# Patient Record
Sex: MALE | Race: BLACK OR AFRICAN AMERICAN | NOT HISPANIC OR LATINO
[De-identification: names, ages, dates, MRNs, and addresses within clinical notes are randomized per-mention and may not be internally consistent; named-entity substitution may affect disease eponyms.]

---

## 2017-06-12 ENCOUNTER — APPOINTMENT (OUTPATIENT)
Dept: HEMATOLOGY ONCOLOGY | Facility: CLINIC | Age: 57
End: 2017-06-12

## 2017-06-12 VITALS
RESPIRATION RATE: 14 BRPM | WEIGHT: 315 LBS | HEIGHT: 70 IN | BODY MASS INDEX: 45.1 KG/M2 | DIASTOLIC BLOOD PRESSURE: 94 MMHG | SYSTOLIC BLOOD PRESSURE: 150 MMHG | TEMPERATURE: 96.3 F | HEART RATE: 80 BPM

## 2017-06-12 DIAGNOSIS — L97.519 NON-PRESSURE CHRONIC ULCER OF OTHER PART OF RIGHT FOOT WITH UNSPECIFIED SEVERITY: ICD-10-CM

## 2017-06-12 DIAGNOSIS — Z82.0 FAMILY HISTORY OF EPILEPSY AND OTHER DISEASES OF THE NERVOUS SYSTEM: ICD-10-CM

## 2017-06-12 DIAGNOSIS — Z80.6 FAMILY HISTORY OF LEUKEMIA: ICD-10-CM

## 2017-06-12 DIAGNOSIS — Z86.69 PERSONAL HISTORY OF OTHER DISEASES OF THE NERVOUS SYSTEM AND SENSE ORGANS: ICD-10-CM

## 2017-06-12 DIAGNOSIS — Z98.890 OTHER SPECIFIED POSTPROCEDURAL STATES: ICD-10-CM

## 2017-06-12 DIAGNOSIS — Z86.39 PERSONAL HISTORY OF OTHER ENDOCRINE, NUTRITIONAL AND METABOLIC DISEASE: ICD-10-CM

## 2017-06-12 DIAGNOSIS — Z87.39 PERSONAL HISTORY OF OTHER DISEASES OF THE MUSCULOSKELETAL SYSTEM AND CONNECTIVE TISSUE: ICD-10-CM

## 2017-06-12 DIAGNOSIS — Z86.79 PERSONAL HISTORY OF OTHER DISEASES OF THE CIRCULATORY SYSTEM: ICD-10-CM

## 2017-06-12 DIAGNOSIS — Z83.3 FAMILY HISTORY OF DIABETES MELLITUS: ICD-10-CM

## 2017-06-12 RX ORDER — LOSARTAN POTASSIUM 25 MG/1
25 TABLET, FILM COATED ORAL
Refills: 0 | Status: ACTIVE | COMMUNITY

## 2017-06-12 RX ORDER — LOSARTAN POTASSIUM 100 MG/1
100 TABLET, FILM COATED ORAL
Refills: 0 | Status: ACTIVE | COMMUNITY

## 2017-06-12 RX ORDER — ASPIRIN 81 MG
81 TABLET, DELAYED RELEASE (ENTERIC COATED) ORAL
Refills: 0 | Status: ACTIVE | COMMUNITY

## 2017-06-13 LAB
BASOPHILS # BLD: 0.08 TH/MM3
BASOPHILS NFR BLD: 0.8 %
EOSINOPHIL # BLD: 0.45 TH/MM3
EOSINOPHIL NFR BLD: 4.3 %
ERYTHROCYTE [DISTWIDTH] IN BLOOD BY AUTOMATED COUNT: 16.8 %
GRANULOCYTES # BLD: 6.67 TH/MM3
GRANULOCYTES NFR BLD: 64.2 %
HCT VFR BLD AUTO: 38.2 %
HGB BLD-MCNC: 12.1 G/DL
IMM GRANULOCYTES # BLD: 0.36 TH/MM3
IMM GRANULOCYTES NFR BLD: 3.5 %
LYMPHOCYTES # BLD: 1.99 TH/MM3
LYMPHOCYTES NFR BLD: 19.2 %
MCH RBC QN AUTO: 28.4 PG
MCHC RBC AUTO-ENTMCNC: 31.7 G/DL
MCV RBC AUTO: 89.7 FL
MONOCYTES # BLD: 0.83 TH/MM3
MONOCYTES NFR BLD: 8 %
PERCENT SATURATION (NORTH): 17.2 %
PLATELET # BLD: 191 TH/MM3
PMV BLD AUTO: 9.8 FL
RBC # BLD AUTO: 4.26 MIL/MM3
RETICS/RBC NFR: 1.19 %
TIBC SERPL-MCNC: 349 UG/DL
WBC # BLD: 10.38 TH/MM3

## 2017-06-16 LAB
ALBUMIN MFR SERPL ELPH: 41.2 %
ALBUMIN SERPL ELPH-MCNC: 3.7 G/DL
ALBUMIN/GLOB SERPL ELPH: 0.7 ZZ
ALPHA1 GLOB MFR SERPL ELPH: 4 %
ALPHA1 GLOB SERPL ELPH-MCNC: 0.4 G/DL
ALPHA2 GLOB MFR SERPL ELPH: 7.7 %
ALPHA2 GLOB SERPL ELPH-MCNC: 0.7 G/DL
B-GLOBULIN SERPL ELPH-MCNC: 1.2 G/DL
BETA1 GLOB MFR SERPL ELPH: 12.8 %
FERRITIN SERPL-MCNC: 97 NG/ML
GAMMA GLOB MFR SERPL ELPH: 34.3 %
GAMMA GLOB SERPL ELPH-MCNC: 3.1 G/DL
INTERPRETATION SERPL IFE-IMP: NORMAL
KAPPA LC FREE SER-MCNC: 121.9 MG/L
KAPPA LC FREE/LAMBDA FREE SER: 2.18
LAMBDA LC FREE SERPL-MCNC: 55.9 MG/L
PROT PATTERN SERPL ELPH-IMP: 9 G/DL
PROT PATTERN SERPL ELPH-IMP: NORMAL
PROT SERPL-MCNC: 9 G/DL
VIT B12 SERPL-MCNC: 413 PG/ML

## 2017-07-17 ENCOUNTER — APPOINTMENT (OUTPATIENT)
Dept: HEMATOLOGY ONCOLOGY | Facility: CLINIC | Age: 57
End: 2017-07-17

## 2017-07-17 ENCOUNTER — OUTPATIENT (OUTPATIENT)
Dept: OUTPATIENT SERVICES | Facility: HOSPITAL | Age: 57
LOS: 1 days | Discharge: HOME | End: 2017-07-17

## 2017-07-17 VITALS
RESPIRATION RATE: 14 BRPM | HEART RATE: 94 BPM | DIASTOLIC BLOOD PRESSURE: 84 MMHG | SYSTOLIC BLOOD PRESSURE: 157 MMHG | HEIGHT: 78 IN | TEMPERATURE: 96.8 F

## 2017-07-17 DIAGNOSIS — D47.2 MONOCLONAL GAMMOPATHY: ICD-10-CM

## 2017-07-17 DIAGNOSIS — M86.179 OTHER ACUTE OSTEOMYELITIS, UNSPECIFIED ANKLE AND FOOT: ICD-10-CM

## 2017-07-17 DIAGNOSIS — N18.9 CHRONIC KIDNEY DISEASE, UNSPECIFIED: ICD-10-CM

## 2017-07-17 DIAGNOSIS — R53.83 OTHER FATIGUE: ICD-10-CM

## 2017-07-17 DIAGNOSIS — N17.9 ACUTE KIDNEY FAILURE, UNSPECIFIED: ICD-10-CM

## 2017-07-18 DIAGNOSIS — D63.1 ANEMIA IN CHRONIC KIDNEY DISEASE: ICD-10-CM

## 2017-08-07 ENCOUNTER — INPATIENT (INPATIENT)
Facility: HOSPITAL | Age: 57
LOS: 3 days | Discharge: HOME HEALTH | End: 2017-08-11
Attending: INTERNAL MEDICINE | Admitting: FAMILY MEDICINE

## 2017-08-07 DIAGNOSIS — M86.179 OTHER ACUTE OSTEOMYELITIS, UNSPECIFIED ANKLE AND FOOT: ICD-10-CM

## 2017-08-07 DIAGNOSIS — N17.9 ACUTE KIDNEY FAILURE, UNSPECIFIED: ICD-10-CM

## 2017-08-07 DIAGNOSIS — R53.83 OTHER FATIGUE: ICD-10-CM

## 2017-08-14 DIAGNOSIS — I12.9 HYPERTENSIVE CHRONIC KIDNEY DISEASE WITH STAGE 1 THROUGH STAGE 4 CHRONIC KIDNEY DISEASE, OR UNSPECIFIED CHRONIC KIDNEY DISEASE: ICD-10-CM

## 2017-08-14 DIAGNOSIS — N17.9 ACUTE KIDNEY FAILURE, UNSPECIFIED: ICD-10-CM

## 2017-08-14 DIAGNOSIS — E11.42 TYPE 2 DIABETES MELLITUS WITH DIABETIC POLYNEUROPATHY: ICD-10-CM

## 2017-08-14 DIAGNOSIS — E44.1 MILD PROTEIN-CALORIE MALNUTRITION: ICD-10-CM

## 2017-08-14 DIAGNOSIS — E11.319 TYPE 2 DIABETES MELLITUS WITH UNSPECIFIED DIABETIC RETINOPATHY WITHOUT MACULAR EDEMA: ICD-10-CM

## 2017-08-14 DIAGNOSIS — M86.471 CHRONIC OSTEOMYELITIS WITH DRAINING SINUS, RIGHT ANKLE AND FOOT: ICD-10-CM

## 2017-08-14 DIAGNOSIS — E11.22 TYPE 2 DIABETES MELLITUS WITH DIABETIC CHRONIC KIDNEY DISEASE: ICD-10-CM

## 2017-08-14 DIAGNOSIS — E11.21 TYPE 2 DIABETES MELLITUS WITH DIABETIC NEPHROPATHY: ICD-10-CM

## 2017-08-14 DIAGNOSIS — E11.69 TYPE 2 DIABETES MELLITUS WITH OTHER SPECIFIED COMPLICATION: ICD-10-CM

## 2017-08-14 DIAGNOSIS — N40.0 BENIGN PROSTATIC HYPERPLASIA WITHOUT LOWER URINARY TRACT SYMPTOMS: ICD-10-CM

## 2017-08-14 DIAGNOSIS — A49.02 METHICILLIN RESISTANT STAPHYLOCOCCUS AUREUS INFECTION, UNSPECIFIED SITE: ICD-10-CM

## 2017-08-14 DIAGNOSIS — I70.234 ATHEROSCLEROSIS OF NATIVE ARTERIES OF RIGHT LEG WITH ULCERATION OF HEEL AND MIDFOOT: ICD-10-CM

## 2017-08-14 DIAGNOSIS — E66.01 MORBID (SEVERE) OBESITY DUE TO EXCESS CALORIES: ICD-10-CM

## 2017-08-14 DIAGNOSIS — E11.51 TYPE 2 DIABETES MELLITUS WITH DIABETIC PERIPHERAL ANGIOPATHY WITHOUT GANGRENE: ICD-10-CM

## 2017-08-14 DIAGNOSIS — L97.414 NON-PRESSURE CHRONIC ULCER OF RIGHT HEEL AND MIDFOOT WITH NECROSIS OF BONE: ICD-10-CM

## 2017-08-14 DIAGNOSIS — D50.0 IRON DEFICIENCY ANEMIA SECONDARY TO BLOOD LOSS (CHRONIC): ICD-10-CM

## 2017-08-14 DIAGNOSIS — Z79.4 LONG TERM (CURRENT) USE OF INSULIN: ICD-10-CM

## 2017-08-14 DIAGNOSIS — N18.3 CHRONIC KIDNEY DISEASE, STAGE 3 (MODERATE): ICD-10-CM

## 2017-10-25 ENCOUNTER — OUTPATIENT (OUTPATIENT)
Dept: OUTPATIENT SERVICES | Facility: HOSPITAL | Age: 57
LOS: 1 days | Discharge: HOME | End: 2017-10-25

## 2017-10-25 ENCOUNTER — APPOINTMENT (OUTPATIENT)
Dept: HEMATOLOGY ONCOLOGY | Facility: CLINIC | Age: 57
End: 2017-10-25

## 2017-10-25 VITALS
HEART RATE: 97 BPM | TEMPERATURE: 97.2 F | SYSTOLIC BLOOD PRESSURE: 136 MMHG | RESPIRATION RATE: 14 BRPM | DIASTOLIC BLOOD PRESSURE: 77 MMHG | HEIGHT: 77 IN

## 2017-10-25 DIAGNOSIS — D89.0 POLYCLONAL HYPERGAMMAGLOBULINEMIA: ICD-10-CM

## 2017-10-25 DIAGNOSIS — D63.8 ANEMIA IN OTHER CHRONIC DISEASES CLASSIFIED ELSEWHERE: ICD-10-CM

## 2017-10-26 DIAGNOSIS — D89.0 POLYCLONAL HYPERGAMMAGLOBULINEMIA: ICD-10-CM

## 2017-10-26 DIAGNOSIS — D63.1 ANEMIA IN CHRONIC KIDNEY DISEASE: ICD-10-CM

## 2017-10-26 LAB
ALBUMIN MFR SERPL ELPH: 45.8 %
ALBUMIN SERPL ELPH-MCNC: 3.9 G/DL
ALBUMIN SERPL-MCNC: 3.6 G/DL
ALBUMIN/GLOB SERPL ELPH: 0.8 ZZ
ALBUMIN/GLOB SERPL: 0.78
ALP SERPL-CCNC: 71 IU/L
ALPHA1 GLOB MFR SERPL ELPH: 4.2 %
ALPHA1 GLOB SERPL ELPH-MCNC: 0.4 G/DL
ALPHA2 GLOB MFR SERPL ELPH: 7.9 %
ALPHA2 GLOB SERPL ELPH-MCNC: 0.7 G/DL
ALT SERPL-CCNC: 17 IU/L
ANION GAP SERPL CALC-SCNC: 11 MEQ/L
AST SERPL-CCNC: 24 IU/L
B-GLOBULIN SERPL ELPH-MCNC: 1.2 G/DL
BASOPHILS # BLD: 0.01 TH/MM3
BASOPHILS NFR BLD: 0.1 %
BETA1 GLOB MFR SERPL ELPH: 13.4 %
BILIRUB SERPL-MCNC: 1 MG/DL
BUN SERPL-MCNC: 28 MG/DL
BUN/CREAT SERPL: 14.2 %
CALCIUM SERPL-MCNC: 9.2 MG/DL
CHLORIDE SERPL-SCNC: 100 MEQ/L
CO2 SERPL-SCNC: 28 MEQ/L
CREAT SERPL-MCNC: 1.97 MG/DL
EOSINOPHIL # BLD: 0.28 TH/MM3
EOSINOPHIL NFR BLD: 3 %
ERYTHROCYTE [DISTWIDTH] IN BLOOD BY AUTOMATED COUNT: 15.9 %
GAMMA GLOB MFR SERPL ELPH: 28.7 %
GAMMA GLOB SERPL ELPH-MCNC: 2.5 G/DL
GFR SERPL CREATININE-BSD FRML MDRD: 43
GLUCOSE SERPL-MCNC: 77 MG/DL
GRANULOCYTES # BLD: 6.62 TH/MM3
GRANULOCYTES NFR BLD: 70.4 %
HCT VFR BLD AUTO: 39.7 %
HGB BLD-MCNC: 12.1 G/DL
IMM GRANULOCYTES # BLD: 0.01 TH/MM3
IMM GRANULOCYTES NFR BLD: 0.1 %
LYMPHOCYTES # BLD: 1.62 TH/MM3
LYMPHOCYTES NFR BLD: 17.2 %
MCH RBC QN AUTO: 26.7 PG
MCHC RBC AUTO-ENTMCNC: 30.5 G/DL
MCV RBC AUTO: 87.6 FL
MONOCYTES # BLD: 0.87 TH/MM3
MONOCYTES NFR BLD: 9.2 %
PLATELET # BLD: 191 TH/MM3
PMV BLD AUTO: 9.7 FL
POTASSIUM SERPL-SCNC: 3.6 MMOL/L
PROT PATTERN SERPL ELPH-IMP: 8.6 G/DL
PROT PATTERN SERPL ELPH-IMP: NORMAL
PROT SERPL-MCNC: 8.2 G/DL
PROT SERPL-MCNC: 8.6 G/DL
RBC # BLD AUTO: 4.53 MIL/MM3
SODIUM SERPL-SCNC: 139 MEQ/L
WBC # BLD: 9.41 TH/MM3

## 2017-10-27 LAB
KAPPA LC FREE SER-MCNC: 114.9 MG/L
KAPPA LC FREE/LAMBDA FREE SER: 2.29
LAMBDA LC FREE SERPL-MCNC: 50.1 MG/L

## 2019-10-02 ENCOUNTER — OUTPATIENT (OUTPATIENT)
Dept: OUTPATIENT SERVICES | Facility: HOSPITAL | Age: 59
LOS: 1 days | Discharge: HOME | End: 2019-10-02
Payer: COMMERCIAL

## 2019-10-02 VITALS
DIASTOLIC BLOOD PRESSURE: 64 MMHG | TEMPERATURE: 98 F | OXYGEN SATURATION: 96 % | WEIGHT: 315 LBS | RESPIRATION RATE: 18 BRPM | HEART RATE: 82 BPM | SYSTOLIC BLOOD PRESSURE: 127 MMHG | HEIGHT: 70 IN

## 2019-10-02 DIAGNOSIS — E13.610: ICD-10-CM

## 2019-10-02 DIAGNOSIS — Z01.818 ENCOUNTER FOR OTHER PREPROCEDURAL EXAMINATION: ICD-10-CM

## 2019-10-02 DIAGNOSIS — Z98.890 OTHER SPECIFIED POSTPROCEDURAL STATES: Chronic | ICD-10-CM

## 2019-10-02 DIAGNOSIS — Z89.422 ACQUIRED ABSENCE OF OTHER LEFT TOE(S): Chronic | ICD-10-CM

## 2019-10-02 DIAGNOSIS — Z94.5 SKIN TRANSPLANT STATUS: Chronic | ICD-10-CM

## 2019-10-02 LAB
ALBUMIN SERPL ELPH-MCNC: 4.5 G/DL — SIGNIFICANT CHANGE UP (ref 3.5–5.2)
ALP SERPL-CCNC: 88 U/L — SIGNIFICANT CHANGE UP (ref 30–115)
ALT FLD-CCNC: 16 U/L — SIGNIFICANT CHANGE UP (ref 0–41)
ANION GAP SERPL CALC-SCNC: 14 MMOL/L — SIGNIFICANT CHANGE UP (ref 7–14)
APPEARANCE UR: CLEAR — SIGNIFICANT CHANGE UP
APTT BLD: 31.9 SEC — SIGNIFICANT CHANGE UP (ref 27–39.2)
AST SERPL-CCNC: 26 U/L — SIGNIFICANT CHANGE UP (ref 0–41)
BACTERIA # UR AUTO: NEGATIVE — SIGNIFICANT CHANGE UP
BASOPHILS # BLD AUTO: 0.02 K/UL — SIGNIFICANT CHANGE UP (ref 0–0.2)
BASOPHILS NFR BLD AUTO: 0.2 % — SIGNIFICANT CHANGE UP (ref 0–1)
BILIRUB SERPL-MCNC: 0.6 MG/DL — SIGNIFICANT CHANGE UP (ref 0.2–1.2)
BILIRUB UR-MCNC: NEGATIVE — SIGNIFICANT CHANGE UP
BUN SERPL-MCNC: 30 MG/DL — HIGH (ref 10–20)
CALCIUM SERPL-MCNC: 9.8 MG/DL — SIGNIFICANT CHANGE UP (ref 8.5–10.1)
CHLORIDE SERPL-SCNC: 96 MMOL/L — LOW (ref 98–110)
CO2 SERPL-SCNC: 31 MMOL/L — SIGNIFICANT CHANGE UP (ref 17–32)
COLOR SPEC: SIGNIFICANT CHANGE UP
CREAT SERPL-MCNC: 2 MG/DL — HIGH (ref 0.7–1.5)
DIFF PNL FLD: ABNORMAL
EOSINOPHIL # BLD AUTO: 0.22 K/UL — SIGNIFICANT CHANGE UP (ref 0–0.7)
EOSINOPHIL NFR BLD AUTO: 2.6 % — SIGNIFICANT CHANGE UP (ref 0–8)
EPI CELLS # UR: 0 /HPF — SIGNIFICANT CHANGE UP (ref 0–5)
ESTIMATED AVERAGE GLUCOSE: 200 MG/DL — HIGH (ref 68–114)
GLUCOSE SERPL-MCNC: 128 MG/DL — HIGH (ref 70–99)
GLUCOSE UR QL: ABNORMAL
HBA1C BLD-MCNC: 8.6 % — HIGH (ref 4–5.6)
HCT VFR BLD CALC: 43.1 % — SIGNIFICANT CHANGE UP (ref 42–52)
HGB BLD-MCNC: 13.8 G/DL — LOW (ref 14–18)
HYALINE CASTS # UR AUTO: 0 /LPF — SIGNIFICANT CHANGE UP (ref 0–7)
IMM GRANULOCYTES NFR BLD AUTO: 0.4 % — HIGH (ref 0.1–0.3)
INR BLD: 0.97 RATIO — SIGNIFICANT CHANGE UP (ref 0.65–1.3)
KETONES UR-MCNC: NEGATIVE — SIGNIFICANT CHANGE UP
LEUKOCYTE ESTERASE UR-ACNC: NEGATIVE — SIGNIFICANT CHANGE UP
LYMPHOCYTES # BLD AUTO: 2.01 K/UL — SIGNIFICANT CHANGE UP (ref 1.2–3.4)
LYMPHOCYTES # BLD AUTO: 23.8 % — SIGNIFICANT CHANGE UP (ref 20.5–51.1)
MCHC RBC-ENTMCNC: 28.9 PG — SIGNIFICANT CHANGE UP (ref 27–31)
MCHC RBC-ENTMCNC: 32 G/DL — SIGNIFICANT CHANGE UP (ref 32–37)
MCV RBC AUTO: 90.2 FL — SIGNIFICANT CHANGE UP (ref 80–94)
MONOCYTES # BLD AUTO: 0.77 K/UL — HIGH (ref 0.1–0.6)
MONOCYTES NFR BLD AUTO: 9.1 % — SIGNIFICANT CHANGE UP (ref 1.7–9.3)
NEUTROPHILS # BLD AUTO: 5.41 K/UL — SIGNIFICANT CHANGE UP (ref 1.4–6.5)
NEUTROPHILS NFR BLD AUTO: 63.9 % — SIGNIFICANT CHANGE UP (ref 42.2–75.2)
NITRITE UR-MCNC: NEGATIVE — SIGNIFICANT CHANGE UP
NRBC # BLD: 0 /100 WBCS — SIGNIFICANT CHANGE UP (ref 0–0)
PH UR: 6.5 — SIGNIFICANT CHANGE UP (ref 5–8)
PLATELET # BLD AUTO: 201 K/UL — SIGNIFICANT CHANGE UP (ref 130–400)
POTASSIUM SERPL-MCNC: 4.3 MMOL/L — SIGNIFICANT CHANGE UP (ref 3.5–5)
POTASSIUM SERPL-SCNC: 4.3 MMOL/L — SIGNIFICANT CHANGE UP (ref 3.5–5)
PROT SERPL-MCNC: 9 G/DL — HIGH (ref 6–8)
PROT UR-MCNC: NEGATIVE — SIGNIFICANT CHANGE UP
PROTHROM AB SERPL-ACNC: 11.2 SEC — SIGNIFICANT CHANGE UP (ref 9.95–12.87)
RBC # BLD: 4.78 M/UL — SIGNIFICANT CHANGE UP (ref 4.7–6.1)
RBC # FLD: 14.8 % — HIGH (ref 11.5–14.5)
RBC CASTS # UR COMP ASSIST: 0 /HPF — SIGNIFICANT CHANGE UP (ref 0–4)
SODIUM SERPL-SCNC: 141 MMOL/L — SIGNIFICANT CHANGE UP (ref 135–146)
SP GR SPEC: 1.02 — SIGNIFICANT CHANGE UP (ref 1.01–1.02)
UROBILINOGEN FLD QL: SIGNIFICANT CHANGE UP
WBC # BLD: 8.46 K/UL — SIGNIFICANT CHANGE UP (ref 4.8–10.8)
WBC # FLD AUTO: 8.46 K/UL — SIGNIFICANT CHANGE UP (ref 4.8–10.8)
WBC UR QL: 0 /HPF — SIGNIFICANT CHANGE UP (ref 0–5)

## 2019-10-02 PROCEDURE — 93010 ELECTROCARDIOGRAM REPORT: CPT

## 2019-10-02 RX ORDER — EZETIMIBE 10 MG/1
1 TABLET ORAL
Qty: 0 | Refills: 0 | DISCHARGE

## 2019-10-02 RX ORDER — GLIMEPIRIDE 1 MG
1 TABLET ORAL
Qty: 0 | Refills: 0 | DISCHARGE

## 2019-10-02 RX ORDER — AMLODIPINE BESYLATE 2.5 MG/1
1 TABLET ORAL
Qty: 0 | Refills: 0 | DISCHARGE

## 2019-10-02 RX ORDER — CANAGLIFLOZIN 100 MG/1
1 TABLET, FILM COATED ORAL
Qty: 0 | Refills: 0 | DISCHARGE

## 2019-10-02 RX ORDER — ASPIRIN/CALCIUM CARB/MAGNESIUM 324 MG
1 TABLET ORAL
Qty: 0 | Refills: 0 | DISCHARGE

## 2019-10-02 RX ORDER — CILOSTAZOL 100 MG/1
0 TABLET ORAL
Qty: 0 | Refills: 0 | DISCHARGE

## 2019-10-02 NOTE — H&P PST ADULT - NSICDXPASTSURGICALHX_GEN_ALL_CORE_FT
PAST SURGICAL HISTORY:  H/O skin graft     History of percutaneous angioplasty     Left toe amputee 4 TOES

## 2019-10-02 NOTE — H&P PST ADULT - REASON FOR ADMISSION
59 Y/O M SCHEDULED FOR PAST FOR CHARCOT RECONSTRUCTION INCLUDING MULTIPLE RIGHT FOOT OSTEOTOMIES, APPLICATION OF INTERNAL HARDWARE AND EXTERNAL FIXATOR FRAME AND ACHILLES TENDON LENGTHENING ON 10/4/19

## 2019-10-02 NOTE — H&P PST ADULT - HISTORY OF PRESENT ILLNESS
CURRENTLY  DENIES ANY CP, SOB,PALPITATIONS,COUGH OR DYSURIA  EXERCISE TOLERANCE 1 FOS WITHOUT SOB      AS PER PATIENT  this is his/her complete medical history including medications - PRESCRIPTIONS  OVER THE COUNTER MEDS

## 2019-10-02 NOTE — H&P PST ADULT - NSICDXPASTMEDICALHX_GEN_ALL_CORE_FT
PAST MEDICAL HISTORY:  DM (diabetes mellitus)     High cholesterol     HTN (hypertension)     MATA on CPAP

## 2019-10-04 ENCOUNTER — INPATIENT (INPATIENT)
Facility: HOSPITAL | Age: 59
LOS: 4 days | Discharge: HOME HEALTH | End: 2019-10-09
Attending: HOSPITALIST | Admitting: HOSPITALIST
Payer: MEDICARE

## 2019-10-04 VITALS
HEIGHT: 70 IN | WEIGHT: 315 LBS | RESPIRATION RATE: 16 BRPM | TEMPERATURE: 99 F | DIASTOLIC BLOOD PRESSURE: 68 MMHG | HEART RATE: 92 BPM | SYSTOLIC BLOOD PRESSURE: 127 MMHG

## 2019-10-04 DIAGNOSIS — E11.22 TYPE 2 DIABETES MELLITUS WITH DIABETIC CHRONIC KIDNEY DISEASE: ICD-10-CM

## 2019-10-04 DIAGNOSIS — Z98.890 OTHER SPECIFIED POSTPROCEDURAL STATES: Chronic | ICD-10-CM

## 2019-10-04 DIAGNOSIS — Z79.4 LONG TERM (CURRENT) USE OF INSULIN: ICD-10-CM

## 2019-10-04 DIAGNOSIS — R50.82 POSTPROCEDURAL FEVER: ICD-10-CM

## 2019-10-04 DIAGNOSIS — M14.671 CHARCOT'S JOINT, RIGHT ANKLE AND FOOT: ICD-10-CM

## 2019-10-04 DIAGNOSIS — E11.69 TYPE 2 DIABETES MELLITUS WITH OTHER SPECIFIED COMPLICATION: ICD-10-CM

## 2019-10-04 DIAGNOSIS — E78.00 PURE HYPERCHOLESTEROLEMIA, UNSPECIFIED: ICD-10-CM

## 2019-10-04 DIAGNOSIS — G47.00 INSOMNIA, UNSPECIFIED: ICD-10-CM

## 2019-10-04 DIAGNOSIS — Z53.20 PROCEDURE AND TREATMENT NOT CARRIED OUT BECAUSE OF PATIENT'S DECISION FOR UNSPECIFIED REASONS: ICD-10-CM

## 2019-10-04 DIAGNOSIS — Z94.5 SKIN TRANSPLANT STATUS: Chronic | ICD-10-CM

## 2019-10-04 DIAGNOSIS — Z79.82 LONG TERM (CURRENT) USE OF ASPIRIN: ICD-10-CM

## 2019-10-04 DIAGNOSIS — G47.33 OBSTRUCTIVE SLEEP APNEA (ADULT) (PEDIATRIC): ICD-10-CM

## 2019-10-04 DIAGNOSIS — E66.01 MORBID (SEVERE) OBESITY DUE TO EXCESS CALORIES: ICD-10-CM

## 2019-10-04 DIAGNOSIS — N18.3 CHRONIC KIDNEY DISEASE, STAGE 3 (MODERATE): ICD-10-CM

## 2019-10-04 DIAGNOSIS — Z89.422 ACQUIRED ABSENCE OF OTHER LEFT TOE(S): ICD-10-CM

## 2019-10-04 DIAGNOSIS — Z89.422 ACQUIRED ABSENCE OF OTHER LEFT TOE(S): Chronic | ICD-10-CM

## 2019-10-04 DIAGNOSIS — M86.671 OTHER CHRONIC OSTEOMYELITIS, RIGHT ANKLE AND FOOT: ICD-10-CM

## 2019-10-04 DIAGNOSIS — I12.9 HYPERTENSIVE CHRONIC KIDNEY DISEASE WITH STAGE 1 THROUGH STAGE 4 CHRONIC KIDNEY DISEASE, OR UNSPECIFIED CHRONIC KIDNEY DISEASE: ICD-10-CM

## 2019-10-04 DIAGNOSIS — B95.62 METHICILLIN RESISTANT STAPHYLOCOCCUS AUREUS INFECTION AS THE CAUSE OF DISEASES CLASSIFIED ELSEWHERE: ICD-10-CM

## 2019-10-04 DIAGNOSIS — Z99.89 DEPENDENCE ON OTHER ENABLING MACHINES AND DEVICES: ICD-10-CM

## 2019-10-04 LAB
BLD GP AB SCN SERPL QL: SIGNIFICANT CHANGE UP
GLUCOSE BLDC GLUCOMTR-MCNC: 115 MG/DL — HIGH (ref 70–99)
GLUCOSE BLDC GLUCOMTR-MCNC: 133 MG/DL — HIGH (ref 70–99)
GLUCOSE BLDC GLUCOMTR-MCNC: 193 MG/DL — HIGH (ref 70–99)

## 2019-10-04 RX ORDER — DEXTROSE 50 % IN WATER 50 %
25 SYRINGE (ML) INTRAVENOUS ONCE
Refills: 0 | Status: DISCONTINUED | OUTPATIENT
Start: 2019-10-04 | End: 2019-10-05

## 2019-10-04 RX ORDER — INSULIN LISPRO 100/ML
VIAL (ML) SUBCUTANEOUS
Refills: 0 | Status: DISCONTINUED | OUTPATIENT
Start: 2019-10-04 | End: 2019-10-09

## 2019-10-04 RX ORDER — OXYCODONE AND ACETAMINOPHEN 5; 325 MG/1; MG/1
1 TABLET ORAL ONCE
Refills: 0 | Status: DISCONTINUED | OUTPATIENT
Start: 2019-10-04 | End: 2019-10-04

## 2019-10-04 RX ORDER — MORPHINE SULFATE 50 MG/1
4 CAPSULE, EXTENDED RELEASE ORAL EVERY 6 HOURS
Refills: 0 | Status: DISCONTINUED | OUTPATIENT
Start: 2019-10-04 | End: 2019-10-05

## 2019-10-04 RX ORDER — ACETAMINOPHEN 500 MG
650 TABLET ORAL ONCE
Refills: 0 | Status: COMPLETED | OUTPATIENT
Start: 2019-10-04 | End: 2019-10-05

## 2019-10-04 RX ORDER — HYDROMORPHONE HYDROCHLORIDE 2 MG/ML
1 INJECTION INTRAMUSCULAR; INTRAVENOUS; SUBCUTANEOUS
Refills: 0 | Status: DISCONTINUED | OUTPATIENT
Start: 2019-10-04 | End: 2019-10-04

## 2019-10-04 RX ORDER — AMLODIPINE BESYLATE 2.5 MG/1
5 TABLET ORAL DAILY
Refills: 0 | Status: DISCONTINUED | OUTPATIENT
Start: 2019-10-04 | End: 2019-10-04

## 2019-10-04 RX ORDER — HYDROCHLOROTHIAZIDE 25 MG
25 TABLET ORAL AT BEDTIME
Refills: 0 | Status: DISCONTINUED | OUTPATIENT
Start: 2019-10-04 | End: 2019-10-05

## 2019-10-04 RX ORDER — SODIUM CHLORIDE 9 MG/ML
1000 INJECTION INTRAMUSCULAR; INTRAVENOUS; SUBCUTANEOUS
Refills: 0 | Status: DISCONTINUED | OUTPATIENT
Start: 2019-10-04 | End: 2019-10-04

## 2019-10-04 RX ORDER — DEXTROSE 50 % IN WATER 50 %
12.5 SYRINGE (ML) INTRAVENOUS ONCE
Refills: 0 | Status: DISCONTINUED | OUTPATIENT
Start: 2019-10-04 | End: 2019-10-05

## 2019-10-04 RX ORDER — VANCOMYCIN HCL 1 G
1000 VIAL (EA) INTRAVENOUS EVERY 12 HOURS
Refills: 0 | Status: DISCONTINUED | OUTPATIENT
Start: 2019-10-04 | End: 2019-10-05

## 2019-10-04 RX ORDER — MORPHINE SULFATE 50 MG/1
2 CAPSULE, EXTENDED RELEASE ORAL EVERY 6 HOURS
Refills: 0 | Status: DISCONTINUED | OUTPATIENT
Start: 2019-10-04 | End: 2019-10-05

## 2019-10-04 RX ORDER — ASPIRIN/CALCIUM CARB/MAGNESIUM 324 MG
81 TABLET ORAL DAILY
Refills: 0 | Status: DISCONTINUED | OUTPATIENT
Start: 2019-10-04 | End: 2019-10-04

## 2019-10-04 RX ORDER — ACETAMINOPHEN 500 MG
650 TABLET ORAL ONCE
Refills: 0 | Status: DISCONTINUED | OUTPATIENT
Start: 2019-10-04 | End: 2019-10-04

## 2019-10-04 RX ORDER — AMLODIPINE BESYLATE 2.5 MG/1
5 TABLET ORAL AT BEDTIME
Refills: 0 | Status: DISCONTINUED | OUTPATIENT
Start: 2019-10-04 | End: 2019-10-05

## 2019-10-04 RX ORDER — ENOXAPARIN SODIUM 100 MG/ML
40 INJECTION SUBCUTANEOUS DAILY
Refills: 0 | Status: DISCONTINUED | OUTPATIENT
Start: 2019-10-04 | End: 2019-10-09

## 2019-10-04 RX ORDER — INSULIN GLARGINE 100 [IU]/ML
40 INJECTION, SOLUTION SUBCUTANEOUS AT BEDTIME
Refills: 0 | Status: DISCONTINUED | OUTPATIENT
Start: 2019-10-04 | End: 2019-10-09

## 2019-10-04 RX ORDER — GLUCAGON INJECTION, SOLUTION 0.5 MG/.1ML
1 INJECTION, SOLUTION SUBCUTANEOUS ONCE
Refills: 0 | Status: DISCONTINUED | OUTPATIENT
Start: 2019-10-04 | End: 2019-10-05

## 2019-10-04 RX ORDER — DEXTROSE 50 % IN WATER 50 %
15 SYRINGE (ML) INTRAVENOUS ONCE
Refills: 0 | Status: DISCONTINUED | OUTPATIENT
Start: 2019-10-04 | End: 2019-10-05

## 2019-10-04 RX ORDER — HYDROCHLOROTHIAZIDE 25 MG
25 TABLET ORAL DAILY
Refills: 0 | Status: DISCONTINUED | OUTPATIENT
Start: 2019-10-04 | End: 2019-10-04

## 2019-10-04 RX ORDER — ONDANSETRON 8 MG/1
4 TABLET, FILM COATED ORAL EVERY 4 HOURS
Refills: 0 | Status: DISCONTINUED | OUTPATIENT
Start: 2019-10-04 | End: 2019-10-04

## 2019-10-04 RX ORDER — INFLUENZA VIRUS VACCINE 15; 15; 15; 15 UG/.5ML; UG/.5ML; UG/.5ML; UG/.5ML
0.5 SUSPENSION INTRAMUSCULAR ONCE
Refills: 0 | Status: DISCONTINUED | OUTPATIENT
Start: 2019-10-04 | End: 2019-10-09

## 2019-10-04 RX ORDER — INSULIN LISPRO 100/ML
12 VIAL (ML) SUBCUTANEOUS
Refills: 0 | Status: DISCONTINUED | OUTPATIENT
Start: 2019-10-04 | End: 2019-10-09

## 2019-10-04 RX ORDER — SODIUM CHLORIDE 9 MG/ML
1000 INJECTION, SOLUTION INTRAVENOUS
Refills: 0 | Status: DISCONTINUED | OUTPATIENT
Start: 2019-10-04 | End: 2019-10-05

## 2019-10-04 RX ADMIN — INSULIN GLARGINE 40 UNIT(S): 100 INJECTION, SOLUTION SUBCUTANEOUS at 23:31

## 2019-10-04 RX ADMIN — SODIUM CHLORIDE 150 MILLILITER(S): 9 INJECTION INTRAMUSCULAR; INTRAVENOUS; SUBCUTANEOUS at 18:08

## 2019-10-04 NOTE — H&P ADULT - NSHPLABSRESULTS_GEN_ALL_CORE
Complete Blood Count + Automated Diff (10.02.19 @ 17:45)    WBC Count: 8.46 K/uL    RBC Count: 4.78 M/uL    Hemoglobin: 13.8 g/dL    Hematocrit: 43.1 %    Mean Cell Volume: 90.2 fL    Mean Cell Hemoglobin: 28.9 pg    Mean Cell Hemoglobin Conc: 32.0 g/dL    Red Cell Distrib Width: 14.8 %    Platelet Count - Automated: 201 K/uL    Auto Neutrophil #: 5.41 K/uL    Auto Lymphocyte #: 2.01 K/uL    Auto Monocyte #: 0.77 K/uL    Auto Eosinophil #: 0.22 K/uL    Auto Basophil #: 0.02 K/uL    Auto Neutrophil %: 63.9: Differential percentages must be correlated with absolute numbers for  clinical significance. %    Auto Lymphocyte %: 23.8 %    Auto Monocyte %: 9.1 %    Auto Eosinophil %: 2.6 %    Auto Basophil %: 0.2 %    Auto Immature Granulocyte %: 0.4 %    Nucleated RBC: 0 /100 WBCs    Comprehensive Metabolic Panel (10.02.19 @ 17:45)    Sodium, Serum: 141 mmol/L    Potassium, Serum: 4.3 mmol/L    Chloride, Serum: 96 mmol/L    Carbon Dioxide, Serum: 31 mmol/L    Anion Gap, Serum: 14 mmol/L    Blood Urea Nitrogen, Serum: 30 mg/dL    Creatinine, Serum: 2.0 mg/dL    Glucose, Serum: 128 mg/dL    Calcium, Total Serum: 9.8 mg/dL    Protein Total, Serum: 9.0 g/dL    Albumin, Serum: 4.5 g/dL    Bilirubin Total, Serum: 0.6 mg/dL    Alkaline Phosphatase, Serum: 88 U/L    Aspartate Aminotransferase (AST/SGOT): 26 U/L    Alanine Aminotransferase (ALT/SGPT): 16 U/L    eGFR if Non : 35: Interpretative comment  The units for eGFR are mL/min/1.73M2 (normalized body surface area). The  eGFR is calculated from a serum creatinine using the CKD-EPI equation.  Other variables required for calculation are race, age and sex. Among  patients with chronic kidney disease (CKD), the eGFR is useful in  determining the stage of disease according to KDOQI CKD classification.  All eGFR results are reported numerically with the following  interpretation.          GFR                    With                 Without     (ml/min/1.73 m2)    Kidney Damage       Kidney Damage        >= 90                    Stage 1                     Normal        60-89                    Stage 2                     Decreased GFR        30-59     Stage 3                     Stage 3        15-29                    Stage 4                     Stage 4        < 15                      Stage 5                     Stage 5  Each stage of CKD assumes that the associated GFR level has been in  effect for at least 3 months. Determination of stages one and two (with  eGFR > 59 ml/min/m2) requires estimation of kidney damage for at least 3  months as defined by structural or functional abnormalities.  Limitations: All estimates of GFR will be less accurate for patients at  extremes of muscle mass (including but not limited to frail elderly,  critically ill, or cancer patients), those with unusual diets, and those  with conditions associated with reduced secretion or extrarenal  elimination of creatinine. The eGFR equation is not recommended for use  in patients with unstable creatinine levels. mL/min/1.73M2    eGFR if African American: 41 mL/min/1.73M2      < from: 12 Lead ECG (10.02.19 @ 15:11) >    Diagnosis Line Sinus rhythm with 1st degree A-V block with Premature supraventricular  complexes  Left axis deviation  Abnormal ECG    < end of copied text >

## 2019-10-04 NOTE — H&P ADULT - ASSESSMENT
Patient is a 57 y/o M with PMH of DM, hypertension, dyslipidemia, sleep apnea was admitted for Charcot foot reconstruction with external fixation today on 10/4/19. Admitted to the medicine service for post operative antibiotics.    ASSESSMENT AND PLAN    #Charcot's foot  -Reconstruction with external fixation done on 10/4  -Patient is Non weight bearing strict  -Podiatry following  -Diet resumed. No active pain complaints  -No dressing change needed as per podiatry  -f/u am hemoglobin  -Vancomycin IV started today. As per podiatry, patient needs 3 days of antibiotics  -Check Vanc trough after 3rd dose tomorrow    #Diabetes Mellitus  -Started on insulin glargine and lispro  -Monitor FS and adjust insulin accordingly    #Hypertension  -c/w HCTZ and amlodipine    #Dyslipidemia  c/w zetia    #Diet: DASH/TLC/Carb consistent  #DVT ppx: lovenox  #GI ppx: Not indicated  #Dispo: still acute    PLEASE CONFIRM MEDICATIONS WITH PHARMACY IN AM. [facility]

## 2019-10-04 NOTE — H&P ADULT - ATTENDING COMMENTS
I saw and evaluated patient  by bedside,  c/o post op right ankle pain , tolerating diet well.    All labs, radiology studies, VS was reviewed  I have reviewed the resident's note and agree with documented findings and  plan of care.  -Right ankle charcoat joint- s/p surgical repair- further post op course as per Podiatry sx. rec.  daily DVT proph  -pain management  - post op abx tx. - IV linezolid tx.    - Severe obesity - BMI above 40   -outpatient weight loss tx.    - DM 2 - on insulin tx. continue bsfs monitoring    - CKD stage 3  - continue renal function monitoring    #Progress Note Handoff: Pending Consults____Podiatry f/up _____,Tests___none_____,Test Results___BMP in am_  Family discussion: pt. by bedside Disposition: to be determined

## 2019-10-04 NOTE — CHART NOTE - NSCHARTNOTEFT_GEN_A_CORE
PACU ANESTHESIA ADMISSION NOTE      Procedure: Application, external fixation device, large  External reposition of right ankle joint using external fixation device  Removal, external fixation device, ankle  Removal, external fixation device, foot    Post op diagnosis:  Diabetic Charcot's foot      ____  Intubated  TV:______       Rate: ______      FiO2: ______    _x___  Patent Airway    ____  Full return of protective reflexes    __x__  Full recovery from anesthesia / back to baseline status    Vitals:  T(F):98.7  HR: 99  BP: 172/87  RR: 21  SpO2: 98%    Mental Status:  _x___ Awake   __x___ Alert   _____ Drowsy   _____ Sedated    Nausea/Vomiting:  x____ NO  ______Yes,     See Post - Op Orders   x___ See Post - Op/PCA Orders    Post - Operative Fluids:   ____ Oral   __x__ See Post - Op Orders    Plan: Discharge:   ____Home       __x___Floor     _____Critical Care    _____  Other:_________________    Comments: Patient tolerated procedure  Transfer to PACU   No anesthesia complications

## 2019-10-04 NOTE — BRIEF OPERATIVE NOTE - NSICDXBRIEFPROCEDURE_GEN_ALL_CORE_FT
PROCEDURES:  Application, external fixation device, large 04-Oct-2019 18:02:23  Antonio Kelley  External reposition of right ankle joint using external fixation device 04-Oct-2019 17:58:24  Antonio Kelley

## 2019-10-04 NOTE — PRE-ANESTHESIA EVALUATION ADULT - NSPROPOSEDPROCEDFT_GEN_ALL_CORE
Charcot Reconstruction including Right Foot Osteotomies, Application of internal hardware and external fixator, tendon lengthening

## 2019-10-04 NOTE — PROGRESS NOTE ADULT - SUBJECTIVE AND OBJECTIVE BOX
PODIATRY PROGRESS NOTE   [id number] FERNANDEZ GUZMAN is a pleasant well-nourished, well developed 58y Male in no acute distress, alert awake, and oriented to person, place and time.   Patient is a 58y old  Male who presents with a chief complaint of right foot pain  HPI:  pt has long history of charcot foot deformity right foot.  s/p charchot recon with external fixation 10/4  pt will need to be admitted to under medicine.       Vital Signs Last 24 Hrs  T(C): 37 (04 Oct 2019 13:32), Max: 37 (04 Oct 2019 13:00)  T(F): 98.6 (04 Oct 2019 13:00), Max: 98.6 (04 Oct 2019 13:00)  HR: 92 (04 Oct 2019 13:32) (92 - 92)  BP: 127/68 (04 Oct 2019 13:32) (127/68 - 127/68)  BP(mean): --  RR: 16 (04 Oct 2019 13:32) (16 - 16)  SpO2: --                        A:  charcot foot type  s/p charcot recon RLE with external fixation 10/4    P:  pt evaluated and treated  strict NWB RLE  order 1g vanco q12h  no dressing change needed   podiatry to follow in house  f/u with attending  10-04-19 @ 18:15

## 2019-10-04 NOTE — H&P ADULT - HISTORY OF PRESENT ILLNESS
Patient is a 59 y/o M with PMH of DM, hypertension, dyslipidemia, sleep apnea was admitted for Charcot foot reconstruction with external fixation today. Patient has been admitted for IV antibiotics multiple times in the past for osteomyelitis of right foot. He has to receive IV vancomycin for 3 days post surgery as prophylaxis because of history of MRSA infection in foot. No other active complaints post surgery. Patient is lying comfortably in bed.

## 2019-10-05 LAB
ANION GAP SERPL CALC-SCNC: 10 MMOL/L — SIGNIFICANT CHANGE UP (ref 7–14)
ANION GAP SERPL CALC-SCNC: 12 MMOL/L — SIGNIFICANT CHANGE UP (ref 7–14)
BASOPHILS # BLD AUTO: 0.02 K/UL — SIGNIFICANT CHANGE UP (ref 0–0.2)
BASOPHILS NFR BLD AUTO: 0.2 % — SIGNIFICANT CHANGE UP (ref 0–1)
BUN SERPL-MCNC: 27 MG/DL — HIGH (ref 10–20)
BUN SERPL-MCNC: 27 MG/DL — HIGH (ref 10–20)
CALCIUM SERPL-MCNC: 8.6 MG/DL — SIGNIFICANT CHANGE UP (ref 8.5–10.1)
CALCIUM SERPL-MCNC: 9.1 MG/DL — SIGNIFICANT CHANGE UP (ref 8.5–10.1)
CHLORIDE SERPL-SCNC: 102 MMOL/L — SIGNIFICANT CHANGE UP (ref 98–110)
CHLORIDE SERPL-SCNC: 99 MMOL/L — SIGNIFICANT CHANGE UP (ref 98–110)
CO2 SERPL-SCNC: 29 MMOL/L — SIGNIFICANT CHANGE UP (ref 17–32)
CO2 SERPL-SCNC: 31 MMOL/L — SIGNIFICANT CHANGE UP (ref 17–32)
CREAT SERPL-MCNC: 1.8 MG/DL — HIGH (ref 0.7–1.5)
CREAT SERPL-MCNC: 1.8 MG/DL — HIGH (ref 0.7–1.5)
EOSINOPHIL # BLD AUTO: 0.15 K/UL — SIGNIFICANT CHANGE UP (ref 0–0.7)
EOSINOPHIL NFR BLD AUTO: 1.5 % — SIGNIFICANT CHANGE UP (ref 0–8)
GLUCOSE BLDC GLUCOMTR-MCNC: 117 MG/DL — HIGH (ref 70–99)
GLUCOSE BLDC GLUCOMTR-MCNC: 130 MG/DL — HIGH (ref 70–99)
GLUCOSE BLDC GLUCOMTR-MCNC: 140 MG/DL — HIGH (ref 70–99)
GLUCOSE BLDC GLUCOMTR-MCNC: 148 MG/DL — HIGH (ref 70–99)
GLUCOSE BLDC GLUCOMTR-MCNC: 180 MG/DL — HIGH (ref 70–99)
GLUCOSE SERPL-MCNC: 177 MG/DL — HIGH (ref 70–99)
GLUCOSE SERPL-MCNC: 180 MG/DL — HIGH (ref 70–99)
HCT VFR BLD CALC: 39.1 % — LOW (ref 42–52)
HCT VFR BLD CALC: 40.1 % — LOW (ref 42–52)
HCV AB S/CO SERPL IA: 0.16 S/CO — SIGNIFICANT CHANGE UP (ref 0–0.99)
HCV AB SERPL-IMP: SIGNIFICANT CHANGE UP
HGB BLD-MCNC: 12 G/DL — LOW (ref 14–18)
HGB BLD-MCNC: 12.5 G/DL — LOW (ref 14–18)
IMM GRANULOCYTES NFR BLD AUTO: 0.4 % — HIGH (ref 0.1–0.3)
LYMPHOCYTES # BLD AUTO: 1.69 K/UL — SIGNIFICANT CHANGE UP (ref 1.2–3.4)
LYMPHOCYTES # BLD AUTO: 17.4 % — LOW (ref 20.5–51.1)
MAGNESIUM SERPL-MCNC: 2.2 MG/DL — SIGNIFICANT CHANGE UP (ref 1.8–2.4)
MCHC RBC-ENTMCNC: 28.2 PG — SIGNIFICANT CHANGE UP (ref 27–31)
MCHC RBC-ENTMCNC: 28.6 PG — SIGNIFICANT CHANGE UP (ref 27–31)
MCHC RBC-ENTMCNC: 30.7 G/DL — LOW (ref 32–37)
MCHC RBC-ENTMCNC: 31.2 G/DL — LOW (ref 32–37)
MCV RBC AUTO: 91.8 FL — SIGNIFICANT CHANGE UP (ref 80–94)
MCV RBC AUTO: 91.8 FL — SIGNIFICANT CHANGE UP (ref 80–94)
MONOCYTES # BLD AUTO: 1.03 K/UL — HIGH (ref 0.1–0.6)
MONOCYTES NFR BLD AUTO: 10.6 % — HIGH (ref 1.7–9.3)
NEUTROPHILS # BLD AUTO: 6.78 K/UL — HIGH (ref 1.4–6.5)
NEUTROPHILS NFR BLD AUTO: 69.9 % — SIGNIFICANT CHANGE UP (ref 42.2–75.2)
NRBC # BLD: 0 /100 WBCS — SIGNIFICANT CHANGE UP (ref 0–0)
NRBC # BLD: 0 /100 WBCS — SIGNIFICANT CHANGE UP (ref 0–0)
PLATELET # BLD AUTO: 165 K/UL — SIGNIFICANT CHANGE UP (ref 130–400)
PLATELET # BLD AUTO: 174 K/UL — SIGNIFICANT CHANGE UP (ref 130–400)
POTASSIUM SERPL-MCNC: 3.8 MMOL/L — SIGNIFICANT CHANGE UP (ref 3.5–5)
POTASSIUM SERPL-MCNC: 4.1 MMOL/L — SIGNIFICANT CHANGE UP (ref 3.5–5)
POTASSIUM SERPL-SCNC: 3.8 MMOL/L — SIGNIFICANT CHANGE UP (ref 3.5–5)
POTASSIUM SERPL-SCNC: 4.1 MMOL/L — SIGNIFICANT CHANGE UP (ref 3.5–5)
RBC # BLD: 4.26 M/UL — LOW (ref 4.7–6.1)
RBC # BLD: 4.37 M/UL — LOW (ref 4.7–6.1)
RBC # FLD: 14.7 % — HIGH (ref 11.5–14.5)
RBC # FLD: 15 % — HIGH (ref 11.5–14.5)
SODIUM SERPL-SCNC: 141 MMOL/L — SIGNIFICANT CHANGE UP (ref 135–146)
SODIUM SERPL-SCNC: 142 MMOL/L — SIGNIFICANT CHANGE UP (ref 135–146)
WBC # BLD: 10.58 K/UL — SIGNIFICANT CHANGE UP (ref 4.8–10.8)
WBC # BLD: 9.71 K/UL — SIGNIFICANT CHANGE UP (ref 4.8–10.8)
WBC # FLD AUTO: 10.58 K/UL — SIGNIFICANT CHANGE UP (ref 4.8–10.8)
WBC # FLD AUTO: 9.71 K/UL — SIGNIFICANT CHANGE UP (ref 4.8–10.8)

## 2019-10-05 PROCEDURE — 99223 1ST HOSP IP/OBS HIGH 75: CPT | Mod: AI

## 2019-10-05 RX ORDER — AMLODIPINE BESYLATE 2.5 MG/1
5 TABLET ORAL DAILY
Refills: 0 | Status: DISCONTINUED | OUTPATIENT
Start: 2019-10-05 | End: 2019-10-09

## 2019-10-05 RX ORDER — AMPICILLIN SODIUM AND SULBACTAM SODIUM 250; 125 MG/ML; MG/ML
1.5 INJECTION, POWDER, FOR SUSPENSION INTRAMUSCULAR; INTRAVENOUS EVERY 8 HOURS
Refills: 0 | Status: DISCONTINUED | OUTPATIENT
Start: 2019-10-05 | End: 2019-10-05

## 2019-10-05 RX ORDER — LINEZOLID 600 MG/300ML
600 INJECTION, SOLUTION INTRAVENOUS EVERY 12 HOURS
Refills: 0 | Status: COMPLETED | OUTPATIENT
Start: 2019-10-05 | End: 2019-10-07

## 2019-10-05 RX ORDER — ACETAMINOPHEN 500 MG
650 TABLET ORAL EVERY 6 HOURS
Refills: 0 | Status: DISCONTINUED | OUTPATIENT
Start: 2019-10-05 | End: 2019-10-06

## 2019-10-05 RX ORDER — HYDROCHLOROTHIAZIDE 25 MG
25 TABLET ORAL DAILY
Refills: 0 | Status: DISCONTINUED | OUTPATIENT
Start: 2019-10-05 | End: 2019-10-09

## 2019-10-05 RX ORDER — FAMOTIDINE 10 MG/ML
20 INJECTION INTRAVENOUS ONCE
Refills: 0 | Status: COMPLETED | OUTPATIENT
Start: 2019-10-05 | End: 2019-10-05

## 2019-10-05 RX ADMIN — Medication 12 UNIT(S): at 11:36

## 2019-10-05 RX ADMIN — Medication 0: at 11:35

## 2019-10-05 RX ADMIN — Medication 2: at 09:09

## 2019-10-05 RX ADMIN — Medication 25 MILLIGRAM(S): at 05:11

## 2019-10-05 RX ADMIN — LINEZOLID 100 MILLIGRAM(S): 600 INJECTION, SOLUTION INTRAVENOUS at 18:54

## 2019-10-05 RX ADMIN — Medication 250 MILLIGRAM(S): at 05:11

## 2019-10-05 RX ADMIN — Medication 650 MILLIGRAM(S): at 11:27

## 2019-10-05 RX ADMIN — Medication 12 UNIT(S): at 18:54

## 2019-10-05 RX ADMIN — Medication 650 MILLIGRAM(S): at 12:15

## 2019-10-05 RX ADMIN — ENOXAPARIN SODIUM 40 MILLIGRAM(S): 100 INJECTION SUBCUTANEOUS at 11:28

## 2019-10-05 RX ADMIN — FAMOTIDINE 20 MILLIGRAM(S): 10 INJECTION INTRAVENOUS at 02:43

## 2019-10-05 RX ADMIN — INSULIN GLARGINE 40 UNIT(S): 100 INJECTION, SOLUTION SUBCUTANEOUS at 22:02

## 2019-10-05 RX ADMIN — AMLODIPINE BESYLATE 5 MILLIGRAM(S): 2.5 TABLET ORAL at 05:11

## 2019-10-05 RX ADMIN — Medication 12 UNIT(S): at 09:09

## 2019-10-05 RX ADMIN — Medication 650 MILLIGRAM(S): at 20:22

## 2019-10-05 NOTE — PROGRESS NOTE ADULT - ASSESSMENT
Patient is a 59 y/o M with PMH of DM, hypertension, dyslipidemia, sleep apnea was admitted for Charcot foot reconstruction with external fixation today on 10/4/19. Admitted to the medicine service for post operative antibiotics.      #Charcot's foot  -Reconstruction with external fixation done on 10/4  -Patient is Non weight bearing strict  -Podiatry following  -Diet resumed. No active pain complaints  -No dressing change needed as per podiatry  -f/u am hemoglobin  -Vancomycin IV started today. As per podiatry, patient needs 3 days of antibiotics  -Check Vanc trough tomorrow afternoon    #Diabetes Mellitus  -Started on insulin glargine and lispro  -Monitor FS and adjust insulin accordingly    #Hypertension  -c/w HCTZ and amlodipine    #Dyslipidemia  c/w zetia    #Diet: DASH/TLC/Carb consistent  #DVT ppx: lovenox  #GI ppx: Not indicated  #Dispo: still acute

## 2019-10-06 LAB
ANION GAP SERPL CALC-SCNC: 15 MMOL/L — HIGH (ref 7–14)
BASOPHILS # BLD AUTO: 0.02 K/UL — SIGNIFICANT CHANGE UP (ref 0–0.2)
BASOPHILS NFR BLD AUTO: 0.1 % — SIGNIFICANT CHANGE UP (ref 0–1)
BUN SERPL-MCNC: 24 MG/DL — HIGH (ref 10–20)
CALCIUM SERPL-MCNC: 8.8 MG/DL — SIGNIFICANT CHANGE UP (ref 8.5–10.1)
CHLORIDE SERPL-SCNC: 99 MMOL/L — SIGNIFICANT CHANGE UP (ref 98–110)
CO2 SERPL-SCNC: 27 MMOL/L — SIGNIFICANT CHANGE UP (ref 17–32)
CREAT SERPL-MCNC: 1.8 MG/DL — HIGH (ref 0.7–1.5)
EOSINOPHIL # BLD AUTO: 0.16 K/UL — SIGNIFICANT CHANGE UP (ref 0–0.7)
EOSINOPHIL NFR BLD AUTO: 1.1 % — SIGNIFICANT CHANGE UP (ref 0–8)
GLUCOSE BLDC GLUCOMTR-MCNC: 100 MG/DL — HIGH (ref 70–99)
GLUCOSE BLDC GLUCOMTR-MCNC: 108 MG/DL — HIGH (ref 70–99)
GLUCOSE BLDC GLUCOMTR-MCNC: 141 MG/DL — HIGH (ref 70–99)
GLUCOSE BLDC GLUCOMTR-MCNC: 183 MG/DL — HIGH (ref 70–99)
GLUCOSE SERPL-MCNC: 110 MG/DL — HIGH (ref 70–99)
HCT VFR BLD CALC: 36.5 % — LOW (ref 42–52)
HGB BLD-MCNC: 11.3 G/DL — LOW (ref 14–18)
IMM GRANULOCYTES NFR BLD AUTO: 0.7 % — HIGH (ref 0.1–0.3)
LYMPHOCYTES # BLD AUTO: 1.25 K/UL — SIGNIFICANT CHANGE UP (ref 1.2–3.4)
LYMPHOCYTES # BLD AUTO: 9 % — LOW (ref 20.5–51.1)
MCHC RBC-ENTMCNC: 28.4 PG — SIGNIFICANT CHANGE UP (ref 27–31)
MCHC RBC-ENTMCNC: 31 G/DL — LOW (ref 32–37)
MCV RBC AUTO: 91.7 FL — SIGNIFICANT CHANGE UP (ref 80–94)
MONOCYTES # BLD AUTO: 1.69 K/UL — HIGH (ref 0.1–0.6)
MONOCYTES NFR BLD AUTO: 12.1 % — HIGH (ref 1.7–9.3)
NEUTROPHILS # BLD AUTO: 10.71 K/UL — HIGH (ref 1.4–6.5)
NEUTROPHILS NFR BLD AUTO: 77 % — HIGH (ref 42.2–75.2)
NRBC # BLD: 0 /100 WBCS — SIGNIFICANT CHANGE UP (ref 0–0)
PLATELET # BLD AUTO: 155 K/UL — SIGNIFICANT CHANGE UP (ref 130–400)
POTASSIUM SERPL-MCNC: 3.4 MMOL/L — LOW (ref 3.5–5)
POTASSIUM SERPL-SCNC: 3.4 MMOL/L — LOW (ref 3.5–5)
RBC # BLD: 3.98 M/UL — LOW (ref 4.7–6.1)
RBC # FLD: 15 % — HIGH (ref 11.5–14.5)
SODIUM SERPL-SCNC: 141 MMOL/L — SIGNIFICANT CHANGE UP (ref 135–146)
WBC # BLD: 13.93 K/UL — HIGH (ref 4.8–10.8)
WBC # FLD AUTO: 13.93 K/UL — HIGH (ref 4.8–10.8)

## 2019-10-06 PROCEDURE — 99233 SBSQ HOSP IP/OBS HIGH 50: CPT

## 2019-10-06 RX ORDER — ACETAMINOPHEN 500 MG
650 TABLET ORAL EVERY 6 HOURS
Refills: 0 | Status: DISCONTINUED | OUTPATIENT
Start: 2019-10-06 | End: 2019-10-09

## 2019-10-06 RX ORDER — ACETAMINOPHEN 500 MG
650 TABLET ORAL EVERY 6 HOURS
Refills: 0 | Status: DISCONTINUED | OUTPATIENT
Start: 2019-10-06 | End: 2019-10-06

## 2019-10-06 RX ADMIN — LINEZOLID 100 MILLIGRAM(S): 600 INJECTION, SOLUTION INTRAVENOUS at 17:47

## 2019-10-06 RX ADMIN — Medication 12 UNIT(S): at 17:46

## 2019-10-06 RX ADMIN — Medication 2: at 11:22

## 2019-10-06 RX ADMIN — Medication 12 UNIT(S): at 11:22

## 2019-10-06 RX ADMIN — LINEZOLID 100 MILLIGRAM(S): 600 INJECTION, SOLUTION INTRAVENOUS at 05:30

## 2019-10-06 RX ADMIN — Medication 650 MILLIGRAM(S): at 21:49

## 2019-10-06 RX ADMIN — AMLODIPINE BESYLATE 5 MILLIGRAM(S): 2.5 TABLET ORAL at 05:30

## 2019-10-06 RX ADMIN — Medication 650 MILLIGRAM(S): at 02:52

## 2019-10-06 RX ADMIN — INSULIN GLARGINE 40 UNIT(S): 100 INJECTION, SOLUTION SUBCUTANEOUS at 21:49

## 2019-10-06 RX ADMIN — Medication 12 UNIT(S): at 10:21

## 2019-10-06 RX ADMIN — ENOXAPARIN SODIUM 40 MILLIGRAM(S): 100 INJECTION SUBCUTANEOUS at 11:22

## 2019-10-06 RX ADMIN — Medication 25 MILLIGRAM(S): at 05:30

## 2019-10-06 NOTE — PROGRESS NOTE ADULT - SUBJECTIVE AND OBJECTIVE BOX
PROGRESS NOTE   Pt seen @ bedside during PM rounds. S/p RLE Charcot recon. Dressing was soiled in coagulated blood and serosanguinous fluid and partially intact.   Currently patient denies F/N/V and shortness of breath     Vital Signs Last 24 Hrs  T(C): 37.4 (05 Oct 2019 17:16), Max: 37.7 (05 Oct 2019 14:02)  T(F): 99.3 (05 Oct 2019 17:16), Max: 99.9 (05 Oct 2019 14:02)  HR: 95 (05 Oct 2019 17:16) (86 - 99)  BP: 125/59 (05 Oct 2019 17:16) (106/59 - 177/81)  BP(mean): --  RR: 18 (05 Oct 2019 17:16) (18 - 22)  SpO2: 100% (05 Oct 2019 01:56) (96% - 100%)                          12.0   10.58 )-----------( 165      ( 05 Oct 2019 06:54 )             39.1               10-05    141  |  102  |  27<H>  ----------------------------<  180<H>  4.1   |  29  |  1.8<H>    Ca    8.6      05 Oct 2019 06:54  Mg     2.2     10-05        PHYSICAL EXAM  LE Focused:  s/p Charcot Recon Right Foot  Ex-Fix Apparatus Intact   Mild Edema and Erythema, w/ mild pinpoint bleeding @ the Suture sites and K-Wire Entry/Exit Points   Capillary Refill Time Less Than 3 Seconds  Foot was Warm to Warm to the touch     Assessment  S/p Charcot recon  Pin Sites Stable; Sutures are Intact, wound edges are well coapted w/o signs of wound dehiscences       Plan:  Patient evaluated and seen. All questions and concerns were addressed and answered   Wounds were Flushed w/ Normal Saline; Reapplied Xeroform / DSD / Kerlix / ACE   Weight Bearing Status: Continue STRICT NWB RLE   Monitoring closely  for a few days for signs of infection  Cont IV Abx: Vancomycin   Podiatry Following w/ Daily Dressing Changes  Planned Discharge This Week;   Wheelchair on D/C  Attending Updated and Aware

## 2019-10-06 NOTE — PROGRESS NOTE ADULT - SUBJECTIVE AND OBJECTIVE BOX
FERNANDEZ GUZMAN  [address] [facility] [address] ([facility] [address] [facility])            Patient was evaluated and examined  by bedside, reports right foot post op pain controlled on current regimen tx.                REVIEW OF SYSTEMS:  please see pertinent positives mentioned above, all other 12 ROS negative      T(C): , Max: 38.3 (10-06-19 @ 01:00)  HR: 90 (10-06-19 @ 13:05)  BP: 140/77 (10-06-19 @ 13:05)  RR: 18 (10-06-19 @ 13:05)  SpO2: --  CAPILLARY BLOOD GLUCOSE      POCT Blood Glucose.: 183 mg/dL (06 Oct 2019 11:03)  POCT Blood Glucose.: 100 mg/dL (06 Oct 2019 07:27)  POCT Blood Glucose.: 117 mg/dL (05 Oct 2019 21:44)  POCT Blood Glucose.: 140 mg/dL (05 Oct 2019 16:36)      PHYSICAL EXAM:  GENERAL: NAD, AAOX3, patient is laying comfortably in bed, obese  HEENT: AT, NC, PERRLA, SUPPLE, NO JVD, NO CB  LUNG: CTA B/L  CVS: normal S1, S2, RRR, NO M/G/R  ABDOMEN: soft, bowel sounds present, normoactive in all 4 quadrants, non-tender, non-distended  EXT: right ankle post op changes with metal rods in joint, no E/C/C, positive PP b/l extremities  NEURO: no acute focal neurological deficits, gait not tested  SKIN: no rash, no ecchymosis        LAB  CBC  Date: 10-06-19 @ 07:41  Mean cell Oxtqcfzhjo71.4  Mean cell Hemoglobin Conc31.0  Mean cell Volum 91.7  Platelet count-Automate 155  RBC Count 3.98  Red Cell Distrib Width15.0  WBC Count13.93  % Albumin, Urine--  Hematocrit 36.5  Hemoglobin 11.3  CBC  Date: 10-05-19 @ 06:54  Mean cell Jheskiacjy54.2  Mean cell Hemoglobin Conc30.7  Mean cell Volum 91.8  Platelet count-Automate 165  RBC Count 4.26  Red Cell Distrib Width15.0  WBC Count10.58  % Albumin, Urine--  Hematocrit 39.1  Hemoglobin 12.0  CBC  Date: 10-05-19 @ 01:05  Mean cell Hcyiwkbocy68.6  Mean cell Hemoglobin Conc31.2  Mean cell Volum 91.8  Platelet count-Automate 174  RBC Count 4.37  Red Cell Distrib Width14.7  WBC Count9.71  % Albumin, Urine--  Hematocrit 40.1  Hemoglobin 12.5  CBC  Date: 10-02-19 @ 17:45  Mean cell Daprayywxl58.9  Mean cell Hemoglobin Conc32.0  Mean cell Volum 90.2  Platelet count-Automate 201  RBC Count 4.78  Red Cell Distrib Width14.8  WBC Count8.46  % Albumin, Urine--  Hematocrit 43.1  Hemoglobin 13.8    [facility]  10-06-19 @ 07:41  Blood Gas Arterial-Calcium,Ionized--  Blood Urea Nitrogen, Serum 24 mg/dL<H> [10 - 20]  Carbon Dioxide, Serum27 mmol/L [17 - 32]  Chloride, Serum99 mmol/L [98 - 110]  Creatinie, Serum1.8 mg/dL<H> [0.7 - 1.5]  Glucose, Golvv533 mg/dL<H> [70 - 99]  Potassium, Serum3.4 mmol/L<L> [3.5 - 5.0]  Sodium, Serum 141 mmol/L [135 - 146]  [facility]  10-05-19 @ 06:54  Blood Gas Arterial-Calcium,Ionized--  Blood Urea Nitrogen, Serum 27 mg/dL<H> [10 - 20]  Carbon Dioxide, Serum29 mmol/L [17 - 32]  Chloride, Wdioj058 mmol/L [98 - 110]  Creatinie, Serum1.8 mg/dL<H> [0.7 - 1.5]  Glucose, Pepee539 mg/dL<H> [70 - 99]  Potassium, Serum4.1 mmol/L [3.5 - 5.0]  Sodium, Serum 141 mmol/L [135 - 146]  [facility]  10-05-19 @ 01:05  Blood Gas Arterial-Calcium,Ionized--  Blood Urea Nitrogen, Serum 27 mg/dL<H> [10 - 20]  Carbon Dioxide, Serum31 mmol/L [17 - 32]  Chloride, Serum99 mmol/L [98 - 110]  Creatinie, Serum1.8 mg/dL<H> [0.7 - 1.5]  Glucose, Tfpvq174 mg/dL<H> [70 - 99]  Potassium, Serum3.8 mmol/L [3.5 - 5.0]  Sodium, Serum 142 mmol/L [135 - 146]  [facility]  10-02-19 @ 17:45  Blood Gas Arterial-Calcium,Ionized--  Blood Urea Nitrogen, Serum 30 mg/dL<H> [10 - 20]  Carbon Dioxide, Serum31 mmol/L [17 - 32]  Chloride, Serum96 mmol/L<L> [98 - 110]  Creatinie, Serum2.0 mg/dL<H> [0.7 - 1.5]  Glucose, Ryruk044 mg/dL<H> [70 - 99]  Potassium, Serum4.3 mmol/L [3.5 - 5.0]  Sodium, Serum 141 mmol/L [135 - 146]          Medications:  acetaminophen   Tablet .. 650 milliGRAM(s) Oral every 6 hours PRN  amLODIPine   Tablet 5 milliGRAM(s) Oral daily  enoxaparin Injectable 40 milliGRAM(s) SubCutaneous daily  hydrochlorothiazide 25 milliGRAM(s) Oral daily  influenza   Vaccine 0.5 milliLiter(s) IntraMuscular once  insulin glargine Injectable (LANTUS) 40 Unit(s) SubCutaneous at bedtime  insulin lispro (HumaLOG) corrective regimen sliding scale   SubCutaneous three times a day before meals  insulin lispro Injectable (HumaLOG) 12 Unit(s) SubCutaneous three times a day before meals  linezolid  IVPB 600 milliGRAM(s) IV Intermittent every 12 hours        Assessment and Plan:  -Right ankle charcoat joint- s/p surgical repair- further post op course as per Podiatry sx. rec.  daily DVT proph  -pain management  - post op abx tx. - IV linezolid tx.    - Severe obesity - BMI above 40   -outpatient weight loss tx.    - DM 2 - on insulin tx. continue bsfs monitoring    - CKD stage 3  - continue renal function monitoring    #Progress Note Handoff: Pending Consults____Podiatry f/up _____,Tests___none_____,Test Results___BMP in am_  Family discussion: pt. by bedside Disposition: home with home care , patient will need wheelchair arrangements prior to d/c.

## 2019-10-07 ENCOUNTER — TRANSCRIPTION ENCOUNTER (OUTPATIENT)
Age: 59
End: 2019-10-07

## 2019-10-07 DIAGNOSIS — Z02.9 ENCOUNTER FOR ADMINISTRATIVE EXAMINATIONS, UNSPECIFIED: ICD-10-CM

## 2019-10-07 LAB
ANION GAP SERPL CALC-SCNC: 14 MMOL/L — SIGNIFICANT CHANGE UP (ref 7–14)
APPEARANCE UR: CLEAR — SIGNIFICANT CHANGE UP
BACTERIA # UR AUTO: NEGATIVE — SIGNIFICANT CHANGE UP
BILIRUB UR-MCNC: NEGATIVE — SIGNIFICANT CHANGE UP
BUN SERPL-MCNC: 20 MG/DL — SIGNIFICANT CHANGE UP (ref 10–20)
CALCIUM SERPL-MCNC: 9.2 MG/DL — SIGNIFICANT CHANGE UP (ref 8.5–10.1)
CHLORIDE SERPL-SCNC: 94 MMOL/L — LOW (ref 98–110)
CO2 SERPL-SCNC: 29 MMOL/L — SIGNIFICANT CHANGE UP (ref 17–32)
COLOR SPEC: SIGNIFICANT CHANGE UP
CREAT SERPL-MCNC: 1.7 MG/DL — HIGH (ref 0.7–1.5)
DIFF PNL FLD: ABNORMAL
EPI CELLS # UR: 0 /HPF — SIGNIFICANT CHANGE UP (ref 0–5)
GLUCOSE BLDC GLUCOMTR-MCNC: 111 MG/DL — HIGH (ref 70–99)
GLUCOSE BLDC GLUCOMTR-MCNC: 151 MG/DL — HIGH (ref 70–99)
GLUCOSE BLDC GLUCOMTR-MCNC: 161 MG/DL — HIGH (ref 70–99)
GLUCOSE BLDC GLUCOMTR-MCNC: 161 MG/DL — HIGH (ref 70–99)
GLUCOSE SERPL-MCNC: 112 MG/DL — HIGH (ref 70–99)
GLUCOSE UR QL: ABNORMAL
HCT VFR BLD CALC: 36.6 % — LOW (ref 42–52)
HGB BLD-MCNC: 11.5 G/DL — LOW (ref 14–18)
HYALINE CASTS # UR AUTO: 1 /LPF — SIGNIFICANT CHANGE UP (ref 0–7)
KETONES UR-MCNC: SIGNIFICANT CHANGE UP
LEUKOCYTE ESTERASE UR-ACNC: NEGATIVE — SIGNIFICANT CHANGE UP
MAGNESIUM SERPL-MCNC: 2 MG/DL — SIGNIFICANT CHANGE UP (ref 1.8–2.4)
MCHC RBC-ENTMCNC: 28.9 PG — SIGNIFICANT CHANGE UP (ref 27–31)
MCHC RBC-ENTMCNC: 31.4 G/DL — LOW (ref 32–37)
MCV RBC AUTO: 92 FL — SIGNIFICANT CHANGE UP (ref 80–94)
NITRITE UR-MCNC: NEGATIVE — SIGNIFICANT CHANGE UP
NRBC # BLD: 0 /100 WBCS — SIGNIFICANT CHANGE UP (ref 0–0)
PH UR: 6 — SIGNIFICANT CHANGE UP (ref 5–8)
PLATELET # BLD AUTO: 167 K/UL — SIGNIFICANT CHANGE UP (ref 130–400)
POTASSIUM SERPL-MCNC: 3.4 MMOL/L — LOW (ref 3.5–5)
POTASSIUM SERPL-SCNC: 3.4 MMOL/L — LOW (ref 3.5–5)
PROT UR-MCNC: SIGNIFICANT CHANGE UP
RBC # BLD: 3.98 M/UL — LOW (ref 4.7–6.1)
RBC # FLD: 14.9 % — HIGH (ref 11.5–14.5)
RBC CASTS # UR COMP ASSIST: 18 /HPF — HIGH (ref 0–4)
SODIUM SERPL-SCNC: 137 MMOL/L — SIGNIFICANT CHANGE UP (ref 135–146)
SP GR SPEC: 1.02 — SIGNIFICANT CHANGE UP (ref 1.01–1.02)
UROBILINOGEN FLD QL: SIGNIFICANT CHANGE UP
WBC # BLD: 16.24 K/UL — HIGH (ref 4.8–10.8)
WBC # FLD AUTO: 16.24 K/UL — HIGH (ref 4.8–10.8)
WBC UR QL: 0 /HPF — SIGNIFICANT CHANGE UP (ref 0–5)

## 2019-10-07 PROCEDURE — 71045 X-RAY EXAM CHEST 1 VIEW: CPT | Mod: 26

## 2019-10-07 PROCEDURE — 99233 SBSQ HOSP IP/OBS HIGH 50: CPT

## 2019-10-07 RX ORDER — POTASSIUM CHLORIDE 20 MEQ
40 PACKET (EA) ORAL ONCE
Refills: 0 | Status: COMPLETED | OUTPATIENT
Start: 2019-10-07 | End: 2019-10-07

## 2019-10-07 RX ADMIN — Medication 12 UNIT(S): at 07:44

## 2019-10-07 RX ADMIN — Medication 40 MILLIEQUIVALENT(S): at 10:38

## 2019-10-07 RX ADMIN — Medication 12 UNIT(S): at 17:45

## 2019-10-07 RX ADMIN — LINEZOLID 100 MILLIGRAM(S): 600 INJECTION, SOLUTION INTRAVENOUS at 05:31

## 2019-10-07 RX ADMIN — Medication 2: at 11:15

## 2019-10-07 RX ADMIN — Medication 25 MILLIGRAM(S): at 05:33

## 2019-10-07 RX ADMIN — AMLODIPINE BESYLATE 5 MILLIGRAM(S): 2.5 TABLET ORAL at 05:33

## 2019-10-07 RX ADMIN — Medication 650 MILLIGRAM(S): at 17:45

## 2019-10-07 RX ADMIN — INSULIN GLARGINE 40 UNIT(S): 100 INJECTION, SOLUTION SUBCUTANEOUS at 21:26

## 2019-10-07 RX ADMIN — Medication 2: at 17:43

## 2019-10-07 RX ADMIN — ENOXAPARIN SODIUM 40 MILLIGRAM(S): 100 INJECTION SUBCUTANEOUS at 11:16

## 2019-10-07 RX ADMIN — Medication 650 MILLIGRAM(S): at 16:07

## 2019-10-07 RX ADMIN — Medication 12 UNIT(S): at 11:16

## 2019-10-07 NOTE — DISCHARGE NOTE NURSING/CASE MANAGEMENT/SOCIAL WORK - PATIENT PORTAL LINK FT
You can access the FollowMyHealth Patient Portal offered by [facility] by registering at the following website: [email or web address]. By joining [email or web address] FollowMyHealth portal, you will also be able to view your health information using other applications (apps) compatible with our system.

## 2019-10-07 NOTE — CONSULT NOTE ADULT - ASSESSMENT
IMPRESSION: Rehab of reconstruction of right charcoat joint, s/p ext fixator    PRECAUTIONS: [  ] Cardiac  [  ] Respiratory  [  ] Seizures [  ] Contact Isolation  [  ] Droplet Isolation  [  ] Other    Weight Bearing Status: NWB LLE    RECOMMENDATION:    Out of Bed to Chair     DVT/Decubiti Prophylaxis    REHAB PLAN:     [  x ] Bedside P/T 3-5 times a week   [   ]   Bedside O/T  2-3 times a week             [   ] No Rehab Therapy Indicated                   [   ]  Speech Therapy   Conditioning/ROM                                    ADL  Bed Mobility                                               Conditioning/ROM  Transfers                                                     Bed Mobility  Sitting /Standing Balance                         Transfers                                        Gait Training                                               Sitting/Standing Balance  Stair Training [   ]Applicable                    Home equipment Eval                                                                        Splinting  [   ] Only      GOALS:   ADL   [   ]   Independent                    Transfers  [ x  ] Independent                          Ambulation  [ x  ] Independent     [ x   ] With device                            [   ]  CG                                                         [   ]  CG                                                                  [   ] CG                            [    ] Min A                                                   [   ] Min A                                                              [   ] Min  A          DISCHARGE PLAN:   [   ]  Good candidate for Intensive Rehabilitation/Hospital based                                             Will tolerate 3hrs Intensive Rehab Daily                                       [  x  ]  Short Term Rehab in Skilled Nursing Facility                      vs                 [ x   ]  Home with Outpatient or  services / 24hr care                                         [    ]  Possible Candidate for Intensive Hospital based Rehab IMPRESSION: Rehab of reconstruction of right charcoat joint, s/p ext fixator    PRECAUTIONS: [  ] Cardiac  [  ] Respiratory  [  ] Seizures [  ] Contact Isolation  [  ] Droplet Isolation  [  ] Other    Weight Bearing Status: NWB RLE    RECOMMENDATION:    Out of Bed to Chair     DVT/Decubiti Prophylaxis    REHAB PLAN:     [  x ] Bedside P/T 3-5 times a week   [   ]   Bedside O/T  2-3 times a week             [   ] No Rehab Therapy Indicated                   [   ]  Speech Therapy   Conditioning/ROM                                    ADL  Bed Mobility                                               Conditioning/ROM  Transfers                                                     Bed Mobility  Sitting /Standing Balance                         Transfers                                        Gait Training                                               Sitting/Standing Balance  Stair Training [   ]Applicable                    Home equipment Eval                                                                        Splinting  [   ] Only      GOALS:   ADL   [   ]   Independent                    Transfers  [ x  ] Independent                          Ambulation  [ x  ] Independent     [ x   ] With device                            [   ]  CG                                                         [   ]  CG                                                                  [   ] CG                            [    ] Min A                                                   [   ] Min A                                                              [   ] Min  A          DISCHARGE PLAN:   [   ]  Good candidate for Intensive Rehabilitation/Hospital based                                             Will tolerate 3hrs Intensive Rehab Daily                                       [  x  ]  Short Term Rehab in Skilled Nursing Facility                      vs                 [ x   ]  Home with Outpatient or  services / 24hr care                                         [    ]  Possible Candidate for Intensive Hospital based Rehab

## 2019-10-07 NOTE — CONSULT NOTE ADULT - SUBJECTIVE AND OBJECTIVE BOX
HPI:  Patient is a 57 y/o M with PMH of DM, hypertension, dyslipidemia, sleep apnea was admitted for Charcot foot reconstruction with external fixation today. Patient has been admitted for IV antibiotics multiple times in the past for osteomyelitis of right foot. He has to receive IV vancomycin for 3 days post surgery as prophylaxis because of history of MRSA infection in foot. No other active complaints post surgery. Patient is lying comfortably in bed. (04 Oct 2019 20:39)      PAST MEDICAL & SURGICAL HISTORY:  High cholesterol  HTN (hypertension)  DM (diabetes mellitus)  MATA on CPAP  Left toe amputee: 4 TOES  H/O skin graft  History of percutaneous angioplasty      Hospital Course:    TODAY'S SUBJECTIVE & REVIEW OF SYMPTOMS:     Constitutional WNL   Cardio WNL   Resp WNL   GI WNL  Heme WNL  Endo WNL  Skin WNL  MSK Weakness  Neuro WNL  Cognitive WNL  Psych WNL      MEDICATIONS  (STANDING):  amLODIPine   Tablet 5 milliGRAM(s) Oral daily  enoxaparin Injectable 40 milliGRAM(s) SubCutaneous daily  hydrochlorothiazide 25 milliGRAM(s) Oral daily  influenza   Vaccine 0.5 milliLiter(s) IntraMuscular once  insulin glargine Injectable (LANTUS) 40 Unit(s) SubCutaneous at bedtime  insulin lispro (HumaLOG) corrective regimen sliding scale   SubCutaneous three times a day before meals  insulin lispro Injectable (HumaLOG) 12 Unit(s) SubCutaneous three times a day before meals    MEDICATIONS  (PRN):  acetaminophen   Tablet .. 650 milliGRAM(s) Oral every 6 hours PRN Temp greater or equal to 38C (100.4F), Moderate Pain (4 - 6)      FAMILY HISTORY:  FH: leukemia  Family history of diabetes mellitus (DM)      Allergies    No Known Allergies    Intolerances    statins (Other)      SOCIAL HISTORY:    [  ] Etoh  [  ] Smoking  [  ] Substance abuse     Home Environment:  [  ] Home Alone  [  ] Lives with Family  [x  ] Home Health Aid 24hr    Dwelling:  [  ] Apartment  [x  ] Private House  [  ] Adult Home  [  ] Skilled Nursing Facility      [  ] Short Term  [  ] Long Term  [  ] Stairs       Elevator [  ]    FUNCTIONAL STATUS PTA: (Check all that apply)  Ambulation: [ x  ]Independent    [  ] Dependent     [  ] Non-Ambulatory  Assistive Device: [  ] SA Cane  [  ]  Q Cane  [ x ] Walker  [  ]  Wheelchair  ADL : [x  ] Independent  [  ]  Dependent       Vital Signs Last 24 Hrs  T(C): 36.8 (07 Oct 2019 12:11), Max: 38.4 (06 Oct 2019 20:18)  T(F): 98.2 (07 Oct 2019 12:11), Max: 101.1 (06 Oct 2019 20:18)  HR: 92 (07 Oct 2019 12:11) (85 - 92)  BP: 132/65 (07 Oct 2019 12:11) (118/69 - 133/65)  BP(mean): --  RR: 18 (07 Oct 2019 12:11) (18 - 18)  SpO2: --      PHYSICAL EXAM: Alert & Oriented X3  GENERAL: NAD  HEAD:  Atraumatic, Normocephalic  CHEST/LUNG: Clear   HEART: S1S2+  ABDOMEN: Soft, Nontender  EXTREMITIES:  no calf tenderness, + right ankle ext fixator    NERVOUS SYSTEM:  Cranial Nerves 2-12 intact [  ] Abnormal  [  ]  ROM: WFL all extremities [  ]  Abnormal [x  ]limited rle  Motor Strength: WFL all extremities  [  ]  Abnormal [x  ]limited rle  Sensation: intact to light touch [  ] Abnormal [  ]  Reflexes: Symmetric [  ]  Abnormal [  ]    FUNCTIONAL STATUS:  Bed Mobility: Independent [  ]  Supervision [  ]  Needs Assistance [ x ]  N/A [  ]  Transfers: Independent [  ]  Supervision [  ]  Needs Assistance [ x ]  N/A [  ]   Ambulation: Independent [  ]  Supervision [  ]  Needs Assistance [  ]  N/A [  ]  ADL: Independent [  ] Requires Assistance [  ] N/A [  ]      LABS:                        11.5   16.24 )-----------( 167      ( 07 Oct 2019 08:00 )             36.6     10-07    137  |  94<L>  |  20  ----------------------------<  112<H>  3.4<L>   |  29  |  1.7<H>    Ca    9.2      07 Oct 2019 08:00  Mg     2.0     10-07        Urinalysis Basic - ( 07 Oct 2019 12:28 )    Color: Light Yellow / Appearance: Clear / S.021 / pH: x  Gluc: x / Ketone: Trace  / Bili: Negative / Urobili: <2 mg/dL   Blood: x / Protein: Trace / Nitrite: Negative   Leuk Esterase: Negative / RBC: 18 /HPF / WBC 0 /HPF   Sq Epi: x / Non Sq Epi: 0 /HPF / Bacteria: Negative        RADIOLOGY & ADDITIONAL STUDIES:    Assesment:

## 2019-10-07 NOTE — PROGRESS NOTE ADULT - ATTENDING COMMENTS
#Fever, post op; clinically asymptomatic  low suspicion for infectious etiology; pt requesting to be d/c home, will f/u outpt pmd  monitor off abx  #Charcot foot s/p repair  pt refusing PT, would like to be d/c home has 24 hour hha  to d/w podiatry  pain controlled    #Progress Note Handoff:  Pending (specify):  Consults_________, Tests________, Test Results_______, Other___f/u pod______  Family discussion:d/w pt at bedside re: primary dx, treatment plan  Disposition: Home___/SNF___/Other________/Unknown at this time___x_____

## 2019-10-07 NOTE — PROGRESS NOTE ADULT - SUBJECTIVE AND OBJECTIVE BOX
SUBJECTIVE:    Patient is a 58y old Male who presents with a chief complaint of Antibiotics for Charcot's foot (06 Oct 2019 13:52)    Currently admitted to medicine with the primary diagnosis of    Today is hospital day 3d. This morning he is resting comfortably in bed and reports no new issues or overnight events.     INTERVAL EVENTS: Patient comfortable. Had low grade fever overnight    PAST MEDICAL & SURGICAL HISTORY  High cholesterol  HTN (hypertension)  DM (diabetes mellitus)  MATA on CPAP  Left toe amputee: 4 TOES  H/O skin graft  History of percutaneous angioplasty      ALLERGIES:  No Known Allergies    MEDICATIONS:  STANDING MEDICATIONS  amLODIPine   Tablet 5 milliGRAM(s) Oral daily  enoxaparin Injectable 40 milliGRAM(s) SubCutaneous daily  hydrochlorothiazide 25 milliGRAM(s) Oral daily  influenza   Vaccine 0.5 milliLiter(s) IntraMuscular once  insulin glargine Injectable (LANTUS) 40 Unit(s) SubCutaneous at bedtime  insulin lispro (HumaLOG) corrective regimen sliding scale   SubCutaneous three times a day before meals  insulin lispro Injectable (HumaLOG) 12 Unit(s) SubCutaneous three times a day before meals    PRN MEDICATIONS  acetaminophen   Tablet .. 650 milliGRAM(s) Oral every 6 hours PRN    VITALS:   T(F): 99.3  HR: 85  BP: 118/69  RR: 18  SpO2: --    LABS:                        11.3   13.93 )-----------( 155      ( 06 Oct 2019 07:41 )             36.5     10-06    141  |  99  |  24<H>  ----------------------------<  110<H>  3.4<L>   |  27  |  1.8<H>    Ca    8.8      06 Oct 2019 07:41                    RADIOLOGY:    PHYSICAL EXAM:  GEN: No acute distress  PULM/CHEST: Clear to auscultation bilaterally, no rales, rhonchi or wheezes   CVS: Regular rate and rhythm, S1-S2, no murmurs  ABD: Soft, non-tender, non-distended, +BS  EXT: No edema  NEURO: AAOx3    Lovelace Catheter: SUBJECTIVE:    Patient is a 58y old Male who presents with a chief complaint of Antibiotics for Charcot's foot (06 Oct 2019 13:52)    Currently admitted to medicine with the primary diagnosis of    Today is hospital day 3d. This morning he is resting comfortably in bed and reports no new issues or overnight events.     INTERVAL EVENTS: Patient comfortable. Had low grade fever overnight  no cp, sob, abd pain  +fever overnight, no cough, diarrhea, abd pain    PAST MEDICAL & SURGICAL HISTORY  High cholesterol  HTN (hypertension)  DM (diabetes mellitus)  MATA on CPAP  Left toe amputee: 4 TOES  H/O skin graft  History of percutaneous angioplasty      ALLERGIES:  No Known Allergies    MEDICATIONS:  STANDING MEDICATIONS  amLODIPine   Tablet 5 milliGRAM(s) Oral daily  enoxaparin Injectable 40 milliGRAM(s) SubCutaneous daily  hydrochlorothiazide 25 milliGRAM(s) Oral daily  influenza   Vaccine 0.5 milliLiter(s) IntraMuscular once  insulin glargine Injectable (LANTUS) 40 Unit(s) SubCutaneous at bedtime  insulin lispro (HumaLOG) corrective regimen sliding scale   SubCutaneous three times a day before meals  insulin lispro Injectable (HumaLOG) 12 Unit(s) SubCutaneous three times a day before meals    PRN MEDICATIONS  acetaminophen   Tablet .. 650 milliGRAM(s) Oral every 6 hours PRN    VITALS:   T(F): 99.3  HR: 85  BP: 118/69  RR: 18  SpO2: --    LABS:                        11.3   13.93 )-----------( 155      ( 06 Oct 2019 07:41 )             36.5     10-06    141  |  99  |  24<H>  ----------------------------<  110<H>  3.4<L>   |  27  |  1.8<H>    Ca    8.8      06 Oct 2019 07:41                    RADIOLOGY:    PHYSICAL EXAM:  GEN: No acute distress  PULM/CHEST: Clear to auscultation bilaterally, no rales, rhonchi or wheezes   CVS: Regular rate and rhythm, S1-S2, no murmurs  ABD: Soft, non-tender, non-distended, +BS  EXT: No edema  NEURO: AAOx3    Lovelace Catheter:

## 2019-10-07 NOTE — PROGRESS NOTE ADULT - ASSESSMENT
Patient is a 59 y/o M with PMH of DM, hypertension, dyslipidemia, sleep apnea was admitted for Charcot foot reconstruction with external fixation today on 10/4/19. Admitted to the medicine service for post operative antibiotics.      # Charcot's foot  - Reconstruction with external fixation done on 10/4  - Patient is Non weight bearing strict  - Podiatry following  - Diet resumed. No active pain complaints  - No dressing change needed as per podiatry  - f/u am hemoglobin  - Vancomycin IV started Saturday. As per podiatry, patient needs 3 days of antibiotics  - Will stop vancomycin today if recommended by podiatry    # Diabetes Mellitus  - Started on insulin glargine and lispro  - Monitor FS and adjust insulin accordingly, well controlled    # Hypertension  - c/w HCTZ and amlodipine    # Dyslipidemia  - c/w zetia    #Diet: DASH/TLC/Carb consistent  #DVT ppx: lovenox  #GI ppx: Not indicated  #Dispo: still acute

## 2019-10-07 NOTE — PROGRESS NOTE ADULT - SUBJECTIVE AND OBJECTIVE BOX
PROGRESS NOTE   Pt seen @ bedside during AM rounds. S/p RLE Charcot recon. Dressing +Clean, +Dry, + Intact.         Vital Signs Last 24 Hrs  T(C): 37.4 (07 Oct 2019 05:00), Max: 38.4 (06 Oct 2019 20:18)  T(F): 99.3 (07 Oct 2019 05:00), Max: 101.1 (06 Oct 2019 20:18)  HR: 85 (07 Oct 2019 05:00) (85 - 92)  BP: 118/69 (07 Oct 2019 05:00) (118/69 - 140/77)  BP(mean): --  RR: 18 (07 Oct 2019 05:00) (18 - 18)  SpO2: --                          11.5   16.24 )-----------( 167      ( 07 Oct 2019 08:00 )             36.6               10-07    137  |  94<L>  |  20  ----------------------------<  112<H>  3.4<L>   |  29  |  1.7<H>    Ca    9.2      07 Oct 2019 08:00  Mg     2.0     10-07        Assessment  S/p Charcot recon  Pin sites and surgical sites are clean without drainage. no dehiscence. Ex fix stable and intact.   Plan:  Pt evaluated  Continue strict NWB RLE   Dressing change by podiatry  Plan D/C to home once pt is stable from the medicine team and fevers are resolved.  Wheelchair on D/C  Request VNS for dressing changes  wound care orders on D/C: Wash with peroxide and apply Tami to pin sites/Gauze/Cling/ACE.   F/u with Dr. Hudson as o/p in 1 week after D/C  attending updated PROGRESS NOTE   Pt seen @ bedside during AM rounds. S/p RLE Charcot recon. Dressing +Clean, +Dry, + Intact.         Vital Signs Last 24 Hrs  T(C): 37.4 (07 Oct 2019 05:00), Max: 38.4 (06 Oct 2019 20:18)  T(F): 99.3 (07 Oct 2019 05:00), Max: 101.1 (06 Oct 2019 20:18)  HR: 85 (07 Oct 2019 05:00) (85 - 92)  BP: 118/69 (07 Oct 2019 05:00) (118/69 - 140/77)  BP(mean): --  RR: 18 (07 Oct 2019 05:00) (18 - 18)  SpO2: --                          11.5   16.24 )-----------( 167      ( 07 Oct 2019 08:00 )             36.6               10-07    137  |  94<L>  |  20  ----------------------------<  112<H>  3.4<L>   |  29  |  1.7<H>    Ca    9.2      07 Oct 2019 08:00  Mg     2.0     10-07        Assessment  S/p Charcot recon  Pin sites and surgical sites are clean without drainage. no dehiscence. Ex fix stable and intact.   Plan:  Pt evaluated  Continue strict NWB RLE   Dressing change by podiatry  Plan D/C to home once pt is stable from the medicine team and fevers are resolved.  Recommend Bactrim DS q12 for 21 days  Wheelchair on D/C  Request VNS for dressing changes  wound care orders on D/C: Wash with peroxide and apply Tami to pin sites/Gauze/Cling/ACE.   F/u with Dr. Hudson as o/p in 1 week after D/C  attending updated

## 2019-10-08 LAB
ALBUMIN SERPL ELPH-MCNC: 3.4 G/DL — LOW (ref 3.5–5.2)
ALP SERPL-CCNC: 81 U/L — SIGNIFICANT CHANGE UP (ref 30–115)
ALT FLD-CCNC: 17 U/L — SIGNIFICANT CHANGE UP (ref 0–41)
ANION GAP SERPL CALC-SCNC: 14 MMOL/L — SIGNIFICANT CHANGE UP (ref 7–14)
AST SERPL-CCNC: 29 U/L — SIGNIFICANT CHANGE UP (ref 0–41)
BASOPHILS # BLD AUTO: 0.02 K/UL — SIGNIFICANT CHANGE UP (ref 0–0.2)
BASOPHILS NFR BLD AUTO: 0.1 % — SIGNIFICANT CHANGE UP (ref 0–1)
BILIRUB SERPL-MCNC: 0.9 MG/DL — SIGNIFICANT CHANGE UP (ref 0.2–1.2)
BUN SERPL-MCNC: 23 MG/DL — HIGH (ref 10–20)
CALCIUM SERPL-MCNC: 8.7 MG/DL — SIGNIFICANT CHANGE UP (ref 8.5–10.1)
CHLORIDE SERPL-SCNC: 94 MMOL/L — LOW (ref 98–110)
CO2 SERPL-SCNC: 30 MMOL/L — SIGNIFICANT CHANGE UP (ref 17–32)
CREAT SERPL-MCNC: 1.8 MG/DL — HIGH (ref 0.7–1.5)
EOSINOPHIL # BLD AUTO: 0.26 K/UL — SIGNIFICANT CHANGE UP (ref 0–0.7)
EOSINOPHIL NFR BLD AUTO: 1.7 % — SIGNIFICANT CHANGE UP (ref 0–8)
GLUCOSE BLDC GLUCOMTR-MCNC: 100 MG/DL — HIGH (ref 70–99)
GLUCOSE BLDC GLUCOMTR-MCNC: 110 MG/DL — HIGH (ref 70–99)
GLUCOSE BLDC GLUCOMTR-MCNC: 143 MG/DL — HIGH (ref 70–99)
GLUCOSE BLDC GLUCOMTR-MCNC: 157 MG/DL — HIGH (ref 70–99)
GLUCOSE BLDC GLUCOMTR-MCNC: 173 MG/DL — HIGH (ref 70–99)
GLUCOSE SERPL-MCNC: 161 MG/DL — HIGH (ref 70–99)
HCT VFR BLD CALC: 37.4 % — LOW (ref 42–52)
HGB BLD-MCNC: 11.7 G/DL — LOW (ref 14–18)
IMM GRANULOCYTES NFR BLD AUTO: 0.7 % — HIGH (ref 0.1–0.3)
LYMPHOCYTES # BLD AUTO: 1.45 K/UL — SIGNIFICANT CHANGE UP (ref 1.2–3.4)
LYMPHOCYTES # BLD AUTO: 9.7 % — LOW (ref 20.5–51.1)
MAGNESIUM SERPL-MCNC: 2.2 MG/DL — SIGNIFICANT CHANGE UP (ref 1.8–2.4)
MCHC RBC-ENTMCNC: 28.5 PG — SIGNIFICANT CHANGE UP (ref 27–31)
MCHC RBC-ENTMCNC: 31.3 G/DL — LOW (ref 32–37)
MCV RBC AUTO: 91.2 FL — SIGNIFICANT CHANGE UP (ref 80–94)
MONOCYTES # BLD AUTO: 1.59 K/UL — HIGH (ref 0.1–0.6)
MONOCYTES NFR BLD AUTO: 10.6 % — HIGH (ref 1.7–9.3)
NEUTROPHILS # BLD AUTO: 11.57 K/UL — HIGH (ref 1.4–6.5)
NEUTROPHILS NFR BLD AUTO: 77.2 % — HIGH (ref 42.2–75.2)
NRBC # BLD: 0 /100 WBCS — SIGNIFICANT CHANGE UP (ref 0–0)
PLATELET # BLD AUTO: 194 K/UL — SIGNIFICANT CHANGE UP (ref 130–400)
POTASSIUM SERPL-MCNC: 3.7 MMOL/L — SIGNIFICANT CHANGE UP (ref 3.5–5)
POTASSIUM SERPL-SCNC: 3.7 MMOL/L — SIGNIFICANT CHANGE UP (ref 3.5–5)
PROT SERPL-MCNC: 7.7 G/DL — SIGNIFICANT CHANGE UP (ref 6–8)
RBC # BLD: 4.1 M/UL — LOW (ref 4.7–6.1)
RBC # FLD: 14.9 % — HIGH (ref 11.5–14.5)
SODIUM SERPL-SCNC: 138 MMOL/L — SIGNIFICANT CHANGE UP (ref 135–146)
WBC # BLD: 14.99 K/UL — HIGH (ref 4.8–10.8)
WBC # FLD AUTO: 14.99 K/UL — HIGH (ref 4.8–10.8)

## 2019-10-08 PROCEDURE — 99233 SBSQ HOSP IP/OBS HIGH 50: CPT

## 2019-10-08 RX ORDER — LANOLIN ALCOHOL/MO/W.PET/CERES
5 CREAM (GRAM) TOPICAL AT BEDTIME
Refills: 0 | Status: DISCONTINUED | OUTPATIENT
Start: 2019-10-08 | End: 2019-10-09

## 2019-10-08 RX ADMIN — Medication 650 MILLIGRAM(S): at 22:12

## 2019-10-08 RX ADMIN — Medication 25 MILLIGRAM(S): at 05:37

## 2019-10-08 RX ADMIN — ENOXAPARIN SODIUM 40 MILLIGRAM(S): 100 INJECTION SUBCUTANEOUS at 11:38

## 2019-10-08 RX ADMIN — Medication 12 UNIT(S): at 07:38

## 2019-10-08 RX ADMIN — Medication 1 TABLET(S): at 17:50

## 2019-10-08 RX ADMIN — Medication 12 UNIT(S): at 11:38

## 2019-10-08 RX ADMIN — INSULIN GLARGINE 40 UNIT(S): 100 INJECTION, SOLUTION SUBCUTANEOUS at 21:32

## 2019-10-08 RX ADMIN — AMLODIPINE BESYLATE 5 MILLIGRAM(S): 2.5 TABLET ORAL at 05:37

## 2019-10-08 RX ADMIN — Medication 2: at 16:43

## 2019-10-08 RX ADMIN — Medication 5 MILLIGRAM(S): at 23:26

## 2019-10-08 RX ADMIN — Medication 12 UNIT(S): at 16:42

## 2019-10-08 NOTE — PROGRESS NOTE ADULT - SUBJECTIVE AND OBJECTIVE BOX
SUBJECTIVE:    Patient is a 58y old Male who presents with a chief complaint of Antibiotics for Charcot's foot (07 Oct 2019 14:25)    Currently admitted to medicine with the primary diagnosis of    Today is hospital day 4d. This morning he is resting comfortably in bed and reports no new issues or overnight events.     INTERVAL EVENTS: Patient spiked a fever yesterday.    PAST MEDICAL & SURGICAL HISTORY  High cholesterol  HTN (hypertension)  DM (diabetes mellitus)  MATA on CPAP  Left toe amputee: 4 TOES  H/O skin graft  History of percutaneous angioplasty      ALLERGIES:  No Known Allergies    MEDICATIONS:  STANDING MEDICATIONS  amLODIPine   Tablet 5 milliGRAM(s) Oral daily  enoxaparin Injectable 40 milliGRAM(s) SubCutaneous daily  hydrochlorothiazide 25 milliGRAM(s) Oral daily  influenza   Vaccine 0.5 milliLiter(s) IntraMuscular once  insulin glargine Injectable (LANTUS) 40 Unit(s) SubCutaneous at bedtime  insulin lispro (HumaLOG) corrective regimen sliding scale   SubCutaneous three times a day before meals  insulin lispro Injectable (HumaLOG) 12 Unit(s) SubCutaneous three times a day before meals    PRN MEDICATIONS  acetaminophen   Tablet .. 650 milliGRAM(s) Oral every 6 hours PRN    VITALS:   T(F): 96.7  HR: 90  BP: 133/66  RR: 18  SpO2: --    LABS:                        11.7   14.99 )-----------( 194      ( 08 Oct 2019 07:45 )             37.4     10-08    138  |  94<L>  |  23<H>  ----------------------------<  161<H>  3.7   |  30  |  1.8<H>    Ca    8.7      08 Oct 2019 07:45  Mg     2.2     10-    TPro  7.7  /  Alb  3.4<L>  /  TBili  0.9  /  DBili  x   /  AST  29  /  ALT  17  /  AlkPhos  81  10-      Urinalysis Basic - ( 07 Oct 2019 12:28 )    Color: Light Yellow / Appearance: Clear / S.021 / pH: x  Gluc: x / Ketone: Trace  / Bili: Negative / Urobili: <2 mg/dL   Blood: x / Protein: Trace / Nitrite: Negative   Leuk Esterase: Negative / RBC: 18 /HPF / WBC 0 /HPF   Sq Epi: x / Non Sq Epi: 0 /HPF / Bacteria: Negative                RADIOLOGY:    PHYSICAL EXAM:  GEN: No acute distress  PULM/CHEST: Clear to auscultation bilaterally, no rales, rhonchi or wheezes   CVS: Regular rate and rhythm, S1-S2, no murmurs  ABD: Soft, non-tender, non-distended, +BS  EXT: Right foot fixated with rods after surgery  NEURO: AAOx3    Lovelace Catheter: SUBJECTIVE:    Patient is a 58y old Male who presents with a chief complaint of Antibiotics for Charcot's foot (07 Oct 2019 14:25)    Currently admitted to medicine with the primary diagnosis of    Today is hospital day 4d. This morning he is resting comfortably in bed and reports no new issues or overnight events.     INTERVAL EVENTS: Patient spiked a fever yesterday.  no cp, sob, abd pain  fever yesterday, no cough, sob, diarrhea    PAST MEDICAL & SURGICAL HISTORY  High cholesterol  HTN (hypertension)  DM (diabetes mellitus)  MATA on CPAP  Left toe amputee: 4 TOES  H/O skin graft  History of percutaneous angioplasty      ALLERGIES:  No Known Allergies    MEDICATIONS:  STANDING MEDICATIONS  amLODIPine   Tablet 5 milliGRAM(s) Oral daily  enoxaparin Injectable 40 milliGRAM(s) SubCutaneous daily  hydrochlorothiazide 25 milliGRAM(s) Oral daily  influenza   Vaccine 0.5 milliLiter(s) IntraMuscular once  insulin glargine Injectable (LANTUS) 40 Unit(s) SubCutaneous at bedtime  insulin lispro (HumaLOG) corrective regimen sliding scale   SubCutaneous three times a day before meals  insulin lispro Injectable (HumaLOG) 12 Unit(s) SubCutaneous three times a day before meals    PRN MEDICATIONS  acetaminophen   Tablet .. 650 milliGRAM(s) Oral every 6 hours PRN    VITALS:   T(F): 96.7  HR: 90  BP: 133/66  RR: 18  SpO2: --    LABS:                        11.7   14.99 )-----------( 194      ( 08 Oct 2019 07:45 )             37.4     10-    138  |  94<L>  |  23<H>  ----------------------------<  161<H>  3.7   |  30  |  1.8<H>    Ca    8.7      08 Oct 2019 07:45  Mg     2.2     10-    TPro  7.7  /  Alb  3.4<L>  /  TBili  0.9  /  DBili  x   /  AST  29  /  ALT  17  /  AlkPhos  81  10      Urinalysis Basic - ( 07 Oct 2019 12:28 )    Color: Light Yellow / Appearance: Clear / S.021 / pH: x  Gluc: x / Ketone: Trace  / Bili: Negative / Urobili: <2 mg/dL   Blood: x / Protein: Trace / Nitrite: Negative   Leuk Esterase: Negative / RBC: 18 /HPF / WBC 0 /HPF   Sq Epi: x / Non Sq Epi: 0 /HPF / Bacteria: Negative                RADIOLOGY:    PHYSICAL EXAM:  GEN: No acute distress  PULM/CHEST: Clear to auscultation bilaterally, no rales, rhonchi or wheezes   CVS: Regular rate and rhythm, S1-S2, no murmurs  ABD: Soft, non-tender, non-distended, +BS  EXT: Right foot fixated with rods after surgery  NEURO: AAOx3    Lovelace Catheter:

## 2019-10-08 NOTE — PROGRESS NOTE ADULT - SUBJECTIVE AND OBJECTIVE BOX
PROGRESS NOTE   Pt seen @ bedside during PM rounds. S/p RLE Charcot recon. Dressing +Clean, +Dry, + Intact.     Vital Signs Last 24 Hrs  T(C): 36.6 (08 Oct 2019 12:45), Max: 38.8 (07 Oct 2019 17:46)  T(F): 97.8 (08 Oct 2019 12:45), Max: 101.8 (07 Oct 2019 17:46)  HR: 81 (08 Oct 2019 12:45) (81 - 90)  BP: 143/68 (08 Oct 2019 12:45) (133/66 - 143/68)  BP(mean): --  RR: 18 (08 Oct 2019 12:45) (18 - 18)  SpO2: --                          11.7   14.99 )-----------( 194      ( 08 Oct 2019 07:45 )             37.4               10-08    138  |  94<L>  |  23<H>  ----------------------------<  161<H>  3.7   |  30  |  1.8<H>    Ca    8.7      08 Oct 2019 07:45  Mg     2.2     10-08    TPro  7.7  /  Alb  3.4<L>  /  TBili  0.9  /  DBili  x   /  AST  29  /  ALT  17  /  AlkPhos  81  10-08      Assessment  S/p Charcot recon  Pin sites and surgical sites are clean without drainage. no dehiscence. Ex fix stable and intact.   Ulcer on the lateral side of the forefoot, stable   Plan:  Pt evaluated  Continue strict NWB RLE   Dressing change by podiatry  Plan D/C to home once pt is stable from the medicine team, fevers are resolved, ans wbc is within normal range.   Recommend Bactrim DS q12 for 21 days  Wheelchair on D/C  Request VNS for dressing changes  wound care orders on D/C: Wash with peroxide and apply Tami to pin sites/Gauze/Cling/ACE.   F/u with Dr. Hudson as o/p in 1 week after D/C  attending updated

## 2019-10-08 NOTE — PROGRESS NOTE ADULT - ATTENDING COMMENTS
#Fever, post op; clinically asymptomatic  febrile again yesterday evening  check bcx, ua neg; cxr neg  monitor off abx  plan for d/c in am if afebrile 24 hours   #Charcot foot s/p repair  pt refusing PT, would like to be d/c home has 24 hour hha  to d/w podiatry  pain controlled    #Progress Note Handoff:  Pending (specify):  Consults_________, Tests________, Test Results_______, Other___fever______  Family discussion:d/w pt at bedside re: primary dx, treatment plan  Disposition: Home___/SNF___/Other________/Unknown at this time___x_____ .

## 2019-10-08 NOTE — PROGRESS NOTE ADULT - ASSESSMENT
Patient is a 57 y/o M with PMH of DM, hypertension, dyslipidemia, sleep apnea was admitted for Charcot foot reconstruction with external fixation today on 10/4/19. Admitted to the medicine service for post operative antibiotics.    Patient spiked fever yesterday and blood cultures were sent. Patient will be monitored for any fevers and will follow blood cultures    # Charcot's foot  - Reconstruction with external fixation done on 10/4  - Patient is Non weight bearing strict  - Podiatry following  - Diet resumed. No active pain complaints  - No dressing change needed as per podiatry  - Completed vancomycin 3 day course on 10/09  - Fever spikes on 10/19 so cultures were sent. Will follow    # Diabetes Mellitus  - Started on insulin glargine and lispro  - Monitor FS and adjust insulin accordingly, well controlled    # Hypertension  - c/w HCTZ and amlodipine    # Dyslipidemia  - c/w zetia    #Diet: DASH/TLC/Carb consistent  #DVT ppx: lovenox  #GI ppx: Not indicated  #Dispo: still acute

## 2019-10-09 ENCOUNTER — TRANSCRIPTION ENCOUNTER (OUTPATIENT)
Age: 59
End: 2019-10-09

## 2019-10-09 VITALS
SYSTOLIC BLOOD PRESSURE: 158 MMHG | DIASTOLIC BLOOD PRESSURE: 74 MMHG | HEART RATE: 91 BPM | RESPIRATION RATE: 18 BRPM | TEMPERATURE: 96 F

## 2019-10-09 LAB
ALBUMIN SERPL ELPH-MCNC: 3.5 G/DL — SIGNIFICANT CHANGE UP (ref 3.5–5.2)
ALP SERPL-CCNC: 84 U/L — SIGNIFICANT CHANGE UP (ref 30–115)
ALT FLD-CCNC: 19 U/L — SIGNIFICANT CHANGE UP (ref 0–41)
ANION GAP SERPL CALC-SCNC: 15 MMOL/L — HIGH (ref 7–14)
AST SERPL-CCNC: 28 U/L — SIGNIFICANT CHANGE UP (ref 0–41)
BASOPHILS # BLD AUTO: 0.02 K/UL — SIGNIFICANT CHANGE UP (ref 0–0.2)
BASOPHILS NFR BLD AUTO: 0.1 % — SIGNIFICANT CHANGE UP (ref 0–1)
BILIRUB SERPL-MCNC: 0.6 MG/DL — SIGNIFICANT CHANGE UP (ref 0.2–1.2)
BUN SERPL-MCNC: 31 MG/DL — HIGH (ref 10–20)
CALCIUM SERPL-MCNC: 8.5 MG/DL — SIGNIFICANT CHANGE UP (ref 8.5–10.1)
CHLORIDE SERPL-SCNC: 95 MMOL/L — LOW (ref 98–110)
CO2 SERPL-SCNC: 28 MMOL/L — SIGNIFICANT CHANGE UP (ref 17–32)
CREAT SERPL-MCNC: 1.8 MG/DL — HIGH (ref 0.7–1.5)
EOSINOPHIL # BLD AUTO: 0.34 K/UL — SIGNIFICANT CHANGE UP (ref 0–0.7)
EOSINOPHIL NFR BLD AUTO: 2.5 % — SIGNIFICANT CHANGE UP (ref 0–8)
GLUCOSE BLDC GLUCOMTR-MCNC: 130 MG/DL — HIGH (ref 70–99)
GLUCOSE BLDC GLUCOMTR-MCNC: 173 MG/DL — HIGH (ref 70–99)
GLUCOSE BLDC GLUCOMTR-MCNC: 196 MG/DL — HIGH (ref 70–99)
GLUCOSE BLDC GLUCOMTR-MCNC: 240 MG/DL — HIGH (ref 70–99)
GLUCOSE SERPL-MCNC: 176 MG/DL — HIGH (ref 70–99)
HCT VFR BLD CALC: 38.4 % — LOW (ref 42–52)
HGB BLD-MCNC: 11.9 G/DL — LOW (ref 14–18)
IMM GRANULOCYTES NFR BLD AUTO: 0.6 % — HIGH (ref 0.1–0.3)
LYMPHOCYTES # BLD AUTO: 1.43 K/UL — SIGNIFICANT CHANGE UP (ref 1.2–3.4)
LYMPHOCYTES # BLD AUTO: 10.6 % — LOW (ref 20.5–51.1)
MAGNESIUM SERPL-MCNC: 2.4 MG/DL — SIGNIFICANT CHANGE UP (ref 1.8–2.4)
MCHC RBC-ENTMCNC: 28.1 PG — SIGNIFICANT CHANGE UP (ref 27–31)
MCHC RBC-ENTMCNC: 31 G/DL — LOW (ref 32–37)
MCV RBC AUTO: 90.6 FL — SIGNIFICANT CHANGE UP (ref 80–94)
MONOCYTES # BLD AUTO: 1.2 K/UL — HIGH (ref 0.1–0.6)
MONOCYTES NFR BLD AUTO: 8.9 % — SIGNIFICANT CHANGE UP (ref 1.7–9.3)
NEUTROPHILS # BLD AUTO: 10.37 K/UL — HIGH (ref 1.4–6.5)
NEUTROPHILS NFR BLD AUTO: 77.3 % — HIGH (ref 42.2–75.2)
NRBC # BLD: 0 /100 WBCS — SIGNIFICANT CHANGE UP (ref 0–0)
PLATELET # BLD AUTO: 214 K/UL — SIGNIFICANT CHANGE UP (ref 130–400)
POTASSIUM SERPL-MCNC: 3.7 MMOL/L — SIGNIFICANT CHANGE UP (ref 3.5–5)
POTASSIUM SERPL-SCNC: 3.7 MMOL/L — SIGNIFICANT CHANGE UP (ref 3.5–5)
PROT SERPL-MCNC: 7.8 G/DL — SIGNIFICANT CHANGE UP (ref 6–8)
RBC # BLD: 4.24 M/UL — LOW (ref 4.7–6.1)
RBC # FLD: 14.8 % — HIGH (ref 11.5–14.5)
SODIUM SERPL-SCNC: 138 MMOL/L — SIGNIFICANT CHANGE UP (ref 135–146)
WBC # BLD: 13.44 K/UL — HIGH (ref 4.8–10.8)
WBC # FLD AUTO: 13.44 K/UL — HIGH (ref 4.8–10.8)

## 2019-10-09 PROCEDURE — 99233 SBSQ HOSP IP/OBS HIGH 50: CPT

## 2019-10-09 RX ORDER — ACETAMINOPHEN 500 MG
2 TABLET ORAL
Qty: 0 | Refills: 0 | DISCHARGE
Start: 2019-10-09

## 2019-10-09 RX ADMIN — AMLODIPINE BESYLATE 5 MILLIGRAM(S): 2.5 TABLET ORAL at 05:13

## 2019-10-09 RX ADMIN — Medication 1 TABLET(S): at 05:13

## 2019-10-09 RX ADMIN — Medication 12 UNIT(S): at 12:27

## 2019-10-09 RX ADMIN — Medication 1 TABLET(S): at 18:51

## 2019-10-09 RX ADMIN — Medication 12 UNIT(S): at 08:47

## 2019-10-09 RX ADMIN — Medication 0: at 12:27

## 2019-10-09 RX ADMIN — ENOXAPARIN SODIUM 40 MILLIGRAM(S): 100 INJECTION SUBCUTANEOUS at 12:29

## 2019-10-09 RX ADMIN — Medication 25 MILLIGRAM(S): at 05:13

## 2019-10-09 RX ADMIN — Medication 2: at 08:48

## 2019-10-09 NOTE — DISCHARGE NOTE PROVIDER - NSDCCPCAREPLAN_GEN_ALL_CORE_FT
PRINCIPAL DISCHARGE DIAGNOSIS  Diagnosis: Diabetic Charcot foot  Assessment and Plan of Treatment: You came to the hospital for the repair of charcot foot. You were treated by the podiatry team and given 3 day course of antibiotics. You had low grade fever spikes post op and blood cultures were sent which came back negative. You were started on Bactrim DS q12 for 21 days and his WBC count and fevers improved. You will be discharged today and continue on your antibiotic for 19 more days and follow up with podiatry in 1 week

## 2019-10-09 NOTE — PROGRESS NOTE ADULT - SUBJECTIVE AND OBJECTIVE BOX
Podiatry Progress Note    Subjective:   Pt seen @ bedside during AM rounds. S/p RLE Charcot recon. Dressing +Clean, +Dry, + Intact.     PAST MEDICAL & SURGICAL HISTORY:  High cholesterol  HTN (hypertension)  DM (diabetes mellitus)  MATA on CPAP  Left toe amputee: 4 TOES  H/O skin graft  History of percutaneous angioplasty    Vital Signs Last 24 Hrs  T(C): 37.3 (09 Oct 2019 04:00), Max: 37.9 (08 Oct 2019 21:00)  T(F): 99.2 (09 Oct 2019 04:00), Max: 100.3 (08 Oct 2019 21:00)  HR: 85 (09 Oct 2019 04:00) (53 - 85)  BP: 129/64 (09 Oct 2019 04:00) (129/64 - 143/68)  BP(mean): --  RR: 18 (09 Oct 2019 04:00) (18 - 18)  SpO2: --                        11.9   13.44 )-----------( 214      ( 09 Oct 2019 07:22 )             38.4                 10-09    138  |  95<L>  |  31<H>  ----------------------------<  176<H>  3.7   |  28  |  1.8<H>    Ca    8.5      09 Oct 2019 07:22  Mg     2.4     10-09    TPro  7.8  /  Alb  3.5  /  TBili  0.6  /  DBili  x   /  AST  28  /  ALT  19  /  AlkPhos  84  10-09    Assessment:  S/p Charcot recon  Pin sites and surgical sites are clean without drainage. no dehiscence. Ex fix stable and intact.   Ulcer on the lateral side of the forefoot, stable     Plan:  Pt seen and evaluated. All questions and concerns were addressed and answered.   Continue strict NWB RLE   Dressing change by podiatry  Plan D/C to home once pt is stable from the medicine team, fevers are resolved, ans wbc is within normal range.   Recommend Bactrim DS q12 for 21 days  Request VNS for dressing changes  Will need a Heavy Duty Wheelchair on discharge   wound care orders on D/C: Wash with peroxide and apply Tami to pin sites/Gauze/Cling/ACE.   F/u with Dr. Hudson as o/p in 1 week after D/C  Attending Updated and Aware    Wheel Chair Request:   Mr Callahan Will Require a Heavy Duty Manual Wheelchair on Discharge. Pt. has a External Fixation Device on his Right Foot that requires Strict Non-Weight Bearing.  Due to this fact, it impairs Mr Callahan from ambulating at all. He will be limited from being transported to and from his private doctor as well as getting around the house. Patients mobility cannot be resolved using a walker or cane given his weight and ability to balance on one foot. The patient has sufficient upper extremity function to safely propel the manual wheelchair in the home or during a typical day. Pt has agreed to use a wheel chair and will not be an issue

## 2019-10-09 NOTE — DISCHARGE NOTE PROVIDER - HOSPITAL COURSE
HPI:    Patient is a 59 y/o M with PMH of DM, hypertension, dyslipidemia, sleep apnea was admitted for Charcot foot reconstruction with external fixation today. Patient has been admitted for IV antibiotics multiple times in the past for osteomyelitis of right foot. He has to receive IV vancomycin for 3 days post surgery as prophylaxis because of history of MRSA infection in foot. No other active complaints post surgery. Patient is lying comfortably in bed. (04 Oct 2019 20:39)        Patient had low grade fever spikes post op and blood cultures were sent which came back negative. Patient was started on Bactrim DS q12 for 21 days and his WBC count and fevers improved. Patient will be discharged today.

## 2019-10-09 NOTE — PROGRESS NOTE ADULT - SUBJECTIVE AND OBJECTIVE BOX
FERNANDEZ GUZMAN  58y  [facility] M3-4[facility] 015 A      Patient is a 58y old  Male who presents with a chief complaint of Antibiotics for Charcot's foot (09 Oct 2019 11:06)      INTERVAL HPI/OVERNIGHT EVENTS:    no acute events overnight     REVIEW OF SYSTEMS:  CONSTITUTIONAL: No fever, weight loss, or fatigue  EYES: No eye pain, visual disturbances, or discharge  ENMT:  No difficulty hearing, tinnitus, vertigo; No sinus or throat pain  NECK: No pain or stiffness  BREASTS: No pain, masses, or nipple discharge  RESPIRATORY: No cough, wheezing, chills or hemoptysis; No shortness of breath  CARDIOVASCULAR: No chest pain, palpitations, dizziness, or leg swelling  GASTROINTESTINAL: No abdominal or epigastric pain. No nausea, vomiting, or hematemesis; No diarrhea or constipation. No melena or hematochezia.  GENITOURINARY: No dysuria, frequency, hematuria, or incontinence  NEUROLOGICAL: No headaches, memory loss, loss of strength, numbness, or tremors  SKIN: No itching, burning, rashes, or lesions   LYMPH NODES: No enlarged glands  ENDOCRINE: No heat or cold intolerance; No hair loss  MUSCULOSKELETAL: No joint pain or swelling; No muscle, back, or extremity pain  PSYCHIATRIC: No depression, anxiety, mood swings, or difficulty sleeping  HEME/LYMPH: No easy bruising, or bleeding gums  ALLERY AND IMMUNOLOGIC: No hives or eczema  FAMILY HISTORY:  FH: leukemia  Family history of diabetes mellitus (DM)    T(C): 35.7 (10-09-19 @ 12:15), Max: 37.9 (10-08-19 @ 21:00)  HR: 91 (10-09-19 @ 12:15) (53 - 91)  BP: 158/74 (10-09-19 @ 12:15) (129/64 - 158/74)  RR: 18 (10-09-19 @ 12:15) (18 - 18)  SpO2: --  Wt(kg): --Vital Signs Last 24 Hrs  T(C): 35.7 (09 Oct 2019 12:15), Max: 37.9 (08 Oct 2019 21:00)  T(F): 96.3 (09 Oct 2019 12:15), Max: 100.3 (08 Oct 2019 21:00)  HR: 91 (09 Oct 2019 12:15) (53 - 91)  BP: 158/74 (09 Oct 2019 12:15) (129/64 - 158/74)  BP(mean): --  RR: 18 (09 Oct 2019 12:15) (18 - 18)  SpO2: --    PHYSICAL EXAM:  GENERAL: NAD, well-groomed, well-developed  HEAD:  Atraumatic, Normocephalic  EYES: EOMI, PERRLA, conjunctiva and sclera clear  ENMT: No tonsillar erythema, exudates, or enlargement; Moist mucous membranes, Good dentition, No lesions  NECK: Supple, No JVD, Normal thyroid  NERVOUS SYSTEM:  Alert & Oriented X3, Good concentration; Motor Strength 5/5 B/L upper and lower extremities; DTRs 2+ intact and symmetric  PULM: Clear to auscultation bilaterally  CARDIAC: Regular rate and rhythm; No murmurs, rubs, or gallops  GI: Soft, Nontender, Nondistended; Bowel sounds present morbid obesity  EXTREMITIES:  2+ Peripheral Pulses, No clubbing, cyanosis, or edema right foot fixated  LYMPH: No lymphadenopathy noted  SKIN: No rashes or lesions    Consultant(s) Notes Reviewed:  [x ] YES  [ ] NO  Care Discussed with Consultants/Other Providers [ x] YES  [ ] NO    LABS:                            11.9   13.44 )-----------( 214      ( 09 Oct 2019 07:22 )             38.4   10-09    138  |  95<L>  |  31<H>  ----------------------------<  176<H>  3.7   |  28  |  1.8<H>    Ca    8.5      09 Oct 2019 07:22  Mg     2.4     10-09    TPro  7.8  /  Alb  3.5  /  TBili  0.6  /  DBili  x   /  AST  28  /  ALT  19  /  AlkPhos  84  10-09            Culture - Blood (collected 07 Oct 2019 21:15)  Source: .Blood Blood  Preliminary Report (09 Oct 2019 03:01):    No growth to date.      acetaminophen   Tablet .. 650 milliGRAM(s) Oral every 6 hours PRN  amLODIPine   Tablet 5 milliGRAM(s) Oral daily  enoxaparin Injectable 40 milliGRAM(s) SubCutaneous daily  hydrochlorothiazide 25 milliGRAM(s) Oral daily  influenza   Vaccine 0.5 milliLiter(s) IntraMuscular once  insulin glargine Injectable (LANTUS) 40 Unit(s) SubCutaneous at bedtime  insulin lispro (HumaLOG) corrective regimen sliding scale   SubCutaneous three times a day before meals  insulin lispro Injectable (HumaLOG) 12 Unit(s) SubCutaneous three times a day before meals  melatonin 5 milliGRAM(s) Oral at bedtime  trimethoprim  160 mG/sulfamethoxazole 800 mG 1 Tablet(s) Oral every 12 hours      HEALTH ISSUES - PROBLEM Dx:          Case Discussed with House Staff   45 minutes spent on total encounter; more than 50% of the visit was spent counseling and/or coordinating care by the attending physician.   Spectra [phone]

## 2019-10-09 NOTE — DISCHARGE NOTE PROVIDER - NSDCFUADDINST_GEN_ALL_CORE_FT
Take your antibiotic for 19 more days and see podiatry for follow up in 1 weeks time. Please do not bear any weight on your operated foot.

## 2019-10-09 NOTE — PROGRESS NOTE ADULT - ASSESSMENT
Patient is a 58y old  Male who presents with a chief complaint of Antibiotics for Charcot's foot (09 Oct 2019 11:06)  October 4th had fixation for charcot foot ,     #Chacoat foot   sp fixation   bactrim x 21 days     #morbid obesity BMI 53 patient needs to see dieitian outpatient for further evaluation and recommend bariatric surgery evaluation     #CKD 3    #MATA on CPAP , suspicion for OHV     #HTN controlled    #DM CAPILLARY BLOOD GLUCOSE      POCT Blood Glucose.: 130 mg/dL (09 Oct 2019 11:32)  POCT Blood Glucose.: 196 mg/dL (09 Oct 2019 07:23)  POCT Blood Glucose.: 173 mg/dL (08 Oct 2019 21:28)  POCT Blood Glucose.: 157 mg/dL (08 Oct 2019 16:26)  controlled    #Insomnia on melatonin     Progress Note Handoff    Pending: home care arrangements , likely dc today  Family discussion: patient is of sound mind and verbalizes udnerstanding to plan of care    Disposition: patient wants to go home

## 2019-10-09 NOTE — DISCHARGE NOTE PROVIDER - CARE PROVIDERS DIRECT ADDRESSES
,DirectAddress_[email or web address] [email or web address]

## 2019-10-09 NOTE — PROGRESS NOTE ADULT - SUBJECTIVE AND OBJECTIVE BOX
SUBJECTIVE:    Patient is a 58y old Male who presents with a chief complaint of Antibiotics for Charcot's foot (08 Oct 2019 15:21)    Currently admitted to medicine with the primary diagnosis of    Today is hospital day 5d. This morning he is resting comfortably in bed and reports no new issues or overnight events.     INTERVAL EVENTS: Likely discharge today    PAST MEDICAL & SURGICAL HISTORY  High cholesterol  HTN (hypertension)  DM (diabetes mellitus)  MATA on CPAP  Left toe amputee: 4 TOES  H/O skin graft  History of percutaneous angioplasty      ALLERGIES:  No Known Allergies    MEDICATIONS:  STANDING MEDICATIONS  amLODIPine   Tablet 5 milliGRAM(s) Oral daily  enoxaparin Injectable 40 milliGRAM(s) SubCutaneous daily  hydrochlorothiazide 25 milliGRAM(s) Oral daily  influenza   Vaccine 0.5 milliLiter(s) IntraMuscular once  insulin glargine Injectable (LANTUS) 40 Unit(s) SubCutaneous at bedtime  insulin lispro (HumaLOG) corrective regimen sliding scale   SubCutaneous three times a day before meals  insulin lispro Injectable (HumaLOG) 12 Unit(s) SubCutaneous three times a day before meals  melatonin 5 milliGRAM(s) Oral at bedtime  trimethoprim  160 mG/sulfamethoxazole 800 mG 1 Tablet(s) Oral every 12 hours    PRN MEDICATIONS  acetaminophen   Tablet .. 650 milliGRAM(s) Oral every 6 hours PRN    VITALS:   T(F): 99.2  HR: 85  BP: 129/64  RR: 18  SpO2: --    LABS:                        11.7   14.99 )-----------( 194      ( 08 Oct 2019 07:45 )             37.4     10-08    138  |  94<L>  |  23<H>  ----------------------------<  161<H>  3.7   |  30  |  1.8<H>    Ca    8.7      08 Oct 2019 07:45  Mg     2.2     10-    TPro  7.7  /  Alb  3.4<L>  /  TBili  0.9  /  DBili  x   /  AST  29  /  ALT  17  /  AlkPhos  81  10-08      Urinalysis Basic - ( 07 Oct 2019 12:28 )    Color: Light Yellow / Appearance: Clear / S.021 / pH: x  Gluc: x / Ketone: Trace  / Bili: Negative / Urobili: <2 mg/dL   Blood: x / Protein: Trace / Nitrite: Negative   Leuk Esterase: Negative / RBC: 18 /HPF / WBC 0 /HPF   Sq Epi: x / Non Sq Epi: 0 /HPF / Bacteria: Negative            Culture - Blood (collected 07 Oct 2019 21:15)  Source: .Blood Blood  Preliminary Report (09 Oct 2019 03:01):    No growth to date.          RADIOLOGY:    PHYSICAL EXAM:  GEN: No acute distress  PULM/CHEST: Clear to auscultation bilaterally, no rales, rhonchi or wheezes   CVS: Regular rate and rhythm, S1-S2, no murmurs  ABD: Soft, non-tender, non-distended, +BS  EXT: Right foot fixated with rods after surgery  NEURO: AAOx3

## 2019-10-09 NOTE — DISCHARGE NOTE PROVIDER - PROVIDER TOKENS
PROVIDER:[TOKEN:[[id number]],FOLLOWUP:[1 week]],PROVIDER:[TOKEN:[[id number]],FOLLOWUP:[1 week]]

## 2019-10-09 NOTE — PROGRESS NOTE ADULT - REASON FOR ADMISSION
Antibiotics for Charcot's foot
charcot foot recon RLE
Antibiotics for Charcot's foot

## 2019-10-09 NOTE — PROGRESS NOTE ADULT - ASSESSMENT
Patient is a 59 y/o M with PMH of DM, hypertension, dyslipidemia, sleep apnea was admitted for Charcot foot reconstruction with external fixation today on 10/4/19. Admitted to the medicine service for post operative antibiotics.    Patient had low grade fever recorded overnight but blood cultures negative. Started on Bactrim DS and will be discharged today    # Charcot's foot  - Reconstruction with external fixation done on 10/4  - Patient is Non weight bearing strict  - Podiatry following  - Diet resumed. No active pain complaints  - No dressing change needed as per podiatry  - Completed vancomycin 3 day course on 10/09  - Fever spikes on 10/19 so cultures were sent. Came back negative  - Bactrim DS twice daily for 21 days starting 10/08 as per podiatry    # Diabetes Mellitus  - Started on insulin glargine and lispro  - Monitor FS and adjust insulin accordingly, well controlled    # Hypertension  - c/w HCTZ and amlodipine    # Dyslipidemia  - c/w zetia    #Diet: DASH/TLC/Carb consistent  #DVT ppx: lovenox  #GI ppx: Not indicated  #Dispo: still acute Patient is a 57 y/o M with PMH of DM, hypertension, dyslipidemia, sleep apnea was admitted for Charcot foot reconstruction with external fixation today on 10/4/19. Admitted to the medicine service for post operative antibiotics.    Patient had low grade fever recorded overnight but blood cultures negative. Started on Bactrim DS and will be discharged today    # Charcot's foot  - Reconstruction with external fixation done on 10/4  - Patient is Non weight bearing strict  - Podiatry following  - Diet resumed. No active pain complaints  - No dressing change needed as per podiatry  - Completed vancomycin 3 day course on 10/09  - Fever spikes on 10/19 so cultures were sent. Came back negative  - Bactrim DS twice daily for 21 days starting 10/08 as per podiatry    # Diabetes Mellitus  - Started on insulin glargine and lispro  - Monitor FS and adjust insulin accordingly, well controlled    # Hypertension  - c/w HCTZ and amlodipine    # Dyslipidemia  - c/w zetia    #Diet: DASH/TLC/Carb consistent  #DVT ppx: lovenox  #GI ppx: Not indicated  #Dispo: Discharge today

## 2019-10-09 NOTE — DISCHARGE NOTE PROVIDER - CARE PROVIDER_API CALL
Drake Hudson (DPM)  Surgery  [address] [place] [address]  Phone: [phone]  Fax: [phone]  Follow Up Time: 1 week    Ingrid Benedict (DO)  Family Medicine  [address] [place] [id number]  Phone: [phone]  Fax: [phone]  Follow Up Time: 1 week

## 2019-10-13 LAB
CULTURE RESULTS: SIGNIFICANT CHANGE UP
SPECIMEN SOURCE: SIGNIFICANT CHANGE UP

## 2019-11-20 ENCOUNTER — INPATIENT (INPATIENT)
Facility: HOSPITAL | Age: 59
LOS: 5 days | Discharge: HOME HEALTH | End: 2019-11-26
Attending: INTERNAL MEDICINE | Admitting: INTERNAL MEDICINE
Payer: MEDICARE

## 2019-11-20 VITALS
HEIGHT: 70 IN | RESPIRATION RATE: 18 BRPM | WEIGHT: 315 LBS | DIASTOLIC BLOOD PRESSURE: 58 MMHG | TEMPERATURE: 96 F | SYSTOLIC BLOOD PRESSURE: 101 MMHG | OXYGEN SATURATION: 95 % | HEART RATE: 95 BPM

## 2019-11-20 DIAGNOSIS — M14.671 CHARCOT'S JOINT, RIGHT ANKLE AND FOOT: ICD-10-CM

## 2019-11-20 DIAGNOSIS — E11.610 TYPE 2 DIABETES MELLITUS WITH DIABETIC NEUROPATHIC ARTHROPATHY: Chronic | ICD-10-CM

## 2019-11-20 DIAGNOSIS — Z98.890 OTHER SPECIFIED POSTPROCEDURAL STATES: Chronic | ICD-10-CM

## 2019-11-20 DIAGNOSIS — E11.9 TYPE 2 DIABETES MELLITUS WITHOUT COMPLICATIONS: ICD-10-CM

## 2019-11-20 DIAGNOSIS — Z94.5 SKIN TRANSPLANT STATUS: Chronic | ICD-10-CM

## 2019-11-20 DIAGNOSIS — I10 ESSENTIAL (PRIMARY) HYPERTENSION: ICD-10-CM

## 2019-11-20 DIAGNOSIS — Z89.422 ACQUIRED ABSENCE OF OTHER LEFT TOE(S): Chronic | ICD-10-CM

## 2019-11-20 DIAGNOSIS — E78.00 PURE HYPERCHOLESTEROLEMIA, UNSPECIFIED: ICD-10-CM

## 2019-11-20 PROBLEM — G47.33 OBSTRUCTIVE SLEEP APNEA (ADULT) (PEDIATRIC): Chronic | Status: ACTIVE | Noted: 2019-10-02

## 2019-11-20 LAB
ANION GAP SERPL CALC-SCNC: 13 MMOL/L — SIGNIFICANT CHANGE UP (ref 7–14)
APPEARANCE UR: ABNORMAL
APTT BLD: 31.7 SEC — SIGNIFICANT CHANGE UP (ref 27–39.2)
BACTERIA # UR AUTO: ABNORMAL
BASOPHILS # BLD AUTO: 0.02 K/UL — SIGNIFICANT CHANGE UP (ref 0–0.2)
BASOPHILS NFR BLD AUTO: 0.2 % — SIGNIFICANT CHANGE UP (ref 0–1)
BILIRUB UR-MCNC: NEGATIVE — SIGNIFICANT CHANGE UP
BLD GP AB SCN SERPL QL: SIGNIFICANT CHANGE UP
BUN SERPL-MCNC: 28 MG/DL — HIGH (ref 10–20)
CALCIUM SERPL-MCNC: 9.2 MG/DL — SIGNIFICANT CHANGE UP (ref 8.5–10.1)
CHLORIDE SERPL-SCNC: 95 MMOL/L — LOW (ref 98–110)
CO2 SERPL-SCNC: 27 MMOL/L — SIGNIFICANT CHANGE UP (ref 17–32)
COLOR SPEC: YELLOW — SIGNIFICANT CHANGE UP
CREAT SERPL-MCNC: 2.2 MG/DL — HIGH (ref 0.7–1.5)
DIFF PNL FLD: ABNORMAL
EOSINOPHIL # BLD AUTO: 0.31 K/UL — SIGNIFICANT CHANGE UP (ref 0–0.7)
EOSINOPHIL NFR BLD AUTO: 3.5 % — SIGNIFICANT CHANGE UP (ref 0–8)
EPI CELLS # UR: ABNORMAL /HPF
GLUCOSE BLDC GLUCOMTR-MCNC: 108 MG/DL — HIGH (ref 70–99)
GLUCOSE BLDC GLUCOMTR-MCNC: 113 MG/DL — HIGH (ref 70–99)
GLUCOSE BLDC GLUCOMTR-MCNC: 92 MG/DL — SIGNIFICANT CHANGE UP (ref 70–99)
GLUCOSE SERPL-MCNC: 155 MG/DL — HIGH (ref 70–99)
GLUCOSE UR QL: >=1000 MG/DL
HCT VFR BLD CALC: 34.5 % — LOW (ref 42–52)
HGB BLD-MCNC: 10.7 G/DL — LOW (ref 14–18)
IMM GRANULOCYTES NFR BLD AUTO: 0.3 % — SIGNIFICANT CHANGE UP (ref 0.1–0.3)
INR BLD: 1.08 RATIO — SIGNIFICANT CHANGE UP (ref 0.65–1.3)
KETONES UR-MCNC: NEGATIVE — SIGNIFICANT CHANGE UP
LEUKOCYTE ESTERASE UR-ACNC: NEGATIVE — SIGNIFICANT CHANGE UP
LYMPHOCYTES # BLD AUTO: 1.35 K/UL — SIGNIFICANT CHANGE UP (ref 1.2–3.4)
LYMPHOCYTES # BLD AUTO: 15.2 % — LOW (ref 20.5–51.1)
MCHC RBC-ENTMCNC: 27.8 PG — SIGNIFICANT CHANGE UP (ref 27–31)
MCHC RBC-ENTMCNC: 31 G/DL — LOW (ref 32–37)
MCV RBC AUTO: 89.6 FL — SIGNIFICANT CHANGE UP (ref 80–94)
MONOCYTES # BLD AUTO: 0.78 K/UL — HIGH (ref 0.1–0.6)
MONOCYTES NFR BLD AUTO: 8.8 % — SIGNIFICANT CHANGE UP (ref 1.7–9.3)
NEUTROPHILS # BLD AUTO: 6.38 K/UL — SIGNIFICANT CHANGE UP (ref 1.4–6.5)
NEUTROPHILS NFR BLD AUTO: 72 % — SIGNIFICANT CHANGE UP (ref 42.2–75.2)
NITRITE UR-MCNC: NEGATIVE — SIGNIFICANT CHANGE UP
NRBC # BLD: 0 /100 WBCS — SIGNIFICANT CHANGE UP (ref 0–0)
PH UR: 6 — SIGNIFICANT CHANGE UP (ref 5–8)
PLATELET # BLD AUTO: 252 K/UL — SIGNIFICANT CHANGE UP (ref 130–400)
POTASSIUM SERPL-MCNC: 4 MMOL/L — SIGNIFICANT CHANGE UP (ref 3.5–5)
POTASSIUM SERPL-SCNC: 4 MMOL/L — SIGNIFICANT CHANGE UP (ref 3.5–5)
PROT UR-MCNC: NEGATIVE MG/DL — SIGNIFICANT CHANGE UP
PROTHROM AB SERPL-ACNC: 12.4 SEC — SIGNIFICANT CHANGE UP (ref 9.95–12.87)
RBC # BLD: 3.85 M/UL — LOW (ref 4.7–6.1)
RBC # FLD: 15.8 % — HIGH (ref 11.5–14.5)
RBC CASTS # UR COMP ASSIST: ABNORMAL /HPF
SODIUM SERPL-SCNC: 135 MMOL/L — SIGNIFICANT CHANGE UP (ref 135–146)
SP GR SPEC: 1.02 — SIGNIFICANT CHANGE UP (ref 1.01–1.03)
UROBILINOGEN FLD QL: 0.2 MG/DL — SIGNIFICANT CHANGE UP (ref 0.2–0.2)
WBC # BLD: 8.87 K/UL — SIGNIFICANT CHANGE UP (ref 4.8–10.8)
WBC # FLD AUTO: 8.87 K/UL — SIGNIFICANT CHANGE UP (ref 4.8–10.8)
WBC UR QL: SIGNIFICANT CHANGE UP /HPF

## 2019-11-20 PROCEDURE — 99285 EMERGENCY DEPT VISIT HI MDM: CPT

## 2019-11-20 PROCEDURE — 71045 X-RAY EXAM CHEST 1 VIEW: CPT | Mod: 26

## 2019-11-20 PROCEDURE — 99223 1ST HOSP IP/OBS HIGH 75: CPT | Mod: AI

## 2019-11-20 PROCEDURE — 71046 X-RAY EXAM CHEST 2 VIEWS: CPT | Mod: 26

## 2019-11-20 RX ORDER — PANTOPRAZOLE SODIUM 20 MG/1
40 TABLET, DELAYED RELEASE ORAL
Refills: 0 | Status: DISCONTINUED | OUTPATIENT
Start: 2019-11-20 | End: 2019-11-21

## 2019-11-20 RX ORDER — AMLODIPINE BESYLATE 2.5 MG/1
5 TABLET ORAL DAILY
Refills: 0 | Status: DISCONTINUED | OUTPATIENT
Start: 2019-11-20 | End: 2019-11-20

## 2019-11-20 RX ORDER — CEFAZOLIN SODIUM 1 G
VIAL (EA) INJECTION
Refills: 0 | Status: DISCONTINUED | OUTPATIENT
Start: 2019-11-20 | End: 2019-11-21

## 2019-11-20 RX ORDER — EZETIMIBE 10 MG/1
1 TABLET ORAL
Qty: 0 | Refills: 0 | DISCHARGE

## 2019-11-20 RX ORDER — INSULIN GLARGINE 100 [IU]/ML
18 INJECTION, SOLUTION SUBCUTANEOUS AT BEDTIME
Refills: 0 | Status: DISCONTINUED | OUTPATIENT
Start: 2019-11-20 | End: 2019-11-21

## 2019-11-20 RX ORDER — CHLORHEXIDINE GLUCONATE 213 G/1000ML
1 SOLUTION TOPICAL
Refills: 0 | Status: DISCONTINUED | OUTPATIENT
Start: 2019-11-20 | End: 2019-11-21

## 2019-11-20 RX ORDER — HYDROCHLOROTHIAZIDE 25 MG
25 TABLET ORAL DAILY
Refills: 0 | Status: DISCONTINUED | OUTPATIENT
Start: 2019-11-20 | End: 2019-11-20

## 2019-11-20 RX ORDER — LOSARTAN POTASSIUM 100 MG/1
50 TABLET, FILM COATED ORAL DAILY
Refills: 0 | Status: DISCONTINUED | OUTPATIENT
Start: 2019-11-20 | End: 2019-11-20

## 2019-11-20 RX ORDER — DEXTROSE 50 % IN WATER 50 %
12.5 SYRINGE (ML) INTRAVENOUS ONCE
Refills: 0 | Status: DISCONTINUED | OUTPATIENT
Start: 2019-11-20 | End: 2019-11-21

## 2019-11-20 RX ORDER — LOSARTAN POTASSIUM 100 MG/1
1 TABLET, FILM COATED ORAL
Qty: 0 | Refills: 0 | DISCHARGE

## 2019-11-20 RX ORDER — SODIUM CHLORIDE 9 MG/ML
1000 INJECTION, SOLUTION INTRAVENOUS
Refills: 0 | Status: DISCONTINUED | OUTPATIENT
Start: 2019-11-20 | End: 2019-11-21

## 2019-11-20 RX ORDER — GLIMEPIRIDE 1 MG
2 TABLET ORAL
Qty: 0 | Refills: 0 | DISCHARGE

## 2019-11-20 RX ORDER — GLIMEPIRIDE 1 MG
1 TABLET ORAL
Qty: 0 | Refills: 0 | DISCHARGE

## 2019-11-20 RX ORDER — CANAGLIFLOZIN 100 MG/1
1 TABLET, FILM COATED ORAL
Qty: 0 | Refills: 0 | DISCHARGE

## 2019-11-20 RX ORDER — HEPARIN SODIUM 5000 [USP'U]/ML
5000 INJECTION INTRAVENOUS; SUBCUTANEOUS THREE TIMES A DAY
Refills: 0 | Status: DISCONTINUED | OUTPATIENT
Start: 2019-11-20 | End: 2019-11-20

## 2019-11-20 RX ORDER — CILOSTAZOL 100 MG/1
0 TABLET ORAL
Qty: 0 | Refills: 0 | DISCHARGE

## 2019-11-20 RX ORDER — GLUCAGON INJECTION, SOLUTION 0.5 MG/.1ML
1 INJECTION, SOLUTION SUBCUTANEOUS ONCE
Refills: 0 | Status: DISCONTINUED | OUTPATIENT
Start: 2019-11-20 | End: 2019-11-21

## 2019-11-20 RX ORDER — CILOSTAZOL 100 MG/1
50 TABLET ORAL
Refills: 0 | Status: DISCONTINUED | OUTPATIENT
Start: 2019-11-20 | End: 2019-11-21

## 2019-11-20 RX ORDER — INSULIN LISPRO 100/ML
VIAL (ML) SUBCUTANEOUS
Refills: 0 | Status: DISCONTINUED | OUTPATIENT
Start: 2019-11-20 | End: 2019-11-21

## 2019-11-20 RX ORDER — LOSARTAN POTASSIUM 100 MG/1
50 TABLET, FILM COATED ORAL AT BEDTIME
Refills: 0 | Status: DISCONTINUED | OUTPATIENT
Start: 2019-11-20 | End: 2019-11-20

## 2019-11-20 RX ORDER — AMLODIPINE BESYLATE 2.5 MG/1
1 TABLET ORAL
Qty: 0 | Refills: 0 | DISCHARGE

## 2019-11-20 RX ORDER — CILOSTAZOL 100 MG/1
100 TABLET ORAL
Refills: 0 | Status: DISCONTINUED | OUTPATIENT
Start: 2019-11-20 | End: 2019-11-20

## 2019-11-20 RX ORDER — ASPIRIN/CALCIUM CARB/MAGNESIUM 324 MG
1 TABLET ORAL
Qty: 0 | Refills: 0 | DISCHARGE

## 2019-11-20 RX ORDER — DEXTROSE 50 % IN WATER 50 %
25 SYRINGE (ML) INTRAVENOUS ONCE
Refills: 0 | Status: DISCONTINUED | OUTPATIENT
Start: 2019-11-20 | End: 2019-11-21

## 2019-11-20 RX ORDER — HYDROCHLOROTHIAZIDE 25 MG
25 TABLET ORAL AT BEDTIME
Refills: 0 | Status: DISCONTINUED | OUTPATIENT
Start: 2019-11-20 | End: 2019-11-20

## 2019-11-20 RX ORDER — ENOXAPARIN SODIUM 100 MG/ML
0 INJECTION SUBCUTANEOUS
Qty: 0 | Refills: 0 | DISCHARGE

## 2019-11-20 RX ORDER — CEFAZOLIN SODIUM 1 G
500 VIAL (EA) INJECTION ONCE
Refills: 0 | Status: COMPLETED | OUTPATIENT
Start: 2019-11-20 | End: 2019-11-20

## 2019-11-20 RX ORDER — SODIUM CHLORIDE 9 MG/ML
1000 INJECTION INTRAMUSCULAR; INTRAVENOUS; SUBCUTANEOUS
Refills: 0 | Status: DISCONTINUED | OUTPATIENT
Start: 2019-11-20 | End: 2019-11-21

## 2019-11-20 RX ORDER — AMLODIPINE BESYLATE 2.5 MG/1
5 TABLET ORAL AT BEDTIME
Refills: 0 | Status: DISCONTINUED | OUTPATIENT
Start: 2019-11-20 | End: 2019-11-21

## 2019-11-20 RX ORDER — CEFAZOLIN SODIUM 1 G
500 VIAL (EA) INJECTION EVERY 8 HOURS
Refills: 0 | Status: DISCONTINUED | OUTPATIENT
Start: 2019-11-20 | End: 2019-11-21

## 2019-11-20 RX ORDER — DEXTROSE 50 % IN WATER 50 %
15 SYRINGE (ML) INTRAVENOUS ONCE
Refills: 0 | Status: DISCONTINUED | OUTPATIENT
Start: 2019-11-20 | End: 2019-11-21

## 2019-11-20 RX ADMIN — CILOSTAZOL 50 MILLIGRAM(S): 100 TABLET ORAL at 17:13

## 2019-11-20 RX ADMIN — AMLODIPINE BESYLATE 5 MILLIGRAM(S): 2.5 TABLET ORAL at 21:07

## 2019-11-20 RX ADMIN — SODIUM CHLORIDE 75 MILLILITER(S): 9 INJECTION INTRAMUSCULAR; INTRAVENOUS; SUBCUTANEOUS at 16:15

## 2019-11-20 RX ADMIN — CHLORHEXIDINE GLUCONATE 1 APPLICATION(S): 213 SOLUTION TOPICAL at 16:49

## 2019-11-20 RX ADMIN — Medication 100 MILLIGRAM(S): at 21:08

## 2019-11-20 RX ADMIN — Medication 100 MILLIGRAM(S): at 17:13

## 2019-11-20 NOTE — H&P ADULT - ATTENDING COMMENTS
CANDY on CKD 3:  Baseline Cr 1.8.  Creatinine elevation ?due to Bactrim.  Started on gentle hydration. Hold Bactrim.  Monitor BMP. Hold Losartan and HCTZ.  Amlodipine for BP control. Nephrology consult and further work up if persists. CANDY on CKD 3:  Baseline Cr 1.8.  Creatinine elevation ?due to Bactrim.  Started on gentle hydration. Hold Bactrim.  Monitor BMP. Hold Losartan and HCTZ.  Amlodipine for BP control. Nephrology consult and further work up if persists.      Preoperative Risk Assessment:  Patient has no active Cardiopulmonary problems. METs prior to External Fixation 10/4 was >4.  Given Morbid Obesity, CKD3 and DM II on Insulin he is moderate Risk for planned procedure.

## 2019-11-20 NOTE — H&P ADULT - NSHPPHYSICALEXAM_GEN_ALL_CORE
PHYSICAL EXAM:      Constitutional: NAD, A&O x3    Eyes: PERRLA, no conjuctivitis    Neck: no lymphadenopathy    Respiratory: +air entry, no rales, no rhonchi, no wheezes    Cardiovascular: +S1 and S2, regular rate and rhythm    Gastrointestinal: +BS, soft, non-tender, not distended    Extremities:  no edema, no calf tenderness    Skin: no rashes, normal turgor PHYSICAL EXAM:      Constitutional: NAD, A&O x3    Eyes: PERRLA, no conjunctivitis    Neck: no lymphadenopathy    Respiratory: +air entry, no rales, no rhonchi, no wheezes    Cardiovascular: +S1 and S2, regular rate and rhythm    Gastrointestinal: +BS, soft, non-tender, not distended    Extremities:  no edema, no calf tenderness    Skin: no rashes, normal turgor

## 2019-11-20 NOTE — H&P ADULT - NSHPLABSRESULTS_GEN_ALL_CORE
10.7   8.87  )-----------( 252      ( 20 Nov 2019 12:50 )             34.5       11-20    135  |  95<L>  |  28<H>  ----------------------------<  155<H>  4.0   |  27  |  2.2<H>    Ca    9.2      20 Nov 2019 13:16                        PT/INR - ( 20 Nov 2019 12:50 )   PT: 12.40 sec;   INR: 1.08 ratio         PTT - ( 20 Nov 2019 12:50 )  PTT:31.7 sec    < from: Xray Chest 1 View- PORTABLE-Urgent (11.20.19 @ 12:31) >    Impression:      Left basilar focal atelectasis          Spoke with klerman on 11/20/2019 1:23 PM with readback.                  ZOFIA IMKE M.D., ATTENDING RADIOLOGIST  This document has been electronically signed. Nov 20 2019  1:23PM        < end of copied text > 10.7   8.87  )-----------( 252      ( 20 Nov 2019 12:50 )             34.5       11-20    135  |  95<L>  |  28<H>  ----------------------------<  155<H>  4.0   |  27  |  2.2<H>    Ca    9.2      20 Nov 2019 13:16              PT/INR - ( 20 Nov 2019 12:50 )   PT: 12.40 sec;   INR: 1.08 ratio         PTT - ( 20 Nov 2019 12:50 )  PTT:31.7 sec    X-ray Chest 1 View- PORTABLE-Urgent (11.20.19 @ 12:31)     Impression:      Left basilar focal atelectasis.

## 2019-11-20 NOTE — H&P ADULT - NSICDXPASTMEDICALHX_GEN_ALL_CORE_FT
PAST MEDICAL HISTORY:  Charcot ankle, right     DM (diabetes mellitus)     High cholesterol     HTN (hypertension)     MATA on CPAP

## 2019-11-20 NOTE — H&P ADULT - HISTORY OF PRESENT ILLNESS
Pt is a 57yo M w/ PMH DMT2, HTN and HLD presenting for removal of external fixation for Charcot's foot (right). Patient states that he was struggling with Charcot's foot for a few years, which was leading to a non-healing ulcer on the lateral side of his right foot. Patient followed by Dr. Hudson as an outpatient. About 6 weeks ago in early Oct 2019 patient had an operation to correct his Charcot's foot where an external fixation was placed. Patient has not been walking since the surgery, but was mobile prior. Pt was last seen by his podiatrist on monday, who advised him to come to the ED today for removal of external fixation in OR tomorrow. Denies HA, dizziness, NVD, fever, sob, chest pain, abdominal pain, change in urination.

## 2019-11-20 NOTE — ED PROVIDER NOTE - NS ED ROS FT
Constitutional: no fevers/chills, no sick contacts  Eyes: No visual changes, eye pain or discharge. No photophobia  ENMT: No hearing changes, pain, discharge or infections. No sore throat or drooling.  Neck:  No neck pain or stiffness. No limited ROM  Cardiac: No SOB or edema. No chest pain with exertion.  Respiratory: No cough or respiratory distress. No hemoptysis. No history of asthma or RAD  GI: No nausea, vomiting, diarrhea or abdominal pain, +"pulled groin muscle"  : No dysuria, frequency or burning. No discharge  MS: No myalgia, muscle weakness, joint pain or back pain, +foot pain w/ hardware  Neuro: No headache or weakness. No LOC  Skin: No skin rash  Endo: no diabetes or thyroid dysfunction  Heme: no abnormal bleeding or clotting  Except as documented in the HPI, all other systems are negative

## 2019-11-20 NOTE — H&P ADULT - NSICDXPASTSURGICALHX_GEN_ALL_CORE_FT
PAST SURGICAL HISTORY:  Diabetic Charcot foot Fixation with external device    H/O skin graft     History of percutaneous angioplasty     Left toe amputee 4 TOES

## 2019-11-20 NOTE — ED PROVIDER NOTE - CLINICAL SUMMARY MEDICAL DECISION MAKING FREE TEXT BOX
58yM DM presents for f/u procedure for removal of external hardware fixation for Charcot foot. CXR, EKG reviewed. Labs sent.  Will adm to medicine in advance of OR w/ podiatry tomorrow.

## 2019-11-20 NOTE — ED PROVIDER NOTE - PHYSICAL EXAMINATION
CONSTITUTIONAL: well developed; well nourished; well appearing in no acute distress  HEAD: normocephalic; atraumatic  EYES: no conjunctival injection, no scleral icterus  ENT: no nasal discharge; airway clear.  NECK: supple; non tender. + full passive ROM in all directions  CARD: S1, S2 normal; no murmurs, gallops, or rubs. Regular rate and rhythm  RESP: no wheezes, rales or rhonchi. Good air movement bilaterally without significant accessory muscle use  ABD: soft; non-distended; non-tender. No rebound, no guarding, no pulsatile abdominal mass  EXT: moving all extremities spontaneously, +external hardware fixation for R foot (wrapped in bandages)  SKIN: warm and dry, no lesions noted  NEURO: alert, oriented, CN II-XII grossly intact, motor and sensory grossly intact, speech nonslurred, gait deferred, no focal deficits. GCS 15  PSYCH: calm, cooperative, appropriate, good eye contact, logical thought process, no apparent danger to self or others

## 2019-11-20 NOTE — CHART NOTE - NSCHARTNOTEFT_GEN_A_CORE
Sx scheduled, tomorrow 11/21 for Removal of Hardware  Please optimize all pre-surgical labs prior to surgery  OR stratification and medical clearance requested  NPO @ MN

## 2019-11-20 NOTE — H&P ADULT - ASSESSMENT
Pt is a 59yo M w/ PMH DMT2, HTN and HLD presenting for removal of external fixation for Charcot's foot (right). Patient followed by Dr. Hudson as an outpatient. Pt was last seen by his podiatrist on monday, who advised him to come to the ED today for removal of external fixation in OR tomorrow.      PLAN: Case d/w Dr. Stearns  - Admit to inpatient level of care - medicine  - Podiatry c/s  - DASH diet w/ consistent carbs  - NPO after midnight  - Hold invokana and glimperide. Monitor fingersticks. SSI + lantus   - Hold ASA and ezetimibe   - Continue rest of home medications  - GI: protonix  - VTE: SQ heparin Pt is a 57yo M w/ PMH DMT2, HTN and HLD presenting for removal of external fixation for Charcot's foot (right). Patient followed by Dr. Hudson as an outpatient. Pt was last seen by his podiatrist on monday, who advised him to come to the ED today for removal of external fixation in OR tomorrow.      PLAN: Case d/w Dr. Stearns  - Admit to inpatient level of care - medicine  - Podiatry c/s  - DASH diet w/ consistent carbs  - NPO after midnight  - Hold invokana and glimperide. Monitor fingersticks. SSI + lantus   - Hold ASA and ezetimibe   - Continue rest of home medications  - GI: protonix  - VTE: SQ heparin.

## 2019-11-20 NOTE — ED ADULT NURSE NOTE - NSIMPLEMENTINTERV_GEN_ALL_ED
Implemented All Fall with Harm Risk Interventions:  [place] to call system. Call bell, personal items and telephone within reach. Instruct patient to call for assistance. Room bathroom lighting operational. Non-slip footwear when patient is off stretcher. Physically safe environment: no spills, clutter or unnecessary equipment. Stretcher in lowest position, wheels locked, appropriate side rails in place. Provide visual cue, wrist band, yellow gown, etc. Monitor gait and stability. Monitor for mental status changes and reorient to person, place, and time. Review medications for side effects contributing to fall risk. Reinforce activity limits and safety measures with patient and family. Provide visual clues: red socks.

## 2019-11-20 NOTE — CHART NOTE - NSCHARTNOTEFT_GEN_A_CORE
podiatry    sx scheduled tomorrow removal of external fixation RLE  NPO MN  pre op labs  req med clearance and OR stratification  optimize pt prior to sx

## 2019-11-21 ENCOUNTER — RESULT REVIEW (OUTPATIENT)
Age: 59
End: 2019-11-21

## 2019-11-21 LAB
ALBUMIN SERPL ELPH-MCNC: 3.3 G/DL — LOW (ref 3.5–5.2)
ALP SERPL-CCNC: 81 U/L — SIGNIFICANT CHANGE UP (ref 30–115)
ALT FLD-CCNC: 12 U/L — SIGNIFICANT CHANGE UP (ref 0–41)
ANION GAP SERPL CALC-SCNC: 9 MMOL/L — SIGNIFICANT CHANGE UP (ref 7–14)
AST SERPL-CCNC: 21 U/L — SIGNIFICANT CHANGE UP (ref 0–41)
BASOPHILS # BLD AUTO: 0.02 K/UL — SIGNIFICANT CHANGE UP (ref 0–0.2)
BASOPHILS NFR BLD AUTO: 0.3 % — SIGNIFICANT CHANGE UP (ref 0–1)
BILIRUB SERPL-MCNC: 0.4 MG/DL — SIGNIFICANT CHANGE UP (ref 0.2–1.2)
BUN SERPL-MCNC: 25 MG/DL — HIGH (ref 10–20)
CALCIUM SERPL-MCNC: 8.3 MG/DL — LOW (ref 8.5–10.1)
CHLORIDE SERPL-SCNC: 98 MMOL/L — SIGNIFICANT CHANGE UP (ref 98–110)
CO2 SERPL-SCNC: 29 MMOL/L — SIGNIFICANT CHANGE UP (ref 17–32)
CREAT SERPL-MCNC: 2 MG/DL — HIGH (ref 0.7–1.5)
EOSINOPHIL # BLD AUTO: 0.38 K/UL — SIGNIFICANT CHANGE UP (ref 0–0.7)
EOSINOPHIL NFR BLD AUTO: 4.8 % — SIGNIFICANT CHANGE UP (ref 0–8)
ERYTHROCYTE [SEDIMENTATION RATE] IN BLOOD: 86 MM/HR — HIGH (ref 0–10)
GLUCOSE BLDC GLUCOMTR-MCNC: 102 MG/DL — HIGH (ref 70–99)
GLUCOSE BLDC GLUCOMTR-MCNC: 168 MG/DL — HIGH (ref 70–99)
GLUCOSE BLDC GLUCOMTR-MCNC: 68 MG/DL — LOW (ref 70–99)
GLUCOSE BLDC GLUCOMTR-MCNC: 82 MG/DL — SIGNIFICANT CHANGE UP (ref 70–99)
GLUCOSE SERPL-MCNC: 95 MG/DL — SIGNIFICANT CHANGE UP (ref 70–99)
HCT VFR BLD CALC: 34.8 % — LOW (ref 42–52)
HGB BLD-MCNC: 10.4 G/DL — LOW (ref 14–18)
IMM GRANULOCYTES NFR BLD AUTO: 0.3 % — SIGNIFICANT CHANGE UP (ref 0.1–0.3)
LYMPHOCYTES # BLD AUTO: 1.23 K/UL — SIGNIFICANT CHANGE UP (ref 1.2–3.4)
LYMPHOCYTES # BLD AUTO: 15.5 % — LOW (ref 20.5–51.1)
MCHC RBC-ENTMCNC: 27.1 PG — SIGNIFICANT CHANGE UP (ref 27–31)
MCHC RBC-ENTMCNC: 29.9 G/DL — LOW (ref 32–37)
MCV RBC AUTO: 90.6 FL — SIGNIFICANT CHANGE UP (ref 80–94)
MONOCYTES # BLD AUTO: 0.79 K/UL — HIGH (ref 0.1–0.6)
MONOCYTES NFR BLD AUTO: 9.9 % — HIGH (ref 1.7–9.3)
NEUTROPHILS # BLD AUTO: 5.51 K/UL — SIGNIFICANT CHANGE UP (ref 1.4–6.5)
NEUTROPHILS NFR BLD AUTO: 69.2 % — SIGNIFICANT CHANGE UP (ref 42.2–75.2)
NRBC # BLD: 0 /100 WBCS — SIGNIFICANT CHANGE UP (ref 0–0)
PHOSPHATE SERPL-MCNC: 3.5 MG/DL — SIGNIFICANT CHANGE UP (ref 2.1–4.9)
PLATELET # BLD AUTO: 238 K/UL — SIGNIFICANT CHANGE UP (ref 130–400)
POTASSIUM SERPL-MCNC: 3.9 MMOL/L — SIGNIFICANT CHANGE UP (ref 3.5–5)
POTASSIUM SERPL-SCNC: 3.9 MMOL/L — SIGNIFICANT CHANGE UP (ref 3.5–5)
PROT SERPL-MCNC: 7.7 G/DL — SIGNIFICANT CHANGE UP (ref 6–8)
RBC # BLD: 3.84 M/UL — LOW (ref 4.7–6.1)
RBC # FLD: 15.6 % — HIGH (ref 11.5–14.5)
SODIUM SERPL-SCNC: 136 MMOL/L — SIGNIFICANT CHANGE UP (ref 135–146)
WBC # BLD: 7.95 K/UL — SIGNIFICANT CHANGE UP (ref 4.8–10.8)
WBC # FLD AUTO: 7.95 K/UL — SIGNIFICANT CHANGE UP (ref 4.8–10.8)

## 2019-11-21 PROCEDURE — 88304 TISSUE EXAM BY PATHOLOGIST: CPT | Mod: 26

## 2019-11-21 PROCEDURE — 73630 X-RAY EXAM OF FOOT: CPT | Mod: 26,RT

## 2019-11-21 PROCEDURE — 88311 DECALCIFY TISSUE: CPT | Mod: 26

## 2019-11-21 PROCEDURE — 99233 SBSQ HOSP IP/OBS HIGH 50: CPT

## 2019-11-21 RX ORDER — AMLODIPINE BESYLATE 2.5 MG/1
5 TABLET ORAL AT BEDTIME
Refills: 0 | Status: DISCONTINUED | OUTPATIENT
Start: 2019-11-21 | End: 2019-11-22

## 2019-11-21 RX ORDER — ACETAMINOPHEN 500 MG
650 TABLET ORAL ONCE
Refills: 0 | Status: DISCONTINUED | OUTPATIENT
Start: 2019-11-21 | End: 2019-11-21

## 2019-11-21 RX ORDER — INSULIN LISPRO 100/ML
VIAL (ML) SUBCUTANEOUS
Refills: 0 | Status: DISCONTINUED | OUTPATIENT
Start: 2019-11-21 | End: 2019-11-26

## 2019-11-21 RX ORDER — CEFAZOLIN SODIUM 1 G
500 VIAL (EA) INJECTION EVERY 8 HOURS
Refills: 0 | Status: DISCONTINUED | OUTPATIENT
Start: 2019-11-21 | End: 2019-11-23

## 2019-11-21 RX ORDER — DEXTROSE 50 % IN WATER 50 %
25 SYRINGE (ML) INTRAVENOUS ONCE
Refills: 0 | Status: DISCONTINUED | OUTPATIENT
Start: 2019-11-21 | End: 2019-11-26

## 2019-11-21 RX ORDER — SODIUM CHLORIDE 9 MG/ML
1000 INJECTION, SOLUTION INTRAVENOUS
Refills: 0 | Status: DISCONTINUED | OUTPATIENT
Start: 2019-11-21 | End: 2019-11-21

## 2019-11-21 RX ORDER — PANTOPRAZOLE SODIUM 20 MG/1
40 TABLET, DELAYED RELEASE ORAL
Refills: 0 | Status: DISCONTINUED | OUTPATIENT
Start: 2019-11-21 | End: 2019-11-26

## 2019-11-21 RX ORDER — ACETAMINOPHEN 500 MG
650 TABLET ORAL ONCE
Refills: 0 | Status: COMPLETED | OUTPATIENT
Start: 2019-11-21 | End: 2019-11-21

## 2019-11-21 RX ORDER — HYDROMORPHONE HYDROCHLORIDE 2 MG/ML
0.5 INJECTION INTRAMUSCULAR; INTRAVENOUS; SUBCUTANEOUS
Refills: 0 | Status: DISCONTINUED | OUTPATIENT
Start: 2019-11-21 | End: 2019-11-21

## 2019-11-21 RX ORDER — CHLORHEXIDINE GLUCONATE 213 G/1000ML
1 SOLUTION TOPICAL
Refills: 0 | Status: DISCONTINUED | OUTPATIENT
Start: 2019-11-21 | End: 2019-11-26

## 2019-11-21 RX ORDER — ACETAMINOPHEN 500 MG
650 TABLET ORAL EVERY 6 HOURS
Refills: 0 | Status: DISCONTINUED | OUTPATIENT
Start: 2019-11-21 | End: 2019-11-26

## 2019-11-21 RX ORDER — CILOSTAZOL 100 MG/1
50 TABLET ORAL
Refills: 0 | Status: DISCONTINUED | OUTPATIENT
Start: 2019-11-21 | End: 2019-11-26

## 2019-11-21 RX ORDER — OXYCODONE AND ACETAMINOPHEN 5; 325 MG/1; MG/1
1 TABLET ORAL ONCE
Refills: 0 | Status: DISCONTINUED | OUTPATIENT
Start: 2019-11-21 | End: 2019-11-21

## 2019-11-21 RX ORDER — GLUCAGON INJECTION, SOLUTION 0.5 MG/.1ML
1 INJECTION, SOLUTION SUBCUTANEOUS ONCE
Refills: 0 | Status: DISCONTINUED | OUTPATIENT
Start: 2019-11-21 | End: 2019-11-26

## 2019-11-21 RX ORDER — DEXTROSE 50 % IN WATER 50 %
12.5 SYRINGE (ML) INTRAVENOUS ONCE
Refills: 0 | Status: DISCONTINUED | OUTPATIENT
Start: 2019-11-21 | End: 2019-11-26

## 2019-11-21 RX ORDER — DEXTROSE 50 % IN WATER 50 %
15 SYRINGE (ML) INTRAVENOUS ONCE
Refills: 0 | Status: DISCONTINUED | OUTPATIENT
Start: 2019-11-21 | End: 2019-11-26

## 2019-11-21 RX ORDER — INSULIN GLARGINE 100 [IU]/ML
18 INJECTION, SOLUTION SUBCUTANEOUS AT BEDTIME
Refills: 0 | Status: DISCONTINUED | OUTPATIENT
Start: 2019-11-21 | End: 2019-11-26

## 2019-11-21 RX ORDER — SODIUM CHLORIDE 9 MG/ML
1000 INJECTION, SOLUTION INTRAVENOUS
Refills: 0 | Status: DISCONTINUED | OUTPATIENT
Start: 2019-11-21 | End: 2019-11-22

## 2019-11-21 RX ORDER — SODIUM CHLORIDE 9 MG/ML
1000 INJECTION, SOLUTION INTRAVENOUS
Refills: 0 | Status: DISCONTINUED | OUTPATIENT
Start: 2019-11-21 | End: 2019-11-26

## 2019-11-21 RX ADMIN — Medication 100 MILLIGRAM(S): at 21:38

## 2019-11-21 RX ADMIN — SODIUM CHLORIDE 50 MILLILITER(S): 9 INJECTION, SOLUTION INTRAVENOUS at 15:19

## 2019-11-21 RX ADMIN — Medication 100 MILLIGRAM(S): at 15:32

## 2019-11-21 RX ADMIN — PANTOPRAZOLE SODIUM 40 MILLIGRAM(S): 20 TABLET, DELAYED RELEASE ORAL at 06:15

## 2019-11-21 RX ADMIN — AMLODIPINE BESYLATE 5 MILLIGRAM(S): 2.5 TABLET ORAL at 21:38

## 2019-11-21 RX ADMIN — CILOSTAZOL 50 MILLIGRAM(S): 100 TABLET ORAL at 17:40

## 2019-11-21 RX ADMIN — SODIUM CHLORIDE 100 MILLILITER(S): 9 INJECTION, SOLUTION INTRAVENOUS at 14:18

## 2019-11-21 RX ADMIN — Medication 650 MILLIGRAM(S): at 01:47

## 2019-11-21 RX ADMIN — Medication 650 MILLIGRAM(S): at 04:58

## 2019-11-21 RX ADMIN — Medication 100 MILLIGRAM(S): at 05:29

## 2019-11-21 RX ADMIN — SODIUM CHLORIDE 50 MILLILITER(S): 9 INJECTION, SOLUTION INTRAVENOUS at 09:53

## 2019-11-21 RX ADMIN — CILOSTAZOL 50 MILLIGRAM(S): 100 TABLET ORAL at 05:29

## 2019-11-21 RX ADMIN — CHLORHEXIDINE GLUCONATE 1 APPLICATION(S): 213 SOLUTION TOPICAL at 15:31

## 2019-11-21 NOTE — PROGRESS NOTE ADULT - SUBJECTIVE AND OBJECTIVE BOX
THOMAS FERNANDEZ  58y  Male    Patient is a 58y old  Male who presents for Removal of external fixation for Charcot's foot (2019 09:45)      INTERVAL HPI/OVERNIGHT EVENTS:  No [id number] events.  Patient scheduled for OR today.      REVIEW OF SYSTEMS:  At least 10 systems were reviewed in ROS.   All systems reviewed are within normal limits.      VITALS:  T(F): 97.4 (19 @ 07:24), Max: 97.4 (19 @ 05:35)  HR: 88 (19 @ 07:24) (88 - 99)  BP: 157/80 (19 @ 07:24) (115/58 - 157/80)  RR: 18 (19 @ 05:35) (18 - 18)  SpO2: --      CAPILLARY BLOOD GLUCOSE  POCT Blood Glucose.: 82 mg/dL (2019 07:35)  POCT Blood Glucose.: 108 mg/dL (2019 21:11)  POCT Blood Glucose.: 92 mg/dL (2019 16:49)  POCT Blood Glucose.: 113 mg/dL (2019 15:10)    PHYSICAL EXAM:  GENERAL: NAD, well-developed  HEAD:  Atraumatic, Normocephalic  EYES: conjunctiva and sclera clear  ENMT: Moist mucous membranes  NECK: Supple, Normal thyroid  NERVOUS SYSTEM:  Alert & Oriented X 3, Good concentration; Motor Strength 5/5 B/L upper and lower extremities.  CHEST/LUNG: Clear to percussion bilaterally; No rales, rhonchi, wheezing, or rubs  HEART: Regular rate and rhythm; No murmurs, rubs, or gallops  ABDOMEN: Soft, Nontender, Nondistended; Bowel sounds present  EXTREMITIES:  2+ Peripheral Pulses, No clubbing, cyanosis, or edema  LYMPH: No lymphadenopathy noted  SKIN: No rashes or lesions    Consultant(s) Notes Reviewed:  [x ] YES  [ ] NO  Care Discussed with Consultants/Other Providers [ x] YES  [ ] NO    LABS:                        10.4   7.95  )-----------( 238      ( 2019 06:51 )             34.8         136  |  98  |  25<H>  ----------------------------<  95  3.9   |  29  |  2.0<H>    Ca    8.3<L>      2019 06:51  Phos  3.5         TPro  7.7  /  Alb  3.3<L>  /  TBili  0.4  /  DBili  x   /  AST  21  /  ALT  12  /  AlkPhos  81  -      Urinalysis Basic - ( 2019 21:00 )    Color: Yellow / Appearance: Slightly Cloudy / S.020 / pH: x  Gluc: x / Ketone: Negative  / Bili: Negative / Urobili: 0.2 mg/dL   Blood: x / Protein: Negative mg/dL / Nitrite: Negative   Leuk Esterase: Negative / RBC: 3-5 /HPF / WBC 1-2 /HPF   Sq Epi: x / Non Sq Epi: Few /HPF / Bacteria: Few      MICROBIOLOGY: None      RADIOLOGY & ADDITIONAL TESTS:  X-ray Chest 1 View- PORTABLE-Urgent (19 @ 12:31)   Left basilar focal atelectasis        Imaging Personally Reviewed:  [x] YES  [ ] NO      MEDICATIONS  (STANDING):  lactated ringers. 1000 milliLiter(s) (100 mL/Hr) IV Continuous <Continuous>    MEDICATIONS  (PRN):  acetaminophen   Tablet .. 650 milliGRAM(s) Oral once PRN Mild Pain (1 - 3)  HYDROmorphone  Injectable 0.5 milliGRAM(s) IV Push every 10 minutes PRN Severe Pain (7 - 10)  oxycodone    5 mG/acetaminophen 325 mG 1 Tablet(s) Oral once PRN Moderate Pain (4 - 6)      Home Medications:  amLODIPine 5 mg oral tablet: 1 tab(s) orally once a day (2019 14:31)  aspirin 81 mg oral tablet, chewable: 1 tab(s) orally once a day (2019 14:31)  ezetimibe 10 mg oral tablet: 1 tab(s) orally once a day (2019 14:31)  glimepiride 4 mg oral tablet: 2 tab(s) orally once a day (2019 14:31)  hydroCHLOROthiazide 25 mg oral tablet: 1 tab(s) orally once a day (at bedtime) (2019 14:31)  Invokana 300 mg oral tablet: 1 tab(s) orally once a day (2019 14:31)  Lantus Solostar Pen 100 units/mL subcutaneous solution: subcutaneous once a day (at bedtime)-  18 UNITS (2019 14:31)  losartan 50 mg oral tablet: 1 tab(s) orally once a day (2019 14:31)  Pletal 100 mg oral tablet: 1 tab(s) orally once a day (2019 14:31)        HEALTH ISSUES - PROBLEM Dx:  High cholesterol  HTN (hypertension)  DM (diabetes mellitus)  Charcot ankle, right

## 2019-11-21 NOTE — CHART NOTE - NSCHARTNOTEFT_GEN_A_CORE
podiatry        strict non weight bearing RLE  ESR/CRP  consult nephro  consult ID   recommend picc line  f/u with attending

## 2019-11-21 NOTE — CONSULT NOTE ADULT - SUBJECTIVE AND OBJECTIVE BOX
PODIATRY CONSULT   FERNANDEZ GUZMAN is a pleasant well-nourished, well developed 58y Male in no acute distress, alert awake, and oriented to person, place and time.   Patient is a 58y old  Male who presents with a chief complaint of Removal of external fixation for Charcot's foot (20 Nov 2019 14:19)    HPI:  Pt is a 57yo M w/ PMH DMT2, HTN and HLD presenting for removal of external fixation for Charcot's foot (right). Patient states that he was struggling with Charcot's foot for a few years, which was leading to a non-healing ulcer on the lateral side of his right foot. Patient followed by Dr. Hudson as an outpatient. About 6 weeks ago in early Oct 2019 patient had an operation to correct his Charcot's foot where an external fixation was placed. Patient has not been walking since the surgery, but was mobile prior. Pt was last seen by his podiatrist on monday, who advised him to come to the ED today for removal of external fixation in OR tomorrow. Denies HA, dizziness, NVD, fever, sob, chest pain, abdominal pain, change in urination. (20 Nov 2019 14:19)    pt had charcot recon RLE back in 10/4/19, pt sent by Dr. Hudson for removal of hardware today. pt states that nurse has been changing dressings daily and was seen by Dr. Hudson this week and advised him to come to the ED for surgery today. pt denies any n/v/f/c/sob at this time.     CHARCOT ANKLE, RIGHT;DM  Charcot ankle, right  High cholesterol  HTN (hypertension)  DM (diabetes mellitus)  MATA on CPAP      PMH: CHARCOT ANKLE, RIGHT;DM  Charcot ankle, right  High cholesterol  HTN (hypertension)  DM (diabetes mellitus)  MATA on CPAP    PSH: Diabetic Charcot foot  Left toe amputee  H/O skin graft  History of percutaneous angioplasty    Medication ceFAZolin   IVPB      ceFAZolin   IVPB 500 milliGRAM(s) IV Intermittent every 8 hours    Allergy: No Known Allergies  statins (Other)        Labs:                        10.4   7.95  )-----------( 238      ( 21 Nov 2019 06:51 )             34.8     PT/INR - ( 20 Nov 2019 12:50 )   PT: 12.40 sec;   INR: 1.08 ratio         PTT - ( 20 Nov 2019 12:50 )  PTT:31.7 sec  11-21    136  |  98  |  25<H>  ----------------------------<  95  3.9   |  29  |  2.0<H>    Ca    8.3<L>      21 Nov 2019 06:51  Phos  3.5     11-21    TPro  7.7  /  Alb  3.3<L>  /  TBili  0.4  /  DBili  x   /  AST  21  /  ALT  12  /  AlkPhos  81  11-21            O:   Derm: external fixator intact RLE, posterior distal calcaneus foot shifted laterally. mild dry blood noted. edema RLE  Vascular: unable to palpate 2/2 ex fix.  Capillary re-fill time less then 3 seconds digits 1-5 right foot.  warm to touch  Neuro: Protective sensation diminished to the level of the digits right foot.      Assessment and Plan:   charcot foot deformity R foot  s/p charcot recon with application of ex fix RLE 10/4      Chart reviewed and Patient evaluated  Discussed diagnosis and treatment with patient  sx scheduled today at 11:30 am, removal of ex fix RLE  NPO since MN  optimize pt prior to sx  med cl: moderate risk  f/u with attending

## 2019-11-21 NOTE — PROGRESS NOTE ADULT - ASSESSMENT
Patient is a 57yo M w/ PMH DMT2, HTN and HLD presenting for removal of external fixation for Charcot's foot (right). Patient followed by Dr. Hudson as an outpatient. Pt was last seen by his podiatrist on monday, who advised him to come to the ED today for removal of external fixation in OR tomorrow.      Assessment & Plan:    1. Charcot foot, right:  Podiatry following Scheduled for OR today.  Continue Cefazolin for now.  Bactrim on hold. (3 more days)      2. Morbid obesity:  BMI   Dietary and Lifestyle modification.      3. Type 2 DM:  Monitor FS with Insulin coverage.  Hemoglobin A1C, Whole Blood: 8.6 % (10.02.19 @ 17:45)  Hold Invokana and Glimepiride.      4.  HTN (hypertension):  Hold HCTZ, and losartan. Continue Amlodipine  Monitor BP.       5. CANDY on CKD 3:  Baseline Cr 1.8.  Creatinine elevation ?due to Bactrim.  Started on gentle hydration. Hold Bactrim.  Monitor BMP. Hold Losartan and HCTZ.  Amlodipine for BP control. Nephrology consult and further work up if persists.        Prophylaxis: Heparin  Code status: Full code    Progress Note Handoff:  Pending consults: None  Pending Tests: None  Family/Patient discussion: Plan of care discussed with Patient.  Disposition: Pending PT eval.      Attending: Dr. Madina Stearns. Spectra [id number].

## 2019-11-21 NOTE — BRIEF OPERATIVE NOTE - NSICDXBRIEFPROCEDURE_GEN_ALL_CORE_FT
PROCEDURES:  Pulsed lavage 21-Nov-2019 14:15:09  Antonio Kelley  Irrigation and excisional debridement of dermis and epidermis 21-Nov-2019 14:15:01  Antonio Kelley  Removal of external fixator from bone of lower leg and foot 21-Nov-2019 14:14:30  Antonio Kelley  Removal of external fixation of tibia 21-Nov-2019 14:14:00  Antonio Kelley PROCEDURES:  Biopsy, needle or trocar, deep bone 21-Nov-2019 16:55:31  Antonio Kelley  Pulsed lavage 21-Nov-2019 14:15:09  Antonio Kelley  Irrigation and excisional debridement of dermis and epidermis 21-Nov-2019 14:15:01  Antonio Kelley  Removal of external fixator from bone of lower leg and foot 21-Nov-2019 14:14:30  Antonio Kelley  Removal of external fixation of tibia 21-Nov-2019 14:14:00  Antonio Kelley

## 2019-11-21 NOTE — BRIEF OPERATIVE NOTE - NSICDXBRIEFPOSTOP_GEN_ALL_CORE_FT
POST-OP DIAGNOSIS:  Diabetic infection of right foot 21-Nov-2019 14:13:21  Antonio Kelley  Diabetic Charcot foot 21-Nov-2019 14:12:29  Antonio Kelley

## 2019-11-22 LAB
CRP SERPL-MCNC: 8.54 MG/DL — HIGH (ref 0–0.4)
GLUCOSE BLDC GLUCOMTR-MCNC: 152 MG/DL — HIGH (ref 70–99)
GLUCOSE BLDC GLUCOMTR-MCNC: 165 MG/DL — HIGH (ref 70–99)
GLUCOSE BLDC GLUCOMTR-MCNC: 174 MG/DL — HIGH (ref 70–99)
GLUCOSE BLDC GLUCOMTR-MCNC: 217 MG/DL — HIGH (ref 70–99)

## 2019-11-22 PROCEDURE — 99233 SBSQ HOSP IP/OBS HIGH 50: CPT

## 2019-11-22 RX ORDER — AMLODIPINE BESYLATE 2.5 MG/1
10 TABLET ORAL AT BEDTIME
Refills: 0 | Status: DISCONTINUED | OUTPATIENT
Start: 2019-11-22 | End: 2019-11-26

## 2019-11-22 RX ADMIN — Medication 1: at 08:21

## 2019-11-22 RX ADMIN — Medication 1: at 11:32

## 2019-11-22 RX ADMIN — PANTOPRAZOLE SODIUM 40 MILLIGRAM(S): 20 TABLET, DELAYED RELEASE ORAL at 06:18

## 2019-11-22 RX ADMIN — Medication 100 MILLIGRAM(S): at 14:10

## 2019-11-22 RX ADMIN — Medication 100 MILLIGRAM(S): at 21:27

## 2019-11-22 RX ADMIN — CILOSTAZOL 50 MILLIGRAM(S): 100 TABLET ORAL at 18:13

## 2019-11-22 RX ADMIN — Medication 2: at 18:13

## 2019-11-22 RX ADMIN — Medication 100 MILLIGRAM(S): at 05:55

## 2019-11-22 RX ADMIN — INSULIN GLARGINE 18 UNIT(S): 100 INJECTION, SOLUTION SUBCUTANEOUS at 22:25

## 2019-11-22 RX ADMIN — AMLODIPINE BESYLATE 10 MILLIGRAM(S): 2.5 TABLET ORAL at 21:17

## 2019-11-22 RX ADMIN — CILOSTAZOL 50 MILLIGRAM(S): 100 TABLET ORAL at 05:55

## 2019-11-22 NOTE — PROGRESS NOTE ADULT - SUBJECTIVE AND OBJECTIVE BOX
FERNANDEZ GUZMAN  58y  Male    Patient is a 58y old  Male who presents for Removal of external fixation for Charcot's foot (2019 09:45)      INTERVAL HPI/OVERNIGHT EVENTS:  No interval events.  s/p Removal of external fixation of tibia 2019.  Patient has no complaints. Denies any pain or fever.      REVIEW OF SYSTEMS:  At least 10 systems were reviewed in ROS.   All systems reviewed are within normal limits.      VITALS:  T(C): 36.7 (2019 13:54), Max: 36.9 (2019 05:09)  T(F): 98 (2019 13:54), Max: 98.5 (2019 05:09)  HR: 87 (2019 13:54) (80 - 89)  BP: 141/66 (2019 13:54) (134/63 - 144/64)  BP(mean): --  RR: 16 (2019 13:54) (16 - 16)  SpO2: --      CAPILLARY BLOOD GLUCOSE  POCT Blood Glucose.: 174 mg/dL (2019 11:24)  POCT Blood Glucose.: 152 mg/dL (2019 07:50)  POCT Blood Glucose.: 168 mg/dL (2019 21:40)  POCT Blood Glucose.: 102 mg/dL (2019 17:17)  POCT Blood Glucose.: 68 mg/dL (2019 16:30)      PHYSICAL EXAM:  GENERAL: NAD, well-developed  HEAD:  Atraumatic, Normocephalic  EYES: conjunctiva and sclera clear  ENMT: Moist mucous membranes  NECK: Supple, Normal thyroid  NERVOUS SYSTEM:  Alert & Oriented X 3, Good concentration; Motor Strength 5/5 B/L upper and lower extremities.  CHEST/LUNG: Clear to auscultation bilaterally; No rales, rhonchi, wheezing, or rubs  HEART: Regular rate and rhythm; No murmurs, rubs, or gallops  ABDOMEN: Soft, Nontender, Nondistended; Bowel sounds present  EXTREMITIES:  2+ Peripheral Pulses, No clubbing, cyanosis, or edema  LYMPH: No lymphadenopathy noted  SKIN: RT External fixator removed. Rt leg dressing/cast.    Consultant(s) Notes Reviewed:  [x ] YES  [ ] NO  Care Discussed with Consultants/Other Providers [ x] YES  [ ] NO    LABS:                        10.4   7.95  )-----------( 238      ( 2019 06:51 )             34.8         136  |  98  |  25<H>  ----------------------------<  95  3.9   |  29  |  2.0<H>    Ca    8.3<L>      2019 06:51  Phos  3.5         TPro  7.7  /  Alb  3.3<L>  /  TBili  0.4  /  DBili  x   /  AST  21  /  ALT  12  /  AlkPhos  81                 Urinalysis Basic - ( 2019 21:00 )    Color: Yellow / Appearance: Slightly Cloudy / S.020 / pH: x  Gluc: x / Ketone: Negative  / Bili: Negative / Urobili: 0.2 mg/dL   Blood: x / Protein: Negative mg/dL / Nitrite: Negative   Leuk Esterase: Negative / RBC: 3-5 /HPF / WBC 1-2 /HPF   Sq Epi: x / Non Sq Epi: Few /HPF / Bacteria: Few      MICROBIOLOGY: pending.      RADIOLOGY & ADDITIONAL TESTS:  X-ray Chest 1 View- PORTABLE-Urgent (19 @ 12:31)   Left basilar focal atelectasis        Imaging Personally Reviewed:  [x] YES  [ ] NO      MEDICATIONS  (STANDING):  amLODIPine   Tablet 5 milliGRAM(s) Oral at bedtime  ceFAZolin   IVPB 500 milliGRAM(s) IV Intermittent every 8 hours  chlorhexidine 4% Liquid 1 Application(s) Topical <User Schedule>  cilostazol 50 milliGRAM(s) Oral two times a day  dextrose 5%. 1000 milliLiter(s) (50 mL/Hr) IV Continuous <Continuous>  dextrose 50% Injectable 12.5 Gram(s) IV Push once  dextrose 50% Injectable 25 Gram(s) IV Push once  dextrose 50% Injectable 25 Gram(s) IV Push once  insulin glargine Injectable (LANTUS) 18 Unit(s) SubCutaneous at bedtime  insulin lispro (HumaLOG) corrective regimen sliding scale   SubCutaneous three times a day before meals  pantoprazole    Tablet 40 milliGRAM(s) Oral before breakfast      MEDICATIONS  (PRN):  acetaminophen   Tablet .. 650 milliGRAM(s) Oral every 6 hours PRN Moderate Pain (4 - 6)  dextrose 40% Gel 15 Gram(s) Oral once PRN Blood Glucose LESS THAN 70 milliGRAM(s)/deciliter  glucagon  Injectable 1 milliGRAM(s) IntraMuscular once PRN Glucose LESS THAN 70 milligrams/deciliter      Home Medications:  amLODIPine 5 mg oral tablet: 1 tab(s) orally once a day (2019 14:31)  aspirin 81 mg oral tablet, chewable: 1 tab(s) orally once a day (2019 14:31)  ezetimibe 10 mg oral tablet: 1 tab(s) orally once a day (2019 14:31)  glimepiride 4 mg oral tablet: 2 tab(s) orally once a day (2019 14:31)  hydroCHLOROthiazide 25 mg oral tablet: 1 tab(s) orally once a day (at bedtime) (2019 14:31)  Invokana 300 mg oral tablet: 1 tab(s) orally once a day (2019 14:31)  Lantus Solostar Pen 100 units/mL subcutaneous solution: subcutaneous once a day (at bedtime)-  18 UNITS (2019 14:31)  losartan 50 mg oral tablet: 1 tab(s) orally once a day (2019 14:31)  Pletal 100 mg oral tablet: 1 tab(s) orally once a day (2019 14:31)        HEALTH ISSUES - PROBLEM Dx:  Suspected OM  High cholesterol  HTN (hypertension)  DM (diabetes mellitus)  Charcot ankle, right

## 2019-11-22 NOTE — PROGRESS NOTE ADULT - ASSESSMENT
Patient is a 57yo M w/ PMH DMT2, HTN and HLD presenting for removal of external fixation for Charcot's foot (right). Patient followed by Dr. Hudson as an outpatient. Pt was last seen by his podiatrist on monday, who advised him to come to the ED today for removal of external fixation in OR tomorrow.      Assessment & Plan:    1. Charcot foot, right:  2. Suspected RT DFU/OM:  s/p Bone biopsy & Removal of external fixation of tibia 21-Nov-2019.  Continue Cefazolin for now.  ID consulted. May need a PICC line.      3. Morbid obesity:  BMI   Dietary and Lifestyle modification.      4. Type 2 DM:  Monitor FS with Insulin coverage.  Hemoglobin A1C, Whole Blood: 8.6 % (10.02.19 @ 17:45)  Hold Invokana and Glimepiride.      5.  HTN (hypertension):  Hold HCTZ, and losartan. Continue Amlodipine  Monitor BP.       6. CANDY on CKD 3:  Baseline Cr 1.8.  Creatinine elevation ?due to Bactrim.  Started on gentle hydration. Hold Bactrim.  Monitor BMP. Hold Losartan and HCTZ.  Amlodipine for BP control. Nephrology consult and further work up if persists.        Prophylaxis: Heparin  Code status: Full code    Progress Note Handoff:  Pending consults: None  Pending Tests: None  Family/Patient discussion: Plan of care discussed with Patient.  Disposition: Home with home care.      Attending: Dr. Madina Stearns. Spectra [phone].

## 2019-11-22 NOTE — CONSULT NOTE ADULT - SUBJECTIVE AND OBJECTIVE BOX
Patient is a 58y old  Male who presents with a chief complaint of Removal of external fixation for Charcot's foot (22 Nov 2019 14:54)      REVIEW OF SYSTEMS: Total of twelve systems have been reviewed with patient and found to be negative unless mentioned in HPI      PAST MEDICAL & SURGICAL HISTORY:  Charcot ankle, right  High cholesterol  HTN (hypertension)  DM (diabetes mellitus)  MATA on CPAP  Diabetic Charcot foot: Fixation with external device  Left toe amputee: 4 TOES  H/O skin graft  History of percutaneous angioplasty            SOCIAL HISTORY  Alcohol: Does not drink  Tobacco: Does not smoke  Illicit substance use: None      FAMILY HISTORY: Non contributory to the present illness        ALLERGIES:  No Known Allergies  statins (Other)      Vital Signs Last 24 Hrs  T(C): 35.6 (22 Nov 2019 21:25), Max: 36.9 (22 Nov 2019 05:09)  T(F): 96.1 (22 Nov 2019 21:25), Max: 98.5 (22 Nov 2019 05:09)  HR: 95 (22 Nov 2019 22:28) (87 - 95)  BP: 172/79 (22 Nov 2019 22:28) (140/80 - 197/90)  BP(mean): --  RR: 16 (22 Nov 2019 22:28) (16 - 16)  SpO2: --        PHYSICAL EXAM:  GENERAL: Not in distress   CHEST/LUNG:  Aire ntry bilaterally  HEART: s1 and s2 present  ABDOMEN:  Nontender and  Nondistended  EXTREMITIES: No pedal  edema  CNS: Awake and Alert      LABS:                        10.4   7.95  )-----------( 238      ( 21 Nov 2019 06:51 )             34.8     11-21    136  |  98  |  25<H>  ----------------------------<  95  3.9   |  29  |  2.0<H>    Ca    8.3<L>      21 Nov 2019 06:51  Phos  3.5     11-21    TPro  7.7  /  Alb  3.3<L>  /  TBili  0.4  /  DBili  x   /  AST  21  /  ALT  12  /  AlkPhos  81  11-21        CAPILLARY BLOOD GLUCOSE      POCT Blood Glucose.: 165 mg/dL (22 Nov 2019 22:02)  POCT Blood Glucose.: 217 mg/dL (22 Nov 2019 18:10)  POCT Blood Glucose.: 174 mg/dL (22 Nov 2019 11:24)  POCT Blood Glucose.: 152 mg/dL (22 Nov 2019 07:50)        MEDICATIONS  (STANDING):  amLODIPine   Tablet 10 milliGRAM(s) Oral at bedtime  ceFAZolin   IVPB 500 milliGRAM(s) IV Intermittent every 8 hours  chlorhexidine 4% Liquid 1 Application(s) Topical <User Schedule>  cilostazol 50 milliGRAM(s) Oral two times a day  dextrose 5%. 1000 milliLiter(s) (50 mL/Hr) IV Continuous <Continuous>  dextrose 50% Injectable 12.5 Gram(s) IV Push once  dextrose 50% Injectable 25 Gram(s) IV Push once  dextrose 50% Injectable 25 Gram(s) IV Push once  insulin glargine Injectable (LANTUS) 18 Unit(s) SubCutaneous at bedtime  insulin lispro (HumaLOG) corrective regimen sliding scale   SubCutaneous three times a day before meals  pantoprazole    Tablet 40 milliGRAM(s) Oral before breakfast    MEDICATIONS  (PRN):  acetaminophen   Tablet .. 650 milliGRAM(s) Oral every 6 hours PRN Moderate Pain (4 - 6)  dextrose 40% Gel 15 Gram(s) Oral once PRN Blood Glucose LESS THAN 70 milliGRAM(s)/deciliter  glucagon  Injectable 1 milliGRAM(s) IntraMuscular once PRN Glucose LESS THAN 70 milligrams/deciliter        RADIOLOGY & ADDITIONAL TESTS:    < from: Xray Foot AP + Lateral + Oblique, Right (11.21.19 @ 15:45) >  INTERPRETATION:  Clinical History / Reason for exam: Postop removal of   external fixation device  3. Images  Comparison 10 4/19 and 4/20/2017  The external fixation devices been removed.  The images are available for surgical review.  Impression  External fixation device removed        < end of copied text > Patient is a 58y old  Male with PMH DMT2, HTN and HLD presenting for removal of external fixation for Charcot's foot (right). Patient states that he was struggling with Charcot's foot for a few years, which was leading to a non-healing ulcer on the lateral side of his right foot. Patient followed by Dr. Hudson as an outpatient. About 6 weeks ago in early Oct 2019 patient had an operation to correct his Charcot's foot where an external fixation was placed. Patient has not been walking since the surgery, but was mobile prior. Pt was last seen by his podiatrist on Monday, who advised him to come to the ED for removal of external fixation in OR. He is s/p removal of External fixation, intra-op cultures pending.        REVIEW OF SYSTEMS: Total of twelve systems have been reviewed with patient and found to be negative unless mentioned in HPI      PAST MEDICAL & SURGICAL HISTORY:  Charcot ankle, right  High cholesterol  HTN (hypertension)  DM (diabetes mellitus)  MATA on CPAP  Diabetic Charcot foot: Fixation with external device  Left toe amputee: 4 TOES  H/O skin graft  History of percutaneous angioplasty            SOCIAL HISTORY  Alcohol: Does not drink  Tobacco: Does not smoke  Illicit substance use: None      FAMILY HISTORY: Non contributory to the present illness        ALLERGIES:  No Known Allergies  statins (Other)      Vital Signs Last 24 Hrs  T(C): 35.6 (22 Nov 2019 21:25), Max: 36.9 (22 Nov 2019 05:09)  T(F): 96.1 (22 Nov 2019 21:25), Max: 98.5 (22 Nov 2019 05:09)  HR: 95 (22 Nov 2019 22:28) (87 - 95)  BP: 172/79 (22 Nov 2019 22:28) (140/80 - 197/90)  BP(mean): --  RR: 16 (22 Nov 2019 22:28) (16 - 16)  SpO2: --        PHYSICAL EXAM:  GENERAL: Not in distress   CHEST/LUNG:  Air  entry bilaterally  HEART: s1 and s2 present  ABDOMEN:  Nontender and  Nondistended  EXTREMITIES: Right foot bandage in placed  CNS: Awake and Alert        LABS:                        10.4   7.95  )-----------( 238      ( 21 Nov 2019 06:51 )             34.8         11-21    136  |  98  |  25<H>  ----------------------------<  95  3.9   |  29  |  2.0<H>    Ca    8.3<L>      21 Nov 2019 06:51  Phos  3.5     11-21    TPro  7.7  /  Alb  3.3<L>  /  TBili  0.4  /  DBili  x   /  AST  21  /  ALT  12  /  AlkPhos  81  11-21        CAPILLARY BLOOD GLUCOSE  POCT Blood Glucose.: 165 mg/dL (22 Nov 2019 22:02)  POCT Blood Glucose.: 217 mg/dL (22 Nov 2019 18:10)  POCT Blood Glucose.: 174 mg/dL (22 Nov 2019 11:24)  POCT Blood Glucose.: 152 mg/dL (22 Nov 2019 07:50)        MEDICATIONS  (STANDING):  amLODIPine   Tablet 10 milliGRAM(s) Oral at bedtime  ceFAZolin   IVPB 500 milliGRAM(s) IV Intermittent every 8 hours  chlorhexidine 4% Liquid 1 Application(s) Topical <User Schedule>  cilostazol 50 milliGRAM(s) Oral two times a day  dextrose 5%. 1000 milliLiter(s) (50 mL/Hr) IV Continuous <Continuous>  dextrose 50% Injectable 12.5 Gram(s) IV Push once  dextrose 50% Injectable 25 Gram(s) IV Push once  dextrose 50% Injectable 25 Gram(s) IV Push once  insulin glargine Injectable (LANTUS) 18 Unit(s) SubCutaneous at bedtime  insulin lispro (HumaLOG) corrective regimen sliding scale   SubCutaneous three times a day before meals  pantoprazole    Tablet 40 milliGRAM(s) Oral before breakfast    MEDICATIONS  (PRN):  acetaminophen   Tablet .. 650 milliGRAM(s) Oral every 6 hours PRN Moderate Pain (4 - 6)  dextrose 40% Gel 15 Gram(s) Oral once PRN Blood Glucose LESS THAN 70 milliGRAM(s)/deciliter  glucagon  Injectable 1 milliGRAM(s) IntraMuscular once PRN Glucose LESS THAN 70 milligrams/deciliter        RADIOLOGY & ADDITIONAL TESTS:    < from: Xray Foot AP + Lateral + Oblique, Right (11.21.19 @ 15:45) >  INTERPRETATION:  Clinical History / Reason for exam: Postop removal of   external fixation device  3. Images  Comparison 10 4/19 and 4/20/2017  The external fixation devices been removed.  The images are available for surgical review.  Impression  External fixation device removed        < end of copied text >

## 2019-11-23 LAB
ALBUMIN SERPL ELPH-MCNC: 3.9 G/DL — SIGNIFICANT CHANGE UP (ref 3.5–5.2)
ALP SERPL-CCNC: 88 U/L — SIGNIFICANT CHANGE UP (ref 30–115)
ALT FLD-CCNC: 10 U/L — SIGNIFICANT CHANGE UP (ref 0–41)
ANION GAP SERPL CALC-SCNC: 10 MMOL/L — SIGNIFICANT CHANGE UP (ref 7–14)
AST SERPL-CCNC: 24 U/L — SIGNIFICANT CHANGE UP (ref 0–41)
BASOPHILS # BLD AUTO: 0.03 K/UL — SIGNIFICANT CHANGE UP (ref 0–0.2)
BASOPHILS NFR BLD AUTO: 0.4 % — SIGNIFICANT CHANGE UP (ref 0–1)
BILIRUB SERPL-MCNC: 0.4 MG/DL — SIGNIFICANT CHANGE UP (ref 0.2–1.2)
BUN SERPL-MCNC: 14 MG/DL — SIGNIFICANT CHANGE UP (ref 10–20)
CALCIUM SERPL-MCNC: 8.8 MG/DL — SIGNIFICANT CHANGE UP (ref 8.5–10.1)
CHLORIDE SERPL-SCNC: 100 MMOL/L — SIGNIFICANT CHANGE UP (ref 98–110)
CO2 SERPL-SCNC: 28 MMOL/L — SIGNIFICANT CHANGE UP (ref 17–32)
CREAT SERPL-MCNC: 1.4 MG/DL — SIGNIFICANT CHANGE UP (ref 0.7–1.5)
EOSINOPHIL # BLD AUTO: 0.42 K/UL — SIGNIFICANT CHANGE UP (ref 0–0.7)
EOSINOPHIL NFR BLD AUTO: 5.6 % — SIGNIFICANT CHANGE UP (ref 0–8)
GLUCOSE BLDC GLUCOMTR-MCNC: 106 MG/DL — HIGH (ref 70–99)
GLUCOSE BLDC GLUCOMTR-MCNC: 172 MG/DL — HIGH (ref 70–99)
GLUCOSE BLDC GLUCOMTR-MCNC: 185 MG/DL — HIGH (ref 70–99)
GLUCOSE BLDC GLUCOMTR-MCNC: 232 MG/DL — HIGH (ref 70–99)
GLUCOSE SERPL-MCNC: 112 MG/DL — HIGH (ref 70–99)
HCT VFR BLD CALC: 37.6 % — LOW (ref 42–52)
HGB BLD-MCNC: 11.3 G/DL — LOW (ref 14–18)
IMM GRANULOCYTES NFR BLD AUTO: 0.3 % — SIGNIFICANT CHANGE UP (ref 0.1–0.3)
LYMPHOCYTES # BLD AUTO: 1.66 K/UL — SIGNIFICANT CHANGE UP (ref 1.2–3.4)
LYMPHOCYTES # BLD AUTO: 22.1 % — SIGNIFICANT CHANGE UP (ref 20.5–51.1)
MCHC RBC-ENTMCNC: 27.4 PG — SIGNIFICANT CHANGE UP (ref 27–31)
MCHC RBC-ENTMCNC: 30.1 G/DL — LOW (ref 32–37)
MCV RBC AUTO: 91 FL — SIGNIFICANT CHANGE UP (ref 80–94)
MONOCYTES # BLD AUTO: 0.72 K/UL — HIGH (ref 0.1–0.6)
MONOCYTES NFR BLD AUTO: 9.6 % — HIGH (ref 1.7–9.3)
NEUTROPHILS # BLD AUTO: 4.65 K/UL — SIGNIFICANT CHANGE UP (ref 1.4–6.5)
NEUTROPHILS NFR BLD AUTO: 62 % — SIGNIFICANT CHANGE UP (ref 42.2–75.2)
NRBC # BLD: 0 /100 WBCS — SIGNIFICANT CHANGE UP (ref 0–0)
PLATELET # BLD AUTO: 278 K/UL — SIGNIFICANT CHANGE UP (ref 130–400)
POTASSIUM SERPL-MCNC: 4.5 MMOL/L — SIGNIFICANT CHANGE UP (ref 3.5–5)
POTASSIUM SERPL-SCNC: 4.5 MMOL/L — SIGNIFICANT CHANGE UP (ref 3.5–5)
PROT SERPL-MCNC: 8.9 G/DL — HIGH (ref 6–8)
RBC # BLD: 4.13 M/UL — LOW (ref 4.7–6.1)
RBC # FLD: 15.3 % — HIGH (ref 11.5–14.5)
SODIUM SERPL-SCNC: 138 MMOL/L — SIGNIFICANT CHANGE UP (ref 135–146)
WBC # BLD: 7.5 K/UL — SIGNIFICANT CHANGE UP (ref 4.8–10.8)
WBC # FLD AUTO: 7.5 K/UL — SIGNIFICANT CHANGE UP (ref 4.8–10.8)

## 2019-11-23 PROCEDURE — 99233 SBSQ HOSP IP/OBS HIGH 50: CPT

## 2019-11-23 RX ORDER — LOSARTAN POTASSIUM 100 MG/1
50 TABLET, FILM COATED ORAL DAILY
Refills: 0 | Status: DISCONTINUED | OUTPATIENT
Start: 2019-11-23 | End: 2019-11-26

## 2019-11-23 RX ORDER — CEFAZOLIN SODIUM 1 G
2000 VIAL (EA) INJECTION EVERY 8 HOURS
Refills: 0 | Status: DISCONTINUED | OUTPATIENT
Start: 2019-11-23 | End: 2019-11-24

## 2019-11-23 RX ORDER — ENOXAPARIN SODIUM 100 MG/ML
40 INJECTION SUBCUTANEOUS DAILY
Refills: 0 | Status: DISCONTINUED | OUTPATIENT
Start: 2019-11-23 | End: 2019-11-26

## 2019-11-23 RX ADMIN — ENOXAPARIN SODIUM 40 MILLIGRAM(S): 100 INJECTION SUBCUTANEOUS at 12:06

## 2019-11-23 RX ADMIN — CILOSTAZOL 50 MILLIGRAM(S): 100 TABLET ORAL at 17:02

## 2019-11-23 RX ADMIN — Medication 100 MILLIGRAM(S): at 14:00

## 2019-11-23 RX ADMIN — LOSARTAN POTASSIUM 50 MILLIGRAM(S): 100 TABLET, FILM COATED ORAL at 21:21

## 2019-11-23 RX ADMIN — CILOSTAZOL 50 MILLIGRAM(S): 100 TABLET ORAL at 05:07

## 2019-11-23 RX ADMIN — Medication 1: at 17:02

## 2019-11-23 RX ADMIN — Medication 100 MILLIGRAM(S): at 05:07

## 2019-11-23 RX ADMIN — PANTOPRAZOLE SODIUM 40 MILLIGRAM(S): 20 TABLET, DELAYED RELEASE ORAL at 06:04

## 2019-11-23 RX ADMIN — Medication 100 MILLIGRAM(S): at 21:20

## 2019-11-23 RX ADMIN — INSULIN GLARGINE 18 UNIT(S): 100 INJECTION, SOLUTION SUBCUTANEOUS at 21:22

## 2019-11-23 RX ADMIN — Medication 1: at 12:06

## 2019-11-23 RX ADMIN — AMLODIPINE BESYLATE 10 MILLIGRAM(S): 2.5 TABLET ORAL at 21:23

## 2019-11-23 NOTE — PROGRESS NOTE ADULT - SUBJECTIVE AND OBJECTIVE BOX
Podiatry Progress Note    Subjective:   FERNANDEZ GUZMAN is a pleasant well-nourished, well developed 58y Male in no acute distress, alert awake, and oriented to person, place and time. Patient is a 58y old  Male who presents with a chief complaint of Removal of external fixation for Charcot's foot (22 Nov 2019 23:08)    HPI:  Pt is a 59yo M w/ PMH DMT2, HTN and HLD presenting for removal of external fixation for Charcot's foot (right). Patient states that he was struggling with Charcot's foot for a few years, which was leading to a non-healing ulcer on the lateral side of his right foot. Patient followed by Dr. Hudson as an outpatient. About 6 weeks ago in early Oct 2019 patient had an operation to correct his Charcot's foot where an external fixation was placed. Patient has not been walking since the surgery, but was mobile prior. Pt was last seen by his podiatrist on monday, who advised him to come to the ED today for removal of external fixation in OR tomorrow. Denies HA, dizziness, NVD, fever, sob, chest pain, abdominal pain, change in urination. (20 Nov 2019 14:19)    PAST MEDICAL & SURGICAL HISTORY:  Charcot ankle, right  High cholesterol  HTN (hypertension)  DM (diabetes mellitus)  MATA on CPAP  Diabetic Charcot foot: Fixation with external device  Left toe amputee: 4 TOES  H/O skin graft  History of percutaneous angioplasty     Review of Systems:   Constitutional: No weakness, fevers or chills  Eyes / ENT: No visual changes; No vertigo or throat pain   Neck: No pain or stiffness  Respiratory: No cough, wheezing, hemoptysis; No shortness of breath  Cardiovascular: No chest pain or palpitations  Gastrointestinal: No abdominal or epigastric pain. No nausea, vomiting, or hematemesis; No diarrhea or constipation. No melena or hematochezia.  Genitourinary: No dysuria, frequency or hematuria  Neurological: No numbness or weakness  Skin: Multiple Ulcerations present on the dorsal medial and plantar aspect of the RLE secondary to pin hole sites from the External Fixator     Objective:  Vital Signs Last 24 Hrs  T(C): 36.7 (23 Nov 2019 05:00), Max: 36.7 (22 Nov 2019 13:54)  T(F): 98 (23 Nov 2019 05:00), Max: 98 (22 Nov 2019 13:54)  HR: 89 (23 Nov 2019 05:00) (87 - 95)  BP: 144/70 (23 Nov 2019 05:00) (141/66 - 197/90)  BP(mean): --  RR: 16 (23 Nov 2019 05:00) (16 - 16)  SpO2: --                        11.3   7.50  )-----------( 278      ( 23 Nov 2019 08:54 )             37.6                 Physical: Focused Right Lower Extremity   Derm:  Multiple Ulcerations present on the RLE from Hardware secondary to External Fixator   Mild Erythema and Edema RLE  Mild Periwound Maceration present on the Dorsal Ulcerations  Fibrogranular Wound Case at the Plantar Medial and Dorsal Lateral Wound Site  Mild Drainage w/o Odor    Vascular: Not Able to Assess secondary to Edema  Neuro: Protective Sensation Diminished    Assessment:   Charcot Foot Deformity Right Foot   s/p Charcot Recon w/ Application of External Fixator 10/4    Plan:   Chart reviewed and Patient evaluated. All questions and concerns were addressed and answered   Discussed diagnosis and treatment with patient  Surgical Sites were Flushed w/ Normal Saline; Applied Tami / DSD / Kerlix / Webril / AO Splint / ACE Wrap  Podiatry Following w/ Dressing Changes; Will Continue Monitoring Surgical Sites  Cont IV ABx: As Per ID; Planned for PICC Line Insertion  Discussed w/ Dr. Hudson Podiatry Progress Note    Subjective:   FERNANDEZ GUZMAN is a pleasant well-nourished, well developed 58y Male in no acute distress, alert awake, and oriented to person, place and time. Patient is a 58y old  Male who presents with a chief complaint of Removal of external fixation for Charcot's foot (22 Nov 2019 23:08)    HPI:  Pt is a 59yo M w/ PMH DMT2, HTN and HLD presenting for removal of external fixation for Charcot's foot (right). Patient states that he was struggling with Charcot's foot for a few years, which was leading to a non-healing ulcer on the lateral side of his right foot. Patient followed by Dr. Hudson as an outpatient. About 6 weeks ago in early Oct 2019 patient had an operation to correct his Charcot's foot where an external fixation was placed. Patient has not been walking since the surgery, but was mobile prior. Pt was last seen by his podiatrist on monday, who advised him to come to the ED today for removal of external fixation in OR tomorrow. Denies HA, dizziness, NVD, fever, sob, chest pain, abdominal pain, change in urination. (20 Nov 2019 14:19)    PAST MEDICAL & SURGICAL HISTORY:  Charcot ankle, right  High cholesterol  HTN (hypertension)  DM (diabetes mellitus)  MATA on CPAP  Diabetic Charcot foot: Fixation with external device  Left toe amputee: 4 TOES  H/O skin graft  History of percutaneous angioplasty     Review of Systems:   Constitutional: No weakness, fevers or chills  Eyes / ENT: No visual changes; No vertigo or throat pain   Neck: No pain or stiffness  Respiratory: No cough, wheezing, hemoptysis; No shortness of breath  Cardiovascular: No chest pain or palpitations  Gastrointestinal: No abdominal or epigastric pain. No nausea, vomiting, or hematemesis; No diarrhea or constipation. No melena or hematochezia.  Genitourinary: No dysuria, frequency or hematuria  Neurological: No numbness or weakness  Skin: Multiple Ulcerations present on the dorsal medial and plantar aspect of the RLE secondary to pin hole sites from the External Fixator     Objective:  Vital Signs Last 24 Hrs  T(C): 36.7 (23 Nov 2019 05:00), Max: 36.7 (22 Nov 2019 13:54)  T(F): 98 (23 Nov 2019 05:00), Max: 98 (22 Nov 2019 13:54)  HR: 89 (23 Nov 2019 05:00) (87 - 95)  BP: 144/70 (23 Nov 2019 05:00) (141/66 - 197/90)  BP(mean): --  RR: 16 (23 Nov 2019 05:00) (16 - 16)  SpO2: --                        11.3   7.50  )-----------( 278      ( 23 Nov 2019 08:54 )             37.6                 Physical: Focused Right Lower Extremity   Derm:  Multiple Ulcerations present on the RLE from Hardware secondary to External Fixator   Mild Erythema and Edema RLE  Mild Periwound Maceration present on the Dorsal Ulcerations  Fibrogranular Wound Case at the Plantar Medial and Dorsal Lateral Wound Site  Mild Drainage w/o Odor    Vascular: Not Able to Assess secondary to Edema  Neuro: Protective Sensation Diminished    Assessment:   Charcot Foot Deformity Right Foot   s/p Charcot Recon w/ Application of External Fixator 10/4    Plan:   Chart reviewed and Patient evaluated. All questions and concerns were addressed and answered   Discussed diagnosis and treatment with patient  Surgical Sites were Flushed w/ Normal Saline;   Applied Xeroform / DSD / Kerlix / Webril / AO Splint / ACE Wrap; Will Apply Tami On Next Dressing Change  Podiatry Following w/ Dressing Changes; Will Continue Monitoring Surgical Sites  Cont IV ABx: As Per ID; Planned for PICC Line Insertion  Discussed w/ Dr. Hudson

## 2019-11-23 NOTE — PROGRESS NOTE ADULT - ASSESSMENT
Patient is a 57yo M w/ PMH DMT2, HTN and HLD presenting for removal of external fixation for Charcot's foot (right). Patient followed by Dr. Hudson as an outpatient. Pt was last seen by his podiatrist on monday, who advised him to come to the ED today for removal of external fixation in OR tomorrow.      Assessment & Plan:    1. Charcot foot, right:  2. Suspected RT DFU/OM:  s/p Bone biopsy & Removal of external fixation of tibia 21-Nov-2019.  Continue Cefazolin for now.  ID consult still pending. May need a PICC line.  Follow up cultures.  Dressing Changes per Podiatry.        3. Morbid obesity:  BMI 54.5  Dietary and Lifestyle modification.      4. Type 2 DM:  Monitor FS with Insulin coverage.  Hemoglobin A1C, Whole Blood: 8.6 % (10.02.19 @ 17:45)  Hold Invokana and Glimepiride.      5.  HTN (hypertension):  Hold HCTZ. Continue Amlodipine & Losartan.  Monitor BP.       6. CANDY on CKD 3: Resolved.  Baseline Cr 1.8. Now 1.4.  Creatinine elevation ?due to Bactrim.  Off Bactrim & IV Fluids.  Monitor BMP.         Prophylaxis: Heparin  Code status: Full code    Progress Note Handoff:  Pending consults: ID  Pending Tests: None  Family/Patient discussion: Plan of care discussed with Patient.  Disposition: Home with home care vs STR.      Attending: Dr. Madina Stearns. Spectra [phone].

## 2019-11-23 NOTE — PROGRESS NOTE ADULT - SUBJECTIVE AND OBJECTIVE BOX
FERNANDEZ GUZMAN  58y  Male    Patient is a 58y old  Male who presents for Removal of external fixation for Charcot's foot (2019 09:45)      INTERVAL HPI/OVERNIGHT EVENTS:  No interval events.  s/p Removal of external fixation of tibia 2019.  Patient has no complaints. Denies any pain or fever.      REVIEW OF SYSTEMS:  At least 10 systems were reviewed in ROS.   All systems reviewed are within normal limits.      VITALS:  T(C): 36.7 (2019 05:00), Max: 36.7 (2019 13:54)  T(F): 98 (2019 05:00), Max: 98 (2019 13:54)  HR: 89 (2019 05:00) (87 - 95)  BP: 144/70 (2019 05:00) (141/66 - 197/90)  BP(mean): --  RR: 16 (2019 05:00) (16 - 16)  SpO2: --      CAPILLARY BLOOD GLUCOSE  POCT Blood Glucose.: 174 mg/dL (2019 11:24)  POCT Blood Glucose.: 152 mg/dL (2019 07:50)  POCT Blood Glucose.: 168 mg/dL (2019 21:40)  POCT Blood Glucose.: 102 mg/dL (2019 17:17)  POCT Blood Glucose.: 68 mg/dL (2019 16:30)      PHYSICAL EXAM:  GENERAL: NAD, well-developed  HEAD:  Atraumatic, Normocephalic  EYES: conjunctiva and sclera clear  ENMT: Moist mucous membranes  NECK: Supple, Normal thyroid  NERVOUS SYSTEM:  Alert & Oriented X 3, Good concentration; Motor Strength 5/5 B/L upper and lower extremities.  CHEST/LUNG: Clear to auscultation bilaterally; No rales, rhonchi, wheezing, or rubs  HEART: Regular rate and rhythm; No murmurs, rubs, or gallops  ABDOMEN: Soft, Nontender, Nondistended; Bowel sounds present  EXTREMITIES:  2+ Peripheral Pulses, No clubbing, cyanosis, or edema  LYMPH: No lymphadenopathy noted  SKIN: Rt leg dressing/cast. c/d/i    Consultant(s) Notes Reviewed:  [x ] YES  [ ] NO  Care Discussed with Consultants/Other Providers [ x] YES  [ ] NO    LABS:                                   11.3   7.50  )-----------( 278      ( 2019 08:54 )             37.6     11-    138  |  100  |  14  ----------------------------<  112<H>  4.5   |  28  |  1.4    Ca    8.8      2019 08:54    TPro  8.9<H>  /  Alb  3.9  /  TBili  0.4  /  DBili  x   /  AST  24  /  ALT  10  /  AlkPhos  88  11-               Urinalysis Basic - ( 2019 21:00 )    Color: Yellow / Appearance: Slightly Cloudy / S.020 / pH: x  Gluc: x / Ketone: Negative  / Bili: Negative / Urobili: 0.2 mg/dL   Blood: x / Protein: Negative mg/dL / Nitrite: Negative   Leuk Esterase: Negative / RBC: 3-5 /HPF / WBC 1-2 /HPF   Sq Epi: x / Non Sq Epi: Few /HPF / Bacteria: Few      MICROBIOLOGY: pending.      RADIOLOGY & ADDITIONAL TESTS:  X-ray Chest 1 View- PORTABLE-Urgent (19 @ 12:31)   Left basilar focal atelectasis        Imaging Personally Reviewed:  [x] YES  [ ] NO      MEDICATIONS  (STANDING):  amLODIPine   Tablet 10 milliGRAM(s) Oral at bedtime  ceFAZolin   IVPB 500 milliGRAM(s) IV Intermittent every 8 hours  chlorhexidine 4% Liquid 1 Application(s) Topical <User Schedule>  cilostazol 50 milliGRAM(s) Oral two times a day  dextrose 5%. 1000 milliLiter(s) (50 mL/Hr) IV Continuous <Continuous>  dextrose 50% Injectable 12.5 Gram(s) IV Push once  dextrose 50% Injectable 25 Gram(s) IV Push once  dextrose 50% Injectable 25 Gram(s) IV Push once  insulin glargine Injectable (LANTUS) 18 Unit(s) SubCutaneous at bedtime  insulin lispro (HumaLOG) corrective regimen sliding scale   SubCutaneous three times a day before meals  pantoprazole    Tablet 40 milliGRAM(s) Oral before breakfast      MEDICATIONS  (PRN):  acetaminophen   Tablet .. 650 milliGRAM(s) Oral every 6 hours PRN Moderate Pain (4 - 6)  dextrose 40% Gel 15 Gram(s) Oral once PRN Blood Glucose LESS THAN 70 milliGRAM(s)/deciliter  glucagon  Injectable 1 milliGRAM(s) IntraMuscular once PRN Glucose LESS THAN 70 milligrams/deciliter        Home Medications:  amLODIPine 5 mg oral tablet: 1 tab(s) orally once a day (2019 14:31)  aspirin 81 mg oral tablet, chewable: 1 tab(s) orally once a day (2019 14:31)  ezetimibe 10 mg oral tablet: 1 tab(s) orally once a day (2019 14:31)  glimepiride 4 mg oral tablet: 2 tab(s) orally once a day (2019 14:31)  hydroCHLOROthiazide 25 mg oral tablet: 1 tab(s) orally once a day (at bedtime) (2019 14:31)  Invokana 300 mg oral tablet: 1 tab(s) orally once a day (2019 14:31)  Lantus Solostar Pen 100 units/mL subcutaneous solution: subcutaneous once a day (at bedtime)-  18 UNITS (2019 14:31)  losartan 50 mg oral tablet: 1 tab(s) orally once a day (2019 14:31)  Pletal 100 mg oral tablet: 1 tab(s) orally once a day (2019 14:31)        HEALTH ISSUES - PROBLEM Dx:  Suspected OM  High cholesterol  HTN (hypertension)  DM (diabetes mellitus)  Charcot ankle, right

## 2019-11-24 LAB
GLUCOSE BLDC GLUCOMTR-MCNC: 136 MG/DL — HIGH (ref 70–99)
GLUCOSE BLDC GLUCOMTR-MCNC: 147 MG/DL — HIGH (ref 70–99)
GLUCOSE BLDC GLUCOMTR-MCNC: 169 MG/DL — HIGH (ref 70–99)
GLUCOSE BLDC GLUCOMTR-MCNC: 214 MG/DL — HIGH (ref 70–99)

## 2019-11-24 PROCEDURE — 99233 SBSQ HOSP IP/OBS HIGH 50: CPT

## 2019-11-24 RX ORDER — LINEZOLID 600 MG/300ML
600 INJECTION, SOLUTION INTRAVENOUS ONCE
Refills: 0 | Status: COMPLETED | OUTPATIENT
Start: 2019-11-24 | End: 2019-11-24

## 2019-11-24 RX ORDER — LINEZOLID 600 MG/300ML
600 INJECTION, SOLUTION INTRAVENOUS EVERY 12 HOURS
Refills: 0 | Status: DISCONTINUED | OUTPATIENT
Start: 2019-11-25 | End: 2019-11-26

## 2019-11-24 RX ORDER — LINEZOLID 600 MG/300ML
INJECTION, SOLUTION INTRAVENOUS
Refills: 0 | Status: DISCONTINUED | OUTPATIENT
Start: 2019-11-24 | End: 2019-11-26

## 2019-11-24 RX ADMIN — CILOSTAZOL 50 MILLIGRAM(S): 100 TABLET ORAL at 16:32

## 2019-11-24 RX ADMIN — CILOSTAZOL 50 MILLIGRAM(S): 100 TABLET ORAL at 05:44

## 2019-11-24 RX ADMIN — Medication 100 MILLIGRAM(S): at 05:43

## 2019-11-24 RX ADMIN — PANTOPRAZOLE SODIUM 40 MILLIGRAM(S): 20 TABLET, DELAYED RELEASE ORAL at 05:44

## 2019-11-24 RX ADMIN — Medication 1: at 11:41

## 2019-11-24 RX ADMIN — LINEZOLID 300 MILLIGRAM(S): 600 INJECTION, SOLUTION INTRAVENOUS at 16:31

## 2019-11-24 RX ADMIN — ENOXAPARIN SODIUM 40 MILLIGRAM(S): 100 INJECTION SUBCUTANEOUS at 11:42

## 2019-11-24 RX ADMIN — LOSARTAN POTASSIUM 50 MILLIGRAM(S): 100 TABLET, FILM COATED ORAL at 05:43

## 2019-11-24 RX ADMIN — INSULIN GLARGINE 18 UNIT(S): 100 INJECTION, SOLUTION SUBCUTANEOUS at 22:19

## 2019-11-24 RX ADMIN — AMLODIPINE BESYLATE 10 MILLIGRAM(S): 2.5 TABLET ORAL at 22:18

## 2019-11-24 NOTE — PROGRESS NOTE ADULT - SUBJECTIVE AND OBJECTIVE BOX
FERNANDEZ GUZMAN  58y  Male    Patient is a 58y old  Male who presents for Removal of external fixation for Charcot's foot (2019 09:45)      INTERVAL HPI/OVERNIGHT EVENTS:  No interval events.  s/p Removal of external fixation of tibia 2019.  Patient has no complaints. Denies any pain or fever.      REVIEW OF SYSTEMS:  At least 10 systems were reviewed in ROS.   All systems reviewed are within normal limits.      VITALS:  T(C): 36.8 (2019 05:44), Max: 36.8 (2019 05:44)  T(F): 98.2 (2019 05:44), Max: 98.2 (2019 05:44)  HR: 83 (2019 05:44) (83 - 92)  BP: 126/59 (2019 05:44) (126/59 - 173/79)  BP(mean): --  RR: 16 (2019 05:44) (16 - 16)  SpO2: --      CAPILLARY BLOOD GLUCOSE  POCT Blood Glucose.: 169 mg/dL (2019 11:31)  POCT Blood Glucose.: 136 mg/dL (2019 07:49)  POCT Blood Glucose.: 232 mg/dL (2019 21:09)  POCT Blood Glucose.: 185 mg/dL (2019 16:27)        PHYSICAL EXAM:  GENERAL: NAD, well-developed  HEAD:  Atraumatic, Normocephalic  EYES: conjunctiva and sclera clear  ENMT: Moist mucous membranes  NECK: Supple, Normal thyroid  NERVOUS SYSTEM:  Alert & Oriented X 3, Good concentration; Motor Strength 5/5 B/L upper and lower extremities.  CHEST/LUNG: Clear to auscultation bilaterally; No rales, rhonchi, wheezing, or rubs  HEART: Regular rate and rhythm; No murmurs, rubs, or gallops  ABDOMEN: Soft, Nontender, Nondistended; Bowel sounds present  EXTREMITIES:  2+ Peripheral Pulses, No clubbing, cyanosis, or edema  LYMPH: No lymphadenopathy noted  SKIN: Rt leg dressing/cast. c/d/i    Consultant(s) Notes Reviewed:  [x ] YES  [ ] NO  Care Discussed with Consultants/Other Providers [ x] YES  [ ] NO    LABS:                                 11.3   7.50  )-----------( 278      ( 2019 08:54 )             37.6     11-    138  |  100  |  14  ----------------------------<  112<H>  4.5   |  28  |  1.4    Ca    8.8      2019 08:54    TPro  8.9<H>  /  Alb  3.9  /  TBili  0.4  /  DBili  x   /  AST  24  /  ALT  10  /  AlkPhos  88                 Urinalysis Basic - ( 2019 21:00 )    Color: Yellow / Appearance: Slightly Cloudy / S.020 / pH: x  Gluc: x / Ketone: Negative  / Bili: Negative / Urobili: 0.2 mg/dL   Blood: x / Protein: Negative mg/dL / Nitrite: Negative   Leuk Esterase: Negative / RBC: 3-5 /HPF / WBC 1-2 /HPF   Sq Epi: x / Non Sq Epi: Few /HPF / Bacteria: Few      MICROBIOLOGY:   Culture - Other (19 @ 13:38)    -  Ampicillin/Sulbactam: R 16/8    -  Cefazolin: R <=4    -  Clindamycin: S <=0.25    -  Daptomycin: S 1    -  Erythromycin: R >4    -  Gentamicin: S <=1 Should not be used as monotherapy    -  Linezolid: S 2    -  Oxacillin: R >2    -  Penicillin: R >8    -  RIF- Rifampin: S <=1 Should not be used as monotherapy    -  Tetra/Doxy: S <=1    -  Trimethoprim/Sulfamethoxazole: S <=0.5/9.5    -  Vancomycin: S 1    Specimen Source: Wound bone    Organism Identification: Methicillin resistant Staphylococcus aureus    Organism: Methicillin resistant Staphylococcus aureus    Method Type: DAVIDA        RADIOLOGY & ADDITIONAL TESTS:  X-ray Chest 1 View- PORTABLE-Urgent (19 @ 12:31)   Left basilar focal atelectasis      Imaging Personally Reviewed:  [x] YES  [ ] NO      MEDICATIONS  (STANDING):  amLODIPine   Tablet 10 milliGRAM(s) Oral at bedtime  ceFAZolin   IVPB 2000 milliGRAM(s) IV Intermittent every 8 hours  chlorhexidine 4% Liquid 1 Application(s) Topical <User Schedule>  cilostazol 50 milliGRAM(s) Oral two times a day  dextrose 5%. 1000 milliLiter(s) (50 mL/Hr) IV Continuous <Continuous>  dextrose 50% Injectable 12.5 Gram(s) IV Push once  dextrose 50% Injectable 25 Gram(s) IV Push once  dextrose 50% Injectable 25 Gram(s) IV Push once  enoxaparin Injectable 40 milliGRAM(s) SubCutaneous daily  insulin glargine Injectable (LANTUS) 18 Unit(s) SubCutaneous at bedtime  insulin lispro (HumaLOG) corrective regimen sliding scale   SubCutaneous three times a day before meals  losartan 50 milliGRAM(s) Oral daily  pantoprazole    Tablet 40 milliGRAM(s) Oral before breakfast      MEDICATIONS  (PRN):  acetaminophen   Tablet .. 650 milliGRAM(s) Oral every 6 hours PRN Moderate Pain (4 - 6)  dextrose 40% Gel 15 Gram(s) Oral once PRN Blood Glucose LESS THAN 70 milliGRAM(s)/deciliter  glucagon  Injectable 1 milliGRAM(s) IntraMuscular once PRN Glucose LESS THAN 70 milligrams/deciliter          Home Medications:  amLODIPine 5 mg oral tablet: 1 tab(s) orally once a day (2019 14:31)  aspirin 81 mg oral tablet, chewable: 1 tab(s) orally once a day (2019 14:31)  ezetimibe 10 mg oral tablet: 1 tab(s) orally once a day (2019 14:31)  glimepiride 4 mg oral tablet: 2 tab(s) orally once a day (2019 14:31)  hydroCHLOROthiazide 25 mg oral tablet: 1 tab(s) orally once a day (at bedtime) (2019 14:31)  Invokana 300 mg oral tablet: 1 tab(s) orally once a day (2019 14:31)  Lantus Solostar Pen 100 units/mL subcutaneous solution: subcutaneous once a day (at bedtime)-  18 UNITS (2019 14:31)  losartan 50 mg oral tablet: 1 tab(s) orally once a day (2019 14:31)  Pletal 100 mg oral tablet: 1 tab(s) orally once a day (2019 14:31)        HEALTH ISSUES - PROBLEM Dx:  Suspected OM  High cholesterol  HTN (hypertension)  DM (diabetes mellitus)  Charcot ankle, right

## 2019-11-24 NOTE — PROGRESS NOTE ADULT - ASSESSMENT
Patient is a 59yo M w/ PMH DMT2, HTN and HLD presenting for removal of external fixation for Charcot's foot (right). Patient followed by Dr. Hudson as an outpatient. Pt was last seen by his podiatrist on monday, who advised him to come to the ED today for removal of external fixation in OR tomorrow.      Assessment & Plan:    1. Charcot foot, right:  2. Suspected RT DFU/OM:  s/p Bone biopsy & Removal of external fixation of tibia 21-Nov-2019.  Cultures: MRSA.   ID consult: IV Antibiotics for 6 weeks. Started on Zyvox. PICC ordered.   Dressing Changes per Podiatry.        3. Morbid obesity:  BMI 54.5  Dietary and Lifestyle modification.      4. Type 2 DM:  Monitor FS with Insulin coverage.  Hemoglobin A1C, Whole Blood: 8.6 % (10.02.19 @ 17:45)  Hold Invokana and Glimepiride.      5.  HTN (hypertension):  Hold HCTZ. Continue Amlodipine & Losartan.  Monitor BP.       6. CANDY on CKD 3: Resolved.  Baseline Cr 1.8. Now 1.4.  Creatinine elevation ? due to Bactrim.  Off Bactrim & IV Fluids.  Monitor BMP.         Prophylaxis: Heparin  Code status: Full code    Progress Note Handoff:  Pending consults: None  Pending Tests: None  Family/Patient discussion: Plan of care discussed with Patient.  Disposition: Home with home care vs STR.      Attending: Dr. Madina Stearns. Spectra [phone].

## 2019-11-24 NOTE — PROGRESS NOTE ADULT - SUBJECTIVE AND OBJECTIVE BOX
Podiatry Progress Note    Subjective:   FERNANDEZ GUZMAN is a pleasant well-nourished, well developed 58y Male in no acute distress, alert awake, and oriented to person, place and time.   Patient is a 58y old  Male who presents with a chief complaint of Removal of external fixation for Charcot's foot (23 Nov 2019 10:41)  Patient was seen bedside today during AM rounds. Patient was pleasant. Patient currently has no pedal complaints and denies F/N/V and shortness of breath.   Dressing was clean, intact and dry.     Objective:  Vital Signs Last 24 Hrs  T(C): 36.8 (24 Nov 2019 05:44), Max: 36.8 (24 Nov 2019 05:44)  T(F): 98.2 (24 Nov 2019 05:44), Max: 98.2 (24 Nov 2019 05:44)  HR: 83 (24 Nov 2019 05:44) (83 - 92)  BP: 126/59 (24 Nov 2019 05:44) (126/59 - 173/79)  BP(mean): --  RR: 16 (24 Nov 2019 05:44) (16 - 16)  SpO2: --                        11.3   7.50  )-----------( 278      ( 23 Nov 2019 08:54 )             37.6                 11-23    138  |  100  |  14  ----------------------------<  112<H>  4.5   |  28  |  1.4    Ca    8.8      23 Nov 2019 08:54    TPro  8.9<H>  /  Alb  3.9  /  TBili  0.4  /  DBili  x   /  AST  24  /  ALT  10  /  AlkPhos  88  11-23    Physical: Focused Right Lower Extremity   Derm:  Multiple Ulcerations present on the RLE from Hardware secondary to External Fixator   Mild Erythema and Edema RLE  Mild Periwound Maceration present on the Dorsal Ulcerations  Fibrogranular Wound Case at the Plantar Medial and Dorsal Lateral Wound Site  Mild Drainage w/o Odor    Vascular: Not Able to Assess secondary to Edema  Neuro: Protective Sensation Diminished    Assessment:   Charcot Foot Deformity Right Foot   s/p Charcot Recon w/ Application of External Fixator 10/4  Edema RLE - Improving since 11/23    Plan:   Chart reviewed and Patient evaluated. All questions and concerns were addressed and answered   Discussed diagnosis and treatment with patient  Surgical Sites were Flushed w/ Normal Saline;   Applied Tami / Xeroform / DSD / Kerlix / Webril / AO Splint / ACE Wrap; Next Dressing Change 11/26   Podiatry Following w/ Dressing Changes; Will Continue Monitoring Surgical Sites  Cont IV ABx: As Per ID; Planned for PICC Line Insertion  Discussed w/ Dr. Hudson

## 2019-11-25 ENCOUNTER — TRANSCRIPTION ENCOUNTER (OUTPATIENT)
Age: 59
End: 2019-11-25

## 2019-11-25 LAB
GLUCOSE BLDC GLUCOMTR-MCNC: 160 MG/DL — HIGH (ref 70–99)
GLUCOSE BLDC GLUCOMTR-MCNC: 165 MG/DL — HIGH (ref 70–99)
GLUCOSE BLDC GLUCOMTR-MCNC: 199 MG/DL — HIGH (ref 70–99)
GLUCOSE BLDC GLUCOMTR-MCNC: 276 MG/DL — HIGH (ref 70–99)
SURGICAL PATHOLOGY STUDY: SIGNIFICANT CHANGE UP

## 2019-11-25 PROCEDURE — 36573 INSJ PICC RS&I 5 YR+: CPT

## 2019-11-25 PROCEDURE — 99233 SBSQ HOSP IP/OBS HIGH 50: CPT

## 2019-11-25 RX ORDER — ACETAMINOPHEN 500 MG
2 TABLET ORAL
Qty: 0 | Refills: 0 | DISCHARGE
Start: 2019-11-25

## 2019-11-25 RX ORDER — LINEZOLID 600 MG/300ML
600 INJECTION, SOLUTION INTRAVENOUS
Qty: 0 | Refills: 0 | DISCHARGE
Start: 2019-11-25

## 2019-11-25 RX ADMIN — PANTOPRAZOLE SODIUM 40 MILLIGRAM(S): 20 TABLET, DELAYED RELEASE ORAL at 06:13

## 2019-11-25 RX ADMIN — INSULIN GLARGINE 18 UNIT(S): 100 INJECTION, SOLUTION SUBCUTANEOUS at 21:26

## 2019-11-25 RX ADMIN — LINEZOLID 300 MILLIGRAM(S): 600 INJECTION, SOLUTION INTRAVENOUS at 17:02

## 2019-11-25 RX ADMIN — Medication 1: at 07:57

## 2019-11-25 RX ADMIN — CILOSTAZOL 50 MILLIGRAM(S): 100 TABLET ORAL at 06:13

## 2019-11-25 RX ADMIN — ENOXAPARIN SODIUM 40 MILLIGRAM(S): 100 INJECTION SUBCUTANEOUS at 11:16

## 2019-11-25 RX ADMIN — AMLODIPINE BESYLATE 10 MILLIGRAM(S): 2.5 TABLET ORAL at 21:26

## 2019-11-25 RX ADMIN — Medication 1: at 16:28

## 2019-11-25 RX ADMIN — LINEZOLID 300 MILLIGRAM(S): 600 INJECTION, SOLUTION INTRAVENOUS at 06:14

## 2019-11-25 RX ADMIN — LOSARTAN POTASSIUM 50 MILLIGRAM(S): 100 TABLET, FILM COATED ORAL at 06:13

## 2019-11-25 RX ADMIN — Medication 1: at 13:54

## 2019-11-25 RX ADMIN — CILOSTAZOL 50 MILLIGRAM(S): 100 TABLET ORAL at 17:02

## 2019-11-25 NOTE — DISCHARGE NOTE PROVIDER - NSDCHHNEEDSERVICE_GEN_ALL_CORE
Medication teaching and assessment/Observation and assessment/Rehabilitation services Rehabilitation services/Medication teaching and assessment/Observation and assessment/Ostomy care and management

## 2019-11-25 NOTE — DISCHARGE NOTE PROVIDER - HOSPITAL COURSE
Patient is a 59yo M w/ PMH DMT2, HTN and HLD presenting for removal of external fixation for Charcot's foot (right). Patient followed by Dr. Hudson as an outpatient. Patient was last seen by his podiatrist who advised him to come to the ED today for removal of external fixation in OR.            Assessment & Plan:        1. Charcot foot, right:    2. RT DFU/OM:    s/p Bone biopsy & Removal of external fixation of tibia 21-Nov-2019.    Cultures: positive for MRSA.     ID consulted: IV Zyvox for 6 weeks. STOP 1/6/20. PICC placed 11/25/19.     If linezolid not approved or $$ can try Vanc 1.5 q8h IV but given body weight achieving good trough will be difficult.    Weekly CBC, BMP, Vanc trough, ESR/CRP.    ID follow-up with Vasquez Mejia or urs Dr. Mohsena Amin & [address] [facility], [phone] (call to make an appointment, walk-ins Tuesdays 10:30 AM). Fax [phone].    Dressing Changes per Podiatry.                3. Morbid obesity:    BMI 54.5    Dietary and Lifestyle modification recommended.            4. Type 2 DM:    Monitored FS with Insulin coverage.    Hemoglobin A1C, Whole Blood: 8.6 % (10.02.19 @ 17:45)    Resumed Invokana and Glimepiride on Discharge.            5.  HTN (hypertension):    Continued HCTZ, Amlodipine & Losartan.            6. CANDY on CKD 3: Resolved.    Baseline Cr 1.8. Now 1.4.    Creatinine elevation ? due to Bactrim.    Out Patient follow up with PMD & Nephrologist. Patient is a 59yo M w/ PMH DMT2, HTN and HLD presenting for removal of external fixation for Charcot's foot (right). Patient followed by Dr. Hudson as an outpatient. Patient was last seen by his podiatrist who advised him to come to the ED today for removal of external fixation in OR.            Assessment & Plan:        1. Charcot foot, right:    2. RT DFU/OM:    s/p Bone biopsy & Removal of external fixation of tibia 21-Nov-2019.    Cultures: positive for MRSA.     ID consulted: IV Zyvox for 6 weeks. STOP 1/6/20. PICC placed 11/25/19.     ID follow-up with Vasquez Mejia or urs Dr. Mohsena Amin & [address] [facility], [phone] (call to make an appointment, walk-ins Tuesdays 10:30 AM). Fax [phone].    Wound care: Applied Tami / Xeroform / DSD / Kerlix / Webril / AO Splint / ACE Wrap.             3. Morbid obesity:    BMI 54.5    Dietary and Lifestyle modification recommended.            4. Type 2 DM:    Monitored FS with Insulin coverage.    Hemoglobin A1C, Whole Blood: 8.6 % (10.02.19 @ 17:45)    Resumed Invokana and Glimepiride on Discharge.            5.  HTN (hypertension):    Continued HCTZ, Amlodipine & Losartan.            6. CANDY on CKD 3: Resolved.    Baseline Cr 1.8. Now 1.4.    Creatinine elevation ? due to Bactrim.    Out Patient follow up with PMD & Nephrologist.

## 2019-11-25 NOTE — PHYSICAL THERAPY INITIAL EVALUATION ADULT - GENERAL OBSERVATIONS, REHAB EVAL
14:57 - 15:20. Chart reviewed. Patient available to be seen for physical therapy, confirmed with nurse. Patient encountered already seated at edge of bed, +ace wrap RLE. Denies pain at rest, agreeable for PT.

## 2019-11-25 NOTE — CONSULT NOTE ADULT - REASON FOR ADMISSION
Removal of external fixation for Charcot's foot

## 2019-11-25 NOTE — DISCHARGE NOTE PROVIDER - CARE PROVIDER_API CALL
Candido Mejia)  Infectious Disease; Internal Medicine  [address] [place] [address]  Phone: [phone]  Fax: [phone]  Follow Up Time:     Drake Hudson (DPM)  Surgery  [address] [place] [address]  Phone: [phone]  Fax: [phone]  Follow Up Time:     Ingrid Benedict (DO)  Family Medicine  [address] [place] [address]  Phone: [phone]  Fax: [phone]  Follow Up Time: 1 week Candido Mejia)  Infectious Disease; Internal Medicine  [address] [place] [address]  Phone: [phone]  Fax: [phone]  Follow Up Time: 1 month    Drake Hudson (DPM)  Surgery  [address] [place] [address]  Phone: [phone]  Fax: [phone]  Follow Up Time: 1 week    Ingrid Benedict (DO)  Family Medicine  [address] [place] [address]  Phone: [phone]  Fax: [phone]  Follow Up Time: 1 week    Amin, Mohsena F (MD)  Infectious Disease; Internal Medicine  [address] [place] [address]  Phone: [phone]  Fax: [phone]  Follow Up Time: 1 month

## 2019-11-25 NOTE — DISCHARGE NOTE PROVIDER - NSDCFUADDINST_GEN_ALL_CORE_FT
NWB to the Right Lower extremity.   Wound care: Applied Tami / Xeroform / DSD / Kerlix / Webril / AO Splint / ACE Wrap. every 2-3 days.

## 2019-11-25 NOTE — DISCHARGE NOTE PROVIDER - NSDCMRMEDTOKEN_GEN_ALL_CORE_FT
amLODIPine 5 mg oral tablet: 1 tab(s) orally once a day  aspirin 81 mg oral tablet, chewable: 1 tab(s) orally once a day  ezetimibe 10 mg oral tablet: 1 tab(s) orally once a day  glimepiride 4 mg oral tablet: 2 tab(s) orally once a day  hydroCHLOROthiazide 25 mg oral tablet: 1 tab(s) orally once a day (at bedtime)  Invokana 300 mg oral tablet: 1 tab(s) orally once a day  Lanrenan Winters Pen 100 units/mL subcutaneous solution: subcutaneous once a day (at bedtime)-  18 UNITS  losartan 50 mg oral tablet: 1 tab(s) orally once a day  Pletal 100 mg oral tablet: 1 tab(s) orally once a day acetaminophen 325 mg oral tablet: 2 tab(s) orally every 6 hours, As needed, Moderate Pain (4 - 6)  amLODIPine 5 mg oral tablet: 1 tab(s) orally once a day  aspirin 81 mg oral tablet, chewable: 1 tab(s) orally once a day  ezetimibe 10 mg oral tablet: 1 tab(s) orally once a day  glimepiride 4 mg oral tablet: 2 tab(s) orally once a day  hydroCHLOROthiazide 25 mg oral tablet: 1 tab(s) orally once a day (at bedtime)  Invokana 300 mg oral tablet: 1 tab(s) orally once a day  Lantus Solostar Pen 100 units/mL subcutaneous solution: subcutaneous once a day (at bedtime)-  18 UNITS  linezolid 2 mg/mL-D5% intravenous solution: 600 milligram(s) intravenous every 12 hours STOP 1/6/20.  losartan 50 mg oral tablet: 1 tab(s) orally once a day  Pletal 100 mg oral tablet: 1 tab(s) orally once a day

## 2019-11-25 NOTE — DISCHARGE NOTE PROVIDER - NSDCCPCAREPLAN_GEN_ALL_CORE_FT
PRINCIPAL DISCHARGE DIAGNOSIS  Diagnosis: Charcot ankle, right  Assessment and Plan of Treatment: Follow up with Dr. Hudson out patient.  s/p Bone biopsy & Removal of external fixation of tibia 21-Nov-2019.  Cultures: positive for MRSA.      SECONDARY DISCHARGE DIAGNOSES  Diagnosis: Diabetic foot ulcer with osteomyelitis  Assessment and Plan of Treatment: Cultures: positive for MRSA.   ID consulted: IV Zyvox for 6 weeks. STOP 1/6/20. PICC placed 11/25/19.   ID follow-up with ingrid Mejia or Thurs Dr. Mohsena Amin & [facility] [facility], [phone] (call to make an appointment, walk-ins Tuesdays 10:30 AM).  Wound care: Applied Tami / Xeroform / DSD / Kerlix / Webril / AO Splint / ACE Wrap.    Diagnosis: CKD (chronic kidney disease) stage 3, GFR 30-59 ml/min  Assessment and Plan of Treatment: Follow up with PMD out patient. Ensure adequate hydration & avoid medications that affect your kidney.    Diagnosis: DM (diabetes mellitus)  Assessment and Plan of Treatment: Monitor blood sugars at home. Continue Home medications.

## 2019-11-25 NOTE — DISCHARGE NOTE PROVIDER - REASON FOR ADMISSION
Removal of external fixation for Charcot's foot Removal of external fixation for Charcot's foot  Osteomyelitis Rt foot.

## 2019-11-25 NOTE — PROGRESS NOTE ADULT - SUBJECTIVE AND OBJECTIVE BOX
FERNANDEZ GUZMAN  58y, Male  Allergy: No Known Allergies  statins (Other)      CHIEF COMPLAINT: Removal of external fixation for Charcot's foot (24 Nov 2019 12:42)      INTERVAL EVENTS/HPI  - No acute events overnight  - T(F): , Max: 98 (11-24-19 @ 14:22)  - Denies any worsening symptoms  - Tolerating medication  - WBC Count: 7.50 (11-23-19 @ 08:54)     -      ROS  General: Denies rigors, nightsweats  HEENT: Denies headache, rhinorrhea, sore throat, eye pain  CV: Denies CP, palpitations  PULM: Denies wheezing, hemoptysis  GI: Denies hematemesis, hematochezia, melena  : Denies discharge, hematuria  MSK: Denies arthralgias, myalgias  SKIN: Denies rash, lesions  NEURO: Denies paresthesias, weakness  PSYCH: Denies depression, anxiety    VITALS:  T(F): 97, Max: 98 (11-24-19 @ 14:22)  HR: 89  BP: 132/82  RR: 16Vital Signs Last 24 Hrs  T(C): 36.1 (25 Nov 2019 05:08), Max: 36.7 (24 Nov 2019 14:22)  T(F): 97 (25 Nov 2019 05:08), Max: 98 (24 Nov 2019 14:22)  HR: 89 (25 Nov 2019 05:08) (76 - 89)  BP: 132/82 (25 Nov 2019 05:08) (125/66 - 157/74)  BP(mean): --  RR: 16 (25 Nov 2019 05:08) (16 - 16)  SpO2: --    PHYSICAL EXAM:  Gen: NAD, resting in bed  HEENT: Normocephalic, atraumatic  Neck: supple, no lymphadenopathy  CV: Regular rate & regular rhythm  Lungs: decreased BS at bases, no fremitus  Abdomen: Soft, BS present  Ext: Warm, well perfused  Neuro: non focal, awake  Skin: no rash, no erythema  Lines: no phlebitis    FH: Non-contributory  Social Hx: Non-contributory    TESTS & MEASUREMENTS:                  Culture - Blood (collected 11-23-19 @ 15:42)  Source: .Blood None  Preliminary Report (11-25-19 @ 02:01):    No growth to date.    Culture - Other (collected 11-21-19 @ 13:38)  Source: Wound bone  Final Report (11-24-19 @ 13:22):    Rare Methicillin resistant Staphylococcus aureus    Few Corynebacterium species "Susceptibilities not performed"    Few Dermabacter hominis "Susceptibilities not performed"  Organism: Methicillin resistant Staphylococcus aureus (11-24-19 @ 13:22)  Organism: Methicillin resistant Staphylococcus aureus (11-24-19 @ 13:22)      -  Ampicillin/Sulbactam: R 16/8      -  Cefazolin: R <=4      -  Clindamycin: S <=0.25      -  Daptomycin: S 1      -  Erythromycin: R >4      -  Gentamicin: S <=1 Should not be used as monotherapy      -  Linezolid: S 2      -  Oxacillin: R >2      -  Penicillin: R >8      -  RIF- Rifampin: S <=1 Should not be used as monotherapy      -  Tetra/Doxy: S <=1      -  Trimethoprim/Sulfamethoxazole: S <=0.5/9.5      -  Vancomycin: S 1      Method Type: DAVIDA    Culture - Other (collected 11-21-19 @ 13:37)  Source: Wound deep wound  Final Report (11-24-19 @ 11:30):    Few Methicillin resistant Staphylococcus aureus    Normal skin mary isolated  Organism: Methicillin resistant Staphylococcus aureus (11-24-19 @ 11:30)  Organism: Methicillin resistant Staphylococcus aureus (11-24-19 @ 11:30)      -  Ampicillin/Sulbactam: R 16/8      -  Cefazolin: R <=4      -  Clindamycin: S <=0.25      -  Daptomycin: S 0.5      -  Erythromycin: R >4      -  Gentamicin: S <=1 Should not be used as monotherapy      -  Linezolid: S 2      -  Oxacillin: R >2      -  Penicillin: R >8      -  RIF- Rifampin: S <=1 Should not be used as monotherapy      -  Tetra/Doxy: S <=1      -  Trimethoprim/Sulfamethoxazole: S <=0.5/9.5      -  Vancomycin: S 1      Method Type: DAVIDA            INFECTIOUS DISEASES TESTING      RADIOLOGY & ADDITIONAL TESTS:  I have personally reviewed the last Chest xray  CXR      CT      CARDIOLOGY TESTING  12 Lead ECG:   Ventricular Rate 91 BPM    Atrial Rate 91 BPM    P-R Interval 244 ms    QRS Duration 118 ms    Q-T Interval 396 ms    QTC Calculation(Bezet) 487 ms    P Axis 57 degrees    R Axis -75 degrees    T Axis 69 degrees    Diagnosis Line Sinus rhythm with 1st degree A-V block  Left anterior fascicular block  Cannot rule out Inferior infarct (masked by fascicular block?) , age  undetermined    Confirmed by KYLE HARPER MD ([id number]) on 11/21/2019 11:04:13 AM (11-20-19 @ 19:42)  12 Lead ECG:   Ventricular Rate 88 BPM    Atrial Rate 88 BPM    P-R Interval 206 ms    QRS Duration 112 ms    Q-T Interval 402 ms    QTC Calculation(Bezet) 486 ms    P Axis 34 degrees    R Axis -67 degrees    T Axis 60 degrees    Diagnosis Line Normal sinus rhythm  Low voltage QRS  Left anterior fascicular block  Cannot rule out Inferior infarct (masked by fascicular block?) , age  undetermined  Possible Anterolateral infarct , age undetermined  Abnormal ECG    Confirmed by KYLE HARPER MD ([id number]) on 11/21/2019 11:04:02 AM (11-20-19 @ 13:03)      MEDICATIONS  amLODIPine   Tablet 10  chlorhexidine 4% Liquid 1  cilostazol 50  dextrose 5%. 1000  dextrose 50% Injectable 12.5  dextrose 50% Injectable 25  dextrose 50% Injectable 25  enoxaparin Injectable 40  insulin glargine Injectable (LANTUS) 18  insulin lispro (HumaLOG) corrective regimen sliding scale   linezolid  IVPB   linezolid  IVPB 600  losartan 50  pantoprazole    Tablet 40      ANTIBIOTICS:  linezolid  IVPB      linezolid  IVPB 600 milliGRAM(s) IV Intermittent every 12 hours      All available historical records have been reviewed

## 2019-11-25 NOTE — CONSULT NOTE ADULT - ASSESSMENT
IMPRESSION: Rehab of 57 y/o  m rehab  for  gd      PRECAUTIONS: [  ] Cardiac  [  ] Respiratory  [  ] Seizures [  ] Contact Isolation  [  ] Droplet Isolation  [ FALL ] Other    Weight Bearing Status: NWB RT LE    RECOMMENDATION:    Out of Bed to Chair     DVT/Decubiti Prophylaxis    REHAB PLAN:     [  xx ] Bedside P/T 3-5 times a week   [   ]   Bedside O/T  2-3 times a week             [   ] No Rehab Therapy Indicated                   [   ]  Speech Therapy   Conditioning/ROM                                    ADL  Bed Mobility                                               Conditioning/ROM  Transfers                                                     Bed Mobility  Sitting /Standing Balance                         Transfers                                        Gait Training                                               Sitting/Standing Balance  Stair Training [   ]Applicable                    Home equipment Eval                                                                        Splinting  [   ] Only      GOALS:   ADL   [   ]   Independent                    Transfers  [   ] Independent                          Ambulation  [   ] Independent     [    ] With device                            [   ]  CG                                                         [   ]  CG                                                                  [   ] CG                            [    ] Min A                                                   [   ] Min A                                                              [   ] Min  A          DISCHARGE PLAN:   [   ]  Good candidate for Intensive Rehabilitation/Hospital based-4A [facility]                                             Will tolerate 3hrs Intensive Rehab Daily                                       [    ]  Short Term Rehab in Skilled Nursing Facility                                       [   xx ]  Home with Outpatient or VN services                                         [    ]  Possible Candidate for Intensive Hospital based Rehab
Patient is a 58y old  Male with PMH DMT2, HTN and HLD presenting for removal of external fixation for Charcot's foot (right). Patient states that he was struggling with Charcot's foot for a few years, which was leading to a non-healing ulcer on the lateral side of his right foot. Patient followed by Dr. Hudson as an outpatient. About 6 weeks ago in early Oct 2019 patient had an operation to correct his Charcot's foot where an external fixation was placed. Patient has not been walking since the surgery, but was mobile prior. Pt was last seen by his podiatrist on Monday, who advised him to come to the ED for removal of external fixation in OR. He is s/p removal of External fixation, intra-op cultures pending.    # Right DFU with OM- s/p removal of External fixation    would recommend:    1. Follow up intra-op cultures  2. Adjust Cefazolin doses and ADD Linezolid since it would be difficult to obtain therapeutic  Vancomycin  level   3. Wound care as per Podiatry   4. Need at least 6 weeks of IV ABx    will follow the patient with you and make further recommendation based on the clinical course and Lab results  Thank you for the opportunity to participate in Mr. NORMAN's care

## 2019-11-25 NOTE — PROCEDURE NOTE - NSCOMPLICATION_GEN_A_CORE
----- Message from Elba Soliz sent at 1/13/2017 11:21 AM CST -----  Contact: Patient  ##Patient is at the pharmacy right now##    Patient called to request a refill on:    1. Xanax    [id number] [employer] - [facility] [address] [address]  Phone: [phone] Fax: [phone]    She can be contacted at [phone].    Thanks,  Elba   no complications

## 2019-11-25 NOTE — PROGRESS NOTE ADULT - SUBJECTIVE AND OBJECTIVE BOX
FERNANDEZ GUZMAN  58y  Male    Patient is a 58y old  Male who presents for Removal of external fixation for Charcot's foot (2019 09:45)      INTERVAL HPI/OVERNIGHT EVENTS:  No interval events.  s/p Removal of external fixation of tibia 2019.  Patient has no complaints. Denies any pain or fever.  PICC line to be placed today.      REVIEW OF SYSTEMS:  At least 10 systems were reviewed in ROS.   All systems reviewed are within normal limits.      VITALS:  T(C): 36.1 (2019 05:08), Max: 36.7 (2019 14:22)  T(F): 97 (2019 05:08), Max: 98 (2019 14:22)  HR: 89 (2019 05:08) (76 - 89)  BP: 132/82 (2019 05:08) (125/66 - 157/74)  BP(mean): --  RR: 16 (2019 05:08) (16 - 16)  SpO2: --      CAPILLARY BLOOD GLUCOSE  POCT Blood Glucose.: 160 mg/dL (2019 07:49)  POCT Blood Glucose.: 214 mg/dL (2019 21:19)  POCT Blood Glucose.: 147 mg/dL (2019 16:16)      PHYSICAL EXAM:  GENERAL: NAD, well-developed  HEAD:  Atraumatic, Normocephalic  EYES: conjunctiva and sclera clear  ENMT: Moist mucous membranes  NECK: Supple, Normal thyroid  NERVOUS SYSTEM:  Alert & Oriented X 3, Good concentration; Motor Strength 5/5 B/L upper and lower extremities.  CHEST/LUNG: Clear to auscultation bilaterally; No rales, rhonchi, wheezing, or rubs  HEART: Regular rate and rhythm; No murmurs, rubs, or gallops  ABDOMEN: Soft, Nontender, Nondistended; Bowel sounds present  EXTREMITIES:  2+ Peripheral Pulses, No clubbing, cyanosis, or edema  LYMPH: No lymphadenopathy noted  SKIN: Rt leg dressing/cast. c/d/i    Consultant(s) Notes Reviewed:  [x ] YES  [ ] NO  Care Discussed with Consultants/Other Providers [ x] YES  [ ] NO    LABS:                                 11.3   7.50  )-----------( 278      ( 2019 08:54 )             37.6       11-    138  |  100  |  14  ----------------------------<  112<H>  4.5   |  28  |  1.4    Ca    8.8      2019 08:54    TPro  8.9<H>  /  Alb  3.9  /  TBili  0.4  /  DBili  x   /  AST  24  /  ALT  10  /  AlkPhos  88                 Urinalysis Basic - ( 2019 21:00 )    Color: Yellow / Appearance: Slightly Cloudy / S.020 / pH: x  Gluc: x / Ketone: Negative  / Bili: Negative / Urobili: 0.2 mg/dL   Blood: x / Protein: Negative mg/dL / Nitrite: Negative   Leuk Esterase: Negative / RBC: 3-5 /HPF / WBC 1-2 /HPF   Sq Epi: x / Non Sq Epi: Few /HPF / Bacteria: Few      MICROBIOLOGY:   Culture - Other (19 @ 13:38)    -  Ampicillin/Sulbactam: R 16/8    -  Cefazolin: R <=4    -  Clindamycin: S <=0.25    -  Daptomycin: S 1    -  Erythromycin: R >4    -  Gentamicin: S <=1 Should not be used as monotherapy    -  Linezolid: S 2    -  Oxacillin: R >2    -  Penicillin: R >8    -  RIF- Rifampin: S <=1 Should not be used as monotherapy    -  Tetra/Doxy: S <=1    -  Trimethoprim/Sulfamethoxazole: S <=0.5/9.5    -  Vancomycin: S 1    Specimen Source: Wound bone    Organism Identification: Methicillin resistant Staphylococcus aureus    Organism: Methicillin resistant Staphylococcus aureus    Method Type: DAVIDA      Culture - Blood (19 @ 15:42)    Specimen Source: .Blood None    Culture Results:   No growth to date.      RADIOLOGY & ADDITIONAL TESTS:  X-ray Chest 1 View- PORTABLE-Urgent (19 @ 12:31)   Left basilar focal atelectasis      Imaging Personally Reviewed:  [x] YES  [ ] NO      MEDICATIONS  (STANDING):  amLODIPine   Tablet 10 milliGRAM(s) Oral at bedtime  chlorhexidine 4% Liquid 1 Application(s) Topical <User Schedule>  cilostazol 50 milliGRAM(s) Oral two times a day  dextrose 5%. 1000 milliLiter(s) (50 mL/Hr) IV Continuous <Continuous>  dextrose 50% Injectable 12.5 Gram(s) IV Push once  dextrose 50% Injectable 25 Gram(s) IV Push once  dextrose 50% Injectable 25 Gram(s) IV Push once  enoxaparin Injectable 40 milliGRAM(s) SubCutaneous daily  insulin glargine Injectable (LANTUS) 18 Unit(s) SubCutaneous at bedtime  insulin lispro (HumaLOG) corrective regimen sliding scale   SubCutaneous three times a day before meals  linezolid  IVPB      linezolid  IVPB 600 milliGRAM(s) IV Intermittent every 12 hours  losartan 50 milliGRAM(s) Oral daily  pantoprazole    Tablet 40 milliGRAM(s) Oral before breakfast      MEDICATIONS  (PRN):  acetaminophen   Tablet .. 650 milliGRAM(s) Oral every 6 hours PRN Moderate Pain (4 - 6)  dextrose 40% Gel 15 Gram(s) Oral once PRN Blood Glucose LESS THAN 70 milliGRAM(s)/deciliter  glucagon  Injectable 1 milliGRAM(s) IntraMuscular once PRN Glucose LESS THAN 70 milligrams/deciliter      Home Medications:  amLODIPine 5 mg oral tablet: 1 tab(s) orally once a day (2019 14:31)  aspirin 81 mg oral tablet, chewable: 1 tab(s) orally once a day (2019 14:31)  ezetimibe 10 mg oral tablet: 1 tab(s) orally once a day (2019 14:31)  glimepiride 4 mg oral tablet: 2 tab(s) orally once a day (2019 14:31)  hydroCHLOROthiazide 25 mg oral tablet: 1 tab(s) orally once a day (at bedtime) (2019 14:31)  Invokana 300 mg oral tablet: 1 tab(s) orally once a day (2019 14:31)  Lantus Solostar Pen 100 units/mL subcutaneous solution: subcutaneous once a day (at bedtime)-  18 UNITS (2019 14:31)  losartan 50 mg oral tablet: 1 tab(s) orally once a day (2019 14:31)  Pletal 100 mg oral tablet: 1 tab(s) orally once a day (2019 14:31)        HEALTH ISSUES - PROBLEM Dx:  Suspected OM  High cholesterol  HTN (hypertension)  DM (diabetes mellitus)  Charcot ankle, right

## 2019-11-25 NOTE — DISCHARGE NOTE PROVIDER - CARE PROVIDERS DIRECT ADDRESSES
,DirectAddress_Unknown,DirectAddress_Unknown,[email or web address] [place] [email or web address] ,DirectAddress_Unknown,DirectAddress_Unknown,[email or web address] [place] [email or web address],DirectAddress_Unknown

## 2019-11-25 NOTE — PHYSICAL THERAPY INITIAL EVALUATION ADULT - PASSIVE RANGE OF MOTION EXAMINATION, REHAB EVAL
BLE AAROM / PROM WFL grossly throughout, assessed in sitting/no Passive ROM deficits were identified

## 2019-11-25 NOTE — PHYSICAL THERAPY INITIAL EVALUATION ADULT - IMPAIRMENTS FOUND, PT EVAL
ergonomics and body mechanics/muscle strength/posture/gait, locomotion, and balance/aerobic capacity/endurance

## 2019-11-25 NOTE — DISCHARGE NOTE PROVIDER - PROVIDER TOKENS
PROVIDER:[TOKEN:[[id number]]],PROVIDER:[TOKEN:[[id number]]],PROVIDER:[TOKEN:[[id number]],FOLLOWUP:[1 week]] PROVIDER:[TOKEN:[[id number]],FOLLOWUP:[1 month]],PROVIDER:[TOKEN:[[id number]],FOLLOWUP:[1 week]],PROVIDER:[TOKEN:[[id number]],FOLLOWUP:[1 week]],PROVIDER:[TOKEN:[[id number]],FOLLOWUP:[1 month]]

## 2019-11-25 NOTE — PROGRESS NOTE ADULT - ASSESSMENT
Patient is a 57yo M w/ PMH DMT2, HTN and HLD presenting for removal of external fixation for Charcot's foot (right). Patient followed by Dr. Hudson as an outpatient. Patient was last seen by his podiatrist who advised him to come to the ED today for removal of external fixation in OR.      Assessment & Plan:    1. Charcot foot, right:  2. Suspected RT DFU/OM:  s/p Bone biopsy & Removal of external fixation of tibia 21-Nov-2019.  Cultures: MRSA.   ID consult: IV Zyvox for 6 weeks. STOP 1/6/20. PICC to be placed today.  If linezolid not approved or $$ can try Vanc 1.5 q8h IV but given body weight achieving good trough will be difficult.  Weekly CBC, BMP, Vanc trough, ESR/CRP.  ID follow-up with Vasquez Mejia or urs Dr. Mohsena Amin & [address] [facility], [phone] (call to make an appointment, walk-ins Tuesdays 10:30 AM). Fax [phone].  Dressing Changes per Podiatry.        3. Morbid obesity:  BMI 54.5  Dietary and Lifestyle modification.      4. Type 2 DM:  Monitor FS with Insulin coverage.  Hemoglobin A1C, Whole Blood: 8.6 % (10.02.19 @ 17:45)  Hold Invokana and Glimepiride.      5.  HTN (hypertension):  Continue HCTZ, Amlodipine & Losartan.  Monitor BP.       6. CANDY on CKD 3: Resolved.  Baseline Cr 1.8. Now 1.4.  Creatinine elevation ? due to Bactrim.  Off Bactrim & IV Fluids.  Monitor BMP.         Prophylaxis: Heparin  Code status: Full code    Progress Note Handoff:  Pending consults: None  Pending Tests: None  Family/Patient discussion: Plan of care discussed with Patient.  Disposition: Home with home care vs STR.      Attending: Dr. Madina Stearns. [phone].

## 2019-11-25 NOTE — PROGRESS NOTE ADULT - ASSESSMENT
ASSESSMENT  58y old  Male with PMH DMT2, HTN and HLD presenting for removal of external fixation for Charcot's foot (right). Patient states that he was struggling with Charcot's foot for a few years, which was leading to a non-healing ulcer on the lateral side of his right foot. Patient followed by Dr. Hudson as an outpatient. About 6 weeks ago in early Oct 2019 patient had an operation to correct his Charcot's foot where an external fixation was placed. Patient has not been walking since the surgery, but was mobile prior. Pt was last seen by his podiatrist on Monday, who advised him to come to the ED for removal of external fixation in OR. He is s/p removal of External fixation 11/21    IMPRESSION  # Right DFU with OM- s/p removal of External fixation    WCX with MRSA     Sedimentation Rate, Erythrocyte: 86 mm/Hr (11.21.19 @ 18:23)    C-Reactive Protein, Serum: 8.54 mg/dL (11.21.19 @ 18:23)  #Super Morbid obesity    would recommend:  - f/u pathology   - linezolid 600mg q12h IV via PICC x 6 weeks end 1/2/20  - monitor CBC  - if linezolid not approved or $$ can trial high dose vanc- will likely have difficulty achieving good troughs given body wt    can start vanc 1.5 q8h IV   - Weekly CBC, BMP, Vanc trough, ESR/CRP  - ID follow-up with Vasquez Mejia or Thurs Dr. Mohsena Amin      [address] [facility]       [phone] (call to make an appointment, walk-ins Tuesdays 10:30 AM)      Fax [phone]

## 2019-11-25 NOTE — CONSULT NOTE ADULT - SUBJECTIVE AND OBJECTIVE BOX
HPI:  58 y o M w/ PMH DMT2, HTN and HLD presenting for removal of external fixation for Charcot's foot (right). Patient states that he was struggling with Charcot's foot for a few years, which was leading to a non-healing ulcer on the lateral side of his right foot. Patient followed by Dr. Hudson as an outpatient. About 6 weeks ago in early Oct 2019 patient had an operation to correct his Charcot's foot where an external fixation was placed. Patient has not been walking since the surgery, but was mobile prior. Pt was last seen by his podiatrist on monday, who advised him to come to the ED today for removal of external fixation in OR tomorrow. Denies HA, dizziness, NVD, fever, sob, chest pain, abdominal pain, change in urination.     PTN  REFERRED TO ACUTE  REHAB  FOR  EVAL AND  TX   PAST MEDICAL & SURGICAL HISTORY:  Charcot ankle, right  High cholesterol  HTN (hypertension)  DM (diabetes mellitus)  MATA on CPAP  Diabetic Charcot foot: Fixation with external device  Left toe amputee: 4 TOES  H/O skin graft  History of percutaneous angioplasty      Hospital Course:    TODAY'S SUBJECTIVE & REVIEW OF SYMPTOMS:     Constitutional WNL   Cardio WNL   Resp WNL   GI WNL  Heme WNL  Endo WNL  Skin WNL  MSK WNL  Neuro WNL  Cognitive WNL  Psych WNL      MEDICATIONS  (STANDING):  amLODIPine   Tablet 10 milliGRAM(s) Oral at bedtime  chlorhexidine 4% Liquid 1 Application(s) Topical <User Schedule>  cilostazol 50 milliGRAM(s) Oral two times a day  dextrose 5%. 1000 milliLiter(s) (50 mL/Hr) IV Continuous <Continuous>  dextrose 50% Injectable 12.5 Gram(s) IV Push once  dextrose 50% Injectable 25 Gram(s) IV Push once  dextrose 50% Injectable 25 Gram(s) IV Push once  enoxaparin Injectable 40 milliGRAM(s) SubCutaneous daily  insulin glargine Injectable (LANTUS) 18 Unit(s) SubCutaneous at bedtime  insulin lispro (HumaLOG) corrective regimen sliding scale   SubCutaneous three times a day before meals  linezolid  IVPB      linezolid  IVPB 600 milliGRAM(s) IV Intermittent every 12 hours  losartan 50 milliGRAM(s) Oral daily  pantoprazole    Tablet 40 milliGRAM(s) Oral before breakfast    MEDICATIONS  (PRN):  acetaminophen   Tablet .. 650 milliGRAM(s) Oral every 6 hours PRN Moderate Pain (4 - 6)  dextrose 40% Gel 15 Gram(s) Oral once PRN Blood Glucose LESS THAN 70 milliGRAM(s)/deciliter  glucagon  Injectable 1 milliGRAM(s) IntraMuscular once PRN Glucose LESS THAN 70 milligrams/deciliter      FAMILY HISTORY:  FH: leukemia  Family history of diabetes mellitus (DM)      Allergies    No Known Allergies    Intolerances    statins (Other)      SOCIAL HISTORY:    [  ] Etoh  [  ] Smoking  [  ] Substance abuse     Home Environment:  [ x ] Home Alone  [  ] Lives with Family  [  ] Home Health Aid    Dwelling:  [  ] Apartment  [  x] Private House  [  ] Adult Home  [  ] Skilled Nursing Facility      [  ] Short Term  [  ] Long Term  [  x] Stairs   out side       Elevator [  ]    FUNCTIONAL STATUS PTA: (Check all that apply)  Ambulation: [x  ]Independent    [  ] Dependent     [  ] Non-Ambulatory  Assistive Device: [  ] SA Cane  [  ]  Q Cane  [ x ] Walker  [x  ]  Wheelchair  ADL : [x  ] Independent  [  ]  Dependent       Vital Signs Last 24 Hrs  T(C): 36.1 (25 Nov 2019 05:08), Max: 36.6 (24 Nov 2019 22:08)  T(F): 97 (25 Nov 2019 05:08), Max: 97.8 (24 Nov 2019 22:08)  HR: 76 (25 Nov 2019 14:20) (76 - 89)  BP: 130/76 (25 Nov 2019 14:20) (125/66 - 132/82)  BP(mean): --  RR: 16 (25 Nov 2019 14:20) (16 - 16)  SpO2: --      PHYSICAL EXAM: Alert & Oriented X3  GENERAL: NAD, well-groomed, well-developed  HEAD:  Atraumatic, Normocephalic  EYES: EOMI, PERRLA, conjunctiva and sclera clear  NECK: Supple, No JVD, Normal thyroid  CHEST/LUNG: Clear to percussion bilaterally; No rales, rhonchi, wheezing, or rubs  HEART: Regular rate and rhythm; No murmurs, rubs, or gallops  ABDOMEN: Soft, Nontender, Nondistended; Bowel sounds present  EXTREMITIES:  2+ Peripheral Pulses, No clubbing, cyanosis, or edema    NERVOUS SYSTEM:  Cranial Nerves 2-12 intact [ x ] Abnormal  [  ]  ROM: WFL all extremities [ x ]  Abnormal [  ]  Motor Strength: WFL all extremities  [ x ]  Abnormal [  ]  Sensation: intact to light touch [  x] Abnormal [  ]  Reflexes: Symmetric [ x ]  Abnormal [  ]    FUNCTIONAL STATUS:  Bed Mobility: Independent [  ]  Supervision [ x]  Needs Assistance [  ]  N/A [  ]  Transfers: Independent [  ]  Supervision [ x ]  Needs Assistance [  ]  N/A [  ]   Ambulation: Independent [  ]  Supervision [x  ]  Needs Assistance [  ]  N/A [  ]  ADL: Independent [ x] Requires Assistance [  ] N/A [  ]  SEE PT/OT IE NOTES    LABS:                RADIOLOGY & ADDITIONAL STUDIES:    Assesment:

## 2019-11-26 ENCOUNTER — TRANSCRIPTION ENCOUNTER (OUTPATIENT)
Age: 59
End: 2019-11-26

## 2019-11-26 VITALS
SYSTOLIC BLOOD PRESSURE: 173 MMHG | TEMPERATURE: 97 F | HEART RATE: 87 BPM | RESPIRATION RATE: 16 BRPM | DIASTOLIC BLOOD PRESSURE: 82 MMHG

## 2019-11-26 DIAGNOSIS — E11.621 TYPE 2 DIABETES MELLITUS WITH FOOT ULCER: ICD-10-CM

## 2019-11-26 LAB
ALBUMIN SERPL ELPH-MCNC: 3.5 G/DL — SIGNIFICANT CHANGE UP (ref 3.5–5.2)
ALP SERPL-CCNC: 80 U/L — SIGNIFICANT CHANGE UP (ref 30–115)
ALT FLD-CCNC: 10 U/L — SIGNIFICANT CHANGE UP (ref 0–41)
ANION GAP SERPL CALC-SCNC: 13 MMOL/L — SIGNIFICANT CHANGE UP (ref 7–14)
AST SERPL-CCNC: 21 U/L — SIGNIFICANT CHANGE UP (ref 0–41)
BILIRUB SERPL-MCNC: 0.4 MG/DL — SIGNIFICANT CHANGE UP (ref 0.2–1.2)
BUN SERPL-MCNC: 17 MG/DL — SIGNIFICANT CHANGE UP (ref 10–20)
CALCIUM SERPL-MCNC: 8.6 MG/DL — SIGNIFICANT CHANGE UP (ref 8.5–10.1)
CHLORIDE SERPL-SCNC: 99 MMOL/L — SIGNIFICANT CHANGE UP (ref 98–110)
CO2 SERPL-SCNC: 25 MMOL/L — SIGNIFICANT CHANGE UP (ref 17–32)
CREAT SERPL-MCNC: 1.3 MG/DL — SIGNIFICANT CHANGE UP (ref 0.7–1.5)
GLUCOSE BLDC GLUCOMTR-MCNC: 182 MG/DL — HIGH (ref 70–99)
GLUCOSE BLDC GLUCOMTR-MCNC: 221 MG/DL — HIGH (ref 70–99)
GLUCOSE SERPL-MCNC: 253 MG/DL — HIGH (ref 70–99)
POTASSIUM SERPL-MCNC: 4.4 MMOL/L — SIGNIFICANT CHANGE UP (ref 3.5–5)
POTASSIUM SERPL-SCNC: 4.4 MMOL/L — SIGNIFICANT CHANGE UP (ref 3.5–5)
PROT SERPL-MCNC: 8.2 G/DL — HIGH (ref 6–8)
SODIUM SERPL-SCNC: 137 MMOL/L — SIGNIFICANT CHANGE UP (ref 135–146)

## 2019-11-26 PROCEDURE — 99239 HOSP IP/OBS DSCHRG MGMT >30: CPT

## 2019-11-26 RX ORDER — LINEZOLID 600 MG/300ML
1 INJECTION, SOLUTION INTRAVENOUS
Qty: 84 | Refills: 0
Start: 2019-11-26 | End: 2020-01-06

## 2019-11-26 RX ADMIN — PANTOPRAZOLE SODIUM 40 MILLIGRAM(S): 20 TABLET, DELAYED RELEASE ORAL at 05:17

## 2019-11-26 RX ADMIN — LOSARTAN POTASSIUM 50 MILLIGRAM(S): 100 TABLET, FILM COATED ORAL at 05:17

## 2019-11-26 RX ADMIN — LINEZOLID 300 MILLIGRAM(S): 600 INJECTION, SOLUTION INTRAVENOUS at 05:17

## 2019-11-26 RX ADMIN — CHLORHEXIDINE GLUCONATE 1 APPLICATION(S): 213 SOLUTION TOPICAL at 05:17

## 2019-11-26 RX ADMIN — Medication 2: at 11:40

## 2019-11-26 RX ADMIN — Medication 1: at 07:44

## 2019-11-26 RX ADMIN — CILOSTAZOL 50 MILLIGRAM(S): 100 TABLET ORAL at 05:17

## 2019-11-26 RX ADMIN — ENOXAPARIN SODIUM 40 MILLIGRAM(S): 100 INJECTION SUBCUTANEOUS at 11:39

## 2019-11-26 NOTE — CHART NOTE - NSCHARTNOTEFT_GEN_A_CORE
I was called by [facility] for 2 positive right foot wound cultures. Both on 11/21. Both growing MRSA. Pt is currently on Linezolid and is being followed by ID.

## 2019-11-26 NOTE — PROGRESS NOTE ADULT - SUBJECTIVE AND OBJECTIVE BOX
FERNANDEZ GUZMAN  58y  Male    Patient is a 58y old  Male who presents for Removal of external fixation for Charcot's foot (21 Nov 2019 09:45)      INTERVAL HPI/OVERNIGHT EVENTS:  No interval events.  s/p Removal of external fixation of tibia 21-Nov-2019.  Patient has no complaints. Denies any pain or fever.  S/p PICC line 11/25/19        REVIEW OF SYSTEMS:  At least 10 systems were reviewed in ROS.   All systems reviewed are within normal limits.      VITALS:  Vital Signs Last 24 Hrs  T(C): 36 (26 Nov 2019 05:07), Max: 36 (26 Nov 2019 05:07)  T(F): 96.8 (26 Nov 2019 05:07), Max: 96.8 (26 Nov 2019 05:07)  HR: 87 (26 Nov 2019 05:07) (76 - 89)  BP: 173/82 (26 Nov 2019 05:07) (130/76 - 173/82)  BP(mean): --  RR: 16 (26 Nov 2019 05:07) (16 - 16)  SpO2: --          PHYSICAL EXAM:  GENERAL: NAD, well-developed  HEAD:  Atraumatic, Normocephalic  EYES: conjunctiva and sclera clear  ENMT: Moist mucous membranes  NECK: Supple, Normal thyroid  NERVOUS SYSTEM:  Alert & Oriented X 3, Good concentration; Motor Strength 5/5 B/L upper and lower extremities.  CHEST/LUNG: Clear to auscultation bilaterally; No rales, rhonchi, wheezing, or rubs  HEART: Regular rate and rhythm; No murmurs, rubs, or gallops  ABDOMEN: Soft, Nontender, Nondistended; Bowel sounds present  EXTREMITIES:  2+ Peripheral Pulses, No clubbing, cyanosis, or edema  LYMPH: No lymphadenopathy noted  SKIN: Rt leg dressing/cast. c/d/i    Consultant(s) Notes Reviewed:  [x ] YES  [ ] NO  Care Discussed with Consultants/Other Providers [ x] YES  [ ] NO    LABS:                       None today      Culture - Blood (11.23.19 @ 15:42)    Specimen Source: .Blood None    Culture Results:   No growth to date.      RADIOLOGY & ADDITIONAL TESTS:  None today      MEDICATIONS  (STANDING):  amLODIPine   Tablet 10 milliGRAM(s) Oral at bedtime  chlorhexidine 4% Liquid 1 Application(s) Topical <User Schedule>  cilostazol 50 milliGRAM(s) Oral two times a day  dextrose 5%. 1000 milliLiter(s) (50 mL/Hr) IV Continuous <Continuous>  dextrose 50% Injectable 12.5 Gram(s) IV Push once  dextrose 50% Injectable 25 Gram(s) IV Push once  dextrose 50% Injectable 25 Gram(s) IV Push once  enoxaparin Injectable 40 milliGRAM(s) SubCutaneous daily  insulin glargine Injectable (LANTUS) 18 Unit(s) SubCutaneous at bedtime  insulin lispro (HumaLOG) corrective regimen sliding scale   SubCutaneous three times a day before meals  linezolid  IVPB      linezolid  IVPB 600 milliGRAM(s) IV Intermittent every 12 hours  losartan 50 milliGRAM(s) Oral daily  pantoprazole    Tablet 40 milliGRAM(s) Oral before breakfast      MEDICATIONS  (PRN):  acetaminophen   Tablet .. 650 milliGRAM(s) Oral every 6 hours PRN Moderate Pain (4 - 6)  dextrose 40% Gel 15 Gram(s) Oral once PRN Blood Glucose LESS THAN 70 milliGRAM(s)/deciliter  glucagon  Injectable 1 milliGRAM(s) IntraMuscular once PRN Glucose LESS THAN 70 milligrams/deciliter      Home Medications:  amLODIPine 5 mg oral tablet: 1 tab(s) orally once a day (20 Nov 2019 14:31)  aspirin 81 mg oral tablet, chewable: 1 tab(s) orally once a day (20 Nov 2019 14:31)  ezetimibe 10 mg oral tablet: 1 tab(s) orally once a day (20 Nov 2019 14:31)  glimepiride 4 mg oral tablet: 2 tab(s) orally once a day (20 Nov 2019 14:31)  hydroCHLOROthiazide 25 mg oral tablet: 1 tab(s) orally once a day (at bedtime) (20 Nov 2019 14:31)  Invokana 300 mg oral tablet: 1 tab(s) orally once a day (20 Nov 2019 14:31)  Lantus Solostar Pen 100 units/mL subcutaneous solution: subcutaneous once a day (at bedtime)-  18 UNITS (20 Nov 2019 14:31)  losartan 50 mg oral tablet: 1 tab(s) orally once a day (20 Nov 2019 14:31)  Pletal 100 mg oral tablet: 1 tab(s) orally once a day (20 Nov 2019 14:31)        HEALTH ISSUES - PROBLEM Dx:  Suspected OM  High cholesterol  HTN (hypertension)  DM (diabetes mellitus)  Charcot ankle, right

## 2019-11-26 NOTE — DISCHARGE NOTE NURSING/CASE MANAGEMENT/SOCIAL WORK - NSDCFUADDAPPT_GEN_ALL_CORE_FT
Follow-up with Vasquez Mejia or Thurs Dr. Mohsena Amin & [facility] [place] , [phone] (call to make an appointment, walk-ins Tuesdays 10:30 AM).

## 2019-11-26 NOTE — PROGRESS NOTE ADULT - PROBLEM SELECTOR PLAN 1
clinically stable   MRSA osteo   6 weeks Po Zyvox 600 mg Q12 - NO need for PICC or IV abx if Po Zyvox is affordable   follow up with Id Dr Allen out pt ID   Recall if needed

## 2019-11-26 NOTE — CHART NOTE - NSCHARTNOTEFT_GEN_A_CORE
Left arm PICC line removed at request of Dr Parr  -pressure applied after removal  -no bleeding   -sterile gauze applied to cover puncture site

## 2019-11-26 NOTE — PROGRESS NOTE ADULT - ASSESSMENT
Patient is a 59yo M w/ PMH DMT2, HTN and HLD presenting for removal of external fixation for Charcot's foot (right). Patient followed by Dr. Hudson as an outpatient. Patient was last seen by his podiatrist who advised him to come to the ED today for removal of external fixation in OR.      Assessment & Plan:    1. Charcot foot, right:  2. Suspected RT DFU/OM:  s/p Bone biopsy & Removal of external fixation of tibia 21-Nov-2019.  Cultures: MRSA.   ID consult: IV Zyvox for 6 weeks. STOP 1/6/20. PICC to be placed.  If linezolid not approved or $$ can try Vanc 1.5 q8h IV but given body weight achieving good trough will be difficult.  Weekly CBC, BMP, Vanc trough, ESR/CRP.  ID follow-up with Vasquez Mejia or Thurs Dr. Mohsena Amin & [address] [place] [place], [phone] (call to make an appointment, walk-ins Tuesdays 10:30 AM). Fax [phone].  Dressing Changes per Podiatry.  Per patient he needs a PICC line extension and needs RN to observe him inject himself with antibiotic  Discharge today if home antibiotics set up        3. Morbid obesity:  BMI 54.5  Dietary and Lifestyle modification.      4. Type 2 DM:  Monitor FS with Insulin coverage.  Hemoglobin A1C, Whole Blood: 8.6 % (10.02.19 @ 17:45)  Hold Invokana and Glimepiride.      5.  HTN (hypertension):  Continue HCTZ, Amlodipine & Losartan.  Monitor BP.       6. CANDY on CKD 3: Resolved.  Baseline Cr 1.8. Now 1.4.  Creatinine elevation ? due to Bactrim.  Off Bactrim & IV Fluids.  Monitor BMP.         Prophylaxis: Heparin  Code status: Full code    Progress Note Handoff:    Disposition: Home with home care/VNS Patient is a 59yo M w/ PMH DMT2, HTN and HLD presenting for removal of external fixation for Charcot's foot (right). Patient followed by Dr. Hudson as an outpatient. Patient was last seen by his podiatrist who advised him to come to the ED today for removal of external fixation in OR.      Assessment & Plan:    1. Charcot foot, right:  2. Suspected RT DFU/OM:  s/p Bone biopsy & Removal of external fixation of tibia 21-Nov-2019.  Cultures: MRSA.   ID consult follow up, now recommends PO ZYVOX, PICC line to be removed prior to discharge, CM confirmed zyvox co pay and it's affordable. Home care set up   Weekly CBC, BMP, Vanc trough, ESR/CRP.  ID follow-up with Vasquez Mjeia or urs Dr. Mohsena Amin & [address], [phone] (call to make an appointment, walk-ins Tuesdays 10:30 AM). Fax [phone].  Dressing Changes per Podiatry.  Discharge today with po zyvox        3. Morbid obesity:  BMI 54.5  Dietary and Lifestyle modification.      4. Type 2 DM:  Monitor FS with Insulin coverage.  Hemoglobin A1C, Whole Blood: 8.6 % (10.02.19 @ 17:45)  Hold Invokana and Glimepiride.      5.  HTN (hypertension):  Continue HCTZ, Amlodipine & Losartan.  Monitor BP.       6. CANDY on CKD 3: Resolved.  Baseline Cr 1.8. Now 1.4.  Creatinine elevation ? due to Bactrim.  Off Bactrim & IV Fluids.  Monitor BMP.         Prophylaxis: Heparin  Code status: Full code    Progress Note Handoff:    Disposition: Home with home care/VNS

## 2019-11-26 NOTE — PROGRESS NOTE ADULT - SUBJECTIVE AND OBJECTIVE BOX
FERNANDEZ GUZMAN  58y, Male  Allergy: No Known Allergies  statins (Other)      CHIEF COMPLAINT: Removal of external fixation for Charcot's foot (26 Nov 2019 07:48)      INTERVAL EVENTS/HPI  - No acute events overnight  - T(F): , Max: 96.8 (11-26-19 @ 05:07)  - Denies any worsening symptoms  - Tolerating medication    ROS  10 system review - neg     SOCIAL HISTORY - not relevant     Substance Use (  ) never used  (  ) IVDU (  ) Other:  Tobacco Usage:  (   ) never smoked   (   ) former smoker   (   ) current smoker   Alcohol Usage: (   ) social  (   ) daily use (   ) denies  Sexual History:       FH noncontributory     VITALS:  T(F): 96.8, Max: 96.8 (11-26-19 @ 05:07)  HR: 87  BP: 173/82  RR: 16Vital Signs Last 24 Hrs  T(C): 36 (26 Nov 2019 05:07), Max: 36 (26 Nov 2019 05:07)  T(F): 96.8 (26 Nov 2019 05:07), Max: 96.8 (26 Nov 2019 05:07)  HR: 87 (26 Nov 2019 05:07) (76 - 89)  BP: 173/82 (26 Nov 2019 05:07) (130/76 - 173/82)  BP(mean): --  RR: 16 (26 Nov 2019 05:07) (16 - 16)  SpO2: --    PHYSICAL EXAM:  Gen: NAD, resting in bed  HEENT: Normocephalic, atraumatic  Neck: supple, no lymphadenopathy  CV: s1 s 2 +   Lungs: clear   Abdomen: Soft, BS present  Ext: Warm, well perfused. dressed R foot   Neuro: non focal, awake  Skin: no rash, no erythema      TESTS & MEASUREMENTS:                  Culture - Blood (collected 11-23-19 @ 15:42)  Source: .Blood None  Preliminary Report (11-25-19 @ 02:01):    No growth to date.    Culture - Other (collected 11-21-19 @ 13:38)  Source: Wound bone  Final Report (11-24-19 @ 13:22):    Rare Methicillin resistant Staphylococcus aureus    Few Corynebacterium species "Susceptibilities not performed"    Few Dermabacter hominis "Susceptibilities not performed"  Organism: Methicillin resistant Staphylococcus aureus (11-24-19 @ 13:22)  Organism: Methicillin resistant Staphylococcus aureus (11-24-19 @ 13:22)      -  Ampicillin/Sulbactam: R 16/8      -  Cefazolin: R <=4      -  Clindamycin: S <=0.25      -  Daptomycin: S 1      -  Erythromycin: R >4      -  Gentamicin: S <=1 Should not be used as monotherapy      -  Linezolid: S 2      -  Oxacillin: R >2      -  Penicillin: R >8      -  RIF- Rifampin: S <=1 Should not be used as monotherapy      -  Tetra/Doxy: S <=1      -  Trimethoprim/Sulfamethoxazole: S <=0.5/9.5      -  Vancomycin: S 1      Method Type: DAVIDA    Culture - Other (collected 11-21-19 @ 13:37)  Source: Wound deep wound  Final Report (11-24-19 @ 11:30):    Few Methicillin resistant Staphylococcus aureus    Normal skin mary isolated  Organism: Methicillin resistant Staphylococcus aureus (11-24-19 @ 11:30)  Organism: Methicillin resistant Staphylococcus aureus (11-24-19 @ 11:30)      -  Ampicillin/Sulbactam: R 16/8      -  Cefazolin: R <=4      -  Clindamycin: S <=0.25      -  Daptomycin: S 0.5      -  Erythromycin: R >4      -  Gentamicin: S <=1 Should not be used as monotherapy      -  Linezolid: S 2      -  Oxacillin: R >2      -  Penicillin: R >8      -  RIF- Rifampin: S <=1 Should not be used as monotherapy      -  Tetra/Doxy: S <=1      -  Trimethoprim/Sulfamethoxazole: S <=0.5/9.5      -  Vancomycin: S 1      Method Type: DAVIDA            INFECTIOUS DISEASES TESTING  Hepatitis C Virus Interpretation: Nonreact (10-05-19 @ 06:54)      RADIOLOGY & ADDITIONAL TESTS:      CARDIOLOGY TESTING  12 Lead ECG:   Ventricular Rate 91 BPM    Atrial Rate 91 BPM    P-R Interval 244 ms    QRS Duration 118 ms    Q-T Interval 396 ms    QTC Calculation(Bezet) 487 ms    P Axis 57 degrees    R Axis -75 degrees    T Axis 69 degrees    Diagnosis Line Sinus rhythm with 1st degree A-V block  Left anterior fascicular block  Cannot rule out Inferior infarct (masked by fascicular block?) , age  undetermined    Confirmed by LEROY CASTILLO, KYLE ([id number]) on 11/21/2019 11:04:13 AM (11-20-19 @ 19:42)  12 Lead ECG:   Ventricular Rate 88 BPM    Atrial Rate 88 BPM    P-R Interval 206 ms    QRS Duration 112 ms    Q-T Interval 402 ms    QTC Calculation(Bezet) 486 ms    P Axis 34 degrees    R Axis -67 degrees    T Axis 60 degrees    Diagnosis Line Normal sinus rhythm  Low voltage QRS  Left anterior fascicular block  Cannot rule out Inferior infarct (masked by fascicular block?) , age  undetermined  Possible Anterolateral infarct , age undetermined  Abnormal ECG    Confirmed by KYLE HARPER MD ([id number]) on 11/21/2019 11:04:02 AM (11-20-19 @ 13:03)      MEDICATIONS  amLODIPine   Tablet 10  chlorhexidine 4% Liquid 1  cilostazol 50  dextrose 5%. 1000  dextrose 50% Injectable 12.5  dextrose 50% Injectable 25  dextrose 50% Injectable 25  enoxaparin Injectable 40  insulin glargine Injectable (LANTUS) 18  insulin lispro (HumaLOG) corrective regimen sliding scale   linezolid  IVPB   linezolid  IVPB 600  losartan 50  pantoprazole    Tablet 40      ANTIBIOTICS:  linezolid  IVPB      linezolid  IVPB 600 milliGRAM(s) IV Intermittent every 12 hours

## 2019-11-26 NOTE — DISCHARGE NOTE NURSING/CASE MANAGEMENT/SOCIAL WORK - PATIENT PORTAL LINK FT
You can access the FollowMyHealth Patient Portal offered by [facility] by registering at the following website: [email or web address]. By joining [email or web address]s FollowMyHealth portal, you will also be able to view your health information using other applications (apps) compatible with our system.

## 2019-11-26 NOTE — PROGRESS NOTE ADULT - REASON FOR ADMISSION
Removal of external fixation for Charcot's foot

## 2019-11-29 LAB
CULTURE RESULTS: SIGNIFICANT CHANGE UP
SPECIMEN SOURCE: SIGNIFICANT CHANGE UP

## 2019-12-04 DIAGNOSIS — Z89.422 ACQUIRED ABSENCE OF OTHER LEFT TOE(S): ICD-10-CM

## 2019-12-04 DIAGNOSIS — Z47.89 ENCOUNTER FOR OTHER ORTHOPEDIC AFTERCARE: ICD-10-CM

## 2019-12-04 DIAGNOSIS — I12.9 HYPERTENSIVE CHRONIC KIDNEY DISEASE WITH STAGE 1 THROUGH STAGE 4 CHRONIC KIDNEY DISEASE, OR UNSPECIFIED CHRONIC KIDNEY DISEASE: ICD-10-CM

## 2019-12-04 DIAGNOSIS — G47.33 OBSTRUCTIVE SLEEP APNEA (ADULT) (PEDIATRIC): ICD-10-CM

## 2019-12-04 DIAGNOSIS — E11.69 TYPE 2 DIABETES MELLITUS WITH OTHER SPECIFIED COMPLICATION: ICD-10-CM

## 2019-12-04 DIAGNOSIS — E78.00 PURE HYPERCHOLESTEROLEMIA, UNSPECIFIED: ICD-10-CM

## 2019-12-04 DIAGNOSIS — Z79.82 LONG TERM (CURRENT) USE OF ASPIRIN: ICD-10-CM

## 2019-12-04 DIAGNOSIS — E11.610 TYPE 2 DIABETES MELLITUS WITH DIABETIC NEUROPATHIC ARTHROPATHY: ICD-10-CM

## 2019-12-04 DIAGNOSIS — Z79.4 LONG TERM (CURRENT) USE OF INSULIN: ICD-10-CM

## 2019-12-04 DIAGNOSIS — L97.316 NON-PRESSURE CHRONIC ULCER OF RIGHT ANKLE WITH BONE INVOLVEMENT WITHOUT EVIDENCE OF NECROSIS: ICD-10-CM

## 2019-12-04 DIAGNOSIS — B95.62 METHICILLIN RESISTANT STAPHYLOCOCCUS AUREUS INFECTION AS THE CAUSE OF DISEASES CLASSIFIED ELSEWHERE: ICD-10-CM

## 2019-12-04 DIAGNOSIS — E11.22 TYPE 2 DIABETES MELLITUS WITH DIABETIC CHRONIC KIDNEY DISEASE: ICD-10-CM

## 2019-12-04 DIAGNOSIS — E66.01 MORBID (SEVERE) OBESITY DUE TO EXCESS CALORIES: ICD-10-CM

## 2019-12-04 DIAGNOSIS — R26.9 UNSPECIFIED ABNORMALITIES OF GAIT AND MOBILITY: ICD-10-CM

## 2019-12-04 DIAGNOSIS — M14.671 CHARCOT'S JOINT, RIGHT ANKLE AND FOOT: ICD-10-CM

## 2019-12-04 DIAGNOSIS — N18.3 CHRONIC KIDNEY DISEASE, STAGE 3 (MODERATE): ICD-10-CM

## 2019-12-04 DIAGNOSIS — E11.622 TYPE 2 DIABETES MELLITUS WITH OTHER SKIN ULCER: ICD-10-CM

## 2019-12-04 DIAGNOSIS — N17.9 ACUTE KIDNEY FAILURE, UNSPECIFIED: ICD-10-CM

## 2019-12-04 DIAGNOSIS — M86.9 OSTEOMYELITIS, UNSPECIFIED: ICD-10-CM

## 2020-01-01 NOTE — DISCHARGE NOTE PROVIDER - NSDCFUADDAPPT_GEN_ALL_CORE_FT
Follow-up with Vasquez Mejia or Thurs Dr. Mohsena Amin & [facility] [place] , [phone] (call to make an appointment, walk-ins Tuesdays 10:30 AM). 0 Hour(s) 1 Minute(s)

## 2020-03-11 ENCOUNTER — RX RENEWAL (OUTPATIENT)
Age: 60
End: 2020-03-11

## 2020-04-15 ENCOUNTER — RX RENEWAL (OUTPATIENT)
Age: 60
End: 2020-04-15

## 2020-04-16 ENCOUNTER — RX RENEWAL (OUTPATIENT)
Age: 60
End: 2020-04-16

## 2020-04-20 ENCOUNTER — RX RENEWAL (OUTPATIENT)
Age: 60
End: 2020-04-20

## 2020-06-18 PROBLEM — M14.671 CHARCOT'S JOINT, RIGHT ANKLE AND FOOT: Chronic | Status: ACTIVE | Noted: 2019-11-20

## 2020-07-06 ENCOUNTER — APPOINTMENT (OUTPATIENT)
Dept: PLASTIC SURGERY | Facility: CLINIC | Age: 60
End: 2020-07-06
Payer: MEDICARE

## 2020-07-06 VITALS — WEIGHT: 315 LBS | BODY MASS INDEX: 45.1 KG/M2 | HEIGHT: 70 IN

## 2020-07-06 DIAGNOSIS — S91.301A UNSPECIFIED OPEN WOUND, RIGHT FOOT, INITIAL ENCOUNTER: ICD-10-CM

## 2020-07-06 PROCEDURE — 99203 OFFICE O/P NEW LOW 30 MIN: CPT

## 2020-07-06 NOTE — PHYSICAL EXAM
[de-identified] : morbidly obese male, NAD [de-identified] : NC/AT [de-identified] : PERRL [de-identified] : unlabored breathing, good inspiratory effort  [de-identified] : supple [de-identified] : SERGOR [de-identified] : soft, nontender  [de-identified] : Right foot- total contact cast in place which is now removed to reveal Charcot's bony architecture, open wound on lateral aspect of mid plantar surface measuring 6x3 cm with granulating base, soft tissue swelling,  tunneling present, venous stasis changes

## 2020-07-06 NOTE — ASSESSMENT
[FreeTextEntry1] : 60 yo M with multiple medical problems with a nonhealing right plantar foot wound.\par \par - right foot X-Ray\par - vascular evaluation\par - f/u after study

## 2020-07-06 NOTE — HISTORY OF PRESENT ILLNESS
[FreeTextEntry1] : 60 yo M with PMH of HTN, DM, MATA on CPAP, CKD, PVD, morbid obesity and DFU known to PRS from management of old b/l DFU and OM s/p STSG to foot. Patient presents today for evaluation of right foot wound, nonhealing for the past few years s/p multiple debridements by Podiatry. Denies any fever, chills or purulent drainage. Patient is in a TCC for few years is is currently on alternating Santyl and Tami. \par \par Occupation- retired clinical \par Nonsmoker

## 2020-08-20 ENCOUNTER — APPOINTMENT (OUTPATIENT)
Dept: VASCULAR SURGERY | Facility: CLINIC | Age: 60
End: 2020-08-20

## 2021-05-28 ENCOUNTER — INPATIENT (INPATIENT)
Facility: HOSPITAL | Age: 61
LOS: 28 days | Discharge: NURSING, REHAB OR LONG-TERM CARE | End: 2021-06-26
Attending: INTERNAL MEDICINE | Admitting: INTERNAL MEDICINE
Payer: MEDICARE

## 2021-05-28 VITALS
HEIGHT: 70 IN | SYSTOLIC BLOOD PRESSURE: 131 MMHG | TEMPERATURE: 99 F | OXYGEN SATURATION: 100 % | RESPIRATION RATE: 17 BRPM | HEART RATE: 118 BPM | WEIGHT: 300.05 LBS | DIASTOLIC BLOOD PRESSURE: 86 MMHG

## 2021-05-28 DIAGNOSIS — Z98.890 OTHER SPECIFIED POSTPROCEDURAL STATES: Chronic | ICD-10-CM

## 2021-05-28 DIAGNOSIS — Z89.422 ACQUIRED ABSENCE OF OTHER LEFT TOE(S): Chronic | ICD-10-CM

## 2021-05-28 DIAGNOSIS — E11.610 TYPE 2 DIABETES MELLITUS WITH DIABETIC NEUROPATHIC ARTHROPATHY: Chronic | ICD-10-CM

## 2021-05-28 DIAGNOSIS — Z94.5 SKIN TRANSPLANT STATUS: Chronic | ICD-10-CM

## 2021-05-28 LAB
ALBUMIN SERPL ELPH-MCNC: 2.9 G/DL — LOW (ref 3.5–5.2)
ALP SERPL-CCNC: 121 U/L — HIGH (ref 30–115)
ALT FLD-CCNC: 22 U/L — SIGNIFICANT CHANGE UP (ref 0–41)
ANION GAP SERPL CALC-SCNC: 12 MMOL/L — SIGNIFICANT CHANGE UP (ref 7–14)
APTT BLD: 33.7 SEC — SIGNIFICANT CHANGE UP (ref 27–39.2)
AST SERPL-CCNC: 20 U/L — SIGNIFICANT CHANGE UP (ref 0–41)
BASOPHILS # BLD AUTO: 0.04 K/UL — SIGNIFICANT CHANGE UP (ref 0–0.2)
BASOPHILS NFR BLD AUTO: 0.3 % — SIGNIFICANT CHANGE UP (ref 0–1)
BILIRUB SERPL-MCNC: 0.2 MG/DL — SIGNIFICANT CHANGE UP (ref 0.2–1.2)
BLD GP AB SCN SERPL QL: SIGNIFICANT CHANGE UP
BUN SERPL-MCNC: 52 MG/DL — HIGH (ref 10–20)
CALCIUM SERPL-MCNC: 8.4 MG/DL — LOW (ref 8.5–10.1)
CHLORIDE SERPL-SCNC: 94 MMOL/L — LOW (ref 98–110)
CO2 SERPL-SCNC: 27 MMOL/L — SIGNIFICANT CHANGE UP (ref 17–32)
CREAT SERPL-MCNC: 3 MG/DL — HIGH (ref 0.7–1.5)
EOSINOPHIL # BLD AUTO: 0.33 K/UL — SIGNIFICANT CHANGE UP (ref 0–0.7)
EOSINOPHIL NFR BLD AUTO: 2.6 % — SIGNIFICANT CHANGE UP (ref 0–8)
ERYTHROCYTE [SEDIMENTATION RATE] IN BLOOD: 126 MM/HR — HIGH (ref 0–10)
GLUCOSE SERPL-MCNC: 397 MG/DL — HIGH (ref 70–99)
HCT VFR BLD CALC: 31.3 % — LOW (ref 42–52)
HGB BLD-MCNC: 9.5 G/DL — LOW (ref 14–18)
IMM GRANULOCYTES NFR BLD AUTO: 0.7 % — HIGH (ref 0.1–0.3)
INR BLD: 1.3 RATIO — SIGNIFICANT CHANGE UP (ref 0.65–1.3)
LACTATE SERPL-SCNC: 1.1 MMOL/L — SIGNIFICANT CHANGE UP (ref 0.7–2)
LACTATE SERPL-SCNC: 2.3 MMOL/L — HIGH (ref 0.7–2)
LYMPHOCYTES # BLD AUTO: 1.22 K/UL — SIGNIFICANT CHANGE UP (ref 1.2–3.4)
LYMPHOCYTES # BLD AUTO: 9.7 % — LOW (ref 20.5–51.1)
MCHC RBC-ENTMCNC: 25.7 PG — LOW (ref 27–31)
MCHC RBC-ENTMCNC: 30.4 G/DL — LOW (ref 32–37)
MCV RBC AUTO: 84.8 FL — SIGNIFICANT CHANGE UP (ref 80–94)
MONOCYTES # BLD AUTO: 1.18 K/UL — HIGH (ref 0.1–0.6)
MONOCYTES NFR BLD AUTO: 9.4 % — HIGH (ref 1.7–9.3)
NEUTROPHILS # BLD AUTO: 9.68 K/UL — HIGH (ref 1.4–6.5)
NEUTROPHILS NFR BLD AUTO: 77.3 % — HIGH (ref 42.2–75.2)
NRBC # BLD: 0 /100 WBCS — SIGNIFICANT CHANGE UP (ref 0–0)
PLATELET # BLD AUTO: 341 K/UL — SIGNIFICANT CHANGE UP (ref 130–400)
POTASSIUM SERPL-MCNC: 4.6 MMOL/L — SIGNIFICANT CHANGE UP (ref 3.5–5)
POTASSIUM SERPL-SCNC: 4.6 MMOL/L — SIGNIFICANT CHANGE UP (ref 3.5–5)
PROT SERPL-MCNC: 9 G/DL — HIGH (ref 6–8)
PROTHROM AB SERPL-ACNC: 15 SEC — HIGH (ref 9.95–12.87)
RBC # BLD: 3.69 M/UL — LOW (ref 4.7–6.1)
RBC # FLD: 17.3 % — HIGH (ref 11.5–14.5)
SARS-COV-2 RNA SPEC QL NAA+PROBE: SIGNIFICANT CHANGE UP
SODIUM SERPL-SCNC: 133 MMOL/L — LOW (ref 135–146)
WBC # BLD: 12.54 K/UL — HIGH (ref 4.8–10.8)
WBC # FLD AUTO: 12.54 K/UL — HIGH (ref 4.8–10.8)

## 2021-05-28 PROCEDURE — 93926 LOWER EXTREMITY STUDY: CPT | Mod: 26,RT

## 2021-05-28 PROCEDURE — 93308 TTE F-UP OR LMTD: CPT | Mod: 26,GC

## 2021-05-28 PROCEDURE — 93971 EXTREMITY STUDY: CPT | Mod: 26,RT

## 2021-05-28 PROCEDURE — 99291 CRITICAL CARE FIRST HOUR: CPT | Mod: 25,GC

## 2021-05-28 PROCEDURE — 73630 X-RAY EXAM OF FOOT: CPT | Mod: 26,RT

## 2021-05-28 PROCEDURE — 73610 X-RAY EXAM OF ANKLE: CPT | Mod: 26,RT

## 2021-05-28 PROCEDURE — 71045 X-RAY EXAM CHEST 1 VIEW: CPT | Mod: 26

## 2021-05-28 PROCEDURE — 93010 ELECTROCARDIOGRAM REPORT: CPT

## 2021-05-28 RX ORDER — VANCOMYCIN HCL 1 G
2000 VIAL (EA) INTRAVENOUS ONCE
Refills: 0 | Status: COMPLETED | OUTPATIENT
Start: 2021-05-28 | End: 2021-05-28

## 2021-05-28 RX ORDER — ACETAMINOPHEN 500 MG
975 TABLET ORAL ONCE
Refills: 0 | Status: COMPLETED | OUTPATIENT
Start: 2021-05-28 | End: 2021-05-28

## 2021-05-28 RX ORDER — CEFEPIME 1 G/1
2000 INJECTION, POWDER, FOR SOLUTION INTRAMUSCULAR; INTRAVENOUS ONCE
Refills: 0 | Status: COMPLETED | OUTPATIENT
Start: 2021-05-28 | End: 2021-05-28

## 2021-05-28 RX ORDER — METRONIDAZOLE 500 MG
500 TABLET ORAL ONCE
Refills: 0 | Status: COMPLETED | OUTPATIENT
Start: 2021-05-28 | End: 2021-05-28

## 2021-05-28 RX ORDER — SODIUM CHLORIDE 9 MG/ML
2000 INJECTION, SOLUTION INTRAVENOUS ONCE
Refills: 0 | Status: COMPLETED | OUTPATIENT
Start: 2021-05-28 | End: 2021-05-28

## 2021-05-28 RX ADMIN — Medication 975 MILLIGRAM(S): at 20:49

## 2021-05-28 RX ADMIN — CEFEPIME 100 MILLIGRAM(S): 1 INJECTION, POWDER, FOR SOLUTION INTRAMUSCULAR; INTRAVENOUS at 20:49

## 2021-05-28 RX ADMIN — Medication 100 MILLIGRAM(S): at 19:06

## 2021-05-28 RX ADMIN — Medication 250 MILLIGRAM(S): at 22:09

## 2021-05-28 RX ADMIN — SODIUM CHLORIDE 2000 MILLILITER(S): 9 INJECTION, SOLUTION INTRAVENOUS at 19:08

## 2021-05-28 NOTE — ED ADULT NURSE NOTE - PSH
Diabetic Charcot foot  Fixation with external device  H/O skin graft    History of percutaneous angioplasty    Left toe amputee  4 TOES

## 2021-05-28 NOTE — ED PROVIDER NOTE - OBJECTIVE STATEMENT
61 yo M with PMHx of charcot ankle, DM, HLD, HTN MATA who presents with worsening R foot infection x3 wk associated with generalized weakness. Currently on 3rd week of bactrim with waxing/waning improvement but was seen by visiting nurse who advised pt to come to ED. No fever, chills, cp, sob, abd pain, nausea, vomiting, dysuria, calf pain. Has baseline neuropathy in both feet so does not feel any pain. 59 yo M with PMHx of charcot ankle, DM, HLD, HTN MATA who presents with worsening R foot infection x3 wk associated with generalized weakness. Currently on 3rd week of bactrim with waxing/waning improvement but was seen by visiting nurse who advised pt to come to ED. No fever, chills, cp, sob, abd pain, nausea, vomiting, dysuria, calf pain. Has baseline neuropathy in both feet so does not feel any pain.    Podiatry: Dr. Hudson 59 yo M with PMHx of charcot ankle, DM, HLD, HTN MATA who presents with worsening R foot infection x3 wk associated with generalized weakness. Infection has been waxing/waning despite being on 3rd week of bactrim so sent in by podiatry for IV abx and admission. No fever, chills, cp, sob, abd pain, nausea, vomiting, dysuria, calf pain. Has baseline neuropathy in both feet so does not feel any pain.    Podiatry: Dr. Hudson

## 2021-05-28 NOTE — ED ADULT NURSE NOTE - PMH
Charcot ankle, right    DM (diabetes mellitus)    High cholesterol    HTN (hypertension)    MATA on CPAP

## 2021-05-28 NOTE — ED PROVIDER NOTE - ATTENDING CONTRIBUTION TO CARE
Patient presents to ED for Rt foot wound x 3 wks, not healing with abx, getting worse. Patient with h/o sleep apnea, denies sob, denies cp/abd pain, denies n/v. Patient is c/o Rt foot wound only.   Vitals reviewed.   Lungs: CTA  abd: +BS, NT, ND, soft,   RLE exam: Rt toot has open wound with discharge, no crepitus, +cap refill.   CNS: awake, alert, o x 3, speaking in full sentences, appears comfortable and not in distress.   A/P: Rt foot wound,   labs, IVF, IV abx,   imaging,   surgery, vascular and podiatry consult,   reevaluation.

## 2021-05-28 NOTE — ED PROVIDER NOTE - PHYSICAL EXAMINATION
CONSTITUTIONAL: well developed, nontoxic appearing, in no acute distress, speaking in full sentences  SKIN: warm, dry, large area of ulceration to sole of R foot with tunneling and erythema and purulence, edema to RLE extending to mid calf, no calf tenderness, no crepitus, cap refill < 2-3 seconds  HEENT: normocephalic, atraumatic, no conjunctival erythema, moist mucous membranes, patent airway  NECK: supple  CV:  tachycardic rate  RESP: normal work of breathing  ABD: soft, nondistended, no rebound, no guarding  MSK: normal ROM, no cyanosis  NEURO: alert, oriented, grossly unremarkable  PSYCH: cooperative, appropriate

## 2021-05-28 NOTE — ED ADULT NURSE NOTE - NSIMPLEMENTINTERV_GEN_ALL_ED
Implemented All Fall Risk Interventions:  [place] to call system. Call bell, personal items and telephone within reach. Instruct patient to call for assistance. Room bathroom lighting operational. Non-slip footwear when patient is off stretcher. Physically safe environment: no spills, clutter or unnecessary equipment. Stretcher in lowest position, wheels locked, appropriate side rails in place. Provide visual cue, wrist band, yellow gown, etc. Monitor gait and stability. Monitor for mental status changes and reorient to person, place, and time. Review medications for side effects contributing to fall risk. Reinforce activity limits and safety measures with patient and family.

## 2021-05-28 NOTE — ED PROVIDER NOTE - NSFOLLOWUPINSTRUCTIONS_ED_ALL_ED_FT
Thoracic Aortic Aneurysm    WHAT YOU NEED TO KNOW:    What is a thoracic aortic aneurysm? A thoracic aortic aneurysm (TAA) is a bulge in your aorta that occurs when the aorta's walls are weakened. The aorta is a large blood vessel that extends from your heart down the center of your chest and into your abdomen.    What causes a TAA?   •Atherosclerosis: This is hardening of the arteries. Hardening occurs when fatty deposits, called plaque, build up along the walls of your arteries.  •Abnormal development: A part of your heart and aorta may not have developed normally.   •Inflammation: You may have inflammation in the walls of your aorta from an inflammatory disease or certain infections, such as syphilis.  •Loss of elasticity: As you age, blood vessels in the body may lose some of their elasticity. It may be worsened by long-term increased blood pressure. Certain disorders also can make the walls of your arteries lose elasticity.  •Trauma: Injury, particularly on the chest area, may lead to a partial or complete cut in the aorta.    What increases my risk of a TAA?   •Cigarette smoking  •High blood pressure  •A close family member has had a TAA  •Being male and older than 65 years of age  •Diabetes   •Being overweight    What are the signs and symptoms of a TAA? You may have no signs or symptoms. The following are common when signs and symptoms do occur:   •Chest, neck, back, or abdominal pain  •Cough or blood in sputum  •Hoarseness  •Trouble breathing, which may be affected by position  •Trouble swallowing  •Wheezing    How is a TAA diagnosed? You may need more than one of the following tests. You may be given contrast dye before some tests to help the pictures show up better. Tell the healthcare provider if you have ever had an allergic reaction to contrast dye.  •X-rays: These show your lungs, heart, aorta, and other parts around them. An x-ray of the aorta with dye is called an aortogram or aortography.     •CT scan: This test is also called a CAT scan. An x-ray machine uses a computer to take pictures of your chest and blood vessels. The pictures may show a TAA.     •MRI: This scan uses powerful magnets and a computer to take pictures of your chest and blood vessels. An MRI may show a TAA. Do not enter the MRI room with anything metal. Metal can cause serious injury. Tell the healthcare provider if you have any metal in or on your body.     •Transesophageal echocardiogram: ?A transesophageal echocardiogram (DUY) is a type of ultrasound that shows pictures of the size and shape of your heart. It also looks at how your heart moves when it is beating. These pictures are seen on a TV-like screen. You may need a DUY if your heart does not show up very well in a regular echocardiogram. You may also need a DUY to check for certain problems such as blood clots or infection inside the heart.         •You will be given medicine to relax you during a DUY. Healthcare providers put a tube in your mouth that is moved down into your esophagus (food pipe). The tube has a small ultrasound sensor on the end. Since your esophagus is right next to your heart, your healthcare provider can see your heart clearly.    How is a TAA treated?   •Medicines: You may be given blood pressure or cholesterol medicine to help stop your TAA from growing.    •Repair: Healthcare providers may cut out the aneurysm and replace the cut area with an artificial tube. If the aortic valve (flap) also has a problem, it may also be replaced.    •Stenting: A stent (tube) may be put in the portion of the aorta with aneurysm. The stent may strengthen your aorta and reduce pressure on the wall of your aorta.     What are the risks of a TAA? Surgery may damage to your spinal cord, or cause you to bleed or get an infection. Too much blood loss can cause your organs to fail, such as your kidneys. You may need to have surgery again. If untreated, TAA may cause more serious problems. The aneurysm may get larger, burst, and be life-threatening.     How can I manage my symptoms?   •Check your blood pressure as directed: Keep a record of your blood pressure and bring it with you to follow-up visits. Ask your healthcare provider what your blood pressure should be and how to check it. High blood pressure can make your aneurysm worse.    •Exercise: Ask your healthcare provider to help you create an exercise plan. This may help lower blood pressure.     •Eat a variety of healthy foods: Healthy foods include fruits, vegetables, whole-grain breads, low-fat dairy products, beans, lean meats, and fish. Ask if you need to be on a special diet.    •Do not smoke: If you smoke, it is never too late to quit. Smoking increases your risk for TAA and heart disease. Ask your healthcare provider for information if you need help quitting.    When should I contact my healthcare provider?   •You have a fever.   •You have new symptoms since your last appointment.  •Your symptoms prevent you from doing your daily activities.  •You have questions or concerns about your condition or care.    When should I seek immediate care or call 911?   •You have nausea and are vomiting.  •You have sudden chest, neck, back, or abdominal pain.   •Your skin becomes pale and sweaty, or you feel very weak.  •You are dizzy, or you faint.     CARE AGREEMENT:    You have the right to help plan your care. Learn about your health condition and how it may be treated. Discuss treatment options with your healthcare providers to decide what care you want to receive. You always have the right to refuse treatment.     © Copyright [employer] 2021

## 2021-05-28 NOTE — ED PROVIDER NOTE - CLINICAL SUMMARY MEDICAL DECISION MAKING FREE TEXT BOX
patient presented to ED for foot wound, patient remained hemodynamically stable, results of the labs, imaging findings reviewed and discussed with patient, Podiatry/surgery and vascular consults obtained and discussed with admitting physician and MAR, patient is admitted to Medicine for further evaluation and care.

## 2021-05-28 NOTE — ED PROVIDER NOTE - NS ED ROS FT
GEN:  no fever, no chills, + generalized weakness  NEURO:  no headache, no dizziness  ENT: no sore throat, no runny nose  CV:  no chest pain, no palpitations  RESP:  no sob, no cough  GI:  no nausea, no vomiting, no abdominal pain, no diarrhea  :  no dysuria, no urinary frequency, no hematuria  MSK:  no joint pain, + edema  SKIN:  + infection  HEME: no hematochezia, no melena

## 2021-05-28 NOTE — ED PROVIDER NOTE - CARE PLAN
Principal Discharge DX:	Diabetic infection of right foot  Secondary Diagnosis:	CANDY (acute kidney injury)   Principal Discharge DX:	Diabetic infection of right foot  Secondary Diagnosis:	CANDY (acute kidney injury)  Secondary Diagnosis:	Necrotizing fasciitis

## 2021-05-28 NOTE — ED PROCEDURE NOTE - ATTENDING CONTRIBUTION TO CARE
I personally supervised the study. I reviewed the images and interpretation by the resident/ACP and have edited where appropriate  Patient permission obtained, bedside US done, no Rt heart strain noted. Patient tolerated procedure well and no complications noted.

## 2021-05-29 LAB
-  COAGULASE NEGATIVE STAPHYLOCOCCUS: SIGNIFICANT CHANGE UP
ALBUMIN SERPL ELPH-MCNC: 2.7 G/DL — LOW (ref 3.5–5.2)
ALP SERPL-CCNC: 96 U/L — SIGNIFICANT CHANGE UP (ref 30–115)
ALT FLD-CCNC: 17 U/L — SIGNIFICANT CHANGE UP (ref 0–41)
ANION GAP SERPL CALC-SCNC: 13 MMOL/L — SIGNIFICANT CHANGE UP (ref 7–14)
APPEARANCE UR: CLEAR — SIGNIFICANT CHANGE UP
AST SERPL-CCNC: 16 U/L — SIGNIFICANT CHANGE UP (ref 0–41)
BACTERIA # UR AUTO: NEGATIVE — SIGNIFICANT CHANGE UP
BASOPHILS # BLD AUTO: 0.02 K/UL — SIGNIFICANT CHANGE UP (ref 0–0.2)
BASOPHILS NFR BLD AUTO: 0.2 % — SIGNIFICANT CHANGE UP (ref 0–1)
BILIRUB SERPL-MCNC: <0.2 MG/DL — SIGNIFICANT CHANGE UP (ref 0.2–1.2)
BILIRUB UR-MCNC: NEGATIVE — SIGNIFICANT CHANGE UP
BUN SERPL-MCNC: 48 MG/DL — HIGH (ref 10–20)
CALCIUM SERPL-MCNC: 8 MG/DL — LOW (ref 8.5–10.1)
CHLORIDE SERPL-SCNC: 84 MMOL/L — LOW (ref 98–110)
CO2 SERPL-SCNC: 26 MMOL/L — SIGNIFICANT CHANGE UP (ref 17–32)
COLOR SPEC: SIGNIFICANT CHANGE UP
CREAT SERPL-MCNC: 3 MG/DL — HIGH (ref 0.7–1.5)
CRP SERPL-MCNC: 390 MG/L — HIGH
DIFF PNL FLD: ABNORMAL
E FAECALIS DNA BLD POS QL NAA+NON-PROBE: SIGNIFICANT CHANGE UP
EOSINOPHIL # BLD AUTO: 0.18 K/UL — SIGNIFICANT CHANGE UP (ref 0–0.7)
EOSINOPHIL NFR BLD AUTO: 1.6 % — SIGNIFICANT CHANGE UP (ref 0–8)
EPI CELLS # UR: 0 /HPF — SIGNIFICANT CHANGE UP (ref 0–5)
GLUCOSE BLDC GLUCOMTR-MCNC: 190 MG/DL — HIGH (ref 70–99)
GLUCOSE BLDC GLUCOMTR-MCNC: 294 MG/DL — HIGH (ref 70–99)
GLUCOSE BLDC GLUCOMTR-MCNC: 297 MG/DL — HIGH (ref 70–99)
GLUCOSE BLDC GLUCOMTR-MCNC: 374 MG/DL — HIGH (ref 70–99)
GLUCOSE SERPL-MCNC: 660 MG/DL — CRITICAL HIGH (ref 70–99)
GLUCOSE UR QL: ABNORMAL
GRAM STN FLD: SIGNIFICANT CHANGE UP
HCT VFR BLD CALC: 26.9 % — LOW (ref 42–52)
HGB BLD-MCNC: 7.7 G/DL — LOW (ref 14–18)
HYALINE CASTS # UR AUTO: 3 /LPF — SIGNIFICANT CHANGE UP (ref 0–7)
IMM GRANULOCYTES NFR BLD AUTO: 0.6 % — HIGH (ref 0.1–0.3)
KETONES UR-MCNC: NEGATIVE — SIGNIFICANT CHANGE UP
LACTATE SERPL-SCNC: 1.4 MMOL/L — SIGNIFICANT CHANGE UP (ref 0.7–2)
LEUKOCYTE ESTERASE UR-ACNC: NEGATIVE — SIGNIFICANT CHANGE UP
LYMPHOCYTES # BLD AUTO: 1.06 K/UL — LOW (ref 1.2–3.4)
LYMPHOCYTES # BLD AUTO: 9.6 % — LOW (ref 20.5–51.1)
MAGNESIUM SERPL-MCNC: 2.9 MG/DL — HIGH (ref 1.8–2.4)
MCHC RBC-ENTMCNC: 25.5 PG — LOW (ref 27–31)
MCHC RBC-ENTMCNC: 28.6 G/DL — LOW (ref 32–37)
MCV RBC AUTO: 89.1 FL — SIGNIFICANT CHANGE UP (ref 80–94)
METHOD TYPE: SIGNIFICANT CHANGE UP
MONOCYTES # BLD AUTO: 1.1 K/UL — HIGH (ref 0.1–0.6)
MONOCYTES NFR BLD AUTO: 10 % — HIGH (ref 1.7–9.3)
NEUTROPHILS # BLD AUTO: 8.56 K/UL — HIGH (ref 1.4–6.5)
NEUTROPHILS NFR BLD AUTO: 78 % — HIGH (ref 42.2–75.2)
NIGHT BLUE STAIN TISS: SIGNIFICANT CHANGE UP
NIGHT BLUE STAIN TISS: SIGNIFICANT CHANGE UP
NITRITE UR-MCNC: NEGATIVE — SIGNIFICANT CHANGE UP
NRBC # BLD: 0 /100 WBCS — SIGNIFICANT CHANGE UP (ref 0–0)
OSMOLALITY SERPL: 312 MOS/KG — HIGH (ref 280–301)
PH UR: 6 — SIGNIFICANT CHANGE UP (ref 5–8)
PHOSPHATE SERPL-MCNC: 5.2 MG/DL — HIGH (ref 2.1–4.9)
PLATELET # BLD AUTO: 301 K/UL — SIGNIFICANT CHANGE UP (ref 130–400)
POTASSIUM SERPL-MCNC: 4.1 MMOL/L — SIGNIFICANT CHANGE UP (ref 3.5–5)
POTASSIUM SERPL-SCNC: 4.1 MMOL/L — SIGNIFICANT CHANGE UP (ref 3.5–5)
PROT SERPL-MCNC: 8.1 G/DL — HIGH (ref 6–8)
PROT UR-MCNC: SIGNIFICANT CHANGE UP
RBC # BLD: 3.02 M/UL — LOW (ref 4.7–6.1)
RBC # FLD: 17.7 % — HIGH (ref 11.5–14.5)
RBC CASTS # UR COMP ASSIST: 3 /HPF — SIGNIFICANT CHANGE UP (ref 0–4)
SODIUM SERPL-SCNC: 123 MMOL/L — LOW (ref 135–146)
SP GR SPEC: 1.03 — SIGNIFICANT CHANGE UP (ref 1.01–1.03)
SPECIMEN SOURCE: SIGNIFICANT CHANGE UP
UROBILINOGEN FLD QL: SIGNIFICANT CHANGE UP
WBC # BLD: 10.99 K/UL — HIGH (ref 4.8–10.8)
WBC # FLD AUTO: 10.99 K/UL — HIGH (ref 4.8–10.8)
WBC UR QL: 1 /HPF — SIGNIFICANT CHANGE UP (ref 0–5)

## 2021-05-29 PROCEDURE — 99223 1ST HOSP IP/OBS HIGH 75: CPT

## 2021-05-29 PROCEDURE — 99221 1ST HOSP IP/OBS SF/LOW 40: CPT

## 2021-05-29 PROCEDURE — 73630 X-RAY EXAM OF FOOT: CPT | Mod: 26,RT

## 2021-05-29 PROCEDURE — 75635 CT ANGIO ABDOMINAL ARTERIES: CPT | Mod: 26,MA

## 2021-05-29 PROCEDURE — 71275 CT ANGIOGRAPHY CHEST: CPT | Mod: 26,MA

## 2021-05-29 RX ORDER — HEPARIN SODIUM 5000 [USP'U]/ML
5000 INJECTION INTRAVENOUS; SUBCUTANEOUS EVERY 8 HOURS
Refills: 0 | Status: DISCONTINUED | OUTPATIENT
Start: 2021-05-29 | End: 2021-06-01

## 2021-05-29 RX ORDER — LINEZOLID 600 MG/300ML
600 INJECTION, SOLUTION INTRAVENOUS ONCE
Refills: 0 | Status: COMPLETED | OUTPATIENT
Start: 2021-05-29 | End: 2021-05-29

## 2021-05-29 RX ORDER — DEXTROSE 50 % IN WATER 50 %
15 SYRINGE (ML) INTRAVENOUS ONCE
Refills: 0 | Status: DISCONTINUED | OUTPATIENT
Start: 2021-05-29 | End: 2021-05-29

## 2021-05-29 RX ORDER — DEXTROSE 50 % IN WATER 50 %
25 SYRINGE (ML) INTRAVENOUS ONCE
Refills: 0 | Status: DISCONTINUED | OUTPATIENT
Start: 2021-05-29 | End: 2021-06-11

## 2021-05-29 RX ORDER — ASPIRIN/CALCIUM CARB/MAGNESIUM 324 MG
81 TABLET ORAL DAILY
Refills: 0 | Status: DISCONTINUED | OUTPATIENT
Start: 2021-05-29 | End: 2021-05-29

## 2021-05-29 RX ORDER — INSULIN LISPRO 100/ML
VIAL (ML) SUBCUTANEOUS
Refills: 0 | Status: DISCONTINUED | OUTPATIENT
Start: 2021-05-29 | End: 2021-05-29

## 2021-05-29 RX ORDER — LINEZOLID 600 MG/300ML
600 INJECTION, SOLUTION INTRAVENOUS EVERY 12 HOURS
Refills: 0 | Status: DISCONTINUED | OUTPATIENT
Start: 2021-05-29 | End: 2021-05-29

## 2021-05-29 RX ORDER — INSULIN LISPRO 100/ML
11 VIAL (ML) SUBCUTANEOUS
Refills: 0 | Status: DISCONTINUED | OUTPATIENT
Start: 2021-05-29 | End: 2021-06-01

## 2021-05-29 RX ORDER — AMPICILLIN SODIUM AND SULBACTAM SODIUM 250; 125 MG/ML; MG/ML
INJECTION, POWDER, FOR SUSPENSION INTRAMUSCULAR; INTRAVENOUS
Refills: 0 | Status: DISCONTINUED | OUTPATIENT
Start: 2021-05-29 | End: 2021-05-29

## 2021-05-29 RX ORDER — AMPICILLIN SODIUM AND SULBACTAM SODIUM 250; 125 MG/ML; MG/ML
3 INJECTION, POWDER, FOR SUSPENSION INTRAMUSCULAR; INTRAVENOUS ONCE
Refills: 0 | Status: COMPLETED | OUTPATIENT
Start: 2021-05-29 | End: 2021-05-29

## 2021-05-29 RX ORDER — SODIUM CHLORIDE 9 MG/ML
1000 INJECTION, SOLUTION INTRAVENOUS
Refills: 0 | Status: DISCONTINUED | OUTPATIENT
Start: 2021-05-29 | End: 2021-05-29

## 2021-05-29 RX ORDER — ACETAMINOPHEN 500 MG
1000 TABLET ORAL ONCE
Refills: 0 | Status: COMPLETED | OUTPATIENT
Start: 2021-05-29 | End: 2021-05-29

## 2021-05-29 RX ORDER — CHLORHEXIDINE GLUCONATE 213 G/1000ML
1 SOLUTION TOPICAL
Refills: 0 | Status: DISCONTINUED | OUTPATIENT
Start: 2021-05-29 | End: 2021-05-29

## 2021-05-29 RX ORDER — INSULIN LISPRO 100/ML
6 VIAL (ML) SUBCUTANEOUS ONCE
Refills: 0 | Status: COMPLETED | OUTPATIENT
Start: 2021-05-29 | End: 2021-05-29

## 2021-05-29 RX ORDER — INSULIN GLARGINE 100 [IU]/ML
33 INJECTION, SOLUTION SUBCUTANEOUS AT BEDTIME
Refills: 0 | Status: DISCONTINUED | OUTPATIENT
Start: 2021-05-29 | End: 2021-05-29

## 2021-05-29 RX ORDER — DEXTROSE 50 % IN WATER 50 %
25 SYRINGE (ML) INTRAVENOUS ONCE
Refills: 0 | Status: DISCONTINUED | OUTPATIENT
Start: 2021-05-29 | End: 2021-05-29

## 2021-05-29 RX ORDER — DEXTROSE 50 % IN WATER 50 %
12.5 SYRINGE (ML) INTRAVENOUS ONCE
Refills: 0 | Status: DISCONTINUED | OUTPATIENT
Start: 2021-05-29 | End: 2021-05-29

## 2021-05-29 RX ORDER — LINEZOLID 600 MG/300ML
INJECTION, SOLUTION INTRAVENOUS
Refills: 0 | Status: DISCONTINUED | OUTPATIENT
Start: 2021-05-29 | End: 2021-05-29

## 2021-05-29 RX ORDER — AMLODIPINE BESYLATE 2.5 MG/1
5 TABLET ORAL DAILY
Refills: 0 | Status: DISCONTINUED | OUTPATIENT
Start: 2021-05-29 | End: 2021-05-29

## 2021-05-29 RX ORDER — ONDANSETRON 8 MG/1
4 TABLET, FILM COATED ORAL ONCE
Refills: 0 | Status: DISCONTINUED | OUTPATIENT
Start: 2021-05-29 | End: 2021-05-29

## 2021-05-29 RX ORDER — INSULIN LISPRO 100/ML
11 VIAL (ML) SUBCUTANEOUS
Refills: 0 | Status: DISCONTINUED | OUTPATIENT
Start: 2021-05-29 | End: 2021-05-29

## 2021-05-29 RX ORDER — ASPIRIN/CALCIUM CARB/MAGNESIUM 324 MG
81 TABLET ORAL DAILY
Refills: 0 | Status: DISCONTINUED | OUTPATIENT
Start: 2021-05-29 | End: 2021-06-11

## 2021-05-29 RX ORDER — PANTOPRAZOLE SODIUM 20 MG/1
40 TABLET, DELAYED RELEASE ORAL
Refills: 0 | Status: DISCONTINUED | OUTPATIENT
Start: 2021-05-29 | End: 2021-06-11

## 2021-05-29 RX ORDER — HEPARIN SODIUM 5000 [USP'U]/ML
5000 INJECTION INTRAVENOUS; SUBCUTANEOUS EVERY 8 HOURS
Refills: 0 | Status: DISCONTINUED | OUTPATIENT
Start: 2021-05-29 | End: 2021-05-29

## 2021-05-29 RX ORDER — AMPICILLIN SODIUM AND SULBACTAM SODIUM 250; 125 MG/ML; MG/ML
3 INJECTION, POWDER, FOR SUSPENSION INTRAMUSCULAR; INTRAVENOUS EVERY 6 HOURS
Refills: 0 | Status: DISCONTINUED | OUTPATIENT
Start: 2021-05-29 | End: 2021-05-29

## 2021-05-29 RX ORDER — METRONIDAZOLE 500 MG
500 TABLET ORAL EVERY 8 HOURS
Refills: 0 | Status: DISCONTINUED | OUTPATIENT
Start: 2021-05-29 | End: 2021-06-09

## 2021-05-29 RX ORDER — CILOSTAZOL 100 MG/1
50 TABLET ORAL
Refills: 0 | Status: DISCONTINUED | OUTPATIENT
Start: 2021-05-29 | End: 2021-05-29

## 2021-05-29 RX ORDER — LINEZOLID 600 MG/300ML
600 INJECTION, SOLUTION INTRAVENOUS EVERY 12 HOURS
Refills: 0 | Status: DISCONTINUED | OUTPATIENT
Start: 2021-05-29 | End: 2021-06-07

## 2021-05-29 RX ORDER — ATORVASTATIN CALCIUM 80 MG/1
40 TABLET, FILM COATED ORAL AT BEDTIME
Refills: 0 | Status: DISCONTINUED | OUTPATIENT
Start: 2021-05-29 | End: 2021-05-29

## 2021-05-29 RX ORDER — ACETAMINOPHEN 500 MG
650 TABLET ORAL EVERY 6 HOURS
Refills: 0 | Status: DISCONTINUED | OUTPATIENT
Start: 2021-05-29 | End: 2021-05-29

## 2021-05-29 RX ORDER — CHLORHEXIDINE GLUCONATE 213 G/1000ML
1 SOLUTION TOPICAL
Refills: 0 | Status: DISCONTINUED | OUTPATIENT
Start: 2021-05-29 | End: 2021-06-11

## 2021-05-29 RX ORDER — CILOSTAZOL 100 MG/1
50 TABLET ORAL
Refills: 0 | Status: DISCONTINUED | OUTPATIENT
Start: 2021-05-29 | End: 2021-06-01

## 2021-05-29 RX ORDER — INSULIN LISPRO 100/ML
VIAL (ML) SUBCUTANEOUS
Refills: 0 | Status: DISCONTINUED | OUTPATIENT
Start: 2021-05-29 | End: 2021-06-09

## 2021-05-29 RX ORDER — ONDANSETRON 8 MG/1
4 TABLET, FILM COATED ORAL EVERY 8 HOURS
Refills: 0 | Status: DISCONTINUED | OUTPATIENT
Start: 2021-05-29 | End: 2021-06-02

## 2021-05-29 RX ORDER — GLUCAGON INJECTION, SOLUTION 0.5 MG/.1ML
1 INJECTION, SOLUTION SUBCUTANEOUS ONCE
Refills: 0 | Status: DISCONTINUED | OUTPATIENT
Start: 2021-05-29 | End: 2021-06-11

## 2021-05-29 RX ORDER — CEFEPIME 1 G/1
2000 INJECTION, POWDER, FOR SOLUTION INTRAMUSCULAR; INTRAVENOUS EVERY 24 HOURS
Refills: 0 | Status: DISCONTINUED | OUTPATIENT
Start: 2021-05-29 | End: 2021-06-01

## 2021-05-29 RX ORDER — AMLODIPINE BESYLATE 2.5 MG/1
5 TABLET ORAL DAILY
Refills: 0 | Status: DISCONTINUED | OUTPATIENT
Start: 2021-05-29 | End: 2021-05-31

## 2021-05-29 RX ORDER — DEXTROSE 50 % IN WATER 50 %
15 SYRINGE (ML) INTRAVENOUS ONCE
Refills: 0 | Status: DISCONTINUED | OUTPATIENT
Start: 2021-05-29 | End: 2021-06-11

## 2021-05-29 RX ORDER — INSULIN GLARGINE 100 [IU]/ML
33 INJECTION, SOLUTION SUBCUTANEOUS AT BEDTIME
Refills: 0 | Status: DISCONTINUED | OUTPATIENT
Start: 2021-05-29 | End: 2021-05-31

## 2021-05-29 RX ORDER — METOCLOPRAMIDE HCL 10 MG
5 TABLET ORAL ONCE
Refills: 0 | Status: COMPLETED | OUTPATIENT
Start: 2021-05-29 | End: 2021-05-29

## 2021-05-29 RX ORDER — GLUCAGON INJECTION, SOLUTION 0.5 MG/.1ML
1 INJECTION, SOLUTION SUBCUTANEOUS ONCE
Refills: 0 | Status: DISCONTINUED | OUTPATIENT
Start: 2021-05-29 | End: 2021-05-29

## 2021-05-29 RX ORDER — DEXTROSE 50 % IN WATER 50 %
12.5 SYRINGE (ML) INTRAVENOUS ONCE
Refills: 0 | Status: DISCONTINUED | OUTPATIENT
Start: 2021-05-29 | End: 2021-06-11

## 2021-05-29 RX ADMIN — ONDANSETRON 4 MILLIGRAM(S): 8 TABLET, FILM COATED ORAL at 18:55

## 2021-05-29 RX ADMIN — Medication 11 UNIT(S): at 16:34

## 2021-05-29 RX ADMIN — Medication 400 MILLIGRAM(S): at 07:00

## 2021-05-29 RX ADMIN — Medication 30 MILLILITER(S): at 21:05

## 2021-05-29 RX ADMIN — AMLODIPINE BESYLATE 5 MILLIGRAM(S): 2.5 TABLET ORAL at 11:51

## 2021-05-29 RX ADMIN — Medication 6 UNIT(S): at 19:59

## 2021-05-29 RX ADMIN — AMPICILLIN SODIUM AND SULBACTAM SODIUM 200 GRAM(S): 250; 125 INJECTION, POWDER, FOR SUSPENSION INTRAMUSCULAR; INTRAVENOUS at 07:00

## 2021-05-29 RX ADMIN — Medication 11 UNIT(S): at 11:49

## 2021-05-29 RX ADMIN — CEFEPIME 100 MILLIGRAM(S): 1 INJECTION, POWDER, FOR SOLUTION INTRAMUSCULAR; INTRAVENOUS at 16:36

## 2021-05-29 RX ADMIN — Medication 1000 MILLIGRAM(S): at 08:12

## 2021-05-29 RX ADMIN — HEPARIN SODIUM 5000 UNIT(S): 5000 INJECTION INTRAVENOUS; SUBCUTANEOUS at 21:04

## 2021-05-29 RX ADMIN — SODIUM CHLORIDE 100 MILLILITER(S): 9 INJECTION, SOLUTION INTRAVENOUS at 03:00

## 2021-05-29 RX ADMIN — CILOSTAZOL 50 MILLIGRAM(S): 100 TABLET ORAL at 16:36

## 2021-05-29 RX ADMIN — Medication 6: at 16:33

## 2021-05-29 RX ADMIN — LINEZOLID 300 MILLIGRAM(S): 600 INJECTION, SOLUTION INTRAVENOUS at 03:39

## 2021-05-29 RX ADMIN — LINEZOLID 300 MILLIGRAM(S): 600 INJECTION, SOLUTION INTRAVENOUS at 07:00

## 2021-05-29 RX ADMIN — HEPARIN SODIUM 5000 UNIT(S): 5000 INJECTION INTRAVENOUS; SUBCUTANEOUS at 07:00

## 2021-05-29 RX ADMIN — Medication 10: at 07:28

## 2021-05-29 RX ADMIN — AMPICILLIN SODIUM AND SULBACTAM SODIUM 200 GRAM(S): 250; 125 INJECTION, POWDER, FOR SUSPENSION INTRAMUSCULAR; INTRAVENOUS at 04:00

## 2021-05-29 RX ADMIN — LINEZOLID 300 MILLIGRAM(S): 600 INJECTION, SOLUTION INTRAVENOUS at 16:35

## 2021-05-29 RX ADMIN — CILOSTAZOL 50 MILLIGRAM(S): 100 TABLET ORAL at 11:50

## 2021-05-29 RX ADMIN — Medication 100 MILLIGRAM(S): at 21:04

## 2021-05-29 RX ADMIN — HEPARIN SODIUM 5000 UNIT(S): 5000 INJECTION INTRAVENOUS; SUBCUTANEOUS at 15:33

## 2021-05-29 RX ADMIN — Medication 6: at 11:48

## 2021-05-29 RX ADMIN — Medication 81 MILLIGRAM(S): at 11:51

## 2021-05-29 RX ADMIN — Medication 100 MILLIGRAM(S): at 15:44

## 2021-05-29 RX ADMIN — Medication 5 MILLIGRAM(S): at 22:02

## 2021-05-29 RX ADMIN — INSULIN GLARGINE 33 UNIT(S): 100 INJECTION, SOLUTION SUBCUTANEOUS at 21:14

## 2021-05-29 NOTE — H&P ADULT - ATTENDING COMMENTS
HPI:  Patient is a 59 y/o M with PMH of DM, hypertension, dyslipidemia, sleep apnea, Charcot foot reconstruction with external fixation, super morbid obesity present for worsening right foot infection.  Pt reports he has had this foot ulcer at various stage of healing for 13 years. Patient has been admitted for IV antibiotics multiple times in the past for osteomyelitis of right foot. He has history of MRSA infection in foot. pt has baseline neuropathy in both feet so does not feel any pain.  Patient states his Infection has been gradually getting worse despite treatment with bactrim, he saw his podiatrists and he was sent in by podiatry for IV abx . No fever, chills, cp, sob, abd pain, nausea, vomiting, dysuria, calf pain.     In ED  T(C): 37.3 (05-29-21 @ 01:31), Max: 38 (05-28-21 @ 20:27)  HR: 100 (05-29-21 @ 01:23) (99 - 118)  BP: 119/59 (05-29-21 @ 01:23) (119/59 - 139/69)  RR: 18 (05-29-21 @ 01:23) (17 - 18)  SpO2: 96% (05-29-21 @ 01:23) (86% - 100%)  there is a Right foot ulcer on the lateral aspect of the plantar surface measuring 8.5-3cm. Ulcer is surrounded by macerated tissue. Serous drainage from wound. Kurlex and sponge dressing in place, clean, dry and intact. Patient was I&D'd bedside by podiatry in the ed. purulent drainage was evacuated and sent to the lab for culture. Patient is scheduled for OR with podiatry in the AM.   (29 May 2021 02:29)    REVIEW OF SYSTEMS:    CONSTITUTIONAL: No weakness, fevers or chills  EYES/ENT: No visual changes;  No vertigo or throat pain   NECK: No pain or stiffness  RESPIRATORY: No cough, wheezing, hemoptysis; No shortness of breath  CARDIOVASCULAR: No chest pain or palpitations  GASTROINTESTINAL: No abdominal or epigastric pain. No nausea, vomiting, or hematemesis; No diarrhea or constipation. No melena or hematochezia.  GENITOURINARY: No dysuria, frequency or hematuria  NEUROLOGICAL: No numbness or weakness  SKIN: No itching, rashes    Physical Exam:  General: WN/WD NAD  Neurology: A&Ox3, nonfocal, follows commands  Eyes: PERRLA/ EOMI  ENT/Neck: Neck supple, trachea midline, No JVD  Respiratory: CTA B/L, No wheezing, rales, rhonchi  CV: Normal rate regular rhythm, S1S2, no murmurs, rubs or gallops  Abdominal: Soft, NT, ND +BS,   Extremities: No edema, + peripheral pulses  Skin: No Rashes, Hematoma, Ecchymosis  Incisions:   Tubes: HPI:  Patient is a 61 y/o M with PMH of DM, hypertension, dyslipidemia, sleep apnea, Charcot foot reconstruction with external fixation, super morbid obesity present for worsening right foot infection.  Pt reports he has had this foot ulcer at various stage of healing for 13 years. Patient has been admitted for IV antibiotics multiple times in the past for osteomyelitis of right foot. He has history of MRSA infection in foot. pt has baseline neuropathy in both feet so does not feel any pain.  Patient states his Infection has been gradually getting worse despite treatment with bactrim, he saw his podiatrists and he was sent in by podiatry for IV abx . No fever, chills, cp, sob, abd pain, nausea, vomiting, dysuria, calf pain.     In ED  T(C): 37.3 (05-29-21 @ 01:31), Max: 38 (05-28-21 @ 20:27)  HR: 100 (05-29-21 @ 01:23) (99 - 118)  BP: 119/59 (05-29-21 @ 01:23) (119/59 - 139/69)  RR: 18 (05-29-21 @ 01:23) (17 - 18)  SpO2: 96% (05-29-21 @ 01:23) (86% - 100%)  there is a Right foot ulcer on the lateral aspect of the plantar surface measuring 8.5-3cm. Ulcer is surrounded by macerated tissue. Serous drainage from wound. Kurlex and sponge dressing in place, clean, dry and intact. Patient was I&D'd bedside by podiatry in the ed. purulent drainage was evacuated and sent to the lab for culture. Patient is scheduled for OR with podiatry in the AM.   (29 May 2021 02:29)    REVIEW OF SYSTEMS: see cc/HPI   CONSTITUTIONAL: No weakness, fevers or chills  EYES/ENT: No visual changes;  No vertigo or throat pain   NECK: No pain or stiffness  RESPIRATORY: No cough, wheezing, hemoptysis; No shortness of breath, (+) MATA  CARDIOVASCULAR: No chest pain or palpitations  GASTROINTESTINAL: No abdominal or epigastric pain. No nausea, vomiting, or hematemesis; No diarrhea or constipation. No melena or hematochezia.  GENITOURINARY: No dysuria, frequency or hematuria  NEUROLOGICAL: No numbness or weakness  SKIN: No itching, rashes, ope wound on R foot-lateral area    Physical Exam:  General: WN/WD NAD, Morbid obesity  Neurology: A&Ox3, nonfocal, follows commands  Eyes: PERRLA/ EOMI  ENT/Neck: Neck supple, trachea midline, No JVD  Respiratory: CTA B/L, No wheezing, rales, rhonchi  CV: Normal rate regular rhythm, S1S2, no murmurs, rubs or gallops  Abdominal: Soft, NT, ND +BS,   Extremities: No edema, (+) R foot open wound/ infected   Skin: No Rashes, Hematoma, Ecchymosis, chronic venous stasis   Incisions: n/a  Tubes: n/a    A/p  DFU   H/o MRSA   -IV abx   -will need OR debridement   -patient is moderate risk for bandar-op complication while undergoing debridement     CANDY on CKD III  HTN   -IV fluids and repeat Scr  -hold Bactrim    DM type II / Dyslipidemia   -F/S monitoring and Lispro Sliding scale PRN while NPO   -statin     Hyponatremia   -hydration and serial BMP    MATA / Morbid obesity   -CPAP and monitoring of resp status post op  -ABG post-op     Anemia - chronic disease vs. iron def   -iron studies     DVT prophylaxis

## 2021-05-29 NOTE — CONSULT NOTE ADULT - SUBJECTIVE AND OBJECTIVE BOX
GENERAL SURGERY CONSULT NOTE    Patient: FERNANDEZ GUZMAN , 60y (56)Male   MRN: [id number]  Location: [facility] 003 A  Visit: 21 Inpatient  Date: 21 @ 02:31    HPI: 61 yo M with PMHx of charcot ankle, DM, HLD, HTN MATA who presents with worsening R foot infection x3 wk associated with generalized weakness. Infection has been waxing/waning despite being on 3rd week of bactrim so sent in by podiatry for IV abx and admission. No fever, chills, cp, sob, abd pain, nausea, vomiting, dysuria, calf pain. Has baseline neuropathy in both feet so does not feel any pain. Pt reports he has had this foot ulcer at various stage of healing for 13 years. He reports walking with a boot. He reports non compliance with CPAP.     PAST MEDICAL & SURGICAL HISTORY:  MATA on CPAP    DM (diabetes mellitus)    HTN (hypertension)    High cholesterol    Charcot ankle, right    History of percutaneous angioplasty    H/O skin graft    Left toe amputee  4 TOES    Diabetic Charcot foot  Fixation with external device    Home Medications:  acetaminophen 325 mg oral tablet: 2 tab(s) orally every 6 hours, As needed, Moderate Pain (4 - 6) (2019 17:16)  amLODIPine 5 mg oral tablet: 1 tab(s) orally once a day (2019 14:31)  aspirin 81 mg oral tablet, chewable: 1 tab(s) orally once a day (2019 14:31)  ezetimibe 10 mg oral tablet: 1 tab(s) orally once a day (2019 14:31)  glimepiride 4 mg oral tablet: 2 tab(s) orally once a day (2019 14:31)  hydroCHLOROthiazide 25 mg oral tablet: 1 tab(s) orally once a day (at bedtime) (2019 14:31)  Invokana 300 mg oral tablet: 1 tab(s) orally once a day (2019 14:31)  Lantus Solostar Pen 100 units/mL subcutaneous solution: subcutaneous once a day (at bedtime)-  18 UNITS (2019 14:31)  linezolid 2 mg/mL-D5% intravenous solution: 600 milligram(s) intravenous every 12 hours STOP 20. (2019 17:16)  losartan 50 mg oral tablet: 1 tab(s) orally once a day (2019 14:31)  Pletal 100 mg oral tablet: 1 tab(s) orally once a day (2019 14:31)    VITALS:  T(F): 99.1 (21 @ 01:31), Max: 100.4 (21 @ 20:27)  HR: 100 (21 @ 01:23) (99 - 118)  BP: 119/59 (21 @ 01:23) (119/59 - 139/69)  RR: 18 (21 @ 01:23) (17 - 18)  SpO2: 96% (21 @ 01:23) (86% - 100%)    PHYSICAL EXAM:  General: NAD, AAOx3, calm and cooperative  HEENT: NCAT, REGINA, EOMI, Trachea ML, Neck supple  Cardiac: RRR   Respiratory: Normal respiratory effort, on nasal canula to 5  Neuro: Sensation grossly intact and equal throughout, no focal deficits  Vascular: Dopplerable Pulses on dorsal aspect of Right foot. LRE and LLE perfused with 3-4 second cap refill  Skin: Warm/dry, edematous, no jaundice, serous drainage from wound.  Incision/wound: Right foot ulcer on the lateral aspect of the plantar surface measuring 8.5-3cm. Ulcer is surrounded by macerated tissue. Serous drainage from wound. Kurlex and sponge dressing in place, clean, dry and intact    MEDICATIONS  (STANDING):  lactated ringers. 1000 milliLiter(s) (100 mL/Hr) IV Continuous <Continuous>  linezolid  IVPB      linezolid  IVPB 600 milliGRAM(s) IV Intermittent once  linezolid  IVPB 600 milliGRAM(s) IV Intermittent every 12 hours    MEDICATIONS  (PRN):      LAB/STUDIES:                        9.5    12.54 )-----------( 341      ( 28 May 2021 18:28 )             31.3         133<L>  |  94<L>  |  52<H>  ----------------------------<  397<H>  4.6   |  27  |  3.0<H>    Ca    8.4<L>      28 May 2021 18:28    TPro  9.0<H>  /  Alb  2.9<L>  /  TBili  0.2  /  DBili  x   /  AST  20  /  ALT  22  /  AlkPhos  121<H>  05-28    PT/INR - ( 28 May 2021 18:28 )   PT: 15.00 sec;   INR: 1.30 ratio         PTT - ( 28 May 2021 18:28 )  PTT:33.7 sec  LIVER FUNCTIONS - ( 28 May 2021 18:28 )  Alb: 2.9 g/dL / Pro: 9.0 g/dL / ALK PHOS: 121 U/L / ALT: 22 U/L / AST: 20 U/L / GGT: x           Urinalysis Basic - ( 29 May 2021 01:20 )    Color: Light Yellow / Appearance: Clear / S.028 / pH: x  Gluc: x / Ketone: Negative  / Bili: Negative / Urobili: <2 mg/dL   Blood: x / Protein: Trace / Nitrite: Negative   Leuk Esterase: Negative / RBC: 3 /HPF / WBC 1 /HPF   Sq Epi: x / Non Sq Epi: 0 /HPF / Bacteria: Negative      IMAGING:  Not read     VASCULAR SURGERY CONSULT NOTE    Patient: FERNANDEZ GUZMAN , 60y (56)Male   MRN: [id number]  Location: [facility] 003 A  Visit: 21 Inpatient  Date: 21 @ 02:31    HPI: 59 yo M with PMHx of charcot ankle, DM, HLD, HTN MATA who presents with worsening R foot infection x3 wk associated with generalized weakness. Infection has been waxing/waning despite being on 3rd week of bactrim so sent in by podiatry for IV abx and admission. No fever, chills, cp, sob, abd pain, nausea, vomiting, dysuria, calf pain. Has baseline neuropathy in both feet so does not feel any pain. Pt reports he has had this foot ulcer at various stage of healing for 13 years. He reports walking with a boot. He reports non compliance with CPAP.     PAST MEDICAL & SURGICAL HISTORY:  MATA on CPAP    DM (diabetes mellitus)    HTN (hypertension)    High cholesterol    Charcot ankle, right    History of percutaneous angioplasty    H/O skin graft    Left toe amputee  4 TOES    Diabetic Charcot foot  Fixation with external device    Home Medications:  acetaminophen 325 mg oral tablet: 2 tab(s) orally every 6 hours, As needed, Moderate Pain (4 - 6) (2019 17:16)  amLODIPine 5 mg oral tablet: 1 tab(s) orally once a day (2019 14:31)  aspirin 81 mg oral tablet, chewable: 1 tab(s) orally once a day (2019 14:31)  ezetimibe 10 mg oral tablet: 1 tab(s) orally once a day (2019 14:31)  glimepiride 4 mg oral tablet: 2 tab(s) orally once a day (2019 14:31)  hydroCHLOROthiazide 25 mg oral tablet: 1 tab(s) orally once a day (at bedtime) (2019 14:31)  Invokana 300 mg oral tablet: 1 tab(s) orally once a day (2019 14:31)  Lantus Solostar Pen 100 units/mL subcutaneous solution: subcutaneous once a day (at bedtime)-  18 UNITS (2019 14:31)  linezolid 2 mg/mL-D5% intravenous solution: 600 milligram(s) intravenous every 12 hours STOP 20. (2019 17:16)  losartan 50 mg oral tablet: 1 tab(s) orally once a day (2019 14:31)  Pletal 100 mg oral tablet: 1 tab(s) orally once a day (2019 14:31)    VITALS:  T(F): 99.1 (21 @ 01:31), Max: 100.4 (21 @ 20:27)  HR: 100 (21 @ 01:23) (99 - 118)  BP: 119/59 (21 @ 01:23) (119/59 - 139/69)  RR: 18 (21 @ 01:23) (17 - 18)  SpO2: 96% (21 @ 01:23) (86% - 100%)    PHYSICAL EXAM:  General: NAD, AAOx3, calm and cooperative  HEENT: NCAT, REGINA, EOMI, Trachea ML, Neck supple  Cardiac: RRR   Respiratory: Normal respiratory effort, on nasal canula to 5  Neuro: Sensation grossly intact and equal throughout, no focal deficits  Vascular: Dopplerable Pulses on dorsal aspect of Right foot. LRE and LLE perfused with 3-4 second cap refill  Skin: Warm/dry, edematous, no jaundice, serous drainage from wound.  Incision/wound: Right foot ulcer on the lateral aspect of the plantar surface measuring 8.5-3cm. Ulcer is surrounded by macerated tissue. Serous drainage from wound. Kurlex and sponge dressing in place, clean, dry and intact    MEDICATIONS  (STANDING):  lactated ringers. 1000 milliLiter(s) (100 mL/Hr) IV Continuous <Continuous>  linezolid  IVPB      linezolid  IVPB 600 milliGRAM(s) IV Intermittent once  linezolid  IVPB 600 milliGRAM(s) IV Intermittent every 12 hours    MEDICATIONS  (PRN):      LAB/STUDIES:                        9.5    12.54 )-----------( 341      ( 28 May 2021 18:28 )             31.3         133<L>  |  94<L>  |  52<H>  ----------------------------<  397<H>  4.6   |  27  |  3.0<H>    Ca    8.4<L>      28 May 2021 18:28    TPro  9.0<H>  /  Alb  2.9<L>  /  TBili  0.2  /  DBili  x   /  AST  20  /  ALT  22  /  AlkPhos  121<H>  05-28    PT/INR - ( 28 May 2021 18:28 )   PT: 15.00 sec;   INR: 1.30 ratio         PTT - ( 28 May 2021 18:28 )  PTT:33.7 sec  LIVER FUNCTIONS - ( 28 May 2021 18:28 )  Alb: 2.9 g/dL / Pro: 9.0 g/dL / ALK PHOS: 121 U/L / ALT: 22 U/L / AST: 20 U/L / GGT: x           Urinalysis Basic - ( 29 May 2021 01:20 )    Color: Light Yellow / Appearance: Clear / S.028 / pH: x  Gluc: x / Ketone: Negative  / Bili: Negative / Urobili: <2 mg/dL   Blood: x / Protein: Trace / Nitrite: Negative   Leuk Esterase: Negative / RBC: 3 /HPF / WBC 1 /HPF   Sq Epi: x / Non Sq Epi: 0 /HPF / Bacteria: Negative      IMAGING:  Not read

## 2021-05-29 NOTE — CHART NOTE - NSCHARTNOTEFT_GEN_A_CORE
- Sx as Add-On #3 Exc Dbx st/b, w/wound washout Right Foot  - Please keep pt NPO  - Request Medical Clearance; OR stratification  - Pre-op Labs optimization;

## 2021-05-29 NOTE — CONSULT NOTE ADULT - ASSESSMENT
ASSESSMENT:   59 yo M with PMHx of charcot ankle, DM, HLD, HTN MATA who presents with worsening R foot infection x3 wk associated with generalized weakness. Infection has been waxing/waning despite being on 3rd week of bactrim so sent in by podiatry for IV abx and admission. No fever, chills, cp, sob, abd pain, nausea, vomiting, dysuria, calf pain. Has baseline neuropathy in both feet so does not feel any pain. Pt reports he has had this foot ulcer at various stage of healing for 13 years. He reports walking with a boot. He reports non compliance with CPAP. Right foot ulcer on the lateral aspect of the plantar surface measuring 8.5-3cm. Ulcer is surrounded by macerated tissue. Serous drainage from wound. Kurlex and sponge dressing in place, clean, dry and intact.     PLAN:  - Recommend admitting to Medicine  - Podiatry to manage  - Recommend ID consult    Above plan discussed with  ***      ASSESSMENT:   61 yo M with PMHx of charcot ankle, DM, HLD, HTN MATA who presents with worsening R foot infection x3 wk associated with generalized weakness. Infection has been waxing/waning despite being on 3rd week of bactrim so sent in by podiatry for IV abx and admission. No fever, chills, cp, sob, abd pain, nausea, vomiting, dysuria, calf pain. Has baseline neuropathy in both feet so does not feel any pain. Pt reports he has had this foot ulcer at various stage of healing for 13 years. He reports walking with a boot. He reports non compliance with CPAP. Right foot ulcer on the lateral aspect of the plantar surface measuring 8.5-3cm. Ulcer is surrounded by macerated tissue. Serous drainage from wound. Kurlex and sponge dressing in place, clean, dry and intact.     PLAN:  - Recommend admitting to Medicine  - Podiatry to manage  - Recommend ID consult

## 2021-05-29 NOTE — H&P ADULT - NSHPLABSRESULTS_GEN_ALL_CORE
Labs:  CAPILLARY BLOOD GLUCOSE                              9.5    12.54 )-----------( 341      ( 28 May 2021 18:28 )             31.3       Auto Neutrophil %: 77.3 % (21 @ 18:28)  Auto Immature Granulocyte %: 0.7 % (21 @ 18:28)        133<L>  |  94<L>  |  52<H>  ----------------------------<  397<H>  4.6   |  27  |  3.0<H>      Calcium, Total Serum: 8.4 mg/dL (21 @ 18:28)      LFTs:             9.0  | 0.2  | 20       ------------------[121     ( 28 May 2021 18:28 )  2.9  | x    | 22          Lipase:x      Amylase:x         Lactate, Blood: 1.1 mmol/L (21 @ 22:12)  Lactate, Blood: 2.3 mmol/L (21 @ 18:28)      Coags:     15.00  ----< 1.30    ( 28 May 2021 18:28 )     33.7                Urinalysis Basic - ( 29 May 2021 01:20 )    Color: Light Yellow / Appearance: Clear / S.028 / pH: x  Gluc: x / Ketone: Negative  / Bili: Negative / Urobili: <2 mg/dL   Blood: x / Protein: Trace / Nitrite: Negative   Leuk Esterase: Negative / RBC: 3 /HPF / WBC 1 /HPF   Sq Epi: x / Non Sq Epi: 0 /HPF / Bacteria: Negative

## 2021-05-29 NOTE — H&P ADULT - ASSESSMENT
Patient is a 59 y/o M with PMH of DM, hypertension, dyslipidemia, sleep apnea, Charcot foot reconstruction with external fixation, super morbid obesity present for worsening right foot infection.     #DFU with Hx of MRSA infection  -septic on admission, WBC 12,  + lactate 2.3. s/p cefepime flagyl and vanc in the ED  -Podiatry on board, s/p I&D bedside, purulent fluid drained, tissue sent.  -Scheduled for OR 5/29 with podiatry, follow up deep tissue culture results, adjust abx accordingly  -LE arterial duplex reviewed, no LE arterial disease. Follow up official read  -CT abdomen and pelvis with LE runoff reviewed, there is distal runoff. however there are subq air concerning for necrotizing fasciitis, Could be post surgical changes. Follow up official read. Vascular surgery on board.  -Abx: Unasyn and Zyvox 600mg q12hr in the setting of CANDY + Hx of MRSA  -ID eval    #CANDY on CKD III  -creatinine 3.0 on admission, his baseline seems to be 1.8 (lowest 1.4)  -likely secondary to pre-renal/poor po intake + bactrim usage  -dwain fluid hydration with LR 100cc/hr  -urine lytes    #hyponatremia, mild  -dwain hydration with LR 100cc/hr  -check urine lytes, serum and urine osm    #eusebio  -c/w CPAP at home    #anemia  -hb 9.5 on admission, baseline 11  -keep t&s active  -iron studies    #Diabetes Mellitus  -insulin per protocol while in patient  -hba1c in the am  -Monitor FS and adjust insulin accordingly    #Hypertension  -c/w amlodipine  -hold htcz and losartan in the setting of CANDY    #Dyslipidemia  -c/w home meds    #morbid obesity  -consulted on diet and exercise    #Diet: npo for now  #DVT ppx: heparin subq q8hrs  #GI ppx: Not indicated  #Dispo: acute   Patient is a 59 y/o M with PMH of DM, hypertension, dyslipidemia, sleep apnea, Charcot foot reconstruction with external fixation, super morbid obesity present for worsening right foot infection.     #DFU with Hx of MRSA infection  -septic on admission, WBC 12,  + lactate 2.3. s/p cefepime flagyl and vanc in the ED  -Podiatry on board, s/p I&D bedside, purulent fluid drained, tissue sent.  -Scheduled for OR 5/29 with podiatry, follow up deep tissue culture results, adjust abx accordingly  -LE arterial duplex reviewed, moderate common illiac artery disease. Follow up official read  -CT abdomen and pelvis with LE runoff reviewed, there is bilateral moderate common iliac artery disease with distal reconstitution. RLE subq air low suspicion for nec fasc. Follow up official read. Vascular surgery on board.  -Abx: Unasyn 3g q6hrs and Zyvox 600mg q12hr in the setting of CANDY + Hx of MRSA  -ID eval    #CANDY on CKD III  -creatinine 3.0 on admission, his baseline seems to be 1.8 (lowest 1.4)  -likely secondary to pre-renal/poor po intake + bactrim usage  -dwain fluid hydration with LR 100cc/hr  -urine lytes    #hyponatremia, mild  -dwain hydration with LR 100cc/hr  -f/u urine lytes, serum and urine osm    #eusebio  -c/w CPAP at home    #anemia  -hb 9.5 on admission, baseline 11  -keep t&s active  -iron studies    #Diabetes Mellitus  -insulin per protocol while in patient  -hba1c in the am  -Monitor FS and adjust insulin accordingly    #Hypertension  -c/w amlodipine  -hold htcz and losartan in the setting of CANDY    #Dyslipidemia  -c/w home meds    #morbid obesity  -consulted on diet and exercise    #Diet: npo for now  #DVT ppx: heparin subq q8hrs  #GI ppx: Not indicated  #Dispo: acute   Patient is a 61 y/o M with PMH of DM, hypertension, dyslipidemia, sleep apnea, Charcot foot reconstruction with external fixation, super morbid obesity present for worsening right foot infection.     #DFU with Hx of MRSA infection  -septic on admission, WBC 12,  + lactate 2.3. s/p cefepime flagyl and vanc in the ED  -Podiatry on board, s/p I&D bedside, purulent fluid drained, culture sent.  -Scheduled for OR 5/29 with podiatry, follow up deep tissue culture results, adjust abx accordingly  -LE arterial duplex reviewed, there is moderate common illiac artery disease. Follow up official read  -CT abdomen and pelvis with LE runoff reviewed, there is bilateral moderate common iliac artery disease with distal reconstitution. there is RLE subq air, likely secondary to charcot's foot, low suspicion for nec fasc. Follow up official read. Vascular surgery on board.  -Abx: Unasyn 3g q6hrs and Zyvox 600mg q12hr in the setting of CANDY + Hx of MRSA  -ID eval    #CANDY on CKD III  -creatinine 3.0 on admission, his baseline seems to be 1.8 (lowest 1.4)  -likely pre-renal +/- bactrim usage  -dwain fluid hydration with LR 100cc/hr  -urine lytes    #hyponatremia, mild  -dwain hydration with LR 100cc/hr  -f/u urine lytes, serum and urine osm    #eusebio  -non compliant with cpap  -c/w CPAP with education    #anemia  -hb 9.5 on admission, baseline 11  -keep t&s active  -iron studies    #Diabetes Mellitus  -insulin per protocol while in patient  -hba1c in the am  -Monitor FS and adjust insulin accordingly    #Hypertension  -c/w amlodipine  -hold htcz and losartan in the setting of CANDY    #Dyslipidemia  -c/w home meds    #morbid obesity  -consulted on diet and exercise    #Diet: npo for now  #DVT ppx: heparin subq q8hrs  #GI ppx: Not indicated  #Dispo: acute

## 2021-05-29 NOTE — CONSULT NOTE ADULT - ASSESSMENT
59 y/o M with PMH of DM, hypertension, dyslipidemia, sleep apnea, Charcot foot reconstruction with external fixation, super morbid obesity present for worsening right foot infection.  Pt reports he has had this foot ulcer at various stage of healing for 13 years. Patient has been admitted for IV antibiotics multiple times in the past for osteomyelitis of right foot. He has history of MRSA infection in foot. pt has baseline neuropathy in both feet so does not feel any pain.  Patient states his Infection has been gradually getting worse despite treatment with bactrim, he saw his podiatrists and he was sent in by podiatry for IV abx .    IMPRESSION;  R foot with abscess plantar aspect  -5/28 s/p debridement    RECOMMENDATIONS;  -f/u OR cultures  -linezolid 600 mg iv q12h  -Cefepime 2 gm iv q24h  -flagyl 500 mg iv q8h

## 2021-05-29 NOTE — CHART NOTE - NSCHARTNOTEFT_GEN_A_CORE
PACU ANESTHESIA ADMISSION NOTE      Procedure: Incision and drainage, pressure ulcer, skin, with debridement Pulsed lavage for 15 minutes    Post op diagnosis:  Gas gangrene of lower extremity    __x__  Patent Airway    _x___  Full return of protective reflexes    __x__  Full recovery from anesthesia / back to baseline status    Vitals:  T(C): 98.9 F  HR: 90  BP: 129/60  RR: 20  SpO2: 98%    Mental Status:  _x___ Awake   __x___ Alert   _____ Drowsy   _____ Sedated    Nausea/Vomiting:  _x___ NO  ______Yes,   See Post - Op Orders          Pain Scale (0-10):  _____    Treatment: ____ None    _x___ See Post - Op/PCA Orders    Post - Operative Fluids:   ____ Oral   __x__ See Post - Op Orders    Plan: Discharge:   ____Home       __x___Floor     _____Critical Care    _____  Other:_________________    Comments: uneventful anesthesia course no complications. Vitals stable. Pt transferred to PACU

## 2021-05-29 NOTE — CHART NOTE - NSCHARTNOTEFT_GEN_A_CORE
patient seen post op  underwent I&D and fasciotomy  Continue IV antibiotics linezolid and Unasyn as per ID  Follow up BMP  Follow up cultures  Dash TLC Consistent Carbs diet

## 2021-05-29 NOTE — CHART NOTE - NSCHARTNOTEFT_GEN_A_CORE
Notified by [employer] that patient's blood culture from 5/28 growing Gram-positive Cocci in pairs. Patient is s/p debridement on 5/28 of an abscess on the plantar aspect of his right foot.    #Gram-positive bacteremia  - in the setting of an abscess on the plantar aspect of the right foot  - S/p debridement on 5/28  - C/w IV antibiotics, as per ID  - F/u sensitivities  - F/u ID Notified by [employer] that patient's blood culture from 5/28 growing Gram-positive Cocci in pairs. Patient is s/p debridement on 5/28 of an abscess on the plantar aspect of his right foot.    #Gram-positive bacteremia  - in the setting of an abscess on the plantar aspect of the right foot  - S/p debridement on 5/28  - C/w IV antibiotics, as per ID  - F/u culture PCR and sensitivities  - F/u ID Notified by [employer] that patient's blood culture from 5/28 growing Gram-positive Cocci in pairs. Patient is s/p debridement on 5/28 of an abscess on the plantar aspect of his right foot. CBC dropped from 9.5 to 7.7 over last 24 hours. Hyponatremic to 123. Glucose to 660.    #?Diastolic CHF exacerbation  - MATA noncompliant  - Check ECHO, trops, BNP  - Strict I/O    #Anemia  - F/u CBC  - Active type and screen    #Hyponatremia  - Hyperglycemia likely contributing  - Renal consult  - Urine and serum osm  - Kidney/bladder U/S    #Gram-positive bacteremia  - in the setting of an abscess on the plantar aspect of the right foot  - S/p debridement on 5/28  - C/w IV antibiotics, as per ID  - F/u culture PCR and sensitivities  - F/u ID

## 2021-05-29 NOTE — H&P ADULT - NSHPPHYSICALEXAM_GEN_ALL_CORE
GENERAL: NAD, morbidly obese  HEAD:  Atraumatic, Normocephalic  EYES: EOMI, PERRLA, conjunctiva and sclera clear  ENT: Moist mucous membranes  NECK: Supple, No JVD  CHEST/LUNG: Clear to auscultation bilaterally; No rales, rhonchi, wheezing, or rubs. Unlabored respirations  HEART: Regular rate and rhythm; No murmurs, rubs, or gallops  ABDOMEN: Bowel sounds present; Soft, Nontender, Nondistended. No hepatomegally  EXTREMITIES:  Palpable DP and PT pulses. RLE dressing in place.   NERVOUS SYSTEM:  Alert & Oriented X3, speech clear. No deficits   MSK: FROM all 4 extremities, full and equal strength  SKIN: No rashes or lesions GENERAL: NAD, morbidly obese  HEAD:  Atraumatic, Normocephalic  EYES: EOMI, PERRLA, conjunctiva and sclera clear  ENT: Moist mucous membranes  NECK: Supple, No JVD  CHEST/LUNG: Clear to auscultation bilaterally; No rales, rhonchi, wheezing, or rubs. Unlabored respirations  HEART: Regular rate and rhythm; No murmurs, rubs, or gallops  ABDOMEN: Bowel sounds present; Soft, Nontender, Nondistended.   EXTREMITIES:  Palpable DP and PT pulses. Right foot ulcer on the lateral aspect of the plantar surface measuring 8.5-3cm. Ulcer is surrounded by macerated tissue. RLE dressing in place.   NERVOUS SYSTEM:  Alert & Oriented X3, speech clear. No deficits   MSK: FROM all 4 extremities, full and equal strength  SKIN: No rashes or lesions

## 2021-05-29 NOTE — CONSULT NOTE ADULT - SUBJECTIVE AND OBJECTIVE BOX
GUZMANFERNANDEZ  60y, Male  Allergy: No Known Allergies  statins (Other)      All historical available data reviewed.    HPI:  Patient is a 59 y/o M with PMH of DM, hypertension, dyslipidemia, sleep apnea, Charcot foot reconstruction with external fixation, super morbid obesity present for worsening right foot infection.  Pt reports he has had this foot ulcer at various stage of healing for 13 years. Patient has been admitted for IV antibiotics multiple times in the past for osteomyelitis of right foot. He has history of MRSA infection in foot. pt has baseline neuropathy in both feet so does not feel any pain.  Patient states his Infection has been gradually getting worse despite treatment with bactrim, he saw his podiatrists and he was sent in by podiatry for IV abx . No fever, chills, cp, sob, abd pain, nausea, vomiting, dysuria, calf pain.     In ED  T(C): 37.3 (21 @ 01:31), Max: 38 (21 @ 20:27)  HR: 100 (21 @ 01:23) (99 - 118)  BP: 119/59 (21 @ 01:23) (119/59 - 139/69)  RR: 18 (21 @ 01:23) (17 - 18)  SpO2: 96% (21 @ 01:23) (86% - 100%)  there is a Right foot ulcer on the lateral aspect of the plantar surface measuring 8.5-3cm. Ulcer is surrounded by macerated tissue. Serous drainage from wound. Kurlex and sponge dressing in place, clean, dry and intact. Patient was I&D'd bedside by podiatry in the ed. purulent drainage was evacuated and sent to the lab for culture. Patient is scheduled for OR with podiatry in the AM.   (29 May 2021 02:29)        FAMILY HISTORY:  Family history of diabetes mellitus (DM)    FH: leukemia      PAST MEDICAL & SURGICAL HISTORY:  MATA on CPAP    DM (diabetes mellitus)    HTN (hypertension)    High cholesterol    Charcot ankle, right    History of percutaneous angioplasty    H/O skin graft    Left toe amputee  4 TOES    Diabetic Charcot foot  Fixation with external device          VITALS:  T(F): 98.5, Max: 100.4 (21 @ 20:27)  HR: 89  BP: 124/62  RR: 17Vital Signs Last 24 Hrs  T(C): 36.9 (29 May 2021 05:20), Max: 38 (28 May 2021 20:27)  T(F): 98.5 (29 May 2021 03:44), Max: 100.4 (28 May 2021 20:27)  HR: 89 (29 May 2021 05:20) (89 - 118)  BP: 124/62 (29 May 2021 05:20) (119/59 - 139/69)  BP(mean): --  RR: 17 (29 May 2021 05:20) (17 - 20)  SpO2: 95% (29 May 2021 05:20) (86% - 100%)    TESTS & MEASUREMENTS:                        9.5    12.54 )-----------( 341      ( 28 May 2021 18:28 )             31.3     05-28    133<L>  |  94<L>  |  52<H>  ----------------------------<  397<H>  4.6   |  27  |  3.0<H>    Ca    8.4<L>      28 May 2021 18:28    TPro  9.0<H>  /  Alb  2.9<L>  /  TBili  0.2  /  DBili  x   /  AST  20  /  ALT  22  /  AlkPhos  121<H>  05-28    LIVER FUNCTIONS - ( 28 May 2021 18:28 )  Alb: 2.9 g/dL / Pro: 9.0 g/dL / ALK PHOS: 121 U/L / ALT: 22 U/L / AST: 20 U/L / GGT: x             Urinalysis Basic - ( 29 May 2021 01:20 )    Color: Light Yellow / Appearance: Clear / S.028 / pH: x  Gluc: x / Ketone: Negative  / Bili: Negative / Urobili: <2 mg/dL   Blood: x / Protein: Trace / Nitrite: Negative   Leuk Esterase: Negative / RBC: 3 /HPF / WBC 1 /HPF   Sq Epi: x / Non Sq Epi: 0 /HPF / Bacteria: Negative          RADIOLOGY & ADDITIONAL TESTS:  Personal review of radiological diagnostics performed  Echo and EKG results noted when applicable.     MEDICATIONS:  acetaminophen   Tablet .. 650 milliGRAM(s) Oral every 6 hours PRN  amLODIPine   Tablet 5 milliGRAM(s) Oral daily  ampicillin/sulbactam  IVPB 3 Gram(s) IV Intermittent every 6 hours  ampicillin/sulbactam  IVPB      aspirin  chewable 81 milliGRAM(s) Oral daily  atorvastatin 40 milliGRAM(s) Oral at bedtime  chlorhexidine 4% Liquid 1 Application(s) Topical <User Schedule>  cilostazol 50 milliGRAM(s) Oral two times a day  dextrose 40% Gel 15 Gram(s) Oral once  dextrose 5%. 1000 milliLiter(s) IV Continuous <Continuous>  dextrose 5%. 1000 milliLiter(s) IV Continuous <Continuous>  dextrose 50% Injectable 25 Gram(s) IV Push once  dextrose 50% Injectable 12.5 Gram(s) IV Push once  dextrose 50% Injectable 25 Gram(s) IV Push once  glucagon  Injectable 1 milliGRAM(s) IntraMuscular once  heparin   Injectable 5000 Unit(s) SubCutaneous every 8 hours  insulin glargine Injectable (LANTUS) 33 Unit(s) SubCutaneous at bedtime  insulin lispro (ADMELOG) corrective regimen sliding scale   SubCutaneous three times a day before meals  insulin lispro Injectable (ADMELOG) 11 Unit(s) SubCutaneous three times a day before meals  lactated ringers. 1000 milliLiter(s) IV Continuous <Continuous>  linezolid  IVPB      linezolid  IVPB 600 milliGRAM(s) IV Intermittent every 12 hours      ANTIBIOTICS:  ampicillin/sulbactam  IVPB 3 Gram(s) IV Intermittent every 6 hours  ampicillin/sulbactam  IVPB      linezolid  IVPB      linezolid  IVPB 600 milliGRAM(s) IV Intermittent every 12 hours       THOMAS FERNANDEZ  60y, Male  Allergy: No Known Allergies  statins (Other)      All historical available data reviewed.    HPI:  Patient is a 59 y/o M with PMH of DM, hypertension, dyslipidemia, sleep apnea, Charcot foot reconstruction with external fixation, super morbid obesity present for worsening right foot infection.  Pt reports he has had this foot ulcer at various stage of healing for 13 years. Patient has been admitted for IV antibiotics multiple times in the past for osteomyelitis of right foot. He has history of MRSA infection in foot. pt has baseline neuropathy in both feet so does not feel any pain.  Patient states his Infection has been gradually getting worse despite treatment with bactrim, he saw his podiatrists and he was sent in by podiatry for IV abx . No fever, chills, cp, sob, abd pain, nausea, vomiting, dysuria, calf pain.     In ED  T(C): 37.3 (21 @ 01:31), Max: 38 (21 @ 20:27)  HR: 100 (21 @ 01:23) (99 - 118)  BP: 119/59 (21 @ 01:23) (119/59 - 139/69)  RR: 18 (21 @ 01:23) (17 - 18)  SpO2: 96% (21 @ 01:23) (86% - 100%)  there is a Right foot ulcer on the lateral aspect of the plantar surface measuring 8.5-3cm. Ulcer is surrounded by macerated tissue. Serous drainage from wound. Kurlex and sponge dressing in place, clean, dry and intact. Patient was I&D'd bedside by podiatry in the ed. purulent drainage was evacuated and sent to the lab for culture. Patient is scheduled for OR with podiatry in the AM.   (29 May 2021 02:29)    s/p Dbridement  : pus noted        FAMILY HISTORY:  Family history of diabetes mellitus (DM)    FH: leukemia      PAST MEDICAL & SURGICAL HISTORY:  MATA on CPAP    DM (diabetes mellitus)    HTN (hypertension)    High cholesterol    Charcot ankle, right    History of percutaneous angioplasty    H/O skin graft    Left toe amputee  4 TOES    Diabetic Charcot foot  Fixation with external device          VITALS:  T(F): 98.5, Max: 100.4 (05-28-21 @ 20:27)  HR: 89  BP: 124/62  RR: 17Vital Signs Last 24 Hrs  T(C): 36.9 (29 May 2021 05:20), Max: 38 (28 May 2021 20:27)  T(F): 98.5 (29 May 2021 03:44), Max: 100.4 (28 May 2021 20:27)  HR: 89 (29 May 2021 05:20) (89 - 118)  BP: 124/62 (29 May 2021 05:20) (119/59 - 139/69)  BP(mean): --  RR: 17 (29 May 2021 05:20) (17 - 20)  SpO2: 95% (29 May 2021 05:20) (86% - 100%)    TESTS & MEASUREMENTS:                        9.5    12.54 )-----------( 341      ( 28 May 2021 18:28 )             31.3     05-28    133<L>  |  94<L>  |  52<H>  ----------------------------<  397<H>  4.6   |  27  |  3.0<H>    Ca    8.4<L>      28 May 2021 18:28    TPro  9.0<H>  /  Alb  2.9<L>  /  TBili  0.2  /  DBili  x   /  AST  20  /  ALT  22  /  AlkPhos  121<H>  -28    LIVER FUNCTIONS - ( 28 May 2021 18:28 )  Alb: 2.9 g/dL / Pro: 9.0 g/dL / ALK PHOS: 121 U/L / ALT: 22 U/L / AST: 20 U/L / GGT: x             Urinalysis Basic - ( 29 May 2021 01:20 )    Color: Light Yellow / Appearance: Clear / S.028 / pH: x  Gluc: x / Ketone: Negative  / Bili: Negative / Urobili: <2 mg/dL   Blood: x / Protein: Trace / Nitrite: Negative   Leuk Esterase: Negative / RBC: 3 /HPF / WBC 1 /HPF   Sq Epi: x / Non Sq Epi: 0 /HPF / Bacteria: Negative          RADIOLOGY & ADDITIONAL TESTS:  Personal review of radiological diagnostics performed  Echo and EKG results noted when applicable.     MEDICATIONS:  acetaminophen   Tablet .. 650 milliGRAM(s) Oral every 6 hours PRN  amLODIPine   Tablet 5 milliGRAM(s) Oral daily  ampicillin/sulbactam  IVPB 3 Gram(s) IV Intermittent every 6 hours  ampicillin/sulbactam  IVPB      aspirin  chewable 81 milliGRAM(s) Oral daily  atorvastatin 40 milliGRAM(s) Oral at bedtime  chlorhexidine 4% Liquid 1 Application(s) Topical <User Schedule>  cilostazol 50 milliGRAM(s) Oral two times a day  dextrose 40% Gel 15 Gram(s) Oral once  dextrose 5%. 1000 milliLiter(s) IV Continuous <Continuous>  dextrose 5%. 1000 milliLiter(s) IV Continuous <Continuous>  dextrose 50% Injectable 25 Gram(s) IV Push once  dextrose 50% Injectable 12.5 Gram(s) IV Push once  dextrose 50% Injectable 25 Gram(s) IV Push once  glucagon  Injectable 1 milliGRAM(s) IntraMuscular once  heparin   Injectable 5000 Unit(s) SubCutaneous every 8 hours  insulin glargine Injectable (LANTUS) 33 Unit(s) SubCutaneous at bedtime  insulin lispro (ADMELOG) corrective regimen sliding scale   SubCutaneous three times a day before meals  insulin lispro Injectable (ADMELOG) 11 Unit(s) SubCutaneous three times a day before meals  lactated ringers. 1000 milliLiter(s) IV Continuous <Continuous>  linezolid  IVPB      linezolid  IVPB 600 milliGRAM(s) IV Intermittent every 12 hours      ANTIBIOTICS:  ampicillin/sulbactam  IVPB 3 Gram(s) IV Intermittent every 6 hours  ampicillin/sulbactam  IVPB      linezolid  IVPB      linezolid  IVPB 600 milliGRAM(s) IV Intermittent every 12 hours

## 2021-05-29 NOTE — H&P ADULT - HISTORY OF PRESENT ILLNESS
Patient is a 59 y/o M with PMH of DM, hypertension, dyslipidemia, sleep apnea, Charcot foot reconstruction with external fixation, super morbid obesity present for worsening right foot infection.  Has baseline neuropathy in both feet so does not feel any pain. Pt reports he has had this foot ulcer at various stage of healing for 13 years. Patient has been admitted for IV antibiotics multiple times in the past for osteomyelitis of right foot. He has history of MRSA infection in foot. He reports walking with a boot. He reports non compliance with CPAP. Right foot ulcer on the lateral aspect of the plantar surface measuring 8.5-3cm. Ulcer is surrounded by macerated tissue. Serous drainage from wound. Kurlex and sponge dressing in place, clean, dry and intact.  Patient states his Infection has been gradually getting worse despite treatment with bactrim, he saw his podiatrists and he was sent in by podiatry for IV abx . No fever, chills, cp, sob, abd pain, nausea, vomiting, dysuria, calf pain.     In ED  T(C): 37.3 (05-29-21 @ 01:31), Max: 38 (05-28-21 @ 20:27)  HR: 100 (05-29-21 @ 01:23) (99 - 118)  BP: 119/59 (05-29-21 @ 01:23) (119/59 - 139/69)  RR: 18 (05-29-21 @ 01:23) (17 - 18)  SpO2: 96% (05-29-21 @ 01:23) (86% - 100%)  Patient was I&D bedside by podiatry in the ed. purulent drainage was evacuated and sent the the lab for culture. Patient is scheduled for OR with podiatry in the AM.   Patient is a 59 y/o M with PMH of DM, hypertension, dyslipidemia, sleep apnea, Charcot foot reconstruction with external fixation, super morbid obesity present for worsening right foot infection.  Has baseline neuropathy in both feet so does not feel any pain. Pt reports he has had this foot ulcer at various stage of healing for 13 years. Patient has been admitted for IV antibiotics multiple times in the past for osteomyelitis of right foot. He has history of MRSA infection in foot. He reports walking with a boot. pt is non-compliant with CPAP. Right foot ulcer on the lateral aspect of the plantar surface measuring 8.5-3cm. Ulcer is surrounded by macerated tissue. Serous drainage from wound. Kurlex and sponge dressing in place, clean, dry and intact.  Patient states his Infection has been gradually getting worse despite treatment with bactrim, he saw his podiatrists and he was sent in by podiatry for IV abx . No fever, chills, cp, sob, abd pain, nausea, vomiting, dysuria, calf pain.     In ED  T(C): 37.3 (05-29-21 @ 01:31), Max: 38 (05-28-21 @ 20:27)  HR: 100 (05-29-21 @ 01:23) (99 - 118)  BP: 119/59 (05-29-21 @ 01:23) (119/59 - 139/69)  RR: 18 (05-29-21 @ 01:23) (17 - 18)  SpO2: 96% (05-29-21 @ 01:23) (86% - 100%)  Patient was I&D bedside by podiatry in the ed. purulent drainage was evacuated and sent the the lab for culture. Patient is scheduled for OR with podiatry in the AM.   Patient is a 61 y/o M with PMH of DM, hypertension, dyslipidemia, sleep apnea, Charcot foot reconstruction with external fixation, super morbid obesity present for worsening right foot infection.  Pt reports he has had this foot ulcer at various stage of healing for 13 years. Patient has been admitted for IV antibiotics multiple times in the past for osteomyelitis of right foot. He has history of MRSA infection in foot. pt has baseline neuropathy in both feet so does not feel any pain.  Patient states his Infection has been gradually getting worse despite treatment with bactrim, he saw his podiatrists and he was sent in by podiatry for IV abx . No fever, chills, cp, sob, abd pain, nausea, vomiting, dysuria, calf pain.     In ED  T(C): 37.3 (05-29-21 @ 01:31), Max: 38 (05-28-21 @ 20:27)  HR: 100 (05-29-21 @ 01:23) (99 - 118)  BP: 119/59 (05-29-21 @ 01:23) (119/59 - 139/69)  RR: 18 (05-29-21 @ 01:23) (17 - 18)  SpO2: 96% (05-29-21 @ 01:23) (86% - 100%)  there is a Right foot ulcer on the lateral aspect of the plantar surface measuring 8.5-3cm. Ulcer is surrounded by macerated tissue. Serous drainage from wound. Kurlex and sponge dressing in place, clean, dry and intact. Patient was I&D'd bedside by podiatry in the ed. purulent drainage was evacuated and sent to the lab for culture. Patient is scheduled for OR with podiatry in the AM.

## 2021-05-30 LAB
-  AMPICILLIN/SULBACTAM: SIGNIFICANT CHANGE UP
-  CEFAZOLIN: SIGNIFICANT CHANGE UP
-  CLINDAMYCIN: SIGNIFICANT CHANGE UP
-  DAPTOMYCIN: SIGNIFICANT CHANGE UP
-  ERYTHROMYCIN: SIGNIFICANT CHANGE UP
-  GENTAMICIN: SIGNIFICANT CHANGE UP
-  LINEZOLID: SIGNIFICANT CHANGE UP
-  OXACILLIN: SIGNIFICANT CHANGE UP
-  PENICILLIN: SIGNIFICANT CHANGE UP
-  RIFAMPIN: SIGNIFICANT CHANGE UP
-  TETRACYCLINE: SIGNIFICANT CHANGE UP
-  TRIMETHOPRIM/SULFAMETHOXAZOLE: SIGNIFICANT CHANGE UP
-  VANCOMYCIN: SIGNIFICANT CHANGE UP
ALBUMIN SERPL ELPH-MCNC: 2.8 G/DL — LOW (ref 3.5–5.2)
ALBUMIN SERPL ELPH-MCNC: 2.9 G/DL — LOW (ref 3.5–5.2)
ALP SERPL-CCNC: 104 U/L — SIGNIFICANT CHANGE UP (ref 30–115)
ALP SERPL-CCNC: 105 U/L — SIGNIFICANT CHANGE UP (ref 30–115)
ALT FLD-CCNC: 17 U/L — SIGNIFICANT CHANGE UP (ref 0–41)
ALT FLD-CCNC: 20 U/L — SIGNIFICANT CHANGE UP (ref 0–41)
ANION GAP SERPL CALC-SCNC: 10 MMOL/L — SIGNIFICANT CHANGE UP (ref 7–14)
ANION GAP SERPL CALC-SCNC: 9 MMOL/L — SIGNIFICANT CHANGE UP (ref 7–14)
AST SERPL-CCNC: 16 U/L — SIGNIFICANT CHANGE UP (ref 0–41)
AST SERPL-CCNC: 26 U/L — SIGNIFICANT CHANGE UP (ref 0–41)
BASOPHILS # BLD AUTO: 0.05 K/UL — SIGNIFICANT CHANGE UP (ref 0–0.2)
BASOPHILS NFR BLD AUTO: 0.4 % — SIGNIFICANT CHANGE UP (ref 0–1)
BILIRUB SERPL-MCNC: 0.2 MG/DL — SIGNIFICANT CHANGE UP (ref 0.2–1.2)
BILIRUB SERPL-MCNC: 0.2 MG/DL — SIGNIFICANT CHANGE UP (ref 0.2–1.2)
BUN SERPL-MCNC: 59 MG/DL — HIGH (ref 10–20)
BUN SERPL-MCNC: 64 MG/DL — CRITICAL HIGH (ref 10–20)
CALCIUM SERPL-MCNC: 8.3 MG/DL — LOW (ref 8.5–10.1)
CALCIUM SERPL-MCNC: 9 MG/DL — SIGNIFICANT CHANGE UP (ref 8.5–10.1)
CALCIUM UR-MCNC: <1 MG/DL — SIGNIFICANT CHANGE UP
CHLORIDE SERPL-SCNC: 92 MMOL/L — LOW (ref 98–110)
CHLORIDE SERPL-SCNC: 94 MMOL/L — LOW (ref 98–110)
CHLORIDE UR-SCNC: <20 — SIGNIFICANT CHANGE UP
CK MB CFR SERPL CALC: 2 NG/ML — SIGNIFICANT CHANGE UP (ref 0.6–6.3)
CO2 SERPL-SCNC: 32 MMOL/L — SIGNIFICANT CHANGE UP (ref 17–32)
CO2 SERPL-SCNC: 38 MMOL/L — HIGH (ref 17–32)
CREAT ?TM UR-MCNC: 82 MG/DL — SIGNIFICANT CHANGE UP
CREAT SERPL-MCNC: 4.3 MG/DL — CRITICAL HIGH (ref 0.7–1.5)
CREAT SERPL-MCNC: 5.9 MG/DL — CRITICAL HIGH (ref 0.7–1.5)
CULTURE RESULTS: SIGNIFICANT CHANGE UP
CULTURE RESULTS: SIGNIFICANT CHANGE UP
EOSINOPHIL # BLD AUTO: 0.16 K/UL — SIGNIFICANT CHANGE UP (ref 0–0.7)
EOSINOPHIL NFR BLD AUTO: 1.3 % — SIGNIFICANT CHANGE UP (ref 0–8)
GLUCOSE BLDC GLUCOMTR-MCNC: 107 MG/DL — HIGH (ref 70–99)
GLUCOSE BLDC GLUCOMTR-MCNC: 194 MG/DL — HIGH (ref 70–99)
GLUCOSE BLDC GLUCOMTR-MCNC: 219 MG/DL — HIGH (ref 70–99)
GLUCOSE BLDC GLUCOMTR-MCNC: 72 MG/DL — SIGNIFICANT CHANGE UP (ref 70–99)
GLUCOSE BLDC GLUCOMTR-MCNC: 87 MG/DL — SIGNIFICANT CHANGE UP (ref 70–99)
GLUCOSE BLDC GLUCOMTR-MCNC: 88 MG/DL — SIGNIFICANT CHANGE UP (ref 70–99)
GLUCOSE SERPL-MCNC: 192 MG/DL — HIGH (ref 70–99)
GLUCOSE SERPL-MCNC: 79 MG/DL — SIGNIFICANT CHANGE UP (ref 70–99)
GRAM STN FLD: SIGNIFICANT CHANGE UP
HCT VFR BLD CALC: 26.8 % — LOW (ref 42–52)
HCT VFR BLD CALC: 28.1 % — LOW (ref 42–52)
HGB BLD-MCNC: 7.7 G/DL — LOW (ref 14–18)
HGB BLD-MCNC: 8.3 G/DL — LOW (ref 14–18)
IMM GRANULOCYTES NFR BLD AUTO: 0.9 % — HIGH (ref 0.1–0.3)
LYMPHOCYTES # BLD AUTO: 1.52 K/UL — SIGNIFICANT CHANGE UP (ref 1.2–3.4)
LYMPHOCYTES # BLD AUTO: 12.5 % — LOW (ref 20.5–51.1)
MAGNESIUM SERPL-MCNC: 3.3 MG/DL — CRITICAL HIGH (ref 1.8–2.4)
MAGNESIUM SERPL-MCNC: 3.4 MG/DL — CRITICAL HIGH (ref 1.8–2.4)
MCHC RBC-ENTMCNC: 25.5 PG — LOW (ref 27–31)
MCHC RBC-ENTMCNC: 25.6 PG — LOW (ref 27–31)
MCHC RBC-ENTMCNC: 28.7 G/DL — LOW (ref 32–37)
MCHC RBC-ENTMCNC: 29.5 G/DL — LOW (ref 32–37)
MCV RBC AUTO: 86.2 FL — SIGNIFICANT CHANGE UP (ref 80–94)
MCV RBC AUTO: 89 FL — SIGNIFICANT CHANGE UP (ref 80–94)
METHOD TYPE: SIGNIFICANT CHANGE UP
MONOCYTES # BLD AUTO: 1.36 K/UL — HIGH (ref 0.1–0.6)
MONOCYTES NFR BLD AUTO: 11.2 % — HIGH (ref 1.7–9.3)
MRSA PCR RESULT.: POSITIVE
NEUTROPHILS # BLD AUTO: 8.99 K/UL — HIGH (ref 1.4–6.5)
NEUTROPHILS NFR BLD AUTO: 73.7 % — SIGNIFICANT CHANGE UP (ref 42.2–75.2)
NRBC # BLD: 0 /100 WBCS — SIGNIFICANT CHANGE UP (ref 0–0)
NRBC # BLD: 0 /100 WBCS — SIGNIFICANT CHANGE UP (ref 0–0)
NT-PROBNP SERPL-SCNC: 748 PG/ML — HIGH (ref 0–300)
NT-PROBNP SERPL-SCNC: 894 PG/ML — HIGH (ref 0–300)
ORGANISM # SPEC MICROSCOPIC CNT: SIGNIFICANT CHANGE UP
ORGANISM # SPEC MICROSCOPIC CNT: SIGNIFICANT CHANGE UP
PHOSPHATE 24H UR-MCNC: 35 MG/DL — SIGNIFICANT CHANGE UP
PLATELET # BLD AUTO: 312 K/UL — SIGNIFICANT CHANGE UP (ref 130–400)
PLATELET # BLD AUTO: 313 K/UL — SIGNIFICANT CHANGE UP (ref 130–400)
POTASSIUM SERPL-MCNC: 4.7 MMOL/L — SIGNIFICANT CHANGE UP (ref 3.5–5)
POTASSIUM SERPL-MCNC: 5.5 MMOL/L — HIGH (ref 3.5–5)
POTASSIUM SERPL-SCNC: 4.7 MMOL/L — SIGNIFICANT CHANGE UP (ref 3.5–5)
POTASSIUM SERPL-SCNC: 5.5 MMOL/L — HIGH (ref 3.5–5)
POTASSIUM UR-SCNC: 23 MMOL/L — SIGNIFICANT CHANGE UP
PROT ?TM UR-MCNC: 19 MG/DLG/24H — SIGNIFICANT CHANGE UP
PROT SERPL-MCNC: 8.5 G/DL — HIGH (ref 6–8)
PROT SERPL-MCNC: 8.9 G/DL — HIGH (ref 6–8)
PROT/CREAT UR-RTO: 0.2 RATIO — SIGNIFICANT CHANGE UP (ref 0–0.2)
RBC # BLD: 3.01 M/UL — LOW (ref 4.7–6.1)
RBC # BLD: 3.26 M/UL — LOW (ref 4.7–6.1)
RBC # FLD: 17.3 % — HIGH (ref 11.5–14.5)
RBC # FLD: 17.6 % — HIGH (ref 11.5–14.5)
SODIUM SERPL-SCNC: 136 MMOL/L — SIGNIFICANT CHANGE UP (ref 135–146)
SODIUM SERPL-SCNC: 139 MMOL/L — SIGNIFICANT CHANGE UP (ref 135–146)
SODIUM UR-SCNC: 21 MMOL/L — SIGNIFICANT CHANGE UP
SPECIMEN SOURCE: SIGNIFICANT CHANGE UP
TRANSFERRIN SERPL-MCNC: 119 MG/DL — LOW (ref 200–360)
TROPONIN T SERPL-MCNC: 0.07 NG/ML — CRITICAL HIGH
TROPONIN T SERPL-MCNC: 0.11 NG/ML — CRITICAL HIGH
TROPONIN T SERPL-MCNC: 0.12 NG/ML — CRITICAL HIGH
TROPONIN T SERPL-MCNC: 0.12 NG/ML — CRITICAL HIGH
WBC # BLD: 12.16 K/UL — HIGH (ref 4.8–10.8)
WBC # BLD: 12.19 K/UL — HIGH (ref 4.8–10.8)
WBC # FLD AUTO: 12.16 K/UL — HIGH (ref 4.8–10.8)
WBC # FLD AUTO: 12.19 K/UL — HIGH (ref 4.8–10.8)

## 2021-05-30 PROCEDURE — 70496 CT ANGIOGRAPHY HEAD: CPT | Mod: 26

## 2021-05-30 PROCEDURE — 76770 US EXAM ABDO BACK WALL COMP: CPT | Mod: 26

## 2021-05-30 PROCEDURE — 74018 RADEX ABDOMEN 1 VIEW: CPT | Mod: 26

## 2021-05-30 PROCEDURE — 93010 ELECTROCARDIOGRAM REPORT: CPT

## 2021-05-30 PROCEDURE — 70498 CT ANGIOGRAPHY NECK: CPT | Mod: 26

## 2021-05-30 PROCEDURE — 70450 CT HEAD/BRAIN W/O DYE: CPT | Mod: 26,59

## 2021-05-30 PROCEDURE — 0042T: CPT

## 2021-05-30 PROCEDURE — 99291 CRITICAL CARE FIRST HOUR: CPT | Mod: 25

## 2021-05-30 PROCEDURE — 99233 SBSQ HOSP IP/OBS HIGH 50: CPT

## 2021-05-30 RX ORDER — POLYETHYLENE GLYCOL 3350 17 G/17G
17 POWDER, FOR SOLUTION ORAL DAILY
Refills: 0 | Status: DISCONTINUED | OUTPATIENT
Start: 2021-05-30 | End: 2021-06-11

## 2021-05-30 RX ORDER — SIMETHICONE 80 MG/1
80 TABLET, CHEWABLE ORAL
Refills: 0 | Status: DISCONTINUED | OUTPATIENT
Start: 2021-05-30 | End: 2021-05-30

## 2021-05-30 RX ORDER — SODIUM CHLORIDE 9 MG/ML
1000 INJECTION, SOLUTION INTRAVENOUS
Refills: 0 | Status: DISCONTINUED | OUTPATIENT
Start: 2021-05-30 | End: 2021-05-30

## 2021-05-30 RX ORDER — SENNA PLUS 8.6 MG/1
2 TABLET ORAL AT BEDTIME
Refills: 0 | Status: DISCONTINUED | OUTPATIENT
Start: 2021-05-30 | End: 2021-06-11

## 2021-05-30 RX ORDER — SODIUM CHLORIDE 9 MG/ML
1000 INJECTION INTRAMUSCULAR; INTRAVENOUS; SUBCUTANEOUS
Refills: 0 | Status: DISCONTINUED | OUTPATIENT
Start: 2021-05-30 | End: 2021-05-31

## 2021-05-30 RX ADMIN — SODIUM CHLORIDE 100 MILLILITER(S): 9 INJECTION INTRAMUSCULAR; INTRAVENOUS; SUBCUTANEOUS at 11:13

## 2021-05-30 RX ADMIN — Medication 4: at 08:22

## 2021-05-30 RX ADMIN — AMLODIPINE BESYLATE 5 MILLIGRAM(S): 2.5 TABLET ORAL at 05:02

## 2021-05-30 RX ADMIN — POLYETHYLENE GLYCOL 3350 17 GRAM(S): 17 POWDER, FOR SOLUTION ORAL at 11:09

## 2021-05-30 RX ADMIN — CILOSTAZOL 50 MILLIGRAM(S): 100 TABLET ORAL at 17:19

## 2021-05-30 RX ADMIN — HEPARIN SODIUM 5000 UNIT(S): 5000 INJECTION INTRAVENOUS; SUBCUTANEOUS at 05:02

## 2021-05-30 RX ADMIN — CHLORHEXIDINE GLUCONATE 1 APPLICATION(S): 213 SOLUTION TOPICAL at 05:02

## 2021-05-30 RX ADMIN — SODIUM CHLORIDE 100 MILLILITER(S): 9 INJECTION INTRAMUSCULAR; INTRAVENOUS; SUBCUTANEOUS at 21:42

## 2021-05-30 RX ADMIN — SIMETHICONE 80 MILLIGRAM(S): 80 TABLET, CHEWABLE ORAL at 17:18

## 2021-05-30 RX ADMIN — HEPARIN SODIUM 5000 UNIT(S): 5000 INJECTION INTRAVENOUS; SUBCUTANEOUS at 17:16

## 2021-05-30 RX ADMIN — CEFEPIME 100 MILLIGRAM(S): 1 INJECTION, POWDER, FOR SOLUTION INTRAMUSCULAR; INTRAVENOUS at 17:16

## 2021-05-30 RX ADMIN — LINEZOLID 300 MILLIGRAM(S): 600 INJECTION, SOLUTION INTRAVENOUS at 17:16

## 2021-05-30 RX ADMIN — Medication 11 UNIT(S): at 11:20

## 2021-05-30 RX ADMIN — Medication 2: at 11:19

## 2021-05-30 RX ADMIN — ONDANSETRON 4 MILLIGRAM(S): 8 TABLET, FILM COATED ORAL at 04:20

## 2021-05-30 RX ADMIN — Medication 11 UNIT(S): at 08:21

## 2021-05-30 RX ADMIN — Medication 100 MILLIGRAM(S): at 21:40

## 2021-05-30 RX ADMIN — Medication 100 MILLIGRAM(S): at 05:02

## 2021-05-30 RX ADMIN — HEPARIN SODIUM 5000 UNIT(S): 5000 INJECTION INTRAVENOUS; SUBCUTANEOUS at 21:41

## 2021-05-30 RX ADMIN — Medication 100 MILLIGRAM(S): at 17:16

## 2021-05-30 RX ADMIN — LINEZOLID 300 MILLIGRAM(S): 600 INJECTION, SOLUTION INTRAVENOUS at 05:02

## 2021-05-30 RX ADMIN — CILOSTAZOL 50 MILLIGRAM(S): 100 TABLET ORAL at 05:02

## 2021-05-30 RX ADMIN — Medication 81 MILLIGRAM(S): at 11:09

## 2021-05-30 RX ADMIN — PANTOPRAZOLE SODIUM 40 MILLIGRAM(S): 20 TABLET, DELAYED RELEASE ORAL at 05:02

## 2021-05-30 NOTE — PROVIDER CONTACT NOTE (OTHER) - ACTION/TREATMENT ORDERED:
MD Kate made aware. Will continue to monitor.
MD Kate made aware. Will order something else for pt for nausea. Will continue to monitor.
will order ekg
MD Kate made aware will continue to monitor.
MD Kate made aware. Will come draw pts labs. Will continue to monitor.
MD Mei made aware. Will continue to monitor.
MD Kate made aware will continue to monitor.
will come and assess pt and "review the chart"
will order ekg and come with U/S to get blood

## 2021-05-30 NOTE — CONSULT NOTE ADULT - SUBJECTIVE AND OBJECTIVE BOX
NEPHROLOGY CONSULTATION NOTE    THIS CONSULT IS INCOMPLETE / FULL CONSULT TO FOLLOW    Patient is a 60y Male whom presented to the hospital with     PAST MEDICAL & SURGICAL HISTORY:  MATA on CPAP    DM (diabetes mellitus)    HTN (hypertension)    High cholesterol    Charcot ankle, right    History of percutaneous angioplasty    H/O skin graft    Left toe amputee  4 TOES    Diabetic Charcot foot  Fixation with external device      Allergies:  No Known Allergies  statins (Other)    Home Medications Reviewed  Hospital Medications:   MEDICATIONS  (STANDING):  amLODIPine   Tablet 5 milliGRAM(s) Oral daily  aspirin  chewable 81 milliGRAM(s) Oral daily  cefepime   IVPB 2000 milliGRAM(s) IV Intermittent every 24 hours  chlorhexidine 4% Liquid 1 Application(s) Topical <User Schedule>  cilostazol 50 milliGRAM(s) Oral two times a day  dextrose 40% Gel 15 Gram(s) Oral once  dextrose 50% Injectable 25 Gram(s) IV Push once  dextrose 50% Injectable 12.5 Gram(s) IV Push once  dextrose 50% Injectable 25 Gram(s) IV Push once  glucagon  Injectable 1 milliGRAM(s) IntraMuscular once  heparin   Injectable 5000 Unit(s) SubCutaneous every 8 hours  insulin glargine Injectable (LANTUS) 33 Unit(s) SubCutaneous at bedtime  insulin lispro (ADMELOG) corrective regimen sliding scale   SubCutaneous three times a day before meals  insulin lispro Injectable (ADMELOG) 11 Unit(s) SubCutaneous three times a day before meals  linezolid  IVPB 600 milliGRAM(s) IV Intermittent every 12 hours  metroNIDAZOLE  IVPB 500 milliGRAM(s) IV Intermittent every 8 hours  pantoprazole    Tablet 40 milliGRAM(s) Oral before breakfast  polyethylene glycol 3350 17 Gram(s) Oral daily  senna 2 Tablet(s) Oral at bedtime      SOCIAL HISTORY:  Denies ETOH,Smoking,   FAMILY HISTORY:  Family history of diabetes mellitus (DM)    FH: leukemia          REVIEW OF SYSTEMS:  CONSTITUTIONAL: No weakness, fevers or chills  EYES/ENT: No visual changes;  No vertigo or throat pain   NECK: No pain or stiffness  RESPIRATORY: No cough, wheezing, hemoptysis; No shortness of breath  CARDIOVASCULAR: No chest pain or palpitations.  GASTROINTESTINAL: No abdominal or epigastric pain. No nausea, vomiting, or hematemesis; No diarrhea or constipation. No melena or hematochezia.  GENITOURINARY: No dysuria, frequency, foamy urine, urinary urgency, incontinence or hematuria  NEUROLOGICAL: No numbness or weakness  SKIN: No itching, burning, rashes, or lesions   VASCULAR: No bilateral lower extremity edema.   All other review of systems is negative unless indicated above.    VITALS:  T(F): 99.5 (21 @ 08:00), Max: 99.6 (21 @ 04:00)  HR: 107 (21 @ 08:00)  BP: 156/80 (21 @ 08:00)  RR: 18 (21 @ 08:00)  SpO2: 98% (21 @ 08:00)        I&O's Detail        PHYSICAL EXAM:  Constitutional: NAD  HEENT: anicteric sclera, oropharynx clear, MMM  Neck: No JVD  Respiratory: CTAB, no wheezes, rales or rhonchi  Cardiovascular: S1, S2, RRR  Gastrointestinal: BS+, soft, NT/ND  Extremities: No cyanosis or clubbing. No peripheral edema  Neurological: A/O x 3, no focal deficits  Psychiatric: Normal mood, normal affect  : No CVA tenderness. No terrazas.   Skin: No rashes  Vascular Access:    LABS:      139  |  92<L>  |  59<H>  ----------------------------<  192<H>  4.7   |  38<H>  |  4.3<HH>    Ca    9.0      30 May 2021 05:35  Phos  5.2     05  Mg     3.4         TPro  8.9<H>  /  Alb  2.9<L>  /  TBili  0.2  /  DBili      /  AST  16  /  ALT  17  /  AlkPhos  105      Creatinine Trend: 4.3 <--, 3.0 <--, 3.0 <--                        8.3    12.16 )-----------( 313      ( 30 May 2021 05:35 )             28.1     Urine Studies:  Urinalysis Basic - ( 29 May 2021 01:20 )    Color: Light Yellow / Appearance: Clear / S.028 / pH:   Gluc:  / Ketone: Negative  / Bili: Negative / Urobili: <2 mg/dL   Blood:  / Protein: Trace / Nitrite: Negative   Leuk Esterase: Negative / RBC: 3 /HPF / WBC 1 /HPF   Sq Epi:  / Non Sq Epi: 0 /HPF / Bacteria: Negative                RADIOLOGY & ADDITIONAL STUDIES:                 NEPHROLOGY CONSULTATION NOTE    Patient is a 59 y/o M with PMH of DM, hypertension, dyslipidemia, sleep apnea, Charcot foot reconstruction with external fixation, morbid obesity presenting to the hospital on  with worsening foot ulcer.  Renal called for CANDY  Patient is sp multiple admissions in the past few weeks with multiple antibiotics course but with worsening conditions of his right foot.  no chest pain no diarrhea no constipation no rash   Confused said he could not sleep overnight/ denied any urinary retention no dysuria no hematuria   sp debridement on  cx c/w Staph aureus    one dose of vanco    PAST MEDICAL & SURGICAL HISTORY:  MATA on CPAP  DM (diabetes mellitus)  HTN (hypertension)  High cholesterol  Charcot ankle, right  History of percutaneous angioplasty  H/O skin graft  Left toe amputee 4 TOES  Diabetic Charcot foot  Fixation with external device      Allergies:  No Known Allergies  statins (Other)    Home Medications Reviewed  Hospital Medications:   MEDICATIONS  (STANDING):  amLODIPine   Tablet 5 milliGRAM(s) Oral daily  aspirin  chewable 81 milliGRAM(s) Oral daily  cefepime   IVPB 2000 milliGRAM(s) IV Intermittent every 24 hours  cilostazol 50 milliGRAM(s) Oral two times a day  dextrose 40% Gel 15 Gram(s) Oral once  glucagon  Injectable 1 milliGRAM(s) IntraMuscular once  heparin   Injectable 5000 Unit(s) SubCutaneous every 8 hours  insulin glargine Injectable (LANTUS) 33 Unit(s) SubCutaneous at bedtime  insulin lispro (ADMELOG) corrective regimen sliding scale   SubCutaneous three times a day before meals  insulin lispro Injectable (ADMELOG) 11 Unit(s) SubCutaneous three times a day before meals  linezolid  IVPB 600 milliGRAM(s) IV Intermittent every 12 hours  metroNIDAZOLE  IVPB 500 milliGRAM(s) IV Intermittent every 8 hours  pantoprazole    Tablet 40 milliGRAM(s) Oral before breakfast  polyethylene glycol 3350 17 Gram(s) Oral daily  senna 2 Tablet(s) Oral at bedtime      SOCIAL HISTORY:  Denies ETOH,Smoking,   FAMILY HISTORY:  Family history of diabetes mellitus (DM)    FH: leukemia          REVIEW OF SYSTEMS:  All other review of systems is negative unless indicated above.    VITALS:  T(F): 99.5 (21 @ 08:00), Max: 99.6 (21 @ 04:00)  HR: 107 (21 @ 08:00)  BP: 156/80 (21 @ 08:00)  RR: 18 (21 @ 08:00)  SpO2: 98% (21 @ 08:00)        I&O's Detail        PHYSICAL EXAM:  Constitutional: obese  Neck: No JVD  Respiratory: CTAB, no wheezes, rales or rhonchi  Cardiovascular: S1, S2, RRR  Gastrointestinal: BS+, soft, NT/ND  Extremities: No cyanosis or clubbing. feet in dressing  Neurological: lethargic  Psychiatric: Normal mood, normal affect  : No terrazas.   Skin: No rashes  Vascular Access:    LABS:      139  |  92<L>  |  59<H>  ----------------------------<  192<H>  4.7   |  38<H>  |  4.3<HH>    Ca    9.0      30 May 2021 05:35  Phos  5.2       Mg     3.4         TPro  8.9<H>  /  Alb  2.9<L>  /  TBili  0.2  /  DBili      /  AST  16  /  ALT  17  /  AlkPhos  105      Creatinine Trend: 4.3 <--, 3.0 <--, 3.0 <--                        8.3    12.16 )-----------( 313      ( 30 May 2021 05:35 )             28.1     Urine Studies:  Urinalysis Basic - ( 29 May 2021 01:20 )    Color: Light Yellow / Appearance: Clear / S.028 / pH:   Gluc:  / Ketone: Negative  / Bili: Negative / Urobili: <2 mg/dL   Blood:  / Protein: Trace / Nitrite: Negative   Leuk Esterase: Negative / RBC: 3 /HPF / WBC 1 /HPF   Sq Epi:  / Non Sq Epi: 0 /HPF / Bacteria: Negative                RADIOLOGY & ADDITIONAL STUDIES:

## 2021-05-30 NOTE — PROVIDER CONTACT NOTE (OTHER) - ASSESSMENT
pt stable.
urine cultures and mrsa swab sent to lab. pheb unable to draw labs. md also made aware
pt remains stable
pt stable.

## 2021-05-30 NOTE — PROVIDER CONTACT NOTE (OTHER) - DATE AND TIME:
29-May-2021 20:38
30-May-2021 08:00
29-May-2021 21:53
30-May-2021 01:05
30-May-2021 04:49
30-May-2021 04:50
30-May-2021 15:45
30-May-2021 18:20
30-May-2021 00:23

## 2021-05-30 NOTE — CHART NOTE - NSCHARTNOTEFT_GEN_A_CORE
Rapid response called for AMS. On exam patient w/ Lt eye deviation and Lt UE drift. Slurred speech but no facial droop. Unable to recognize niece at bedside.     #Suspected Stroke  -neuro aware  Plan  - f/u CT Head and Perfusion  - Permissive HTN unless BP >220/120 or patient develops symptoms   - ASA after 24 hrs if neuro clears Rapid response called for AMS. On exam patient w/ Lt eye deviation and Lt UE drift. Slurred speech but no facial droop. Unable to recognize niece at bedside.     #Suspected Stroke  -neuro aware  Plan  - f/u CT Head and Perfusion  - Permissive HTN unless BP >220/120 or patient develops symptoms   - already on ASA  - consider ABG Rapid response called for AMS. On exam patient w/ Lt eye deviation and Lt UE drift. Slurred speech but no facial droop. Unable to recognize niece at bedside.     #Suspected Stroke  -neuro aware  Plan  - f/u CT Head and Perfusion  - Permissive HTN unless BP >220/120 or patient develops symptoms   - already on ASA  - consider ABG      Addendum:  Responded to call for RRT. Patient seen and evaluated.  Briefly, this is a 61 yo M pt w/ a relevant hx of an infected diabetic foot ulcer who p/w gas gangrene and promptly underwent I&D, debridement & lavage on 05/29/2021. Has been on treatment with broad spectrum IV abx. RRT called as the patient was confused. Family member (niece) at bedside noted that the patient did not recognize her, had slurring of speech & was not making sense (waxing and waning mentation). Per family member, a patient's friend visited earlier today and he also noted that the patient did not recognize him.      Subjective:  Pt responds to queries but trails off (latter part of sentence often nonsensical).  He is unable to coherently elaborate on concerns/complaints.  Last known well time yesterday.    Objective:  NIHSS ~ 7  Month & age (incorrect +2)  Gaze palsy +2  Extremity drift: +1 (LUE)  Dysarthria +1  Inattention +1  Pt alert and responsive  WBC 12.16, H&H 8.3/28.1, AG 9, bicarb 38, BUN/Cr 59/4.3,     Assessment:  (1) Suspected CVA  (2) Possibly toxic metabolic encephalopathy 2/2 infection  (3) Delirium - multifactorial  (4) Severe acute renal failure (likely has underlying CKD 3 2/2 diabetic nephropathy)  (5) Morbid obesity  (6) MATA & possible OHS    Plan:  (1) Not candidate for tPA based on timing; c/w aspirin, cilostazol; permissive HTN  (2) Neuro-imaging  (3) Frequent re-orientation and verbal de-escalation; avoid sedative hypnotics  (4) C/w abx; repeat cultures  (5) Avoid renal insults; agree w/ hydration; monitor urine output & daily BUN/Cr    Close clinical monitoring

## 2021-05-30 NOTE — PROGRESS NOTE ADULT - ASSESSMENT
· Assessment	  61 y/o M with PMH of DM, hypertension, dyslipidemia, sleep apnea, Charcot foot reconstruction with external fixation, super morbid obesity present for worsening right foot infection.  Pt reports he has had this foot ulcer at various stage of healing for 13 years. Patient has been admitted for IV antibiotics multiple times in the past for osteomyelitis of right foot. He has history of MRSA infection in foot. pt has baseline neuropathy in both feet so does not feel any pain.  Patient states his Infection has been gradually getting worse despite treatment with bactrim, he saw his podiatrists and he was sent in by podiatry for IV abx .    IMPRESSION;  R foot with abscess plantar aspect  -5/28 BCx E fecalis, CoNS  -5/28 s/p debridement  -5/28 R foot : S aureus    RECOMMENDATIONS;  -f/u OR cultures  -linezolid 600 mg iv q12h  -Cefepime 2 gm iv q24h  -flagyl 500 mg iv q8h

## 2021-05-30 NOTE — PROVIDER CONTACT NOTE (OTHER) - SITUATION
pt remains tachycardic P:103.
 unable to draw labs. elevated hr
 unable to draw am labs.
 unable to draw pts labs.
pt complains of nausea. pt not due for Zofran until 2am.
pt still complaining of nausea.
pts P: 103.
pts hr is 107. bp elevated. pt in no distress and aymtomatic
pt is confused. mumbling garbled speech. . pt is now incoten of urine. diaphorectic. pt is also tachycardic

## 2021-05-30 NOTE — CONSULT NOTE ADULT - ASSESSMENT
Patient is a 61 y/o M with PMH of DM, hypertension, dyslipidemia, sleep apnea, Charcot foot reconstruction with external fixation, morbid obesity presenting to the hospital on 5/29 with worsening foot ulcer.    ·	CANDY on CKD ( unknown stage last known creat 1.4 in 11/2019) / rule out ATN / obstructive uropathy/ sp CT angio ( too early for FLO)/ doubt IRGN ( infection related GN)/ doubt AIN (multiple antibx courses)/ high bicarb ? alkalemia  ·	suggest renal bladder sono rule out obstruction  ·	consider terrazas cathter/ strict I and O  ·	UA and urine lytes please   ·	dose meds to GFR < 15   ·	appreciate ID on cefepime flagyl and zyvox  ·	sp debridement of DFU as per podiatry  ·	check C3 C4 urine eosinophils SPEP UPEP SFLC and IF  ·	if poor po intake consider NS at 100 cc per hour  ·	check ABG/ follow BMP check IP  ·	no need for RRT    will follow

## 2021-05-30 NOTE — PROGRESS NOTE ADULT - SUBJECTIVE AND OBJECTIVE BOX
Progress Note:  Provider Speciality                            Hospitalist      FERNANDEZ GUZMAN [id number] 60y Male     CHIEF PRESENTING COMPLAINT:  Patient is a 60y old  Male who presents with a chief complaint of dfu (30 May 2021 11:01)        SUBJECTIVE:  Patient was seen and examined at bedside. Reports lethargy and emesis( non-bloody , non-bilious) in AM   No significant overnight events reported.     HISTORY OF PRESENTING ILLNESS:  HPI:  Patient is a 61 y/o M with PMH of DM, hypertension, dyslipidemia, sleep apnea, Charcot foot reconstruction with external fixation, super morbid obesity present for worsening right foot infection.  Pt reports he has had this foot ulcer at various stage of healing for 13 years. Patient has been admitted for IV antibiotics multiple times in the past for osteomyelitis of right foot. He has history of MRSA infection in foot. pt has baseline neuropathy in both feet so does not feel any pain.  Patient states his Infection has been gradually getting worse despite treatment with bactrim, he saw his podiatrists and he was sent in by podiatry for IV abx . No fever, chills, cp, sob, abd pain, nausea, vomiting, dysuria, calf pain.     In ED  T(C): 37.3 (05-29-21 @ 01:31), Max: 38 (05-28-21 @ 20:27)  HR: 100 (05-29-21 @ 01:23) (99 - 118)  BP: 119/59 (05-29-21 @ 01:23) (119/59 - 139/69)  RR: 18 (05-29-21 @ 01:23) (17 - 18)  SpO2: 96% (05-29-21 @ 01:23) (86% - 100%)  there is a Right foot ulcer on the lateral aspect of the plantar surface measuring 8.5-3cm. Ulcer is surrounded by macerated tissue. Serous drainage from wound. Kurlex and sponge dressing in place, clean, dry and intact. Patient was I&D'd bedside by podiatry in the ed. purulent drainage was evacuated and sent to the lab for culture. Patient is scheduled for OR with podiatry in the AM.   (29 May 2021 02:29)        REVIEW OF SYSTEMS:  Patient denies any headache, any vision complaints, runny nose, fever, chills, sore throat. Denies chest pain, shortness of breath, palpitation.  Denies urinary burning, urgency, frequency, dysuria. Denies weakness in any part of the body or numbness.   At least 10 systems were reviewed in ROS. All systems reviewed  are within normal limits except for the complaints as described in Subjective.    PAST MEDICAL & SURGICAL HISTORY:  PAST MEDICAL & SURGICAL HISTORY:  MATA on CPAP    DM (diabetes mellitus)    HTN (hypertension)    High cholesterol    Charcot ankle, right    History of percutaneous angioplasty    H/O skin graft    Left toe amputee  4 TOES    Diabetic Charcot foot  Fixation with external device            VITAL SIGNS:  Vital Signs Last 24 Hrs  T(C): 37.5 (30 May 2021 08:00), Max: 37.6 (30 May 2021 04:00)  T(F): 99.5 (30 May 2021 08:00), Max: 99.6 (30 May 2021 04:00)  HR: 107 (30 May 2021 08:00) (89 - 107)  BP: 156/80 (30 May 2021 08:00) (126/61 - 156/80)  BP(mean): --  RR: 18 (30 May 2021 08:00) (17 - 20)  SpO2: 98% (30 May 2021 08:00) (94% - 100%)          PHYSICAL EXAMINATION:  Not in acute distress, morbid obesity  General: No pallor, no icterus  HEENT:   EOMI, no JVD.  Heart: S1+S2 audible  Lungs: bilateral  fair air entry, no wheezing, no crepitations.  Abdomen: Soft, non-tender, non-distended , no  rigidity or guarding.  CNS: Awake alert, CN  grossly intact.  Extremities:  Right foot ulcer on the lateral aspect of the plantar surface measuring 8.5-3cm. Ulcer is surrounded by macerated tissue. Serous drainage from wound. Kurlex and sponge dressing in place, clean, dry and intact.         CONSULTS:  Consultant(s) Notes Reviewed by me.   Care Discussed with Consultants/Other Providers where required.        MEDICATIONS:  MEDICATIONS  (STANDING):  amLODIPine   Tablet 5 milliGRAM(s) Oral daily  aspirin  chewable 81 milliGRAM(s) Oral daily  cefepime   IVPB 2000 milliGRAM(s) IV Intermittent every 24 hours  chlorhexidine 4% Liquid 1 Application(s) Topical <User Schedule>  cilostazol 50 milliGRAM(s) Oral two times a day  dextrose 40% Gel 15 Gram(s) Oral once  dextrose 50% Injectable 25 Gram(s) IV Push once  dextrose 50% Injectable 12.5 Gram(s) IV Push once  dextrose 50% Injectable 25 Gram(s) IV Push once  glucagon  Injectable 1 milliGRAM(s) IntraMuscular once  heparin   Injectable 5000 Unit(s) SubCutaneous every 8 hours  insulin glargine Injectable (LANTUS) 33 Unit(s) SubCutaneous at bedtime  insulin lispro (ADMELOG) corrective regimen sliding scale   SubCutaneous three times a day before meals  insulin lispro Injectable (ADMELOG) 11 Unit(s) SubCutaneous three times a day before meals  linezolid  IVPB 600 milliGRAM(s) IV Intermittent every 12 hours  metroNIDAZOLE  IVPB 500 milliGRAM(s) IV Intermittent every 8 hours  pantoprazole    Tablet 40 milliGRAM(s) Oral before breakfast  polyethylene glycol 3350 17 Gram(s) Oral daily  senna 2 Tablet(s) Oral at bedtime  sodium chloride 0.9%. 1000 milliLiter(s) (100 mL/Hr) IV Continuous <Continuous>    MEDICATIONS  (PRN):  aluminum hydroxide/magnesium hydroxide/simethicone Suspension 30 milliLiter(s) Oral every 4 hours PRN Dyspepsia  ondansetron Injectable 4 milliGRAM(s) IV Push every 8 hours PRN Nausea and/or Vomiting            ASSESSMENT:  61 y/o M with PMH of DM, hypertension, dyslipidemia, sleep apnea, Charcot foot reconstruction with external fixation, super morbid obesity present for worsening right foot infection.          R foot with abscess  CANDY on CKD stage 3, possible secondary to bactrim vs  FLO  Elevated troponin  Lactic acidosis  Morbid obesity  MATA. possible OHS  Hypertension  Dyslipidemia  DM2          Status post debridement  5/28   and   H/o  Charcot foot reconstruction with external fixation  Follow up OR cultures  Continue linezolid 600 mg iv q12h, Cefepime 2 gm iv q24h, flagyl 500 mg iv q8h , asper ID  CANDY- FLO vs nephropathy secondary to bactrim( reported recent 3 weeks of bactrim use) Get et renal US, bladder scan, Urine lytes, Nephrology consult  IVF, NS at 100 ml/hr for now. Reevaluate in AM  Acute on chronic normocytic anemia, possible AOCD. Follow anemia w/u  CTA- Approximately 50% stenosis in the left common iliac artery.No aortic aneurysm or dissection. Vascular on board  DM2- Lantus 33, lispro 11 tid pre-prandial & sliding scal coverage      Handoff: Discharge Disposition: Acute inpatient management  required further      Progress Note:  Provider Speciality                            Hospitalist      FERNANDEZ GUZMAN [id number] 60y Male     CHIEF PRESENTING COMPLAINT:  Patient is a 60y old  Male who presents with a chief complaint of dfu (30 May 2021 11:01)        SUBJECTIVE:  Patient was seen and examined at bedside. Reports lethargy and emesis( non-bloody , non-bilious) in AM   No significant overnight events reported.     HISTORY OF PRESENTING ILLNESS:  HPI:  Patient is a 59 y/o M with PMH of DM, hypertension, dyslipidemia, sleep apnea, Charcot foot reconstruction with external fixation, super morbid obesity present for worsening right foot infection.  Pt reports he has had this foot ulcer at various stage of healing for 13 years. Patient has been admitted for IV antibiotics multiple times in the past for osteomyelitis of right foot. He has history of MRSA infection in foot. pt has baseline neuropathy in both feet so does not feel any pain.  Patient states his Infection has been gradually getting worse despite treatment with bactrim, he saw his podiatrists and he was sent in by podiatry for IV abx . No fever, chills, cp, sob, abd pain, nausea, vomiting, dysuria, calf pain.     In ED  T(C): 37.3 (05-29-21 @ 01:31), Max: 38 (05-28-21 @ 20:27)  HR: 100 (05-29-21 @ 01:23) (99 - 118)  BP: 119/59 (05-29-21 @ 01:23) (119/59 - 139/69)  RR: 18 (05-29-21 @ 01:23) (17 - 18)  SpO2: 96% (05-29-21 @ 01:23) (86% - 100%)  there is a Right foot ulcer on the lateral aspect of the plantar surface measuring 8.5-3cm. Ulcer is surrounded by macerated tissue. Serous drainage from wound. Kurlex and sponge dressing in place, clean, dry and intact. Patient was I&D'd bedside by podiatry in the ed. purulent drainage was evacuated and sent to the lab for culture. Patient is scheduled for OR with podiatry in the AM.   (29 May 2021 02:29)        REVIEW OF SYSTEMS:  Patient denies any headache, any vision complaints, runny nose, fever, chills, sore throat. Denies chest pain, shortness of breath, palpitation.  Denies urinary burning, urgency, frequency, dysuria. Denies weakness in any part of the body or numbness.   At least 10 systems were reviewed in ROS. All systems reviewed  are within normal limits except for the complaints as described in Subjective.    PAST MEDICAL & SURGICAL HISTORY:  PAST MEDICAL & SURGICAL HISTORY:  MATA on CPAP    DM (diabetes mellitus)    HTN (hypertension)    High cholesterol    Charcot ankle, right    History of percutaneous angioplasty    H/O skin graft    Left toe amputee  4 TOES    Diabetic Charcot foot  Fixation with external device            VITAL SIGNS:  Vital Signs Last 24 Hrs  T(C): 37.5 (30 May 2021 08:00), Max: 37.6 (30 May 2021 04:00)  T(F): 99.5 (30 May 2021 08:00), Max: 99.6 (30 May 2021 04:00)  HR: 107 (30 May 2021 08:00) (89 - 107)  BP: 156/80 (30 May 2021 08:00) (126/61 - 156/80)  BP(mean): --  RR: 18 (30 May 2021 08:00) (17 - 20)  SpO2: 98% (30 May 2021 08:00) (94% - 100%)          PHYSICAL EXAMINATION:  Not in acute distress, morbid obesity  General: No pallor, no icterus  HEENT:   EOMI, no JVD.  Heart: S1+S2 audible  Lungs: bilateral  fair air entry, no wheezing, no crepitations.  Abdomen: Soft, non-tender, non-distended , no  rigidity or guarding.  CNS: Awake alert, CN  grossly intact.  Extremities:  Right foot ulcer on the lateral aspect of the plantar surface measuring 8.5-3cm. Ulcer is surrounded by macerated tissue. Serous drainage from wound. Kurlex and sponge dressing in place, clean, dry and intact.         CONSULTS:  Consultant(s) Notes Reviewed by me.   Care Discussed with Consultants/Other Providers where required.        MEDICATIONS:  MEDICATIONS  (STANDING):  amLODIPine   Tablet 5 milliGRAM(s) Oral daily  aspirin  chewable 81 milliGRAM(s) Oral daily  cefepime   IVPB 2000 milliGRAM(s) IV Intermittent every 24 hours  chlorhexidine 4% Liquid 1 Application(s) Topical <User Schedule>  cilostazol 50 milliGRAM(s) Oral two times a day  dextrose 40% Gel 15 Gram(s) Oral once  dextrose 50% Injectable 25 Gram(s) IV Push once  dextrose 50% Injectable 12.5 Gram(s) IV Push once  dextrose 50% Injectable 25 Gram(s) IV Push once  glucagon  Injectable 1 milliGRAM(s) IntraMuscular once  heparin   Injectable 5000 Unit(s) SubCutaneous every 8 hours  insulin glargine Injectable (LANTUS) 33 Unit(s) SubCutaneous at bedtime  insulin lispro (ADMELOG) corrective regimen sliding scale   SubCutaneous three times a day before meals  insulin lispro Injectable (ADMELOG) 11 Unit(s) SubCutaneous three times a day before meals  linezolid  IVPB 600 milliGRAM(s) IV Intermittent every 12 hours  metroNIDAZOLE  IVPB 500 milliGRAM(s) IV Intermittent every 8 hours  pantoprazole    Tablet 40 milliGRAM(s) Oral before breakfast  polyethylene glycol 3350 17 Gram(s) Oral daily  senna 2 Tablet(s) Oral at bedtime  sodium chloride 0.9%. 1000 milliLiter(s) (100 mL/Hr) IV Continuous <Continuous>    MEDICATIONS  (PRN):  aluminum hydroxide/magnesium hydroxide/simethicone Suspension 30 milliLiter(s) Oral every 4 hours PRN Dyspepsia  ondansetron Injectable 4 milliGRAM(s) IV Push every 8 hours PRN Nausea and/or Vomiting            ASSESSMENT:  59 y/o M with PMH of DM, hypertension, dyslipidemia, sleep apnea, Charcot foot reconstruction with external fixation, super morbid obesity present for worsening right foot infection.          R foot with abscess  CANDY on CKD stage 3, possible secondary to bactrim vs  FLO  Elevated troponin  Lactic acidosis  E fecalis bacteremia  Morbid obesity  MATA. possible OHS  Hypertension  Dyslipidemia  DM2          Status post debridement  5/28   and   H/o  Charcot foot reconstruction with external fixation  Follow up OR cultures. E fecalis bacteremia 05/28  Continue linezolid 600 mg iv q12h, Cefepime 2 gm iv q24h, flagyl 500 mg iv q8h , asper ID  CANDY- FLO vs nephropathy secondary to bactrim( reported recent 3 weeks of bactrim use) Get et renal US, bladder scan, Urine lytes, Nephrology consult  IVF, NS at 100 ml/hr for now. Reevaluate in AM  Acute on chronic normocytic anemia, possible AOCD. Follow anemia w/u  CTA- Approximately 50% stenosis in the left common iliac artery. No aortic aneurysm or dissection. Vascular on board  DM2- Lantus 33, lispro 11 tid pre-prandial & sliding scal coverage  Discussion: Discussed in detail with pt . Pt says no family member is there who needs update  Handoff: Discharge Disposition: Acute inpatient management  required further

## 2021-05-30 NOTE — PROGRESS NOTE ADULT - SUBJECTIVE AND OBJECTIVE BOX
Podiatry Progress Note    Subjective:   FERNANDEZ GUZMAN is a pleasant well-nourished, well developed 60y Male in no acute distress, alert awake, and oriented to person, place and time.    Patient is a 60y old  Male who presents with a chief complaint of dfu (30 May 2021 11:16)    PAST MEDICAL & SURGICAL HISTORY:  MATA on CPAP  DM (diabetes mellitus)  HTN (hypertension)  High cholesterol  Charcot ankle, right  History of percutaneous angioplasty  H/O skin graft  Left toe amputee  4 TOES  Diabetic Charcot foot  Fixation with external device    Objective:  Vital Signs Last 24 Hrs  T(C): 37.5 (30 May 2021 08:00), Max: 37.6 (30 May 2021 04:00)  T(F): 99.5 (30 May 2021 08:00), Max: 99.6 (30 May 2021 04:00)  HR: 107 (30 May 2021 08:00) (91 - 107)  BP: 156/80 (30 May 2021 08:00) (126/61 - 156/80)  BP(mean): --  RR: 18 (30 May 2021 08:00) (17 - 18)  SpO2: 98% (30 May 2021 08:00) (94% - 100%)                        8.3    12.16 )-----------( 313      ( 30 May 2021 05:35 )             28.1                 05-30    139  |  92<L>  |  59<H>  ----------------------------<  192<H>  4.7   |  38<H>  |  4.3<HH>    Ca    9.0      30 May 2021 05:35  Phos  5.2     05-29  Mg     3.4     05-30    TPro  8.9<H>  /  Alb  2.9<L>  /  TBili  0.2  /  DBili  x   /  AST  16  /  ALT  17  /  AlkPhos  105  05-30    Physical Exam - Lower Extremity Focused:   Derm:   Open Surgical Incision Wounds on the Plantar Lateral and Posterior Aspect of Right Foot;  No Purulence w/ Mild Bleeding Noted on the Lateral Aspect;  Open Pressure UIcer on the Plantar Midfoot;     Vascular: DP and PT Pulses Diminished; Foot is Warm to Warm to the touch   Neuro: Protective Sensation Diminished / Moderately Neuropathic     Assessment:   s/p I&D Right Foot; 5/29;  Open Surgical Wounds; Right Foot;     Plan:  Local Wound Care; Flush w/ NS; 1/4 Iodoform Packing / DSD / ABD / Kerlix / Ace Wrap; Right Foot;  Weight Bearing Status; NWB Right Foot;   Follow Up w/ ID and OR Cultures;    Will Continue w/ Local Wound Care; Q24 Dressing Changes;  Podiatry Following;  Discussed Plan w/ Dr. Hudson;     Podiatry      Podiatry Progress Note    Subjective:   FERNANDEZ GUZMAN is a pleasant well-nourished, well developed 60y Male in no acute distress, alert awake, and oriented to person, place and time.    Patient is a 60y old  Male who presents with a chief complaint of dfu (30 May 2021 11:16)    PAST MEDICAL & SURGICAL HISTORY:  MATA on CPAP  DM (diabetes mellitus)  HTN (hypertension)  High cholesterol  Charcot ankle, right  History of percutaneous angioplasty  H/O skin graft  Left toe amputee  4 TOES  Diabetic Charcot foot  Fixation with external device    Objective:  Vital Signs Last 24 Hrs  T(C): 37.5 (30 May 2021 08:00), Max: 37.6 (30 May 2021 04:00)  T(F): 99.5 (30 May 2021 08:00), Max: 99.6 (30 May 2021 04:00)  HR: 107 (30 May 2021 08:00) (91 - 107)  BP: 156/80 (30 May 2021 08:00) (126/61 - 156/80)  BP(mean): --  RR: 18 (30 May 2021 08:00) (17 - 18)  SpO2: 98% (30 May 2021 08:00) (94% - 100%)                        8.3    12.16 )-----------( 313      ( 30 May 2021 05:35 )             28.1                 05-30    139  |  92<L>  |  59<H>  ----------------------------<  192<H>  4.7   |  38<H>  |  4.3<HH>    Ca    9.0      30 May 2021 05:35  Phos  5.2     05-29  Mg     3.4     05-30    TPro  8.9<H>  /  Alb  2.9<L>  /  TBili  0.2  /  DBili  x   /  AST  16  /  ALT  17  /  AlkPhos  105  05-30    Physical Exam - Lower Extremity Focused:   Derm:   Open Surgical Incision Wounds on the Plantar Lateral and Posterior Aspect of Right Foot;  No Purulence w/ Mild Bleeding Noted on the Lateral Aspect;  Open Pressure UIcer on the Plantar Midfoot;     Vascular: DP and PT Pulses Diminished; Foot is Warm to Warm to the touch   Neuro: Protective Sensation Diminished / Moderately Neuropathic     Assessment:   s/p I&D Right Foot; 5/29;  Open Surgical Wounds; Right Foot;     Plan:  Local Wound Care; Flush w/ NS; 1/4 Iodoform Packing / DSD / ABD / Kerlix / Ace Wrap; Right Foot;  Weight Bearing Status; NWB Right Foot;   Follow Up w/ ID and OR Cultures;    Will Continue w/ Local Wound Care; Q24 Dressing Changes;  Will Need Skin Closure Sometime Next Week; As Infection Clears;   Podiatry Following; Will Continue to Monitor;   Discussed Plan w/ Dr. Hudson;     Podiatry

## 2021-05-30 NOTE — PROGRESS NOTE ADULT - SUBJECTIVE AND OBJECTIVE BOX
THOMAS FERNANDEZ  60y, Male    All available historical data reviewed    OVERNIGHT EVENTS:  none    ROS:  General: Denies rigors, nightsweats  HEENT: Denies headache, rhinorrhea, sore throat, eye pain  CV: Denies CP, palpitations  PULM: Denies wheezing, hemoptysis  GI: Denies hematemesis, hematochezia, melena  : Denies discharge, hematuria  MSK: Denies arthralgias, myalgias  SKIN: Denies rash, lesions  NEURO: Denies paresthesias, weakness  PSYCH: Denies depression, anxiety    VITALS:  T(F): 99.5, Max: 99.6 (21 @ 04:00)  HR: 107  BP: 156/80  RR: 18Vital Signs Last 24 Hrs  T(C): 37.5 (30 May 2021 08:00), Max: 37.6 (30 May 2021 04:00)  T(F): 99.5 (30 May 2021 08:00), Max: 99.6 (30 May 2021 04:00)  HR: 107 (30 May 2021 08:00) (89 - 107)  BP: 156/80 (30 May 2021 08:00) (126/61 - 156/80)  BP(mean): --  RR: 18 (30 May 2021 08:00) (17 - 20)  SpO2: 98% (30 May 2021 08:00) (94% - 100%)    TESTS & MEASUREMENTS:                        8.3    12.16 )-----------( 313      ( 30 May 2021 05:35 )             28.1     05-30    139  |  92<L>  |  59<H>  ----------------------------<  192<H>  4.7   |  38<H>  |  4.3<HH>    Ca    9.0      30 May 2021 05:35  Phos  5.2     05-29  Mg     3.4     05-30    TPro  8.9<H>  /  Alb  2.9<L>  /  TBili  0.2  /  DBili  x   /  AST  16  /  ALT  17  /  AlkPhos  105  05-30    LIVER FUNCTIONS - ( 30 May 2021 05:35 )  Alb: 2.9 g/dL / Pro: 8.9 g/dL / ALK PHOS: 105 U/L / ALT: 17 U/L / AST: 16 U/L / GGT: x             Culture - Acid Fast - Other w/Smear (collected 21 @ 06:10)  Source: .Other None(R-FOOT)    Culture - Acid Fast - Other w/Smear (collected 21 @ 06:10)  Source: .Other None (R-FOOT)    Culture - Urine (collected 21 @ 01:20)  Source: .Urine Clean Catch (Midstream)  Final Report (21 @ 08:11):    <10,000 CFU/mL Normal Urogenital Mary    Culture - Other (collected 21 @ 18:32)  Source: .Other R foot DFU  Preliminary Report (21 @ 19:55):    Moderate Staphylococcus aureus    Normal skin mary isolated    Culture - Blood (collected 21 @ 18:28)  Source: .Blood Blood-Peripheral  Gram Stain (21 @ 18:30):    Growth in anaerobic bottle: Gram positive cocci in pairs    Growth in aerobic bottle: Gram positive cocci in pairs  Preliminary Report (21 @ 18:30):    Growth in anaerobic bottle: Gram positive cocci in pairs    ***Blood Panel PCR results on this specimen are available    approximately 3 hours after the Gram stain result.***    Gram stain, PCR, and/or culture results may not always    correspond due to difference in methodologies.    ************************************************************    This PCR assay was performed by multiplex PCR. This    Assay tests for 66 bacterial and resistance gene targets.    Please refer to the [facility] test directory    at [email or web address] for details.    Growth in aerobic bottle: Gram positive cocci in pairs  Organism: Blood Culture PCR (21 @ 18:15)  Organism: Blood Culture PCR (21 @ 18:15)      -  Coagulase negative Staphylococcus: Detec      -  Enterococcus faecalis: Detec      Method Type: PCR    Culture - Blood (collected 21 @ 18:28)  Source: .Blood Blood-Peripheral  Gram Stain (21 @ 06:00):    Growth in aerobic bottle: Gram positive cocci in pairs    Growth in anaerobic bottle: Gram positive cocci in pairs  Preliminary Report (21 @ 06:01):    Growth in aerobic bottle: Gram positive cocci in pairs    Growth in anaerobic bottle: Gram positive cocci in pairs      Urinalysis Basic - ( 29 May 2021 01:20 )    Color: Light Yellow / Appearance: Clear / S.028 / pH: x  Gluc: x / Ketone: Negative  / Bili: Negative / Urobili: <2 mg/dL   Blood: x / Protein: Trace / Nitrite: Negative   Leuk Esterase: Negative / RBC: 3 /HPF / WBC 1 /HPF   Sq Epi: x / Non Sq Epi: 0 /HPF / Bacteria: Negative          RADIOLOGY & ADDITIONAL TESTS:  Personal review of radiological diagnostics performed  Echo and EKG results noted when applicable.     MEDICATIONS:  aluminum hydroxide/magnesium hydroxide/simethicone Suspension 30 milliLiter(s) Oral every 4 hours PRN  amLODIPine   Tablet 5 milliGRAM(s) Oral daily  aspirin  chewable 81 milliGRAM(s) Oral daily  cefepime   IVPB 2000 milliGRAM(s) IV Intermittent every 24 hours  chlorhexidine 4% Liquid 1 Application(s) Topical <User Schedule>  cilostazol 50 milliGRAM(s) Oral two times a day  dextrose 40% Gel 15 Gram(s) Oral once  dextrose 50% Injectable 25 Gram(s) IV Push once  dextrose 50% Injectable 12.5 Gram(s) IV Push once  dextrose 50% Injectable 25 Gram(s) IV Push once  glucagon  Injectable 1 milliGRAM(s) IntraMuscular once  heparin   Injectable 5000 Unit(s) SubCutaneous every 8 hours  insulin glargine Injectable (LANTUS) 33 Unit(s) SubCutaneous at bedtime  insulin lispro (ADMELOG) corrective regimen sliding scale   SubCutaneous three times a day before meals  insulin lispro Injectable (ADMELOG) 11 Unit(s) SubCutaneous three times a day before meals  linezolid  IVPB 600 milliGRAM(s) IV Intermittent every 12 hours  metroNIDAZOLE  IVPB 500 milliGRAM(s) IV Intermittent every 8 hours  ondansetron Injectable 4 milliGRAM(s) IV Push every 8 hours PRN  pantoprazole    Tablet 40 milliGRAM(s) Oral before breakfast  polyethylene glycol 3350 17 Gram(s) Oral daily  senna 2 Tablet(s) Oral at bedtime  sodium chloride 0.9%. 1000 milliLiter(s) IV Continuous <Continuous>      ANTIBIOTICS:  cefepime   IVPB 2000 milliGRAM(s) IV Intermittent every 24 hours  linezolid  IVPB 600 milliGRAM(s) IV Intermittent every 12 hours  metroNIDAZOLE  IVPB 500 milliGRAM(s) IV Intermittent every 8 hours

## 2021-05-30 NOTE — CHART NOTE - NSCHARTNOTEFT_GEN_A_CORE
Transfer Note    Transfer from:4B  Transfer to: Unit     61 yo M pt w/ a relevant hx of an infected diabetic foot ulcer who p/w gas gangrene and promptly underwent I&D, debridement & lavage on 05/29/2021. Has been on treatment with broad spectrum IV abx. RRT called as the patient was confused. Family member (niece) at bedside noted that the patient did not recognize her, had slurring of speech & was not making sense (waxing and waning mentation). Per family member, a patient's friend visited earlier today and he also noted that the patient did not recognize him. Pt responds to queries but trails off (latter part of sentence often nonsensical). He is unable to coherently elaborate on concerns/complaints.    Objective:  NIHSS ~ 7  Month & age (incorrect +2)  Gaze palsy +2  Extremity drift: +1 (LUE)  Dysarthria +1  Inattention +1  Pt alert and responsive  WBC 12.16, H&H 8.3/28.1, AG 9, bicarb 38, BUN/Cr 59/4.3,       ASSESSMENT & PLAN:     #suspected CVA  -Not candidate for tPA based on timing; c/w aspirin, cilostazol; permissive HTN  -CTH -No evidence of acute intracranial pathology.  -CT Angio- Moderate stenoses at the junction of the precavernous and cavernous segments of both internal carotid arteries.   -f/u MRI  - Frequent re-orientation and verbal de-escalation; avoid sedative hypnotics    #Possibly toxic metabolic encephalopathy 2/2 infection  - Continue linezolid 600 mg iv q12h, Cefepime 2 gm iv q24h, flagyl 500 mg iv q8h , as per ID   -f/u repeat Blood cultures  -Follow up OR cultures. E fecalis bacteremia 05/28    #CANDY- FLO vs nephropathy secondary to bactrim( reported recent 3 weeks of bactrim use)   Avoid renal insults;  f/u renal US, bladder scan, Urine lytes, Nephrology consult  IVF, NS at 100 ml/hr for now.   monitor urine output & daily BUN/Cr  Reevaluate in AM    #Morbid obesity  #MATA & possible OHS  CPAP at Bedtime     #Acute on chronic normocytic anemia, possible AOCD  - Follow anemia w/u      #CTA- Approximately 50% stenosis in the left common iliac artery. No aortic aneurysm or dissection.   Vascular on board      #DM2- Lantus 33, lispro 11 tid pre-prandial & sliding scal coverage    Close clinical monitoring in the unit           For Follow-Up:  Stat Labs -CBC, CMP, BCx, Trops         Vital Signs Last 24 Hrs  T(C): 36.3 (30 May 2021 19:50), Max: 37.6 (30 May 2021 04:00)  T(F): 97.3 (30 May 2021 19:50), Max: 99.6 (30 May 2021 04:00)  HR: 101 (30 May 2021 19:50) (99 - 107)  BP: 140/69 (30 May 2021 19:50) (137/72 - 156/80)  BP(mean): --  RR: 22 (30 May 2021 19:50) (17 - 22)  SpO2: 98% (30 May 2021 19:50) (98% - 100%)  I&O's Summary    30 May 2021 07:01  -  30 May 2021 22:47  --------------------------------------------------------  IN: 180 mL / OUT: 0 mL / NET: 180 mL          MEDICATIONS  (STANDING):  amLODIPine   Tablet 5 milliGRAM(s) Oral daily  aspirin  chewable 81 milliGRAM(s) Oral daily  cefepime   IVPB 2000 milliGRAM(s) IV Intermittent every 24 hours  chlorhexidine 4% Liquid 1 Application(s) Topical <User Schedule>  cilostazol 50 milliGRAM(s) Oral two times a day  dextrose 40% Gel 15 Gram(s) Oral once  dextrose 50% Injectable 25 Gram(s) IV Push once  dextrose 50% Injectable 12.5 Gram(s) IV Push once  dextrose 50% Injectable 25 Gram(s) IV Push once  glucagon  Injectable 1 milliGRAM(s) IntraMuscular once  heparin   Injectable 5000 Unit(s) SubCutaneous every 8 hours  insulin glargine Injectable (LANTUS) 33 Unit(s) SubCutaneous at bedtime  insulin lispro (ADMELOG) corrective regimen sliding scale   SubCutaneous three times a day before meals  insulin lispro Injectable (ADMELOG) 11 Unit(s) SubCutaneous three times a day before meals  linezolid  IVPB 600 milliGRAM(s) IV Intermittent every 12 hours  metroNIDAZOLE  IVPB 500 milliGRAM(s) IV Intermittent every 8 hours  pantoprazole    Tablet 40 milliGRAM(s) Oral before breakfast  polyethylene glycol 3350 17 Gram(s) Oral daily  senna 2 Tablet(s) Oral at bedtime  sodium chloride 0.9%. 1000 milliLiter(s) (100 mL/Hr) IV Continuous <Continuous>    MEDICATIONS  (PRN):  ondansetron Injectable 4 milliGRAM(s) IV Push every 8 hours PRN Nausea and/or Vomiting        LABS                                            7.7                   Neurophils% (auto):   73.7   (05-30 @ 20:07):    12.19)-----------(312          Lymphocytes% (auto):  12.5                                          26.8                   Eosinphils% (auto):   1.3      Manual%: Neutrophils x    ; Lymphocytes x    ; Eosinophils x    ; Bands%: x    ; Blasts x                                    139    |  92     |  59                  Calcium: 9.0   / iCa: x      (05-30 @ 05:35)    ----------------------------<  192       Magnesium: 3.4                              4.7     |  38     |  4.3              Phosphorous: x        TPro  8.9    /  Alb  2.9    /  TBili  0.2    /  DBili  x      /  AST  16     /  ALT  17     /  AlkPhos  105    30 May 2021 05:35

## 2021-05-31 ENCOUNTER — RESULT REVIEW (OUTPATIENT)
Age: 61
End: 2021-05-31

## 2021-05-31 LAB
-  AMPICILLIN/SULBACTAM: SIGNIFICANT CHANGE UP
-  AMPICILLIN: SIGNIFICANT CHANGE UP
-  CEFAZOLIN: SIGNIFICANT CHANGE UP
-  CLINDAMYCIN: SIGNIFICANT CHANGE UP
-  DAPTOMYCIN: SIGNIFICANT CHANGE UP
-  ERYTHROMYCIN: SIGNIFICANT CHANGE UP
-  GENTAMICIN SYNERGY: SIGNIFICANT CHANGE UP
-  GENTAMICIN: SIGNIFICANT CHANGE UP
-  LINEZOLID: SIGNIFICANT CHANGE UP
-  OXACILLIN: SIGNIFICANT CHANGE UP
-  PENICILLIN: SIGNIFICANT CHANGE UP
-  RIFAMPIN: SIGNIFICANT CHANGE UP
-  TETRACYCLINE: SIGNIFICANT CHANGE UP
-  TRIMETHOPRIM/SULFAMETHOXAZOLE: SIGNIFICANT CHANGE UP
-  VANCOMYCIN: SIGNIFICANT CHANGE UP
ALBUMIN SERPL ELPH-MCNC: 2.7 G/DL — LOW (ref 3.5–5.2)
ALP SERPL-CCNC: 99 U/L — SIGNIFICANT CHANGE UP (ref 30–115)
ALT FLD-CCNC: 19 U/L — SIGNIFICANT CHANGE UP (ref 0–41)
ANION GAP SERPL CALC-SCNC: 10 MMOL/L — SIGNIFICANT CHANGE UP (ref 7–14)
AST SERPL-CCNC: 26 U/L — SIGNIFICANT CHANGE UP (ref 0–41)
BASE EXCESS BLDA CALC-SCNC: 1.9 MMOL/L — SIGNIFICANT CHANGE UP (ref -2–2)
BASE EXCESS BLDA CALC-SCNC: 6.6 MMOL/L — HIGH (ref -2–2)
BASOPHILS # BLD AUTO: 0.04 K/UL — SIGNIFICANT CHANGE UP (ref 0–0.2)
BASOPHILS NFR BLD AUTO: 0.3 % — SIGNIFICANT CHANGE UP (ref 0–1)
BILIRUB SERPL-MCNC: <0.2 MG/DL — SIGNIFICANT CHANGE UP (ref 0.2–1.2)
BUN SERPL-MCNC: 67 MG/DL — CRITICAL HIGH (ref 10–20)
CALCIUM SERPL-MCNC: 7.9 MG/DL — LOW (ref 8.5–10.1)
CHLORIDE SERPL-SCNC: 97 MMOL/L — LOW (ref 98–110)
CHOLEST SERPL-MCNC: 115 MG/DL — SIGNIFICANT CHANGE UP
CO2 SERPL-SCNC: 32 MMOL/L — SIGNIFICANT CHANGE UP (ref 17–32)
CREAT SERPL-MCNC: 6.3 MG/DL — CRITICAL HIGH (ref 0.7–1.5)
CULTURE RESULTS: SIGNIFICANT CHANGE UP
CULTURE RESULTS: SIGNIFICANT CHANGE UP
EOSINOPHIL # BLD AUTO: 0.18 K/UL — SIGNIFICANT CHANGE UP (ref 0–0.7)
EOSINOPHIL NFR BLD AUTO: 1.3 % — SIGNIFICANT CHANGE UP (ref 0–8)
GAS PNL BLDA: SIGNIFICANT CHANGE UP
GLUCOSE BLDC GLUCOMTR-MCNC: 74 MG/DL — SIGNIFICANT CHANGE UP (ref 70–99)
GLUCOSE BLDC GLUCOMTR-MCNC: 88 MG/DL — SIGNIFICANT CHANGE UP (ref 70–99)
GLUCOSE BLDC GLUCOMTR-MCNC: 91 MG/DL — SIGNIFICANT CHANGE UP (ref 70–99)
GLUCOSE BLDC GLUCOMTR-MCNC: 94 MG/DL — SIGNIFICANT CHANGE UP (ref 70–99)
GLUCOSE SERPL-MCNC: 81 MG/DL — SIGNIFICANT CHANGE UP (ref 70–99)
HCO3 BLDA-SCNC: 28 MMOL/L — SIGNIFICANT CHANGE UP (ref 21–29)
HCO3 BLDA-SCNC: 34 MMOL/L — HIGH (ref 21–29)
HCT VFR BLD CALC: 25.8 % — LOW (ref 42–52)
HDLC SERPL-MCNC: 27 MG/DL — LOW
HGB BLD-MCNC: 7.3 G/DL — LOW (ref 14–18)
IMM GRANULOCYTES NFR BLD AUTO: 1.1 % — HIGH (ref 0.1–0.3)
LIPID PNL WITH DIRECT LDL SERPL: 54 MG/DL — SIGNIFICANT CHANGE UP
LYMPHOCYTES # BLD AUTO: 1.77 K/UL — SIGNIFICANT CHANGE UP (ref 1.2–3.4)
LYMPHOCYTES # BLD AUTO: 12.9 % — LOW (ref 20.5–51.1)
MAGNESIUM SERPL-MCNC: 3.3 MG/DL — CRITICAL HIGH (ref 1.8–2.4)
MCHC RBC-ENTMCNC: 24.9 PG — LOW (ref 27–31)
MCHC RBC-ENTMCNC: 28.3 G/DL — LOW (ref 32–37)
MCV RBC AUTO: 88.1 FL — SIGNIFICANT CHANGE UP (ref 80–94)
METHOD TYPE: SIGNIFICANT CHANGE UP
MONOCYTES # BLD AUTO: 1.55 K/UL — HIGH (ref 0.1–0.6)
MONOCYTES NFR BLD AUTO: 11.3 % — HIGH (ref 1.7–9.3)
NEUTROPHILS # BLD AUTO: 10 K/UL — HIGH (ref 1.4–6.5)
NEUTROPHILS NFR BLD AUTO: 73.1 % — SIGNIFICANT CHANGE UP (ref 42.2–75.2)
NON HDL CHOLESTEROL: 88 MG/DL — SIGNIFICANT CHANGE UP
NRBC # BLD: 0 /100 WBCS — SIGNIFICANT CHANGE UP (ref 0–0)
ORGANISM # SPEC MICROSCOPIC CNT: SIGNIFICANT CHANGE UP
PCO2 BLDA: 54 MMHG — HIGH (ref 38–42)
PCO2 BLDA: 69 MMHG — CRITICAL HIGH (ref 38–42)
PH BLDA: 7.3 — LOW (ref 7.38–7.42)
PH BLDA: 7.33 — LOW (ref 7.38–7.42)
PLATELET # BLD AUTO: 306 K/UL — SIGNIFICANT CHANGE UP (ref 130–400)
PO2 BLDA: 150 MMHG — HIGH (ref 78–95)
PO2 BLDA: 72 MMHG — LOW (ref 78–95)
POTASSIUM SERPL-MCNC: 5.5 MMOL/L — HIGH (ref 3.5–5)
POTASSIUM SERPL-SCNC: 5.5 MMOL/L — HIGH (ref 3.5–5)
PROT SERPL-MCNC: 8.4 G/DL — HIGH (ref 6–8)
RBC # BLD: 2.93 M/UL — LOW (ref 4.7–6.1)
RBC # FLD: 17.9 % — HIGH (ref 11.5–14.5)
SAO2 % BLDA: 94 % — SIGNIFICANT CHANGE UP (ref 92–96)
SAO2 % BLDA: 99 % — HIGH (ref 92–96)
SODIUM SERPL-SCNC: 139 MMOL/L — SIGNIFICANT CHANGE UP (ref 135–146)
SPECIMEN SOURCE: SIGNIFICANT CHANGE UP
SPECIMEN SOURCE: SIGNIFICANT CHANGE UP
TRIGL SERPL-MCNC: 180 MG/DL — HIGH
VIT B12 SERPL-MCNC: 1307 PG/ML — HIGH (ref 232–1245)
WBC # BLD: 13.69 K/UL — HIGH (ref 4.8–10.8)
WBC # FLD AUTO: 13.69 K/UL — HIGH (ref 4.8–10.8)

## 2021-05-31 PROCEDURE — 36620 INSERTION CATHETER ARTERY: CPT | Mod: GC

## 2021-05-31 PROCEDURE — 31500 INSERT EMERGENCY AIRWAY: CPT | Mod: GC

## 2021-05-31 PROCEDURE — 27598 AMPUTATE LOWER LEG AT KNEE: CPT | Mod: GC

## 2021-05-31 PROCEDURE — 36556 INSERT NON-TUNNEL CV CATH: CPT | Mod: GC

## 2021-05-31 PROCEDURE — 71045 X-RAY EXAM CHEST 1 VIEW: CPT | Mod: 26,77

## 2021-05-31 PROCEDURE — 93306 TTE W/DOPPLER COMPLETE: CPT | Mod: 26

## 2021-05-31 PROCEDURE — 71045 X-RAY EXAM CHEST 1 VIEW: CPT | Mod: 26

## 2021-05-31 PROCEDURE — 88311 DECALCIFY TISSUE: CPT | Mod: 26

## 2021-05-31 PROCEDURE — 99291 CRITICAL CARE FIRST HOUR: CPT | Mod: 25

## 2021-05-31 PROCEDURE — 88307 TISSUE EXAM BY PATHOLOGIST: CPT | Mod: 26

## 2021-05-31 RX ORDER — SODIUM CHLORIDE 9 MG/ML
2000 INJECTION INTRAMUSCULAR; INTRAVENOUS; SUBCUTANEOUS ONCE
Refills: 0 | Status: DISCONTINUED | OUTPATIENT
Start: 2021-05-31 | End: 2021-06-01

## 2021-05-31 RX ORDER — ETOMIDATE 2 MG/ML
20 INJECTION INTRAVENOUS ONCE
Refills: 0 | Status: COMPLETED | OUTPATIENT
Start: 2021-05-31 | End: 2021-05-31

## 2021-05-31 RX ORDER — MIDAZOLAM HYDROCHLORIDE 1 MG/ML
4 INJECTION, SOLUTION INTRAMUSCULAR; INTRAVENOUS ONCE
Refills: 0 | Status: DISCONTINUED | OUTPATIENT
Start: 2021-05-31 | End: 2021-05-31

## 2021-05-31 RX ORDER — FENTANYL CITRATE 50 UG/ML
0.5 INJECTION INTRAVENOUS
Qty: 2500 | Refills: 0 | Status: DISCONTINUED | OUTPATIENT
Start: 2021-05-31 | End: 2021-06-01

## 2021-05-31 RX ORDER — FENTANYL CITRATE 50 UG/ML
0.5 INJECTION INTRAVENOUS
Qty: 5000 | Refills: 0 | Status: DISCONTINUED | OUTPATIENT
Start: 2021-05-31 | End: 2021-05-31

## 2021-05-31 RX ORDER — PROPOFOL 10 MG/ML
5 INJECTION, EMULSION INTRAVENOUS
Qty: 1000 | Refills: 0 | Status: DISCONTINUED | OUTPATIENT
Start: 2021-05-31 | End: 2021-06-01

## 2021-05-31 RX ORDER — CHLORHEXIDINE GLUCONATE 213 G/1000ML
15 SOLUTION TOPICAL
Refills: 0 | Status: DISCONTINUED | OUTPATIENT
Start: 2021-05-31 | End: 2021-06-11

## 2021-05-31 RX ORDER — DEXTROSE 50 % IN WATER 50 %
50 SYRINGE (ML) INTRAVENOUS ONCE
Refills: 0 | Status: COMPLETED | OUTPATIENT
Start: 2021-05-31 | End: 2021-05-31

## 2021-05-31 RX ORDER — BUMETANIDE 0.25 MG/ML
2 INJECTION INTRAMUSCULAR; INTRAVENOUS EVERY 12 HOURS
Refills: 0 | Status: DISCONTINUED | OUTPATIENT
Start: 2021-06-01 | End: 2021-06-01

## 2021-05-31 RX ORDER — FENTANYL CITRATE 50 UG/ML
100 INJECTION INTRAVENOUS ONCE
Refills: 0 | Status: DISCONTINUED | OUTPATIENT
Start: 2021-05-31 | End: 2021-05-31

## 2021-05-31 RX ORDER — INSULIN HUMAN 100 [IU]/ML
10 INJECTION, SOLUTION SUBCUTANEOUS ONCE
Refills: 0 | Status: COMPLETED | OUTPATIENT
Start: 2021-05-31 | End: 2021-05-31

## 2021-05-31 RX ORDER — NOREPINEPHRINE BITARTRATE/D5W 8 MG/250ML
0.05 PLASTIC BAG, INJECTION (ML) INTRAVENOUS
Qty: 8 | Refills: 0 | Status: DISCONTINUED | OUTPATIENT
Start: 2021-05-31 | End: 2021-06-01

## 2021-05-31 RX ORDER — ETOMIDATE 2 MG/ML
40 INJECTION INTRAVENOUS ONCE
Refills: 0 | Status: DISCONTINUED | OUTPATIENT
Start: 2021-05-31 | End: 2021-05-31

## 2021-05-31 RX ORDER — MIDAZOLAM HYDROCHLORIDE 1 MG/ML
2 INJECTION, SOLUTION INTRAMUSCULAR; INTRAVENOUS EVERY 4 HOURS
Refills: 0 | Status: DISCONTINUED | OUTPATIENT
Start: 2021-05-31 | End: 2021-06-02

## 2021-05-31 RX ORDER — PROPOFOL 10 MG/ML
200 INJECTION, EMULSION INTRAVENOUS ONCE
Refills: 0 | Status: COMPLETED | OUTPATIENT
Start: 2021-05-31 | End: 2021-05-31

## 2021-05-31 RX ORDER — BUMETANIDE 0.25 MG/ML
3 INJECTION INTRAMUSCULAR; INTRAVENOUS ONCE
Refills: 0 | Status: COMPLETED | OUTPATIENT
Start: 2021-05-31 | End: 2021-05-31

## 2021-05-31 RX ADMIN — FENTANYL CITRATE 100 MICROGRAM(S): 50 INJECTION INTRAVENOUS at 14:45

## 2021-05-31 RX ADMIN — HEPARIN SODIUM 5000 UNIT(S): 5000 INJECTION INTRAVENOUS; SUBCUTANEOUS at 23:03

## 2021-05-31 RX ADMIN — ETOMIDATE 20 MILLIGRAM(S): 2 INJECTION INTRAVENOUS at 14:00

## 2021-05-31 RX ADMIN — CHLORHEXIDINE GLUCONATE 1 APPLICATION(S): 213 SOLUTION TOPICAL at 05:23

## 2021-05-31 RX ADMIN — LINEZOLID 300 MILLIGRAM(S): 600 INJECTION, SOLUTION INTRAVENOUS at 17:56

## 2021-05-31 RX ADMIN — Medication 81 MILLIGRAM(S): at 11:21

## 2021-05-31 RX ADMIN — LINEZOLID 300 MILLIGRAM(S): 600 INJECTION, SOLUTION INTRAVENOUS at 05:25

## 2021-05-31 RX ADMIN — BUMETANIDE 3 MILLIGRAM(S): 0.25 INJECTION INTRAMUSCULAR; INTRAVENOUS at 12:33

## 2021-05-31 RX ADMIN — MIDAZOLAM HYDROCHLORIDE 4 MILLIGRAM(S): 1 INJECTION, SOLUTION INTRAMUSCULAR; INTRAVENOUS at 14:45

## 2021-05-31 RX ADMIN — INSULIN HUMAN 10 UNIT(S): 100 INJECTION, SOLUTION SUBCUTANEOUS at 04:53

## 2021-05-31 RX ADMIN — Medication 50 MILLILITER(S): at 04:53

## 2021-05-31 RX ADMIN — SENNA PLUS 2 TABLET(S): 8.6 TABLET ORAL at 21:00

## 2021-05-31 RX ADMIN — HEPARIN SODIUM 5000 UNIT(S): 5000 INJECTION INTRAVENOUS; SUBCUTANEOUS at 05:24

## 2021-05-31 RX ADMIN — Medication 100 MILLIGRAM(S): at 23:03

## 2021-05-31 RX ADMIN — Medication 100 MILLIGRAM(S): at 05:25

## 2021-05-31 RX ADMIN — PROPOFOL 200 MILLIGRAM(S): 10 INJECTION, EMULSION INTRAVENOUS at 14:45

## 2021-05-31 NOTE — PROCEDURE NOTE - NSTRACHINTUBMED_RESP_A_CORE
fentanyl 100mcg, Propofol 100mg, Etomidate 20mg, Versed 4mg/fentaNYL injectable/etomidate injectable/midazolam injectable/propofol injectable

## 2021-05-31 NOTE — BRIEF OPERATIVE NOTE - OPERATION/FINDINGS
Right Knee Disarticulation for necrosis soft tissue infection of right lower extremity Right Knee Disarticulation for necrotizing soft tissue infection of right lower extremity

## 2021-05-31 NOTE — PROGRESS NOTE ADULT - SUBJECTIVE AND OBJECTIVE BOX
Nephrology Progress Note    FERNANDEZ GUZMAN  MRN-[id number]  60y  Male    S:  Patient is seen and examined, events over the last 24h noted.    O:  Allergies:  No Known Allergies  statins (Other)    Hospital Medications:   MEDICATIONS  (STANDING):  amLODIPine   Tablet 5 milliGRAM(s) Oral daily  aspirin  chewable 81 milliGRAM(s) Oral daily  cefepime   IVPB 2000 milliGRAM(s) IV Intermittent every 24 hours  chlorhexidine 4% Liquid 1 Application(s) Topical <User Schedule>  cilostazol 50 milliGRAM(s) Oral two times a day  dextrose 40% Gel 15 Gram(s) Oral once  dextrose 50% Injectable 25 Gram(s) IV Push once  dextrose 50% Injectable 12.5 Gram(s) IV Push once  dextrose 50% Injectable 25 Gram(s) IV Push once  glucagon  Injectable 1 milliGRAM(s) IntraMuscular once  heparin   Injectable 5000 Unit(s) SubCutaneous every 8 hours  insulin glargine Injectable (LANTUS) 33 Unit(s) SubCutaneous at bedtime  insulin lispro (ADMELOG) corrective regimen sliding scale   SubCutaneous three times a day before meals  insulin lispro Injectable (ADMELOG) 11 Unit(s) SubCutaneous three times a day before meals  linezolid  IVPB 600 milliGRAM(s) IV Intermittent every 12 hours  metroNIDAZOLE  IVPB 500 milliGRAM(s) IV Intermittent every 8 hours  pantoprazole    Tablet 40 milliGRAM(s) Oral before breakfast  polyethylene glycol 3350 17 Gram(s) Oral daily  senna 2 Tablet(s) Oral at bedtime  sodium chloride 0.9%. 1000 milliLiter(s) (100 mL/Hr) IV Continuous <Continuous>    MEDICATIONS  (PRN):  ondansetron Injectable 4 milliGRAM(s) IV Push every 8 hours PRN Nausea and/or Vomiting    Home Medications:  Home Medications:  acetaminophen 325 mg oral tablet: 2 tab(s) orally every 6 hours, As needed, Moderate Pain (4 - 6) (2019 17:16)  amLODIPine 5 mg oral tablet: 1 tab(s) orally once a day (2019 14:31)  aspirin 81 mg oral tablet, chewable: 1 tab(s) orally once a day (2019 14:31)  ezetimibe 10 mg oral tablet: 1 tab(s) orally once a day (2019 14:31)  glimepiride 4 mg oral tablet: 2 tab(s) orally once a day (2019 14:31)  hydroCHLOROthiazide 25 mg oral tablet: 1 tab(s) orally once a day (at bedtime) (2019 14:31)  Invokana 300 mg oral tablet: 1 tab(s) orally once a day (2019 14:31)  Lant Solostar Pen 100 units/mL subcutaneous solution: subcutaneous once a day (at bedtime)-  18 UNITS (2019 14:31)  linezolid 2 mg/mL-D5% intravenous solution: 600 milligram(s) intravenous every 12 hours STOP 20. (2019 17:16)  losartan 50 mg oral tablet: 1 tab(s) orally once a day (2019 14:31)  Pletal 100 mg oral tablet: 1 tab(s) orally once a day (2019 14:31)      VITALS:  Daily Height in cm: 177.8 (31 May 2021 00:30)    Daily Weight in k.5 (31 May 2021 06:00)  T(F): 99 (21 @ 07:11), Max: 100.2 (21 @ 00:30)  HR: 96 (21 @ 09:51)  BP: 136/59 (21 @ 09:51)  RR: 28 (21 @ 09:51)  SpO2: 93% (21 @ 09:51)  Wt(kg): --  I&O's Detail    30 May 2021 07:01  -  31 May 2021 07:00  --------------------------------------------------------  IN:    IV PiggyBack: 500 mL    Oral Fluid: 180 mL    sodium chloride 0.9%: 1200 mL  Total IN: 1880 mL    OUT:    Indwelling Catheter - Urethral (mL): 145 mL  Total OUT: 145 mL    Total NET: 1735 mL      31 May 2021 07:01  -  31 May 2021 10:54  --------------------------------------------------------  IN:    sodium chloride 0.9%: 300 mL  Total IN: 300 mL    OUT:    Indwelling Catheter - Urethral (mL): 10 mL  Total OUT: 10 mL    Total NET: 290 mL        I&O's Summary    30 May 2021 07:  -  31 May 2021 07:00  --------------------------------------------------------  IN: 1880 mL / OUT: 145 mL / NET: 1735 mL    31 May 2021 07:  -  31 May 2021 10:54  --------------------------------------------------------  IN: 300 mL / OUT: 10 mL / NET: 290 mL      Height (cm): 177.8 ( @ 00:30)  Weight (kg): 164.2 ( @ 00:30)  BMI (kg/m2): 51.9 ( @ 00:30)  BSA (m2): 2.69 ( @ 00:30)    PHYSICAL EXAM:  Gen: NAD  Resp:   Card: S1/S2  Abd: soft  Extremities:   Vascular access:       LABS:          139  |  97<L>  |  67<HH>  ----------------------------<  81  5.5<H>   |  32  |  6.3<HH>    Ca    7.9<L>      31 May 2021 04:33  Phos  5.2       Mg     3.3         TPro  8.4<H>  /  Alb  2.7<L>  /  TBili  <0.2  /  DBili      /  AST  26  /  ALT  19  /  AlkPhos  99      eGFR if Non African American: 9 mL/min/1.73M2 (21 @ 04:33)  eGFR if African American: 10 mL/min/1.73M2 (21 @ 04:33)    Phosphorus Level, Serum: 5.2 mg/dL (21 @ 18:07)    Osmolality, Serum: 312 mos/kg (21 @ 19:41)                          7.3    13.69 )-----------( 306      ( 31 May 2021 04:33 )             25.8     Mean Cell Volume: 88.1 fL (21 @ 04:33)        Urine Studies:  Urinalysis Basic - ( 29 May 2021 01:20 )    Color: Light Yellow / Appearance: Clear / S.028 / pH:   Gluc:  / Ketone: Negative  / Bili: Negative / Urobili: <2 mg/dL   Blood:  / Protein: Trace / Nitrite: Negative   Leuk Esterase: Negative / RBC: 3 /HPF / WBC 1 /HPF   Sq Epi:  / Non Sq Epi: 0 /HPF / Bacteria: Negative          Creatinine, Random Urine: 82 mg/dL ( @ 15:34)  Protein/Creatinine Ratio Calculation: 0.2 Ratio ( @ 15:34)    Creatinine trend:  Creatinine, Serum: 6.3 mg/dL (21 @ 04:33)  Creatinine, Serum: 5.9 mg/dL (21 @ 20:00)  Creatinine, Serum: 4.3 mg/dL (21 @ 05:35)  Creatinine, Serum: 3.0 mg/dL (21 @ 18:07)  Creatinine, Serum: 3.0 mg/dL (21 @ 18:28)  Creatinine, Serum: 1.3 mg/dL (19 @ 08:35)          US Kidney and Bladder:   EXAM:  US KIDNEYS AND BLADDER            PROCEDURE DATE:  2021            INTERPRETATION:  CLINICAL INFORMATION: Acute kidney injury    COMPARISON: 2017. Correlation is made with CT abdomen and pelvis May 28, 2021    TECHNIQUE: Sonography of the kidneys and bladder.    FINDINGS:    Right kidney: 11.4 cm. No renal mass, hydronephrosis or calculi.    Left kidney:  10.9 cm. No renal mass, hydronephrosis or calculi.    Urinary bladder: No debris or calculus. Bilateral ureteral jets are visualized. Prevoid volume of approximately 314 mL. Patient was unable to urinate during this examination..      IMPRESSION:    No hydronephrosis bilaterally.    Urinary bladder volume of 314 mL. Patient unable to void during this examination.              ELLIOT LANDAU MD; Attending Radiologist  This document has been electronically signed. May 30 2021  1:58PM (21 @ 10:18)     Nephrology Progress Note    FERNANDEZ GUZMAN  [id number]  60y  Male    S:  Patient is seen and examined, events over the last 24h noted.    O:  Allergies:  No Known Allergies  statins (Other)    Hospital Medications:   MEDICATIONS  (STANDING):  amLODIPine   Tablet 5 milliGRAM(s) Oral daily  aspirin  chewable 81 milliGRAM(s) Oral daily  cefepime   IVPB 2000 milliGRAM(s) IV Intermittent every 24 hours  chlorhexidine 4% Liquid 1 Application(s) Topical <User Schedule>  cilostazol 50 milliGRAM(s) Oral two times a day  heparin   Injectable 5000 Unit(s) SubCutaneous every 8 hours  insulin glargine Injectable (LANTUS) 33 Unit(s) SubCutaneous at bedtime  insulin lispro (ADMELOG) corrective regimen sliding scale   SubCutaneous three times a day before meals  insulin lispro Injectable (ADMELOG) 11 Unit(s) SubCutaneous three times a day before meals  linezolid  IVPB 600 milliGRAM(s) IV Intermittent every 12 hours  metroNIDAZOLE  IVPB 500 milliGRAM(s) IV Intermittent every 8 hours  pantoprazole    Tablet 40 milliGRAM(s) Oral before breakfast  polyethylene glycol 3350 17 Gram(s) Oral daily  senna 2 Tablet(s) Oral at bedtime  sodium chloride 0.9%. 1000 milliLiter(s) (100 mL/Hr) IV Continuous <Continuous>    MEDICATIONS  (PRN):  ondansetron Injectable 4 milliGRAM(s) IV Push every 8 hours PRN Nausea and/or Vomiting    Home Medications:  Home Medications:  acetaminophen 325 mg oral tablet: 2 tab(s) orally every 6 hours, As needed, Moderate Pain (4 - 6) (2019 17:16)  amLODIPine 5 mg oral tablet: 1 tab(s) orally once a day (2019 14:31)  aspirin 81 mg oral tablet, chewable: 1 tab(s) orally once a day (2019 14:31)  ezetimibe 10 mg oral tablet: 1 tab(s) orally once a day (2019 14:31)  glimepiride 4 mg oral tablet: 2 tab(s) orally once a day (2019 14:31)  hydroCHLOROthiazide 25 mg oral tablet: 1 tab(s) orally once a day (at bedtime) (2019 14:31)  Invokana 300 mg oral tablet: 1 tab(s) orally once a day (2019 14:31)  Lanrenan Michaudar Pen 100 units/mL subcutaneous solution: subcutaneous once a day (at bedtime)-  18 UNITS (2019 14:31)  linezolid 2 mg/mL-D5% intravenous solution: 600 milligram(s) intravenous every 12 hours STOP 20. (2019 17:16)  losartan 50 mg oral tablet: 1 tab(s) orally once a day (2019 14:31)  Pletal 100 mg oral tablet: 1 tab(s) orally once a day (2019 14:31)      VITALS:  Daily Height in cm: 177.8 (31 May 2021 00:30)    Daily Weight in k.5 (31 May 2021 06:00)  T(F): 99 (21 @ 07:11), Max: 100.2 (21 @ 00:30)  HR: 96 (21 @ 09:51)  BP: 136/59 (21 @ 09:51)  RR: 28 (21 @ 09:51)  SpO2: 93% (21 @ 09:51)  Wt(kg): --  I&O's Detail    30 May 2021 07:01  -  31 May 2021 07:00  --------------------------------------------------------  IN:    IV PiggyBack: 500 mL    Oral Fluid: 180 mL    sodium chloride 0.9%: 1200 mL  Total IN: 1880 mL    OUT:    Indwelling Catheter - Urethral (mL): 145 mL  Total OUT: 145 mL    Total NET: 1735 mL      31 May 2021 07:01  -  31 May 2021 10:54  --------------------------------------------------------  IN:    sodium chloride 0.9%: 300 mL  Total IN: 300 mL    OUT:    Indwelling Catheter - Urethral (mL): 10 mL  Total OUT: 10 mL    Total NET: 290 mL        I&O's Summary    30 May 2021 07:  -  31 May 2021 07:00  --------------------------------------------------------  IN: 1880 mL / OUT: 145 mL / NET: 1735 mL    31 May 2021 07:  -  31 May 2021 10:54  --------------------------------------------------------  IN: 300 mL / OUT: 10 mL / NET: 290 mL      Height (cm): 177.8 ( @ 00:30)  Weight (kg): 164.2 ( 00:30)  BMI (kg/m2): 51.9 ( @ 00:30)  BSA (m2): 2.69 ( @ 00:30)    PHYSICAL EXAM:  Gen: on bipap, agitated  Resp: decreased bs b/l  Card: S1/S2  Abd: soft  Extremities: edema  Lovelace    LABS:          139  |  97<L>  |  67<HH>  ----------------------------<  81  5.5<H>   |  32  |  6.3<HH>    Ca    7.9<L>      31 May 2021 04:33  Phos  5.2       Mg     3.3         TPro  8.4<H>  /  Alb  2.7<L>  /  TBili  <0.2  /  DBili      /  AST  26  /  ALT  19  /  AlkPhos  99      eGFR if Non African American: 9 mL/min/1.73M2 (21 @ 04:33)  eGFR if African American: 10 mL/min/1.73M2 (21 @ 04:33)    Phosphorus Level, Serum: 5.2 mg/dL (21 @ 18:07)    Osmolality, Serum: 312 mos/kg (21 @ 19:41)                          7.3    13.69 )-----------( 306      ( 31 May 2021 04:33 )             25.8     Mean Cell Volume: 88.1 fL (21 @ 04:33)        Urine Studies:  Urinalysis Basic - ( 29 May 2021 01:20 )    Color: Light Yellow / Appearance: Clear / S.028 / pH:   Gluc:  / Ketone: Negative  / Bili: Negative / Urobili: <2 mg/dL   Blood:  / Protein: Trace / Nitrite: Negative   Leuk Esterase: Negative / RBC: 3 /HPF / WBC 1 /HPF   Sq Epi:  / Non Sq Epi: 0 /HPF / Bacteria: Negative          Creatinine, Random Urine: 82 mg/dL ( @ 15:34)  Protein/Creatinine Ratio Calculation: 0.2 Ratio ( @ 15:34)    Creatinine trend:  Creatinine, Serum: 6.3 mg/dL (21 @ 04:33)  Creatinine, Serum: 5.9 mg/dL (21 @ 20:00)  Creatinine, Serum: 4.3 mg/dL (21 @ 05:35)  Creatinine, Serum: 3.0 mg/dL (21 @ 18:07)  Creatinine, Serum: 3.0 mg/dL (21 @ 18:28)  Creatinine, Serum: 1.3 mg/dL (19 @ 08:35)          US Kidney and Bladder:   EXAM:  US KIDNEYS AND BLADDER            PROCEDURE DATE:  2021            INTERPRETATION:  CLINICAL INFORMATION: Acute kidney injury    COMPARISON: 2017. Correlation is made with CT abdomen and pelvis May 28, 2021    TECHNIQUE: Sonography of the kidneys and bladder.    FINDINGS:    Right kidney: 11.4 cm. No renal mass, hydronephrosis or calculi.    Left kidney:  10.9 cm. No renal mass, hydronephrosis or calculi.    Urinary bladder: No debris or calculus. Bilateral ureteral jets are visualized. Prevoid volume of approximately 314 mL. Patient was unable to urinate during this examination..      IMPRESSION:    No hydronephrosis bilaterally.    Urinary bladder volume of 314 mL. Patient unable to void during this examination.              ELLIOT LANDAU MD; Attending Radiologist  This document has been electronically signed. May 30 2021  1:58PM (21 @ 10:18)    < from: Xray Chest 1 View-PORTABLE IMMEDIATE (Xray Chest 1 View-PORTABLE IMMEDIATE .) (21 @ 12:00) >    EXAM:  XR CHEST PORTABLE IMMED 1V            PROCEDURE DATE:  2021            INTERPRETATION:  Clinical History / Reason for exam: Shortness of breath    Comparison : Chest radiograph May 28, 2021.    Technique/Positioning: Single AP view of the chest.    Findings:    Support devices: None.    Cardiac/mediastinum/hilum: Unchanged    Lung parenchyma/Pleura: No consolidation, effusion or pneumothorax.    Skeleton/soft tissues: Unchanged.    Impression:    No consolidation, effusion or pneumothorax.                  ELLIOT LANDAU MD; Attending Radiologist  This document has been electronically signed. May 31 2021 12:42PM    < end of copied text >

## 2021-05-31 NOTE — PROGRESS NOTE ADULT - SUBJECTIVE AND OBJECTIVE BOX
Neurocritical Care Progress Note    FERNANDEZ GUZMAN    60y     [id number]   Daily Height in cm: 177.8 (31 May 2021 00:30)    COVID-19 PCR: NotDetec (28 May 2021 18:28)    Patient is a 60y old  Male who presents with a chief complaint of dfu (31 May 2021 00:32)    HPI:  Patient is a 59 y/o M with PMH of DM, hypertension, dyslipidemia, sleep apnea, Charcot foot reconstruction with external fixation, super morbid obesity present for worsening right foot infection.  Pt reports he has had this foot ulcer at various stage of healing for 13 years. Patient has been admitted for IV antibiotics multiple times in the past for osteomyelitis of right foot. He has history of MRSA infection in foot. pt has baseline neuropathy in both feet so does not feel any pain.  Patient states his Infection has been gradually getting worse despite treatment with bactrim, he saw his podiatrists and he was sent in by podiatry for IV abx . No fever, chills, cp, sob, abd pain, nausea, vomiting, dysuria, calf pain.     In ED  T(C): 37.3 (05-29-21 @ 01:31), Max: 38 (05-28-21 @ 20:27)  HR: 100 (05-29-21 @ 01:23) (99 - 118)  BP: 119/59 (05-29-21 @ 01:23) (119/59 - 139/69)  RR: 18 (05-29-21 @ 01:23) (17 - 18)  SpO2: 96% (05-29-21 @ 01:23) (86% - 100%)  there is a Right foot ulcer on the lateral aspect of the plantar surface measuring 8.5-3cm. Ulcer is surrounded by macerated tissue. Serous drainage from wound. Kurlex and sponge dressing in place, clean, dry and intact. Patient was I&D'd bedside by podiatry in the ed. purulent drainage was evacuated and sent to the lab for culture. Patient is scheduled for OR with podiatry in the AM.   (29 May 2021 02:29)    Allergies:  No Known Allergies  statins (Other)    PAST MEDICAL & SURGICAL HISTORY:  MATA on CPAP    DM (diabetes mellitus)    HTN (hypertension)    High cholesterol    Charcot ankle, right    History of percutaneous angioplasty    H/O skin graft    Left toe amputee  4 TOES    Diabetic Charcot foot  Fixation with external device    Home Medications:  acetaminophen 325 mg oral tablet: 2 tab(s) orally every 6 hours, As needed, Moderate Pain (4 - 6) (25 Nov 2019 17:16)  amLODIPine 5 mg oral tablet: 1 tab(s) orally once a day (20 Nov 2019 14:31)  aspirin 81 mg oral tablet, chewable: 1 tab(s) orally once a day (20 Nov 2019 14:31)  ezetimibe 10 mg oral tablet: 1 tab(s) orally once a day (20 Nov 2019 14:31)  glimepiride 4 mg oral tablet: 2 tab(s) orally once a day (20 Nov 2019 14:31)  hydroCHLOROthiazide 25 mg oral tablet: 1 tab(s) orally once a day (at bedtime) (20 Nov 2019 14:31)  Invokana 300 mg oral tablet: 1 tab(s) orally once a day (20 Nov 2019 14:31)  Lantus Solostar Pen 100 units/mL subcutaneous solution: subcutaneous once a day (at bedtime)-  18 UNITS (20 Nov 2019 14:31)  linezolid 2 mg/mL-D5% intravenous solution: 600 milligram(s) intravenous every 12 hours STOP 1/6/20. (25 Nov 2019 17:16)  losartan 50 mg oral tablet: 1 tab(s) orally once a day (20 Nov 2019 14:31)  Pletal 100 mg oral tablet: 1 tab(s) orally once a day (20 Nov 2019 14:31)    24 Hour Events:  Patient was an in-house stroke code for worsening mental status and a left gaze deviation; initial NIHSS 11. CTH negative, CTA/P was motion limited , but no occlusion . Perfusion deficits noted in right and left hemisphere could be artifactual. Admitted to CCU for Q1 neuro checks.     Exam:    Current Medications:  amLODIPine   Tablet 5 milliGRAM(s) Oral daily  aspirin  chewable 81 milliGRAM(s) Oral daily  cefepime   IVPB 2000 milliGRAM(s) IV Intermittent every 24 hours  chlorhexidine 4% Liquid 1 Application(s) Topical <User Schedule>  cilostazol 50 milliGRAM(s) Oral two times a day  dextrose 40% Gel 15 Gram(s) Oral once  dextrose 50% Injectable 25 Gram(s) IV Push once  dextrose 50% Injectable 12.5 Gram(s) IV Push once  dextrose 50% Injectable 25 Gram(s) IV Push once  glucagon  Injectable 1 milliGRAM(s) IntraMuscular once  heparin   Injectable 5000 Unit(s) SubCutaneous every 8 hours  insulin glargine Injectable (LANTUS) 33 Unit(s) SubCutaneous at bedtime  insulin lispro (ADMELOG) corrective regimen sliding scale   SubCutaneous three times a day before meals  insulin lispro Injectable (ADMELOG) 11 Unit(s) SubCutaneous three times a day before meals  linezolid  IVPB 600 milliGRAM(s) IV Intermittent every 12 hours  metroNIDAZOLE  IVPB 500 milliGRAM(s) IV Intermittent every 8 hours  ondansetron Injectable 4 milliGRAM(s) IV Push every 8 hours PRN  pantoprazole    Tablet 40 milliGRAM(s) Oral before breakfast  polyethylene glycol 3350 17 Gram(s) Oral daily  senna 2 Tablet(s) Oral at bedtime  sodium chloride 0.9%. 1000 milliLiter(s) IV Continuous <Continuous>    mRS:  0 No symptoms at all  1 No significant disability despite symptoms; able to carry out all usual duties and activities without assistance  2 Slight disability; unable to carry out all previous activities, but able to look after own affairs  3 Moderate disability; requiring some help, but able to walk without assistance  4 Moderately severe disability; unable to walk without assistance and unable to attend to own bodily needs without assistance  5 Severe disability; bedridden, incontinent and requiring constant nursing care and attention  6 Dead    Vital Signs Last 24 Hrs  T(C): 37.9 (31 May 2021 00:30), Max: 37.9 (31 May 2021 00:30)  T(F): 100.2 (31 May 2021 00:30), Max: 100.2 (31 May 2021 00:30)  HR: 96 (31 May 2021 02:00) (94 - 107)  BP: 143/56 (31 May 2021 02:00) (127/61 - 171/72)  BP(mean): 81 (31 May 2021 02:00) (81 - 97)  RR: 15 (31 May 2021 02:00) (15 - 24)  SpO2: 94% (31 May 2021 02:00) (94% - 100%)     I/Os:  I&O's Detail    30 May 2021 07:01  -  31 May 2021 03:04  --------------------------------------------------------  IN:    IV PiggyBack: 100 mL    Oral Fluid: 180 mL    sodium chloride 0.9%: 600 mL  Total IN: 880 mL    OUT:  Total OUT: 0 mL    Total NET: 880 mL    LIVER FUNCTIONS - ( 30 May 2021 20:00 )  Alb: 2.8 g/dL / Pro: 8.5 g/dL / ALK PHOS: 104 U/L / ALT: 20 U/L / AST: 26 U/L / GGT: x           Lactate, Blood: 1.4 mmol/L (05-29 @ 18:07)  Lactate, Blood: 1.1 mmol/L (05-28 @ 22:12)  Lactate, Blood: 2.3 mmol/L (05-28 @ 18:28)                           7.7    12.19 )-----------( 312      ( 30 May 2021 20:07 )             26.8      05-30    136  |  94<L>  |  64<HH>  ----------------------------<  79  5.5<H>   |  32  |  5.9<HH>    Ca    8.3<L>      30 May 2021 20:00  Phos  5.2     05-29  Mg     3.3     05-30    TPro  8.5<H>  /  Alb  2.8<L>  /  TBili  0.2  /  DBili  x   /  AST  26  /  ALT  20  /  AlkPhos  104  05-30     Adult Advanced Hemodynamics Last 24 Hrs  CVP(mm Hg): --  CVP(cm H2O): --  CO: --  CI: --  PA: --  PA(mean): --  PCWP: --  SVR: --  SVRI: --  PVR: --  PVRI: --  CARDIAC MARKERS ( 30 May 2021 20:07 )  x     / 0.12 ng/mL / x     / x     / 2.0 ng/mL  CARDIAC MARKERS ( 30 May 2021 20:00 )  x     / 0.12 ng/mL / x     / x     / x      CARDIAC MARKERS ( 30 May 2021 11:28 )  x     / 0.11 ng/mL / x     / x     / x      CARDIAC MARKERS ( 30 May 2021 05:35 )  x     / 0.07 ng/mL / x     / x     / x        Diagnostics/ Results:  Last CTH: < from: CT Brain Stroke Protocol (05.30.21 @ 20:31) >  IMPRESSION:    No evidence of acute intracranial pathology.    < end of copied text >    Last CTA/MRA: < from: CT Angio Neck w/ IV Cont (05.30.21 @ 20:58) >  CT ANGIOGRAPHY NECK:  1.  A 9 x 6 x 10 mm medially directed saccular outpouching arising from the RIGHT carotidbulb may represent penetrating atherosclerotic ulcer or a chronic focal dissection with pseudoaneurysm.  The outpouching appears to compress the parent vessel resulting in a mild to moderate stenosis in the RIGHT carotid bulb that measures 40-50% using NASCET criteria.  2.  Mild flattening the left carotid bulb.  No hemodynamically significant left internal internal carotid artery stenosis using NASCET criteria.  3.  No acute carotid dissection or carotid occlusion.  4.  Dominant left vertebral artery and hypoplastic right vertebral artery are grossly patent without evidence of flow-limiting stenosis or dissection.  5.  Incidental findings as discussed above.  There are superior mediastinal lymph nodes measuring up to 1.2 cm short axis.  There are mid and lower cervical lymph nodes measuring up to 8 mm short axis.      CT ANGIOGRAPHY BRAIN:  1.  No vessel occlusion or aneurysm about the [place] of [place].  2.  Moderate stenoses at the junction of the precavernous and cavernous segments of both internal carotid arteries.  Diffuse mild stenosis in the cavernous and proximal supraclinoid segments of both internal carotid arteries.  No flow-limiting stenosis in the anterior cerebral arteries, middle cerebral arteries, vertebrobasilar system or posterior cerebral arteries  3.  No evidence of acute territorial infarct.  No suspicious hypoattenuation in the brain parenchyma or loss of gray matter-white matter differentiation is visualized on the CTA source data.    < end of copied text >    Last CTP: < from: CT Perfusion w/ Maps w/ IV Cont (05.30.21 @ 20:51) >  CT PERFUSION: Severely limited by motion artifact.  Perfusion maps are severely motion degraded.  Detected CBF abnormality of 11 mL and Tmax abnormality of 134 mL are in nonvascular distributions and may be artifactual.  Follow-up MRI may be obtained to exclude a small infarct.    < end of copied text >    Last MRI: n/a    Last TCD: n/a    Last EEG: n/a    Last Echo: n/a    Last EKG: < from: 12 Lead ECG (05.30.21 @ 19:49) >  Diagnosis Line Sinus tachycardia  Right bundle branch block  Abnormal ECG    < end of copied text >    Chest Xray: < from: Xray Chest 1 View-PORTABLE IMMEDIATE (Xray Chest 1 View-PORTABLE IMMEDIATE .) (05.28.21 @ 22:31) >  Impression:    No radiographic evidence of acute cardiopulmonary disease.    < end of copied text >    ROS:  [X] A ten-point ROS was otherwise negative except as noted in HPI    Assessment:      Plan:  Neurology:      Respiratory:  -Keep HOB > 35; aspiration precautions     Cardiology:      Vascular:      GI/Nutrition:  -Diet, NPO:      Special Instructions for Nursing:  CODE STROKE; NPO until Dysphagia screen or Speech Bedside Swallow Evaluation completed and passed (05-30-21 @ 20:07) [Active]        -Bowel Regimen ->  -PPx -> Pantoprazole       / Renal/ Fluids/ Electrolytes:      Hematology/ Oncology:  -DVT PPx -> Heparin SQ    Lovenox SQ    Heparin gtt     SCDs      Infectious Disease:      Musculoskeletol:      Endocrine:      Tubes/ Lines/ Drains:  Central    Peripheral    A-line    Lovelace    NGT/ OGT    Chest    EVD    Disposition:  Continue medical management as per primary team in:   ICU   CCU   SICU   Stroke Unit   Stepdown    CODE STATUS w/ Next of Kin:   DNI   DNR   FULL    Patient seen and case discussed with NCC attending; see attending attestation  Please call w/ questions  Roma Cochran NP  [id number]             Neurocritical Care Progress Note    FERNANDEZ GUZMAN    60y     [id number]   Daily Height in cm: 177.8 (31 May 2021 00:30)    COVID-19 PCR: NotDetec (28 May 2021 18:28)    Patient is a 60y old  Male who presents with a chief complaint of dfu (31 May 2021 00:32)    HPI:  Patient is a 59 y/o M with PMH of DM, hypertension, dyslipidemia, sleep apnea, Charcot foot reconstruction with external fixation, super morbid obesity present for worsening right foot infection.  Pt reports he has had this foot ulcer at various stage of healing for 13 years. Patient has been admitted for IV antibiotics multiple times in the past for osteomyelitis of right foot. He has history of MRSA infection in foot. pt has baseline neuropathy in both feet so does not feel any pain.  Patient states his Infection has been gradually getting worse despite treatment with bactrim, he saw his podiatrists and he was sent in by podiatry for IV abx . No fever, chills, cp, sob, abd pain, nausea, vomiting, dysuria, calf pain.     In ED  T(C): 37.3 (05-29-21 @ 01:31), Max: 38 (05-28-21 @ 20:27)  HR: 100 (05-29-21 @ 01:23) (99 - 118)  BP: 119/59 (05-29-21 @ 01:23) (119/59 - 139/69)  RR: 18 (05-29-21 @ 01:23) (17 - 18)  SpO2: 96% (05-29-21 @ 01:23) (86% - 100%)  there is a Right foot ulcer on the lateral aspect of the plantar surface measuring 8.5-3cm. Ulcer is surrounded by macerated tissue. Serous drainage from wound. Kurlex and sponge dressing in place, clean, dry and intact. Patient was I&D'd bedside by podiatry in the ed. purulent drainage was evacuated and sent to the lab for culture. Patient is scheduled for OR with podiatry in the AM.   (29 May 2021 02:29)    Allergies:  No Known Allergies  statins (Other)    PAST MEDICAL & SURGICAL HISTORY:  MATA on CPAP    DM (diabetes mellitus)    HTN (hypertension)    High cholesterol    Charcot ankle, right    History of percutaneous angioplasty    H/O skin graft    Left toe amputee  4 TOES    Diabetic Charcot foot  Fixation with external device    Home Medications:  acetaminophen 325 mg oral tablet: 2 tab(s) orally every 6 hours, As needed, Moderate Pain (4 - 6) (25 Nov 2019 17:16)  amLODIPine 5 mg oral tablet: 1 tab(s) orally once a day (20 Nov 2019 14:31)  aspirin 81 mg oral tablet, chewable: 1 tab(s) orally once a day (20 Nov 2019 14:31)  ezetimibe 10 mg oral tablet: 1 tab(s) orally once a day (20 Nov 2019 14:31)  glimepiride 4 mg oral tablet: 2 tab(s) orally once a day (20 Nov 2019 14:31)  hydroCHLOROthiazide 25 mg oral tablet: 1 tab(s) orally once a day (at bedtime) (20 Nov 2019 14:31)  Invokana 300 mg oral tablet: 1 tab(s) orally once a day (20 Nov 2019 14:31)  Lantus Solostar Pen 100 units/mL subcutaneous solution: subcutaneous once a day (at bedtime)-  18 UNITS (20 Nov 2019 14:31)  linezolid 2 mg/mL-D5% intravenous solution: 600 milligram(s) intravenous every 12 hours STOP 1/6/20. (25 Nov 2019 17:16)  losartan 50 mg oral tablet: 1 tab(s) orally once a day (20 Nov 2019 14:31)  Pletal 100 mg oral tablet: 1 tab(s) orally once a day (20 Nov 2019 14:31)    24 Hour Events:  Patient was an in-house stroke code for worsening mental status and a left gaze deviation; initial NIHSS 11. CTH negative, CTA/P was motion limited , but no occlusion . Perfusion deficits noted in right and left hemisphere could be artifactual. Admitted to CCU for Q1 neuro checks.     Exam:    Current Medications:  amLODIPine   Tablet 5 milliGRAM(s) Oral daily  aspirin  chewable 81 milliGRAM(s) Oral daily  cefepime   IVPB 2000 milliGRAM(s) IV Intermittent every 24 hours  chlorhexidine 4% Liquid 1 Application(s) Topical <User Schedule>  cilostazol 50 milliGRAM(s) Oral two times a day  dextrose 40% Gel 15 Gram(s) Oral once  dextrose 50% Injectable 25 Gram(s) IV Push once  dextrose 50% Injectable 12.5 Gram(s) IV Push once  dextrose 50% Injectable 25 Gram(s) IV Push once  glucagon  Injectable 1 milliGRAM(s) IntraMuscular once  heparin   Injectable 5000 Unit(s) SubCutaneous every 8 hours  insulin glargine Injectable (LANTUS) 33 Unit(s) SubCutaneous at bedtime  insulin lispro (ADMELOG) corrective regimen sliding scale   SubCutaneous three times a day before meals  insulin lispro Injectable (ADMELOG) 11 Unit(s) SubCutaneous three times a day before meals  linezolid  IVPB 600 milliGRAM(s) IV Intermittent every 12 hours  metroNIDAZOLE  IVPB 500 milliGRAM(s) IV Intermittent every 8 hours  ondansetron Injectable 4 milliGRAM(s) IV Push every 8 hours PRN  pantoprazole    Tablet 40 milliGRAM(s) Oral before breakfast  polyethylene glycol 3350 17 Gram(s) Oral daily  senna 2 Tablet(s) Oral at bedtime  sodium chloride 0.9%. 1000 milliLiter(s) IV Continuous <Continuous>    mRS:  0 No symptoms at all  1 No significant disability despite symptoms; able to carry out all usual duties and activities without assistance  2 Slight disability; unable to carry out all previous activities, but able to look after own affairs  3 Moderate disability; requiring some help, but able to walk without assistance  4 Moderately severe disability; unable to walk without assistance and unable to attend to own bodily needs without assistance  5 Severe disability; bedridden, incontinent and requiring constant nursing care and attention  6 Dead    Vital Signs Last 24 Hrs  T(C): 37.9 (31 May 2021 00:30), Max: 37.9 (31 May 2021 00:30)  T(F): 100.2 (31 May 2021 00:30), Max: 100.2 (31 May 2021 00:30)  HR: 96 (31 May 2021 02:00) (94 - 107)  BP: 143/56 (31 May 2021 02:00) (127/61 - 171/72)  BP(mean): 81 (31 May 2021 02:00) (81 - 97)  RR: 15 (31 May 2021 02:00) (15 - 24)  SpO2: 94% (31 May 2021 02:00) (94% - 100%)     I/Os:  I&O's Detail    30 May 2021 07:01  -  31 May 2021 03:04  --------------------------------------------------------  IN:    IV PiggyBack: 100 mL    Oral Fluid: 180 mL    sodium chloride 0.9%: 600 mL  Total IN: 880 mL    OUT:  Total OUT: 0 mL    Total NET: 880 mL    LIVER FUNCTIONS - ( 30 May 2021 20:00 )  Alb: 2.8 g/dL / Pro: 8.5 g/dL / ALK PHOS: 104 U/L / ALT: 20 U/L / AST: 26 U/L / GGT: x           Lactate, Blood: 1.4 mmol/L (05-29 @ 18:07)  Lactate, Blood: 1.1 mmol/L (05-28 @ 22:12)  Lactate, Blood: 2.3 mmol/L (05-28 @ 18:28)                           7.7    12.19 )-----------( 312      ( 30 May 2021 20:07 )             26.8      05-30    136  |  94<L>  |  64<HH>  ----------------------------<  79  5.5<H>   |  32  |  5.9<HH>    Ca    8.3<L>      30 May 2021 20:00  Phos  5.2     05-29  Mg     3.3     05-30    TPro  8.5<H>  /  Alb  2.8<L>  /  TBili  0.2  /  DBili  x   /  AST  26  /  ALT  20  /  AlkPhos  104  05-30     Adult Advanced Hemodynamics Last 24 Hrs  CVP(mm Hg): --  CVP(cm H2O): --  CO: --  CI: --  PA: --  PA(mean): --  PCWP: --  SVR: --  SVRI: --  PVR: --  PVRI: --  CARDIAC MARKERS ( 30 May 2021 20:07 )  x     / 0.12 ng/mL / x     / x     / 2.0 ng/mL  CARDIAC MARKERS ( 30 May 2021 20:00 )  x     / 0.12 ng/mL / x     / x     / x      CARDIAC MARKERS ( 30 May 2021 11:28 )  x     / 0.11 ng/mL / x     / x     / x      CARDIAC MARKERS ( 30 May 2021 05:35 )  x     / 0.07 ng/mL / x     / x     / x        Diagnostics/ Results:  Last CTH: < from: CT Brain Stroke Protocol (05.30.21 @ 20:31) >  IMPRESSION:    No evidence of acute intracranial pathology.    < end of copied text >    Last CTA/MRA: < from: CT Angio Neck w/ IV Cont (05.30.21 @ 20:58) >  CT ANGIOGRAPHY NECK:  1.  A 9 x 6 x 10 mm medially directed saccular outpouching arising from the RIGHT carotidbulb may represent penetrating atherosclerotic ulcer or a chronic focal dissection with pseudoaneurysm.  The outpouching appears to compress the parent vessel resulting in a mild to moderate stenosis in the RIGHT carotid bulb that measures 40-50% using NASCET criteria.  2.  Mild flattening the left carotid bulb.  No hemodynamically significant left internal internal carotid artery stenosis using NASCET criteria.  3.  No acute carotid dissection or carotid occlusion.  4.  Dominant left vertebral artery and hypoplastic right vertebral artery are grossly patent without evidence of flow-limiting stenosis or dissection.  5.  Incidental findings as discussed above.  There are superior mediastinal lymph nodes measuring up to 1.2 cm short axis.  There are mid and lower cervical lymph nodes measuring up to 8 mm short axis.      CT ANGIOGRAPHY BRAIN:  1.  No vessel occlusion or aneurysm about the [place] of [place].  2.  Moderate stenoses at the junction of the precavernous and cavernous segments of both internal carotid arteries.  Diffuse mild stenosis in the cavernous and proximal supraclinoid segments of both internal carotid arteries.  No flow-limiting stenosis in the anterior cerebral arteries, middle cerebral arteries, vertebrobasilar system or posterior cerebral arteries  3.  No evidence of acute territorial infarct.  No suspicious hypoattenuation in the brain parenchyma or loss of gray matter-white matter differentiation is visualized on the CTA source data.    < end of copied text >    Last CTP: < from: CT Perfusion w/ Maps w/ IV Cont (05.30.21 @ 20:51) >  CT PERFUSION: Severely limited by motion artifact.  Perfusion maps are severely motion degraded.  Detected CBF abnormality of 11 mL and Tmax abnormality of 134 mL are in nonvascular distributions and may be artifactual.  Follow-up MRI may be obtained to exclude a small infarct.    < end of copied text >    Last MRI: n/a    Last TCD: n/a    Last EEG: n/a    Last Echo: n/a    Last EKG: < from: 12 Lead ECG (05.30.21 @ 19:49) >  Diagnosis Line Sinus tachycardia  Right bundle branch block  Abnormal ECG    < end of copied text >    Chest Xray: < from: Xray Chest 1 View-PORTABLE IMMEDIATE (Xray Chest 1 View-PORTABLE IMMEDIATE .) (05.28.21 @ 22:31) >  Impression:    No radiographic evidence of acute cardiopulmonary disease.    < end of copied text >    ROS:  [X] A ten-point ROS was otherwise negative except as noted in HPI    Assessment:  59 y/o M with PMH of DM, hypertension, dyslipidemia, sleep apnea, Charcot foot reconstruction with external fixation, and morbid obesity presenting to the hospital on 5/29 with worsening foot ulcer. S/P debridement of right foot on 5/28 and was being monitored on 4B. Today, a stroke code was activated for worsening mental status and a left gaze deviation. As per RN, patient has been altered since this morning and at 8pm he seemed more lethargic, was not speaking, and had a gaze deviation. No fever, chills, or witnessed seizure at this time. /89. FS 79. NIHSS 11. CTH negative for acute findings. CTA/P was motion limited, but no occlusion. Perfusion deficits noted in right and left hemisphere could be artifactual. Patient transferred to CCU for neuro checks Q1.    Plan:  Neurology:  -Obtain MRI brain w/o  -Start ASA 81 mg daily  -Obtain rEEG in AM  -Continue neuro checks Q1 hrs   -STAT CTH/CTP/CTA if acute change in neurological status     Respiratory:  -Keep HOB > 35; aspiration precautions   -Keep satts >94%    Cardiology:  -Keep SBP goal 140-180; norvasc  -Hold blood pressure meds and start NSS if SBPs not meeting parameters  -Elevated trop and BNP  -Obtain ECHO w/ bubble    GI/Nutrition:  -Diet, NPO: Special Instructions for Nursing:  CODE STROKE; NPO until Dysphagia screen or Speech Bedside Swallow Evaluation completed and passed (05-30-21 @ 20:07) [Active]  -Bowel Regimen ->Miralax/ Senna  -PPx -> Pantoprazole     / Renal/ Fluids/ Electrolytes:  -No RRT as of now as per nephro note; Nephrology f/u appreciated. BUN/Cr elevated  -Monitor and treat lytes PRN  -Keep euvolemic; avoid fluid deficits   -US kidney and bladder 5/30: Urinary bladder volume of 314 mL. Patient unable to void during examination    Hematology/ Oncology:  -DVT PPx -> Heparin SQ    Lovenox SQ    Heparin gtt     SCDs  -Hgb: 9.5 on admission--> 7.7--> 8.3-->7.7 today    Infectious Disease:  -Monitor and treat fevers; tylenol PRN (currently febrile 100.2)  -ABX as per ID; flagyl/ cefepime/ linezolid (diabetic foot ulcer treatment)  -WBCs trending up; continue to monitor     Musculoskeletol:  -PT/rehab when able    Endocrine:  -Monitor FS w/ ISS PRN  -Lipid profile, HgbA1C, thyroid panel  -Start Lipitor 80 mg daily    Tubes/ Lines/ Drains:  Central    Peripheral    A-line    Lovelace    NGT/ OGT    Chest    EVD    Disposition:  Continue medical management as per primary team in:   ICU   CCU   SICU   Stroke Unit   Stepdown    CODE STATUS w/ Next of Kin:   DNI   DNR   FULL    Patient seen and case discussed with NCC attending; see attending attestation  Please call w/ questions  Roma Cochran NP  [id number]             Neurocritical Care Progress Note    FERNANDEZ GUZMAN    60y     [id number]   Daily Height in cm: 177.8 (31 May 2021 00:30)    COVID-19 PCR: NotDetec (28 May 2021 18:28)    Patient is a 60y old  Male who presents with a chief complaint of dfu (31 May 2021 00:32)    HPI:  Patient is a 59 y/o M with PMH of DM, hypertension, dyslipidemia, sleep apnea, Charcot foot reconstruction with external fixation, super morbid obesity present for worsening right foot infection.  Pt reports he has had this foot ulcer at various stage of healing for 13 years. Patient has been admitted for IV antibiotics multiple times in the past for osteomyelitis of right foot. He has history of MRSA infection in foot. pt has baseline neuropathy in both feet so does not feel any pain.  Patient states his Infection has been gradually getting worse despite treatment with bactrim, he saw his podiatrists and he was sent in by podiatry for IV abx . No fever, chills, cp, sob, abd pain, nausea, vomiting, dysuria, calf pain.     In ED  T(C): 37.3 (05-29-21 @ 01:31), Max: 38 (05-28-21 @ 20:27)  HR: 100 (05-29-21 @ 01:23) (99 - 118)  BP: 119/59 (05-29-21 @ 01:23) (119/59 - 139/69)  RR: 18 (05-29-21 @ 01:23) (17 - 18)  SpO2: 96% (05-29-21 @ 01:23) (86% - 100%)  there is a Right foot ulcer on the lateral aspect of the plantar surface measuring 8.5-3cm. Ulcer is surrounded by macerated tissue. Serous drainage from wound. Kurlex and sponge dressing in place, clean, dry and intact. Patient was I&D'd bedside by podiatry in the ed. purulent drainage was evacuated and sent to the lab for culture. Patient is scheduled for OR with podiatry in the AM.   (29 May 2021 02:29)    Allergies:  No Known Allergies  statins (Other)    PAST MEDICAL & SURGICAL HISTORY:  MATA on CPAP    DM (diabetes mellitus)    HTN (hypertension)    High cholesterol    Charcot ankle, right    History of percutaneous angioplasty    H/O skin graft    Left toe amputee  4 TOES    Diabetic Charcot foot  Fixation with external device    Home Medications:  acetaminophen 325 mg oral tablet: 2 tab(s) orally every 6 hours, As needed, Moderate Pain (4 - 6) (25 Nov 2019 17:16)  amLODIPine 5 mg oral tablet: 1 tab(s) orally once a day (20 Nov 2019 14:31)  aspirin 81 mg oral tablet, chewable: 1 tab(s) orally once a day (20 Nov 2019 14:31)  ezetimibe 10 mg oral tablet: 1 tab(s) orally once a day (20 Nov 2019 14:31)  glimepiride 4 mg oral tablet: 2 tab(s) orally once a day (20 Nov 2019 14:31)  hydroCHLOROthiazide 25 mg oral tablet: 1 tab(s) orally once a day (at bedtime) (20 Nov 2019 14:31)  Invokana 300 mg oral tablet: 1 tab(s) orally once a day (20 Nov 2019 14:31)  Lantus Solostar Pen 100 units/mL subcutaneous solution: subcutaneous once a day (at bedtime)-  18 UNITS (20 Nov 2019 14:31)  linezolid 2 mg/mL-D5% intravenous solution: 600 milligram(s) intravenous every 12 hours STOP 1/6/20. (25 Nov 2019 17:16)  losartan 50 mg oral tablet: 1 tab(s) orally once a day (20 Nov 2019 14:31)  Pletal 100 mg oral tablet: 1 tab(s) orally once a day (20 Nov 2019 14:31)    24 Hour Events:  Patient was an in-house stroke code for worsening mental status and a left gaze deviation; initial NIHSS 11. CTH negative, CTA/P was motion limited , but no occlusion . Perfusion deficits noted in right and left hemisphere could be artifactual. Admitted to CCU for Q1 neuro checks.     Exam:  Neurologic Exam:  Mental status: Awake, alert and oriented to person, place. Attention and concentration altered   Language: Naming, repetition, fluency, and comprehension intact. No dysarthria. Expresses confused sentences   Cranial nerves: Pupils equally round and reactive to light. Visual fields full. No nystagmus. left gaze preference; L>R. Face symmetric. Hearing intact   Motor:  Normal bulk and tone. Strength:    3/5 in RUE  3/5 in LUE  2/5 in RLE  2/5 in LLE  Motor: No tremors or tics noted. Arnie  Sensation: Intact to noxious stimuli. No neglect noted  Coordination: TRENT    Current Medications:  amLODIPine   Tablet 5 milliGRAM(s) Oral daily  aspirin  chewable 81 milliGRAM(s) Oral daily  cefepime   IVPB 2000 milliGRAM(s) IV Intermittent every 24 hours  chlorhexidine 4% Liquid 1 Application(s) Topical <User Schedule>  cilostazol 50 milliGRAM(s) Oral two times a day  dextrose 40% Gel 15 Gram(s) Oral once  dextrose 50% Injectable 25 Gram(s) IV Push once  dextrose 50% Injectable 12.5 Gram(s) IV Push once  dextrose 50% Injectable 25 Gram(s) IV Push once  glucagon  Injectable 1 milliGRAM(s) IntraMuscular once  heparin   Injectable 5000 Unit(s) SubCutaneous every 8 hours  insulin glargine Injectable (LANTUS) 33 Unit(s) SubCutaneous at bedtime  insulin lispro (ADMELOG) corrective regimen sliding scale   SubCutaneous three times a day before meals  insulin lispro Injectable (ADMELOG) 11 Unit(s) SubCutaneous three times a day before meals  linezolid  IVPB 600 milliGRAM(s) IV Intermittent every 12 hours  metroNIDAZOLE  IVPB 500 milliGRAM(s) IV Intermittent every 8 hours  ondansetron Injectable 4 milliGRAM(s) IV Push every 8 hours PRN  pantoprazole    Tablet 40 milliGRAM(s) Oral before breakfast  polyethylene glycol 3350 17 Gram(s) Oral daily  senna 2 Tablet(s) Oral at bedtime  sodium chloride 0.9%. 1000 milliLiter(s) IV Continuous <Continuous>    mRS:  0 No symptoms at all  1 No significant disability despite symptoms; able to carry out all usual duties and activities without assistance  2 Slight disability; unable to carry out all previous activities, but able to look after own affairs  3 Moderate disability; requiring some help, but able to walk without assistance  4 Moderately severe disability; unable to walk without assistance and unable to attend to own bodily needs without assistance  5 Severe disability; bedridden, incontinent and requiring constant nursing care and attention  6 Dead    Vital Signs Last 24 Hrs  T(C): 37.9 (31 May 2021 00:30), Max: 37.9 (31 May 2021 00:30)  T(F): 100.2 (31 May 2021 00:30), Max: 100.2 (31 May 2021 00:30)  HR: 96 (31 May 2021 02:00) (94 - 107)  BP: 143/56 (31 May 2021 02:00) (127/61 - 171/72)  BP(mean): 81 (31 May 2021 02:00) (81 - 97)  RR: 15 (31 May 2021 02:00) (15 - 24)  SpO2: 94% (31 May 2021 02:00) (94% - 100%)     I/Os:  I&O's Detail    30 May 2021 07:01  -  31 May 2021 03:04  --------------------------------------------------------  IN:    IV PiggyBack: 100 mL    Oral Fluid: 180 mL    sodium chloride 0.9%: 600 mL  Total IN: 880 mL    OUT:  Total OUT: 0 mL    Total NET: 880 mL    LIVER FUNCTIONS - ( 30 May 2021 20:00 )  Alb: 2.8 g/dL / Pro: 8.5 g/dL / ALK PHOS: 104 U/L / ALT: 20 U/L / AST: 26 U/L / GGT: x           Lactate, Blood: 1.4 mmol/L (05-29 @ 18:07)  Lactate, Blood: 1.1 mmol/L (05-28 @ 22:12)  Lactate, Blood: 2.3 mmol/L (05-28 @ 18:28)                           7.7    12.19 )-----------( 312      ( 30 May 2021 20:07 )             26.8      05-30    136  |  94<L>  |  64<HH>  ----------------------------<  79  5.5<H>   |  32  |  5.9<HH>    Ca    8.3<L>      30 May 2021 20:00  Phos  5.2     05-29  Mg     3.3     05-30    TPro  8.5<H>  /  Alb  2.8<L>  /  TBili  0.2  /  DBili  x   /  AST  26  /  ALT  20  /  AlkPhos  104  05-30     Adult Advanced Hemodynamics Last 24 Hrs  CVP(mm Hg): --  CVP(cm H2O): --  CO: --  CI: --  PA: --  PA(mean): --  PCWP: --  SVR: --  SVRI: --  PVR: --  PVRI: --  CARDIAC MARKERS ( 30 May 2021 20:07 )  x     / 0.12 ng/mL / x     / x     / 2.0 ng/mL  CARDIAC MARKERS ( 30 May 2021 20:00 )  x     / 0.12 ng/mL / x     / x     / x      CARDIAC MARKERS ( 30 May 2021 11:28 )  x     / 0.11 ng/mL / x     / x     / x      CARDIAC MARKERS ( 30 May 2021 05:35 )  x     / 0.07 ng/mL / x     / x     / x        Diagnostics/ Results:  Last CTH: < from: CT Brain Stroke Protocol (05.30.21 @ 20:31) >  IMPRESSION:    No evidence of acute intracranial pathology.    < end of copied text >    Last CTA/MRA: < from: CT Angio Neck w/ IV Cont (05.30.21 @ 20:58) >  CT ANGIOGRAPHY NECK:  1.  A 9 x 6 x 10 mm medially directed saccular outpouching arising from the RIGHT carotidbulb may represent penetrating atherosclerotic ulcer or a chronic focal dissection with pseudoaneurysm.  The outpouching appears to compress the parent vessel resulting in a mild to moderate stenosis in the RIGHT carotid bulb that measures 40-50% using NASCET criteria.  2.  Mild flattening the left carotid bulb.  No hemodynamically significant left internal internal carotid artery stenosis using NASCET criteria.  3.  No acute carotid dissection or carotid occlusion.  4.  Dominant left vertebral artery and hypoplastic right vertebral artery are grossly patent without evidence of flow-limiting stenosis or dissection.  5.  Incidental findings as discussed above.  There are superior mediastinal lymph nodes measuring up to 1.2 cm short axis.  There are mid and lower cervical lymph nodes measuring up to 8 mm short axis.      CT ANGIOGRAPHY BRAIN:  1.  No vessel occlusion or aneurysm about the [place].  2.  Moderate stenoses at the junction of the precavernous and cavernous segments of both internal carotid arteries.  Diffuse mild stenosis in the cavernous and proximal supraclinoid segments of both internal carotid arteries.  No flow-limiting stenosis in the anterior cerebral arteries, middle cerebral arteries, vertebrobasilar system or posterior cerebral arteries  3.  No evidence of acute territorial infarct.  No suspicious hypoattenuation in the brain parenchyma or loss of gray matter-white matter differentiation is visualized on the CTA source data.    < end of copied text >    Last CTP: < from: CT Perfusion w/ Maps w/ IV Cont (05.30.21 @ 20:51) >  CT PERFUSION: Severely limited by motion artifact.  Perfusion maps are severely motion degraded.  Detected CBF abnormality of 11 mL and Tmax abnormality of 134 mL are in nonvascular distributions and may be artifactual.  Follow-up MRI may be obtained to exclude a small infarct.    < end of copied text >    Last MRI: n/a    Last TCD: n/a    Last EEG: n/a    Last Echo: n/a    Last EKG: < from: 12 Lead ECG (05.30.21 @ 19:49) >  Diagnosis Line Sinus tachycardia  Right bundle branch block  Abnormal ECG    < end of copied text >    Chest Xray: < from: Xray Chest 1 View-PORTABLE IMMEDIATE (Xray Chest 1 View-PORTABLE IMMEDIATE .) (05.28.21 @ 22:31) >  Impression:    No radiographic evidence of acute cardiopulmonary disease.    < end of copied text >    ROS:  [X] A ten-point ROS was otherwise negative except as noted in HPI    Assessment:  59 y/o M with PMH of DM, hypertension, dyslipidemia, sleep apnea, Charcot foot reconstruction with external fixation, and morbid obesity presenting to the hospital on 5/29 with worsening foot ulcer. S/P debridement of right foot on 5/28 and was being monitored on 4B. Today, a stroke code was activated for worsening mental status and a left gaze deviation. As per RN, patient has been altered since this morning and at 8pm he seemed more lethargic, was not speaking, and had a gaze deviation. No fever, chills, or witnessed seizure at this time. /89. FS 79. NIHSS 11. CTH negative for acute findings. CTA/P was motion limited, but no occlusion. Perfusion deficits noted in right and left hemisphere could be artifactual. Patient transferred to CCU for neuro checks Q1.    Plan:  Neurology:  -Obtain MRI brain w/o  -Start ASA 81 mg daily  -Obtain rEEG in AM  -Continue neuro checks Q1 hrs   -STAT CTH/CTP/CTA if acute change in neurological status     Respiratory:  -Keep HOB > 35; aspiration precautions   -Keep satts >94%    Cardiology:  -Keep SBP goal 140-180; norvasc  -Hold blood pressure meds and start NSS if SBPs not meeting parameters  -Elevated trop and BNP  -Obtain ECHO w/ bubble    GI/Nutrition:  -Diet, NPO: Special Instructions for Nursing:  CODE STROKE; NPO until Dysphagia screen or Speech Bedside Swallow Evaluation completed and passed (05-30-21 @ 20:07) [Active]  -Bowel Regimen ->Miralax/ Senna  -PPx -> Pantoprazole     / Renal/ Fluids/ Electrolytes:  -No RRT as of now as per nephro note; Nephrology f/u appreciated. BUN/Cr elevated  -Monitor and treat lytes PRN  -Keep euvolemic; avoid fluid deficits   -US kidney and bladder 5/30: Urinary bladder volume of 314 mL. Patient unable to void during examination    Hematology/ Oncology:  -DVT PPx -> Heparin SQ    Lovenox SQ    Heparin gtt     SCDs  -Hgb: 9.5 on admission--> 7.7--> 8.3-->7.7 today    Infectious Disease:  -Monitor and treat fevers; tylenol PRN (currently febrile 100.2)  -ABX as per ID; flagyl/ cefepime/ linezolid (diabetic foot ulcer treatment)  -WBCs trending up; continue to monitor     Musculoskeletol:  -PT/rehab when able    Endocrine:  -Monitor FS w/ ISS PRN  -Lipid profile, HgbA1C, thyroid panel  -Start Lipitor 80 mg daily    Tubes/ Lines/ Drains:  Central    Peripheral    A-line    Lovelace    NGT/ OGT    Chest    EVD    Disposition:  Continue medical management as per primary team in:   ICU   CCU   SICU   Stroke Unit   Stepdown    CODE STATUS w/ Next of Kin:   DNI   DNR   FULL    Patient seen and case discussed with NCC attending; see attending attestation  Please call w/ questions  Roma Cochran, LAMONTE  [id number]

## 2021-05-31 NOTE — CONSULT NOTE ADULT - NSTELEHEALTH_GEN_ALL_CORE
Chief Complaint   Patient presents with   â¢ Derm Problem     New patient - Consult, Skin lesion of scalp     Referred from:  Slim Sheppard MD / PCP:  Slim Sheppard MD    History of present illness:  Va Tirado presents with:    Complaint:  Skin lesions  Duration:  Years  Location:  Scalp and sideburn  Symptoms/severity:  No itch or pain reported  Previous treatments:  None    Past dermatologic specific history:   None     Family history/social history:   He does not  have a family history of skin cancer. Social History     Tobacco Use   Smoking Status Never Smoker   Smokeless Tobacco Never Used    Elizabeth Li's medications, past medical history, and surgical history were reviewed in the electronic medical record and updated as necessary. Review of systems:   Constitutional: No fevers, no chills, no unintentional weight loss   Skin: no other skin complaints    Physical examination:   General: well developed, well nourished, in no acute distress. Neurologic and psychiatric: He has an appropriate mood and affect. Alert and oriented x3 with no gross neurological defects. Musculoskeletal: normal gait   Ocular: Normal eyelids and conjunctivae. SKIN:  Inspection and palpation of the area(s) of concern was performed, revealing:    Stuck on, verrucous papule with gyriform surface change consistent with seborrheic keratosis on the right preauricular cheek  1.5cm Subcutaneous nodule present on the right frontal scalp    Assessment and plan:   Candido Thompson was seen today for derm problem. Diagnoses and all orders for this visit:    Seborrheic keratosis    Pilar cyst       Cyst can be excised in future per patient preference. Procedures performed today:   None    Return if symptoms worsen or fail to improve.      On 9/18/2019, nAay Weinberg scribed the services personally performed by Trent Borden MD  The documentation recorded by the scribe accurately and completely reflects
No
No
the service(s) I personally performed and the decisions made by me.
No

## 2021-05-31 NOTE — PRE-ANESTHESIA EVALUATION ADULT - NSANTHPMHFT_GEN_ALL_CORE
Pt with infected R foot requiring amputation   Admitted 5/29 now intubated for altered mental status, failing kidneys, anemia requiring iv pressor support

## 2021-05-31 NOTE — PRE-OP CHECKLIST - SELECT TESTS ORDERED
BMP/CBC/CMP/Type and Screen/Urinalysis/CXR/POCT Blood Glucose/COVID-19
BMP/CMP/PT/PTT/INR/Type and Cross/Type and Screen/COVID-19

## 2021-05-31 NOTE — CHART NOTE - NSCHARTNOTEFT_GEN_A_CORE
ICU transfer ANESTHESIA ADMISSION NOTE      Procedure: Incision and drainage, pressure ulcer, skin, with debridement    Pulsed lavage for 15 minutes    Disarticulation of right knee      Post op diagnosis:  Gas gangrene of lower extremity        __x__  Intubated  TV:_16_____       Rate: 480______      FiO2: 100______    ____  Patent Airway    ____  Full return of protective reflexes    ____  Full recovery from anesthesia / back to baseline status    Vitals  HR: 84  BP: 122/61  RR: 16  O2 Sat: 100  Temp: 36.8    Mental Status:  ____ Awake   _____ Alert   _____ Drowsy   __x___ Sedated    Nausea/Vomiting:  __x__ NO  ______Yes,   See Post - Op Orders          Pain Scale (0-10):  _____    Treatment: ____ None    ____ See Post - Op/PCA Orders    Post - Operative Fluids:   ____ Oral   __x__ See Post - Op Orders    Plan: Discharge when criteria met:   ____Home       _____Floor     __x___Critical Care   Other:_________________    Comments: Patient had smooth intraoperative event, no anesthesia complication.

## 2021-05-31 NOTE — PROGRESS NOTE ADULT - ASSESSMENT
1. Acute Hypoxic Hypercarbic Respiratory Failure: secondary to septic shock, Toxic Metabolic encerphalopathy, continue on lung protective mechanical ventilation for now, will wean FiO2 for SpO2 greater then 92%,  tx nebs, pulmonary toilet, continue supportive care monitor for now.     2. Septic Shock: secondary to Bacteremia from diabetic foot ulcer, will continue empiric abx as per ID, continue levophed gtt will wean for maps greater then 65, continue monitor for now.     3. Diabetic Foot Ulcer: s/p debridement growing Staph aureus, discussed case with Vascular Surgery who will need to take pt for emergent amputation for proper source control, continue supportive care, monitor for now.  Continue tx as per ID.    4. Bacteremia: secondary to Enterococcus, Coag neg Staph, and Staph Aureus most likley from Diabetic foot Ulcer, continue empiric abx as per ID, f/u Cx's, will go to OR for amputation.    5. CANDY on CKD: secondary to sepsis induced ATN, will need renal replacement therapy as per nephrology, continue supportive care, monitor for now.     6. Toxic Metabolic Encephelopathy: secondary to sepsis and multiorgan failure, continue monitor for now, tx supportively CT head neg for acute pathology. Less likley to be acute CVA, f/u repeat CT head zee.    7. DM: tx inuslin sliding scale, monitor BSG's, continue supportive care.     8. HTN: continue supportive care, monitor for now. Hold oral meds.    9. HLD: tx statin, continue supportive care, monitor for now.     10. MATA/ OHS: continue supportive care, monitor for now on MV.    11. Morbid obesity: continue supportive care, monitor for now.     12. NSTEMI: secondary to sepsis induced demand ischemia, less likely true ACS, continue supportive care for now, f/u CE's and ECHO. Monitor for now.     13. Anemia: secondary to Anemia of chronic disease will transsfue as needed for HGB less then 7, monitor for now.     14. DVT/GI px: tx PPI and hep subcut, check lower ext US.    15. NUT: keep NPO tx IVF's.    Critical Care Time not including procedures 68 mins

## 2021-05-31 NOTE — PROGRESS NOTE ADULT - ASSESSMENT
oliguric, creatinine up-trending  [place] placement for RRT   d/c iv fluids n   Patient is a 61 y/o M with PMH of DM, hypertension, dyslipidemia, sleep apnea, Charcot foot reconstruction with external fixation, morbid obesity presenting to the hospital on 5/29 with worsening foot ulcer.    ·	CANDY on CKD ( unknown stage last known creat 1.4 in 11/2019) / rule out ATN / obstructive uropathy/ sp CT angio (too early for FLO)/ doubt IRGN (infection related GN)/ doubt AIN (multiple antibx courses)/ high bicarb/chr resp acidosis  ·	oligoanuric, creat rising  ·	d/c iv fluids  ·	renal ultrasound noted w/o hydronephrosis, s/p terrazas catheter placement   ·	UA noted, minimal proteinuria    ·	appreciate ID f/u, on cefepime flagyl and zyvox  ·	sp debridement of DFU as per podiatry  ·	check C3 C4 urine eosinophils SPEP SFLC and IF  ·	acute respiratory failure plan for intubation  ·	appreciate podiatry f/u, plan for OR for BKA vs. AKA today  ·	d/w pt's nihardik Blunttrae, medical decision maker, gave consent for RRT  ·	udall placement for RRT, HD today, vs. CCVHD if hemodynamically unstable, needs to be reassessed post OR  ·	low K diet, add lokelma 10g q12  ·	check phos level    ·	d/w ICU team    will follow

## 2021-05-31 NOTE — CONSULT NOTE ADULT - ASSESSMENT
Impression:  61 y/o M with PMH of DM, hypertension, dyslipidemia, sleep apnea, Charcot foot reconstruction with external fixation, morbid obesity presenting to the hospital on 5/29 with worsening foot ulcer. S/P debridement of right foot on 5/28 and was being monitored on 4b. Today a stroke code was activated for worsening mental status and a left gaze deviation. As per RN patient has been altered since this morning and at 8pm he seemed more lethargic was not speaking and had a gaze deviation. No fever chills, witnessed seizure at this time. /89  FS 79. NIHSS  11. CTH negative for acute findings. CTA/P was motion limited , but no occlusion . Perfusion deficits noted in right and left hemisphere could be artifactual. Case was discussed with neuro attending on call Dr Sintia Scales.      DDX   -stroke  -Seizure   -Uremic encephalopathy    Suggestions:  Neuro  Upgrade to ICU/CCU  MRI brain n/c  ECHO with bubble study   Lipid profile and hbaic  Start ASA 81 MG Q 24  Start Lipitor 80mg q 24  REEG in am  Q1 Neuro checks        Cardiovascular  Keep sbp 140-180  Hold bp meds and start NS if sbps not meeting parameters.      Pulmonary   Keep hob >45 degrees  Maintain aspiration precautions  Maintain 02 sat >95%      GI  Protonix 40 mg q 24      Renal   Strict I/Os  Monitor Lytes replete as needed  No RRT as of now as per nephro note      Endocrine  Maintain strict glycemic control  Check thyroid panel      Musculoskeletal   PT/REHAB      Haem/onc  Heparin SQ  Sequential stockings      ID  Continue Antibiotics as per ID    Disposition: ICU/CCU   Impression:  61 y/o M with PMH of DM, hypertension, dyslipidemia, sleep apnea, Charcot foot reconstruction with external fixation, morbid obesity presenting to the hospital on 5/29 with worsening foot ulcer. S/P debridement of right foot on 5/28 and was being monitored on 4b. Today a stroke code was activated for worsening mental status and a left gaze deviation. As per RN patient has been altered since this morning and at 8pm he seemed more lethargic was not speaking and had a gaze deviation. No fever chills, witnessed seizure at this time. /89  FS 79. NIHSS  11. CTH negative for acute findings. CTA/P was motion limited , but no occlusion . Perfusion deficits noted in right and left hemisphere could be artifactual. Case was discussed with neuro attending on call Dr Sintia Scales.      DDX   -stroke  -Seizure   -Uremic encephalopathy    Suggestions:  Neuro  Upgrade to ICU/CCU  MRI brain n/c  ECHO with bubble study   Lipid profile and hbaic  Start ASA 81 MG Q 24  Start Lipitor 80mg q 24  REEG in am  Q1 Neuro checks        Cardiovascular  Keep sbp 140-180  Hold bp meds and start NS if sbps not meeting parameters.      Pulmonary   Keep hob >45 degrees  Maintain aspiration precautions  Maintain 02 sat >95%      GI  Protonix 40 mg q 24      Renal   Strict I/Os  Monitor Lytes replete as needed  No RRT as of now as per nephro note  Nephrology follow up    Endocrine  Maintain strict glycemic control  Check thyroid panel      Musculoskeletal   PT/REHAB      Haem/onc  Heparin SQ  Sequential stockings      ID  Continue Antibiotics as per ID    Disposition: ICU/CCU

## 2021-05-31 NOTE — PROGRESS NOTE ADULT - SUBJECTIVE AND OBJECTIVE BOX
Patient is a 60y old  Male who presents with a chief complaint of dfu (31 May 2021 14:31)      Pt remains critically ill in ICU on MV was emergently intubated for worsening mental status due to most likely sepsis      ROS:     All ROS are negative except HPI         PHYSICAL EXAM    ICU Vital Signs Last 24 Hrs  T(C): 36.8 (31 May 2021 11:59), Max: 37.9 (31 May 2021 00:30)  T(F): 98.2 (31 May 2021 11:59), Max: 100.2 (31 May 2021 00:30)  HR: 108 (31 May 2021 14:31) (84 - 108)  BP: 146/70 (31 May 2021 11:59) (114/59 - 171/72)  BP(mean): 98 (31 May 2021 11:59) (75 - 112)  ABP: --  ABP(mean): --  RR: 28 (31 May 2021 11:59) (15 - 33)  SpO2: 96% (31 May 2021 14:31) (92% - 98%)      CONSTITUTIONAL:  Pt remains critically ill in ICU on MV     ENT:   pos et tube    EYES:   Pupils equal,   Round and reactive to light.    CARDIAC:   Normal rate,   Regular rhythm.    No edema      Vascular:  Normal systolic impulse  No Carotid bruits    RESPIRATORY:   rhonchi b/l lungs    GASTROINTESTINAL:  Abdomen soft,   Non-tender,   No guarding,   + BS    MUSCULOSKELETAL:   Range of motion is not limited,  No clubbing, cyanosis    NEUROLOGICAL:   Pt remains sedated on MV     SKIN:   Skin normal color for race,   Warm and dry and intact.   No evidence of rash.    PSYCHIATRIC:   Pt remains sedated on MV     HEMATOLOGICAL:  No cervical  lymphadenopathy.  no inguinal lymphadenopathy      05-30-21 @ 07:01  -  05-31-21 @ 07:00  --------------------------------------------------------  IN:    IV PiggyBack: 500 mL    Oral Fluid: 180 mL    sodium chloride 0.9%: 1200 mL  Total IN: 1880 mL    OUT:    Indwelling Catheter - Urethral (mL): 145 mL  Total OUT: 145 mL    Total NET: 1735 mL      05-31-21 @ 07:01 - 05-31-21 @ 14:40  --------------------------------------------------------  IN:    sodium chloride 0.9%: 300 mL  Total IN: 300 mL    OUT:    Indwelling Catheter - Urethral (mL): 25 mL  Total OUT: 25 mL    Total NET: 275 mL          LABS:                            7.3    13.69 )-----------( 306      ( 31 May 2021 04:33 )             25.8                                               05-31    139  |  97<L>  |  67<HH>  ----------------------------<  81  5.5<H>   |  32  |  6.3<HH>    Ca    7.9<L>      31 May 2021 04:33  Phos  5.2     05-29  Mg     3.3     05-31    TPro  8.4<H>  /  Alb  2.7<L>  /  TBili  <0.2  /  DBili  x   /  AST  26  /  ALT  19  /  AlkPhos  99  05-31                                                 CARDIAC MARKERS ( 30 May 2021 20:07 )  x     / 0.12 ng/mL / x     / x     / 2.0 ng/mL  CARDIAC MARKERS ( 30 May 2021 20:00 )  x     / 0.12 ng/mL / x     / x     / x      CARDIAC MARKERS ( 30 May 2021 11:28 )  x     / 0.11 ng/mL / x     / x     / x      CARDIAC MARKERS ( 30 May 2021 05:35 )  x     / 0.07 ng/mL / x     / x     / x                                                LIVER FUNCTIONS - ( 31 May 2021 04:33 )  Alb: 2.7 g/dL / Pro: 8.4 g/dL / ALK PHOS: 99 U/L / ALT: 19 U/L / AST: 26 U/L / GGT: x                                                  Culture - Acid Fast - Other w/Smear (collected 29 May 2021 06:10)  Source: .Other None(R-FOOT)    Culture - Surgical Swab (collected 29 May 2021 06:10)  Source: .Surgical Swab None  Preliminary Report (30 May 2021 17:28):    No growth    Culture - Acid Fast - Other w/Smear (collected 29 May 2021 06:10)  Source: .Other None (R-FOOT)    Culture - Surgical Swab (collected 29 May 2021 06:10)  Source: .Surgical Swab None  Preliminary Report (30 May 2021 17:21):    Numerous Staphylococcus aureus    Culture - Urine (collected 29 May 2021 01:20)  Source: .Urine Clean Catch (Midstream)  Final Report (30 May 2021 08:11):    <10,000 CFU/mL Normal Urogenital Mary    Culture - Abscess with Gram Stain (collected 28 May 2021 19:51)  Source: .Abscess Right - Foot  Preliminary Report (31 May 2021 12:15):    Moderate Methicillin resistant Staphylococcus aureus    Few Corynebacterium species "Susceptibilities not performed"  Organism: Methicillin resistant Staphylococcus aureus (31 May 2021 12:14)  Organism: Methicillin resistant Staphylococcus aureus (31 May 2021 12:14)    Culture - Other (collected 28 May 2021 18:32)  Source: .Other R foot DFU  Final Report (30 May 2021 15:44):    Moderate Methicillin resistant Staphylococcus aureus    Normal skin mary isolated  Organism: Methicillin resistant Staphylococcus aureus (30 May 2021 15:44)  Organism: Methicillin resistant Staphylococcus aureus (30 May 2021 15:44)    Culture - Blood (collected 28 May 2021 18:28)  Source: .Blood Blood-Peripheral  Gram Stain (29 May 2021 18:30):    Growth in anaerobic bottle: Gram positive cocci in pairs    Growth in aerobic bottle: Gram positive cocci in pairs  Preliminary Report (30 May 2021 17:52):    Growth in anaerobic bottle: Enterococcus faecalis    Growth in aerobic and anaerobic bottles: Staphylococcus aureus    ***Blood Panel PCR results on this specimen are available    approximately 3 hours after the Gram stain result.***    Gram stain, PCR, and/or culture results may not always    correspond due to difference in methodologies.    ************************************************************    This PCR assay was performed by multiplex PCR. This    Assay tests for 66 bacterial and resistance gene targets.    Please refer to the [employer] test directory    at [email or web address] for details.  Organism: Blood Culture PCR (29 May 2021 18:15)  Organism: Blood Culture PCR (29 May 2021 18:15)    Culture - Blood (collected 28 May 2021 18:28)  Source: .Blood Blood-Peripheral  Gram Stain (30 May 2021 06:00):    Growth in aerobic bottle: Gram positive cocci in pairs    Growth in anaerobic bottle: Gram positive cocci in pairs  Preliminary Report (31 May 2021 11:05):    Growth in aerobic and anaerobic bottles: Staphylococcus aureus    See previous culture [id number]                                                   Mode: AC/ CMV (Assist Control/ Continuous Mandatory Ventilation)  RR (machine): 16  TV (machine): 480  FiO2: 100  PEEP: 8  ITime: 1  MAP: 14  PIP: 35                                      ABG - ( 31 May 2021 12:50 )  pH, Arterial: 7.30  pH, Blood: x     /  pCO2: 69    /  pO2: 72    / HCO3: 34    / Base Excess: 6.6   /  SaO2: 94                  MEDICATIONS  (STANDING):  aspirin  chewable 81 milliGRAM(s) Oral daily  cefepime   IVPB 2000 milliGRAM(s) IV Intermittent every 24 hours  chlorhexidine 4% Liquid 1 Application(s) Topical <User Schedule>  cilostazol 50 milliGRAM(s) Oral two times a day  dextrose 40% Gel 15 Gram(s) Oral once  dextrose 50% Injectable 25 Gram(s) IV Push once  dextrose 50% Injectable 12.5 Gram(s) IV Push once  dextrose 50% Injectable 25 Gram(s) IV Push once  etomidate Injectable 40 milliGRAM(s) IV Push once  fentaNYL    Injectable 100 MICROGram(s) IV Push once  fentaNYL   Infusion... 0.5 MICROgram(s)/kG/Hr (4.11 mL/Hr) IV Continuous <Continuous>  glucagon  Injectable 1 milliGRAM(s) IntraMuscular once  heparin   Injectable 5000 Unit(s) SubCutaneous every 8 hours  insulin lispro (ADMELOG) corrective regimen sliding scale   SubCutaneous three times a day before meals  insulin lispro Injectable (ADMELOG) 11 Unit(s) SubCutaneous three times a day before meals  linezolid  IVPB 600 milliGRAM(s) IV Intermittent every 12 hours  metroNIDAZOLE  IVPB 500 milliGRAM(s) IV Intermittent every 8 hours  midazolam Injectable 4 milliGRAM(s) IV Push once  norepinephrine Infusion 0.05 MICROgram(s)/kG/Min (15.4 mL/Hr) IV Continuous <Continuous>  pantoprazole    Tablet 40 milliGRAM(s) Oral before breakfast  polyethylene glycol 3350 17 Gram(s) Oral daily  propofol Infusion 5 MICROgram(s)/kG/Min (4.93 mL/Hr) IV Continuous <Continuous>  propofol Injectable 200 milliGRAM(s) IV Push once  senna 2 Tablet(s) Oral at bedtime  sodium chloride 0.9% Bolus 2000 milliLiter(s) IV Bolus once    MEDICATIONS  (PRN):  midazolam Injectable 2 milliGRAM(s) IV Push every 4 hours PRN agitation  ondansetron Injectable 4 milliGRAM(s) IV Push every 8 hours PRN Nausea and/or Vomiting      New X-rays reviewed:                                                                                  ECHO    CXR interpreted by me:

## 2021-05-31 NOTE — PRE-ANESTHESIA EVALUATION ADULT - NSANTHADDINFOFT_GEN_ALL_CORE
Pt with deteriorating physical status, probable sepsis, renal failure, possible CVA, anemia  Poor prognosos
risks, benefits, alternatives, general anesthesia as a backup discussed with the patient and he agrees to proceed as planned. If intubation is necessary, possible prolonged intubation also discussed

## 2021-05-31 NOTE — PROGRESS NOTE ADULT - SUBJECTIVE AND OBJECTIVE BOX
Podiatry Progress Note    Subjective:   FERNANDEZ GUZMAN is a pleasant well-nourished, well developed 60y Male in no acute distress, alert awake, and oriented to person, place and time.  Patient is a 60y old  Male who presents with a chief complaint of dfu (31 May 2021 09:54)      PAST MEDICAL & SURGICAL HISTORY:  MATA on CPAP    DM (diabetes mellitus)    HTN (hypertension)    High cholesterol    Charcot ankle, right    History of percutaneous angioplasty    H/O skin graft    Left toe amputee  4 TOES    Diabetic Charcot foot  Fixation with external device         Objective:  Vital Signs Last 24 Hrs  T(C): 36.8 (31 May 2021 11:59), Max: 37.9 (31 May 2021 00:30)  T(F): 98.2 (31 May 2021 11:59), Max: 100.2 (31 May 2021 00:30)  HR: 96 (31 May 2021 11:59) (84 - 101)  BP: 146/70 (31 May 2021 11:59) (114/59 - 171/72)  BP(mean): 98 (31 May 2021 11:59) (75 - 112)  RR: 28 (31 May 2021 11:59) (15 - 33)  SpO2: 95% (31 May 2021 11:59) (92% - 98%)                        7.3    13.69 )-----------( 306      ( 31 May 2021 04:33 )             25.8                 05-31    139  |  97<L>  |  67<HH>  ----------------------------<  81  5.5<H>   |  32  |  6.3<HH>    Ca    7.9<L>      31 May 2021 04:33  Phos  5.2     05-29  Mg     3.3     05-31    TPro  8.4<H>  /  Alb  2.7<L>  /  TBili  <0.2  /  DBili  x   /  AST  26  /  ALT  19  /  AlkPhos  99  05-31      Physical Exam - Lower Extremity Focused:   Derm:   Open Surgical Incision Wounds on the Plantar Lateral and Posterior Aspect of Right Foot;  No Purulence w/ Minimal Bleeding Noted on the Lateral Aspect;  Open Pressure UIcer on the Plantar Midfoot;     Vascular: DP and PT Pulses Diminished; Foot is Warm to Warm to the touch   Neuro: Protective Sensation Diminished / Moderately Neuropathic   MSK: Pain on Palpation to posterior RLE at Mid-Calf Level    Assessment:   s/p I&D Right Foot; 5/29;  Open Surgical Wounds; Right Foot;     Plan:  Local Wound Care; Flush w/ NS; 1/4 Iodoform Packing / DSD / ABD / Kerlix / Ace Wrap; Right Foot;  Weight Bearing Status; NWB Right Foot;   Follow Up w/ ID and OR Cultures;    Will Continue w/ Local Wound Care; Q24 Dressing Changes;  Possible plan for BKA vs AKA due to overall poor prognosis; AMS overnight; CANDY on CKD  Will f/u plan w/Attending    Podiatry

## 2021-05-31 NOTE — CONSULT NOTE ADULT - SUBJECTIVE AND OBJECTIVE BOX
1.Presentation: Presented for DFU now a stroke code for worsening mental status and Gaze deviation      2. Today's Acute Problems:  Encephalopathy      3.History:  61 y/o M with PMH of DM, hypertension, dyslipidemia, sleep apnea, Charcot foot reconstruction with external fixation, morbid obesity presenting to the hospital on  with worsening foot ulcer. S/P debridement of right foot on  and was being monitored on 4b. Today a stroke code was activated for worsening mental status and a left gaze deviation. As per RN patient has been altered since this morning and at 8pm he seemed more lethargic was not speaking and had a gaze deviation. No fever chills, witnessed seizure at this time. /89  FS 79          5.Medications:  Amlodipine   Tablet 5 milligram Oral daily  aspirin  chewable 81 milligram Oral daily  cefepime   IVPB 2000 milligram IV Intermittent every 24 hours  chlorhexidine 4% Liquid 1 Application(s) Topical <User Schedule>  cilostazol 50 milligrams Oral two times a day  dextrose 40% Gel 15 Gram(s) Oral once  dextrose 50% Injectable 25 Gram(s) IV Push once  dextrose 50% Injectable 12.5 Gram(s) IV Push once  dextrose 50% Injectable 25 Gram(s) IV Push once  glucagon  Injectable 1 milligrams Intramuscular once  heparin   Injectable 5000 Unit(s) Subcutaneous every 8 hours  insulin glargine Injectable (LANTUS) 33 Unit(s) Subcutaneous at bedtime  insulin lispro (ADMELOG) corrective regimen sliding scale   Subcutaneous three times a day before meals  insulin lispro Injectable (ADMELOG) 11 Unit(s) Subcutaneous three times a day before meals  linezolid  IVPB 600 milligrams IV Intermittent every 12 hours  metronidazole  IVPB 500 milligram IV Intermittent every 8 hours  pantoprazole    Tablet 40 milligrams Oral before breakfast  polyethylene glycol 3350 17 Gram(s) Oral daily  senna 2 Tablet(s) Oral at bedtime  sodium chloride 0.9%. 1000 milliliter IV Continuous <Continuous>      6.Ancillary Mangement:  Chest PT[]  Head of bed >35 [x]  Out of bed to chair []  PT/OT/ST []  Spirometry[]  DVT prophylaxis[x]      7.Neuro Exam:  Patient is lethargic easily arousable following few 1 step commands. Able to say hsi name but unable to answer any other specific question, was unable to recognize or name family member.  CN: Left gaze deviation        Expressive aphasia  Power: Able to lift extremities to antigravity except for his left UE  FTN: Unable to assess  HKS: Unable to assess  Gait: deferred        NIHSS  LOC: 1    Questions: 2     Commands:  1  VF:  2       Gaze:  2     Facial:  0      RUE: 0  RLE: 2     LUE: 2   LLE:   Ataxia:   0             Sensory: 0  Language:  2         Dysarthria: 1  Extinction: 0        Last CTH:    Last CTA/MRA:    Last CTP:    Last MRI:    Last TCD:    Last EEG:    GCS:    ICHs:    Gutierrez&Scott:   m-Lackey:  CPP:    8.CVS:  Vital Signs Last 24 Hrs  T(C): 37.6 (31 May 2021 00:00), Max: 37.6 (30 May 2021 04:00)  T(F): 99.6 (31 May 2021 00:00), Max: 99.6 (30 May 2021 04:00)  HR: 96 (31 May 2021 00:00) (96 - 107)  BP: 127/61 (31 May 2021 00:00) (127/61 - 156/80)  BP(mean): --  RR: 23 (31 May 2021 00:00) (17 - 23)  SpO2: 98% (31 May 2021 00:00) (98% - 100%)  Last Echo:    Last EKG:      CVP  MAP/CPP/SBP target:   CO:    CI:  Enzymes/Trop:    9.Respiratory:  ABG:    VBG:    Chest Xray:        10.GI:  Prophalaxis:    GI:    LIVER FUNCTIONS - ( 30 May 2021 20:00 )  Alb: 2.8 g/dL / Pro: 8.5 g/dL / ALK PHOS: 104 U/L / ALT: 20 U/L / AST: 26 U/L / GGT: x             11.Renal/Fluids/Electrolytes:    05-    136  |  94<L>  |  64<HH>  ----------------------------<  79  5.5<H>   |  32  |  5.9<HH>    Ca    8.3<L>      30 May 2021 20:00  Phos  5.2     05-29  Mg     3.3     05-30    TPro  8.5<H>  /  Alb  2.8<L>  /  TBili  0.2  /  DBili  x   /  AST  26  /  ALT  20  /  AlkPhos  104        I&O's Detail    30 May 2021 07:01  -  31 May 2021 00:36  --------------------------------------------------------  IN:    IV PiggyBack: 100 mL    Oral Fluid: 180 mL    sodium chloride 0.9%: 500 mL  Total IN: 780 mL    OUT:  Total OUT: 0 mL    Total NET: 780 mL          12.ID:  TMax:  Vital Signs Last 24 Hrs  T(C): 37.6 (31 May 2021 00:00), Max: 37.6 (30 May 2021 04:00)  T(F): 99.6 (31 May 2021 00:00), Max: 99.6 (30 May 2021 04:00)  HR: 96 (31 May 2021 00:00) (96 - 107)  BP: 127/61 (31 May 2021 00:00) (127/61 - 156/80)  BP(mean): --  RR: 23 (31 May 2021 00:00) (17 - 23)  SpO2: 98% (31 May 2021 00:00) (98% - 100%)  Lactate, Blood: 1.4 mmol/L ( @ 18:07)  Lactate, Blood: 1.1 mmol/L ( @ 22:12)  Lactate, Blood: 2.3 mmol/L ( @ 18:28)    Urinalysis Basic - ( 29 May 2021 01:20 )    Color: Light Yellow / Appearance: Clear / S.028 / pH: x  Gluc: x / Ketone: Negative  / Bili: Negative / Urobili: <2 mg/dL   Blood: x / Protein: Trace / Nitrite: Negative   Leuk Esterase: Negative / RBC: 3 /HPF / WBC 1 /HPF   Sq Epi: x / Non Sq Epi: 0 /HPF / Bacteria: Negative      Lines:  Central[] Date inserted:      Peripheral[]    13.Hematology:                        7.7    12.19 )-----------( 312      ( 30 May 2021 20:07 )             26.8     05-    136  |  94<L>  |  64<HH>  ----------------------------<  79  5.5<H>   |  32  |  5.9<HH>    Ca    8.3<L>      30 May 2021 20:00  Phos  5.2     05-  Mg     3.3         TPro  8.5<H>  /  Alb  2.8<L>  /  TBili  0.2  /  DBili  x   /  AST  26  /  ALT  20  /  AlkPhos  104  05-30        DVT Prophylaxis  Lovenox[]   Heparin[]   Venodynes[]   SCD's[x]    Impression:          Suggestions:    Disposition:       1.Presentation: Presented for DFU now a stroke code for worsening mental status and Gaze deviation      2. Today's Acute Problems:  Encephalopathy      3.History:  61 y/o M with PMH of DM, hypertension, dyslipidemia, sleep apnea, Charcot foot reconstruction with external fixation, morbid obesity presenting to the hospital on  with worsening foot ulcer. S/P debridement of right foot on  and was being monitored on 4b. Today a stroke code was activated for worsening mental status and a left gaze deviation. As per RN patient has been altered since this morning and at 8pm he seemed more lethargic was not speaking and had a gaze deviation. No fever chills, witnessed seizure at this time. /89  FS 79          5.Medications:  Amlodipine   Tablet 5 milligram Oral daily  aspirin  chewable 81 milligram Oral daily  cefepime   IVPB 2000 milligram IV Intermittent every 24 hours  chlorhexidine 4% Liquid 1 Application(s) Topical <User Schedule>  cilostazol 50 milligrams Oral two times a day  dextrose 40% Gel 15 Gram(s) Oral once  dextrose 50% Injectable 25 Gram(s) IV Push once  dextrose 50% Injectable 12.5 Gram(s) IV Push once  dextrose 50% Injectable 25 Gram(s) IV Push once  glucagon  Injectable 1 milligrams Intramuscular once  heparin   Injectable 5000 Unit(s) Subcutaneous every 8 hours  insulin glargine Injectable (LANTUS) 33 Unit(s) Subcutaneous at bedtime  insulin lispro (ADMELOG) corrective regimen sliding scale   Subcutaneous three times a day before meals  insulin lispro Injectable (ADMELOG) 11 Unit(s) Subcutaneous three times a day before meals  linezolid  IVPB 600 milligrams IV Intermittent every 12 hours  metronidazole  IVPB 500 milligram IV Intermittent every 8 hours  pantoprazole    Tablet 40 milligrams Oral before breakfast  polyethylene glycol 3350 17 Gram(s) Oral daily  senna 2 Tablet(s) Oral at bedtime  sodium chloride 0.9%. 1000 milliliter IV Continuous <Continuous>      6.Ancillary Mangement:  Chest PT[]  Head of bed >35 [x]  Out of bed to chair []  PT/OT/ST []  Spirometry[]  DVT prophylaxis[x]      7.Neuro Exam:  Patient is lethargic easily arousable following few 1 step commands. Able to say hsi name but unable to answer any other specific question, was unable to recognize or name family member.  CN: Left gaze deviation        Expressive aphasia  Power: Able to lift extremities to antigravity except for his left UE  FTN: Unable to assess  HKS: Unable to assess  Gait: deferred        NIHSS 11  LOC: 1    Questions: 2     Commands:  1  VF:  2       Gaze:  2     Facial:  0      RUE: 0  RLE: 2     LUE: 1   LLE:   Ataxia:   0             Sensory: 0  Language:  1         Dysarthria: 1  Extinction: 0        Last CTH:  < from: CT Brain Stroke Protocol (21 @ 20:31) >  Findings:    The ventricles and cortical sulci are appropriate for the patient's stated age.    No intracranial hemorrhage is identified. There are no extra-axial fluid collections, space occupying processes, mass-effect, or midline shift.    There are patchy hypodensities throughout the hemispheric white matter without mass effect compatible with chronic microvascular changes.    Gray white matter differentiation is preserved.    Evaluation of the calvarium demonstrates no bony abnormalities. The visualized paranasal sinuses and mastoids are well aerated.    IMPRESSION:    No evidence of acute intracranial pathology.        ELLIOT LANDAU MD; Attending Radiologist  This document has been electronically signed. May 30 2021  8:35PM    < end of copied text >        Last CTA:  < from: CT Angio Neck w/ IV Cont (21 @ 20:58) >  CT ANGIOGRAPHY NECK:  1.  A 9 x 6 x 10 mm medially directed saccular outpouching arising from the RIGHT carotidbulb may represent penetrating atherosclerotic ulcer or a chronic focal dissection with pseudoaneurysm.  The outpouching appears to compress the parent vessel resulting in a mild to moderate stenosis in the RIGHT carotid bulb that measures 40-50% using NASCET criteria.  2.  Mild flattening the left carotid bulb.  No hemodynamically significant left internal internal carotid artery stenosis using NASCET criteria.  3.  No acute carotid dissection or carotid occlusion.  4.  Dominant left vertebral artery and hypoplastic right vertebral artery are grossly patent without evidence of flow-limiting stenosis or dissection.  5.  Incidental findings as discussed above.  There are superior mediastinal lymph nodes measuring up to 1.2 cm short axis.  There are mid and lower cervical lymph nodes measuring up to 8 mm short axis.      CT ANGIOGRAPHY BRAIN:  1.  No vessel occlusion or aneurysm about the [place].  2.  Moderate stenoses at the junction of the precavernous and cavernous segments of both internal carotid arteries.  Diffuse mild stenosis in the cavernous and proximal supraclinoid segments of both internal carotid arteries.  No flow-limiting stenosis in the anterior cerebral arteries, middle cerebral arteries, vertebrobasilar system or posterior cerebral arteries  3.  No evidence of acute territorial infarct.  No suspicious hypoattenuation in the brain parenchyma or loss of gray matter-white matter differentiation is visualized on the CTA source data.        < end of copied text >          Last CTP:  < from: CT Perfusion w/ Maps w/ IV Cont (21 @ 20:51) >  IMPRESSION:    CT PERFUSION: Severely limited by motion artifact.  Perfusion maps are severely motion degraded.  Detected CBF abnormality of 11 mL and Tmax abnormality of 134 mL are in nonvascular distributions and may be artifactual.  Follow-up MRI may be obtained to exclude a small infarct.      < end of copied text >        8.CVS:  Vital Signs Last 24 Hrs  T(C): 37.6 (31 May 2021 00:00), Max: 37.6 (30 May 2021 04:00)  T(F): 99.6 (31 May 2021 00:00), Max: 99.6 (30 May 2021 04:00)  HR: 96 (31 May 2021 00:00) (96 - 107)  BP: 127/61 (31 May 2021 00:00) (127/61 - 156/80)  BP(mean): --  RR: 23 (31 May 2021 00:00) (17 - 23)  SpO2: 98% (31 May 2021 00:00) (98% - 100%)        Last EKG:  < from: 12 Lead ECG (21 @ 19:49) >    Ventricular Rate 104 BPM    Atrial Rate 105 BPM    P-R Interval 200 ms    QRS Duration 164 ms    Q-T Interval 392 ms    QTC Calculation(Bazett) 515 ms    P Axis 33 degrees    R Axis 263 degrees    T Axis 45 degrees    Diagnosis Line Sinus tachycardia  Right bundle branch block  Abnormal ECG    Confirmed by Cheko Lux ([id number]) on 2021 9:08:21 PM    < end of copied text >            10.GI:  LIVER FUNCTIONS - ( 30 May 2021 20:00 )  Alb: 2.8 g/dL / Pro: 8.5 g/dL / ALK PHOS: 104 U/L / ALT: 20 U/L / AST: 26 U/L / GGT: x             11.Renal/Fluids/Electrolytes:        136  |  94<L>  |  64<HH>  ----------------------------<  79  5.5<H>   |  32  |  5.9<HH>    Ca    8.3<L>      30 May 2021 20:00  Phos  5.2       Mg     3.3         TPro  8.5<H>  /  Alb  2.8<L>  /  TBili  0.2  /  DBili  x   /  AST  26  /  ALT  20  /  AlkPhos  104        I&O's Detail    30 May 2021 07:01  -  31 May 2021 00:36  --------------------------------------------------------  IN:    IV PiggyBack: 100 mL    Oral Fluid: 180 mL    sodium chloride 0.9%: 500 mL  Total IN: 780 mL    OUT:  Total OUT: 0 mL    Total NET: 780 mL          12.ID:  TMax:  Vital Signs Last 24 Hrs  T(C): 37.6 (31 May 2021 00:00), Max: 37.6 (30 May 2021 04:00)  T(F): 99.6 (31 May 2021 00:00), Max: 99.6 (30 May 2021 04:00)  HR: 96 (31 May 2021 00:00) (96 - 107)  BP: 127/61 (31 May 2021 00:00) (127/61 - 156/80)  BP(mean): --  RR: 23 (31 May 2021 00:00) (17 - 23)  SpO2: 98% (31 May 2021 00:00) (98% - 100%)  Lactate, Blood: 1.4 mmol/L ( @ 18:07)  Lactate, Blood: 1.1 mmol/L ( @ 22:12)  Lactate, Blood: 2.3 mmol/L ( @ 18:28)    Urinalysis Basic - ( 29 May 2021 01:20 )    Color: Light Yellow / Appearance: Clear / S.028 / pH: x  Gluc: x / Ketone: Negative  / Bili: Negative / Urobili: <2 mg/dL   Blood: x / Protein: Trace / Nitrite: Negative   Leuk Esterase: Negative / RBC: 3 /HPF / WBC 1 /HPF   Sq Epi: x / Non Sq Epi: 0 /HPF / Bacteria: Negative          13.Hematology:                        7.7    12.19 )-----------( 312      ( 30 May 2021 20:07 )             26.8         DVT Prophylaxis  Lovenox[]   Heparin[]   Venodyne []   SCD's[x]

## 2021-05-31 NOTE — PROCEDURE NOTE - SUPERVISORY STATEMENT
Not including critical care time

## 2021-05-31 NOTE — PROCEDURE NOTE - ADDITIONAL PROCEDURE DETAILS
grade 1 view    sedated with:    100mg Propofol  20mg Etomidate  100mcg Fentanyl  4mg versed.
Keep Pt NPO; OR in AM on Sat 5/29

## 2021-05-31 NOTE — CHART NOTE - NSCHARTNOTEFT_GEN_A_CORE
60 year old M hx of HTN, COPD, morbid obesity, OHS/MAAT, Charcot's foot w/ indwelling hardware with multiple bouts of osteo requiring hospitalization and IV antibiotics  Admitted 5/28 from podiatry clinic for worsening R foot infection  Blood cultures drawn in ED +MRSA/E. faecalis  Taken to OR 5/29 for debridement with pockets of purulence drained (also +MRSA)  On IV Cefe/Linezolid/Flagyl  Overnight altered r/o stroke (negative) - did show focal pseudoaneurysm of right ICA bulb; clinically no focal deficits this AM  At bedside significantly altered, on bipap (14/6 40%) w/ increased work of breathing, SaO2 80s-90s; HR 90s, BP 140s/80s, afebrile  Clinically, tenderness elicited up to right knee joint w/o crepitus or fluctuance; firm tender area at the back of gastrocnemius muscle.    WBCK 13k, Lact 0.6, CRP > 300; ABG c/w respiratory acidosis w/ uncompensated metabolic alkalosis & P/F ~ 140     A/P:   Sepsis from right foot infection  Acute hypoxemic, hypercabic respiratory failure 2/2 severe infection  Altered mental status  CANDY on CKD     Given clinical decline 2/2 foot infection, recommend continued resuscitation and OR for Guillotine BKA vs AKA  D/w patient's niece as next of kin since patient is too alerted to provide informed consent  Attending aware 60 year old M hx of HTN, COPD, morbid obesity, OHS/MATA, Charcot's foot w/ indwelling hardware with multiple bouts of osteo requiring hospitalization and IV antibiotics  Admitted 5/28 from podiatry clinic for worsening R foot infection  Blood cultures drawn in ED +MRSA/E. faecalis  Taken to OR 5/29 for debridement with pockets of purulence drained (also +MRSA)  On IV Cefe/Linezolid/Flagyl  Overnight altered r/o stroke (negative) - did show focal pseudoaneurysm of right ICA bulb; clinically no focal deficits this AM  At bedside significantly altered, on bipap (14/6 40%) w/ increased work of breathing, SaO2 80s-90s; HR 90s, BP 140s/80s, afebrile  Clinically, tenderness elicited up to right knee joint w/o crepitus or fluctuance; firm tender area at the back of gastrocnemius muscle.    WBCK 13k, Lact 0.6, CRP > 300; ABG c/w respiratory acidosis w/ uncompensated metabolic alkalosis & P/F ~ 140     A/P:   Sepsis from right foot infection  Acute hypoxemic, hypercabic respiratory failure 2/2 severe infection  Altered mental status  CANDY on CKD     Given clinical decline 2/2 foot infection, recommend continued resuscitation and OR for Guillotine BKA vs AKA  D/w patient's niece as next of kin since patient is too alerted to provide informed consent  Attending aware      Attestation:  I personally saw and examined the patient in CCU: Known Charcot's foot for over a decade, s/p drainage of the right foot abscesses.  Septic with positive blood cultures (MRSA)  He just got intubated and is started on pressors.  In the examination, right foot is deformed. He misses some toes, wide drainage in the posterior part.  Tender in calf palpation, with thick skin.    He is also non-ambulatory due to foot problems.    Patient needs open amputation emergently for source control. As his posterior calf's skin is not healthy, we will proceed with right through-knee amputation (Open)  The niece has been informed and agreed with the amputation.

## 2021-06-01 DIAGNOSIS — Z51.5 ENCOUNTER FOR PALLIATIVE CARE: ICD-10-CM

## 2021-06-01 DIAGNOSIS — J96.90 RESPIRATORY FAILURE, UNSPECIFIED, UNSPECIFIED WHETHER WITH HYPOXIA OR HYPERCAPNIA: ICD-10-CM

## 2021-06-01 DIAGNOSIS — A41.9 SEPSIS, UNSPECIFIED ORGANISM: ICD-10-CM

## 2021-06-01 DIAGNOSIS — N17.9 ACUTE KIDNEY FAILURE, UNSPECIFIED: ICD-10-CM

## 2021-06-01 LAB
ALBUMIN SERPL ELPH-MCNC: 2.5 G/DL — LOW (ref 3.5–5.2)
ALP SERPL-CCNC: 101 U/L — SIGNIFICANT CHANGE UP (ref 30–115)
ALT FLD-CCNC: 22 U/L — SIGNIFICANT CHANGE UP (ref 0–41)
ANION GAP SERPL CALC-SCNC: 14 MMOL/L — SIGNIFICANT CHANGE UP (ref 7–14)
APTT BLD: 25.2 SEC — LOW (ref 27–39.2)
AST SERPL-CCNC: 35 U/L — SIGNIFICANT CHANGE UP (ref 0–41)
BASE EXCESS BLDA CALC-SCNC: -1.3 MMOL/L — SIGNIFICANT CHANGE UP (ref -2–2)
BASE EXCESS BLDA CALC-SCNC: -2 MMOL/L — SIGNIFICANT CHANGE UP (ref -2–2)
BASE EXCESS BLDA CALC-SCNC: -2.5 MMOL/L — LOW (ref -2–2)
BASOPHILS # BLD AUTO: 0.04 K/UL — SIGNIFICANT CHANGE UP (ref 0–0.2)
BASOPHILS NFR BLD AUTO: 0.2 % — SIGNIFICANT CHANGE UP (ref 0–1)
BILIRUB SERPL-MCNC: 0.3 MG/DL — SIGNIFICANT CHANGE UP (ref 0.2–1.2)
BUN SERPL-MCNC: 69 MG/DL — CRITICAL HIGH (ref 10–20)
CALCIUM SERPL-MCNC: 7.2 MG/DL — LOW (ref 8.5–10.1)
CHLORIDE SERPL-SCNC: 94 MMOL/L — LOW (ref 98–110)
CHOLEST SERPL-MCNC: 122 MG/DL — SIGNIFICANT CHANGE UP
CK SERPL-CCNC: 201 U/L — SIGNIFICANT CHANGE UP (ref 0–225)
CO2 SERPL-SCNC: 26 MMOL/L — SIGNIFICANT CHANGE UP (ref 17–32)
CREAT SERPL-MCNC: 7.5 MG/DL — CRITICAL HIGH (ref 0.7–1.5)
EOSINOPHIL # BLD AUTO: 0.59 K/UL — SIGNIFICANT CHANGE UP (ref 0–0.7)
EOSINOPHIL NFR BLD AUTO: 3.6 % — SIGNIFICANT CHANGE UP (ref 0–8)
GAS PNL BLDA: SIGNIFICANT CHANGE UP
GLUCOSE BLDC GLUCOMTR-MCNC: 162 MG/DL — HIGH (ref 70–99)
GLUCOSE BLDC GLUCOMTR-MCNC: 178 MG/DL — HIGH (ref 70–99)
GLUCOSE BLDC GLUCOMTR-MCNC: 197 MG/DL — HIGH (ref 70–99)
GLUCOSE BLDC GLUCOMTR-MCNC: 245 MG/DL — HIGH (ref 70–99)
GLUCOSE SERPL-MCNC: 139 MG/DL — HIGH (ref 70–99)
HCO3 BLDA-SCNC: 22 MMOL/L — LOW (ref 23–27)
HCO3 BLDA-SCNC: 24 MMOL/L — SIGNIFICANT CHANGE UP (ref 23–27)
HCO3 BLDA-SCNC: 24 MMOL/L — SIGNIFICANT CHANGE UP (ref 23–27)
HCT VFR BLD CALC: 28.1 % — LOW (ref 42–52)
HDLC SERPL-MCNC: 26 MG/DL — LOW
HGB BLD-MCNC: 8.2 G/DL — LOW (ref 14–18)
HOROWITZ INDEX BLDA+IHG-RTO: 50 — SIGNIFICANT CHANGE UP
HOROWITZ INDEX BLDA+IHG-RTO: 60 — SIGNIFICANT CHANGE UP
HOROWITZ INDEX BLDA+IHG-RTO: 70 — SIGNIFICANT CHANGE UP
IMM GRANULOCYTES NFR BLD AUTO: 1 % — HIGH (ref 0.1–0.3)
INR BLD: 1.35 RATIO — HIGH (ref 0.65–1.3)
LIPID PNL WITH DIRECT LDL SERPL: 50 MG/DL — SIGNIFICANT CHANGE UP
LYMPHOCYTES # BLD AUTO: 14.5 % — LOW (ref 20.5–51.1)
LYMPHOCYTES # BLD AUTO: 2.39 K/UL — SIGNIFICANT CHANGE UP (ref 1.2–3.4)
MAGNESIUM SERPL-MCNC: 3 MG/DL — HIGH (ref 1.8–2.4)
MCHC RBC-ENTMCNC: 25.9 PG — LOW (ref 27–31)
MCHC RBC-ENTMCNC: 29.2 G/DL — LOW (ref 32–37)
MCV RBC AUTO: 88.6 FL — SIGNIFICANT CHANGE UP (ref 80–94)
MONOCYTES # BLD AUTO: 1.74 K/UL — HIGH (ref 0.1–0.6)
MONOCYTES NFR BLD AUTO: 10.6 % — HIGH (ref 1.7–9.3)
NEUTROPHILS # BLD AUTO: 11.52 K/UL — HIGH (ref 1.4–6.5)
NEUTROPHILS NFR BLD AUTO: 70.1 % — SIGNIFICANT CHANGE UP (ref 42.2–75.2)
NON HDL CHOLESTEROL: 96 MG/DL — SIGNIFICANT CHANGE UP
NRBC # BLD: 0 /100 WBCS — SIGNIFICANT CHANGE UP (ref 0–0)
PCO2 BLDA: 31 MMHG — LOW (ref 38–42)
PCO2 BLDA: 44 MMHG — HIGH (ref 38–42)
PCO2 BLDA: 51 MMHG — HIGH (ref 38–42)
PH BLDA: 7.29 — LOW (ref 7.38–7.42)
PH BLDA: 7.34 — LOW (ref 7.38–7.42)
PH BLDA: 7.46 — HIGH (ref 7.38–7.42)
PLATELET # BLD AUTO: 297 K/UL — SIGNIFICANT CHANGE UP (ref 130–400)
PO2 BLDA: 139 MMHG — HIGH (ref 78–95)
PO2 BLDA: 177 MMHG — HIGH (ref 78–95)
PO2 BLDA: 80 MMHG — SIGNIFICANT CHANGE UP (ref 78–95)
POTASSIUM SERPL-MCNC: 5.5 MMOL/L — HIGH (ref 3.5–5)
POTASSIUM SERPL-SCNC: 5.5 MMOL/L — HIGH (ref 3.5–5)
PROT SERPL-MCNC: 7.8 G/DL — SIGNIFICANT CHANGE UP (ref 6–8)
PROTHROM AB SERPL-ACNC: 15.5 SEC — HIGH (ref 9.95–12.87)
RBC # BLD: 3.17 M/UL — LOW (ref 4.7–6.1)
RBC # FLD: 17.1 % — HIGH (ref 11.5–14.5)
SAO2 % BLDA: 100 % — HIGH (ref 94–98)
SAO2 % BLDA: 97 % — SIGNIFICANT CHANGE UP (ref 94–98)
SAO2 % BLDA: 99 % — HIGH (ref 94–98)
SODIUM SERPL-SCNC: 134 MMOL/L — LOW (ref 135–146)
TRIGL SERPL-MCNC: 246 MG/DL — HIGH
WBC # BLD: 16.45 K/UL — HIGH (ref 4.8–10.8)
WBC # FLD AUTO: 16.45 K/UL — HIGH (ref 4.8–10.8)

## 2021-06-01 PROCEDURE — 99222 1ST HOSP IP/OBS MODERATE 55: CPT

## 2021-06-01 PROCEDURE — 99291 CRITICAL CARE FIRST HOUR: CPT

## 2021-06-01 PROCEDURE — 99024 POSTOP FOLLOW-UP VISIT: CPT

## 2021-06-01 PROCEDURE — 71045 X-RAY EXAM CHEST 1 VIEW: CPT | Mod: 26

## 2021-06-01 RX ORDER — ALTEPLASE 100 MG
2 KIT INTRAVENOUS ONCE
Refills: 0 | Status: COMPLETED | OUTPATIENT
Start: 2021-06-01 | End: 2021-06-01

## 2021-06-01 RX ORDER — FENTANYL CITRATE 50 UG/ML
0.5 INJECTION INTRAVENOUS
Qty: 2500 | Refills: 0 | Status: DISCONTINUED | OUTPATIENT
Start: 2021-06-01 | End: 2021-06-08

## 2021-06-01 RX ORDER — SODIUM CHLORIDE 9 MG/ML
1000 INJECTION, SOLUTION INTRAVENOUS
Refills: 0 | Status: DISCONTINUED | OUTPATIENT
Start: 2021-06-01 | End: 2021-06-11

## 2021-06-01 RX ORDER — CALCIUM GLUCONATE 100 MG/ML
2 VIAL (ML) INTRAVENOUS ONCE
Refills: 0 | Status: COMPLETED | OUTPATIENT
Start: 2021-06-01 | End: 2021-06-01

## 2021-06-01 RX ORDER — PROPOFOL 10 MG/ML
5 INJECTION, EMULSION INTRAVENOUS
Qty: 1000 | Refills: 0 | Status: DISCONTINUED | OUTPATIENT
Start: 2021-06-01 | End: 2021-06-08

## 2021-06-01 RX ORDER — CEFEPIME 1 G/1
2000 INJECTION, POWDER, FOR SOLUTION INTRAMUSCULAR; INTRAVENOUS EVERY 24 HOURS
Refills: 0 | Status: DISCONTINUED | OUTPATIENT
Start: 2021-06-01 | End: 2021-06-01

## 2021-06-01 RX ORDER — CEFTAROLINE FOSAMIL 600 MG/20ML
200 POWDER, FOR SOLUTION INTRAVENOUS EVERY 12 HOURS
Refills: 0 | Status: DISCONTINUED | OUTPATIENT
Start: 2021-06-01 | End: 2021-06-07

## 2021-06-01 RX ORDER — HUMAN INSULIN 100 [IU]/ML
10 INJECTION, SUSPENSION SUBCUTANEOUS EVERY 12 HOURS
Refills: 0 | Status: DISCONTINUED | OUTPATIENT
Start: 2021-06-01 | End: 2021-06-03

## 2021-06-01 RX ORDER — MUPIROCIN 20 MG/G
1 OINTMENT TOPICAL
Refills: 0 | Status: DISCONTINUED | OUTPATIENT
Start: 2021-06-01 | End: 2021-06-09

## 2021-06-01 RX ORDER — HEPARIN SODIUM 5000 [USP'U]/ML
2000 INJECTION INTRAVENOUS; SUBCUTANEOUS
Qty: 25000 | Refills: 0 | Status: DISCONTINUED | OUTPATIENT
Start: 2021-06-01 | End: 2021-06-02

## 2021-06-01 RX ORDER — NOREPINEPHRINE BITARTRATE/D5W 8 MG/250ML
0.05 PLASTIC BAG, INJECTION (ML) INTRAVENOUS
Qty: 16 | Refills: 0 | Status: DISCONTINUED | OUTPATIENT
Start: 2021-06-01 | End: 2021-06-03

## 2021-06-01 RX ORDER — INSULIN GLARGINE 100 [IU]/ML
12 INJECTION, SOLUTION SUBCUTANEOUS AT BEDTIME
Refills: 0 | Status: DISCONTINUED | OUTPATIENT
Start: 2021-06-01 | End: 2021-06-01

## 2021-06-01 RX ADMIN — PANTOPRAZOLE SODIUM 40 MILLIGRAM(S): 20 TABLET, DELAYED RELEASE ORAL at 05:06

## 2021-06-01 RX ADMIN — CHLORHEXIDINE GLUCONATE 1 APPLICATION(S): 213 SOLUTION TOPICAL at 05:04

## 2021-06-01 RX ADMIN — HEPARIN SODIUM 5000 UNIT(S): 5000 INJECTION INTRAVENOUS; SUBCUTANEOUS at 05:04

## 2021-06-01 RX ADMIN — CHLORHEXIDINE GLUCONATE 15 MILLILITER(S): 213 SOLUTION TOPICAL at 05:07

## 2021-06-01 RX ADMIN — Medication 100 MILLIGRAM(S): at 13:07

## 2021-06-01 RX ADMIN — ALTEPLASE 2 MILLIGRAM(S): KIT at 22:24

## 2021-06-01 RX ADMIN — CHLORHEXIDINE GLUCONATE 15 MILLILITER(S): 213 SOLUTION TOPICAL at 16:23

## 2021-06-01 RX ADMIN — LINEZOLID 300 MILLIGRAM(S): 600 INJECTION, SOLUTION INTRAVENOUS at 16:32

## 2021-06-01 RX ADMIN — CEFTAROLINE FOSAMIL 50 MILLIGRAM(S): 600 POWDER, FOR SOLUTION INTRAVENOUS at 18:16

## 2021-06-01 RX ADMIN — Medication 81 MILLIGRAM(S): at 11:42

## 2021-06-01 RX ADMIN — Medication 4: at 17:04

## 2021-06-01 RX ADMIN — HEPARIN SODIUM 5000 UNIT(S): 5000 INJECTION INTRAVENOUS; SUBCUTANEOUS at 13:06

## 2021-06-01 RX ADMIN — SENNA PLUS 2 TABLET(S): 8.6 TABLET ORAL at 21:11

## 2021-06-01 RX ADMIN — Medication 4: at 10:40

## 2021-06-01 RX ADMIN — LINEZOLID 300 MILLIGRAM(S): 600 INJECTION, SOLUTION INTRAVENOUS at 05:04

## 2021-06-01 RX ADMIN — HUMAN INSULIN 10 UNIT(S): 100 INJECTION, SUSPENSION SUBCUTANEOUS at 11:42

## 2021-06-01 RX ADMIN — Medication 100 MILLIGRAM(S): at 05:05

## 2021-06-01 RX ADMIN — CILOSTAZOL 50 MILLIGRAM(S): 100 TABLET ORAL at 05:03

## 2021-06-01 RX ADMIN — HUMAN INSULIN 10 UNIT(S): 100 INJECTION, SUSPENSION SUBCUTANEOUS at 23:48

## 2021-06-01 RX ADMIN — Medication 200 GRAM(S): at 12:12

## 2021-06-01 RX ADMIN — MUPIROCIN 1 APPLICATION(S): 20 OINTMENT TOPICAL at 18:16

## 2021-06-01 RX ADMIN — Medication 100 MILLIGRAM(S): at 21:08

## 2021-06-01 RX ADMIN — BUMETANIDE 2 MILLIGRAM(S): 0.25 INJECTION INTRAMUSCULAR; INTRAVENOUS at 05:12

## 2021-06-01 RX ADMIN — POLYETHYLENE GLYCOL 3350 17 GRAM(S): 17 POWDER, FOR SOLUTION ORAL at 11:43

## 2021-06-01 NOTE — PROGRESS NOTE ADULT - ASSESSMENT
1. Acute Hypoxic Hypercarbic Respiratory Failure: secondary to septic shock, Toxic Metabolic encerphalopathy, continue on lung protective mechanical ventilation for now, will wean FiO2 for SpO2 greater then 92%,  tx nebs, pulmonary toilet, continue supportive care monitor for now.     2. Septic Shock: secondary to Bacteremia from diabetic foot ulcer, will continue empiric abx as per ID, continue levophed gtt will wean for maps greater then 65, continue monitor for now.     3. Diabetic Foot Ulcer: s/p debridement growing Staph aureus, s/p Right Knee Disarticulation for necrotizing soft tissue infection of right lower extremity by Vascular Surgery  for proper source control, continue supportive care, monitor for now.  Continue tx as per ID.    4. Bacteremia: secondary to Enterococcus, Coag neg Staph, and Staph Aureus most likley from Diabetic foot Ulcer, continue empiric abx as per ID, f/u Cx's, f/u ECHO.    5. CANDY on CKD: secondary to sepsis induced ATN, will need renal replacement therapy as per nephrology, continue supportive care, monitor for now.     6. Toxic Metabolic Encephelopathy: secondary to sepsis and multiorgan failure, continue monitor for now, tx supportively CT head neg for acute pathology. Less likely to be acute CVA, f/u repeat CT head once stable.    7. DM: tx inuslin sliding scale, monitor BSG's, continue supportive care.     8. HTN: continue supportive care, monitor for now. Hold oral meds.    9. HLD: tx statin, continue supportive care, monitor for now.     10. MATA/ OHS: continue supportive care, monitor for now on MV.    11. Morbid obesity: continue supportive care, monitor for now.     12. NSTEMI: secondary to sepsis induced demand ischemia, less likely true ACS, continue supportive care for now, f/u CE's and ECHO. Monitor for now.     13. Anemia: secondary to Anemia of chronic disease will transfuse as needed for HGB less then 7, monitor for now.     14. DVT/GI px: tx PPI and hep subcut, check lower ext US.    15. NUT: tx Tf's.    Critical Care Time not including procedures 65 mins

## 2021-06-01 NOTE — PROGRESS NOTE ADULT - ASSESSMENT
ICU Management  Pain management   IV antibiotics  Monitor for any bleeding   Will continue to follow   Call as needed

## 2021-06-01 NOTE — PROGRESS NOTE ADULT - ASSESSMENT
59 y/o M with PMH of DM, hypertension, dyslipidemia, sleep apnea, Charcot foot reconstruction with external fixation, morbid obesity presented for worsening right foot infection.     #DFU with Hx of MRSA infection  # Septic shock   # Acute hypoxic respiratory failure secondary to septic shock  # Toxic metabolic encephalopathy   - s/p intubation 5/31   - bacteremia from right foot abscess   -septic on admission, WBC 12,  + lactate 2.3. s/p cefepime flagyl and vanc in the ED  -Podiatry on board, f/u   - S/p disarticulation right knee 5/31   - f/u vascular   - ID following, f/u recs   -f/u cultures   - Echo 3/32: EF 55-60%, G2DD     #Possible CVA  - ptnt was code stroke on floors for AMS and confusion  - upgraded to CCU   - metabolic encephalopathy secondary to sepsis more likely than stroke  -  CT head neg for acute pathology, CTA w/ moderate stenoses at the junction of the precavernous and cavernous segments of both internal carotid arteries  - neuro recs for brain MRI when stable     # NSTEMI  - secondary to sepsis induced demand ischemia  - less likely true ACS  -monitor     #CANDY on CKD III  - secondary to sepsis induced ATN, will need renal replacement therapy as per nephrology, continue supportive care, monitor for now.   -creatinine 3.0 on admission, baseline around 1.4-1.8   - nephro following     #anemia of chronic disease   - transfuse Hb < 7  - monitor     #Diabetes Mellitus  -on basal insulin   -f/u a1c   -Monitor      #Hypertension  -holding oral meds     #Dyslipidemia  -c/w home meds    #MATA/ OHS    #morbid obesity    Diet: npo w/ tube feeds   DVT ppx: heparin subq q8hrs  GI ppx: protonix   Dispo: acute

## 2021-06-01 NOTE — CONSULT NOTE ADULT - ASSESSMENT
60yMale  DM, hypertension, dyslipidemia, sleep apnea, Charcot foot reconstruction with external fixation, super morbid obesity presented for worsening right footinfection; since admission -  Acute Hypoxic Hypercarbic Respiratory Failure: secondary to septic shock, Toxic Metabolic encephalopathy continued on lung protective mechanical ventilation and CVVH; Post-op day 1 after Right Knee Disarticulation for necrotizing soft tissue infection of right lower extremity - vascular follwoing being evaluated for support with GOC    Morphine Equivalent Daily Dose (MEDD) on fent gtt      See Recs below. D/W primary team    Please call   Shantel Sutton  or [phone] 24/7  with questions or concerns.   We will continue to follow.    60yMale  DM, hypertension, dyslipidemia, sleep apnea, Charcot foot reconstruction with external fixation, super morbid obesity presented for worsening right footinfection; since admission -  Acute Hypoxic Hypercarbic Respiratory Failure: secondary to septic shock, Toxic Metabolic encephalopathy continued on lung protective mechanical ventilation and CVVH; Post-op day 1 after Right Knee Disarticulation for necrotizing soft tissue infection of right lower extremity - vascular follwoing being evaluated for support with C    Called Jose Luis Pena and introduced palliative care. Jose Luis stated she had received a medical update from the team.   We discussed HCP with her, she stated that the brother listed is  and she is not aware of the second person named.  She identifies as next of kin.    Morphine Equivalent Daily Dose (MEDD) on fent gtt      See Recs below. D/W primary team and bedside RN     Please call   Shantel Sutton  or [phone]   with questions or concerns.   We will continue to follow.

## 2021-06-01 NOTE — PROGRESS NOTE ADULT - SUBJECTIVE AND OBJECTIVE BOX
Vascular Surgery Post Operative Note      Procedure: Status post right knee disarticulation   Post Operative day #1    Patient is intubated resting comfortably no acute distress     T(C): 36.4 (05-31-21 @ 20:00), Max: 37.4 (05-31-21 @ 04:00)  HR: 106 (06-01-21 @ 00:00) (84 - 118)  BP: 116/66 (05-31-21 @ 20:00) (114/59 - 203/81)  RR: 16 (06-01-21 @ 00:00) (8 - 33)  SpO2: 96% (06-01-21 @ 00:00) (82% - 100%)    05-30-21 @ 07:01  -  05-31-21 @ 07:00  --------------------------------------------------------  IN: 1880 mL / OUT: 145 mL / NET: 1735 mL    05-31-21 @ 07:01  -  06-01-21 @ 00:42  --------------------------------------------------------  IN: 2801.6 mL / OUT: 540 mL / NET: 2261.6 mL        General:Alert and oriented times 3, not in acute distress   Heart: Regular rate and rhythm, no rubs , murmurs or gallops  Lungs: Clear to auscultation bilaterally, no wheezes, rales, rhonci appreciated  Abdomen: Soft , positive bowel sounds, no tenderness, no distention, no peritoneal signs   Exremites: right knee disarticulation wrapped in ace no bleeding  warm extremities,  good color, no swelling, motor and sensation , pulses                   7.3    13.69 )-----------( 306      ( 05-31 @ 04:33 )             25.8                7.7    12.19 )-----------( 312      ( 05-30 @ 20:07 )             26.8                8.3    12.16 )-----------( 313      ( 05-30 @ 05:35 )             28.1                    139   |  97    |  67                 Ca: 7.9    BMP:   ----------------------------< 81     Mg: 3.3   (05-31-21 @ 04:33)             5.5    |  32    | 6.3                Ph: x        LFT:     TPro: 8.4 / Alb: 2.7 / TBili: <0.2 / DBili: x / AST: 26 / ALT: 19 / AlkPhos: 99   (05-31-21 @ 04:33)              CARDIAC MARKERS ( 30 May 2021 20:07 )  x     / 0.12 ng/mL / x     / x     / 2.0 ng/mL  CARDIAC MARKERS ( 30 May 2021 20:00 )  x     / 0.12 ng/mL / x     / x     / x      CARDIAC MARKERS ( 30 May 2021 11:28 )  x     / 0.11 ng/mL / x     / x     / x      CARDIAC MARKERS ( 30 May 2021 05:35 )  x     / 0.07 ng/mL / x     / x     / x                ABG - ( 31 May 2021 18:14 )  pH: 7.33  /  pCO2: 54    /  pO2: 150   / HCO3: 28    / Base Excess: 1.9   /  SaO2: 99                    Culture - Acid Fast - Other w/Smear (collected 29 May 2021 06:10)  Source: .Other None(R-FOOT)    Culture - Surgical Swab (collected 29 May 2021 06:10)  Source: .Surgical Swab None  Preliminary Report (30 May 2021 17:28):    No growth    Culture - Acid Fast - Other w/Smear (collected 29 May 2021 06:10)  Source: .Other None (R-FOOT)    Culture - Surgical Swab (collected 29 May 2021 06:10)  Source: .Surgical Swab None  Preliminary Report (31 May 2021 17:10):    Numerous Methicillin resistant Staphylococcus aureus    Rare Corynebacterium species "Susceptibilities not performed"  Organism: Methicillin resistant Staphylococcus aureus (31 May 2021 17:07)  Organism: Methicillin resistant Staphylococcus aureus (31 May 2021 17:07)    Culture - Urine (collected 29 May 2021 01:20)  Source: .Urine Clean Catch (Midstream)  Final Report (30 May 2021 08:11):    <10,000 CFU/mL Normal Urogenital Carmen

## 2021-06-01 NOTE — CONSULT NOTE ADULT - SUBJECTIVE AND OBJECTIVE BOX
FERNANDEZ GUZMAN          MRN-[id number]              HPI:  Patient is a 59 y/o M with PMH of DM, hypertension, dyslipidemia, sleep apnea, Charcot foot reconstruction with external fixation, super morbid obesity present for worsening right foot infection.  Pt reports he has had this foot ulcer at various stage of healing for 13 years. Patient has been admitted for IV antibiotics multiple times in the past for osteomyelitis of right foot. He has history of MRSA infection in foot. pt has baseline neuropathy in both feet so does not feel any pain.  Patient states his Infection has been gradually getting worse despite treatment with bactrim, he saw his podiatrists and he was sent in by podiatry for IV abx . No fever, chills, cp, sob, abd pain, nausea, vomiting, dysuria, calf pain.     In ED  T(C): 37.3 (05-29-21 @ 01:31), Max: 38 (05-28-21 @ 20:27)  HR: 100 (05-29-21 @ 01:23) (99 - 118)  BP: 119/59 (05-29-21 @ 01:23) (119/59 - 139/69)  RR: 18 (05-29-21 @ 01:23) (17 - 18)  SpO2: 96% (05-29-21 @ 01:23) (86% - 100%)  there is a Right foot ulcer on the lateral aspect of the plantar surface measuring 8.5-3cm. Ulcer is surrounded by macerated tissue. Serous drainage from wound. Kurlex and sponge dressing in place, clean, dry and intact. Patient was I&D'd bedside by podiatry in the ed. purulent drainage was evacuated and sent to the lab for culture. Patient is scheduled for OR with podiatry in the AM.   (29 May 2021 02:29)      PAST MEDICAL & SURGICAL HISTORY:  MATA on CPAP    DM (diabetes mellitus)    HTN (hypertension)    High cholesterol    Charcot ankle, right    History of percutaneous angioplasty    H/O skin graft    Left toe amputee  4 TOES    Diabetic Charcot foot  Fixation with external device        FAMILY HISTORY:  Family history of diabetes mellitus (DM)    FH: leukemia     Reviewed and found non contributory in mother or father    SOCIAL HISTORY:     ROS:    Unable to attain due to:  intubated and sedated           Dyspnea (Sindhu 0-10): 0                       N/V (Y/N): No                             Secretions (Y/N) : No                                          Agitation(Y/N): No                              Pain (Y/N): No                                 -Provocation/Palliation: N/A  -Quality/Quantity: N/A  -Radiating: N/A  -Severity: No pain  -Timing/Frequency: N/A  -Impact on ADLs: N/A    Allergies    No Known Allergies    Intolerances    statins (Other)    Opiate Naive (Y/N): Y  -iStop reviewed (Y/N): Y  Ref#:    Reference #: [id number]          Medications:      MEDICATIONS  (STANDING):  aspirin  chewable 81 milliGRAM(s) Oral daily  chlorhexidine 0.12% Liquid 15 milliLiter(s) Oral Mucosa two times a day  chlorhexidine 4% Liquid 1 Application(s) Topical <User Schedule>  CRRT Treatment    <Continuous>  CRRT Treatment    <Continuous>  dextrose 40% Gel 15 Gram(s) Oral once  dextrose 5%. 1000 milliLiter(s) (50 mL/Hr) IV Continuous <Continuous>  dextrose 5%. 1000 milliLiter(s) (100 mL/Hr) IV Continuous <Continuous>  dextrose 50% Injectable 25 Gram(s) IV Push once  dextrose 50% Injectable 12.5 Gram(s) IV Push once  dextrose 50% Injectable 25 Gram(s) IV Push once  fentaNYL   Infusion. 0.5 MICROgram(s)/kG/Hr (8.21 mL/Hr) IV Continuous <Continuous>  glucagon  Injectable 1 milliGRAM(s) IntraMuscular once  heparin   Injectable 5000 Unit(s) SubCutaneous every 8 hours  insulin lispro (ADMELOG) corrective regimen sliding scale   SubCutaneous three times a day before meals  insulin NPH human recombinant 10 Unit(s) SubCutaneous every 12 hours  linezolid  IVPB 600 milliGRAM(s) IV Intermittent every 12 hours  metroNIDAZOLE  IVPB 500 milliGRAM(s) IV Intermittent every 8 hours  mupirocin 2% Nasal 1 Application(s) Nasal two times a day  norepinephrine Infusion 0.05 MICROgram(s)/kG/Min (7.7 mL/Hr) IV Continuous <Continuous>  pantoprazole    Tablet 40 milliGRAM(s) Oral before breakfast  polyethylene glycol 3350 17 Gram(s) Oral daily  propofol Infusion 5.004 MICROgram(s)/kG/Min (4.93 mL/Hr) IV Continuous <Continuous>  PureFlow Dialysate RFP-400 (K 2 / Ca 3) 5000 milliLiter(s) (2000 mL/Hr) CRRT <Continuous>  PureFlow Dialysate RFP-400 (K 2 / Ca 3) 5000 milliLiter(s) (2000 mL/Hr) CRRT <Continuous>  senna 2 Tablet(s) Oral at bedtime    MEDICATIONS  (PRN):  midazolam Injectable 2 milliGRAM(s) IV Push every 4 hours PRN agitation  ondansetron Injectable 4 milliGRAM(s) IV Push every 8 hours PRN Nausea and/or Vomiting      Labs:    CBC:                        8.2    16.45 )-----------( 297      ( 01 Jun 2021 04:07 )             28.1     CMP:    06-01    134<L>  |  94<L>  |  69<HH>  ----------------------------<  139<H>  5.5<H>   |  26  |  7.5<HH>    Ca    7.2<L>      01 Jun 2021 04:07  Mg     3.0     06-01    TPro  7.8  /  Alb  2.5<L>  /  TBili  0.3  /  DBili  x   /  AST  35  /  ALT  22  /  AlkPhos  101  06-01     Albumin, Serum: 2.5 g/dL (06-01-21 @ 04:07)    PT/INR - ( 01 Jun 2021 01:20 )   PT: 15.50 sec;   INR: 1.35 ratio       PTT - ( 01 Jun 2021 01:20 )  PTT:25.2 sec     Imaging:  Reviewed    PEx:  T(C): 36.2 (06-01-21 @ 12:00), Max: 37.2 (05-31-21 @ 17:51)  HR: 92 (06-01-21 @ 14:00) (88 - 118)  BP: 116/60 (06-01-21 @ 07:18) (116/60 - 203/81)  RR: 7 (06-01-21 @ 14:00) (7 - 39)  SpO2: 93% (06-01-21 @ 14:00) (82% - 100%)  Wt(kg): --  Daily Height in cm: 177.8 (31 May 2021 14:45)    Daily Weight in kG: 160.6 (01 Jun 2021 05:00)                        General: vented sedated; NAD; morbidly obese  HEENT:  NCAT   CVS:NSR; +edema  Resp: Unlabored Non tachypneic No increased WOB  GI:  obese  :  [place]   Musc: No C/C/E    Neuro: sedated    Preadmit Karnofsky:  %           Current Karnofsky:   10  %  http://www.npcrc.org/files/news/karnofsky_performance_scale.pdf   http://www.npcrc.org/files/news/palliative_performance_scale_PPSv2.pdf  Cachexia (Y/N):   BMI: 51.9    Advanced Directives:     Full Code     HCP - verified; in hard chart         Decision maker: The patient is unable to participate in complex medical decision making conversations.   Legal surrogate: HCP in hard chart    GOALS OF CARE DISCUSSION       Palliative care info introduced/counseling provided	       	    REFERRALS	        Palliative Med        Unit SW/Case Mgmt               FERNANDEZ GUZMAN          MRN-[id number]              HPI:  Patient is a 59 y/o M with PMH of DM, hypertension, dyslipidemia, sleep apnea, Charcot foot reconstruction with external fixation, super morbid obesity present for worsening right foot infection.  Pt reports he has had this foot ulcer at various stage of healing for 13 years. Patient has been admitted for IV antibiotics multiple times in the past for osteomyelitis of right foot. He has history of MRSA infection in foot. pt has baseline neuropathy in both feet so does not feel any pain.  Patient states his Infection has been gradually getting worse despite treatment with bactrim, he saw his podiatrists and he was sent in by podiatry for IV abx . No fever, chills, cp, sob, abd pain, nausea, vomiting, dysuria, calf pain.     In ED  T(C): 37.3 (05-29-21 @ 01:31), Max: 38 (05-28-21 @ 20:27)  HR: 100 (05-29-21 @ 01:23) (99 - 118)  BP: 119/59 (05-29-21 @ 01:23) (119/59 - 139/69)  RR: 18 (05-29-21 @ 01:23) (17 - 18)  SpO2: 96% (05-29-21 @ 01:23) (86% - 100%)  there is a Right foot ulcer on the lateral aspect of the plantar surface measuring 8.5-3cm. Ulcer is surrounded by macerated tissue. Serous drainage from wound. Kurlex and sponge dressing in place, clean, dry and intact. Patient was I&D'd bedside by podiatry in the ed. purulent drainage was evacuated and sent to the lab for culture. Patient is scheduled for OR with podiatry in the AM.   (29 May 2021 02:29)      PAST MEDICAL & SURGICAL HISTORY:  MATA on CPAP    DM (diabetes mellitus)    HTN (hypertension)    High cholesterol    Charcot ankle, right    History of percutaneous angioplasty    H/O skin graft    Left toe amputee  4 TOES    Diabetic Charcot foot  Fixation with external device    FAMILY HISTORY:  Family history of diabetes mellitus (DM)    FH: leukemia     Reviewed and found non contributory in mother or father    SOCIAL HISTORY:  Patient was at home. No assistance needed. no hx of smoking.    ROS:    Unable to attain due to:  intubated and sedated    Allergies    No Known Allergies    Intolerances    statins (Other)    Opiate Naive (Y/N): Y  -iStop reviewed (Y/N): Y  Ref#:    Reference #: [id number]          Medications:      MEDICATIONS  (STANDING):  aspirin  chewable 81 milliGRAM(s) Oral daily  chlorhexidine 0.12% Liquid 15 milliLiter(s) Oral Mucosa two times a day  chlorhexidine 4% Liquid 1 Application(s) Topical <User Schedule>  CRRT Treatment    <Continuous>  CRRT Treatment    <Continuous>  dextrose 40% Gel 15 Gram(s) Oral once  dextrose 5%. 1000 milliLiter(s) (50 mL/Hr) IV Continuous <Continuous>  dextrose 5%. 1000 milliLiter(s) (100 mL/Hr) IV Continuous <Continuous>  dextrose 50% Injectable 25 Gram(s) IV Push once  dextrose 50% Injectable 12.5 Gram(s) IV Push once  dextrose 50% Injectable 25 Gram(s) IV Push once  fentaNYL   Infusion. 0.5 MICROgram(s)/kG/Hr (8.21 mL/Hr) IV Continuous <Continuous>  glucagon  Injectable 1 milliGRAM(s) IntraMuscular once  heparin   Injectable 5000 Unit(s) SubCutaneous every 8 hours  insulin lispro (ADMELOG) corrective regimen sliding scale   SubCutaneous three times a day before meals  insulin NPH human recombinant 10 Unit(s) SubCutaneous every 12 hours  linezolid  IVPB 600 milliGRAM(s) IV Intermittent every 12 hours  metroNIDAZOLE  IVPB 500 milliGRAM(s) IV Intermittent every 8 hours  mupirocin 2% Nasal 1 Application(s) Nasal two times a day  norepinephrine Infusion 0.05 MICROgram(s)/kG/Min (7.7 mL/Hr) IV Continuous <Continuous>  pantoprazole    Tablet 40 milliGRAM(s) Oral before breakfast  polyethylene glycol 3350 17 Gram(s) Oral daily  propofol Infusion 5.004 MICROgram(s)/kG/Min (4.93 mL/Hr) IV Continuous <Continuous>  PureFlow Dialysate RFP-400 (K 2 / Ca 3) 5000 milliLiter(s) (2000 mL/Hr) CRRT <Continuous>  PureFlow Dialysate RFP-400 (K 2 / Ca 3) 5000 milliLiter(s) (2000 mL/Hr) CRRT <Continuous>  senna 2 Tablet(s) Oral at bedtime    MEDICATIONS  (PRN):  midazolam Injectable 2 milliGRAM(s) IV Push every 4 hours PRN agitation  ondansetron Injectable 4 milliGRAM(s) IV Push every 8 hours PRN Nausea and/or Vomiting      Labs:    CBC:                        8.2    16.45 )-----------( 297      ( 01 Jun 2021 04:07 )             28.1     CMP:    06-01    134<L>  |  94<L>  |  69<HH>  ----------------------------<  139<H>  5.5<H>   |  26  |  7.5<HH>    Ca    7.2<L>      01 Jun 2021 04:07  Mg     3.0     06-01    TPro  7.8  /  Alb  2.5<L>  /  TBili  0.3  /  DBili  x   /  AST  35  /  ALT  22  /  AlkPhos  101  06-01     Albumin, Serum: 2.5 g/dL (06-01-21 @ 04:07)    PT/INR - ( 01 Jun 2021 01:20 )   PT: 15.50 sec;   INR: 1.35 ratio       PTT - ( 01 Jun 2021 01:20 )  PTT:25.2 sec     Imaging:  Reviewed personally    EKG reviewed     PEx:  T(C): 36.2 (06-01-21 @ 12:00), Max: 37.2 (05-31-21 @ 17:51)  HR: 92 (06-01-21 @ 14:00) (88 - 118)  BP: 116/60 (06-01-21 @ 07:18) (116/60 - 203/81)  RR: 7 (06-01-21 @ 14:00) (7 - 39)  SpO2: 93% (06-01-21 @ 14:00) (82% - 100%)  Wt(kg): --  Daily Height in cm: 177.8 (31 May 2021 14:45)    Daily Weight in kG: 160.6 (01 Jun 2021 05:00)                        General: vented sedated; NAD; morbidly obese  HEENT:  NCAT   CVS:NSR; +edema  Resp: Unlabored Non tachypneic No increased WOB  GI:  obese  :  [place]   Musc: No C/C/E    Neuro: sedated    Preadmit Karnofsky:  %           Current Karnofsky:   10  %  http://www.npcrc.org/files/news/karnofsky_performance_scale.pdf   http://www.npcrc.org/files/news/palliative_performance_scale_PPSv2.pdf  Cachexia (Y/N):   BMI: 51.9    Advanced Directives:     Full Code     HCP - verified; in hard chart         Decision maker: The patient is unable to participate in complex medical decision making conversations.   Legal surrogate: HCP in hard chart    GOALS OF CARE DISCUSSION       Palliative care info introduced/counseling provided	       	    REFERRALS	        Palliative Med        Unit SW/Case Mgmt               FERNANDEZ GUZMAN          MRN-[id number]              HPI:  Patient is a 59 y/o M with PMH of DM, hypertension, dyslipidemia, sleep apnea, Charcot foot reconstruction with external fixation, super morbid obesity present for worsening right foot infection.  Pt reports he has had this foot ulcer at various stage of healing for 13 years. Patient has been admitted for IV antibiotics multiple times in the past for osteomyelitis of right foot. He has history of MRSA infection in foot. pt has baseline neuropathy in both feet so does not feel any pain.  Patient states his Infection has been gradually getting worse despite treatment with bactrim, he saw his podiatrists and he was sent in by podiatry for IV abx . No fever, chills, cp, sob, abd pain, nausea, vomiting, dysuria, calf pain.     In ED  T(C): 37.3 (21 @ 01:31), Max: 38 (21 @ 20:27)  HR: 100 (21 @ 01:23) (99 - 118)  BP: 119/59 (21 @ 01:23) (119/59 - 139/69)  RR: 18 (21 @ 01:23) (17 - 18)  SpO2: 96% (21 @ 01:23) (86% - 100%)  there is a Right foot ulcer on the lateral aspect of the plantar surface measuring 8.5-3cm. Ulcer is surrounded by macerated tissue. Serous drainage from wound. Kurlex and sponge dressing in place, clean, dry and intact. Patient was I&D'd bedside by podiatry in the ed. purulent drainage was evacuated and sent to the lab for culture. Patient is scheduled for OR with podiatry in the AM.   (29 May 2021 02:29)      PAST MEDICAL & SURGICAL HISTORY:  MATA on CPAP    DM (diabetes mellitus)    HTN (hypertension)    High cholesterol    Charcot ankle, right    History of percutaneous angioplasty    H/O skin graft    Left toe amputee  4 TOES    Diabetic Charcot foot  Fixation with external device    FAMILY HISTORY:  Family history of diabetes mellitus (DM)    FH: leukemia     Reviewed and found non contributory in mother or father    SOCIAL HISTORY:  Patient was at home. No assistance needed. no hx of smoking.    ROS:    Unable to attain due to:  intubated and sedated    Allergies    No Known Allergies    Intolerances    statins (Other)    Opiate Naive (Y/N): Y  -iStop reviewed (Y/N): Y  Ref#:    Reference #: [id number]          Medications:      MEDICATIONS  (STANDING):  aspirin  chewable 81 milliGRAM(s) Oral daily  chlorhexidine 0.12% Liquid 15 milliLiter(s) Oral Mucosa two times a day  chlorhexidine 4% Liquid 1 Application(s) Topical <User Schedule>  CRRT Treatment    <Continuous>  CRRT Treatment    <Continuous>  dextrose 40% Gel 15 Gram(s) Oral once  dextrose 5%. 1000 milliLiter(s) (50 mL/Hr) IV Continuous <Continuous>  dextrose 5%. 1000 milliLiter(s) (100 mL/Hr) IV Continuous <Continuous>  dextrose 50% Injectable 25 Gram(s) IV Push once  dextrose 50% Injectable 12.5 Gram(s) IV Push once  dextrose 50% Injectable 25 Gram(s) IV Push once  fentaNYL   Infusion. 0.5 MICROgram(s)/kG/Hr (8.21 mL/Hr) IV Continuous <Continuous>  glucagon  Injectable 1 milliGRAM(s) IntraMuscular once  heparin   Injectable 5000 Unit(s) SubCutaneous every 8 hours  insulin lispro (ADMELOG) corrective regimen sliding scale   SubCutaneous three times a day before meals  insulin NPH human recombinant 10 Unit(s) SubCutaneous every 12 hours  linezolid  IVPB 600 milliGRAM(s) IV Intermittent every 12 hours  metroNIDAZOLE  IVPB 500 milliGRAM(s) IV Intermittent every 8 hours  mupirocin 2% Nasal 1 Application(s) Nasal two times a day  norepinephrine Infusion 0.05 MICROgram(s)/kG/Min (7.7 mL/Hr) IV Continuous <Continuous>  pantoprazole    Tablet 40 milliGRAM(s) Oral before breakfast  polyethylene glycol 3350 17 Gram(s) Oral daily  propofol Infusion 5.004 MICROgram(s)/kG/Min (4.93 mL/Hr) IV Continuous <Continuous>  PureFlow Dialysate RFP-400 (K 2 / Ca 3) 5000 milliLiter(s) (2000 mL/Hr) CRRT <Continuous>  PureFlow Dialysate RFP-400 (K 2 / Ca 3) 5000 milliLiter(s) (2000 mL/Hr) CRRT <Continuous>  senna 2 Tablet(s) Oral at bedtime    MEDICATIONS  (PRN):  midazolam Injectable 2 milliGRAM(s) IV Push every 4 hours PRN agitation  ondansetron Injectable 4 milliGRAM(s) IV Push every 8 hours PRN Nausea and/or Vomiting      Labs:    CBC:                        8.2    16.45 )-----------( 297      ( 2021 04:07 )             28.1     CMP:        134<L>  |  94<L>  |  69<HH>  ----------------------------<  139<H>  5.5<H>   |  26  |  7.5<HH>    Ca    7.2<L>      2021 04:07  Mg     3.0         TPro  7.8  /  Alb  2.5<L>  /  TBili  0.3  /  DBili  x   /  AST  35  /  ALT  22  /  AlkPhos  101       Albumin, Serum: 2.5 g/dL (21 @ 04:07)    PT/INR - ( 2021 01:20 )   PT: 15.50 sec;   INR: 1.35 ratio       PTT - ( 2021 01:20 )  PTT:25.2 sec     Imaging:  Reviewed personally  CXR:   Pulmonary vascular congestion and bibasilar opacities    EKG reviewed     PEx:  T(C): 36.2 (21 @ 12:00), Max: 37.2 (21 @ 17:51)  HR: 92 (21 @ 14:00) (88 - 118)  BP: 116/60 (21 @ 07:18) (116/60 - 203/81)  RR: 7 (21 @ 14:00) (7 - 39)  SpO2: 93% (21 @ 14:00) (82% - 100%)  Wt(kg): --  Daily Height in cm: 177.8 (31 May 2021 14:45)    Daily Weight in kG: 160.6 (2021 05:00)                        General: vented sedated; NAD; morbidly obese  HEENT:  NCAT   CVS:NSR; +edema  Resp: Unlabored Non tachypneic No increased WOB  GI:  obese  :  [place]   Musc: No C/C/E    Neuro: sedated    Preadmit Karnofsky:  %      70     Current Karnofsky:   10  %  http://www.npcrc.org/files/news/karnofsky_performance_scale.pdf   http://www.npcrc.org/files/news/palliative_performance_scale_PPSv2.pdf  Cachexia (Y/N): n  BMI: 51.9    Advanced Directives:     Full Code     HCP          Decision maker: The patient is unable to participate in complex medical decision making conversations.   Legal surrogate: HCP in hard chart. However brother named on HCP is . Niece Becky is [place]     GOALS OF CARE DISCUSSION       Palliative care info introduced/counseling provided	       	    REFERRALS	        Palliative Med        Unit SW/Case Mgmt

## 2021-06-01 NOTE — PROGRESS NOTE ADULT - ASSESSMENT
· Assessment	  61 y/o M with PMH of DM, hypertension, dyslipidemia, sleep apnea, Charcot foot reconstruction with external fixation, super morbid obesity present for worsening right foot infection.  Pt reports he has had this foot ulcer at various stage of healing for 13 years. Patient has been admitted for IV antibiotics multiple times in the past for osteomyelitis of right foot. He has history of MRSA infection in foot. pt has baseline neuropathy in both feet so does not feel any pain.  Patient states his Infection has been gradually getting worse despite treatment with bactrim, he saw his podiatrists and he was sent in by podiatry for IV abx .    IMPRESSION;  5/31 S/p disarticulation right knee   -5/28 BCx E fecalis, ORSA  -etiology of bacteremia was right foot abscess  -5/28 R foot : ORSA , Corynebacterium  -530 BCx NG    RECOMMENDATIONS;  -linezolid 600 mg iv q12h  -d/c Cefepime   -add Ceftaroline 200 mg iv q12h  -flagyl 500 mg iv q8h   -ECHO

## 2021-06-01 NOTE — PROGRESS NOTE ADULT - SUBJECTIVE AND OBJECTIVE BOX
SUBJECTIVE:    Patient is a 60y old Male who presents with a chief complaint of dfu (01 Jun 2021 13:21)    Currently admitted to medicine with the primary diagnosis of Diabetic infection of right foot       Today is hospital day 3d. Patient was lethargic on BIPAP, was intubated yesterday, s/p right knee disarticulation yesterday and initiation of CVVH.       PAST MEDICAL & SURGICAL HISTORY  MATA on CPAP    DM (diabetes mellitus)    HTN (hypertension)    High cholesterol    Charcot ankle, right    History of percutaneous angioplasty    H/O skin graft    Left toe amputee  4 TOES    Diabetic Charcot foot  Fixation with external device    ALLERGIES:  No Known Allergies    MEDICATIONS:  STANDING MEDICATIONS  aspirin  chewable 81 milliGRAM(s) Oral daily  chlorhexidine 0.12% Liquid 15 milliLiter(s) Oral Mucosa two times a day  chlorhexidine 4% Liquid 1 Application(s) Topical <User Schedule>  CRRT Treatment    <Continuous>  CRRT Treatment    <Continuous>  dextrose 40% Gel 15 Gram(s) Oral once  dextrose 5%. 1000 milliLiter(s) IV Continuous <Continuous>  dextrose 5%. 1000 milliLiter(s) IV Continuous <Continuous>  dextrose 50% Injectable 25 Gram(s) IV Push once  dextrose 50% Injectable 12.5 Gram(s) IV Push once  dextrose 50% Injectable 25 Gram(s) IV Push once  fentaNYL   Infusion. 0.5 MICROgram(s)/kG/Hr IV Continuous <Continuous>  glucagon  Injectable 1 milliGRAM(s) IntraMuscular once  heparin   Injectable 5000 Unit(s) SubCutaneous every 8 hours  insulin lispro (ADMELOG) corrective regimen sliding scale   SubCutaneous three times a day before meals  insulin NPH human recombinant 10 Unit(s) SubCutaneous every 12 hours  linezolid  IVPB 600 milliGRAM(s) IV Intermittent every 12 hours  metroNIDAZOLE  IVPB 500 milliGRAM(s) IV Intermittent every 8 hours  mupirocin 2% Nasal 1 Application(s) Nasal two times a day  norepinephrine Infusion 0.05 MICROgram(s)/kG/Min IV Continuous <Continuous>  pantoprazole    Tablet 40 milliGRAM(s) Oral before breakfast  polyethylene glycol 3350 17 Gram(s) Oral daily  propofol Infusion 5.004 MICROgram(s)/kG/Min IV Continuous <Continuous>  PureFlow Dialysate RFP-400 (K 2 / Ca 3) 5000 milliLiter(s) CRRT <Continuous>  PureFlow Dialysate RFP-400 (K 2 / Ca 3) 5000 milliLiter(s) CRRT <Continuous>  senna 2 Tablet(s) Oral at bedtime    PRN MEDICATIONS  midazolam Injectable 2 milliGRAM(s) IV Push every 4 hours PRN  ondansetron Injectable 4 milliGRAM(s) IV Push every 8 hours PRN    VITALS:   T(F): 97.1  HR: 92  BP: 116/60  RR: 7  SpO2: 93%    PHYSICAL EXAM:  GEN: Intubated, sedated   LUNGS: Bilateral breath sounds   HEART: S1/S2 present   ABD: Soft, bowel sounds present.  EXT: right lower extremity bandaged   NEURO: non focal     Lovelace present       LABS:                        8.2    16.45 )-----------( 297      ( 01 Jun 2021 04:07 )             28.1     06-01    134<L>  |  94<L>  |  69<HH>  ----------------------------<  139<H>  5.5<H>   |  26  |  7.5<HH>    Ca    7.2<L>      01 Jun 2021 04:07  Mg     3.0     06-01    TPro  7.8  /  Alb  2.5<L>  /  TBili  0.3  /  DBili  x   /  AST  35  /  ALT  22  /  AlkPhos  101  06-01    PT/INR - ( 01 Jun 2021 01:20 )   PT: 15.50 sec;   INR: 1.35 ratio         PTT - ( 01 Jun 2021 01:20 )  PTT:25.2 sec    ABG - ( 01 Jun 2021 08:55 )  pH, Arterial: 7.34  pH, Blood: x     /  pCO2: 44    /  pO2: 80    / HCO3: 24    / Base Excess: -2.0  /  SaO2: 97        Creatine Kinase, Serum: 201 U/L (06-01-21 @ 04:07)    Culture - Blood (collected 30 May 2021 22:09)  Source: .Blood Blood-Peripheral  Preliminary Report (01 Jun 2021 08:01):    No growth to date.    Culture - Blood (collected 30 May 2021 22:09)  Source: .Blood Blood-Peripheral  Preliminary Report (01 Jun 2021 08:01):    No growth to date.      CARDIAC MARKERS ( 01 Jun 2021 04:07 )  x     / x     / 201 U/L / x     / x      CARDIAC MARKERS ( 30 May 2021 20:07 )  x     / 0.12 ng/mL / x     / x     / 2.0 ng/mL  CARDIAC MARKERS ( 30 May 2021 20:00 )  x     / 0.12 ng/mL / x     / x     / x          RADIOLOGY:

## 2021-06-01 NOTE — CONSULT NOTE ADULT - PROBLEM SELECTOR RECOMMENDATION 4
verified HCP - hard copy in chart  Palliative Care introduced to neice  continue Full Code and current care management  will follow verified HCP - hard copy in chart  Palliative Care introduced to cristiane  continue Full Code and current care management  will follow

## 2021-06-01 NOTE — PROGRESS NOTE ADULT - SUBJECTIVE AND OBJECTIVE BOX
Nephrology progress note    Patient was seen and examined, events over the last 24 h noted .  On CVVH  Allergies:  No Known Allergies  statins (Other)    Hospital Medications:   MEDICATIONS  (STANDING):  aspirin  chewable 81 milliGRAM(s) Oral daily  buMETAnide Injectable 2 milliGRAM(s) IV Push every 12 hours  cefepime   IVPB 2000 milliGRAM(s) IV Intermittent every 24 hours  chlorhexidine 0.12% Liquid 15 milliLiter(s) Oral Mucosa two times a day  chlorhexidine 4% Liquid 1 Application(s) Topical <User Schedule>  cilostazol 50 milliGRAM(s) Oral two times a day  CRRT Treatment    <Continuous>  dextrose 40% Gel 15 Gram(s) Oral once  dextrose 50% Injectable 25 Gram(s) IV Push once  dextrose 50% Injectable 12.5 Gram(s) IV Push once  dextrose 50% Injectable 25 Gram(s) IV Push once  fentaNYL   Infusion. 0.5 MICROgram(s)/kG/Hr (8.21 mL/Hr) IV Continuous <Continuous>  glucagon  Injectable 1 milliGRAM(s) IntraMuscular once  heparin   Injectable 5000 Unit(s) SubCutaneous every 8 hours  insulin lispro (ADMELOG) corrective regimen sliding scale   SubCutaneous three times a day before meals  insulin lispro Injectable (ADMELOG) 11 Unit(s) SubCutaneous three times a day before meals  linezolid  IVPB 600 milliGRAM(s) IV Intermittent every 12 hours  metroNIDAZOLE  IVPB 500 milliGRAM(s) IV Intermittent every 8 hours  mupirocin 2% Nasal 1 Application(s) Nasal two times a day  norepinephrine Infusion 0.05 MICROgram(s)/kG/Min (7.7 mL/Hr) IV Continuous <Continuous>  pantoprazole    Tablet 40 milliGRAM(s) Oral before breakfast  polyethylene glycol 3350 17 Gram(s) Oral daily  propofol Infusion 5 MICROgram(s)/kG/Min (4.93 mL/Hr) IV Continuous <Continuous>  PureFlow Dialysate RFP-400 (K 2 / Ca 3) 5000 milliLiter(s) (2000 mL/Hr) CRRT <Continuous>  senna 2 Tablet(s) Oral at bedtime  sodium chloride 0.9% Bolus 2000 milliLiter(s) IV Bolus once        VITALS:  T(F): 98.7 (21 @ 08:00), Max: 98.9 (21 @ 17:51)  HR: 90 (21 @ 10:00)  BP: 116/60 (21 @ 07:18)  RR: 20 (21 @ 10:00)  SpO2: 98% (21 @ 10:00)  Wt(kg): --     @ 07:01  -   @ 07:00  --------------------------------------------------------  IN: 1880 mL / OUT: 145 mL / NET: 1735 mL     @ 07:01  -   @ 07:00  --------------------------------------------------------  IN: 4550.5 mL / OUT: 610 mL / NET: 3940.5 mL     @ 07:  -   @ 10:25  --------------------------------------------------------  IN: 839 mL / OUT: 16 mL / NET: 823 mL      Height (cm): 177.8 ( @ 14:45)  Weight (kg): 164.2 ( @ 15:48)  BMI (kg/m2): 51.9 ( @ 15:48)  BSA (m2): 2.69 ( @ 15:48)    PHYSICAL EXAM:  Gen: intubated  Resp: decreased bs b/l  Card: S1/S2  Abd: soft  Extremities: edema  Lovelace    LABS:      134<L>  |  94<L>  |  69<HH>  ----------------------------<  139<H>  5.5<H>   |  26  |  7.5<HH>    Ca    7.2<L>      2021 04:07  Mg     3.0         TPro  7.8  /  Alb  2.5<L>  /  TBili  0.3  /  DBili      /  AST  35  /  ALT  22  /  AlkPhos  101                            8.2    16.45 )-----------( 297      ( 2021 04:07 )             28.1       Urine Studies:  Urinalysis Basic - ( 29 May 2021 01:20 )    Color: Light Yellow / Appearance: Clear / S.028 / pH:   Gluc:  / Ketone: Negative  / Bili: Negative / Urobili: <2 mg/dL   Blood:  / Protein: Trace / Nitrite: Negative   Leuk Esterase: Negative / RBC: 3 /HPF / WBC 1 /HPF   Sq Epi:  / Non Sq Epi: 0 /HPF / Bacteria: Negative      Creatinine, Random Urine: 82 mg/dL ( @ 15:34)  Protein/Creatinine Ratio Calculation: 0.2 Ratio ( @ 15:34)  Calcium, Random Urine: <1 mg/dL ( @ 15:34)  Potassium, Random Urine: 23 mmol/L ( @ 15:34)  Sodium, Random Urine: 21.0 mmoL/L ( @ 15:34)  Chloride, Random Urine: <20 ( @ 15:34)    RADIOLOGY & ADDITIONAL STUDIES:

## 2021-06-01 NOTE — CONSULT NOTE ADULT - PROBLEM SELECTOR RECOMMENDATION 9
r/t MRSA worsening R foot ulcer; now post up day 1 Right Knee Disarticulation for necrotizing soft tissue infection of right lower extremity    continue current ICU management

## 2021-06-01 NOTE — PROGRESS NOTE ADULT - SUBJECTIVE AND OBJECTIVE BOX
Patient is a 60y old  Male who presents with a chief complaint of dfu (01 Jun 2021 10:24)        Over Night Events:        ROS:     All ROS are negative except HPI         PHYSICAL EXAM    ICU Vital Signs Last 24 Hrs  T(C): 36.2 (01 Jun 2021 12:00), Max: 37.2 (31 May 2021 17:51)  T(F): 97.1 (01 Jun 2021 12:00), Max: 98.9 (31 May 2021 17:51)  HR: 88 (01 Jun 2021 12:00) (88 - 118)  BP: 116/60 (01 Jun 2021 07:18) (116/60 - 203/81)  BP(mean): 73 (01 Jun 2021 07:18) (73 - 160)  ABP: 154/56 (01 Jun 2021 12:00) (64/54 - 180/74)  ABP(mean): 86 (01 Jun 2021 12:00) (66 - 232)  RR: 19 (01 Jun 2021 12:00) (8 - 39)  SpO2: 100% (01 Jun 2021 12:00) (82% - 100%)      CONSTITUTIONAL:  Pt remains critically ill in ICU on MV     ENT:   pos et tube    EYES:   Pupils equal,   Round and reactive to light.    CARDIAC:   Normal rate,   Regular rhythm.    No edema      Vascular:  Normal systolic impulse  No Carotid bruits    RESPIRATORY:   rhonchi b/l lungs    GASTROINTESTINAL:  Abdomen soft,   Non-tender,   No guarding,   + BS    MUSCULOSKELETAL:   Range of motion is not limited,  No clubbing, cyanosis    NEUROLOGICAL:   Pt remains sedated on MV       SKIN:   Skin normal color for race,   Warm and dry and intact.   No evidence of rash.    PSYCHIATRIC:   Pt remains sedated on MV     HEMATOLOGICAL:  No cervical  lymphadenopathy.  no inguinal lymphadenopathy      05-31-21 @ 07:01  -  06-01-21 @ 07:00  --------------------------------------------------------  IN:    FentaNYL: 1323.4 mL    IV PiggyBack: 200 mL    Norepinephrine: 786.1 mL    Other (mL): 306 mL    PRBCs (Packed Red Blood Cells): 600 mL    Propofol: 435 mL    sodium chloride 0.9%: 500 mL    Sodium Chloride 0.9% Bolus: 400 mL  Total IN: 4550.5 mL    OUT:    Emesis (mL): 475 mL    Indwelling Catheter - Urethral (mL): 135 mL  Total OUT: 610 mL    Total NET: 3940.5 mL      06-01-21 @ 07:01  -  06-01-21 @ 13:21  --------------------------------------------------------  IN:    FentaNYL: 292 mL    FentaNYL: 146 mL    IV PiggyBack: 200 mL    Norepinephrine: 75 mL    Norepinephrine: 112 mL    Oral Fluid: 60 mL    Osmolite: 240 mL    Other (mL): 1174 mL    Propofol: 90 mL    Propofol: 60 mL  Total IN: 2449 mL    OUT:    Indwelling Catheter - Urethral (mL): 0 mL    Other (mL): 114 mL  Total OUT: 114 mL    Total NET: 2335 mL          LABS:                            8.2    16.45 )-----------( 297      ( 01 Jun 2021 04:07 )             28.1                                               06-01    134<L>  |  94<L>  |  69<HH>  ----------------------------<  139<H>  5.5<H>   |  26  |  7.5<HH>    Ca    7.2<L>      01 Jun 2021 04:07  Mg     3.0     06-01    TPro  7.8  /  Alb  2.5<L>  /  TBili  0.3  /  DBili  x   /  AST  35  /  ALT  22  /  AlkPhos  101  06-01      PT/INR - ( 01 Jun 2021 01:20 )   PT: 15.50 sec;   INR: 1.35 ratio         PTT - ( 01 Jun 2021 01:20 )  PTT:25.2 sec                                           CARDIAC MARKERS ( 01 Jun 2021 04:07 )  x     / x     / 201 U/L / x     / x      CARDIAC MARKERS ( 30 May 2021 20:07 )  x     / 0.12 ng/mL / x     / x     / 2.0 ng/mL  CARDIAC MARKERS ( 30 May 2021 20:00 )  x     / 0.12 ng/mL / x     / x     / x                                                LIVER FUNCTIONS - ( 01 Jun 2021 04:07 )  Alb: 2.5 g/dL / Pro: 7.8 g/dL / ALK PHOS: 101 U/L / ALT: 22 U/L / AST: 35 U/L / GGT: x                                                  Culture - Blood (collected 30 May 2021 22:09)  Source: .Blood Blood-Peripheral  Preliminary Report (01 Jun 2021 08:01):    No growth to date.    Culture - Blood (collected 30 May 2021 22:09)  Source: .Blood Blood-Peripheral  Preliminary Report (01 Jun 2021 08:01):    No growth to date.                                                   Mode: AC/ CMV (Assist Control/ Continuous Mandatory Ventilation)  RR (machine): 20  TV (machine): 480  FiO2: 70  PEEP: 8  ITime: 1  MAP: 16  PIP: 30                                      ABG - ( 01 Jun 2021 08:55 )  pH, Arterial: 7.34  pH, Blood: x     /  pCO2: 44    /  pO2: 80    / HCO3: 24    / Base Excess: -2.0  /  SaO2: 97                  MEDICATIONS  (STANDING):  aspirin  chewable 81 milliGRAM(s) Oral daily  chlorhexidine 0.12% Liquid 15 milliLiter(s) Oral Mucosa two times a day  chlorhexidine 4% Liquid 1 Application(s) Topical <User Schedule>  CRRT Treatment    <Continuous>  CRRT Treatment    <Continuous>  dextrose 40% Gel 15 Gram(s) Oral once  dextrose 5%. 1000 milliLiter(s) (50 mL/Hr) IV Continuous <Continuous>  dextrose 5%. 1000 milliLiter(s) (100 mL/Hr) IV Continuous <Continuous>  dextrose 50% Injectable 25 Gram(s) IV Push once  dextrose 50% Injectable 12.5 Gram(s) IV Push once  dextrose 50% Injectable 25 Gram(s) IV Push once  fentaNYL   Infusion. 0.5 MICROgram(s)/kG/Hr (8.21 mL/Hr) IV Continuous <Continuous>  glucagon  Injectable 1 milliGRAM(s) IntraMuscular once  heparin   Injectable 5000 Unit(s) SubCutaneous every 8 hours  insulin lispro (ADMELOG) corrective regimen sliding scale   SubCutaneous three times a day before meals  insulin NPH human recombinant 10 Unit(s) SubCutaneous every 12 hours  linezolid  IVPB 600 milliGRAM(s) IV Intermittent every 12 hours  metroNIDAZOLE  IVPB 500 milliGRAM(s) IV Intermittent every 8 hours  mupirocin 2% Nasal 1 Application(s) Nasal two times a day  norepinephrine Infusion 0.05 MICROgram(s)/kG/Min (7.7 mL/Hr) IV Continuous <Continuous>  pantoprazole    Tablet 40 milliGRAM(s) Oral before breakfast  polyethylene glycol 3350 17 Gram(s) Oral daily  propofol Infusion 5.004 MICROgram(s)/kG/Min (4.93 mL/Hr) IV Continuous <Continuous>  PureFlow Dialysate RFP-400 (K 2 / Ca 3) 5000 milliLiter(s) (2000 mL/Hr) CRRT <Continuous>  PureFlow Dialysate RFP-400 (K 2 / Ca 3) 5000 milliLiter(s) (2000 mL/Hr) CRRT <Continuous>  senna 2 Tablet(s) Oral at bedtime    MEDICATIONS  (PRN):  midazolam Injectable 2 milliGRAM(s) IV Push every 4 hours PRN agitation  ondansetron Injectable 4 milliGRAM(s) IV Push every 8 hours PRN Nausea and/or Vomiting      New X-rays reviewed:                                                                                  ECHO    CXR interpreted by me:

## 2021-06-01 NOTE — PROGRESS NOTE ADULT - ASSESSMENT
61 y/o M with PMH of DM, hypertension, dyslipidemia, sleep apnea, Charcot foot reconstruction with external fixation, morbid obesity presenting to the hospital on 5/29 with worsening foot ulcer.  s/p Right Knee Disarticulation for necrotizing soft tissue infection of right lower extremity          ·	CANDY on CKD ( unknown stage last known creat 1.4 in 11/2019) / rule out ATN / obstructive uropathy/ sp CT angio (too early for FLO)/ doubt IRGN (infection related GN)/ doubt AIN (multiple antibx courses)/ high bicarb/chr resp acidosis  ·	oligoanuric, creat rising started CRRT 5/31  ·	renal ultrasound noted w/o hydronephrosis, s/p terrazas catheter placement   ·	UA noted, minimal proteinuria    ·	appreciate ID f/u, on cefepime flagyl and zyvox  ·	sp debridement of DFU as per podiatry  ·	check C3 C4 urine eosinophils SPEP SFLC and IF  ·	cont CVVH   ·	check phos level daily while on CRRT   ·	d/w ICU team    will follow

## 2021-06-01 NOTE — PROGRESS NOTE ADULT - SUBJECTIVE AND OBJECTIVE BOX
THOMASFERNANDEZ  60y, Male    All available historical data reviewed    OVERNIGHT EVENTS:  5/31 S/p disarticulation right knee    ROS:  General: Denies rigors, nightsweats  HEENT: Denies headache, rhinorrhea, sore throat, eye pain  CV: Denies CP, palpitations  PULM: Denies wheezing, hemoptysis  GI: Denies hematemesis, hematochezia, melena  : Denies discharge, hematuria  MSK: Denies arthralgias, myalgias  SKIN: Denies rash, lesions  NEURO: Denies paresthesias, weakness  PSYCH: Denies depression, anxiety    VITALS:  T(F): 98.7, Max: 98.9 (05-31-21 @ 17:51)  HR: 92  BP: 116/60  RR: 16Vital Signs Last 24 Hrs  T(C): 37.1 (01 Jun 2021 08:00), Max: 37.2 (31 May 2021 17:51)  T(F): 98.7 (01 Jun 2021 08:00), Max: 98.9 (31 May 2021 17:51)  HR: 92 (01 Jun 2021 08:00) (88 - 118)  BP: 116/60 (01 Jun 2021 07:18) (116/60 - 203/81)  BP(mean): 73 (01 Jun 2021 07:18) (73 - 160)  RR: 16 (01 Jun 2021 08:00) (8 - 39)  SpO2: 99% (01 Jun 2021 08:00) (82% - 100%)    TESTS & MEASUREMENTS:                        8.2    16.45 )-----------( 297      ( 01 Jun 2021 04:07 )             28.1     06-01    134<L>  |  94<L>  |  69<HH>  ----------------------------<  139<H>  5.5<H>   |  26  |  7.5<HH>    Ca    7.2<L>      01 Jun 2021 04:07  Mg     3.0     06-01    TPro  7.8  /  Alb  2.5<L>  /  TBili  0.3  /  DBili  x   /  AST  35  /  ALT  22  /  AlkPhos  101  06-01    LIVER FUNCTIONS - ( 01 Jun 2021 04:07 )  Alb: 2.5 g/dL / Pro: 7.8 g/dL / ALK PHOS: 101 U/L / ALT: 22 U/L / AST: 35 U/L / GGT: x             Culture - Blood (collected 05-30-21 @ 22:09)  Source: .Blood Blood-Peripheral  Preliminary Report (06-01-21 @ 08:01):    No growth to date.    Culture - Blood (collected 05-30-21 @ 22:09)  Source: .Blood Blood-Peripheral  Preliminary Report (06-01-21 @ 08:01):    No growth to date.    Culture - Acid Fast - Other w/Smear (collected 05-29-21 @ 06:10)  Source: .Other None(R-FOOT)    Culture - Surgical Swab (collected 05-29-21 @ 06:10)  Source: .Surgical Swab None  Preliminary Report (05-30-21 @ 17:28):    No growth    Culture - Acid Fast - Other w/Smear (collected 05-29-21 @ 06:10)  Source: .Other None (R-FOOT)    Culture - Surgical Swab (collected 05-29-21 @ 06:10)  Source: .Surgical Swab None  Preliminary Report (05-31-21 @ 17:10):    Numerous Methicillin resistant Staphylococcus aureus    Rare Corynebacterium species "Susceptibilities not performed"  Organism: Methicillin resistant Staphylococcus aureus (05-31-21 @ 17:07)  Organism: Methicillin resistant Staphylococcus aureus (05-31-21 @ 17:07)      -  Ampicillin/Sulbactam: R 16/8      -  Cefazolin: R 8      -  Clindamycin: S <=0.25      -  Daptomycin: S 0.5      -  Erythromycin: R >4      -  Gentamicin: S <=1 Should not be used as monotherapy      -  Linezolid: S 1      -  Oxacillin: R >2      -  Penicillin: R >8      -  RIF- Rifampin: S <=1 Should not be used as monotherapy      -  Tetra/Doxy: S <=1      -  Trimethoprim/Sulfamethoxazole: S <=0.5/9.5      -  Vancomycin: S 2      Method Type: DAVIDA    Culture - Urine (collected 05-29-21 @ 01:20)  Source: .Urine Clean Catch (Midstream)  Final Report (05-30-21 @ 08:11):    <10,000 CFU/mL Normal Urogenital Mary    Culture - Abscess with Gram Stain (collected 05-28-21 @ 19:51)  Source: .Abscess Right - Foot  Preliminary Report (05-31-21 @ 12:15):    Moderate Methicillin resistant Staphylococcus aureus    Few Corynebacterium species "Susceptibilities not performed"  Organism: Methicillin resistant Staphylococcus aureus (05-31-21 @ 12:14)  Organism: Methicillin resistant Staphylococcus aureus (05-31-21 @ 12:14)      -  Ampicillin/Sulbactam: R 16/8      -  Cefazolin: R 8      -  Clindamycin: S <=0.25      -  Daptomycin: S 0.5      -  Erythromycin: R >4      -  Gentamicin: S <=1 Should not be used as monotherapy      -  Linezolid: S 2      -  Oxacillin: R >2      -  Penicillin: R >8      -  RIF- Rifampin: S <=1 Should not be used as monotherapy      -  Tetra/Doxy: S <=1      -  Trimethoprim/Sulfamethoxazole: S 1/19      -  Vancomycin: S 2      Method Type: DAVIDA    Culture - Other (collected 05-28-21 @ 18:32)  Source: .Other R foot DFU  Final Report (05-30-21 @ 15:44):    Moderate Methicillin resistant Staphylococcus aureus    Normal skin mary isolated  Organism: Methicillin resistant Staphylococcus aureus (05-30-21 @ 15:44)  Organism: Methicillin resistant Staphylococcus aureus (05-30-21 @ 15:44)      -  Ampicillin/Sulbactam: R 16/8      -  Cefazolin: R 16      -  Clindamycin: S <=0.25      -  Daptomycin: S 0.5      -  Erythromycin: R >4      -  Gentamicin: S <=1 Should not be used as monotherapy      -  Linezolid: S 2      -  Oxacillin: R >2      -  Penicillin: R >8      -  RIF- Rifampin: S <=1 Should not be used as monotherapy      -  Tetra/Doxy: S <=1      -  Trimethoprim/Sulfamethoxazole: S <=0.5/9.5      -  Vancomycin: S 2      Method Type: DAVIDA    Culture - Blood (collected 05-28-21 @ 18:28)  Source: .Blood Blood-Peripheral  Gram Stain (05-29-21 @ 18:30):    Growth in anaerobic bottle: Gram positive cocci in pairs    Growth in aerobic bottle: Gram positive cocci in pairs  Final Report (05-31-21 @ 15:45):    Growth in anaerobic bottle: Enterococcus faecalis    Growth in aerobic and anaerobic bottles: Methicillin resistant    Staphylococcus aureus    ***Blood Panel PCR results on this specimen are available    approximately 3 hours after the Gram stain result.***    Gram stain, PCR, and/or culture results may not always    correspond due to difference in methodologies.    ************************************************************    This PCR assay was performed by multiplex PCR. This    Assay tests for 66 bacterialand resistance gene targets.    Please refer to the [facility] test directory    at [email or web address] for details.  Organism: Blood Culture PCR  Enterococcus faecalis  Methicillin resistant Staphylococcus aureus (05-31-21 @ 15:45)  Organism: Methicillin resistant Staphylococcus aureus (05-31-21 @ 15:45)      -  Ampicillin/Sulbactam: R 16/8      -  Cefazolin: R 16      -  Clindamycin: S <=0.25      -  Daptomycin: S 0.5      -  Erythromycin: R >4      -  Gentamicin: S <=1 Should not be used as monotherapy      -  Linezolid: S 2      -  Oxacillin: R >2      -  Penicillin: R >8      -  RIF- Rifampin: S <=1 Should not be used as monotherapy      -  Tetra/Doxy: S <=1      -  Trimethoprim/Sulfamethoxazole: S <=0.5/9.5      -  Vancomycin: S 1      Method Type: DAVIDA  Organism: Enterococcus faecalis (05-31-21 @ 15:45)      -  Ampicillin: S <=2 Predicts results to ampicillin/sulbactam, amoxacillin-clavulanate and  piperacillin-tazobactam.      -  Gentamicin synergy: S      -  Vancomycin: S 1      Method Type: DAVIDA  Organism: Blood Culture PCR (05-31-21 @ 15:45)      -  Coagulase negative Staphylococcus: Detec      -  Enterococcus faecalis: Detec      Method Type: PCR    Culture - Blood (collected 05-28-21 @ 18:28)  Source: .Blood Blood-Peripheral  Gram Stain (05-30-21 @ 06:00):    Growth in aerobic bottle: Gram positive cocci in pairs    Growth in anaerobic bottle: Gram positive cocci in pairs  Final Report (05-31-21 @ 15:46):    Growth in aerobic and anaerobic bottles: Methicillin resistant    Staphylococcus aureus    See previous culture [id number]            RADIOLOGY & ADDITIONAL TESTS:  Personal review of radiological diagnostics performed  Echo and EKG results noted when applicable.     MEDICATIONS:  aspirin  chewable 81 milliGRAM(s) Oral daily  buMETAnide Injectable 2 milliGRAM(s) IV Push every 12 hours  cefepime   IVPB 2000 milliGRAM(s) IV Intermittent every 24 hours  chlorhexidine 0.12% Liquid 15 milliLiter(s) Oral Mucosa two times a day  chlorhexidine 4% Liquid 1 Application(s) Topical <User Schedule>  cilostazol 50 milliGRAM(s) Oral two times a day  CRRT Treatment    <Continuous>  dextrose 40% Gel 15 Gram(s) Oral once  dextrose 50% Injectable 25 Gram(s) IV Push once  dextrose 50% Injectable 12.5 Gram(s) IV Push once  dextrose 50% Injectable 25 Gram(s) IV Push once  fentaNYL   Infusion. 0.5 MICROgram(s)/kG/Hr IV Continuous <Continuous>  glucagon  Injectable 1 milliGRAM(s) IntraMuscular once  heparin   Injectable 5000 Unit(s) SubCutaneous every 8 hours  insulin lispro (ADMELOG) corrective regimen sliding scale   SubCutaneous three times a day before meals  insulin lispro Injectable (ADMELOG) 11 Unit(s) SubCutaneous three times a day before meals  linezolid  IVPB 600 milliGRAM(s) IV Intermittent every 12 hours  metroNIDAZOLE  IVPB 500 milliGRAM(s) IV Intermittent every 8 hours  midazolam Injectable 2 milliGRAM(s) IV Push every 4 hours PRN  mupirocin 2% Nasal 1 Application(s) Nasal two times a day  norepinephrine Infusion 0.05 MICROgram(s)/kG/Min IV Continuous <Continuous>  ondansetron Injectable 4 milliGRAM(s) IV Push every 8 hours PRN  pantoprazole    Tablet 40 milliGRAM(s) Oral before breakfast  polyethylene glycol 3350 17 Gram(s) Oral daily  propofol Infusion 5 MICROgram(s)/kG/Min IV Continuous <Continuous>  PureFlow Dialysate RFP-400 (K 2 / Ca 3) 5000 milliLiter(s) CRRT <Continuous>  senna 2 Tablet(s) Oral at bedtime  sodium chloride 0.9% Bolus 2000 milliLiter(s) IV Bolus once      ANTIBIOTICS:  cefepime   IVPB 2000 milliGRAM(s) IV Intermittent every 24 hours  linezolid  IVPB 600 milliGRAM(s) IV Intermittent every 12 hours  metroNIDAZOLE  IVPB 500 milliGRAM(s) IV Intermittent every 8 hours

## 2021-06-01 NOTE — CHART NOTE - NSCHARTNOTEFT_GEN_A_CORE
PALLIATIVE MEDICINE INTERDISCIPLINARY TEAM NOTE    Provider:  [x   ]Social Work   [   ]          [ x ] Initial visit [   ] Follow up    Family or contact name / phone # Gentry Callahan [phone]  Met with: [ x  ] Patient  [  x ] Family  [   ] Other:    Primary Language: [   x] English [   ] Other*:                      *Interpretation provided by:    SUPPORT DIAGNOSES            (Check all that apply)  [ x  ] Psychosocial spiritual assessment (PSSA)  [   ] EOL issues  [   ] Cultural / spiritual concerns  [   ] Pain / suffering  [   ] Dementia / AMS  [   ] Other:  [   ] AD issues  [   ] Grief / loss / sadness  [   ] Discharge issues  [   ] Distress / coping    PSYCHOSOCIAL ASSESSMENT OF PATIENT         (Check all that apply)  [x   ] Initial Assessment            [   ] Reassessment          [   ] Not Applicable this visit    Pain/suffering acuity:  [ x  ] None to mild (0-3)           [   ] Moderate (4-6)        [   ] High (7-10)    Mental Status:  [   ] Alert/oriented (x3)          [   ] Confused/Altered(x2/x1)         [ x  ] Non-resp    Functional status:  [   ] Independent w ADLs      [   ] Needs Assistance             [  x ] Bedbound/Full Care    Coping:  [   ] Coping well                     [   ] Coping w/difficulty            [   ] Poor coping *unable to assess    Support system:  [  x  ] Strong                              [   ] Adequate                        [   ] Inadequate    SPIRITUAL ASSESSMENT  Spiritism/Spiritual practice: ___________________________    Role of organized Uatsdin:  [   ] Important                     [   ] Some (fam tradition, cultural)               [   ] None    Effects on medical care:  [   ] Yes, _____________________________________                         [   ] None    Cultural/Sikhism need:  [   ] Yes, _____________________________________                         [   ] None    Refer to Pastoral Care:  [   ] Yes           [   ] No, not at this time    SERVICE PROVIDED  [ x  ]PSSA                                                                             [   ]Discharge support / facilitation  [   ]AD / goals of care counseling                                  [   ]EOL / death / bereavement counseling  [   ]Counseling / support                                                [   ] Family meeting  [   ]Prayer / sacrament / ritual                                      [   ] Referral   [   ]Other                                                                       NOTE and Plan of Care (PoC):    60yMale  DM, hypertension, dyslipidemia, sleep apnea, Charcot foot reconstruction with external fixation, super morbid obesity presented for worsening right footinfection; since admission -  Acute Hypoxic Hypercarbic Respiratory Failure: secondary to septic shock, Toxic Metabolic encephalopathy continued on lung protective mechanical ventilation and CVVH; Post-op day 1 after Right Knee Disarticulation for necrotizing soft tissue infection of right lower extremity - vascular following being evaluated for support with GOC.    Palliative Care MD and LMSW spoke with pt.'s niece, Gentry Callahan.  A health care proxy from  was located naming a friend Brandt Light as health care agent (no contact/family unaware of friend) and brother Tunde Callahan as alternate, who is  according to pt.'s, niece.  According to Gentry, pt. never  and does not have children; he has one living brother, two nieces and one nephew.  Niece Gentry has self-identified as decision maker in collaboration with her siblings/father.  Introduced Palliative Care to Gentry via telephone, who was appropriately tearful.  Family wish to continue with current level of care, but are aware Palliative Care will be following and guiding should pt. decline.  Pt. worked as a  for over 30 years at [employer] and has a large social support network.    Support provided to nihardik.  YAN will be made available for supportive counseling and for ongoing GOC conversations as appropriate.  Full Code.

## 2021-06-02 LAB
% ALBUMIN: 25.7 % — SIGNIFICANT CHANGE UP
% ALPHA 1: 7.7 % — SIGNIFICANT CHANGE UP
% ALPHA 2: 11.3 % — SIGNIFICANT CHANGE UP
% BETA: 7.2 % — SIGNIFICANT CHANGE UP
% GAMMA: 48.1 % — SIGNIFICANT CHANGE UP
A1C WITH ESTIMATED AVERAGE GLUCOSE RESULT: 8.9 % — HIGH (ref 4–5.6)
ALBUMIN SERPL ELPH-MCNC: 1.9 G/DL — LOW (ref 3.6–5.5)
ALBUMIN SERPL ELPH-MCNC: 2.2 G/DL — LOW (ref 3.5–5.2)
ALBUMIN/GLOB SERPL ELPH: 0.3 RATIO — SIGNIFICANT CHANGE UP
ALP SERPL-CCNC: 120 U/L — HIGH (ref 30–115)
ALPHA1 GLOB SERPL ELPH-MCNC: 0.6 G/DL — HIGH (ref 0.1–0.4)
ALPHA2 GLOB SERPL ELPH-MCNC: 0.8 G/DL — SIGNIFICANT CHANGE UP (ref 0.5–1)
ALT FLD-CCNC: 16 U/L — SIGNIFICANT CHANGE UP (ref 0–41)
ANION GAP SERPL CALC-SCNC: 15 MMOL/L — HIGH (ref 7–14)
APTT BLD: 56.1 SEC — HIGH (ref 27–39.2)
APTT BLD: 56.7 SEC — HIGH (ref 27–39.2)
APTT BLD: 60 SEC — HIGH (ref 27–39.2)
APTT BLD: 73 SEC — CRITICAL HIGH (ref 27–39.2)
AST SERPL-CCNC: 38 U/L — SIGNIFICANT CHANGE UP (ref 0–41)
B-GLOBULIN SERPL ELPH-MCNC: 0.5 G/DL — SIGNIFICANT CHANGE UP (ref 0.5–1)
BASE EXCESS BLDA CALC-SCNC: -1.1 MMOL/L — SIGNIFICANT CHANGE UP (ref -2–2)
BASE EXCESS BLDA CALC-SCNC: -1.3 MMOL/L — SIGNIFICANT CHANGE UP (ref -2–2)
BILIRUB SERPL-MCNC: 0.5 MG/DL — SIGNIFICANT CHANGE UP (ref 0.2–1.2)
BLD GP AB SCN SERPL QL: SIGNIFICANT CHANGE UP
BUN SERPL-MCNC: 54 MG/DL — HIGH (ref 10–20)
C3 SERPL-MCNC: 120 MG/DL — SIGNIFICANT CHANGE UP (ref 81–157)
C4 SERPL-MCNC: 24 MG/DL — SIGNIFICANT CHANGE UP (ref 13–39)
CALCIUM SERPL-MCNC: 6.9 MG/DL — LOW (ref 8.5–10.1)
CHLORIDE SERPL-SCNC: 99 MMOL/L — SIGNIFICANT CHANGE UP (ref 98–110)
CO2 SERPL-SCNC: 22 MMOL/L — SIGNIFICANT CHANGE UP (ref 17–32)
CREAT SERPL-MCNC: 6.1 MG/DL — CRITICAL HIGH (ref 0.7–1.5)
EOSINOPHIL NFR URNS MANUAL: NEGATIVE — SIGNIFICANT CHANGE UP
ESTIMATED AVERAGE GLUCOSE: 209 MG/DL — HIGH (ref 68–114)
GAMMA GLOBULIN: 3.6 G/DL — HIGH (ref 0.6–1.6)
GAS PNL BLDA: SIGNIFICANT CHANGE UP
GLUCOSE BLDC GLUCOMTR-MCNC: 173 MG/DL — HIGH (ref 70–99)
GLUCOSE BLDC GLUCOMTR-MCNC: 184 MG/DL — HIGH (ref 70–99)
GLUCOSE BLDC GLUCOMTR-MCNC: 193 MG/DL — HIGH (ref 70–99)
GLUCOSE BLDC GLUCOMTR-MCNC: 216 MG/DL — HIGH (ref 70–99)
GLUCOSE SERPL-MCNC: 162 MG/DL — HIGH (ref 70–99)
HCO3 BLDA-SCNC: 23 MMOL/L — SIGNIFICANT CHANGE UP (ref 23–27)
HCO3 BLDA-SCNC: 24 MMOL/L — SIGNIFICANT CHANGE UP (ref 23–27)
HCT VFR BLD CALC: 24.6 % — LOW (ref 42–52)
HCT VFR BLD CALC: 26.6 % — LOW (ref 42–52)
HCT VFR BLD CALC: 26.8 % — LOW (ref 42–52)
HGB BLD-MCNC: 7.5 G/DL — LOW (ref 14–18)
HGB BLD-MCNC: 8.1 G/DL — LOW (ref 14–18)
HGB BLD-MCNC: 8.2 G/DL — LOW (ref 14–18)
HOROWITZ INDEX BLDA+IHG-RTO: 60 — SIGNIFICANT CHANGE UP
INTERPRETATION SERPL IFE-IMP: SIGNIFICANT CHANGE UP
KAPPA LC SER QL IFE: 67.71 MG/DL — HIGH (ref 0.33–1.94)
KAPPA/LAMBDA FREE LIGHT CHAIN RATIO, SERUM: 2.25 RATIO — HIGH (ref 0.26–1.65)
LAMBDA LC SER QL IFE: 30.1 MG/DL — HIGH (ref 0.57–2.63)
MAGNESIUM SERPL-MCNC: 2.7 MG/DL — HIGH (ref 1.8–2.4)
MCHC RBC-ENTMCNC: 26.1 PG — LOW (ref 27–31)
MCHC RBC-ENTMCNC: 26.1 PG — LOW (ref 27–31)
MCHC RBC-ENTMCNC: 26.5 PG — LOW (ref 27–31)
MCHC RBC-ENTMCNC: 30.5 G/DL — LOW (ref 32–37)
MCHC RBC-ENTMCNC: 30.5 G/DL — LOW (ref 32–37)
MCHC RBC-ENTMCNC: 30.6 G/DL — LOW (ref 32–37)
MCV RBC AUTO: 85.4 FL — SIGNIFICANT CHANGE UP (ref 80–94)
MCV RBC AUTO: 85.8 FL — SIGNIFICANT CHANGE UP (ref 80–94)
MCV RBC AUTO: 86.9 FL — SIGNIFICANT CHANGE UP (ref 80–94)
NRBC # BLD: 0 /100 WBCS — SIGNIFICANT CHANGE UP (ref 0–0)
PCO2 BLDA: 35 MMHG — LOW (ref 38–42)
PCO2 BLDA: 41 MMHG — SIGNIFICANT CHANGE UP (ref 38–42)
PH BLDA: 7.37 — LOW (ref 7.38–7.42)
PH BLDA: 7.43 — HIGH (ref 7.38–7.42)
PHOSPHATE SERPL-MCNC: 9.8 MG/DL — HIGH (ref 2.1–4.9)
PLATELET # BLD AUTO: 187 K/UL — SIGNIFICANT CHANGE UP (ref 130–400)
PLATELET # BLD AUTO: 190 K/UL — SIGNIFICANT CHANGE UP (ref 130–400)
PLATELET # BLD AUTO: 215 K/UL — SIGNIFICANT CHANGE UP (ref 130–400)
PO2 BLDA: 158 MMHG — HIGH (ref 78–95)
PO2 BLDA: 91 MMHG — SIGNIFICANT CHANGE UP (ref 78–95)
POTASSIUM SERPL-MCNC: 4.9 MMOL/L — SIGNIFICANT CHANGE UP (ref 3.5–5)
POTASSIUM SERPL-SCNC: 4.9 MMOL/L — SIGNIFICANT CHANGE UP (ref 3.5–5)
PROT PATTERN SERPL ELPH-IMP: SIGNIFICANT CHANGE UP
PROT SERPL-MCNC: 7 G/DL — SIGNIFICANT CHANGE UP (ref 6–8)
PROT SERPL-MCNC: 7.4 G/DL — SIGNIFICANT CHANGE UP (ref 6–8.3)
PROT SERPL-MCNC: 7.4 G/DL — SIGNIFICANT CHANGE UP (ref 6–8.3)
RBC # BLD: 2.83 M/UL — LOW (ref 4.7–6.1)
RBC # BLD: 3.1 M/UL — LOW (ref 4.7–6.1)
RBC # BLD: 3.14 M/UL — LOW (ref 4.7–6.1)
RBC # FLD: 17 % — HIGH (ref 11.5–14.5)
RBC # FLD: 17.2 % — HIGH (ref 11.5–14.5)
RBC # FLD: 17.3 % — HIGH (ref 11.5–14.5)
SAO2 % BLDA: 97 % — SIGNIFICANT CHANGE UP (ref 94–98)
SAO2 % BLDA: 99 % — HIGH (ref 94–98)
SODIUM SERPL-SCNC: 136 MMOL/L — SIGNIFICANT CHANGE UP (ref 135–146)
WBC # BLD: 13.9 K/UL — HIGH (ref 4.8–10.8)
WBC # BLD: 14.3 K/UL — HIGH (ref 4.8–10.8)
WBC # BLD: 16.15 K/UL — HIGH (ref 4.8–10.8)
WBC # FLD AUTO: 13.9 K/UL — HIGH (ref 4.8–10.8)
WBC # FLD AUTO: 14.3 K/UL — HIGH (ref 4.8–10.8)
WBC # FLD AUTO: 16.15 K/UL — HIGH (ref 4.8–10.8)

## 2021-06-02 PROCEDURE — 93970 EXTREMITY STUDY: CPT | Mod: 26

## 2021-06-02 PROCEDURE — 71045 X-RAY EXAM CHEST 1 VIEW: CPT | Mod: 26

## 2021-06-02 PROCEDURE — 99232 SBSQ HOSP IP/OBS MODERATE 35: CPT

## 2021-06-02 PROCEDURE — 99291 CRITICAL CARE FIRST HOUR: CPT

## 2021-06-02 RX ORDER — HEPARIN SODIUM 5000 [USP'U]/ML
1700 INJECTION INTRAVENOUS; SUBCUTANEOUS
Qty: 25000 | Refills: 0 | Status: DISCONTINUED | OUTPATIENT
Start: 2021-06-02 | End: 2021-06-03

## 2021-06-02 RX ADMIN — PROPOFOL 4.93 MICROGRAM(S)/KG/MIN: 10 INJECTION, EMULSION INTRAVENOUS at 10:46

## 2021-06-02 RX ADMIN — Medication 2: at 17:13

## 2021-06-02 RX ADMIN — FENTANYL CITRATE 8.21 MICROGRAM(S)/KG/HR: 50 INJECTION INTRAVENOUS at 10:46

## 2021-06-02 RX ADMIN — CEFTAROLINE FOSAMIL 50 MILLIGRAM(S): 600 POWDER, FOR SOLUTION INTRAVENOUS at 17:14

## 2021-06-02 RX ADMIN — CEFTAROLINE FOSAMIL 50 MILLIGRAM(S): 600 POWDER, FOR SOLUTION INTRAVENOUS at 05:13

## 2021-06-02 RX ADMIN — FENTANYL CITRATE 8.21 MICROGRAM(S)/KG/HR: 50 INJECTION INTRAVENOUS at 16:52

## 2021-06-02 RX ADMIN — PROPOFOL 4.93 MICROGRAM(S)/KG/MIN: 10 INJECTION, EMULSION INTRAVENOUS at 17:19

## 2021-06-02 RX ADMIN — MUPIROCIN 1 APPLICATION(S): 20 OINTMENT TOPICAL at 17:16

## 2021-06-02 RX ADMIN — CHLORHEXIDINE GLUCONATE 15 MILLILITER(S): 213 SOLUTION TOPICAL at 17:15

## 2021-06-02 RX ADMIN — HEPARIN SODIUM 18 UNIT(S)/HR: 5000 INJECTION INTRAVENOUS; SUBCUTANEOUS at 21:45

## 2021-06-02 RX ADMIN — HEPARIN SODIUM 19 UNIT(S)/HR: 5000 INJECTION INTRAVENOUS; SUBCUTANEOUS at 15:00

## 2021-06-02 RX ADMIN — SENNA PLUS 2 TABLET(S): 8.6 TABLET ORAL at 22:13

## 2021-06-02 RX ADMIN — Medication 7.7 MICROGRAM(S)/KG/MIN: at 19:00

## 2021-06-02 RX ADMIN — LINEZOLID 300 MILLIGRAM(S): 600 INJECTION, SOLUTION INTRAVENOUS at 17:14

## 2021-06-02 RX ADMIN — Medication 100 MILLIGRAM(S): at 15:03

## 2021-06-02 RX ADMIN — HUMAN INSULIN 10 UNIT(S): 100 INJECTION, SUSPENSION SUBCUTANEOUS at 17:19

## 2021-06-02 RX ADMIN — HUMAN INSULIN 10 UNIT(S): 100 INJECTION, SUSPENSION SUBCUTANEOUS at 10:51

## 2021-06-02 RX ADMIN — PROPOFOL 4.93 MICROGRAM(S)/KG/MIN: 10 INJECTION, EMULSION INTRAVENOUS at 15:00

## 2021-06-02 RX ADMIN — PROPOFOL 4.93 MICROGRAM(S)/KG/MIN: 10 INJECTION, EMULSION INTRAVENOUS at 22:12

## 2021-06-02 RX ADMIN — MUPIROCIN 1 APPLICATION(S): 20 OINTMENT TOPICAL at 07:27

## 2021-06-02 RX ADMIN — PANTOPRAZOLE SODIUM 40 MILLIGRAM(S): 20 TABLET, DELAYED RELEASE ORAL at 05:13

## 2021-06-02 RX ADMIN — POLYETHYLENE GLYCOL 3350 17 GRAM(S): 17 POWDER, FOR SOLUTION ORAL at 12:38

## 2021-06-02 RX ADMIN — CHLORHEXIDINE GLUCONATE 15 MILLILITER(S): 213 SOLUTION TOPICAL at 05:11

## 2021-06-02 RX ADMIN — Medication 100 MILLIGRAM(S): at 22:20

## 2021-06-02 RX ADMIN — Medication 100 MILLIGRAM(S): at 05:12

## 2021-06-02 RX ADMIN — Medication 2: at 11:01

## 2021-06-02 RX ADMIN — FENTANYL CITRATE 8.21 MICROGRAM(S)/KG/HR: 50 INJECTION INTRAVENOUS at 21:00

## 2021-06-02 RX ADMIN — CHLORHEXIDINE GLUCONATE 1 APPLICATION(S): 213 SOLUTION TOPICAL at 05:12

## 2021-06-02 RX ADMIN — LINEZOLID 300 MILLIGRAM(S): 600 INJECTION, SOLUTION INTRAVENOUS at 05:12

## 2021-06-02 RX ADMIN — Medication 81 MILLIGRAM(S): at 12:38

## 2021-06-02 RX ADMIN — FENTANYL CITRATE 8.21 MICROGRAM(S)/KG/HR: 50 INJECTION INTRAVENOUS at 15:00

## 2021-06-02 NOTE — DIETITIAN INITIAL EVALUATION ADULT. - FACTORS AFF FOOD INTAKE
OGT. no BM noted since admit. rounded abdomen, soft/nontender. MAP: 82 @8:30-9:00, 84 @8:00, 80@ 5:./NPO

## 2021-06-02 NOTE — PROGRESS NOTE ADULT - SUBJECTIVE AND OBJECTIVE BOX
Nephrology progress note    THIS IS AN INCOMPLETE NOTE . FULL NOTE TO FOLLOW SHORTLY    Patient is seen and examined, events over the last 24 h noted .    Allergies:  No Known Allergies  statins (Other)    Hospital Medications:   MEDICATIONS  (STANDING):  aspirin  chewable 81 milliGRAM(s) Oral daily  ceftaroline fosamil IVPB 200 milliGRAM(s) IV Intermittent every 12 hours  CRRT Treatment    <Continuous>  dextrose 40% Gel 15 Gram(s) Oral once  dextrose 5%. 1000 milliLiter(s) (50 mL/Hr) IV Continuous <Continuous>  dextrose 5%. 1000 milliLiter(s) (100 mL/Hr) IV Continuous <Continuous>  dextrose 50% Injectable 25 Gram(s) IV Push once  dextrose 50% Injectable 12.5 Gram(s) IV Push once  dextrose 50% Injectable 25 Gram(s) IV Push once  fentaNYL   Infusion. 0.5 MICROgram(s)/kG/Hr (8.21 mL/Hr) IV Continuous <Continuous>  glucagon  Injectable 1 milliGRAM(s) IntraMuscular once  heparin  Infusion 1700 Unit(s)/Hr (17 mL/Hr) IV Continuous <Continuous>  insulin lispro (ADMELOG) corrective regimen sliding scale   SubCutaneous three times a day before meals  insulin NPH human recombinant 10 Unit(s) SubCutaneous every 12 hours  linezolid  IVPB 600 milliGRAM(s) IV Intermittent every 12 hours  metroNIDAZOLE  IVPB 500 milliGRAM(s) IV Intermittent every 8 hours  mupirocin 2% Nasal 1 Application(s) Nasal two times a day  norepinephrine Infusion 0.05 MICROgram(s)/kG/Min (7.7 mL/Hr) IV Continuous <Continuous>  pantoprazole    Tablet 40 milliGRAM(s) Oral before breakfast  polyethylene glycol 3350 17 Gram(s) Oral daily  propofol Infusion 5.004 MICROgram(s)/kG/Min (4.93 mL/Hr) IV Continuous <Continuous>  PureFlow Dialysate RFP-400 (K 2 / Ca 3) 5000 milliLiter(s) (2000 mL/Hr) CRRT <Continuous>  senna 2 Tablet(s) Oral at bedtime        VITALS:  T(F): 96.4 (21 @ 08:00), Max: 97.1 (21 @ 12:00)  HR: 84 (21 @ 08:00)  BP: --  RR: 26 (21 @ 08:00)  SpO2: 96% (21 @ 08:00)  Wt(kg): --     @ 07:01  -   @ 07:00  --------------------------------------------------------  IN: 4550.5 mL / OUT: 610 mL / NET: 3940.5 mL     @ 07:  -   @ 07:00  --------------------------------------------------------  IN: 6430 mL / OUT: 2278 mL / NET: 4152 mL     @ 07:01  -   @ 08:41  --------------------------------------------------------  IN: 135 mL / OUT: 195 mL / NET: -60 mL          PHYSICAL EXAM:  Constitutional: NAD  HEENT: anicteric sclera, oropharynx clear, MMM  Neck: No JVD  Respiratory: CTAB, no wheezes, rales or rhonchi  Cardiovascular: S1, S2, RRR  Gastrointestinal: BS+, soft, NT/ND  Extremities: No cyanosis or clubbing. No peripheral edema  :  No terrazas.   Skin: No rashes    LABS:      136  |  99  |  54<H>  ----------------------------<  162<H>  4.9   |  22  |  6.1<HH>    Ca    6.9<L>      2021 03:00  Mg     2.7         TPro  7.0  /  Alb  2.2<L>  /  TBili  0.5  /  DBili      /  AST  38  /  ALT  16  /  AlkPhos  120<H>                            7.5    13.90 )-----------( 187      ( 2021 03:00 )             24.6       Urine Studies:  Urinalysis Basic - ( 29 May 2021 01:20 )    Color: Light Yellow / Appearance: Clear / S.028 / pH:   Gluc:  / Ketone: Negative  / Bili: Negative / Urobili: <2 mg/dL   Blood:  / Protein: Trace / Nitrite: Negative   Leuk Esterase: Negative / RBC: 3 /HPF / WBC 1 /HPF   Sq Epi:  / Non Sq Epi: 0 /HPF / Bacteria: Negative      Creatinine, Random Urine: 82 mg/dL ( @ 15:34)  Protein/Creatinine Ratio Calculation: 0.2 Ratio ( @ 15:34)  Calcium, Random Urine: <1 mg/dL ( @ 15:34)  Potassium, Random Urine: 23 mmol/L (30 @ 15:34)  Sodium, Random Urine: 21.0 mmoL/L ( @ 15:34)  Chloride, Random Urine: <20 ( @ 15:34)    RADIOLOGY & ADDITIONAL STUDIES:   Nephrology progress note  Patient is seen and examined, events over the last 24 h noted .  intubated on MV   on CVVHD  BP noted     Allergies:  No Known Allergies  statins (Other)    Hospital Medications:   MEDICATIONS  (STANDING):  aspirin  chewable 81 milliGRAM(s) Oral daily  ceftaroline fosamil IVPB 200 milliGRAM(s) IV Intermittent every 12 hours  CRRT Treatment    <Continuous>  fentaNYL   Infusion. 0.5 MICROgram(s)/kG/Hr (8.21 mL/Hr) IV Continuous <Continuous>  glucagon  Injectable 1 milliGRAM(s) IntraMuscular once  heparin  Infusion 1700 Unit(s)/Hr (17 mL/Hr) IV Continuous <Continuous>  insulin lispro (ADMELOG) corrective regimen sliding scale   SubCutaneous three times a day before meals  insulin NPH human recombinant 10 Unit(s) SubCutaneous every 12 hours  linezolid  IVPB 600 milliGRAM(s) IV Intermittent every 12 hours  metroNIDAZOLE  IVPB 500 milliGRAM(s) IV Intermittent every 8 hours  mupirocin 2% Nasal 1 Application(s) Nasal two times a day  norepinephrine Infusion 0.05 MICROgram(s)/kG/Min (7.7 mL/Hr) IV Continuous <Continuous>  pantoprazole    Tablet 40 milliGRAM(s) Oral before breakfast  polyethylene glycol 3350 17 Gram(s) Oral daily  propofol Infusion 5.004 MICROgram(s)/kG/Min (4.93 mL/Hr) IV Continuous <Continuous>  PureFlow Dialysate RFP-400 (K 2 / Ca 3) 5000 milliLiter(s) (2000 mL/Hr) CRRT <Continuous>  senna 2 Tablet(s) Oral at bedtime        VITALS:  T(F): 96.4 (21 @ 08:00), Max: 97.1 (21 @ 12:00)  HR: 84 (21 @ 08:00)  RR: 26 (21 @ 08:00)  SpO2: 96% (21 @ 08:00)     @ 07:01  -   @ 07:00  --------------------------------------------------------  IN: 4550.5 mL / OUT: 610 mL / NET: 3940.5 mL     @ 07:01  -   @ 07:00  --------------------------------------------------------  IN: 6430 mL / OUT: 2278 mL / NET: 4152 mL     @ 07:  -   @ 08:41  --------------------------------------------------------  IN: 135 mL / OUT: 195 mL / NET: -60 mL          PHYSICAL EXAM:  Constitutional: intubated on MV   Neck: No JVD  Respiratory: Crackles at base   Cardiovascular: S1, S2, RRR  Gastrointestinal: BS+, soft, NT/ND  Extremities: No cyanosis or clubbing. plus one edema   Skin: No rashes    LABS:      136  |  99  |  54<H>  ----------------------------<  162<H>  4.9   |  22  |  6.1<HH>    Ca    6.9<L>      2021 03:00  Mg     2.7         TPro  7.0  /  Alb  2.2<L>  /  TBili  0.5  /  DBili      /  AST  38  /  ALT  16  /  AlkPhos  120<H>                            7.5    13.90 )-----------( 187      ( 2021 03:00 )             24.6       Urine Studies:  Urinalysis Basic - ( 29 May 2021 01:20 )    Color: Light Yellow / Appearance: Clear / S.028 / pH:   Gluc:  / Ketone: Negative  / Bili: Negative / Urobili: <2 mg/dL   Blood:  / Protein: Trace / Nitrite: Negative   Leuk Esterase: Negative / RBC: 3 /HPF / WBC 1 /HPF   Sq Epi:  / Non Sq Epi: 0 /HPF / Bacteria: Negative      Creatinine, Random Urine: 82 mg/dL ( @ 15:34)  Protein/Creatinine Ratio Calculation: 0.2 Ratio (05-30 @ 15:34)  Calcium, Random Urine: <1 mg/dL (05-30 @ 15:34)  Potassium, Random Urine: 23 mmol/L (05-30 @ 15:34)  Sodium, Random Urine: 21.0 mmoL/L (0530 @ 15:34)  Chloride, Random Urine: <20 (30 @ 15:34)    RADIOLOGY & ADDITIONAL STUDIES:

## 2021-06-02 NOTE — PROGRESS NOTE ADULT - ASSESSMENT
61 y/o M with PMH of DM, hypertension, dyslipidemia, sleep apnea, Charcot foot reconstruction with external fixation, morbid obesity presenting to the hospital on 5/29 with worsening foot ulcer.  s/p Right Knee Disarticulation for necrotizing soft tissue infection of right lower extremity          ·	CANDY on CKD ( unknown stage last known creat 1.4 in 11/2019) / rule out ATN / obstructive uropathy/ sp CT angio (too early for FLO)/ doubt IRGN (infection related GN)/ doubt AIN (multiple antibx courses)/ high bicarb/chr resp acidosis  ·	oligoanuric, creat rising started CRRT 5/31 will continue and keep in neg balance 50 cc per hour   ·	renal ultrasound noted w/o hydronephrosis, s/p terrazas catheter placement   ·	UA noted, minimal proteinuria    ·	appreciate ID f/u, on ceftarolien flagyl and zyvox/ dose to GFR < 15  ·	sp debridement of DFU as per podiatry  ·	C3 C4 wnl   ·	check  urine eosinophils SPEP SFLC and IF  ·	check phos level daily while on CRRT / Ca noted if corrected to albumin ok/ check ICA  ·	d/w ICU team    will follow

## 2021-06-02 NOTE — PROGRESS NOTE ADULT - ASSESSMENT
59 y/o M with PMH of DM, hypertension, dyslipidemia, sleep apnea, Charcot foot reconstruction with external fixation, morbid obesity presented for worsening right foot infection.     #DFU with Hx of MRSA infection  # Septic shock   # Acute hypoxic respiratory failure secondary to septic shock  # Toxic metabolic encephalopathy   - s/p intubation 5/31   - bacteremia from right foot abscess   - septic on admission, WBC 12,  + lactate 2.3. s/p cefepime flagyl and vanc in the ED  - Podiatry on board, f/u   - S/p disarticulation right knee 5/31 w/ vascular   - f/u vascular   - ID following, f/u recs:  -linezolid 600 mg iv q12h  -Ceftaroline 200 mg iv q12h  -flagyl 500 mg iv q8h   -ECHO  - f/u cultures   - Echo 3/31: EF 55-60%, G2DD     #?Code stroke for possible CVA   - ptnt was code stroke on floors for AMS and confusion  - upgraded to CCU   - metabolic encephalopathy secondary to sepsis more likely than stroke  -  CT head neg for acute pathology, CTA w/ moderate stenoses at the junction of the precavernous and cavernous segments of both internal carotid arteries  - neuro recs for brain MRI when stable     # NSTEMI  - secondary to sepsis induced demand ischemia  - less likely true ACS  -monitor     #CANDY on CKD III  - secondary to sepsis induced ATN, will need renal replacement therapy as per nephrology, continue supportive care, monitor for now.   -creatinine 3.0 on admission, baseline around 1.4-1.8   - nephro following     #anemia of chronic disease   - transfuse Hb < 7  - monitor     #Diabetes Mellitus  -on basal insulin   - a1c 8.9   -Monitor      #Hypertension  -holding oral meds     #Dyslipidemia  -c/w home meds    #MATA/ OHS    #morbid obesity    Diet: npo w/ tube feeds   DVT ppx: heparin gtt  GI ppx: protonix   Dispo: acute

## 2021-06-02 NOTE — PROGRESS NOTE ADULT - ASSESSMENT
1. Acute Hypoxic Hypercarbic Respiratory Failure: secondary to septic shock, Toxic Metabolic encerphalopathy, continue on lung protective mechanical ventilation for now, will wean FiO2 for SpO2 greater then 92%,  tx nebs, pulmonary toilet, continue supportive care monitor for now.     2. Septic Shock: secondary to Bacteremia from diabetic foot ulcer, will continue empiric abx as per ID, continue levophed gtt will wean for maps greater then 65, continue monitor for now.     3. Diabetic Foot Ulcer: s/p debridement growing Staph aureus, s/p Right Knee Disarticulation for necrotizing soft tissue infection of right lower extremity by Vascular Surgery  for proper source control, continue supportive care, monitor for now.  Continue tx as per ID.    4. Bacteremia: secondary to Enterococcus, Coag neg Staph, and Staph Aureus most likley from Diabetic foot Ulcer, continue empiric abx as per ID, f/u Cx's.    5. CANDY on CKD: secondary to sepsis induced ATN, will need renal replacement therapy as per nephrology, continue supportive care, monitor for now.     6. Toxic Metabolic Encephelopathy: secondary to sepsis and multiorgan failure, continue monitor for now, tx supportively CT head neg for acute pathology. Less likely to be acute CVA, f/u repeat CT head once stable.    7. DM: tx NPH BID tx inuslin sliding scale, monitor BSG's, continue supportive care.     8. HTN: continue supportive care, monitor for now. Hold oral meds.    9. HLD: tx statin, continue supportive care, monitor for now.     10. MATA/ OHS: continue supportive care, monitor for now on MV.    11. Morbid obesity: continue supportive care, monitor for now.     12. NSTEMI: secondary to sepsis induced demand ischemia, less likely true ACS, continue supportive care for now, Monitor for now.     13. Anemia: secondary to Anemia of chronic disease will transfuse as needed for HGB less then 7, monitor for now.     14. DVT/GI px: tx PPI and hep subcut, check lower ext US.    15. NUT: tx Tf's.    16. Diastolic CHF: continue supportive care, monitor for now.     Critical Care Time not including procedures 67 mins

## 2021-06-02 NOTE — DIETITIAN INITIAL EVALUATION ADULT. - ENERGY INTAKE
Pt received 1 feed since initiated. TF held this morning. Updated recs at end of document d/w LIP Dr. Rodgers. Pt received 1 feed since initiated. TF held this morning d/t concern for aspiration w. bolus feeds as they could not raise HOB d/t CVVHD line. Updated recs at end of document d/w LIP Dr. Rodgers.

## 2021-06-02 NOTE — PROGRESS NOTE ADULT - ASSESSMENT
60yMale  DM, hypertension, dyslipidemia, sleep apnea, Charcot foot reconstruction with external fixation, super morbid obesity presented for worsening right footinfection; since admission -  Acute Hypoxic Hypercarbic Respiratory Failure, patient is on ventilator. He had PRBC transfused last night.  No family at bedside.     Morphine Equivalent Daily Dose (MEDD) on fent gtt      See Recs below. D/W primary team and bedside RN     Please call or [phone]  with questions or concerns.   We will continue to follow.

## 2021-06-02 NOTE — DIETITIAN INITIAL EVALUATION ADULT. - PERTINENT LABORATORY DATA
(6/2/21) WBC 13.90, RBC 2.83, H/H 7.5/24.6, BUN 54, Cr 6.1, glucose 162, corrected Ca 8.34, GFR 9, Mg 2.7

## 2021-06-02 NOTE — DIETITIAN INITIAL EVALUATION ADULT. - OTHER CALCULATIONS
est kcal needs = 4778-9907 kcal/day [obtained comparin5424-0632 (60-70% PSE b (4752)) vs 9353-3363 (11-14 kcal/kg CBW) vs. 3148-8875 (22-25 kcal/kg IBW)]; est protein needs = 145-182 g/day (2.0-2.5 g/kg IBW considering CVVHD & BMI >40); est fluid needs = per LIP

## 2021-06-02 NOTE — PROGRESS NOTE ADULT - SUBJECTIVE AND OBJECTIVE BOX
SUBJECTIVE:    Patient is a 60y old Male who presents with a chief complaint of dfu (02 Jun 2021 11:09)    Currently admitted to medicine with the primary diagnosis of Diabetic infection of right foot       Today is hospital day 4d. Overnight CHVVHD not functioning properly, vascular flushed with tPA, remains on heparin gtt, s/p 1u pRBC overnight.     PAST MEDICAL & SURGICAL HISTORY  MATA on CPAP    DM (diabetes mellitus)    HTN (hypertension)    High cholesterol    Charcot ankle, right    History of percutaneous angioplasty    H/O skin graft    Left toe amputee  4 TOES    Diabetic Charcot foot  Fixation with external device    ALLERGIES:  No Known Allergies    MEDICATIONS:  STANDING MEDICATIONS  aspirin  chewable 81 milliGRAM(s) Oral daily  ceftaroline fosamil IVPB 200 milliGRAM(s) IV Intermittent every 12 hours  chlorhexidine 0.12% Liquid 15 milliLiter(s) Oral Mucosa two times a day  chlorhexidine 4% Liquid 1 Application(s) Topical <User Schedule>  CRRT Treatment    <Continuous>  CRRT Treatment    <Continuous>  dextrose 40% Gel 15 Gram(s) Oral once  dextrose 5%. 1000 milliLiter(s) IV Continuous <Continuous>  dextrose 5%. 1000 milliLiter(s) IV Continuous <Continuous>  dextrose 50% Injectable 25 Gram(s) IV Push once  dextrose 50% Injectable 12.5 Gram(s) IV Push once  dextrose 50% Injectable 25 Gram(s) IV Push once  fentaNYL   Infusion. 0.5 MICROgram(s)/kG/Hr IV Continuous <Continuous>  glucagon  Injectable 1 milliGRAM(s) IntraMuscular once  heparin  Infusion 1700 Unit(s)/Hr IV Continuous <Continuous>  insulin lispro (ADMELOG) corrective regimen sliding scale   SubCutaneous three times a day before meals  insulin NPH human recombinant 10 Unit(s) SubCutaneous every 12 hours  linezolid  IVPB 600 milliGRAM(s) IV Intermittent every 12 hours  metroNIDAZOLE  IVPB 500 milliGRAM(s) IV Intermittent every 8 hours  mupirocin 2% Nasal 1 Application(s) Nasal two times a day  norepinephrine Infusion 0.05 MICROgram(s)/kG/Min IV Continuous <Continuous>  pantoprazole    Tablet 40 milliGRAM(s) Oral before breakfast  polyethylene glycol 3350 17 Gram(s) Oral daily  propofol Infusion 5.004 MICROgram(s)/kG/Min IV Continuous <Continuous>  PureFlow Dialysate RFP-400 (K 2 / Ca 3) 5000 milliLiter(s) CRRT <Continuous>  PureFlow Dialysate RFP-400 (K 2 / Ca 3) 5000 milliLiter(s) CRRT <Continuous>  senna 2 Tablet(s) Oral at bedtime    PRN MEDICATIONS  midazolam Injectable 2 milliGRAM(s) IV Push every 4 hours PRN  ondansetron Injectable 4 milliGRAM(s) IV Push every 8 hours PRN    VITALS:   T(F): 96.4  HR: 84  BP: --  RR: 26  SpO2: 94%    PHYSICAL EXAM:  GEN: Intubated, sedated   LUNGS: Bilateral breath sounds   HEART: S1/S2 present   ABD: Soft, bowel sounds present.  EXT: right lower extremity bandaged   NEURO: non focal     Lovelace present     LABS:                        8.1    16.15 )-----------( 190      ( 02 Jun 2021 12:24 )             26.6     06-02    136  |  99  |  54<H>  ----------------------------<  162<H>  4.9   |  22  |  6.1<HH>    Ca    6.9<L>      02 Jun 2021 03:00  Mg     2.7     06-02    TPro  7.0  /  Alb  2.2<L>  /  TBili  0.5  /  DBili  x   /  AST  38  /  ALT  16  /  AlkPhos  120<H>  06-02    PT/INR - ( 01 Jun 2021 01:20 )   PT: 15.50 sec;   INR: 1.35 ratio         PTT - ( 02 Jun 2021 03:00 )  PTT:60.0 sec    ABG - ( 02 Jun 2021 03:00 )  pH, Arterial: 7.37  pH, Blood: x     /  pCO2: 41    /  pO2: 158   / HCO3: 24    / Base Excess: -1.3  /  SaO2: 99          Culture - Blood (collected 30 May 2021 22:09)  Source: .Blood Blood-Peripheral  Preliminary Report (01 Jun 2021 08:01):    No growth to date.    Culture - Blood (collected 30 May 2021 22:09)  Source: .Blood Blood-Peripheral  Preliminary Report (01 Jun 2021 08:01):    No growth to date.      CARDIAC MARKERS ( 01 Jun 2021 04:07 )  x     / x     / 201 U/L / x     / x          RADIOLOGY:  < from: [facility] Duplex Lower Ext Vein Scan, Bilat (06.02.21 @ 10:23) >  IMPRESSION:  No evidence of deep venous thrombosis in either lower extremity.    < end of copied text >      < from: Xray Chest 1 View- PORTABLE-Routine (Xray Chest 1 View- PORTABLE-Routine in AM.) (06.02.21 @ 06:23) >  Impression:    Pulmonary vascular congestion. Bibasilar opacities/effusions similar to prior.    < end of copied text >

## 2021-06-02 NOTE — PROGRESS NOTE ADULT - ASSESSMENT
· Assessment	  59 y/o M with PMH of DM, hypertension, dyslipidemia, sleep apnea, Charcot foot reconstruction with external fixation, super morbid obesity present for worsening right foot infection.  Pt reports he has had this foot ulcer at various stage of healing for 13 years. Patient has been admitted for IV antibiotics multiple times in the past for osteomyelitis of right foot. He has history of MRSA infection in foot. pt has baseline neuropathy in both feet so does not feel any pain.  Patient states his Infection has been gradually getting worse despite treatment with bactrim, he saw his podiatrists and he was sent in by podiatry for IV abx .    IMPRESSION;  MSOF : resp failure with fio2 60%, on pressors, renal failure on CVVH, metabolic encephalopathy  5/31 S/p disarticulation right knee   -5/28 BCx E fecalis, ORSA  -etiology of bacteremia was right foot abscess  -5/29 R foot : ORSA , Corynebacterium  -5/30 BCx NG    RECOMMENDATIONS;  -linezolid 600 mg iv q12h  -Ceftaroline 200 mg iv q12h  -flagyl 500 mg iv q8h   -ECHO    Prognosis is guarded

## 2021-06-02 NOTE — PROGRESS NOTE ADULT - SUBJECTIVE AND OBJECTIVE BOX
THOMAS FERNANDEZ  60y, Male    All available historical data reviewed    OVERNIGHT EVENTS:  no feves  fio2 60%  on levo  on CVVH through right IJ udall  no diarrhea  sedated, responds to pain    ROS:  unable to obtain history secondary to patient's mental status and/or sedation     VITALS:  T(F): 96.3, Max: 97.1 (06-01-21 @ 12:00)  HR: 84  BP: --  RR: 26Vital Signs Last 24 Hrs  T(C): 35.7 (02 Jun 2021 08:45), Max: 36.2 (01 Jun 2021 12:00)  T(F): 96.3 (02 Jun 2021 08:45), Max: 97.1 (01 Jun 2021 12:00)  HR: 84 (02 Jun 2021 09:25) (76 - 92)  BP: --  BP(mean): --  RR: 26 (02 Jun 2021 09:00) (0 - 26)  SpO2: 94% (02 Jun 2021 09:25) (93% - 100%)    TESTS & MEASUREMENTS:                        7.5    13.90 )-----------( 187      ( 02 Jun 2021 03:00 )             24.6     06-02    136  |  99  |  54<H>  ----------------------------<  162<H>  4.9   |  22  |  6.1<HH>    Ca    6.9<L>      02 Jun 2021 03:00  Mg     2.7     06-02    TPro  7.0  /  Alb  2.2<L>  /  TBili  0.5  /  DBili  x   /  AST  38  /  ALT  16  /  AlkPhos  120<H>  06-02    LIVER FUNCTIONS - ( 02 Jun 2021 03:00 )  Alb: 2.2 g/dL / Pro: 7.0 g/dL / ALK PHOS: 120 U/L / ALT: 16 U/L / AST: 38 U/L / GGT: x             Culture - Blood (collected 05-30-21 @ 22:09)  Source: .Blood Blood-Peripheral  Preliminary Report (06-01-21 @ 08:01):    No growth to date.    Culture - Blood (collected 05-30-21 @ 22:09)  Source: .Blood Blood-Peripheral  Preliminary Report (06-01-21 @ 08:01):    No growth to date.    Culture - Acid Fast - Other w/Smear (collected 05-29-21 @ 06:10)  Source: .Other None(R-FOOT)    Culture - Surgical Swab (collected 05-29-21 @ 06:10)  Source: .Surgical Swab None  Preliminary Report (05-30-21 @ 17:28):    No growth    Culture - Acid Fast - Other w/Smear (collected 05-29-21 @ 06:10)  Source: .Other None (R-FOOT)    Culture - Surgical Swab (collected 05-29-21 @ 06:10)  Source: .Surgical Swab None  Preliminary Report (05-31-21 @ 17:10):    Numerous Methicillin resistant Staphylococcus aureus    Rare Corynebacterium species "Susceptibilities not performed"  Organism: Methicillin resistant Staphylococcus aureus (05-31-21 @ 17:07)  Organism: Methicillin resistant Staphylococcus aureus (05-31-21 @ 17:07)      -  Ampicillin/Sulbactam: R 16/8      -  Cefazolin: R 8      -  Clindamycin: S <=0.25      -  Daptomycin: S 0.5      -  Erythromycin: R >4      -  Gentamicin: S <=1 Should not be used as monotherapy      -  Linezolid: S 1      -  Oxacillin: R >2      -  Penicillin: R >8      -  RIF- Rifampin: S <=1 Should not be used as monotherapy      -  Tetra/Doxy: S <=1      -  Trimethoprim/Sulfamethoxazole: S <=0.5/9.5      -  Vancomycin: S 2      Method Type: DAVIDA    Culture - Urine (collected 05-29-21 @ 01:20)  Source: .Urine Clean Catch (Midstream)  Final Report (05-30-21 @ 08:11):    <10,000 CFU/mL Normal Urogenital Mary    Culture - Abscess with Gram Stain (collected 05-28-21 @ 19:51)  Source: .Abscess Right - Foot  Preliminary Report (05-31-21 @ 12:15):    Moderate Methicillin resistant Staphylococcus aureus    Few Corynebacterium species "Susceptibilities not performed"  Organism: Methicillin resistant Staphylococcus aureus (05-31-21 @ 12:14)  Organism: Methicillin resistant Staphylococcus aureus (05-31-21 @ 12:14)      -  Ampicillin/Sulbactam: R 16/8      -  Cefazolin: R 8      -  Clindamycin: S <=0.25      -  Daptomycin: S 0.5      -  Erythromycin: R >4      -  Gentamicin: S <=1 Should not be used as monotherapy      -  Linezolid: S 2      -  Oxacillin: R >2      -  Penicillin: R >8      -  RIF- Rifampin: S <=1 Should not be used as monotherapy      -  Tetra/Doxy: S <=1      -  Trimethoprim/Sulfamethoxazole: S 1/19      -  Vancomycin: S 2      Method Type: DAVIDA    Culture - Other (collected 05-28-21 @ 18:32)  Source: .Other R foot DFU  Final Report (05-30-21 @ 15:44):    Moderate Methicillin resistant Staphylococcus aureus    Normal skin mary isolated  Organism: Methicillin resistant Staphylococcus aureus (05-30-21 @ 15:44)  Organism: Methicillin resistant Staphylococcus aureus (05-30-21 @ 15:44)      -  Ampicillin/Sulbactam: R 16/8      -  Cefazolin: R 16      -  Clindamycin: S <=0.25      -  Daptomycin: S 0.5      -  Erythromycin: R >4      -  Gentamicin: S <=1 Should not be used as monotherapy      -  Linezolid: S 2      -  Oxacillin: R >2      -  Penicillin: R >8      -  RIF- Rifampin: S <=1 Should not be used as monotherapy      -  Tetra/Doxy: S <=1      -  Trimethoprim/Sulfamethoxazole: S <=0.5/9.5      -  Vancomycin: S 2      Method Type: DAVIDA    Culture - Blood (collected 05-28-21 @ 18:28)  Source: .Blood Blood-Peripheral  Gram Stain (05-29-21 @ 18:30):    Growth in anaerobic bottle: Gram positive cocci in pairs    Growth in aerobic bottle: Gram positive cocci in pairs  Final Report (05-31-21 @ 15:45):    Growth in anaerobic bottle: Enterococcus faecalis    Growth in aerobic and anaerobic bottles: Methicillin resistant    Staphylococcus aureus    ***Blood Panel PCR results on this specimen are available    approximately 3 hours after the Gram stain result.***    Gram stain, PCR, and/or culture results may not always    correspond due to difference in methodologies.    ************************************************************    This PCR assay was performed by multiplex PCR. This    Assay tests for 66 bacterialand resistance gene targets.    Please refer to the [facility] test directory    at [email or web address] for details.  Organism: Blood Culture PCR  Enterococcus faecalis  Methicillin resistant Staphylococcus aureus (05-31-21 @ 15:45)  Organism: Methicillin resistant Staphylococcus aureus (05-31-21 @ 15:45)      -  Ampicillin/Sulbactam: R 16/8      -  Cefazolin: R 16      -  Clindamycin: S <=0.25      -  Daptomycin: S 0.5      -  Erythromycin: R >4      -  Gentamicin: S <=1 Should not be used as monotherapy      -  Linezolid: S 2      -  Oxacillin: R >2      -  Penicillin: R >8      -  RIF- Rifampin: S <=1 Should not be used as monotherapy      -  Tetra/Doxy: S <=1      -  Trimethoprim/Sulfamethoxazole: S <=0.5/9.5      -  Vancomycin: S 1      Method Type: DAVIDA  Organism: Enterococcus faecalis (05-31-21 @ 15:45)      -  Ampicillin: S <=2 Predicts results to ampicillin/sulbactam, amoxacillin-clavulanate and  piperacillin-tazobactam.      -  Gentamicin synergy: S      -  Vancomycin: S 1      Method Type: DAVIDA  Organism: Blood Culture PCR (05-31-21 @ 15:45)      -  Coagulase negative Staphylococcus: Detec      -  Enterococcus faecalis: Detec      Method Type: PCR    Culture - Blood (collected 05-28-21 @ 18:28)  Source: .Blood Blood-Peripheral  Gram Stain (05-30-21 @ 06:00):    Growth in aerobic bottle: Gram positive cocci in pairs    Growth in anaerobic bottle: Gram positive cocci in pairs  Final Report (05-31-21 @ 15:46):    Growth in aerobic and anaerobic bottles: Methicillin resistant    Staphylococcus aureus    See previous culture [id number]            RADIOLOGY & ADDITIONAL TESTS:  Personal review of radiological diagnostics performed  Echo and EKG results noted when applicable.     MEDICATIONS:  aspirin  chewable 81 milliGRAM(s) Oral daily  ceftaroline fosamil IVPB 200 milliGRAM(s) IV Intermittent every 12 hours  chlorhexidine 0.12% Liquid 15 milliLiter(s) Oral Mucosa two times a day  chlorhexidine 4% Liquid 1 Application(s) Topical <User Schedule>  CRRT Treatment    <Continuous>  dextrose 40% Gel 15 Gram(s) Oral once  dextrose 5%. 1000 milliLiter(s) IV Continuous <Continuous>  dextrose 5%. 1000 milliLiter(s) IV Continuous <Continuous>  dextrose 50% Injectable 25 Gram(s) IV Push once  dextrose 50% Injectable 12.5 Gram(s) IV Push once  dextrose 50% Injectable 25 Gram(s) IV Push once  fentaNYL   Infusion. 0.5 MICROgram(s)/kG/Hr IV Continuous <Continuous>  glucagon  Injectable 1 milliGRAM(s) IntraMuscular once  heparin  Infusion 1700 Unit(s)/Hr IV Continuous <Continuous>  insulin lispro (ADMELOG) corrective regimen sliding scale   SubCutaneous three times a day before meals  insulin NPH human recombinant 10 Unit(s) SubCutaneous every 12 hours  linezolid  IVPB 600 milliGRAM(s) IV Intermittent every 12 hours  metroNIDAZOLE  IVPB 500 milliGRAM(s) IV Intermittent every 8 hours  midazolam Injectable 2 milliGRAM(s) IV Push every 4 hours PRN  mupirocin 2% Nasal 1 Application(s) Nasal two times a day  norepinephrine Infusion 0.05 MICROgram(s)/kG/Min IV Continuous <Continuous>  ondansetron Injectable 4 milliGRAM(s) IV Push every 8 hours PRN  pantoprazole    Tablet 40 milliGRAM(s) Oral before breakfast  polyethylene glycol 3350 17 Gram(s) Oral daily  propofol Infusion 5.004 MICROgram(s)/kG/Min IV Continuous <Continuous>  PureFlow Dialysate RFP-400 (K 2 / Ca 3) 5000 milliLiter(s) CRRT <Continuous>  senna 2 Tablet(s) Oral at bedtime      ANTIBIOTICS:  ceftaroline fosamil IVPB 200 milliGRAM(s) IV Intermittent every 12 hours  linezolid  IVPB 600 milliGRAM(s) IV Intermittent every 12 hours  metroNIDAZOLE  IVPB 500 milliGRAM(s) IV Intermittent every 8 hours

## 2021-06-02 NOTE — DIETITIAN INITIAL EVALUATION ADULT. - ENTERAL
1. Adjust TF regimen to continuous feeds of low fat/low fiber, high protein TF formula. Recommend: Vital HP starting at 20mL/hr & adv as tolerated to GOAL RATE 50mL/hr x24H + 6packs/day Prosource TF. TF at goal rate (including propofol) will provide 2100kcal, 171g protein, 84%RDIs & 1008mL free H2O. Flushes per CCU. 2. Can bundle Prosource & provide 3packs q12H or 2packs q8H. 3. Hold feed if MAP persistently <65 or GI s/s intolerance. 1. Adjust TF regimen to continuous feeds of low fat/low fiber, high protein TF formula. Recommend: Vital HP starting at 15mL/hr & adv as tolerated to GOAL RATE 55mL/hr x24H + 7packs/day Beneprotein. TF at goal rate (including propofol & Beneprotein) will provide 2155kcal, 158g protein, 84%RDIs & 1008mL free H2O. Flushes per CCU. 2. Can bundle Beneprotein, mix 3-4packs w. 75-100cc NS & provide q12H. 3. Hold feed if MAP persistently <65 or GI s/s intolerance.

## 2021-06-02 NOTE — DIETITIAN INITIAL EVALUATION ADULT. - OTHER INFO
Pt p/w worsening chronic R DFU. s/p debridement 5/29, Cx growing Staph aureus. s/p Right Knee Disarticulation for necrotizing soft tissue infection 5/30; Cx indicated Bacteremia 2/2 Enterococcus, Coag neg Staph, and Staph Aureus. Further complicated by possible CVA, CCU upgrade 5/31. Septic shock 2/2 bacteremia. Acute hypoxic respiratroy failure 2/2 septicfk shock, toxic metabolic encephalopthy requiring intubation 5/31--sedated w. propfol & fentanyl, pressor support in place. CANDY on CKD III 2/2 sepsis induced ATN--undergoing CVVHD per Nephro. Toxic metabolic encephalopathy 2/2 sepsis & multiorgan failure. NSTEMI. Anemia of chronic disease.  h/o T2DM.

## 2021-06-02 NOTE — DIETITIAN INITIAL EVALUATION ADULT. - SIGNS/SYMPTOMS
TF +propofol meeting 82% est kcal & 22% est protein needs TRANSFER - OUT REPORT:    Verbal report given to 7[address] Leroy Yao RN(name) on [address]  being transferred to 8th floor room 815(unit) for routine progression of care       Report consisted of patients Situation, Background, Assessment and   Recommendations(SBAR). Information from the following report(s) SBAR, Kardex, ED Summary, Intake/Output, MAR, Recent Results and Cardiac Rhythm SR was reviewed with the receiving nurse. Lines:   Peripheral IV 03/06/17 Right Antecubital (Active)   Site Assessment Clean, dry, & intact 3/7/2017  6:55 AM   Phlebitis Assessment 0 3/7/2017  6:55 AM   Infiltration Assessment 0 3/7/2017  6:55 AM   Dressing Status Clean, dry, & intact 3/7/2017  6:55 AM   Dressing Type Tape;Transparent 3/7/2017  6:55 AM   Hub Color/Line Status Green; Infusing;Patent 3/7/2017  6:55 AM   Alcohol Cap Used No 3/7/2017  6:55 AM        Opportunity for questions and clarification was provided.       Patient transported with:   Registered Nurse  Tech

## 2021-06-02 NOTE — PROGRESS NOTE ADULT - SUBJECTIVE AND OBJECTIVE BOX
Patient is a 60y old  Male who presents with a chief complaint of dfu (02 Jun 2021 10:28)        Pt remains critically ill in ICU on MV         PHYSICAL EXAM    ICU Vital Signs Last 24 Hrs  T(C): 35.7 (02 Jun 2021 08:45), Max: 36.2 (01 Jun 2021 12:00)  T(F): 96.3 (02 Jun 2021 08:45), Max: 97.1 (01 Jun 2021 12:00)  HR: 80 (02 Jun 2021 10:00) (76 - 92)  BP: --  BP(mean): --  ABP: 152/46 (02 Jun 2021 10:00) (116/44 - 174/54)  ABP(mean): 74 (02 Jun 2021 10:00) (62 - 88)  RR: 26 (02 Jun 2021 10:00) (0 - 26)  SpO2: 96% (02 Jun 2021 10:00) (93% - 100%)      CONSTITUTIONAL:  Pt remains critically ill in ICU on MV     ENT:   pos et tube    EYES:   Pupils equal,   Round and reactive to light.    CARDIAC:   Normal rate,   Regular rhythm.    No edema      Vascular:  Normal systolic impulse  No Carotid bruits    RESPIRATORY:   rhonchi b/l lungs    GASTROINTESTINAL:  Abdomen soft,   Non-tender,   No guarding,   + BS    MUSCULOSKELETAL:   Range of motion is not limited,  No clubbing, cyanosis    NEUROLOGICAL:   Pt remains sedated on MV     SKIN:   Skin normal color for race,   Warm and dry and intact.   No evidence of rash.    PSYCHIATRIC:   Pt remains sedated on MV     HEMATOLOGICAL:  No cervical  lymphadenopathy.  no inguinal lymphadenopathy      06-01-21 @ 07:01  -  06-02-21 @ 07:00  --------------------------------------------------------  IN:    FentaNYL: 292 mL    FentaNYL: 1460 mL    Heparin: 17 mL    Heparin Infusion: 280 mL    IV PiggyBack: 600 mL    Norepinephrine: 441 mL    Norepinephrine: 75 mL    Oral Fluid: 60 mL    Osmolite: 240 mL    Other (mL): 2340 mL    Propofol: 90 mL    Propofol: 535 mL  Total IN: 6430 mL    OUT:    Emesis (mL): 275 mL    Indwelling Catheter - Urethral (mL): 360 mL    Other (mL): 1643 mL  Total OUT: 2278 mL    Total NET: 4152 mL      06-02-21 @ 07:01  -  06-02-21 @ 11:10  --------------------------------------------------------  IN:    FentaNYL: 219 mL    Heparin: 51 mL    Norepinephrine: 60 mL    PRBCs (Packed Red Blood Cells): 200 mL    Propofol: 75 mL  Total IN: 605 mL    OUT:    Indwelling Catheter - Urethral (mL): 315 mL    Other (mL): 303 mL  Total OUT: 618 mL    Total NET: -13 mL          LABS:                            7.5    13.90 )-----------( 187      ( 02 Jun 2021 03:00 )             24.6                                               06-02    136  |  99  |  54<H>  ----------------------------<  162<H>  4.9   |  22  |  6.1<HH>    Ca    6.9<L>      02 Jun 2021 03:00  Mg     2.7     06-02    TPro  7.0  /  Alb  2.2<L>  /  TBili  0.5  /  DBili  x   /  AST  38  /  ALT  16  /  AlkPhos  120<H>  06-02      PT/INR - ( 01 Jun 2021 01:20 )   PT: 15.50 sec;   INR: 1.35 ratio         PTT - ( 02 Jun 2021 03:00 )  PTT:60.0 sec                                           CARDIAC MARKERS ( 01 Jun 2021 04:07 )  x     / x     / 201 U/L / x     / x                                                LIVER FUNCTIONS - ( 02 Jun 2021 03:00 )  Alb: 2.2 g/dL / Pro: 7.0 g/dL / ALK PHOS: 120 U/L / ALT: 16 U/L / AST: 38 U/L / GGT: x                                                  Culture - Blood (collected 30 May 2021 22:09)  Source: .Blood Blood-Peripheral  Preliminary Report (01 Jun 2021 08:01):    No growth to date.    Culture - Blood (collected 30 May 2021 22:09)  Source: .Blood Blood-Peripheral  Preliminary Report (01 Jun 2021 08:01):    No growth to date.                                                   Mode: AC/ CMV (Assist Control/ Continuous Mandatory Ventilation)  RR (machine): 26  TV (machine): 480  FiO2: 40  PEEP: 8  ITime: 1  MAP: 18  PIP: 34                                      ABG - ( 02 Jun 2021 03:00 )  pH, Arterial: 7.37  pH, Blood: x     /  pCO2: 41    /  pO2: 158   / HCO3: 24    / Base Excess: -1.3  /  SaO2: 99                  MEDICATIONS  (STANDING):  aspirin  chewable 81 milliGRAM(s) Oral daily  ceftaroline fosamil IVPB 200 milliGRAM(s) IV Intermittent every 12 hours  chlorhexidine 0.12% Liquid 15 milliLiter(s) Oral Mucosa two times a day  chlorhexidine 4% Liquid 1 Application(s) Topical <User Schedule>  CRRT Treatment    <Continuous>  CRRT Treatment    <Continuous>  dextrose 40% Gel 15 Gram(s) Oral once  dextrose 5%. 1000 milliLiter(s) (50 mL/Hr) IV Continuous <Continuous>  dextrose 5%. 1000 milliLiter(s) (100 mL/Hr) IV Continuous <Continuous>  dextrose 50% Injectable 25 Gram(s) IV Push once  dextrose 50% Injectable 12.5 Gram(s) IV Push once  dextrose 50% Injectable 25 Gram(s) IV Push once  fentaNYL   Infusion. 0.5 MICROgram(s)/kG/Hr (8.21 mL/Hr) IV Continuous <Continuous>  glucagon  Injectable 1 milliGRAM(s) IntraMuscular once  heparin  Infusion 1700 Unit(s)/Hr (17 mL/Hr) IV Continuous <Continuous>  insulin lispro (ADMELOG) corrective regimen sliding scale   SubCutaneous three times a day before meals  insulin NPH human recombinant 10 Unit(s) SubCutaneous every 12 hours  linezolid  IVPB 600 milliGRAM(s) IV Intermittent every 12 hours  metroNIDAZOLE  IVPB 500 milliGRAM(s) IV Intermittent every 8 hours  mupirocin 2% Nasal 1 Application(s) Nasal two times a day  norepinephrine Infusion 0.05 MICROgram(s)/kG/Min (7.7 mL/Hr) IV Continuous <Continuous>  pantoprazole    Tablet 40 milliGRAM(s) Oral before breakfast  polyethylene glycol 3350 17 Gram(s) Oral daily  propofol Infusion 5.004 MICROgram(s)/kG/Min (4.93 mL/Hr) IV Continuous <Continuous>  PureFlow Dialysate RFP-400 (K 2 / Ca 3) 5000 milliLiter(s) (2000 mL/Hr) CRRT <Continuous>  PureFlow Dialysate RFP-400 (K 2 / Ca 3) 5000 milliLiter(s) (2000 mL/Hr) CRRT <Continuous>  senna 2 Tablet(s) Oral at bedtime    MEDICATIONS  (PRN):  midazolam Injectable 2 milliGRAM(s) IV Push every 4 hours PRN agitation  ondansetron Injectable 4 milliGRAM(s) IV Push every 8 hours PRN Nausea and/or Vomiting      New X-rays reviewed:                                                                                  ECHO    CXR interpreted by me:

## 2021-06-02 NOTE — PROGRESS NOTE ADULT - PROBLEM SELECTOR PLAN 4
verified HCP - hard copy in chart  Palliative Care introduced to nihardik  continue Full Code and current care management  will follow.

## 2021-06-02 NOTE — PROGRESS NOTE ADULT - SUBJECTIVE AND OBJECTIVE BOX
FERNANDEZ GUZMAN             MRN-[id number]    CC:     HPI:  Patient is a 59 y/o M with PMH of DM, hypertension, dyslipidemia, sleep apnea, Charcot foot reconstruction with external fixation, super morbid obesity present for worsening right foot infection.  Pt reports he has had this foot ulcer at various stage of healing for 13 years. Patient has been admitted for IV antibiotics multiple times in the past for osteomyelitis of right foot. He has history of MRSA infection in foot. pt has baseline neuropathy in both feet so does not feel any pain.  Patient states his Infection has been gradually getting worse despite treatment with bactrim, he saw his podiatrists and he was sent in by podiatry for IV abx . No fever, chills, cp, sob, abd pain, nausea, vomiting, dysuria, calf pain.     In ED  T(C): 37.3 (05-29-21 @ 01:31), Max: 38 (05-28-21 @ 20:27)  HR: 100 (05-29-21 @ 01:23) (99 - 118)  BP: 119/59 (05-29-21 @ 01:23) (119/59 - 139/69)  RR: 18 (05-29-21 @ 01:23) (17 - 18)  SpO2: 96% (05-29-21 @ 01:23) (86% - 100%)  there is a Right foot ulcer on the lateral aspect of the plantar surface measuring 8.5-3cm. Ulcer is surrounded by macerated tissue. Serous drainage from wound. Kurlex and sponge dressing in place, clean, dry and intact. Patient was I&D'd bedside by podiatry in the ed. purulent drainage was evacuated and sent to the lab for culture. Patient is scheduled for OR with podiatry in the AM.   (29 May 2021 02:29)      SUBJECTIVE: Patient seen and examined at bedside. He is intubated. Appears comfortable No family at bedside.     ROS:  UNABLE TO OBTAIN  due to: lethargy and intubation     PEx:  60y            General: vented sedated; NAD; morbidly obese  HEENT:  NCAT   CVS:NSR; +edema  Resp: Unlabored Non tachypneic No increased WOB  GI:  obese  :  Lovelace   Musc: No C/C/E    Neuro: sedated    Last BM: unknown       ALLERGIES:  NKDA     OPIATE NAÏVE (Y/N): n    MEDICATIONS: REVIEWED  MEDICATIONS  (STANDING):  aspirin  chewable 81 milliGRAM(s) Oral daily  ceftaroline fosamil IVPB 200 milliGRAM(s) IV Intermittent every 12 hours  chlorhexidine 0.12% Liquid 15 milliLiter(s) Oral Mucosa two times a day  chlorhexidine 4% Liquid 1 Application(s) Topical <User Schedule>  CRRT Treatment    <Continuous>  CRRT Treatment    <Continuous>  dextrose 40% Gel 15 Gram(s) Oral once  dextrose 5%. 1000 milliLiter(s) (50 mL/Hr) IV Continuous <Continuous>  dextrose 5%. 1000 milliLiter(s) (100 mL/Hr) IV Continuous <Continuous>  dextrose 50% Injectable 25 Gram(s) IV Push once  dextrose 50% Injectable 12.5 Gram(s) IV Push once  dextrose 50% Injectable 25 Gram(s) IV Push once  fentaNYL   Infusion. 0.5 MICROgram(s)/kG/Hr (8.21 mL/Hr) IV Continuous <Continuous>  glucagon  Injectable 1 milliGRAM(s) IntraMuscular once  heparin  Infusion 1700 Unit(s)/Hr (17 mL/Hr) IV Continuous <Continuous>  insulin lispro (ADMELOG) corrective regimen sliding scale   SubCutaneous three times a day before meals  insulin NPH human recombinant 10 Unit(s) SubCutaneous every 12 hours  linezolid  IVPB 600 milliGRAM(s) IV Intermittent every 12 hours  metroNIDAZOLE  IVPB 500 milliGRAM(s) IV Intermittent every 8 hours  mupirocin 2% Nasal 1 Application(s) Nasal two times a day  norepinephrine Infusion 0.05 MICROgram(s)/kG/Min (7.7 mL/Hr) IV Continuous <Continuous>  pantoprazole    Tablet 40 milliGRAM(s) Oral before breakfast  polyethylene glycol 3350 17 Gram(s) Oral daily  propofol Infusion 5.004 MICROgram(s)/kG/Min (4.93 mL/Hr) IV Continuous <Continuous>  PureFlow Dialysate RFP-400 (K 2 / Ca 3) 5000 milliLiter(s) (2000 mL/Hr) CRRT <Continuous>  PureFlow Dialysate RFP-400 (K 2 / Ca 3) 5000 milliLiter(s) (2000 mL/Hr) CRRT <Continuous>  senna 2 Tablet(s) Oral at bedtime    MEDICATIONS  (PRN):  midazolam Injectable 2 milliGRAM(s) IV Push every 4 hours PRN agitation  ondansetron Injectable 4 milliGRAM(s) IV Push every 8 hours PRN Nausea and/or Vomiting      LABS: REVIEWED  CBC:                        8.1    16.15 )-----------( 190      ( 02 Jun 2021 12:24 )             26.6     CMP:    06-02    136  |  99  |  54<H>  ----------------------------<  162<H>  4.9   |  22  |  6.1<HH>    Ca    6.9<L>      02 Jun 2021 03:00  Mg     2.7     06-02    TPro  7.0  /  Alb  2.2<L>  /  TBili  0.5  /  DBili  x   /  AST  38  /  ALT  16  /  AlkPhos  120<H>  06-02  Albumin, Serum: 2.2 g/dL (06-02-21 @ 03:00)      IMAGING: REVIEWED PERSONALLY    EKG REVIEWED PERSONALLY.     ADVANCED DIRECTIVES:            FULL CODE             DECISION MAKER: cristiane Pena  LEGAL SURROGATE: Adan Pena     PSYCHOSOCIAL-SPIRITUAL ASSESSMENT:       Reviewed       Care plan unchanged           GOALS OF CARE DISCUSSION       Palliative care info/counseling provided	           Family meeting       CURRENT DISPO PLAN:     WILL REMAIN IN HOSPITAL        REFERRALS	        Palliative Med        Unit SW/Case Mgmt

## 2021-06-02 NOTE — DIETITIAN INITIAL EVALUATION ADULT. - PERTINENT MEDS FT
heparin infusion, aspirin, abx, insulin, SSI, fentanyl, propofol @25mL/hr (660kcal), levophed, miralax, senna, zofran, protonix

## 2021-06-03 LAB
ALBUMIN SERPL ELPH-MCNC: 1.9 G/DL — LOW (ref 3.5–5.2)
ALBUMIN SERPL ELPH-MCNC: 2.2 G/DL — LOW (ref 3.5–5.2)
ALP SERPL-CCNC: 103 U/L — SIGNIFICANT CHANGE UP (ref 30–115)
ALP SERPL-CCNC: 99 U/L — SIGNIFICANT CHANGE UP (ref 30–115)
ALT FLD-CCNC: 12 U/L — SIGNIFICANT CHANGE UP (ref 0–41)
ALT FLD-CCNC: 15 U/L — SIGNIFICANT CHANGE UP (ref 0–41)
ANION GAP SERPL CALC-SCNC: 13 MMOL/L — SIGNIFICANT CHANGE UP (ref 7–14)
ANION GAP SERPL CALC-SCNC: 8 MMOL/L — SIGNIFICANT CHANGE UP (ref 7–14)
APTT BLD: 42.1 SEC — HIGH (ref 27–39.2)
APTT BLD: 73.4 SEC — CRITICAL HIGH (ref 27–39.2)
AST SERPL-CCNC: 22 U/L — SIGNIFICANT CHANGE UP (ref 0–41)
AST SERPL-CCNC: 28 U/L — SIGNIFICANT CHANGE UP (ref 0–41)
BASE EXCESS BLDA CALC-SCNC: -0.3 MMOL/L — SIGNIFICANT CHANGE UP (ref -2–2)
BASOPHILS # BLD AUTO: 0.04 K/UL — SIGNIFICANT CHANGE UP (ref 0–0.2)
BASOPHILS NFR BLD AUTO: 0.3 % — SIGNIFICANT CHANGE UP (ref 0–1)
BILIRUB SERPL-MCNC: 0.3 MG/DL — SIGNIFICANT CHANGE UP (ref 0.2–1.2)
BILIRUB SERPL-MCNC: 0.3 MG/DL — SIGNIFICANT CHANGE UP (ref 0.2–1.2)
BUN SERPL-MCNC: 27 MG/DL — HIGH (ref 10–20)
BUN SERPL-MCNC: 30 MG/DL — HIGH (ref 10–20)
CA-I BLD-SCNC: 0.98 MMOL/L — LOW (ref 1.12–1.3)
CALCIUM SERPL-MCNC: 7.5 MG/DL — LOW (ref 8.5–10.1)
CALCIUM SERPL-MCNC: 7.6 MG/DL — LOW (ref 8.5–10.1)
CHLORIDE SERPL-SCNC: 101 MMOL/L — SIGNIFICANT CHANGE UP (ref 98–110)
CHLORIDE SERPL-SCNC: 99 MMOL/L — SIGNIFICANT CHANGE UP (ref 98–110)
CO2 SERPL-SCNC: 20 MMOL/L — SIGNIFICANT CHANGE UP (ref 17–32)
CO2 SERPL-SCNC: 25 MMOL/L — SIGNIFICANT CHANGE UP (ref 17–32)
CREAT SERPL-MCNC: 2.2 MG/DL — HIGH (ref 0.7–1.5)
CREAT SERPL-MCNC: 2.5 MG/DL — HIGH (ref 0.7–1.5)
CULTURE RESULTS: SIGNIFICANT CHANGE UP
EOSINOPHIL # BLD AUTO: 0.74 K/UL — HIGH (ref 0–0.7)
EOSINOPHIL NFR BLD AUTO: 6.1 % — SIGNIFICANT CHANGE UP (ref 0–8)
EOSINOPHIL NFR URNS MANUAL: NEGATIVE — SIGNIFICANT CHANGE UP
GAS PNL BLDA: SIGNIFICANT CHANGE UP
GLUCOSE BLDC GLUCOMTR-MCNC: 225 MG/DL — HIGH (ref 70–99)
GLUCOSE BLDC GLUCOMTR-MCNC: 226 MG/DL — HIGH (ref 70–99)
GLUCOSE BLDC GLUCOMTR-MCNC: 259 MG/DL — HIGH (ref 70–99)
GLUCOSE BLDC GLUCOMTR-MCNC: 269 MG/DL — HIGH (ref 70–99)
GLUCOSE SERPL-MCNC: 236 MG/DL — HIGH (ref 70–99)
GLUCOSE SERPL-MCNC: 246 MG/DL — HIGH (ref 70–99)
HCO3 BLDA-SCNC: 25 MMOL/L — SIGNIFICANT CHANGE UP (ref 23–27)
HCT VFR BLD CALC: 24 % — LOW (ref 42–52)
HCT VFR BLD CALC: 26.3 % — LOW (ref 42–52)
HGB BLD-MCNC: 7.4 G/DL — LOW (ref 14–18)
HGB BLD-MCNC: 8.2 G/DL — LOW (ref 14–18)
HOROWITZ INDEX BLDA+IHG-RTO: 40 — SIGNIFICANT CHANGE UP
IMM GRANULOCYTES NFR BLD AUTO: 2.4 % — HIGH (ref 0.1–0.3)
KAPPA LC SER QL IFE: 41.32 MG/DL — HIGH (ref 0.33–1.94)
KAPPA/LAMBDA FREE LIGHT CHAIN RATIO, SERUM: 1.79 RATIO — HIGH (ref 0.26–1.65)
LACTATE SERPL-SCNC: 1.1 MMOL/L — SIGNIFICANT CHANGE UP (ref 0.7–2)
LAMBDA LC SER QL IFE: 23.14 MG/DL — HIGH (ref 0.57–2.63)
LYMPHOCYTES # BLD AUTO: 1.42 K/UL — SIGNIFICANT CHANGE UP (ref 1.2–3.4)
LYMPHOCYTES # BLD AUTO: 11.6 % — LOW (ref 20.5–51.1)
MAGNESIUM SERPL-MCNC: 2.1 MG/DL — SIGNIFICANT CHANGE UP (ref 1.8–2.4)
MAGNESIUM SERPL-MCNC: 2.1 MG/DL — SIGNIFICANT CHANGE UP (ref 1.8–2.4)
MCHC RBC-ENTMCNC: 26.2 PG — LOW (ref 27–31)
MCHC RBC-ENTMCNC: 26.5 PG — LOW (ref 27–31)
MCHC RBC-ENTMCNC: 30.8 G/DL — LOW (ref 32–37)
MCHC RBC-ENTMCNC: 31.2 G/DL — LOW (ref 32–37)
MCV RBC AUTO: 85.1 FL — SIGNIFICANT CHANGE UP (ref 80–94)
MCV RBC AUTO: 85.1 FL — SIGNIFICANT CHANGE UP (ref 80–94)
MONOCYTES # BLD AUTO: 0.93 K/UL — HIGH (ref 0.1–0.6)
MONOCYTES NFR BLD AUTO: 7.6 % — SIGNIFICANT CHANGE UP (ref 1.7–9.3)
NEUTROPHILS # BLD AUTO: 8.77 K/UL — HIGH (ref 1.4–6.5)
NEUTROPHILS NFR BLD AUTO: 72 % — SIGNIFICANT CHANGE UP (ref 42.2–75.2)
NRBC # BLD: 0 /100 WBCS — SIGNIFICANT CHANGE UP (ref 0–0)
NRBC # BLD: 0 /100 WBCS — SIGNIFICANT CHANGE UP (ref 0–0)
ORGANISM # SPEC MICROSCOPIC CNT: SIGNIFICANT CHANGE UP
PCO2 BLDA: 42 MMHG — SIGNIFICANT CHANGE UP (ref 38–42)
PH BLDA: 7.38 — SIGNIFICANT CHANGE UP (ref 7.38–7.42)
PHOSPHATE SERPL-MCNC: 3.5 MG/DL — SIGNIFICANT CHANGE UP (ref 2.1–4.9)
PHOSPHATE SERPL-MCNC: 3.9 MG/DL — SIGNIFICANT CHANGE UP (ref 2.1–4.9)
PLATELET # BLD AUTO: 169 K/UL — SIGNIFICANT CHANGE UP (ref 130–400)
PLATELET # BLD AUTO: 213 K/UL — SIGNIFICANT CHANGE UP (ref 130–400)
PO2 BLDA: 110 MMHG — HIGH (ref 78–95)
POTASSIUM SERPL-MCNC: 4.2 MMOL/L — SIGNIFICANT CHANGE UP (ref 3.5–5)
POTASSIUM SERPL-MCNC: 4.5 MMOL/L — SIGNIFICANT CHANGE UP (ref 3.5–5)
POTASSIUM SERPL-SCNC: 4.2 MMOL/L — SIGNIFICANT CHANGE UP (ref 3.5–5)
POTASSIUM SERPL-SCNC: 4.5 MMOL/L — SIGNIFICANT CHANGE UP (ref 3.5–5)
PROT SERPL-MCNC: 6.8 G/DL — SIGNIFICANT CHANGE UP (ref 6–8)
PROT SERPL-MCNC: 6.9 G/DL — SIGNIFICANT CHANGE UP (ref 6–8)
RBC # BLD: 2.82 M/UL — LOW (ref 4.7–6.1)
RBC # BLD: 3.09 M/UL — LOW (ref 4.7–6.1)
RBC # FLD: 16.9 % — HIGH (ref 11.5–14.5)
RBC # FLD: 17.2 % — HIGH (ref 11.5–14.5)
SAO2 % BLDA: 98 % — SIGNIFICANT CHANGE UP (ref 94–98)
SODIUM SERPL-SCNC: 132 MMOL/L — LOW (ref 135–146)
SODIUM SERPL-SCNC: 134 MMOL/L — LOW (ref 135–146)
SPECIMEN SOURCE: SIGNIFICANT CHANGE UP
WBC # BLD: 12.19 K/UL — HIGH (ref 4.8–10.8)
WBC # BLD: 14.46 K/UL — HIGH (ref 4.8–10.8)
WBC # FLD AUTO: 12.19 K/UL — HIGH (ref 4.8–10.8)
WBC # FLD AUTO: 14.46 K/UL — HIGH (ref 4.8–10.8)

## 2021-06-03 PROCEDURE — 99291 CRITICAL CARE FIRST HOUR: CPT

## 2021-06-03 PROCEDURE — 74018 RADEX ABDOMEN 1 VIEW: CPT | Mod: 26

## 2021-06-03 PROCEDURE — 99233 SBSQ HOSP IP/OBS HIGH 50: CPT

## 2021-06-03 PROCEDURE — 71045 X-RAY EXAM CHEST 1 VIEW: CPT | Mod: 26

## 2021-06-03 PROCEDURE — 71045 X-RAY EXAM CHEST 1 VIEW: CPT | Mod: 26,77

## 2021-06-03 RX ORDER — NOREPINEPHRINE BITARTRATE/D5W 8 MG/250ML
0.05 PLASTIC BAG, INJECTION (ML) INTRAVENOUS
Qty: 16 | Refills: 0 | Status: DISCONTINUED | OUTPATIENT
Start: 2021-06-03 | End: 2021-06-11

## 2021-06-03 RX ORDER — ALBUTEROL 90 UG/1
1 AEROSOL, METERED ORAL ONCE
Refills: 0 | Status: DISCONTINUED | OUTPATIENT
Start: 2021-06-03 | End: 2021-06-11

## 2021-06-03 RX ORDER — HUMAN INSULIN 100 [IU]/ML
12 INJECTION, SUSPENSION SUBCUTANEOUS EVERY 12 HOURS
Refills: 0 | Status: DISCONTINUED | OUTPATIENT
Start: 2021-06-03 | End: 2021-06-04

## 2021-06-03 RX ORDER — CALCIUM GLUCONATE 100 MG/ML
1 VIAL (ML) INTRAVENOUS ONCE
Refills: 0 | Status: COMPLETED | OUTPATIENT
Start: 2021-06-03 | End: 2021-06-03

## 2021-06-03 RX ORDER — POLYETHYLENE GLYCOL 3350 17 G/17G
17 POWDER, FOR SOLUTION ORAL DAILY
Refills: 0 | Status: DISCONTINUED | OUTPATIENT
Start: 2021-06-03 | End: 2021-06-03

## 2021-06-03 RX ORDER — LACTULOSE 10 G/15ML
15 SOLUTION ORAL ONCE
Refills: 0 | Status: COMPLETED | OUTPATIENT
Start: 2021-06-03 | End: 2021-06-04

## 2021-06-03 RX ORDER — TIOTROPIUM BROMIDE 18 UG/1
1 CAPSULE ORAL; RESPIRATORY (INHALATION) ONCE
Refills: 0 | Status: DISCONTINUED | OUTPATIENT
Start: 2021-06-03 | End: 2021-06-11

## 2021-06-03 RX ORDER — IPRATROPIUM/ALBUTEROL SULFATE 18-103MCG
3 AEROSOL WITH ADAPTER (GRAM) INHALATION ONCE
Refills: 0 | Status: DISCONTINUED | OUTPATIENT
Start: 2021-06-03 | End: 2021-06-03

## 2021-06-03 RX ORDER — HEPARIN SODIUM 5000 [USP'U]/ML
5000 INJECTION INTRAVENOUS; SUBCUTANEOUS EVERY 8 HOURS
Refills: 0 | Status: DISCONTINUED | OUTPATIENT
Start: 2021-06-03 | End: 2021-06-05

## 2021-06-03 RX ORDER — ACETAMINOPHEN 500 MG
650 TABLET ORAL ONCE
Refills: 0 | Status: COMPLETED | OUTPATIENT
Start: 2021-06-03 | End: 2021-06-03

## 2021-06-03 RX ADMIN — CEFTAROLINE FOSAMIL 50 MILLIGRAM(S): 600 POWDER, FOR SOLUTION INTRAVENOUS at 17:12

## 2021-06-03 RX ADMIN — FENTANYL CITRATE 8.21 MICROGRAM(S)/KG/HR: 50 INJECTION INTRAVENOUS at 17:12

## 2021-06-03 RX ADMIN — PANTOPRAZOLE SODIUM 40 MILLIGRAM(S): 20 TABLET, DELAYED RELEASE ORAL at 05:36

## 2021-06-03 RX ADMIN — PROPOFOL 4.93 MICROGRAM(S)/KG/MIN: 10 INJECTION, EMULSION INTRAVENOUS at 17:35

## 2021-06-03 RX ADMIN — HEPARIN SODIUM 5000 UNIT(S): 5000 INJECTION INTRAVENOUS; SUBCUTANEOUS at 13:50

## 2021-06-03 RX ADMIN — PROPOFOL 4.93 MICROGRAM(S)/KG/MIN: 10 INJECTION, EMULSION INTRAVENOUS at 05:33

## 2021-06-03 RX ADMIN — POLYETHYLENE GLYCOL 3350 17 GRAM(S): 17 POWDER, FOR SOLUTION ORAL at 11:26

## 2021-06-03 RX ADMIN — Medication 4: at 17:13

## 2021-06-03 RX ADMIN — Medication 650 MILLIGRAM(S): at 22:55

## 2021-06-03 RX ADMIN — MUPIROCIN 1 APPLICATION(S): 20 OINTMENT TOPICAL at 17:14

## 2021-06-03 RX ADMIN — FENTANYL CITRATE 8.21 MICROGRAM(S)/KG/HR: 50 INJECTION INTRAVENOUS at 06:49

## 2021-06-03 RX ADMIN — Medication 81 MILLIGRAM(S): at 11:25

## 2021-06-03 RX ADMIN — PROPOFOL 4.93 MICROGRAM(S)/KG/MIN: 10 INJECTION, EMULSION INTRAVENOUS at 12:00

## 2021-06-03 RX ADMIN — Medication 6: at 06:49

## 2021-06-03 RX ADMIN — Medication 100 MILLIGRAM(S): at 05:00

## 2021-06-03 RX ADMIN — LINEZOLID 300 MILLIGRAM(S): 600 INJECTION, SOLUTION INTRAVENOUS at 17:12

## 2021-06-03 RX ADMIN — Medication 6: at 11:45

## 2021-06-03 RX ADMIN — FENTANYL CITRATE 8.21 MICROGRAM(S)/KG/HR: 50 INJECTION INTRAVENOUS at 14:16

## 2021-06-03 RX ADMIN — CHLORHEXIDINE GLUCONATE 15 MILLILITER(S): 213 SOLUTION TOPICAL at 05:36

## 2021-06-03 RX ADMIN — SENNA PLUS 2 TABLET(S): 8.6 TABLET ORAL at 21:13

## 2021-06-03 RX ADMIN — HEPARIN SODIUM 17 UNIT(S)/HR: 5000 INJECTION INTRAVENOUS; SUBCUTANEOUS at 05:32

## 2021-06-03 RX ADMIN — HUMAN INSULIN 10 UNIT(S): 100 INJECTION, SUSPENSION SUBCUTANEOUS at 06:47

## 2021-06-03 RX ADMIN — Medication 100 MILLIGRAM(S): at 13:50

## 2021-06-03 RX ADMIN — CHLORHEXIDINE GLUCONATE 15 MILLILITER(S): 213 SOLUTION TOPICAL at 17:12

## 2021-06-03 RX ADMIN — HUMAN INSULIN 12 UNIT(S): 100 INJECTION, SUSPENSION SUBCUTANEOUS at 17:13

## 2021-06-03 RX ADMIN — LINEZOLID 300 MILLIGRAM(S): 600 INJECTION, SOLUTION INTRAVENOUS at 05:34

## 2021-06-03 RX ADMIN — Medication 100 GRAM(S): at 09:47

## 2021-06-03 RX ADMIN — HEPARIN SODIUM 5000 UNIT(S): 5000 INJECTION INTRAVENOUS; SUBCUTANEOUS at 21:13

## 2021-06-03 RX ADMIN — FENTANYL CITRATE 8.21 MICROGRAM(S)/KG/HR: 50 INJECTION INTRAVENOUS at 03:00

## 2021-06-03 RX ADMIN — FENTANYL CITRATE 8.21 MICROGRAM(S)/KG/HR: 50 INJECTION INTRAVENOUS at 20:46

## 2021-06-03 RX ADMIN — MUPIROCIN 1 APPLICATION(S): 20 OINTMENT TOPICAL at 05:37

## 2021-06-03 RX ADMIN — FENTANYL CITRATE 8.21 MICROGRAM(S)/KG/HR: 50 INJECTION INTRAVENOUS at 11:26

## 2021-06-03 RX ADMIN — Medication 100 MILLIGRAM(S): at 21:14

## 2021-06-03 RX ADMIN — CHLORHEXIDINE GLUCONATE 1 APPLICATION(S): 213 SOLUTION TOPICAL at 05:35

## 2021-06-03 RX ADMIN — CEFTAROLINE FOSAMIL 50 MILLIGRAM(S): 600 POWDER, FOR SOLUTION INTRAVENOUS at 05:33

## 2021-06-03 NOTE — PROGRESS NOTE ADULT - ASSESSMENT
60yMale  DM, hypertension, dyslipidemia, sleep apnea, Charcot foot reconstruction with external fixation, super morbid obesity presented for worsening right footinfection; since admission -  Acute Hypoxic Hypercarbic Respiratory Failure, patient is on ventilator. N olonger on CVVH  No family at bedside.     Morphine Equivalent Daily Dose (MEDD) on fent gtt      See Recs below. D/W primary team and bedside RN     Please call or [phone]  with questions or concerns.   We will continue to follow.

## 2021-06-03 NOTE — PROGRESS NOTE ADULT - ASSESSMENT
1. Acute Hypoxic Hypercarbic Respiratory Failure: secondary to septic shock, Toxic Metabolic encerphalopathy, continue on lung protective mechanical ventilation for now, will wean FiO2 for SpO2 greater then 92%,  tx nebs, pulmonary toilet, continue supportive care monitor for now.     2. Septic Shock: secondary to Bacteremia from diabetic foot ulcer, will continue empiric abx as per ID, continue levophed gtt will wean for maps greater then 65, continue monitor for now.     3. Diabetic Foot Ulcer: s/p debridement growing Staph aureus, s/p Right Knee Disarticulation for necrotizing soft tissue infection of right lower extremity by Vascular Surgery  for proper source control, continue supportive care, monitor for now.  Continue tx as per ID.    4. Bacteremia: secondary to Enterococcus, Coag neg Staph, and Staph Aureus most likley from Diabetic foot Ulcer, continue empiric abx as per ID, f/u Cx's.    5. CANDY on CKD: secondary to sepsis induced ATN,  on renal replacement therapy as per nephrology attempt to wean off , continue supportive care, monitor for now.     6. Toxic Metabolic Encephelopathy: secondary to sepsis and multiorgan failure, continue monitor for now, tx supportively CT head neg for acute pathology. Less likely to be acute CVA, f/u repeat CT head once stable.    7. DM: tx NPH BID tx inuslin sliding scale, monitor BSG's, continue supportive care.     8. HTN: continue supportive care, monitor for now. Hold oral meds.    9. HLD: tx statin, continue supportive care, monitor for now.     10. MATA/ OHS: continue supportive care, monitor for now on MV.    11. Morbid obesity: continue supportive care, monitor for now.     12. NSTEMI: secondary to sepsis induced demand ischemia, less likely true ACS, continue supportive care for now, Monitor for now.     13. Anemia: secondary to Anemia of chronic disease will transfuse as needed for HGB less then 7, monitor for now.     14. DVT/GI px: tx PPI and hep subcut, check lower ext US.    15. NUT: tx Tf's.    16. Diastolic CHF: continue supportive care, monitor for now.     Critical Care Time not including procedures 65 mins

## 2021-06-03 NOTE — PROGRESS NOTE ADULT - ASSESSMENT
61 y/o M with PMH of DM, hypertension, dyslipidemia, sleep apnea, Charcot foot reconstruction with external fixation, morbid obesity presenting to the hospital on 5/29 with worsening foot ulcer.  s/p Right Knee Disarticulation for necrotizing soft tissue infection of right lower extremity          ·	CANDY on CKD ( unknown stage last known creat 1.4 in 11/2019) / rule out ATN / obstructive uropathy/ sp CT angio (too early for FLO)/ doubt IRGN (infection related GN)/ doubt AIN (multiple antibx courses)/ high bicarb/chr resp acidosis  ·	non oliguric now / creat better now/ Hold CVVHD but keep U dall for 24h   ·	if UO decreases consider diuretics   ·	renal ultrasound noted w/o hydronephrosis, s/p terrazas catheter placement   ·	UA noted, minimal proteinuria    ·	appreciate ID f/u, on ceftarolien flagyl and zyvox  ·	sp debridement of DFU as per podiatry  ·	C3 C4 wnl SPEP polyclonal gammopathy / K/L noted expected for CANDY   ·	check  urine eosinophils   ·	follow IP and Ca   ·	d/w ICU team     will follow

## 2021-06-03 NOTE — PROGRESS NOTE ADULT - SUBJECTIVE AND OBJECTIVE BOX
FERNANDEZ GUZMAN             MRN-[id number]    CC:     HPI:  Patient is a 61 y/o M with PMH of DM, hypertension, dyslipidemia, sleep apnea, Charcot foot reconstruction with external fixation, super morbid obesity present for worsening right foot infection.  Pt reports he has had this foot ulcer at various stage of healing for 13 years. Patient has been admitted for IV antibiotics multiple times in the past for osteomyelitis of right foot. He has history of MRSA infection in foot. pt has baseline neuropathy in both feet so does not feel any pain.  Patient states his Infection has been gradually getting worse despite treatment with bactrim, he saw his podiatrists and he was sent in by podiatry for IV abx . No fever, chills, cp, sob, abd pain, nausea, vomiting, dysuria, calf pain.     In ED  T(C): 37.3 (05-29-21 @ 01:31), Max: 38 (05-28-21 @ 20:27)  HR: 100 (05-29-21 @ 01:23) (99 - 118)  BP: 119/59 (05-29-21 @ 01:23) (119/59 - 139/69)  RR: 18 (05-29-21 @ 01:23) (17 - 18)  SpO2: 96% (05-29-21 @ 01:23) (86% - 100%)  there is a Right foot ulcer on the lateral aspect of the plantar surface measuring 8.5-3cm. Ulcer is surrounded by macerated tissue. Serous drainage from wound. Kurlex and sponge dressing in place, clean, dry and intact. Patient was I&D'd bedside by podiatry in the ed. purulent drainage was evacuated and sent to the lab for culture. Patient is scheduled for OR with podiatry in the AM.   (29 May 2021 02:29)      SUBJECTIVE: unable to participate in exam/ remains vented/sedated  interim event CVVH stopped   Appears comfortable No family at bedside.     ROS:  UNABLE TO OBTAIN  due to: l unable to participate in exam/ remains vented/sedated    PEx:  60y            General: vented sedated; NAD; morbidly obese  HEENT:  NCAT   CVS:NSR; +edema  Resp: Unlabored Non tachypneic No increased WOB  GI:  obese  :  Lovelace   Musc: No C/C/E    Neuro: sedated    Last BM: no stool documented; will need to follow-u      ALLERGIES:  NKDA     OPIATE NAÏVE (Y/N): n    Vital Signs Last 24 Hrs  T(C): 36.3 (03 Jun 2021 12:00), Max: 36.3 (03 Jun 2021 12:00)  T(F): 97.3 (03 Jun 2021 12:00), Max: 97.3 (03 Jun 2021 12:00)  HR: 80 (03 Jun 2021 15:00) (78 - 96)  BP: 159/63 (03 Jun 2021 00:00) (159/63 - 159/63)  BP(mean): 87 (03 Jun 2021 00:00) (87 - 87)  RR: 26 (03 Jun 2021 15:00) (25 - 27)  SpO2: 100% (03 Jun 2021 15:00) (96% - 100%)    Labs    06-03    134<L>  |  101  |  27<H>  ----------------------------<  246<H>  4.2   |  25  |  2.2<H> improving    Ca    7.6<L>      03 Jun 2021 12:00  Phos  3.5     06-03  Mg     2.1     06-03    TPro  6.8  /  Alb  1.9<L>  /  TBili  0.3  /  DBili  x   /  AST  22  /  ALT  12  /  AlkPhos  103  06-03                            7.4    12.19 )-----------( 169      ( 03 Jun 2021 12:00 )             24.0       Medications    MEDICATIONS  (STANDING):  aspirin  chewable 81 milliGRAM(s) Oral daily  ceftaroline fosamil IVPB 200 milliGRAM(s) IV Intermittent every 12 hours  chlorhexidine 0.12% Liquid 15 milliLiter(s) Oral Mucosa two times a day  chlorhexidine 4% Liquid 1 Application(s) Topical <User Schedule>  dextrose 40% Gel 15 Gram(s) Oral once  dextrose 5%. 1000 milliLiter(s) (50 mL/Hr) IV Continuous <Continuous>  dextrose 5%. 1000 milliLiter(s) (100 mL/Hr) IV Continuous <Continuous>  dextrose 50% Injectable 25 Gram(s) IV Push once  dextrose 50% Injectable 12.5 Gram(s) IV Push once  dextrose 50% Injectable 25 Gram(s) IV Push once  fentaNYL   Infusion. 0.5 MICROgram(s)/kG/Hr (8.21 mL/Hr) IV Continuous <Continuous>  glucagon  Injectable 1 milliGRAM(s) IntraMuscular once  heparin   Injectable 5000 Unit(s) SubCutaneous every 8 hours  insulin lispro (ADMELOG) corrective regimen sliding scale   SubCutaneous three times a day before meals  insulin NPH human recombinant 12 Unit(s) SubCutaneous every 12 hours  linezolid  IVPB 600 milliGRAM(s) IV Intermittent every 12 hours  metroNIDAZOLE  IVPB 500 milliGRAM(s) IV Intermittent every 8 hours  mupirocin 2% Nasal 1 Application(s) Nasal two times a day  norepinephrine Infusion 0.05 MICROgram(s)/kG/Min (7.7 mL/Hr) IV Continuous <Continuous>  pantoprazole    Tablet 40 milliGRAM(s) Oral before breakfast  polyethylene glycol 3350 17 Gram(s) Oral daily  propofol Infusion 5.004 MICROgram(s)/kG/Min (4.93 mL/Hr) IV Continuous <Continuous>  senna 2 Tablet(s) Oral at bedtime    MEDICATIONS  (PRN):      ADVANCED DIRECTIVES:            FULL CODE             DECISION MAKER: cristiane Truong  LEGAL SURROGATE: Adan Truong     PSYCHOSOCIAL-SPIRITUAL ASSESSMENT:       Reviewed       Care plan unchanged           GOALS OF CARE DISCUSSION          Pal care introduced 6/1 and remains available PRN       CURRENT DISPO PLAN:     WILL REMAIN IN HOSPITAL        REFERRALS	        Palliative Med        Unit SW/Case Mgmt

## 2021-06-03 NOTE — PROGRESS NOTE ADULT - ASSESSMENT
59 y/o M with PMH of DM, hypertension, dyslipidemia, sleep apnea, Charcot foot reconstruction with external fixation, morbid obesity presented for worsening right foot infection.     #DFU with Hx of MRSA infection  # Septic shock   # Acute hypoxic respiratory failure secondary to septic shock  # Toxic metabolic encephalopathy   - s/p intubation 5/31   - bacteremia from right foot abscess   - septic on admission, WBC 12,  + lactate 2.3. s/p cefepime flagyl and vanc in the ED  - Podiatry on board, f/u   - S/p disarticulation right knee 5/31 w/ vascular   - f/u vascular   - ID following, f/u recs:  -linezolid 600 mg iv q12h  -Ceftaroline 200 mg iv q12h  -flagyl 500 mg iv q8h   -ECHO  - f/u cultures   - Echo 3/31: EF 55-60%, G2DD     #?Code stroke for possible CVA   - ptnt was code stroke on floors for AMS and confusion  - upgraded to CCU   - metabolic encephalopathy secondary to sepsis more likely than stroke  -  CT head neg for acute pathology, CTA w/ moderate stenoses at the junction of the precavernous and cavernous segments of both internal carotid arteries  - neuro recs for brain MR, can get repeat CT head when stable    # NSTEMI  - secondary to sepsis induced demand ischemia  - less likely true ACS  -monitor     #CANDY on CKD III  - secondary to sepsis induced ATN, will need renal replacement therapy as per nephrology, continue supportive care, monitor for now.   -creatinine 3.0 on admission, baseline around 1.4-1.8   - nephro following   - s/p CVVHD 6/1 and 6/2, holding CVVHD today     #anemia of chronic disease   - transfuse Hb < 7  - monitor     #Diabetes Mellitus  -on insulin regimen   - a1c 8.9   -Monitor      #Hypertension  -holding oral meds     #Dyslipidemia  -c/w home meds    #MATA/ OHS    #morbid obesity    Diet: npo w/ tube feeds   DVT ppx: heparin gtt  GI ppx: protonix   Dispo: acute

## 2021-06-03 NOTE — PROGRESS NOTE ADULT - SUBJECTIVE AND OBJECTIVE BOX
SUBJECTIVE:    Patient is a 60y old Male who presents with a chief complaint of dfu (03 Jun 2021 10:20)    Currently admitted to medicine with the primary diagnosis of Diabetic infection of right foot       Today is hospital day 5d. This morning he is resting comfortably in bed and reports no new issues or overnight events. No fevers, chills. Denies CP or SOB. No N/V/D. Has been eating well. No dysuria. Last BM was. Adequate sleep. Ambulating.      PAST MEDICAL & SURGICAL HISTORY  MATA on CPAP    DM (diabetes mellitus)    HTN (hypertension)    High cholesterol    Charcot ankle, right    History of percutaneous angioplasty    H/O skin graft    Left toe amputee  4 TOES    Diabetic Charcot foot  Fixation with external device      SOCIAL HISTORY:  Negative for smoking/alcohol/drug use.     ALLERGIES:  No Known Allergies    MEDICATIONS:  STANDING MEDICATIONS  aspirin  chewable 81 milliGRAM(s) Oral daily  ceftaroline fosamil IVPB 200 milliGRAM(s) IV Intermittent every 12 hours  chlorhexidine 0.12% Liquid 15 milliLiter(s) Oral Mucosa two times a day  chlorhexidine 4% Liquid 1 Application(s) Topical <User Schedule>  CRRT Treatment    <Continuous>  dextrose 40% Gel 15 Gram(s) Oral once  dextrose 5%. 1000 milliLiter(s) IV Continuous <Continuous>  dextrose 5%. 1000 milliLiter(s) IV Continuous <Continuous>  dextrose 50% Injectable 25 Gram(s) IV Push once  dextrose 50% Injectable 12.5 Gram(s) IV Push once  dextrose 50% Injectable 25 Gram(s) IV Push once  fentaNYL   Infusion. 0.5 MICROgram(s)/kG/Hr IV Continuous <Continuous>  glucagon  Injectable 1 milliGRAM(s) IntraMuscular once  heparin   Injectable 5000 Unit(s) SubCutaneous every 8 hours  insulin lispro (ADMELOG) corrective regimen sliding scale   SubCutaneous three times a day before meals  insulin NPH human recombinant 12 Unit(s) SubCutaneous every 12 hours  linezolid  IVPB 600 milliGRAM(s) IV Intermittent every 12 hours  metroNIDAZOLE  IVPB 500 milliGRAM(s) IV Intermittent every 8 hours  mupirocin 2% Nasal 1 Application(s) Nasal two times a day  norepinephrine Infusion 0.05 MICROgram(s)/kG/Min IV Continuous <Continuous>  pantoprazole    Tablet 40 milliGRAM(s) Oral before breakfast  polyethylene glycol 3350 17 Gram(s) Oral daily  propofol Infusion 5.004 MICROgram(s)/kG/Min IV Continuous <Continuous>  PureFlow Dialysate RFP-400 (K 2 / Ca 3) 5000 milliLiter(s) CRRT <Continuous>  senna 2 Tablet(s) Oral at bedtime    PRN MEDICATIONS    VITALS:   T(F): 96.7  HR: 82  BP: 159/63  RR: 26  SpO2: 100%    PHYSICAL EXAM:  GEN: Intubated, sedated   LUNGS: Decreased breath sounds bilaterally   HEART: S1/S2 present   ABD: Soft, bowel sounds present.  EXT: right lower extremity bandaged   NEURO: non focal     Lovelace present       LABS:                        8.2    14.46 )-----------( 213      ( 03 Jun 2021 03:50 )             26.3     06-03    132<L>  |  99  |  30<H>  ----------------------------<  236<H>  4.5   |  20  |  2.5<H>    Ca    7.5<L>      03 Jun 2021 03:50  Phos  3.9     06-03  Mg     2.1     06-03    TPro  6.9  /  Alb  2.2<L>  /  TBili  0.3  /  DBili  x   /  AST  28  /  ALT  15  /  AlkPhos  99  06-03    PTT - ( 03 Jun 2021 03:50 )  PTT:73.4 sec    ABG - ( 03 Jun 2021 04:40 )  pH, Arterial: 7.38  pH, Blood: x     /  pCO2: 42    /  pO2: 110   / HCO3: 25    / Base Excess: -0.3  /  SaO2: 98        Lactate, Blood: 1.1 mmol/L (06-03-21 @ 03:50)        RADIOLOGY:  < from: Xray Chest 1 View- PORTABLE-Routine (Xray Chest 1 View- PORTABLE-Routine in AM.) (06.03.21 @ 05:52) >  EXAM:  XR CHEST PORTABLE ROUTINE 1V          PROCEDURE DATE:  06/03/2021      INTERPRETATION:  Clinical History / Reason for exam: Shortness of breath    Comparison : Chest radiograph June 2, 2021.    Technique/Positioning: Frontal chest radiograph.    Findings:    Support devices: Stable ET tube, enteric tube, right and left IJ central venous catheters.    Cardiac/mediastinum/hilum: Unchanged.    Lung parenchyma/Pleura: Pulmonary vascular congestion and stable bibasilar opacities/effusions.    Skeleton/soft tissues: Unchanged.    Impression:    Unchanged pulmonary vascular congestion and bibasilar opacities/effusions.  Stable support devices as above.      BOGDAN TREVINO MD; Attending Radiologist  This document has been electronically signed. Juan F  3 2021  9:06AM    < end of copied text >      < from: [facility] Duplex Lower Ext Vein Scan, Bilat (06.02.21 @ 10:23) >  EXAM:  DUPLEX SCAN EXT VEINS LOWER BI          PROCEDURE DATE:  06/02/2021      INTERPRETATION:  CLINICAL INFORMATION: 60-year-old male with shortness of breath    TECHNIQUE: Duplex sonography of the BILATERAL LOWER extremity veins with color and spectral Doppler, with and without compression.    FINDINGS:    RIGHT:  Normal compressibility of the RIGHT common femoral, femoral and popliteal veins.  Doppler examination shows normal spontaneous and phasic flow.  No RIGHT calf vein thrombosis is detected.    LEFT:  Normal compressibility of the LEFT common femoral, femoral and popliteal veins.  Doppler examination shows normal spontaneous and phasic flow.  No LEFT calf vein thrombosis is detected.    IMPRESSION:  No evidence of deep venous thrombosis in either lower extremity.      KIRA GONCALVES MD; Attending Vascular Surgery  This document has been electronically signed. Jun 2 2021 10:28AM    < end of copied text >

## 2021-06-03 NOTE — PROGRESS NOTE ADULT - SUBJECTIVE AND OBJECTIVE BOX
THOMASFERNANDEZ  60y, Male    All available historical data reviewed    OVERNIGHT EVENTS:  no fevers  fio2 40%  on levo  ongoing CVVH    ROS:  unable to obtain history secondary to patient's mental status and/or sedation     VITALS:  T(F): 96.7, Max: 96.7 (06-03-21 @ 08:00)  HR: 80  BP: 159/63  RR: 26Vital Signs Last 24 Hrs  T(C): 35.9 (03 Jun 2021 08:00), Max: 35.9 (03 Jun 2021 08:00)  T(F): 96.7 (03 Jun 2021 08:00), Max: 96.7 (03 Jun 2021 08:00)  HR: 80 (03 Jun 2021 09:00) (78 - 88)  BP: 159/63 (03 Jun 2021 00:00) (159/63 - 159/63)  BP(mean): 87 (03 Jun 2021 00:00) (87 - 87)  RR: 26 (03 Jun 2021 09:00) (25 - 26)  SpO2: 100% (03 Jun 2021 09:00) (94% - 100%)    TESTS & MEASUREMENTS:                        8.2    14.46 )-----------( 213      ( 03 Jun 2021 03:50 )             26.3     06-03    132<L>  |  99  |  30<H>  ----------------------------<  236<H>  4.5   |  20  |  2.5<H>    Ca    7.5<L>      03 Jun 2021 03:50  Phos  3.9     06-03  Mg     2.1     06-03    TPro  6.9  /  Alb  2.2<L>  /  TBili  0.3  /  DBili  x   /  AST  28  /  ALT  15  /  AlkPhos  99  06-03    LIVER FUNCTIONS - ( 03 Jun 2021 03:50 )  Alb: 2.2 g/dL / Pro: 6.9 g/dL / ALK PHOS: 99 U/L / ALT: 15 U/L / AST: 28 U/L / GGT: x             Culture - Blood (collected 05-30-21 @ 22:09)  Source: .Blood Blood-Peripheral  Preliminary Report (06-01-21 @ 08:01):    No growth to date.    Culture - Blood (collected 05-30-21 @ 22:09)  Source: .Blood Blood-Peripheral  Preliminary Report (06-01-21 @ 08:01):    No growth to date.    Culture - Acid Fast - Other w/Smear (collected 05-29-21 @ 06:10)  Source: .Other None(R-FOOT)  Preliminary Report (06-02-21 @ 15:07):    Culture is being performed.    Culture - Surgical Swab (collected 05-29-21 @ 06:10)  Source: .Surgical Swab None  Preliminary Report (05-30-21 @ 17:28):    No growth    Culture - Acid Fast - Other w/Smear (collected 05-29-21 @ 06:10)  Source: .Other None (R-FOOT)  Preliminary Report (06-02-21 @ 15:07):    Culture is being performed.    Culture - Surgical Swab (collected 05-29-21 @ 06:10)  Source: .Surgical Swab None  Preliminary Report (05-31-21 @ 17:10):    Numerous Methicillin resistant Staphylococcus aureus    Rare Corynebacterium species "Susceptibilities not performed"  Organism: Methicillin resistant Staphylococcus aureus (05-31-21 @ 17:07)  Organism: Methicillin resistant Staphylococcus aureus (05-31-21 @ 17:07)      -  Ampicillin/Sulbactam: R 16/8      -  Cefazolin: R 8      -  Clindamycin: S <=0.25      -  Daptomycin: S 0.5      -  Erythromycin: R >4      -  Gentamicin: S <=1 Should not be used as monotherapy      -  Linezolid: S 1      -  Oxacillin: R >2      -  Penicillin: R >8      -  RIF- Rifampin: S <=1 Should not be used as monotherapy      -  Tetra/Doxy: S <=1      -  Trimethoprim/Sulfamethoxazole: S <=0.5/9.5      -  Vancomycin: S 2      Method Type: DAVIDA    Culture - Urine (collected 05-29-21 @ 01:20)  Source: .Urine Clean Catch (Midstream)  Final Report (05-30-21 @ 08:11):    <10,000 CFU/mL Normal Urogenital Mary    Culture - Abscess with Gram Stain (collected 05-28-21 @ 19:51)  Source: .Abscess Right - Foot  Final Report (06-03-21 @ 09:25):    Moderate Methicillin resistant Staphylococcus aureus    Few Corynebacterium species "Susceptibilities not performed"  Organism: Methicillin resistant Staphylococcus aureus (06-03-21 @ 09:25)  Organism: Methicillin resistant Staphylococcus aureus (06-03-21 @ 09:25)      -  Ampicillin/Sulbactam: R 16/8      -  Cefazolin: R 8      -  Clindamycin: S <=0.25      -  Daptomycin: S 0.5      -  Erythromycin: R >4      -  Gentamicin: S <=1 Should not be used as monotherapy      -  Linezolid: S 2      -  Oxacillin: R >2      -  Penicillin: R >8      -  RIF- Rifampin: S <=1 Should not be used as monotherapy      -  Tetra/Doxy: S <=1      -  Trimethoprim/Sulfamethoxazole: S 1/19      -  Vancomycin: S 2      Method Type: DAVIDA    Culture - Other (collected 05-28-21 @ 18:32)  Source: .Other R foot DFU  Final Report (05-30-21 @ 15:44):    Moderate Methicillin resistant Staphylococcus aureus    Normal skin mary isolated  Organism: Methicillin resistant Staphylococcus aureus (05-30-21 @ 15:44)  Organism: Methicillin resistant Staphylococcus aureus (05-30-21 @ 15:44)      -  Ampicillin/Sulbactam: R 16/8      -  Cefazolin: R 16      -  Clindamycin: S <=0.25      -  Daptomycin: S 0.5      -  Erythromycin: R >4      -  Gentamicin: S <=1 Should not be used as monotherapy      -  Linezolid: S 2      -  Oxacillin: R >2      -  Penicillin: R >8      -  RIF- Rifampin: S <=1 Should not be used as monotherapy      -  Tetra/Doxy: S <=1      -  Trimethoprim/Sulfamethoxazole: S <=0.5/9.5      -  Vancomycin: S 2      Method Type: DAVIDA    Culture - Blood (collected 05-28-21 @ 18:28)  Source: .Blood Blood-Peripheral  Gram Stain (05-29-21 @ 18:30):    Growth in anaerobic bottle: Gram positive cocci in pairs    Growth in aerobic bottle: Gram positive cocci in pairs  Final Report (05-31-21 @ 15:45):    Growth in anaerobic bottle: Enterococcus faecalis    Growth in aerobic and anaerobic bottles: Methicillin resistant    Staphylococcus aureus    ***Blood Panel PCR results on this specimen are available    approximately 3 hours after the Gram stain result.***    Gram stain, PCR, and/or culture results may not always    correspond due to difference in methodologies.    ************************************************************    This PCR assay was performed by multiplex PCR. This    Assay tests for 66 bacterialand resistance gene targets.    Please refer to the [facility] test directory    at [email or web address] for details.  Organism: Blood Culture PCR  Enterococcus faecalis  Methicillin resistant Staphylococcus aureus (05-31-21 @ 15:45)  Organism: Methicillin resistant Staphylococcus aureus (05-31-21 @ 15:45)      -  Ampicillin/Sulbactam: R 16/8      -  Cefazolin: R 16      -  Clindamycin: S <=0.25      -  Daptomycin: S 0.5      -  Erythromycin: R >4      -  Gentamicin: S <=1 Should not be used as monotherapy      -  Linezolid: S 2      -  Oxacillin: R >2      -  Penicillin: R >8      -  RIF- Rifampin: S <=1 Should not be used as monotherapy      -  Tetra/Doxy: S <=1      -  Trimethoprim/Sulfamethoxazole: S <=0.5/9.5      -  Vancomycin: S 1      Method Type: DAVIDA  Organism: Enterococcus faecalis (05-31-21 @ 15:45)      -  Ampicillin: S <=2 Predicts results to ampicillin/sulbactam, amoxacillin-clavulanate and  piperacillin-tazobactam.      -  Gentamicin synergy: S      -  Vancomycin: S 1      Method Type: DAVIDA  Organism: Blood Culture PCR (05-31-21 @ 15:45)      -  Coagulase negative Staphylococcus: Detec      -  Enterococcus faecalis: Detec      Method Type: PCR    Culture - Blood (collected 05-28-21 @ 18:28)  Source: .Blood Blood-Peripheral  Gram Stain (05-30-21 @ 06:00):    Growth in aerobic bottle: Gram positive cocci in pairs    Growth in anaerobic bottle: Gram positive cocci in pairs  Final Report (05-31-21 @ 15:46):    Growth in aerobic and anaerobic bottles: Methicillin resistant    Staphylococcus aureus    See previous culture [id number]            RADIOLOGY & ADDITIONAL TESTS:  Personal review of radiological diagnostics performed  Echo and EKG results noted when applicable.     MEDICATIONS:  aspirin  chewable 81 milliGRAM(s) Oral daily  ceftaroline fosamil IVPB 200 milliGRAM(s) IV Intermittent every 12 hours  chlorhexidine 0.12% Liquid 15 milliLiter(s) Oral Mucosa two times a day  chlorhexidine 4% Liquid 1 Application(s) Topical <User Schedule>  CRRT Treatment    <Continuous>  dextrose 40% Gel 15 Gram(s) Oral once  dextrose 5%. 1000 milliLiter(s) IV Continuous <Continuous>  dextrose 5%. 1000 milliLiter(s) IV Continuous <Continuous>  dextrose 50% Injectable 25 Gram(s) IV Push once  dextrose 50% Injectable 12.5 Gram(s) IV Push once  dextrose 50% Injectable 25 Gram(s) IV Push once  fentaNYL   Infusion. 0.5 MICROgram(s)/kG/Hr IV Continuous <Continuous>  glucagon  Injectable 1 milliGRAM(s) IntraMuscular once  heparin   Injectable 5000 Unit(s) SubCutaneous every 8 hours  insulin lispro (ADMELOG) corrective regimen sliding scale   SubCutaneous three times a day before meals  insulin NPH human recombinant 12 Unit(s) SubCutaneous every 12 hours  linezolid  IVPB 600 milliGRAM(s) IV Intermittent every 12 hours  metroNIDAZOLE  IVPB 500 milliGRAM(s) IV Intermittent every 8 hours  mupirocin 2% Nasal 1 Application(s) Nasal two times a day  norepinephrine Infusion 0.05 MICROgram(s)/kG/Min IV Continuous <Continuous>  pantoprazole    Tablet 40 milliGRAM(s) Oral before breakfast  polyethylene glycol 3350 17 Gram(s) Oral daily  propofol Infusion 5.004 MICROgram(s)/kG/Min IV Continuous <Continuous>  PureFlow Dialysate RFP-400 (K 2 / Ca 3) 5000 milliLiter(s) CRRT <Continuous>  senna 2 Tablet(s) Oral at bedtime      ANTIBIOTICS:  ceftaroline fosamil IVPB 200 milliGRAM(s) IV Intermittent every 12 hours  linezolid  IVPB 600 milliGRAM(s) IV Intermittent every 12 hours  metroNIDAZOLE  IVPB 500 milliGRAM(s) IV Intermittent every 8 hours

## 2021-06-03 NOTE — PROGRESS NOTE ADULT - SUBJECTIVE AND OBJECTIVE BOX
Nephrology progress note    THIS IS AN INCOMPLETE NOTE . FULL NOTE TO FOLLOW SHORTLY    Patient is seen and examined, events over the last 24 h noted .    Allergies:  No Known Allergies  statins (Other)    Hospital Medications:   MEDICATIONS  (STANDING):  aspirin  chewable 81 milliGRAM(s) Oral daily  calcium gluconate IVPB 1 Gram(s) IV Intermittent once  ceftaroline fosamil IVPB 200 milliGRAM(s) IV Intermittent every 12 hours  chlorhexidine 0.12% Liquid 15 milliLiter(s) Oral Mucosa two times a day  chlorhexidine 4% Liquid 1 Application(s) Topical <User Schedule>  CRRT Treatment    <Continuous>  dextrose 40% Gel 15 Gram(s) Oral once  dextrose 5%. 1000 milliLiter(s) (50 mL/Hr) IV Continuous <Continuous>  dextrose 5%. 1000 milliLiter(s) (100 mL/Hr) IV Continuous <Continuous>  dextrose 50% Injectable 25 Gram(s) IV Push once  dextrose 50% Injectable 12.5 Gram(s) IV Push once  dextrose 50% Injectable 25 Gram(s) IV Push once  fentaNYL   Infusion. 0.5 MICROgram(s)/kG/Hr (8.21 mL/Hr) IV Continuous <Continuous>  glucagon  Injectable 1 milliGRAM(s) IntraMuscular once  heparin  Infusion 1700 Unit(s)/Hr (17 mL/Hr) IV Continuous <Continuous>  insulin lispro (ADMELOG) corrective regimen sliding scale   SubCutaneous three times a day before meals  insulin NPH human recombinant 12 Unit(s) SubCutaneous every 12 hours  linezolid  IVPB 600 milliGRAM(s) IV Intermittent every 12 hours  metroNIDAZOLE  IVPB 500 milliGRAM(s) IV Intermittent every 8 hours  mupirocin 2% Nasal 1 Application(s) Nasal two times a day  norepinephrine Infusion 0.05 MICROgram(s)/kG/Min (7.7 mL/Hr) IV Continuous <Continuous>  pantoprazole    Tablet 40 milliGRAM(s) Oral before breakfast  polyethylene glycol 3350 17 Gram(s) Oral daily  propofol Infusion 5.004 MICROgram(s)/kG/Min (4.93 mL/Hr) IV Continuous <Continuous>  PureFlow Dialysate RFP-400 (K 2 / Ca 3) 5000 milliLiter(s) (2000 mL/Hr) CRRT <Continuous>  senna 2 Tablet(s) Oral at bedtime        VITALS:  T(F): 96.7 (21 @ 08:00), Max: 96.7 (21 @ 08:00)  HR: 80 (21 @ 09:00)  BP: 159/63 (21 @ 00:00)  RR: 26 (21 @ 09:00)  SpO2: 100% (21 @ 09:00)  Wt(kg): --     @ 07:  -   @ 07:00  --------------------------------------------------------  IN: 6430 mL / OUT: 2278 mL / NET: 4152 mL     @ 07:  -   @ 07:00  --------------------------------------------------------  IN: 5467 mL / OUT: 5525 mL / NET: -58 mL     @ 07:01  -   @ 09:24  --------------------------------------------------------  IN: 374 mL / OUT: 412 mL / NET: -38 mL          PHYSICAL EXAM:  Constitutional: NAD  HEENT: anicteric sclera, oropharynx clear, MMM  Neck: No JVD  Respiratory: CTAB, no wheezes, rales or rhonchi  Cardiovascular: S1, S2, RRR  Gastrointestinal: BS+, soft, NT/ND  Extremities: No cyanosis or clubbing. No peripheral edema  :  No terrazas.   Skin: No rashes    LABS:      132<L>  |  99  |  30<H>  ----------------------------<  236<H>  4.5   |  20  |  2.5<H>    Ca    7.5<L>      2021 03:50  Phos  3.9     06-  Mg     2.1     06-    TPro  6.9  /  Alb  2.2<L>  /  TBili  0.3  /  DBili      /  AST  28  /  ALT  15  /  AlkPhos  99  -                          8.2    14.46 )-----------( 213      ( 2021 03:50 )             26.3       Urine Studies:  Urinalysis Basic - ( 29 May 2021 01:20 )    Color: Light Yellow / Appearance: Clear / S.028 / pH:   Gluc:  / Ketone: Negative  / Bili: Negative / Urobili: <2 mg/dL   Blood:  / Protein: Trace / Nitrite: Negative   Leuk Esterase: Negative / RBC: 3 /HPF / WBC 1 /HPF   Sq Epi:  / Non Sq Epi: 0 /HPF / Bacteria: Negative      Creatinine, Random Urine: 82 mg/dL ( @ 15:34)  Protein/Creatinine Ratio Calculation: 0.2 Ratio ( @ 15:34)  Calcium, Random Urine: <1 mg/dL ( @ 15:34)  Potassium, Random Urine: 23 mmol/L ( @ 15:34)  Sodium, Random Urine: 21.0 mmoL/L ( @ 15:34)  Chloride, Random Urine: <20 ( @ 15:34)    RADIOLOGY & ADDITIONAL STUDIES:   Nephrology progress note  Patient is seen and examined, events over the last 24 h noted .  intubated on MV     Allergies:  No Known Allergies  statins (Other)    Hospital Medications:   MEDICATIONS  (STANDING):  aspirin  chewable 81 milliGRAM(s) Oral daily  calcium gluconate IVPB 1 Gram(s) IV Intermittent once  ceftaroline fosamil IVPB 200 milliGRAM(s) IV Intermittent every 12 hours  CRRT Treatment    <Continuous>  dextrose 40% Gel 15 Gram(s) Oral once  fentaNYL   Infusion. 0.5 MICROgram(s)/kG/Hr (8.21 mL/Hr) IV Continuous <Continuous>  glucagon  Injectable 1 milliGRAM(s) IntraMuscular once  heparin  Infusion 1700 Unit(s)/Hr (17 mL/Hr) IV Continuous <Continuous>  insulin lispro (ADMELOG) corrective regimen sliding scale   SubCutaneous three times a day before meals  insulin NPH human recombinant 12 Unit(s) SubCutaneous every 12 hours  linezolid  IVPB 600 milliGRAM(s) IV Intermittent every 12 hours  metroNIDAZOLE  IVPB 500 milliGRAM(s) IV Intermittent every 8 hours  mupirocin 2% Nasal 1 Application(s) Nasal two times a day  norepinephrine Infusion 0.05 MICROgram(s)/kG/Min (7.7 mL/Hr) IV Continuous <Continuous>  pantoprazole    Tablet 40 milliGRAM(s) Oral before breakfast  polyethylene glycol 3350 17 Gram(s) Oral daily  propofol Infusion 5.004 MICROgram(s)/kG/Min (4.93 mL/Hr) IV Continuous <Continuous>  PureFlow Dialysate RFP-400 (K 2 / Ca 3) 5000 milliLiter(s) (2000 mL/Hr) CRRT <Continuous>  senna 2 Tablet(s) Oral at bedtime        VITALS:  T(F): 96.7 (21 @ 08:00), Max: 96.7 (21 @ 08:00)  HR: 80 (21 @ 09:00)  BP: 159/63 (21 @ 00:00)  RR: 26 (21 @ 09:00)  SpO2: 100% (21 @ 09:00)      06- @ 07:01  -   @ 07:00  --------------------------------------------------------  IN: 6430 mL / OUT: 2278 mL / NET: 4152 mL     @ 07:01  -   @ 07:00  --------------------------------------------------------  IN: 5467 mL / OUT: 5525 mL / NET: -58 mL     @ 07:01  -   @ 09:24  --------------------------------------------------------  IN: 374 mL / OUT: 412 mL / NET: -38 mL        2021 07:  -  2021 07:00  --------------------------------------------------------  IN:    Enteral Tube Flush: 300 mL    FentaNYL: 1752 mL    Heparin: 430 mL    IV PiggyBack: 1000 mL    Norepinephrine: 433 mL    PRBCs (Packed Red Blood Cells): 360 mL    Propofol: 602 mL    Vital High Protein: 590 mL  Total IN: 5467 mL    OUT:    Emesis (mL): 50 mL    Indwelling Catheter - Urethral (mL): 3700 mL    Other (mL): 1775 mL  Total OUT: 5525 mL    Total NET: -58 mL      2021 07:  -  2021 10:40  --------------------------------------------------------  IN:    FentaNYL: 146 mL    Heparin: 34 mL    Norepinephrine: 30 mL    Propofol: 54 mL    Vital High Protein: 110 mL  Total IN: 374 mL    OUT:    Indwelling Catheter - Urethral (mL): 180 mL    Other (mL): 232 mL  Total OUT: 412 mL    Total NET: -38 mL          PHYSICAL EXAM:  Constitutional: NAD  HEENT: anicteric sclera, oropharynx clear, MMM  Neck: No JVD  Respiratory: CTAB, no wheezes, rales or rhonchi  Cardiovascular: S1, S2, RRR  Gastrointestinal: BS+, soft, NT/ND  Extremities: No cyanosis or clubbing. No peripheral edema  :  No terrazas.   Skin: No rashes    LABS:      132<L>  |  99  |  30<H>  ----------------------------<  236<H>  4.5   |  20  |  2.5<H>    Creatinine Trend: 2.5<--, 6.1<--, 7.5<--, 6.3<--, 5.9<--, 4.3<--    Ca    7.5<L>      2021 03:50  Phos  3.9       Mg     2.1         TPro  6.9  /  Alb  2.2<L>  /  TBili  0.3  /  DBili      /  AST  28  /  ALT  15  /  AlkPhos  99                            8.2    14.46 )-----------( 213      ( 2021 03:50 )             26.3       Urine Studies:  Urinalysis Basic - ( 29 May 2021 01:20 )    Color: Light Yellow / Appearance: Clear / S.028 / pH:   Gluc:  / Ketone: Negative  / Bili: Negative / Urobili: <2 mg/dL   Blood:  / Protein: Trace / Nitrite: Negative   Leuk Esterase: Negative / RBC: 3 /HPF / WBC 1 /HPF   Sq Epi:  / Non Sq Epi: 0 /HPF / Bacteria: Negative      Creatinine, Random Urine: 82 mg/dL ( @ 15:34)  Protein/Creatinine Ratio Calculation: 0.2 Ratio ( @ 15:34)  Calcium, Random Urine: <1 mg/dL ( @ 15:34)  Potassium, Random Urine: 23 mmol/L ( @ 15:34)  Sodium, Random Urine: 21.0 mmoL/L ( @ 15:34)  Chloride, Random Urine: <20 ( @ 15:34)    RADIOLOGY & ADDITIONAL STUDIES:

## 2021-06-03 NOTE — PROGRESS NOTE ADULT - SUBJECTIVE AND OBJECTIVE BOX
Patient is a 60y old  Male who presents with a chief complaint of dfu (03 Jun 2021 09:23)        Pt remains critically ill in ICU on MV     All ROS are negative except HPI         PHYSICAL EXAM    ICU Vital Signs Last 24 Hrs  T(C): 35.9 (03 Jun 2021 08:00), Max: 35.9 (03 Jun 2021 08:00)  T(F): 96.7 (03 Jun 2021 08:00), Max: 96.7 (03 Jun 2021 08:00)  HR: 80 (03 Jun 2021 09:00) (78 - 88)  BP: 159/63 (03 Jun 2021 00:00) (159/63 - 159/63)  BP(mean): 87 (03 Jun 2021 00:00) (87 - 87)  ABP: 140/50 (03 Jun 2021 09:00) (128/50 - 202/58)  ABP(mean): 74 (03 Jun 2021 09:00) (68 - 94)  RR: 26 (03 Jun 2021 09:00) (25 - 26)  SpO2: 100% (03 Jun 2021 09:00) (94% - 100%)      CONSTITUTIONAL:  Pt remains critically ill in ICU on MV     ENT:   pos et tube    EYES:   Pupils equal,   Round and reactive to light.    CARDIAC:   Normal rate,   Regular rhythm.    No edema      Vascular:  Normal systolic impulse  No Carotid bruits    RESPIRATORY:   rhonchi b/l lungs    GASTROINTESTINAL:  Abdomen soft,   Non-tender,   No guarding,   + BS    MUSCULOSKELETAL:   Range of motion is not limited,  No clubbing, cyanosis    NEUROLOGICAL:   Pt remains sedated on MV     SKIN:   Skin normal color for race,   Warm and dry and intact.   No evidence of rash.    PSYCHIATRIC:   Pt remains sedated on MV     HEMATOLOGICAL:  No cervical  lymphadenopathy.  no inguinal lymphadenopathy      06-02-21 @ 07:01  -  06-03-21 @ 07:00  --------------------------------------------------------  IN:    Enteral Tube Flush: 300 mL    FentaNYL: 1752 mL    Heparin: 430 mL    IV PiggyBack: 1000 mL    Norepinephrine: 433 mL    PRBCs (Packed Red Blood Cells): 360 mL    Propofol: 602 mL    Vital High Protein: 590 mL  Total IN: 5467 mL    OUT:    Emesis (mL): 50 mL    Indwelling Catheter - Urethral (mL): 3700 mL    Other (mL): 1775 mL  Total OUT: 5525 mL    Total NET: -58 mL      06-03-21 @ 07:01  -  06-03-21 @ 10:03  --------------------------------------------------------  IN:    FentaNYL: 146 mL    Heparin: 34 mL    Norepinephrine: 30 mL    Propofol: 54 mL    Vital High Protein: 110 mL  Total IN: 374 mL    OUT:    Indwelling Catheter - Urethral (mL): 180 mL    Other (mL): 232 mL  Total OUT: 412 mL    Total NET: -38 mL          LABS:                            8.2    14.46 )-----------( 213      ( 03 Jun 2021 03:50 )             26.3                                               06-03    132<L>  |  99  |  30<H>  ----------------------------<  236<H>  4.5   |  20  |  2.5<H>    Ca    7.5<L>      03 Jun 2021 03:50  Phos  3.9     06-03  Mg     2.1     06-03    TPro  6.9  /  Alb  2.2<L>  /  TBili  0.3  /  DBili  x   /  AST  28  /  ALT  15  /  AlkPhos  99  06-03      PTT - ( 03 Jun 2021 03:50 )  PTT:73.4 sec                                                                                     LIVER FUNCTIONS - ( 03 Jun 2021 03:50 )  Alb: 2.2 g/dL / Pro: 6.9 g/dL / ALK PHOS: 99 U/L / ALT: 15 U/L / AST: 28 U/L / GGT: x                                                                                               Mode: AC/ CMV (Assist Control/ Continuous Mandatory Ventilation)  RR (machine): 26  TV (machine): 480  FiO2: 40  PEEP: 8  ITime: 1  MAP: 17  PIP: 31                                      ABG - ( 03 Jun 2021 04:40 )  pH, Arterial: 7.38  pH, Blood: x     /  pCO2: 42    /  pO2: 110   / HCO3: 25    / Base Excess: -0.3  /  SaO2: 98                  MEDICATIONS  (STANDING):  aspirin  chewable 81 milliGRAM(s) Oral daily  ceftaroline fosamil IVPB 200 milliGRAM(s) IV Intermittent every 12 hours  chlorhexidine 0.12% Liquid 15 milliLiter(s) Oral Mucosa two times a day  chlorhexidine 4% Liquid 1 Application(s) Topical <User Schedule>  CRRT Treatment    <Continuous>  dextrose 40% Gel 15 Gram(s) Oral once  dextrose 5%. 1000 milliLiter(s) (50 mL/Hr) IV Continuous <Continuous>  dextrose 5%. 1000 milliLiter(s) (100 mL/Hr) IV Continuous <Continuous>  dextrose 50% Injectable 25 Gram(s) IV Push once  dextrose 50% Injectable 12.5 Gram(s) IV Push once  dextrose 50% Injectable 25 Gram(s) IV Push once  fentaNYL   Infusion. 0.5 MICROgram(s)/kG/Hr (8.21 mL/Hr) IV Continuous <Continuous>  glucagon  Injectable 1 milliGRAM(s) IntraMuscular once  heparin   Injectable 5000 Unit(s) SubCutaneous every 8 hours  insulin lispro (ADMELOG) corrective regimen sliding scale   SubCutaneous three times a day before meals  insulin NPH human recombinant 12 Unit(s) SubCutaneous every 12 hours  linezolid  IVPB 600 milliGRAM(s) IV Intermittent every 12 hours  metroNIDAZOLE  IVPB 500 milliGRAM(s) IV Intermittent every 8 hours  mupirocin 2% Nasal 1 Application(s) Nasal two times a day  norepinephrine Infusion 0.05 MICROgram(s)/kG/Min (7.7 mL/Hr) IV Continuous <Continuous>  pantoprazole    Tablet 40 milliGRAM(s) Oral before breakfast  polyethylene glycol 3350 17 Gram(s) Oral daily  propofol Infusion 5.004 MICROgram(s)/kG/Min (4.93 mL/Hr) IV Continuous <Continuous>  PureFlow Dialysate RFP-400 (K 2 / Ca 3) 5000 milliLiter(s) (2000 mL/Hr) CRRT <Continuous>  senna 2 Tablet(s) Oral at bedtime    MEDICATIONS  (PRN):      New X-rays reviewed:                                                                                  ECHO    CXR interpreted by me:

## 2021-06-03 NOTE — PROGRESS NOTE ADULT - ASSESSMENT
· Assessment	  61 y/o M with PMH of DM, hypertension, dyslipidemia, sleep apnea, Charcot foot reconstruction with external fixation, super morbid obesity present for worsening right foot infection.  Pt reports he has had this foot ulcer at various stage of healing for 13 years. Patient has been admitted for IV antibiotics multiple times in the past for osteomyelitis of right foot. He has history of MRSA infection in foot. pt has baseline neuropathy in both feet so does not feel any pain.  Patient states his Infection has been gradually getting worse despite treatment with bactrim, he saw his podiatrists and he was sent in by podiatry for IV abx .    IMPRESSION;  MSOF : resp failure with fio2 40%, on pressors, renal failure on CVVH, metabolic encephalopathy  5/31 S/p disarticulation right knee   -5/28 BCx E fecalis, ORSA  -etiology of bacteremia was right foot abscess  -5/29 R foot : ORSA , Corynebacterium  -5/30 BCx NG    RECOMMENDATIONS;  -linezolid 600 mg iv q12h  -Ceftaroline 200 mg iv q12h  -flagyl 500 mg iv q8h   -ECHO

## 2021-06-03 NOTE — PROGRESS NOTE ADULT - PROBLEM SELECTOR PLAN 4
verified HCP - hard copy in chart  Palliative Care introduced to cristiane on 6/1; remain available  continue Full Code and current care management  will follow.

## 2021-06-04 LAB
ALBUMIN SERPL ELPH-MCNC: 2.1 G/DL — LOW (ref 3.5–5.2)
ALP SERPL-CCNC: 191 U/L — HIGH (ref 30–115)
ALT FLD-CCNC: 15 U/L — SIGNIFICANT CHANGE UP (ref 0–41)
ANION GAP SERPL CALC-SCNC: 10 MMOL/L — SIGNIFICANT CHANGE UP (ref 7–14)
ANION GAP SERPL CALC-SCNC: 10 MMOL/L — SIGNIFICANT CHANGE UP (ref 7–14)
ANION GAP SERPL CALC-SCNC: 8 MMOL/L — SIGNIFICANT CHANGE UP (ref 7–14)
APPEARANCE UR: CLEAR — SIGNIFICANT CHANGE UP
AST SERPL-CCNC: 29 U/L — SIGNIFICANT CHANGE UP (ref 0–41)
BACTERIA # UR AUTO: NEGATIVE — SIGNIFICANT CHANGE UP
BILIRUB SERPL-MCNC: 0.2 MG/DL — SIGNIFICANT CHANGE UP (ref 0.2–1.2)
BILIRUB UR-MCNC: NEGATIVE — SIGNIFICANT CHANGE UP
BUN SERPL-MCNC: 26 MG/DL — HIGH (ref 10–20)
BUN SERPL-MCNC: 29 MG/DL — HIGH (ref 10–20)
BUN SERPL-MCNC: 29 MG/DL — HIGH (ref 10–20)
CA-I BLD-SCNC: 0.96 MMOL/L — LOW (ref 1.12–1.3)
CA-I BLD-SCNC: 1.11 MMOL/L — LOW (ref 1.12–1.3)
CALCIUM SERPL-MCNC: 7.5 MG/DL — LOW (ref 8.5–10.1)
CALCIUM SERPL-MCNC: 7.6 MG/DL — LOW (ref 8.5–10.1)
CALCIUM SERPL-MCNC: 7.8 MG/DL — LOW (ref 8.5–10.1)
CHLORIDE SERPL-SCNC: 105 MMOL/L — SIGNIFICANT CHANGE UP (ref 98–110)
CHLORIDE SERPL-SCNC: 106 MMOL/L — SIGNIFICANT CHANGE UP (ref 98–110)
CHLORIDE SERPL-SCNC: 109 MMOL/L — SIGNIFICANT CHANGE UP (ref 98–110)
CO2 SERPL-SCNC: 24 MMOL/L — SIGNIFICANT CHANGE UP (ref 17–32)
CO2 SERPL-SCNC: 25 MMOL/L — SIGNIFICANT CHANGE UP (ref 17–32)
CO2 SERPL-SCNC: 25 MMOL/L — SIGNIFICANT CHANGE UP (ref 17–32)
COLOR SPEC: SIGNIFICANT CHANGE UP
CREAT SERPL-MCNC: 2.2 MG/DL — HIGH (ref 0.7–1.5)
CREAT SERPL-MCNC: 2.4 MG/DL — HIGH (ref 0.7–1.5)
CREAT SERPL-MCNC: 2.4 MG/DL — HIGH (ref 0.7–1.5)
DIFF PNL FLD: ABNORMAL
EPI CELLS # UR: 0 /HPF — SIGNIFICANT CHANGE UP (ref 0–5)
GAS PNL BLDA: SIGNIFICANT CHANGE UP
GLUCOSE BLDC GLUCOMTR-MCNC: 118 MG/DL — HIGH (ref 70–99)
GLUCOSE BLDC GLUCOMTR-MCNC: 172 MG/DL — HIGH (ref 70–99)
GLUCOSE BLDC GLUCOMTR-MCNC: 174 MG/DL — HIGH (ref 70–99)
GLUCOSE BLDC GLUCOMTR-MCNC: 205 MG/DL — HIGH (ref 70–99)
GLUCOSE BLDC GLUCOMTR-MCNC: 217 MG/DL — HIGH (ref 70–99)
GLUCOSE BLDC GLUCOMTR-MCNC: 234 MG/DL — HIGH (ref 70–99)
GLUCOSE SERPL-MCNC: 118 MG/DL — HIGH (ref 70–99)
GLUCOSE SERPL-MCNC: 167 MG/DL — HIGH (ref 70–99)
GLUCOSE SERPL-MCNC: 197 MG/DL — HIGH (ref 70–99)
GLUCOSE UR QL: ABNORMAL
HCT VFR BLD CALC: 23.9 % — LOW (ref 42–52)
HGB BLD-MCNC: 7.4 G/DL — LOW (ref 14–18)
HYALINE CASTS # UR AUTO: 0 /LPF — SIGNIFICANT CHANGE UP (ref 0–7)
KETONES UR-MCNC: NEGATIVE — SIGNIFICANT CHANGE UP
LACTATE SERPL-SCNC: 1.1 MMOL/L — SIGNIFICANT CHANGE UP (ref 0.7–2)
LEUKOCYTE ESTERASE UR-ACNC: NEGATIVE — SIGNIFICANT CHANGE UP
MAGNESIUM SERPL-MCNC: 2.4 MG/DL — SIGNIFICANT CHANGE UP (ref 1.8–2.4)
MCHC RBC-ENTMCNC: 27.2 PG — SIGNIFICANT CHANGE UP (ref 27–31)
MCHC RBC-ENTMCNC: 31 G/DL — LOW (ref 32–37)
MCV RBC AUTO: 87.9 FL — SIGNIFICANT CHANGE UP (ref 80–94)
NITRITE UR-MCNC: NEGATIVE — SIGNIFICANT CHANGE UP
NRBC # BLD: 0 /100 WBCS — SIGNIFICANT CHANGE UP (ref 0–0)
PH UR: 6 — SIGNIFICANT CHANGE UP (ref 5–8)
PHOSPHATE SERPL-MCNC: 3.2 MG/DL — SIGNIFICANT CHANGE UP (ref 2.1–4.9)
PLATELET # BLD AUTO: 185 K/UL — SIGNIFICANT CHANGE UP (ref 130–400)
POTASSIUM SERPL-MCNC: 4.3 MMOL/L — SIGNIFICANT CHANGE UP (ref 3.5–5)
POTASSIUM SERPL-MCNC: 4.4 MMOL/L — SIGNIFICANT CHANGE UP (ref 3.5–5)
POTASSIUM SERPL-MCNC: 4.5 MMOL/L — SIGNIFICANT CHANGE UP (ref 3.5–5)
POTASSIUM SERPL-SCNC: 4.3 MMOL/L — SIGNIFICANT CHANGE UP (ref 3.5–5)
POTASSIUM SERPL-SCNC: 4.4 MMOL/L — SIGNIFICANT CHANGE UP (ref 3.5–5)
POTASSIUM SERPL-SCNC: 4.5 MMOL/L — SIGNIFICANT CHANGE UP (ref 3.5–5)
PROT SERPL-MCNC: 7.1 G/DL — SIGNIFICANT CHANGE UP (ref 6–8)
PROT UR-MCNC: ABNORMAL
RBC # BLD: 2.72 M/UL — LOW (ref 4.7–6.1)
RBC # FLD: 17.4 % — HIGH (ref 11.5–14.5)
RBC CASTS # UR COMP ASSIST: 14 /HPF — HIGH (ref 0–4)
SODIUM SERPL-SCNC: 140 MMOL/L — SIGNIFICANT CHANGE UP (ref 135–146)
SODIUM SERPL-SCNC: 140 MMOL/L — SIGNIFICANT CHANGE UP (ref 135–146)
SODIUM SERPL-SCNC: 142 MMOL/L — SIGNIFICANT CHANGE UP (ref 135–146)
SP GR SPEC: 1.01 — SIGNIFICANT CHANGE UP (ref 1.01–1.03)
UROBILINOGEN FLD QL: SIGNIFICANT CHANGE UP
WBC # BLD: 13.69 K/UL — HIGH (ref 4.8–10.8)
WBC # FLD AUTO: 13.69 K/UL — HIGH (ref 4.8–10.8)
WBC UR QL: 0 /HPF — SIGNIFICANT CHANGE UP (ref 0–5)

## 2021-06-04 PROCEDURE — 99232 SBSQ HOSP IP/OBS MODERATE 35: CPT

## 2021-06-04 PROCEDURE — 99291 CRITICAL CARE FIRST HOUR: CPT

## 2021-06-04 PROCEDURE — 71045 X-RAY EXAM CHEST 1 VIEW: CPT | Mod: 26

## 2021-06-04 RX ORDER — CALCIUM GLUCONATE 100 MG/ML
2 VIAL (ML) INTRAVENOUS ONCE
Refills: 0 | Status: COMPLETED | OUTPATIENT
Start: 2021-06-04 | End: 2021-06-04

## 2021-06-04 RX ORDER — HUMAN INSULIN 100 [IU]/ML
15 INJECTION, SUSPENSION SUBCUTANEOUS EVERY 12 HOURS
Refills: 0 | Status: DISCONTINUED | OUTPATIENT
Start: 2021-06-04 | End: 2021-06-11

## 2021-06-04 RX ADMIN — HEPARIN SODIUM 5000 UNIT(S): 5000 INJECTION INTRAVENOUS; SUBCUTANEOUS at 05:34

## 2021-06-04 RX ADMIN — Medication 4: at 06:50

## 2021-06-04 RX ADMIN — HUMAN INSULIN 12 UNIT(S): 100 INJECTION, SUSPENSION SUBCUTANEOUS at 06:50

## 2021-06-04 RX ADMIN — MUPIROCIN 1 APPLICATION(S): 20 OINTMENT TOPICAL at 17:15

## 2021-06-04 RX ADMIN — Medication 100 MILLIGRAM(S): at 05:29

## 2021-06-04 RX ADMIN — LACTULOSE 15 GRAM(S): 10 SOLUTION ORAL at 01:38

## 2021-06-04 RX ADMIN — CEFTAROLINE FOSAMIL 50 MILLIGRAM(S): 600 POWDER, FOR SOLUTION INTRAVENOUS at 05:29

## 2021-06-04 RX ADMIN — Medication 650 MILLIGRAM(S): at 00:00

## 2021-06-04 RX ADMIN — PROPOFOL 4.93 MICROGRAM(S)/KG/MIN: 10 INJECTION, EMULSION INTRAVENOUS at 16:00

## 2021-06-04 RX ADMIN — Medication 2: at 11:30

## 2021-06-04 RX ADMIN — Medication 81 MILLIGRAM(S): at 11:07

## 2021-06-04 RX ADMIN — MUPIROCIN 1 APPLICATION(S): 20 OINTMENT TOPICAL at 05:34

## 2021-06-04 RX ADMIN — CHLORHEXIDINE GLUCONATE 15 MILLILITER(S): 213 SOLUTION TOPICAL at 17:14

## 2021-06-04 RX ADMIN — CEFTAROLINE FOSAMIL 50 MILLIGRAM(S): 600 POWDER, FOR SOLUTION INTRAVENOUS at 17:14

## 2021-06-04 RX ADMIN — HUMAN INSULIN 15 UNIT(S): 100 INJECTION, SUSPENSION SUBCUTANEOUS at 17:14

## 2021-06-04 RX ADMIN — HEPARIN SODIUM 5000 UNIT(S): 5000 INJECTION INTRAVENOUS; SUBCUTANEOUS at 15:23

## 2021-06-04 RX ADMIN — SENNA PLUS 2 TABLET(S): 8.6 TABLET ORAL at 21:07

## 2021-06-04 RX ADMIN — PROPOFOL 4.93 MICROGRAM(S)/KG/MIN: 10 INJECTION, EMULSION INTRAVENOUS at 11:08

## 2021-06-04 RX ADMIN — CHLORHEXIDINE GLUCONATE 15 MILLILITER(S): 213 SOLUTION TOPICAL at 05:31

## 2021-06-04 RX ADMIN — LINEZOLID 300 MILLIGRAM(S): 600 INJECTION, SOLUTION INTRAVENOUS at 05:29

## 2021-06-04 RX ADMIN — POLYETHYLENE GLYCOL 3350 17 GRAM(S): 17 POWDER, FOR SOLUTION ORAL at 11:07

## 2021-06-04 RX ADMIN — HEPARIN SODIUM 5000 UNIT(S): 5000 INJECTION INTRAVENOUS; SUBCUTANEOUS at 21:06

## 2021-06-04 RX ADMIN — FENTANYL CITRATE 8.21 MICROGRAM(S)/KG/HR: 50 INJECTION INTRAVENOUS at 06:51

## 2021-06-04 RX ADMIN — FENTANYL CITRATE 8.21 MICROGRAM(S)/KG/HR: 50 INJECTION INTRAVENOUS at 19:56

## 2021-06-04 RX ADMIN — LINEZOLID 300 MILLIGRAM(S): 600 INJECTION, SOLUTION INTRAVENOUS at 17:14

## 2021-06-04 RX ADMIN — CHLORHEXIDINE GLUCONATE 1 APPLICATION(S): 213 SOLUTION TOPICAL at 05:30

## 2021-06-04 RX ADMIN — Medication 7.7 MICROGRAM(S)/KG/MIN: at 17:30

## 2021-06-04 RX ADMIN — Medication 200 GRAM(S): at 11:07

## 2021-06-04 RX ADMIN — FENTANYL CITRATE 8.21 MICROGRAM(S)/KG/HR: 50 INJECTION INTRAVENOUS at 11:10

## 2021-06-04 RX ADMIN — Medication 100 MILLIGRAM(S): at 15:24

## 2021-06-04 RX ADMIN — FENTANYL CITRATE 8.21 MICROGRAM(S)/KG/HR: 50 INJECTION INTRAVENOUS at 03:00

## 2021-06-04 RX ADMIN — Medication 100 MILLIGRAM(S): at 21:06

## 2021-06-04 RX ADMIN — PROPOFOL 4.93 MICROGRAM(S)/KG/MIN: 10 INJECTION, EMULSION INTRAVENOUS at 05:30

## 2021-06-04 RX ADMIN — PANTOPRAZOLE SODIUM 40 MILLIGRAM(S): 20 TABLET, DELAYED RELEASE ORAL at 05:34

## 2021-06-04 NOTE — PROGRESS NOTE ADULT - ASSESSMENT
60yMale  DM, hypertension, dyslipidemia, sleep apnea, Charcot foot reconstruction with external fixation, super morbid obesity presented for worsening right footinfection; since admission -  Acute Hypoxic Hypercarbic Respiratory Failure, patient is on ventilator. CVVHD stopped.  No family at bedside.     Called niece and left VM with call back number.    Morphine Equivalent Daily Dose (MEDD) on fent gtt      See Recs below. D/W primary team and bedside RN     Please call or [phone] 24/7  with questions or concerns.   We will continue to follow.    60yMale  DM, hypertension, dyslipidemia, sleep apnea, Charcot foot reconstruction with external fixation, super morbid obesity presented for worsening right footinfection; since admission -  Acute Hypoxic Hypercarbic Respiratory Failure, patient is on ventilator. CVVHD stopped.  No family at bedside.     Called cristiane and left  with call back number.  Addendum: call received from cristiane, states she was given a medical update and patient appears to be improving. Informed her that we will continue following for support and reassess.     Morphine Equivalent Daily Dose (MEDD) on fent gtt      See Recs below. D/W primary team and bedside RN     Please call or [phone] 24/7  with questions or concerns.   We will continue to follow.    (0) independent

## 2021-06-04 NOTE — PROGRESS NOTE ADULT - SUBJECTIVE AND OBJECTIVE BOX
Patient is a 60y old  Male who presents with a chief complaint of dfu (2021 12:25)        Over Night Events:        ROS:     All ROS are negative except HPI         PHYSICAL EXAM    ICU Vital Signs Last 24 Hrs  T(C): 37.6 (2021 08:00), Max: 38 (2021 22:00)  T(F): 99.6 (2021 08:00), Max: 100.4 (2021 22:00)  HR: 94 (2021 11:00) (78 - 100)  BP: 113/62 (2021 08:00) (113/62 - 122/49)  BP(mean): 87 (2021 08:00) (69 - 87)  ABP: 142/64 (2021 11:00) (102/52 - 154/56)  ABP(mean): 84 (2021 11:00) (62 - 84)  RR: 22 (2021 11:00) (13 - 26)  SpO2: 99% (2021 11:00) (98% - 100%)      CONSTITUTIONAL:  Pt remains critically ill in ICU on MV     ENT:   pos et tube    EYES:   Pupils equal,   Round and reactive to light.    CARDIAC:   Normal rate,   Regular rhythm.    No edema      Vascular:  Normal systolic impulse  No Carotid bruits    RESPIRATORY:   rhonchi b/l lungs    GASTROINTESTINAL:  Abdomen soft,   Non-tender,   No guarding,   + BS    MUSCULOSKELETAL:   Range of motion is not limited,  No clubbing, cyanosis    NEUROLOGICAL:   Pt remains sedated on MV     SKIN:   Skin normal color for race,   Warm and dry and intact.   No evidence of rash.    PSYCHIATRIC:   Pt remains sedated on MV     HEMATOLOGICAL:  No cervical  lymphadenopathy.  no inguinal lymphadenopathy      21 @ 07:01  -  21 @ 07:00  --------------------------------------------------------  IN:    Enteral Tube Flush: 260 mL    FentaNYL: 1700 mL    Heparin: 34 mL    IV PiggyBack: 600 mL    Norepinephrine: 56 mL    Norepinephrine: 211 mL    Propofol: 628 mL    Vital High Protein: 770 mL  Total IN: 4259 mL    OUT:    Indwelling Catheter - Urethral (mL): 3890 mL    Other (mL): 232 mL  Total OUT: 4122 mL    Total NET: 137 mL      21 @ 07:01  -  21 @ 12:32  --------------------------------------------------------  IN:    FentaNYL: 205.6 mL    IV PiggyBack: 100 mL    Norepinephrine: 44 mL    Propofol: 83.7 mL  Total IN: 433.3 mL    OUT:    Indwelling Catheter - Urethral (mL): 825 mL  Total OUT: 825 mL    Total NET: -391.7 mL          LABS:                            7.4    13.69 )-----------( 185      ( 2021 05:50 )             23.9                                               06-04    140  |  105  |  29<H>  ----------------------------<  167<H>  4.3   |  25  |  2.2<H>    Ca    7.5<L>      2021 11:00  Phos  3.2     06-  Mg     2.4     06-    TPro  7.1  /  Alb  2.1<L>  /  TBili  0.2  /  DBili  x   /  AST  29  /  ALT  15  /  AlkPhos  191<H>  -      PTT - ( 2021 12:30 )  PTT:42.1 sec                                       Urinalysis Basic - ( 2021 01:10 )    Color: Light Yellow / Appearance: Clear / S.013 / pH: x  Gluc: x / Ketone: Negative  / Bili: Negative / Urobili: <2 mg/dL   Blood: x / Protein: 30 mg/dL / Nitrite: Negative   Leuk Esterase: Negative / RBC: 14 /HPF / WBC 0 /HPF   Sq Epi: x / Non Sq Epi: 0 /HPF / Bacteria: Negative                                                  LIVER FUNCTIONS - ( 2021 05:50 )  Alb: 2.1 g/dL / Pro: 7.1 g/dL / ALK PHOS: 191 U/L / ALT: 15 U/L / AST: 29 U/L / GGT: x                                                                                               Mode: AC/ CMV (Assist Control/ Continuous Mandatory Ventilation)  RR (machine): 18  TV (machine): 480  FiO2: 40  PEEP: 8  ITime: 1  MAP: 23  PIP: 38                                      ABG - ( 2021 03:55 )  pH, Arterial: 7.41  pH, Blood: x     /  pCO2: 42    /  pO2: 195   / HCO3: 26    / Base Excess: 1.5   /  SaO2: 100                 MEDICATIONS  (STANDING):  ALBUTerol    90 MICROgram(s) HFA Inhaler 1 Puff(s) Inhalation once  aspirin  chewable 81 milliGRAM(s) Oral daily  ceftaroline fosamil IVPB 200 milliGRAM(s) IV Intermittent every 12 hours  chlorhexidine 0.12% Liquid 15 milliLiter(s) Oral Mucosa two times a day  chlorhexidine 4% Liquid 1 Application(s) Topical <User Schedule>  dextrose 40% Gel 15 Gram(s) Oral once  dextrose 5%. 1000 milliLiter(s) (50 mL/Hr) IV Continuous <Continuous>  dextrose 5%. 1000 milliLiter(s) (100 mL/Hr) IV Continuous <Continuous>  dextrose 50% Injectable 25 Gram(s) IV Push once  dextrose 50% Injectable 12.5 Gram(s) IV Push once  dextrose 50% Injectable 25 Gram(s) IV Push once  fentaNYL   Infusion. 0.5 MICROgram(s)/kG/Hr (8.21 mL/Hr) IV Continuous <Continuous>  glucagon  Injectable 1 milliGRAM(s) IntraMuscular once  heparin   Injectable 5000 Unit(s) SubCutaneous every 8 hours  insulin lispro (ADMELOG) corrective regimen sliding scale   SubCutaneous three times a day before meals  insulin NPH human recombinant 15 Unit(s) SubCutaneous every 12 hours  linezolid  IVPB 600 milliGRAM(s) IV Intermittent every 12 hours  metroNIDAZOLE  IVPB 500 milliGRAM(s) IV Intermittent every 8 hours  mupirocin 2% Nasal 1 Application(s) Nasal two times a day  norepinephrine Infusion 0.05 MICROgram(s)/kG/Min (7.7 mL/Hr) IV Continuous <Continuous>  pantoprazole    Tablet 40 milliGRAM(s) Oral before breakfast  polyethylene glycol 3350 17 Gram(s) Oral daily  propofol Infusion 5.004 MICROgram(s)/kG/Min (4.93 mL/Hr) IV Continuous <Continuous>  senna 2 Tablet(s) Oral at bedtime  tiotropium 18 MICROgram(s) Capsule 1 Capsule(s) Inhalation once    MEDICATIONS  (PRN):      New X-rays reviewed:                                                                                  ECHO    CXR interpreted by me:

## 2021-06-04 NOTE — PROGRESS NOTE ADULT - SUBJECTIVE AND OBJECTIVE BOX
Nephrology progress note    THIS IS AN INCOMPLETE NOTE . FULL NOTE TO FOLLOW SHORTLY    Patient is seen and examined, events over the last 24 h noted .    Allergies:  No Known Allergies  statins (Other)    Hospital Medications:   MEDICATIONS  (STANDING):  ALBUTerol    90 MICROgram(s) HFA Inhaler 1 Puff(s) Inhalation once  aspirin  chewable 81 milliGRAM(s) Oral daily  ceftaroline fosamil IVPB 200 milliGRAM(s) IV Intermittent every 12 hours  chlorhexidine 0.12% Liquid 15 milliLiter(s) Oral Mucosa two times a day  chlorhexidine 4% Liquid 1 Application(s) Topical <User Schedule>  dextrose 40% Gel 15 Gram(s) Oral once  dextrose 5%. 1000 milliLiter(s) (50 mL/Hr) IV Continuous <Continuous>  dextrose 5%. 1000 milliLiter(s) (100 mL/Hr) IV Continuous <Continuous>  dextrose 50% Injectable 25 Gram(s) IV Push once  dextrose 50% Injectable 12.5 Gram(s) IV Push once  dextrose 50% Injectable 25 Gram(s) IV Push once  fentaNYL   Infusion. 0.5 MICROgram(s)/kG/Hr (8.21 mL/Hr) IV Continuous <Continuous>  glucagon  Injectable 1 milliGRAM(s) IntraMuscular once  heparin   Injectable 5000 Unit(s) SubCutaneous every 8 hours  insulin lispro (ADMELOG) corrective regimen sliding scale   SubCutaneous three times a day before meals  insulin NPH human recombinant 12 Unit(s) SubCutaneous every 12 hours  linezolid  IVPB 600 milliGRAM(s) IV Intermittent every 12 hours  metroNIDAZOLE  IVPB 500 milliGRAM(s) IV Intermittent every 8 hours  mupirocin 2% Nasal 1 Application(s) Nasal two times a day  norepinephrine Infusion 0.05 MICROgram(s)/kG/Min (7.7 mL/Hr) IV Continuous <Continuous>  pantoprazole    Tablet 40 milliGRAM(s) Oral before breakfast  polyethylene glycol 3350 17 Gram(s) Oral daily  propofol Infusion 5.004 MICROgram(s)/kG/Min (4.93 mL/Hr) IV Continuous <Continuous>  senna 2 Tablet(s) Oral at bedtime  tiotropium 18 MICROgram(s) Capsule 1 Capsule(s) Inhalation once        VITALS:  T(F): 99.9 (21 @ 04:00), Max: 100.4 (21 @ 22:00)  HR: 94 (21 @ 08:00)  BP: 113/62 (21 @ 08:00)  RR: 22 (21 @ 08:00)  SpO2: 100% (21 @ 08:00)  Wt(kg): --     @ 07:  -   @ 07:00  --------------------------------------------------------  IN: 5467 mL / OUT: 5525 mL / NET: -58 mL     @ 07:  -   @ 07:00  --------------------------------------------------------  IN: 4259 mL / OUT: 4122 mL / NET: 137 mL     @ 07:  -   @ 09:02  --------------------------------------------------------  IN: 108 mL / OUT: 125 mL / NET: -17 mL          PHYSICAL EXAM:  Constitutional: NAD  HEENT: anicteric sclera, oropharynx clear, MMM  Neck: No JVD  Respiratory: CTAB, no wheezes, rales or rhonchi  Cardiovascular: S1, S2, RRR  Gastrointestinal: BS+, soft, NT/ND  Extremities: No cyanosis or clubbing. No peripheral edema  :  No terrazas.   Skin: No rashes    LABS:      140  |  106  |  29<H>  ----------------------------<  197<H>  4.5   |  24  |  2.4<H>    Creatinine Trend: 2.4<--, 2.2<--, 2.5<--, 6.1<--, 7.5<--, 6.3<--    Ca    7.6<L>      2021 05:50  Phos  3.2     06-  Mg     2.4     -    TPro  7.1  /  Alb  2.1<L>  /  TBili  0.2  /  DBili      /  AST  29  /  ALT  15  /  AlkPhos  191<H>                            7.4    13.69 )-----------( 185      ( 2021 05:50 )             23.9       Urine Studies:  Urinalysis Basic - ( 2021 01:10 )    Color: Light Yellow / Appearance: Clear / S.013 / pH:   Gluc:  / Ketone: Negative  / Bili: Negative / Urobili: <2 mg/dL   Blood:  / Protein: 30 mg/dL / Nitrite: Negative   Leuk Esterase: Negative / RBC: 14 /HPF / WBC 0 /HPF   Sq Epi:  / Non Sq Epi: 0 /HPF / Bacteria: Negative      Creatinine, Random Urine: 82 mg/dL ( @ 15:34)  Protein/Creatinine Ratio Calculation: 0.2 Ratio ( @ 15:34)  Calcium, Random Urine: <1 mg/dL ( @ 15:34)  Potassium, Random Urine: 23 mmol/L ( @ 15:34)  Sodium, Random Urine: 21.0 mmoL/L ( @ 15:34)  Chloride, Random Urine: <20 ( @ 15:34)    RADIOLOGY & ADDITIONAL STUDIES:   Nephrology progress note  Patient is seen and examined, events over the last 24 h noted .  intubated on MV  non oliguric     Allergies:  No Known Allergies  statins (Other)    Hospital Medications:   MEDICATIONS  (STANDING):  ALBUTerol    90 MICROgram(s) HFA Inhaler 1 Puff(s) Inhalation once  aspirin  chewable 81 milliGRAM(s) Oral daily  ceftaroline fosamil IVPB 200 milliGRAM(s) IV Intermittent every 12 hours  dextrose 40% Gel 15 Gram(s) Oral once  fentaNYL   Infusion. 0.5 MICROgram(s)/kG/Hr (8.21 mL/Hr) IV Continuous <Continuous>  glucagon  Injectable 1 milliGRAM(s) IntraMuscular once  heparin   Injectable 5000 Unit(s) SubCutaneous every 8 hours  insulin lispro (ADMELOG) corrective regimen sliding scale   SubCutaneous three times a day before meals  insulin NPH human recombinant 12 Unit(s) SubCutaneous every 12 hours  linezolid  IVPB 600 milliGRAM(s) IV Intermittent every 12 hours  metroNIDAZOLE  IVPB 500 milliGRAM(s) IV Intermittent every 8 hours  mupirocin 2% Nasal 1 Application(s) Nasal two times a day  norepinephrine Infusion 0.05 MICROgram(s)/kG/Min (7.7 mL/Hr) IV Continuous <Continuous>  pantoprazole    Tablet 40 milliGRAM(s) Oral before breakfast  polyethylene glycol 3350 17 Gram(s) Oral daily  propofol Infusion 5.004 MICROgram(s)/kG/Min (4.93 mL/Hr) IV Continuous <Continuous>  senna 2 Tablet(s) Oral at bedtime  tiotropium 18 MICROgram(s) Capsule 1 Capsule(s) Inhalation once        VITALS:  T(F): 99.9 (21 @ 04:00), Max: 100.4 (21 @ 22:00)  HR: 94 (21 @ 08:00)  BP: 113/62 (21 @ 08:00)  RR: 22 (21 @ 08:00)  SpO2: 100% (21 @ 08:00)       @ 07:01  -   @ 07:00  --------------------------------------------------------  IN: 5467 mL / OUT: 5525 mL / NET: -58 mL     @ 07:01  -   @ 07:00  --------------------------------------------------------  IN: 4259 mL / OUT: 4122 mL / NET: 137 mL     @ 07:01  -   @ 09:02  --------------------------------------------------------  IN: 108 mL / OUT: 125 mL / NET: -17 mL          PHYSICAL EXAM:  Constitutional: on MV   Neck: No JVD  Respiratory: Crackles at base   Cardiovascular: S1, S2, RRR  Gastrointestinal: BS+, soft, NT/ND  Extremities: No cyanosis or clubbing. plus trace edema   : positive terrazas   Skin: No rashes    LABS:      140  |  106  |  29<H>  ----------------------------<  197<H>  4.5   |  24  |  2.4<H>    Creatinine Trend: 2.4<--, 2.2<--, 2.5<--, 6.1<--, 7.5<--, 6.3<--    Ca    7.6<L>      2021 05:50  Phos  3.2       Mg     2.4         TPro  7.1  /  Alb  2.1<L>  /  TBili  0.2  /  DBili      /  AST  29  /  ALT  15  /  AlkPhos  191<H>                            7.4    13.69 )-----------( 185      ( 2021 05:50 )             23.9       Urine Studies:  Urinalysis Basic - ( 2021 01:10 )    Color: Light Yellow / Appearance: Clear / S.013 / pH:   Gluc:  / Ketone: Negative  / Bili: Negative / Urobili: <2 mg/dL   Blood:  / Protein: 30 mg/dL / Nitrite: Negative   Leuk Esterase: Negative / RBC: 14 /HPF / WBC 0 /HPF   Sq Epi:  / Non Sq Epi: 0 /HPF / Bacteria: Negative      Creatinine, Random Urine: 82 mg/dL ( @ 15:34)  Protein/Creatinine Ratio Calculation: 0.2 Ratio ( @ 15:34)  Calcium, Random Urine: <1 mg/dL ( @ 15:34)  Potassium, Random Urine: 23 mmol/L (30 @ 15:34)  Sodium, Random Urine: 21.0 mmoL/L ( @ 15:34)  Chloride, Random Urine: <20 ( @ 15:34)    RADIOLOGY & ADDITIONAL STUDIES:

## 2021-06-04 NOTE — PROGRESS NOTE ADULT - ASSESSMENT
1. Acute Hypoxic Hypercarbic Respiratory Failure: secondary to septic shock, Toxic Metabolic encerphalopathy, continue on lung protective mechanical ventilation for now, will wean FiO2 for SpO2 greater then 92%,  tx nebs, pulmonary toilet, continue supportive care monitor for now. Check SAT and SBT.    2. Septic Shock: secondary to Bacteremia from diabetic foot ulcer, will continue empiric abx as per ID, continue levophed gtt will wean for maps greater then 65, continue monitor for now.     3. Diabetic Foot Ulcer: s/p debridement growing Staph aureus, s/p Right Knee Disarticulation for necrotizing soft tissue infection of right lower extremity by Vascular Surgery  for proper source control, continue supportive care, monitor for now.  Continue tx as per ID.    4. Bacteremia: secondary to Enterococcus, Coag neg Staph, and Staph Aureus most likley from Diabetic foot Ulcer, continue empiric abx as per ID, f/u Cx's.    5. CANDY on CKD: secondary to sepsis induced ATN,  off renal replacement therapy, continue supportive care, monitor for now of CVVHD.    6. Toxic Metabolic Encephelopathy: secondary to sepsis and multiorgan failure, continue monitor for now, tx supportively CT head neg for acute pathology. Less likely to be acute CVA, f/u repeat CT head once stable.    7. DM: tx NPH BID tx inuslin sliding scale, monitor BSG's, continue supportive care.     8. HTN: continue supportive care, monitor for now. Hold oral meds.    9. HLD: tx statin, continue supportive care, monitor for now.     10. MATA/ OHS: continue supportive care, monitor for now on MV.    11. Morbid obesity: continue supportive care, monitor for now.     12. NSTEMI: secondary to sepsis induced demand ischemia, less likely true ACS, continue supportive care for now, Monitor for now.     13. Anemia: secondary to Anemia of chronic disease will transfuse as needed for HGB less then 7, monitor for now.     14. DVT/GI px: tx PPI and hep subcut, lower ext US neg for DVT.    15. NUT: tx Tf's.    16. Diastolic CHF: continue supportive care, monitor for now.     Critical Care Time not including procedures 55 mins

## 2021-06-04 NOTE — PROGRESS NOTE ADULT - ASSESSMENT
dressing changes daily  if improves by early next week - will take him for AKA on Tuesday 6/8/21    SPECTRA [id number]

## 2021-06-04 NOTE — PROGRESS NOTE ADULT - ASSESSMENT
61 y/o M with PMH of DM, hypertension, dyslipidemia, sleep apnea, Charcot foot reconstruction with external fixation, morbid obesity presented for worsening right foot infection.     #DFU with Hx of MRSA infection  # Septic shock   # Acute hypoxic respiratory failure secondary to septic shock  # Toxic metabolic encephalopathy   - s/p intubation 5/31   - bacteremia from right foot abscess   - septic on admission, WBC 12,  + lactate 2.3. s/p cefepime flagyl and vanc in the ED  - Podiatry on board, f/u   - S/p disarticulation right knee 5/31 w/ vascular   - vascular following:   if improves by early next week - will take him for AKA on Tuesday 6/8/21  - ID following, f/u recs:  -linezolid 600 mg iv q12h  -Ceftaroline 200 mg iv q12h  -flagyl 500 mg iv q8h   -ECHO  - f/u cultures   - Echo 3/31: EF 55-60%, G2DD     #?Code stroke for possible CVA   - ptnt was code stroke on floors on 3/30 for AMS and confusion upgraded to CCU   - metabolic encephalopathy secondary to sepsis more likely than stroke  -  CT head neg for acute pathology, CTA w/ moderate stenoses at the junction of the precavernous and cavernous segments of both internal carotid arteries  - neuro recs for brain MR, can get repeat CT head when stable    # NSTEMI  - secondary to sepsis induced demand ischemia  - less likely true ACS  -monitor     #CANDY on CKD III  - secondary to sepsis induced ATN, will need renal replacement therapy as per nephrology, continue supportive care, monitor for now.   -creatinine 3.0 on admission, baseline around 1.4-1.8   - nephro following   - s/p CVVHD 6/1 and 6/2, holding CVVHD for now ptnt w/ good urine output     #anemia of chronic disease   - transfuse Hb < 7  - monitor     #Diabetes Mellitus  -on insulin regimen   - a1c 8.9   -Monitor      #Hypertension  -holding oral meds     #Dyslipidemia  -c/w home meds    #MATA/ OHS    #morbid obesity    Diet: npo w/ tube feeds   DVT ppx: heparin gtt  GI ppx: protonix   Dispo: acute

## 2021-06-04 NOTE — PROGRESS NOTE ADULT - PROBLEM SELECTOR PLAN 4
verified HCP - hard copy in chart  Palliative Care introduced to cristiane on 6/1; remain available  continue Full Code and current care management  will follow. verified HCP - hard copy in chart  continue Full Code and current care management  will follow.

## 2021-06-04 NOTE — PROGRESS NOTE ADULT - SUBJECTIVE AND OBJECTIVE BOX
THOMASFERNANDEZ  60y, Male    All available historical data reviewed    OVERNIGHT EVENTS:  low grade fevers  fio2 40%  on Levo  Off CVVh  no diarrhea    ROS:  unable to obtain history secondary to patient's mental status and/or sedation     VITALS:  T(F): 99.6, Max: 100.4 (21 @ 22:00)  HR: 94  BP: 113/62  RR: 22Vital Signs Last 24 Hrs  T(C): 37.6 (2021 08:00), Max: 38 (2021 22:00)  T(F): 99.6 (2021 08:00), Max: 100.4 (2021 22:00)  HR: 94 (2021 11:00) (78 - 100)  BP: 113/62 (2021 08:00) (113/62 - 122/49)  BP(mean): 87 (2021 08:00) (69 - 87)  RR: 22 (2021 11:00) (13 - 26)  SpO2: 99% (2021 11:00) (98% - 100%)    TESTS & MEASUREMENTS:                        7.4    13.69 )-----------( 185      ( 2021 05:50 )             23.9     06-04    140  |  105  |  29<H>  ----------------------------<  167<H>  4.3   |  25  |  2.2<H>    Ca    7.5<L>      2021 11:00  Phos  3.2     06-04  Mg     2.4     06-04    TPro  7.1  /  Alb  2.1<L>  /  TBili  0.2  /  DBili  x   /  AST  29  /  ALT  15  /  AlkPhos  191<H>  06-04    LIVER FUNCTIONS - ( 2021 05:50 )  Alb: 2.1 g/dL / Pro: 7.1 g/dL / ALK PHOS: 191 U/L / ALT: 15 U/L / AST: 29 U/L / GGT: x             Culture - Blood (collected 21 @ 22:09)  Source: .Blood Blood-Peripheral  Preliminary Report (21 @ 08:01):    No growth to date.    Culture - Blood (collected 21 @ 22:09)  Source: .Blood Blood-Peripheral  Preliminary Report (21 @ 08:01):    No growth to date.    Culture - Acid Fast - Other w/Smear (collected 21 @ 06:10)  Source: .Other None(R-FOOT)  Preliminary Report (21 @ 15:07):    Culture is being performed.    Culture - Surgical Swab (collected 21 @ 06:10)  Source: .Surgical Swab None  Final Report (21 @ 16:27):    No growth at 5 days    Culture - Acid Fast - Other w/Smear (collected 21 @ 06:10)  Source: .Other None (R-FOOT)  Preliminary Report (21 @ 15:07):    Culture is being performed.    Culture - Surgical Swab (collected 21 @ 06:10)  Source: .Surgical Swab None  Final Report (21 @ 17:24):    Numerous Methicillin resistant Staphylococcus aureus    Rare Corynebacterium species "Susceptibilities not performed"  Organism: Methicillin resistant Staphylococcus aureus (21 @ 17:24)  Organism: Methicillin resistant Staphylococcus aureus (21 @ 17:24)      -  Ampicillin/Sulbactam: R 16/8      -  Cefazolin: R 8      -  Clindamycin: S <=0.25      -  Daptomycin: S 0.5      -  Erythromycin: R >4      -  Gentamicin: S <=1 Should not be used as monotherapy      -  Linezolid: S 1      -  Oxacillin: R >2      -  Penicillin: R >8      -  RIF- Rifampin: S <=1 Should not be used as monotherapy      -  Tetra/Doxy: S <=1      -  Trimethoprim/Sulfamethoxazole: S <=0.5/9.5      -  Vancomycin: S 2      Method Type: DAVIDA    Culture - Urine (collected 21 @ 01:20)  Source: .Urine Clean Catch (Midstream)  Final Report (21 @ 08:11):    <10,000 CFU/mL Normal Urogenital Mary    Culture - Abscess with Gram Stain (collected 21 @ 19:51)  Source: .Abscess Right - Foot  Final Report (21 @ 09:25):    Moderate Methicillin resistant Staphylococcus aureus    Few Corynebacterium species "Susceptibilities not performed"  Organism: Methicillin resistant Staphylococcus aureus (21 @ 09:25)  Organism: Methicillin resistant Staphylococcus aureus (21 @ 09:25)      -  Ampicillin/Sulbactam: R 16/8      -  Cefazolin: R 8      -  Clindamycin: S <=0.25      -  Daptomycin: S 0.5      -  Erythromycin: R >4      -  Gentamicin: S <=1 Should not be used as monotherapy      -  Linezolid: S 2      -  Oxacillin: R >2      -  Penicillin: R >8      -  RIF- Rifampin: S <=1 Should not be used as monotherapy      -  Tetra/Doxy: S <=1      -  Trimethoprim/Sulfamethoxazole: S 1/19      -  Vancomycin: S 2      Method Type: DAVIDA    Culture - Other (collected 21 @ 18:32)  Source: .Other R foot DFU  Final Report (21 @ 15:44):    Moderate Methicillin resistant Staphylococcus aureus    Normal skin mary isolated  Organism: Methicillin resistant Staphylococcus aureus (21 @ 15:44)  Organism: Methicillin resistant Staphylococcus aureus (21 @ 15:44)      -  Ampicillin/Sulbactam: R 16/8      -  Cefazolin: R 16      -  Clindamycin: S <=0.25      -  Daptomycin: S 0.5      -  Erythromycin: R >4      -  Gentamicin: S <=1 Should not be used as monotherapy      -  Linezolid: S 2      -  Oxacillin: R >2      -  Penicillin: R >8      -  RIF- Rifampin: S <=1 Should not be used as monotherapy      -  Tetra/Doxy: S <=1      -  Trimethoprim/Sulfamethoxazole: S <=0.5/9.5      -  Vancomycin: S 2      Method Type: DAVIDA    Culture - Blood (collected 21 @ 18:28)  Source: .Blood Blood-Peripheral  Gram Stain (21 @ 18:30):    Growth in anaerobic bottle: Gram positive cocci in pairs    Growth in aerobic bottle: Gram positive cocci in pairs  Final Report (21 @ 15:45):    Growth in anaerobic bottle: Enterococcus faecalis    Growth in aerobic and anaerobic bottles: Methicillin resistant    Staphylococcus aureus    ***Blood Panel PCR results on this specimen are available    approximately 3 hours after the Gram stain result.***    Gram stain, PCR, and/or culture results may not always    correspond due to difference in methodologies.    ************************************************************    This PCR assay was performed by multiplex PCR. This    Assay tests for 66 bacterialand resistance gene targets.    Please refer to the [facility] test directory    at [email or web address] for details.  Organism: Blood Culture PCR  Enterococcus faecalis  Methicillin resistant Staphylococcus aureus (21 @ 15:45)  Organism: Methicillin resistant Staphylococcus aureus (21 @ 15:45)      -  Ampicillin/Sulbactam: R 16/8      -  Cefazolin: R 16      -  Clindamycin: S <=0.25      -  Daptomycin: S 0.5      -  Erythromycin: R >4      -  Gentamicin: S <=1 Should not be used as monotherapy      -  Linezolid: S 2      -  Oxacillin: R >2      -  Penicillin: R >8      -  RIF- Rifampin: S <=1 Should not be used as monotherapy      -  Tetra/Doxy: S <=1      -  Trimethoprim/Sulfamethoxazole: S <=0.5/9.5      -  Vancomycin: S 1      Method Type: DAVIDA  Organism: Enterococcus faecalis (21 @ 15:45)      -  Ampicillin: S <=2 Predicts results to ampicillin/sulbactam, amoxacillin-clavulanate and  piperacillin-tazobactam.      -  Gentamicin synergy: S      -  Vancomycin: S 1      Method Type: DAVIDA  Organism: Blood Culture PCR (21 @ 15:45)      -  Coagulase negative Staphylococcus: Detec      -  Enterococcus faecalis: Detec      Method Type: PCR    Culture - Blood (collected 21 @ 18:28)  Source: .Blood Blood-Peripheral  Gram Stain (21 @ 06:00):    Growth in aerobic bottle: Gram positive cocci in pairs    Growth in anaerobic bottle: Gram positive cocci in pairs  Final Report (21 @ 15:46):    Growth in aerobic and anaerobic bottles: Methicillin resistant    Staphylococcus aureus    See previous culture [id number]      Urinalysis Basic - ( 2021 01:10 )    Color: Light Yellow / Appearance: Clear / S.013 / pH: x  Gluc: x / Ketone: Negative  / Bili: Negative / Urobili: <2 mg/dL   Blood: x / Protein: 30 mg/dL / Nitrite: Negative   Leuk Esterase: Negative / RBC: 14 /HPF / WBC 0 /HPF   Sq Epi: x / Non Sq Epi: 0 /HPF / Bacteria: Negative          RADIOLOGY & ADDITIONAL TESTS:  Personal review of radiological diagnostics performed  Echo and EKG results noted when applicable.     MEDICATIONS:  ALBUTerol    90 MICROgram(s) HFA Inhaler 1 Puff(s) Inhalation once  aspirin  chewable 81 milliGRAM(s) Oral daily  ceftaroline fosamil IVPB 200 milliGRAM(s) IV Intermittent every 12 hours  chlorhexidine 0.12% Liquid 15 milliLiter(s) Oral Mucosa two times a day  chlorhexidine 4% Liquid 1 Application(s) Topical <User Schedule>  dextrose 40% Gel 15 Gram(s) Oral once  dextrose 5%. 1000 milliLiter(s) IV Continuous <Continuous>  dextrose 5%. 1000 milliLiter(s) IV Continuous <Continuous>  dextrose 50% Injectable 25 Gram(s) IV Push once  dextrose 50% Injectable 12.5 Gram(s) IV Push once  dextrose 50% Injectable 25 Gram(s) IV Push once  fentaNYL   Infusion. 0.5 MICROgram(s)/kG/Hr IV Continuous <Continuous>  glucagon  Injectable 1 milliGRAM(s) IntraMuscular once  heparin   Injectable 5000 Unit(s) SubCutaneous every 8 hours  insulin lispro (ADMELOG) corrective regimen sliding scale   SubCutaneous three times a day before meals  insulin NPH human recombinant 15 Unit(s) SubCutaneous every 12 hours  linezolid  IVPB 600 milliGRAM(s) IV Intermittent every 12 hours  metroNIDAZOLE  IVPB 500 milliGRAM(s) IV Intermittent every 8 hours  mupirocin 2% Nasal 1 Application(s) Nasal two times a day  norepinephrine Infusion 0.05 MICROgram(s)/kG/Min IV Continuous <Continuous>  pantoprazole    Tablet 40 milliGRAM(s) Oral before breakfast  polyethylene glycol 3350 17 Gram(s) Oral daily  propofol Infusion 5.004 MICROgram(s)/kG/Min IV Continuous <Continuous>  senna 2 Tablet(s) Oral at bedtime  tiotropium 18 MICROgram(s) Capsule 1 Capsule(s) Inhalation once      ANTIBIOTICS:  ceftaroline fosamil IVPB 200 milliGRAM(s) IV Intermittent every 12 hours  linezolid  IVPB 600 milliGRAM(s) IV Intermittent every 12 hours  metroNIDAZOLE  IVPB 500 milliGRAM(s) IV Intermittent every 8 hours

## 2021-06-04 NOTE — PROGRESS NOTE ADULT - SUBJECTIVE AND OBJECTIVE BOX
FERNANDEZ GUZMAN             MRN-[id number]    CC: weakness     HPI:  Patient is a 59 y/o M with PMH of DM, hypertension, dyslipidemia, sleep apnea, Charcot foot reconstruction with external fixation, super morbid obesity present for worsening right foot infection.  Pt reports he has had this foot ulcer at various stage of healing for 13 years. Patient has been admitted for IV antibiotics multiple times in the past for osteomyelitis of right foot. He has history of MRSA infection in foot. pt has baseline neuropathy in both feet so does not feel any pain.  Patient states his Infection has been gradually getting worse despite treatment with bactrim, he saw his podiatrists and he was sent in by podiatry for IV abx . No fever, chills, cp, sob, abd pain, nausea, vomiting, dysuria, calf pain.     In ED  T(C): 37.3 (05-29-21 @ 01:31), Max: 38 (05-28-21 @ 20:27)  HR: 100 (05-29-21 @ 01:23) (99 - 118)  BP: 119/59 (05-29-21 @ 01:23) (119/59 - 139/69)  RR: 18 (05-29-21 @ 01:23) (17 - 18)  SpO2: 96% (05-29-21 @ 01:23) (86% - 100%)  there is a Right foot ulcer on the lateral aspect of the plantar surface measuring 8.5-3cm. Ulcer is surrounded by macerated tissue. Serous drainage from wound. Kurlex and sponge dressing in place, clean, dry and intact. Patient was I&D'd bedside by podiatry in the ed. purulent drainage was evacuated and sent to the lab for culture. Patient is scheduled for OR with podiatry in the AM.   (29 May 2021 02:29)      SUBJECTIVE: Patient seen and examined at bedside. Patient is off CVVHD, vent setting improved.    ROS:  UNABLE TO OBTAIN  due to: intubated     PEx:  60y            General: vented sedated; NAD; morbidly obese  HEENT:  NCAT   CVS:NSR; +edema  Resp: Unlabored Non tachypneic No increased WOB  GI:  obese  :  Lovelace   Musc: No C/C/E    Neuro: sedated    Last BM: no stool documented;    ALLERGIES:  NKDA     OPIATE NAÏVE (Y/N): n    MEDICATIONS: REVIEWED  MEDICATIONS  (STANDING):  ALBUTerol    90 MICROgram(s) HFA Inhaler 1 Puff(s) Inhalation once  aspirin  chewable 81 milliGRAM(s) Oral daily  ceftaroline fosamil IVPB 200 milliGRAM(s) IV Intermittent every 12 hours  chlorhexidine 0.12% Liquid 15 milliLiter(s) Oral Mucosa two times a day  chlorhexidine 4% Liquid 1 Application(s) Topical <User Schedule>  dextrose 40% Gel 15 Gram(s) Oral once  dextrose 5%. 1000 milliLiter(s) (50 mL/Hr) IV Continuous <Continuous>  dextrose 5%. 1000 milliLiter(s) (100 mL/Hr) IV Continuous <Continuous>  dextrose 50% Injectable 25 Gram(s) IV Push once  dextrose 50% Injectable 12.5 Gram(s) IV Push once  dextrose 50% Injectable 25 Gram(s) IV Push once  fentaNYL   Infusion. 0.5 MICROgram(s)/kG/Hr (8.21 mL/Hr) IV Continuous <Continuous>  glucagon  Injectable 1 milliGRAM(s) IntraMuscular once  heparin   Injectable 5000 Unit(s) SubCutaneous every 8 hours  insulin lispro (ADMELOG) corrective regimen sliding scale   SubCutaneous three times a day before meals  insulin NPH human recombinant 15 Unit(s) SubCutaneous every 12 hours  linezolid  IVPB 600 milliGRAM(s) IV Intermittent every 12 hours  metroNIDAZOLE  IVPB 500 milliGRAM(s) IV Intermittent every 8 hours  mupirocin 2% Nasal 1 Application(s) Nasal two times a day  norepinephrine Infusion 0.05 MICROgram(s)/kG/Min (7.7 mL/Hr) IV Continuous <Continuous>  pantoprazole    Tablet 40 milliGRAM(s) Oral before breakfast  polyethylene glycol 3350 17 Gram(s) Oral daily  propofol Infusion 5.004 MICROgram(s)/kG/Min (4.93 mL/Hr) IV Continuous <Continuous>  senna 2 Tablet(s) Oral at bedtime  tiotropium 18 MICROgram(s) Capsule 1 Capsule(s) Inhalation once    MEDICATIONS  (PRN):      LABS: REVIEWED  CBC:                        7.4    13.69 )-----------( 185      ( 04 Jun 2021 05:50 )             23.9     CMP:    06-04    140  |  105  |  29<H>  ----------------------------<  167<H>  4.3   |  25  |  2.2<H>    Ca    7.5<L>      04 Jun 2021 11:00  Phos  3.2     06-04  Mg     2.4     06-04    TPro  7.1  /  Alb  2.1<L>  /  TBili  0.2  /  DBili  x   /  AST  29  /  ALT  15  /  AlkPhos  191<H>  06-04  Albumin, Serum: 2.1 g/dL (06-04-21 @ 05:50)    ADVANCED DIRECTIVES:            FULL CODE             DECISION MAKER: cristiane Truong  LEGAL SURROGATE: Adan Truong     PSYCHOSOCIAL-SPIRITUAL ASSESSMENT:       Reviewed       Care plan unchanged           GOALS OF CARE DISCUSSION          Pal care introduced 6/1 and remains available PRN       CURRENT DISPO PLAN:     WILL REMAIN IN HOSPITAL    REFERRALS	        Palliative Med        Unit SW/Case Mgmt

## 2021-06-04 NOTE — PROGRESS NOTE ADULT - ASSESSMENT
· Assessment	  59 y/o M with PMH of DM, hypertension, dyslipidemia, sleep apnea, Charcot foot reconstruction with external fixation, super morbid obesity present for worsening right foot infection.  Pt reports he has had this foot ulcer at various stage of healing for 13 years. Patient has been admitted for IV antibiotics multiple times in the past for osteomyelitis of right foot. He has history of MRSA infection in foot. pt has baseline neuropathy in both feet so does not feel any pain.  Patient states his Infection has been gradually getting worse despite treatment with bactrim, he saw his podiatrists and he was sent in by podiatry for IV abx .    IMPRESSION;  MSOF : resp failure with fio2 40%, on pressors, renal failure off  CVVH, metabolic encephalopathy  5/31 S/p disarticulation right knee   -5/28 BCx E fecalis, ORSA  -etiology of bacteremia was right foot abscess  -5/29 R foot : ORSA , Corynebacterium  -5/30 BCx NG    RECOMMENDATIONS;  -linezolid 600 mg iv q12h with monitoring of CBC  -Ceftaroline 200 mg iv q12h  -flagyl 500 mg iv q8h   -ECHO  I shall be away from 3/6-13 and will be covered by Andriy Mejia/Wilfredo during that period

## 2021-06-04 NOTE — PROGRESS NOTE ADULT - SUBJECTIVE AND OBJECTIVE BOX
SUBJECTIVE:    Patient is a 60y old Male who presents with a chief complaint of dfu (2021 09:51)    Currently admitted to medicine with the primary diagnosis of Diabetic infection of right foot       Today is hospital day 6d. CVVHD stopped yesterday, patient making good urine, ~300cc/hour this AM. Feeds held this AM for SAT/SBT.       PAST MEDICAL & SURGICAL HISTORY  MATA on CPAP    DM (diabetes mellitus)    HTN (hypertension)    High cholesterol    Charcot ankle, right    History of percutaneous angioplasty    H/O skin graft    Left toe amputee  4 TOES    Diabetic Charcot foot  Fixation with external device      SOCIAL HISTORY:  Negative for smoking/alcohol/drug use.     ALLERGIES:  No Known Allergies    MEDICATIONS:  STANDING MEDICATIONS  ALBUTerol    90 MICROgram(s) HFA Inhaler 1 Puff(s) Inhalation once  aspirin  chewable 81 milliGRAM(s) Oral daily  ceftaroline fosamil IVPB 200 milliGRAM(s) IV Intermittent every 12 hours  chlorhexidine 0.12% Liquid 15 milliLiter(s) Oral Mucosa two times a day  chlorhexidine 4% Liquid 1 Application(s) Topical <User Schedule>  dextrose 40% Gel 15 Gram(s) Oral once  dextrose 5%. 1000 milliLiter(s) IV Continuous <Continuous>  dextrose 5%. 1000 milliLiter(s) IV Continuous <Continuous>  dextrose 50% Injectable 25 Gram(s) IV Push once  dextrose 50% Injectable 25 Gram(s) IV Push once  dextrose 50% Injectable 12.5 Gram(s) IV Push once  fentaNYL   Infusion. 0.5 MICROgram(s)/kG/Hr IV Continuous <Continuous>  glucagon  Injectable 1 milliGRAM(s) IntraMuscular once  heparin   Injectable 5000 Unit(s) SubCutaneous every 8 hours  insulin lispro (ADMELOG) corrective regimen sliding scale   SubCutaneous three times a day before meals  insulin NPH human recombinant 15 Unit(s) SubCutaneous every 12 hours  linezolid  IVPB 600 milliGRAM(s) IV Intermittent every 12 hours  metroNIDAZOLE  IVPB 500 milliGRAM(s) IV Intermittent every 8 hours  mupirocin 2% Nasal 1 Application(s) Nasal two times a day  norepinephrine Infusion 0.05 MICROgram(s)/kG/Min IV Continuous <Continuous>  pantoprazole    Tablet 40 milliGRAM(s) Oral before breakfast  polyethylene glycol 3350 17 Gram(s) Oral daily  propofol Infusion 5.004 MICROgram(s)/kG/Min IV Continuous <Continuous>  senna 2 Tablet(s) Oral at bedtime  tiotropium 18 MICROgram(s) Capsule 1 Capsule(s) Inhalation once    PRN MEDICATIONS    VITALS:   T(F): 99.9  HR: 94  BP: 113/62  RR: 22  SpO2: 100%    PHYSICAL EXAM:  GEN: Intubated, sedated   LUNGS: Decreased breath sounds bilaterally   HEART: S1/S2 present   ABD: Soft, bowel sounds present.  EXT: right lower extremity bandaged   NEURO: non focal     Lovelace present       LABS:                        7.4    13.69 )-----------( 185      ( 2021 05:50 )             23.9     06-    140  |  106  |  29<H>  ----------------------------<  197<H>  4.5   |  24  |  2.4<H>    Ca    7.6<L>      2021 05:50  Phos  3.2     -  Mg     2.4     -    TPro  7.1  /  Alb  2.1<L>  /  TBili  0.2  /  DBili  x   /  AST  29  /  ALT  15  /  AlkPhos  191<H>      PTT - ( 2021 12:30 )  PTT:42.1 sec  Urinalysis Basic - ( 2021 01:10 )    Color: Light Yellow / Appearance: Clear / S.013 / pH: x  Gluc: x / Ketone: Negative  / Bili: Negative / Urobili: <2 mg/dL   Blood: x / Protein: 30 mg/dL / Nitrite: Negative   Leuk Esterase: Negative / RBC: 14 /HPF / WBC 0 /HPF   Sq Epi: x / Non Sq Epi: 0 /HPF / Bacteria: Negative      ABG - ( 2021 03:55 )  pH, Arterial: 7.41  pH, Blood: x     /  pCO2: 42    /  pO2: 195   / HCO3: 26    / Base Excess: 1.5   /  SaO2: 100       Lactate, Blood: 1.1 mmol/L (21 @ 05:50)      RADIOLOGY:  < from: Xray Chest 1 View- PORTABLE-Routine (Xray Chest 1 View- PORTABLE-Routine in AM.) (21 @ 07:35) >  EXAM:  XR CHEST PORTABLE ROUTINE 1V          PROCEDURE DATE:  2021      INTERPRETATION:  Clinical History / Reason for exam: Shortness of breath    Comparison : Chest radiograph Loli 3, 2021.    Technique/Positioning: Single AP view of the chest.    Findings:    Support devices: Endotracheal tube, feeding tube, right IJ hemodialysis catheter and left IJ centimeters catheter are unchanged.    Cardiac/mediastinum/hilum: Unchanged.    Lung parenchyma/Pleura: No consolidation, effusion or pneumothorax.    Skeleton/soft tissues: Unchanged.    Impression:    No consolidation, effusion or pneumothorax.    Tubes and lines in appropriate position    ELLIOT LANDAU MD; Attending Radiologist  This document has been electronically signed. 2021  9:31AM    < end of copied text >

## 2021-06-04 NOTE — PROGRESS NOTE ADULT - ASSESSMENT
61 y/o M with PMH of DM, hypertension, dyslipidemia, sleep apnea, Charcot foot reconstruction with external fixation, morbid obesity presenting to the hospital on 5/29 with worsening foot ulcer.  s/p Right Knee Disarticulation for necrotizing soft tissue infection of right lower extremity          ·	CANDY on CKD ( unknown stage last known creat 1.4 in 11/2019) / rule out ATN / obstructive uropathy/ sp CT angio (too early for FLO)/ doubt IRGN (infection related GN)/ doubt AIN (multiple antibx courses)/ high bicarb/chr resp acidosis  ·	non oliguric now / creat better now/ off CVVHD / can remove [place]   ·	if UO decreases consider diuretics / follow for dehydration post ATN diuresis   ·	renal ultrasound noted w/o hydronephrosis, s/p terrazas catheter placement   ·	UA noted, minimal proteinuria    ·	appreciate ID f/u, on ceftarolien flagyl and zyvox  ·	sp debridement of DFU as per podiatry  ·	C3 C4 wnl SPEP polyclonal gammopathy / K/L noted expected for CANDY   ·	follow IP and Ca   ·	d/w ICU team     will follow

## 2021-06-05 LAB
ALBUMIN SERPL ELPH-MCNC: 2.4 G/DL — LOW (ref 3.5–5.2)
ALP SERPL-CCNC: 152 U/L — HIGH (ref 30–115)
ALT FLD-CCNC: 15 U/L — SIGNIFICANT CHANGE UP (ref 0–41)
ANION GAP SERPL CALC-SCNC: 6 MMOL/L — LOW (ref 7–14)
ANION GAP SERPL CALC-SCNC: 7 MMOL/L — SIGNIFICANT CHANGE UP (ref 7–14)
APTT BLD: 32.3 SEC — SIGNIFICANT CHANGE UP (ref 27–39.2)
AST SERPL-CCNC: 37 U/L — SIGNIFICANT CHANGE UP (ref 0–41)
BASOPHILS # BLD AUTO: 0.02 K/UL — SIGNIFICANT CHANGE UP (ref 0–0.2)
BASOPHILS # BLD AUTO: 0.03 K/UL — SIGNIFICANT CHANGE UP (ref 0–0.2)
BASOPHILS # BLD AUTO: 0.03 K/UL — SIGNIFICANT CHANGE UP (ref 0–0.2)
BASOPHILS NFR BLD AUTO: 0.2 % — SIGNIFICANT CHANGE UP (ref 0–1)
BILIRUB SERPL-MCNC: 0.2 MG/DL — SIGNIFICANT CHANGE UP (ref 0.2–1.2)
BLD GP AB SCN SERPL QL: SIGNIFICANT CHANGE UP
BUN SERPL-MCNC: 27 MG/DL — HIGH (ref 10–20)
BUN SERPL-MCNC: 28 MG/DL — HIGH (ref 10–20)
CALCIUM SERPL-MCNC: 7.3 MG/DL — LOW (ref 8.5–10.1)
CALCIUM SERPL-MCNC: 7.7 MG/DL — LOW (ref 8.5–10.1)
CHLORIDE SERPL-SCNC: 108 MMOL/L — SIGNIFICANT CHANGE UP (ref 98–110)
CHLORIDE SERPL-SCNC: 109 MMOL/L — SIGNIFICANT CHANGE UP (ref 98–110)
CO2 SERPL-SCNC: 25 MMOL/L — SIGNIFICANT CHANGE UP (ref 17–32)
CO2 SERPL-SCNC: 27 MMOL/L — SIGNIFICANT CHANGE UP (ref 17–32)
CREAT SERPL-MCNC: 2.1 MG/DL — HIGH (ref 0.7–1.5)
CREAT SERPL-MCNC: 2.2 MG/DL — HIGH (ref 0.7–1.5)
CULTURE RESULTS: SIGNIFICANT CHANGE UP
CULTURE RESULTS: SIGNIFICANT CHANGE UP
D DIMER BLD IA.RAPID-MCNC: 666 NG/ML DDU — HIGH (ref 0–230)
EOSINOPHIL # BLD AUTO: 0.52 K/UL — SIGNIFICANT CHANGE UP (ref 0–0.7)
EOSINOPHIL # BLD AUTO: 0.54 K/UL — SIGNIFICANT CHANGE UP (ref 0–0.7)
EOSINOPHIL # BLD AUTO: 0.6 K/UL — SIGNIFICANT CHANGE UP (ref 0–0.7)
EOSINOPHIL NFR BLD AUTO: 4 % — SIGNIFICANT CHANGE UP (ref 0–8)
EOSINOPHIL NFR BLD AUTO: 4.2 % — SIGNIFICANT CHANGE UP (ref 0–8)
EOSINOPHIL NFR BLD AUTO: 4.5 % — SIGNIFICANT CHANGE UP (ref 0–8)
EOSINOPHIL NFR URNS MANUAL: NEGATIVE — SIGNIFICANT CHANGE UP
FIBRINOGEN PPP-MCNC: >700 MG/DL — HIGH (ref 204.4–570.6)
GAS PNL BLDA: SIGNIFICANT CHANGE UP
GAS PNL BLDA: SIGNIFICANT CHANGE UP
GLUCOSE BLDC GLUCOMTR-MCNC: 164 MG/DL — HIGH (ref 70–99)
GLUCOSE BLDC GLUCOMTR-MCNC: 177 MG/DL — HIGH (ref 70–99)
GLUCOSE BLDC GLUCOMTR-MCNC: 180 MG/DL — HIGH (ref 70–99)
GLUCOSE BLDC GLUCOMTR-MCNC: 205 MG/DL — HIGH (ref 70–99)
GLUCOSE SERPL-MCNC: 157 MG/DL — HIGH (ref 70–99)
GLUCOSE SERPL-MCNC: 186 MG/DL — HIGH (ref 70–99)
HCT VFR BLD CALC: 21.9 % — LOW (ref 42–52)
HCT VFR BLD CALC: 23.5 % — LOW (ref 42–52)
HCT VFR BLD CALC: 24.2 % — LOW (ref 42–52)
HCT VFR BLD CALC: 25 % — LOW (ref 42–52)
HGB BLD-MCNC: 6.5 G/DL — CRITICAL LOW (ref 14–18)
HGB BLD-MCNC: 6.9 G/DL — CRITICAL LOW (ref 14–18)
HGB BLD-MCNC: 7.1 G/DL — LOW (ref 14–18)
HGB BLD-MCNC: 7.7 G/DL — LOW (ref 14–18)
IMM GRANULOCYTES NFR BLD AUTO: 2.8 % — HIGH (ref 0.1–0.3)
IMM GRANULOCYTES NFR BLD AUTO: 3.1 % — HIGH (ref 0.1–0.3)
IMM GRANULOCYTES NFR BLD AUTO: 3.3 % — HIGH (ref 0.1–0.3)
INR BLD: 1.15 RATIO — SIGNIFICANT CHANGE UP (ref 0.65–1.3)
LACTATE SERPL-SCNC: 0.8 MMOL/L — SIGNIFICANT CHANGE UP (ref 0.7–2)
LYMPHOCYTES # BLD AUTO: 1.38 K/UL — SIGNIFICANT CHANGE UP (ref 1.2–3.4)
LYMPHOCYTES # BLD AUTO: 1.43 K/UL — SIGNIFICANT CHANGE UP (ref 1.2–3.4)
LYMPHOCYTES # BLD AUTO: 1.63 K/UL — SIGNIFICANT CHANGE UP (ref 1.2–3.4)
LYMPHOCYTES # BLD AUTO: 10.5 % — LOW (ref 20.5–51.1)
LYMPHOCYTES # BLD AUTO: 11.2 % — LOW (ref 20.5–51.1)
LYMPHOCYTES # BLD AUTO: 12.1 % — LOW (ref 20.5–51.1)
MAGNESIUM SERPL-MCNC: 2.3 MG/DL — SIGNIFICANT CHANGE UP (ref 1.8–2.4)
MAGNESIUM SERPL-MCNC: 2.3 MG/DL — SIGNIFICANT CHANGE UP (ref 1.8–2.4)
MCHC RBC-ENTMCNC: 26.5 PG — LOW (ref 27–31)
MCHC RBC-ENTMCNC: 26.5 PG — LOW (ref 27–31)
MCHC RBC-ENTMCNC: 26.7 PG — LOW (ref 27–31)
MCHC RBC-ENTMCNC: 27.2 PG — SIGNIFICANT CHANGE UP (ref 27–31)
MCHC RBC-ENTMCNC: 29.3 G/DL — LOW (ref 32–37)
MCHC RBC-ENTMCNC: 29.4 G/DL — LOW (ref 32–37)
MCHC RBC-ENTMCNC: 29.7 G/DL — LOW (ref 32–37)
MCHC RBC-ENTMCNC: 30.8 G/DL — LOW (ref 32–37)
MCV RBC AUTO: 88.3 FL — SIGNIFICANT CHANGE UP (ref 80–94)
MCV RBC AUTO: 90.1 FL — SIGNIFICANT CHANGE UP (ref 80–94)
MCV RBC AUTO: 90.3 FL — SIGNIFICANT CHANGE UP (ref 80–94)
MCV RBC AUTO: 90.4 FL — SIGNIFICANT CHANGE UP (ref 80–94)
MONOCYTES # BLD AUTO: 1.34 K/UL — HIGH (ref 0.1–0.6)
MONOCYTES # BLD AUTO: 1.42 K/UL — HIGH (ref 0.1–0.6)
MONOCYTES # BLD AUTO: 1.71 K/UL — HIGH (ref 0.1–0.6)
MONOCYTES NFR BLD AUTO: 10.5 % — HIGH (ref 1.7–9.3)
MONOCYTES NFR BLD AUTO: 10.5 % — HIGH (ref 1.7–9.3)
MONOCYTES NFR BLD AUTO: 13.1 % — HIGH (ref 1.7–9.3)
NEUTROPHILS # BLD AUTO: 9 K/UL — HIGH (ref 1.4–6.5)
NEUTROPHILS # BLD AUTO: 9.09 K/UL — HIGH (ref 1.4–6.5)
NEUTROPHILS # BLD AUTO: 9.38 K/UL — HIGH (ref 1.4–6.5)
NEUTROPHILS NFR BLD AUTO: 69.4 % — SIGNIFICANT CHANGE UP (ref 42.2–75.2)
NEUTROPHILS NFR BLD AUTO: 69.6 % — SIGNIFICANT CHANGE UP (ref 42.2–75.2)
NEUTROPHILS NFR BLD AUTO: 70.6 % — SIGNIFICANT CHANGE UP (ref 42.2–75.2)
NRBC # BLD: 0 /100 WBCS — SIGNIFICANT CHANGE UP (ref 0–0)
PHOSPHATE SERPL-MCNC: 2.2 MG/DL — SIGNIFICANT CHANGE UP (ref 2.1–4.9)
PHOSPHATE SERPL-MCNC: 2.7 MG/DL — SIGNIFICANT CHANGE UP (ref 2.1–4.9)
PLATELET # BLD AUTO: 154 K/UL — SIGNIFICANT CHANGE UP (ref 130–400)
PLATELET # BLD AUTO: 157 K/UL — SIGNIFICANT CHANGE UP (ref 130–400)
PLATELET # BLD AUTO: 160 K/UL — SIGNIFICANT CHANGE UP (ref 130–400)
PLATELET # BLD AUTO: 167 K/UL — SIGNIFICANT CHANGE UP (ref 130–400)
POTASSIUM SERPL-MCNC: 4.6 MMOL/L — SIGNIFICANT CHANGE UP (ref 3.5–5)
POTASSIUM SERPL-MCNC: 4.7 MMOL/L — SIGNIFICANT CHANGE UP (ref 3.5–5)
POTASSIUM SERPL-SCNC: 4.6 MMOL/L — SIGNIFICANT CHANGE UP (ref 3.5–5)
POTASSIUM SERPL-SCNC: 4.7 MMOL/L — SIGNIFICANT CHANGE UP (ref 3.5–5)
PROT SERPL-MCNC: 6.9 G/DL — SIGNIFICANT CHANGE UP (ref 6–8)
PROTHROM AB SERPL-ACNC: 13.2 SEC — HIGH (ref 9.95–12.87)
RBC # BLD: 2.43 M/UL — LOW (ref 4.7–6.1)
RBC # BLD: 2.6 M/UL — LOW (ref 4.7–6.1)
RBC # BLD: 2.68 M/UL — LOW (ref 4.7–6.1)
RBC # BLD: 2.83 M/UL — LOW (ref 4.7–6.1)
RBC # FLD: 17.5 % — HIGH (ref 11.5–14.5)
RBC # FLD: 17.9 % — HIGH (ref 11.5–14.5)
RBC # FLD: 17.9 % — HIGH (ref 11.5–14.5)
RBC # FLD: 18 % — HIGH (ref 11.5–14.5)
SODIUM SERPL-SCNC: 141 MMOL/L — SIGNIFICANT CHANGE UP (ref 135–146)
SODIUM SERPL-SCNC: 141 MMOL/L — SIGNIFICANT CHANGE UP (ref 135–146)
SPECIMEN SOURCE: SIGNIFICANT CHANGE UP
SPECIMEN SOURCE: SIGNIFICANT CHANGE UP
WBC # BLD: 11.84 K/UL — HIGH (ref 4.8–10.8)
WBC # BLD: 12.75 K/UL — HIGH (ref 4.8–10.8)
WBC # BLD: 13.1 K/UL — HIGH (ref 4.8–10.8)
WBC # BLD: 13.48 K/UL — HIGH (ref 4.8–10.8)
WBC # FLD AUTO: 11.84 K/UL — HIGH (ref 4.8–10.8)
WBC # FLD AUTO: 12.75 K/UL — HIGH (ref 4.8–10.8)
WBC # FLD AUTO: 13.1 K/UL — HIGH (ref 4.8–10.8)
WBC # FLD AUTO: 13.48 K/UL — HIGH (ref 4.8–10.8)

## 2021-06-05 PROCEDURE — 99233 SBSQ HOSP IP/OBS HIGH 50: CPT

## 2021-06-05 PROCEDURE — 71045 X-RAY EXAM CHEST 1 VIEW: CPT | Mod: 26

## 2021-06-05 RX ORDER — ACETAMINOPHEN 500 MG
650 TABLET ORAL EVERY 6 HOURS
Refills: 0 | Status: DISCONTINUED | OUTPATIENT
Start: 2021-06-05 | End: 2021-06-07

## 2021-06-05 RX ADMIN — FENTANYL CITRATE 8.21 MICROGRAM(S)/KG/HR: 50 INJECTION INTRAVENOUS at 14:30

## 2021-06-05 RX ADMIN — PANTOPRAZOLE SODIUM 40 MILLIGRAM(S): 20 TABLET, DELAYED RELEASE ORAL at 06:01

## 2021-06-05 RX ADMIN — CHLORHEXIDINE GLUCONATE 15 MILLILITER(S): 213 SOLUTION TOPICAL at 05:00

## 2021-06-05 RX ADMIN — Medication 650 MILLIGRAM(S): at 19:32

## 2021-06-05 RX ADMIN — Medication 81 MILLIGRAM(S): at 12:49

## 2021-06-05 RX ADMIN — Medication 4: at 16:31

## 2021-06-05 RX ADMIN — Medication 2: at 06:01

## 2021-06-05 RX ADMIN — Medication 100 MILLIGRAM(S): at 05:00

## 2021-06-05 RX ADMIN — LINEZOLID 300 MILLIGRAM(S): 600 INJECTION, SOLUTION INTRAVENOUS at 05:00

## 2021-06-05 RX ADMIN — CHLORHEXIDINE GLUCONATE 15 MILLILITER(S): 213 SOLUTION TOPICAL at 16:32

## 2021-06-05 RX ADMIN — CEFTAROLINE FOSAMIL 50 MILLIGRAM(S): 600 POWDER, FOR SOLUTION INTRAVENOUS at 16:59

## 2021-06-05 RX ADMIN — PROPOFOL 4.93 MICROGRAM(S)/KG/MIN: 10 INJECTION, EMULSION INTRAVENOUS at 15:03

## 2021-06-05 RX ADMIN — Medication 650 MILLIGRAM(S): at 20:30

## 2021-06-05 RX ADMIN — MUPIROCIN 1 APPLICATION(S): 20 OINTMENT TOPICAL at 05:01

## 2021-06-05 RX ADMIN — HUMAN INSULIN 15 UNIT(S): 100 INJECTION, SUSPENSION SUBCUTANEOUS at 16:33

## 2021-06-05 RX ADMIN — CEFTAROLINE FOSAMIL 50 MILLIGRAM(S): 600 POWDER, FOR SOLUTION INTRAVENOUS at 05:00

## 2021-06-05 RX ADMIN — CHLORHEXIDINE GLUCONATE 1 APPLICATION(S): 213 SOLUTION TOPICAL at 05:01

## 2021-06-05 RX ADMIN — Medication 100 MILLIGRAM(S): at 21:01

## 2021-06-05 RX ADMIN — Medication 650 MILLIGRAM(S): at 06:02

## 2021-06-05 RX ADMIN — Medication 100 MILLIGRAM(S): at 14:28

## 2021-06-05 RX ADMIN — HEPARIN SODIUM 5000 UNIT(S): 5000 INJECTION INTRAVENOUS; SUBCUTANEOUS at 05:01

## 2021-06-05 RX ADMIN — Medication 2: at 11:49

## 2021-06-05 RX ADMIN — FENTANYL CITRATE 8.21 MICROGRAM(S)/KG/HR: 50 INJECTION INTRAVENOUS at 04:42

## 2021-06-05 RX ADMIN — MUPIROCIN 1 APPLICATION(S): 20 OINTMENT TOPICAL at 16:34

## 2021-06-05 RX ADMIN — POLYETHYLENE GLYCOL 3350 17 GRAM(S): 17 POWDER, FOR SOLUTION ORAL at 12:51

## 2021-06-05 RX ADMIN — SENNA PLUS 2 TABLET(S): 8.6 TABLET ORAL at 21:01

## 2021-06-05 RX ADMIN — LINEZOLID 300 MILLIGRAM(S): 600 INJECTION, SOLUTION INTRAVENOUS at 17:00

## 2021-06-05 RX ADMIN — HUMAN INSULIN 15 UNIT(S): 100 INJECTION, SUSPENSION SUBCUTANEOUS at 06:01

## 2021-06-05 RX ADMIN — Medication 650 MILLIGRAM(S): at 06:32

## 2021-06-05 NOTE — PROGRESS NOTE ADULT - ASSESSMENT
IMPRESSION  Acute Hypoxemic respiratory failure with hypercapnia s/p intubation   Septic Shock 2/2 to Bacteremia from DFU (Enterococcus, Coag neg Staph, and Staph Aureus )   DFU s/p Right Knee Disarticulation for necrotizing soft tissue infection  CANDY on CKD s/p CVVHD.  AMS 2/2 Toxic-Metabolic Encephelopathy s/p CT head neg for acute pathology  MATA/ OHS  Morbid obesity  NSTEMI    PLAN:    CNS: SAT. if sedation needed use precedex. Ammonia level    HEENT: ETT care and Oral care    PULMONARY: HOB elevation 45 degrees. Aspiration precautions. No vent changes. Duonebs. SBT after SAT. pulmonary toilet    CARDIOVASCULAR: Wean levophed to target map >65.     GI: GI prophylaxis.  OG Feeding as tolerated    RENAL: F/U nephrology if no more plan for CVVHD can d/c udall. F/U lytes     INFECTIOUS DISEASE:  will continue empiric abx as per ID. f/u repeat cultures    HEMATOLOGICAL:  DVT prophylaxis. Transfuse to keep Hb >7. Maintain active Type and screen. Repeat CBC and coags at 11:00    ENDOCRINE:  Follow up FS.  Insulin protocol if needed.    MUSCULOSKELETAL: Bedrest. f/u vascular surgery  Poor prognosis  Palliative care f/u   IMPRESSION    Acute Hypoxemic respiratory failure with hypercapnia s/p intubation   Septic Shock 2/2 to Bacteremia from DFU (Enterococcus,  Staph Aureus )   DFU s/p Right Knee Disarticulation for necrotizing soft tissue infection  CANDY on CKD s/p CVVHD.  AMS 2/2 Toxic-Metabolic Encephelopathy s/p CT head neg for acute pathology  MATA/ OHS  Morbid obesity  NSTEMI    PLAN:    CNS: SAT. if sedation needed use precedex. Ammonia level    HEENT: ETT care and Oral care    PULMONARY: HOB elevation 45 degrees. Aspiration precautions. No vent changes. Duonebs. SBT after SAT. pulmonary toilet    CARDIOVASCULAR: Wean levophed to target map >65.     GI: GI prophylaxis.  OG Feeding as tolerated    RENAL: F/U nephrology if no more plan for CVVHD can d/c udall. F/U lytes     INFECTIOUS DISEASE:  will continue empiric abx as per ID. f/u repeat cultures    HEMATOLOGICAL:  DVT prophylaxis. Transfuse to keep Hb >7. Maintain active Type and screen. Repeat CBC and coags at 11:00    ENDOCRINE:  Follow up FS.  Insulin protocol if needed.    MUSCULOSKELETAL: Bedrest. f/u vascular surgery  Poor prognosis  Palliative care f/u

## 2021-06-05 NOTE — CHART NOTE - NSCHARTNOTEFT_GEN_A_CORE
Plan to take to OR Tuesday 6/8 for BKA    Please make patient NPO after midnight on 6/7  Patient should be on IV Fluids once made NPO at midnight.  Order 8 PM labs for the patient, including CBC, BMP, Mg, Phos, PT/PTT/INR, and Type and Screen on 6/7  This will give us adequate time to replete electrolyte disturbances.  Please replete K to 4.0, Mg to 2.0, and Phos to 3.0    Ensure EKG and CXR have been done in the last 1 week.     PLEASE ENSURE HEMOGLOBIN IS TRANSFUSED TO 8.0 BY 6/8

## 2021-06-05 NOTE — PROGRESS NOTE ADULT - SUBJECTIVE AND OBJECTIVE BOX
Over Night Events: No acute events overnight. No melena, hemoptysis, hematemesis episode reported  On propofol 23, levophed 0.02, fentanyl 1.4    ROS:  See HPI    PHYSICAL EXAM    ICU Vital Signs Last 24 Hrs  T(C): 38.1 (2021 06:00), Max: 38.1 (2021 06:00)  T(F): 100.5 (2021 06:00), Max: 100.5 (2021 06:00)  HR: 92 (2021 08:08) (80 - 106)  BP: --  BP(mean): --  ABP: 100/50 (2021 07:00) (100/50 - 144/58)  ABP(mean): 62 (2021 07:00) (62 - 84)  RR: 18 (2021 07:00) (17 - 27)  SpO2: 100% (2021 08:08) (99% - 100%)      General: intubated and sedated  HEENT: ETT+           Lungs: Bilateral DEC bs AT bases  Cardiovascular: Regular   Abdomen: Soft, Positive BS  Skin: Warm  Neurological: sedated.       21 @ 07:01  -  - @ 07:00  --------------------------------------------------------  IN:    Enteral Tube Flush: 180 mL    FentaNYL: 714.5 mL    IV PiggyBack: 1000 mL    Norepinephrine: 127.4 mL    PRBCs (Packed Red Blood Cells): 321 mL    Propofol: 434.7 mL    Vital High Protein: 855 mL  Total IN: 3632.6 mL    OUT:    Indwelling Catheter - Urethral (mL): 3290 mL  Total OUT: 3290 mL    Total NET: 342.6 mL          LABS:                          6.5    11.84 )-----------( 160      ( 2021 04:30 )             21.9                                                                     7.4    13.69 )-----------( 185      ( 04 @ 05:50 )             23.9                7.4    12.19 )-----------( 169      (  @ 12:00 )             24.0                8.2    14.46 )-----------( 213      (  @ 03:50 )             26.3                8.2    14.30 )-----------( 215      (  @ 20:39 )             26.8                8.1    16.15 )-----------( 190      (  @ 12:24 )             26.6       141  |  108  |  27<H>  ----------------------------<  157<H>  4.6   |  27  |  2.2<H>    Ca    7.7<L>      2021 04:30  Phos  2.7     06-  Mg     2.3     06-    TPro  6.9  /  Alb  2.4<L>  /  TBili  0.2  /  DBili  x   /  AST  37  /  ALT  15  /  AlkPhos  152<H>  -05      PTT - ( 2021 12:30 )  PTT:42.1 sec                                       Urinalysis Basic - ( 2021 01:10 )    Color: Light Yellow / Appearance: Clear / S.013 / pH: x  Gluc: x / Ketone: Negative  / Bili: Negative / Urobili: <2 mg/dL   Blood: x / Protein: 30 mg/dL / Nitrite: Negative   Leuk Esterase: Negative / RBC: 14 /HPF / WBC 0 /HPF   Sq Epi: x / Non Sq Epi: 0 /HPF / Bacteria: Negative                                                  LIVER FUNCTIONS - ( 2021 04:30 )  Alb: 2.4 g/dL / Pro: 6.9 g/dL / ALK PHOS: 152 U/L / ALT: 15 U/L / AST: 37 U/L / GGT: x                                                  Culture - Blood (collected 2021 01:00)  Source: .Blood None  Preliminary Report (2021 08:01):    No growth to date.                                                   Mode: AC/ CMV (Assist Control/ Continuous Mandatory Ventilation)  RR (machine): 18  TV (machine): 480  FiO2: 40  PEEP: 8  ITime: 1  MAP: 18  PIP: 31                                      ABG - ( 2021 04:00 )  pH, Arterial: 7.41  pH, Blood: x     /  pCO2: 44    /  pO2: 177   / HCO3: 28    / Base Excess: 3.0   /  SaO2: 100         MEDICATIONS  (STANDING):  ALBUTerol    90 MICROgram(s) HFA Inhaler 1 Puff(s) Inhalation once  aspirin  chewable 81 milliGRAM(s) Oral daily  ceftaroline fosamil IVPB 200 milliGRAM(s) IV Intermittent every 12 hours  chlorhexidine 0.12% Liquid 15 milliLiter(s) Oral Mucosa two times a day  chlorhexidine 4% Liquid 1 Application(s) Topical <User Schedule>  dextrose 40% Gel 15 Gram(s) Oral once  dextrose 5%. 1000 milliLiter(s) (100 mL/Hr) IV Continuous <Continuous>  dextrose 5%. 1000 milliLiter(s) (50 mL/Hr) IV Continuous <Continuous>  dextrose 50% Injectable 25 Gram(s) IV Push once  dextrose 50% Injectable 12.5 Gram(s) IV Push once  dextrose 50% Injectable 25 Gram(s) IV Push once  fentaNYL   Infusion. 0.5 MICROgram(s)/kG/Hr (8.21 mL/Hr) IV Continuous <Continuous>  glucagon  Injectable 1 milliGRAM(s) IntraMuscular once  insulin lispro (ADMELOG) corrective regimen sliding scale   SubCutaneous three times a day before meals  insulin NPH human recombinant 15 Unit(s) SubCutaneous every 12 hours  linezolid  IVPB 600 milliGRAM(s) IV Intermittent every 12 hours  metroNIDAZOLE  IVPB 500 milliGRAM(s) IV Intermittent every 8 hours  mupirocin 2% Nasal 1 Application(s) Nasal two times a day  norepinephrine Infusion 0.05 MICROgram(s)/kG/Min (7.7 mL/Hr) IV Continuous <Continuous>  pantoprazole    Tablet 40 milliGRAM(s) Oral before breakfast  polyethylene glycol 3350 17 Gram(s) Oral daily  propofol Infusion 5.004 MICROgram(s)/kG/Min (4.93 mL/Hr) IV Continuous <Continuous>  senna 2 Tablet(s) Oral at bedtime  tiotropium 18 MICROgram(s) Capsule 1 Capsule(s) Inhalation once    MEDICATIONS  (PRN):  acetaminophen   Tablet .. 650 milliGRAM(s) Oral every 6 hours PRN Temp greater or equal to 38C (100.4F), Mild Pain (1 - 3)      Xrays:    < from: Xray Chest 1 View- PORTABLE-Routine (Xray Chest 1 View- PORTABLE-Routine in AM.) (21 @ 05:03) >  ETT with its tip above the serina, NGT with its tip below the diaphragm and bilateral IJ lines with both tips overlying the SVC.      < end of copied text >                                                                                       ECHO < from: TTE Echo Complete w/ Contrast w/ Doppler (21 @ 13:36) >  Summary:   1. Left ventricular ejection fraction, by visual estimation, is 55 to 60%.   2. Normal global left ventricular systolic function.   3. Elevated left ventricular end-diastolic pressure.   4. Mildly increased LV wall thickness.   5. Normal left ventricular internal cavity size.   6. Spectral Doppler shows pseudonormal pattern of left ventricular myocardial filling (Grade II diastolic dysfunction).   7. Sigmoid septum present. and mild concentric LV hypertrophy.   8. Normal left atrial size.   9. Normal right atrial size.  10. Mild to moderate mitral annular calcification.  11. No evidence of mitral valve regurgitation.  12. Structurally normal mitral valve, with normal leaflet excursion.  13.Sclerotic aortic valve with decreased opening.  14. Mildly reduced AV opening with mild AS by doppler. 15 mm peak AV gradient.  15. Ascending aorta mildly dilated to 4.2cm.  16. LA volume Index is 27.3 ml/m² ml/m2.    < end of copied text >       Over Night Events: No acute events overnight Still intubated, ventilated. No melena, hemoptysis, hematemesis episode reported  On propofol 23, levophed 0.02, fentanyl 1.4    ROS:  See HPI    PHYSICAL EXAM    ICU Vital Signs Last 24 Hrs  T(C): 38.1 (2021 06:00), Max: 38.1 (2021 06:00)  T(F): 100.5 (2021 06:00), Max: 100.5 (2021 06:00)  HR: 92 (2021 08:08) (80 - 106)  ABP: 100/50 (2021 07:00) (100/50 - 144/58)  ABP(mean): 62 (2021 07:00) (62 - 84)  RR: 18 (2021 07:00) (17 - 27)  SpO2: 100% (2021 08:08) (99% - 100%)      General: intubated and sedated  HEENT: ETT+           Lungs: Bilateral DEC bs AT bases  Cardiovascular: Regular   Abdomen: Soft, Positive BS  Skin: Warm  bka  Neurological: sedated.       21 @ 07:01  -  21 @ 07:00  --------------------------------------------------------  IN:    Enteral Tube Flush: 180 mL    FentaNYL: 714.5 mL    IV PiggyBack: 1000 mL    Norepinephrine: 127.4 mL    PRBCs (Packed Red Blood Cells): 321 mL    Propofol: 434.7 mL    Vital High Protein: 855 mL  Total IN: 3632.6 mL    OUT:    Indwelling Catheter - Urethral (mL): 3290 mL  Total OUT: 3290 mL    Total NET: 342.6 mL          LABS:                          6.5    11.84 )-----------( 160      ( 2021 04:30 )             21.9                                                                     7.4    13.69 )-----------( 185      (  @ 05:50 )             23.9                7.4    12.19 )-----------( 169      (  @ 12:00 )             24.0                8.2    14.46 )-----------( 213      (  @ 03:50 )             26.3                8.2    14.30 )-----------( 215      (  @ 20:39 )             26.8                8.1    16.15 )-----------( 190      (  @ 12:24 )             26.6       141  |  108  |  27<H>  ----------------------------<  157<H>  4.6   |  27  |  2.2<H>    Ca    7.7<L>      2021 04:30  Phos  2.7     06  Mg     2.3     06-    TPro  6.9  /  Alb  2.4<L>  /  TBili  0.2  /  DBili  x   /  AST  37  /  ALT  15  /  AlkPhos  152<H>  06-05      PTT - ( 2021 12:30 )  PTT:42.1 sec                                       Urinalysis Basic - ( 2021 01:10 )    Color: Light Yellow / Appearance: Clear / S.013 / pH: x  Gluc: x / Ketone: Negative  / Bili: Negative / Urobili: <2 mg/dL   Blood: x / Protein: 30 mg/dL / Nitrite: Negative   Leuk Esterase: Negative / RBC: 14 /HPF / WBC 0 /HPF   Sq Epi: x / Non Sq Epi: 0 /HPF / Bacteria: Negative                                                  LIVER FUNCTIONS - ( 2021 04:30 )  Alb: 2.4 g/dL / Pro: 6.9 g/dL / ALK PHOS: 152 U/L / ALT: 15 U/L / AST: 37 U/L / GGT: x                                                  Culture - Blood (collected 2021 01:00)  Source: .Blood None  Preliminary Report (2021 08:01):    No growth to date.                                                   Mode: AC/ CMV (Assist Control/ Continuous Mandatory Ventilation)  RR (machine): 18  TV (machine): 480  FiO2: 40  PEEP: 8  ITime: 1  MAP: 18  PIP: 31                                      ABG - ( 2021 04:00 )  pH, Arterial: 7.41  pH, Blood: x     /  pCO2: 44    /  pO2: 177   / HCO3: 28    / Base Excess: 3.0   /  SaO2: 100         MEDICATIONS  (STANDING):  ALBUTerol    90 MICROgram(s) HFA Inhaler 1 Puff(s) Inhalation once  aspirin  chewable 81 milliGRAM(s) Oral daily  ceftaroline fosamil IVPB 200 milliGRAM(s) IV Intermittent every 12 hours  chlorhexidine 0.12% Liquid 15 milliLiter(s) Oral Mucosa two times a day  chlorhexidine 4% Liquid 1 Application(s) Topical <User Schedule>  dextrose 40% Gel 15 Gram(s) Oral once  dextrose 5%. 1000 milliLiter(s) (100 mL/Hr) IV Continuous <Continuous>  dextrose 5%. 1000 milliLiter(s) (50 mL/Hr) IV Continuous <Continuous>  dextrose 50% Injectable 25 Gram(s) IV Push once  dextrose 50% Injectable 12.5 Gram(s) IV Push once  dextrose 50% Injectable 25 Gram(s) IV Push once  fentaNYL   Infusion. 0.5 MICROgram(s)/kG/Hr (8.21 mL/Hr) IV Continuous <Continuous>  glucagon  Injectable 1 milliGRAM(s) IntraMuscular once  insulin lispro (ADMELOG) corrective regimen sliding scale   SubCutaneous three times a day before meals  insulin NPH human recombinant 15 Unit(s) SubCutaneous every 12 hours  linezolid  IVPB 600 milliGRAM(s) IV Intermittent every 12 hours  metroNIDAZOLE  IVPB 500 milliGRAM(s) IV Intermittent every 8 hours  mupirocin 2% Nasal 1 Application(s) Nasal two times a day  norepinephrine Infusion 0.05 MICROgram(s)/kG/Min (7.7 mL/Hr) IV Continuous <Continuous>  pantoprazole    Tablet 40 milliGRAM(s) Oral before breakfast  polyethylene glycol 3350 17 Gram(s) Oral daily  propofol Infusion 5.004 MICROgram(s)/kG/Min (4.93 mL/Hr) IV Continuous <Continuous>  senna 2 Tablet(s) Oral at bedtime  tiotropium 18 MICROgram(s) Capsule 1 Capsule(s) Inhalation once    MEDICATIONS  (PRN):  acetaminophen   Tablet .. 650 milliGRAM(s) Oral every 6 hours PRN Temp greater or equal to 38C (100.4F), Mild Pain (1 - 3)      Xrays:    < from: Xray Chest 1 View- PORTABLE-Routine (Xray Chest 1 View- PORTABLE-Routine in AM.) (21 @ 05:03) >  ETT with its tip above the serina, NGT with its tip below the diaphragm and bilateral IJ lines with both tips overlying the SVC.      < end of copied text >                                                                                       ECHO < from: TTE Echo Complete w/ Contrast w/ Doppler (21 @ 13:36) >  Summary:   1. Left ventricular ejection fraction, by visual estimation, is 55 to 60%.   2. Normal global left ventricular systolic function.   3. Elevated left ventricular end-diastolic pressure.   4. Mildly increased LV wall thickness.   5. Normal left ventricular internal cavity size.   6. Spectral Doppler shows pseudonormal pattern of left ventricular myocardial filling (Grade II diastolic dysfunction).   7. Sigmoid septum present. and mild concentric LV hypertrophy.   8. Normal left atrial size.   9. Normal right atrial size.  10. Mild to moderate mitral annular calcification.  11. No evidence of mitral valve regurgitation.  12. Structurally normal mitral valve, with normal leaflet excursion.  13.Sclerotic aortic valve with decreased opening.  14. Mildly reduced AV opening with mild AS by doppler. 15 mm peak AV gradient.  15. Ascending aorta mildly dilated to 4.2cm.  16. LA volume Index is 27.3 ml/m² ml/m2.    < end of copied text >

## 2021-06-05 NOTE — CHART NOTE - NSCHARTNOTEFT_GEN_A_CORE
Registered Dietitian Follow-Up     Patient Profile Reviewed                           Yes [x]   No []     Nutrition History Previously Obtained        Yes [x]  No []       Pertinent Subjective Information: An interview with the patient could not be completed secondary to intubation. No family is present. Per RN, there is no plan for extubation at this time.     Pertinent Medical Interventions: Presented with worsening chronic R DFU. S/p debridement (), Cx growing Staph aureus. S/p right knee disarticulation for necrotizing soft tissue infection (); Cx indicated bacteremia secondary to enterococcus, coag neg staph and staph aureus. further complicated by possible CVA, CCU upgrade (). Septic shock secondary to bacteremia. Acute hypoxic respiratroy failure secondary to septic shock, toxic metabolic encephalopathy requiring intubation (), sedated with propfol and fentanyl, pressor support in place. CCU is further complicated by CANDY on CKD III secondary to sepsis induced ATN; CVVHD now stopped. The patient is making good urine, ~300cc/hour (). Toxic metabolic encephalopathy secondary to sepsis and multiorgan failure. NSTEMI. Anemia of chronic disease. h/o T2DM. A left BKA is scheduled for Tuesday (). MAP-83.    Diet order: () TF with Vital HP at 55mL/hr (24hrs) via OG Tube     Anthropometrics:  - Ht: 5'9"  - Wt: 164.2kg  - %wt change  - BMI: 54 (Morbid obesity)  - IBW: 73kg     Pertinent Lab Data: () Na-141, K-4.6, CL-108, BUN-27, Cr-2.2, GFR-36, Glucose-157mg/dL, POC-180, 177, 172mg/dL, Corrected Ca-9 (WNL), H/H-7.1/24.2, WBC-13.48     Pertinent Meds: D5 at 100mL/hr, Admelog, Albuterol, Protonix, Norepinephrine, Fentanyl, Senna, Glucagon, Propofol at 14.8mL/hr (391kcal/day)      Physical Findings:  - Appearance: Intubated; Well nourished  - GI function: Per EMR, the patient has constipation  - Tubes: OG Tube  - Oral/Mouth cavity: Intubated  - Skin: Intact (Jasen Score-14)      Nutrition Requirements  Weight Used: 164.2kg      Estimated Energy Needs    Continue [x]  Adjust []  Adjusted Energy Recommendations: 1645-1965kcal/day [obtained [id number] (60-70% PSE b (2542) vs 1538-7662 (11-14 kcal/kg CBW) vs. 9267-3619 (22-25 kcal/kg IBW)] -Derived from nutrition note ()      Estimated Protein Needs    Continue [x]  Adjust []  Adjusted Protein Recommendations: 145-182g/day (2-2.5g/kg IBW considering BMI >40) -Derived from nutrition note ()       Estimated Fluid Needs        Continue [x]  Adjust []  Adjusted Fluid Recommendations: Fluids per CCU team      Nutrient Intake: Current TF regimen + Protein modular + Propofol provides: 1886kcal, 157gm protein, 1109mL free H2O, meeting 96% estimated calorie and 86% estimated protein needs     [x] Previous Nutrition Diagnosis: Inadequate Enteral Nutrition Infusion            [] Ongoing          [x] Resolved    -However, will continue to reassess the patient at risk secondary to continued intubated state.    [] No active nutrition diagnosis identified at this time     Nutrition Diagnostic #1  Problem:  Etiology:  Statement:     Nutrition Diagnostic #2  Problem:  Etiology:  Statement:     Nutrition Intervention:  1.Enteral Nutrition  2.Medical Food Supplement  3.Vitamin Supplement     Goal/Expected Outcome:  1.Continue to meet >85% estimated nutritional needs in 3 days  2.Achieve normal GI motility/BM in 3 days     Indicator/Monitorin.Diet order, energy intake, nutrition focused physical findings, glucose, renal and anemia profile    Recommendations:  1.Continue to provide TF with Vital HP at 55mL/hr (24hrs)      -Will adjust TF regimen according change in propofol regimen  2.Continue to provide Beneprotein 7 packs/day  3.Recommend provide a MVI Q24hrs  4.Consider adjust bowel regimen

## 2021-06-06 LAB
ALBUMIN SERPL ELPH-MCNC: 2.2 G/DL — LOW (ref 3.5–5.2)
ALP SERPL-CCNC: 132 U/L — HIGH (ref 30–115)
ALT FLD-CCNC: 14 U/L — SIGNIFICANT CHANGE UP (ref 0–41)
ANION GAP SERPL CALC-SCNC: 7 MMOL/L — SIGNIFICANT CHANGE UP (ref 7–14)
AST SERPL-CCNC: 30 U/L — SIGNIFICANT CHANGE UP (ref 0–41)
BASOPHILS # BLD AUTO: 0.03 K/UL — SIGNIFICANT CHANGE UP (ref 0–0.2)
BASOPHILS NFR BLD AUTO: 0.2 % — SIGNIFICANT CHANGE UP (ref 0–1)
BILIRUB SERPL-MCNC: 0.2 MG/DL — SIGNIFICANT CHANGE UP (ref 0.2–1.2)
BUN SERPL-MCNC: 30 MG/DL — HIGH (ref 10–20)
CALCIUM SERPL-MCNC: 7.3 MG/DL — LOW (ref 8.5–10.1)
CHLORIDE SERPL-SCNC: 108 MMOL/L — SIGNIFICANT CHANGE UP (ref 98–110)
CO2 SERPL-SCNC: 25 MMOL/L — SIGNIFICANT CHANGE UP (ref 17–32)
CREAT SERPL-MCNC: 2.3 MG/DL — HIGH (ref 0.7–1.5)
EOSINOPHIL # BLD AUTO: 0.55 K/UL — SIGNIFICANT CHANGE UP (ref 0–0.7)
EOSINOPHIL NFR BLD AUTO: 3.9 % — SIGNIFICANT CHANGE UP (ref 0–8)
GAS PNL BLDA: SIGNIFICANT CHANGE UP
GAS PNL BLDA: SIGNIFICANT CHANGE UP
GLUCOSE BLDC GLUCOMTR-MCNC: 211 MG/DL — HIGH (ref 70–99)
GLUCOSE BLDC GLUCOMTR-MCNC: 214 MG/DL — HIGH (ref 70–99)
GLUCOSE BLDC GLUCOMTR-MCNC: 231 MG/DL — HIGH (ref 70–99)
GLUCOSE BLDC GLUCOMTR-MCNC: 242 MG/DL — HIGH (ref 70–99)
GLUCOSE SERPL-MCNC: 167 MG/DL — HIGH (ref 70–99)
HCT VFR BLD CALC: 24.9 % — LOW (ref 42–52)
HCT VFR BLD CALC: 25.7 % — LOW (ref 42–52)
HGB BLD-MCNC: 7.3 G/DL — LOW (ref 14–18)
HGB BLD-MCNC: 7.7 G/DL — LOW (ref 14–18)
IMM GRANULOCYTES NFR BLD AUTO: 2.8 % — HIGH (ref 0.1–0.3)
LYMPHOCYTES # BLD AUTO: 1.49 K/UL — SIGNIFICANT CHANGE UP (ref 1.2–3.4)
LYMPHOCYTES # BLD AUTO: 10.6 % — LOW (ref 20.5–51.1)
MAGNESIUM SERPL-MCNC: 2.4 MG/DL — SIGNIFICANT CHANGE UP (ref 1.8–2.4)
MCHC RBC-ENTMCNC: 26.5 PG — LOW (ref 27–31)
MCHC RBC-ENTMCNC: 27.2 PG — SIGNIFICANT CHANGE UP (ref 27–31)
MCHC RBC-ENTMCNC: 29.3 G/DL — LOW (ref 32–37)
MCHC RBC-ENTMCNC: 30 G/DL — LOW (ref 32–37)
MCV RBC AUTO: 90.5 FL — SIGNIFICANT CHANGE UP (ref 80–94)
MCV RBC AUTO: 90.8 FL — SIGNIFICANT CHANGE UP (ref 80–94)
MONOCYTES # BLD AUTO: 1.77 K/UL — HIGH (ref 0.1–0.6)
MONOCYTES NFR BLD AUTO: 12.6 % — HIGH (ref 1.7–9.3)
NEUTROPHILS # BLD AUTO: 9.86 K/UL — HIGH (ref 1.4–6.5)
NEUTROPHILS NFR BLD AUTO: 69.9 % — SIGNIFICANT CHANGE UP (ref 42.2–75.2)
NRBC # BLD: 0 /100 WBCS — SIGNIFICANT CHANGE UP (ref 0–0)
NRBC # BLD: 0 /100 WBCS — SIGNIFICANT CHANGE UP (ref 0–0)
PHOSPHATE SERPL-MCNC: 2.1 MG/DL — SIGNIFICANT CHANGE UP (ref 2.1–4.9)
PLATELET # BLD AUTO: 154 K/UL — SIGNIFICANT CHANGE UP (ref 130–400)
PLATELET # BLD AUTO: 167 K/UL — SIGNIFICANT CHANGE UP (ref 130–400)
POTASSIUM SERPL-MCNC: 4.6 MMOL/L — SIGNIFICANT CHANGE UP (ref 3.5–5)
POTASSIUM SERPL-SCNC: 4.6 MMOL/L — SIGNIFICANT CHANGE UP (ref 3.5–5)
PROT SERPL-MCNC: 7.2 G/DL — SIGNIFICANT CHANGE UP (ref 6–8)
RBC # BLD: 2.75 M/UL — LOW (ref 4.7–6.1)
RBC # BLD: 2.83 M/UL — LOW (ref 4.7–6.1)
RBC # FLD: 17.7 % — HIGH (ref 11.5–14.5)
RBC # FLD: 17.8 % — HIGH (ref 11.5–14.5)
SODIUM SERPL-SCNC: 140 MMOL/L — SIGNIFICANT CHANGE UP (ref 135–146)
WBC # BLD: 14.09 K/UL — HIGH (ref 4.8–10.8)
WBC # BLD: 16.37 K/UL — HIGH (ref 4.8–10.8)
WBC # FLD AUTO: 14.09 K/UL — HIGH (ref 4.8–10.8)
WBC # FLD AUTO: 16.37 K/UL — HIGH (ref 4.8–10.8)

## 2021-06-06 PROCEDURE — 71045 X-RAY EXAM CHEST 1 VIEW: CPT | Mod: 26

## 2021-06-06 PROCEDURE — 99232 SBSQ HOSP IP/OBS MODERATE 35: CPT

## 2021-06-06 RX ORDER — HEPARIN SODIUM 5000 [USP'U]/ML
5000 INJECTION INTRAVENOUS; SUBCUTANEOUS EVERY 8 HOURS
Refills: 0 | Status: DISCONTINUED | OUTPATIENT
Start: 2021-06-06 | End: 2021-06-11

## 2021-06-06 RX ORDER — LACTULOSE 10 G/15ML
10 SOLUTION ORAL EVERY 6 HOURS
Refills: 0 | Status: DISCONTINUED | OUTPATIENT
Start: 2021-06-06 | End: 2021-06-08

## 2021-06-06 RX ORDER — FUROSEMIDE 40 MG
60 TABLET ORAL EVERY 12 HOURS
Refills: 0 | Status: DISCONTINUED | OUTPATIENT
Start: 2021-06-06 | End: 2021-06-08

## 2021-06-06 RX ADMIN — Medication 650 MILLIGRAM(S): at 20:00

## 2021-06-06 RX ADMIN — LINEZOLID 300 MILLIGRAM(S): 600 INJECTION, SOLUTION INTRAVENOUS at 05:05

## 2021-06-06 RX ADMIN — HUMAN INSULIN 15 UNIT(S): 100 INJECTION, SUSPENSION SUBCUTANEOUS at 17:45

## 2021-06-06 RX ADMIN — HEPARIN SODIUM 5000 UNIT(S): 5000 INJECTION INTRAVENOUS; SUBCUTANEOUS at 21:21

## 2021-06-06 RX ADMIN — LACTULOSE 10 GRAM(S): 10 SOLUTION ORAL at 17:43

## 2021-06-06 RX ADMIN — Medication 4: at 11:17

## 2021-06-06 RX ADMIN — Medication 100 MILLIGRAM(S): at 21:21

## 2021-06-06 RX ADMIN — LINEZOLID 300 MILLIGRAM(S): 600 INJECTION, SOLUTION INTRAVENOUS at 17:42

## 2021-06-06 RX ADMIN — MUPIROCIN 1 APPLICATION(S): 20 OINTMENT TOPICAL at 05:06

## 2021-06-06 RX ADMIN — Medication 100 MILLIGRAM(S): at 14:30

## 2021-06-06 RX ADMIN — FENTANYL CITRATE 8.21 MICROGRAM(S)/KG/HR: 50 INJECTION INTRAVENOUS at 04:35

## 2021-06-06 RX ADMIN — SENNA PLUS 2 TABLET(S): 8.6 TABLET ORAL at 21:21

## 2021-06-06 RX ADMIN — CHLORHEXIDINE GLUCONATE 1 APPLICATION(S): 213 SOLUTION TOPICAL at 05:04

## 2021-06-06 RX ADMIN — CEFTAROLINE FOSAMIL 50 MILLIGRAM(S): 600 POWDER, FOR SOLUTION INTRAVENOUS at 17:42

## 2021-06-06 RX ADMIN — MUPIROCIN 1 APPLICATION(S): 20 OINTMENT TOPICAL at 17:55

## 2021-06-06 RX ADMIN — CHLORHEXIDINE GLUCONATE 15 MILLILITER(S): 213 SOLUTION TOPICAL at 17:43

## 2021-06-06 RX ADMIN — CHLORHEXIDINE GLUCONATE 15 MILLILITER(S): 213 SOLUTION TOPICAL at 05:04

## 2021-06-06 RX ADMIN — Medication 650 MILLIGRAM(S): at 01:43

## 2021-06-06 RX ADMIN — Medication 81 MILLIGRAM(S): at 11:31

## 2021-06-06 RX ADMIN — CEFTAROLINE FOSAMIL 50 MILLIGRAM(S): 600 POWDER, FOR SOLUTION INTRAVENOUS at 05:44

## 2021-06-06 RX ADMIN — Medication 650 MILLIGRAM(S): at 02:43

## 2021-06-06 RX ADMIN — Medication 4: at 17:38

## 2021-06-06 RX ADMIN — Medication 4: at 06:04

## 2021-06-06 RX ADMIN — LACTULOSE 10 GRAM(S): 10 SOLUTION ORAL at 23:42

## 2021-06-06 RX ADMIN — PROPOFOL 4.93 MICROGRAM(S)/KG/MIN: 10 INJECTION, EMULSION INTRAVENOUS at 03:00

## 2021-06-06 RX ADMIN — POLYETHYLENE GLYCOL 3350 17 GRAM(S): 17 POWDER, FOR SOLUTION ORAL at 11:31

## 2021-06-06 RX ADMIN — Medication 60 MILLIGRAM(S): at 17:43

## 2021-06-06 RX ADMIN — Medication 100 MILLIGRAM(S): at 05:04

## 2021-06-06 RX ADMIN — LACTULOSE 10 GRAM(S): 10 SOLUTION ORAL at 11:31

## 2021-06-06 RX ADMIN — HUMAN INSULIN 15 UNIT(S): 100 INJECTION, SUSPENSION SUBCUTANEOUS at 06:03

## 2021-06-06 NOTE — PROGRESS NOTE ADULT - SUBJECTIVE AND OBJECTIVE BOX
OVERNIGHT EVENTS: Events noted, on propofol, fentanyl, no pressors, low grade fever, no BM, sp 2  units PRBC, SP DC U DALL    Vital Signs Last 24 Hrs  T(C): 37.7 (06 Jun 2021 06:00), Max: 38.3 (05 Jun 2021 20:38)  T(F): 99.8 (06 Jun 2021 06:00), Max: 101 (05 Jun 2021 20:38)  HR: 72 (06 Jun 2021 06:00) (72 - 106)  RR: 18 (06 Jun 2021 06:00) (15 - 18)  SpO2: 100% (06 Jun 2021 06:00) (100% - 100%)    PHYSICAL EXAMINATION:    GENERAL: Ill looking    HEENT: Head is normocephalic and atraumatic.    NECK: Supple.    LUNGS: dec bs both bases    HEART: KIRILL 3/6    ABDOMEN: Soft, nontender, and nondistended.      EXTREMITIES: R BKA    NEUROLOGIC: no focal        LABS:                        7.7    16.37 )-----------( 167      ( 06 Jun 2021 04:30 )             25.7     06-06    140  |  108  |  30<H>  ----------------------------<  167<H>  4.6   |  25  |  2.3<H>    Ca    7.3<L>      06 Jun 2021 04:30  Phos  2.1     06-06  Mg     2.4     06-06    TPro  7.2  /  Alb  2.2<L>  /  TBili  0.2  /  DBili  x   /  AST  30  /  ALT  14  /  AlkPhos  132<H>  06-06    PT/INR - ( 05 Jun 2021 20:51 )   PT: 13.20 sec;   INR: 1.15 ratio         PTT - ( 05 Jun 2021 20:51 )  PTT:32.3 sec    ABG - ( 06 Jun 2021 04:14 )  pH, Arterial: 7.38  pH, Blood: x     /  pCO2: 48    /  pO2: 110   / HCO3: 28    / Base Excess: 2.9   /  SaO2: 99          18/480/40/8          D-Dimer Assay, Quantitative: 666 ng/mL DDU (06-05-21 @ 20:51)              06-05-21 @ 07:01 - 06-06-21 @ 07:00  --------------------------------------------------------  IN: 3843 mL / OUT: 2960 mL / NET: 883 mL        MICROBIOLOGY:  Culture Results:   No growth to date. (06-04 @ 01:00)      MEDICATIONS  (STANDING):  ALBUTerol    90 MICROgram(s) HFA Inhaler 1 Puff(s) Inhalation once  aspirin  chewable 81 milliGRAM(s) Oral daily  ceftaroline fosamil IVPB 200 milliGRAM(s) IV Intermittent every 12 hours  chlorhexidine 0.12% Liquid 15 milliLiter(s) Oral Mucosa two times a day  chlorhexidine 4% Liquid 1 Application(s) Topical <User Schedule>  dextrose 40% Gel 15 Gram(s) Oral once  dextrose 5%. 1000 milliLiter(s) (50 mL/Hr) IV Continuous <Continuous>  dextrose 5%. 1000 milliLiter(s) (100 mL/Hr) IV Continuous <Continuous>  dextrose 50% Injectable 25 Gram(s) IV Push once  dextrose 50% Injectable 12.5 Gram(s) IV Push once  dextrose 50% Injectable 25 Gram(s) IV Push once  fentaNYL   Infusion. 0.5 MICROgram(s)/kG/Hr (8.21 mL/Hr) IV Continuous <Continuous>  glucagon  Injectable 1 milliGRAM(s) IntraMuscular once  insulin lispro (ADMELOG) corrective regimen sliding scale   SubCutaneous three times a day before meals  insulin NPH human recombinant 15 Unit(s) SubCutaneous every 12 hours  lactulose Syrup 10 Gram(s) Oral every 6 hours  linezolid  IVPB 600 milliGRAM(s) IV Intermittent every 12 hours  metroNIDAZOLE  IVPB 500 milliGRAM(s) IV Intermittent every 8 hours  mupirocin 2% Nasal 1 Application(s) Nasal two times a day  norepinephrine Infusion 0.05 MICROgram(s)/kG/Min (7.7 mL/Hr) IV Continuous <Continuous>  pantoprazole    Tablet 40 milliGRAM(s) Oral before breakfast  polyethylene glycol 3350 17 Gram(s) Oral daily  propofol Infusion 5.004 MICROgram(s)/kG/Min (4.93 mL/Hr) IV Continuous <Continuous>  senna 2 Tablet(s) Oral at bedtime  tiotropium 18 MICROgram(s) Capsule 1 Capsule(s) Inhalation once    MEDICATIONS  (PRN):  acetaminophen   Tablet .. 650 milliGRAM(s) Oral every 6 hours PRN Temp greater or equal to 38C (100.4F), Mild Pain (1 - 3)      CXR  reviewed

## 2021-06-06 NOTE — PROGRESS NOTE ADULT - ASSESSMENT
IMPRESSION    Acute Hypoxemic respiratory failure with hypercapnia s/p intubation on 40%  Septic Shock 2/2 to Bacteremia from DFU (Enterococcus,  Staph Aureus ) repeat cx neg  DFU s/p Right Knee Disarticulation for necrotizing soft tissue infection  CANDY on CKD s/p CVVHD sp dc u dall  AMS 2/2 Toxic-Metabolic Encephelopathy s/p CT head neg for acute pathology  MATA/ OHS  Morbid obesity  NSTEMI    PLAN:    CNS: SAT. if sedation needed use precedex.    HEENT: ETT care and Oral care    PULMONARY: HOB elevation 45 degrees. Aspiration precautions. No vent changes. Duonebs. SBT after SAT. pulmonary toilet, dec fio2    CARDIOVASCULAR: off levophed    GI: GI prophylaxis.  OG Feeding as tolerated    RENAL: F/U nephrology , IV lasix    INFECTIOUS DISEASE:  will continue empiric abx as per ID. f/u repeat cultures neg    HEMATOLOGICAL:  DVT prophylaxis. Transfuse to keep Hb >7. Maintain active Type and screen. Repeat CBC and coags at 11:00    ENDOCRINE:  Follow up FS.  Insulin protocol if needed.    MUSCULOSKELETAL: Bedrest. f/u vascular surgery  Poor prognosis  Palliative care f/u

## 2021-06-06 NOTE — PROGRESS NOTE ADULT - ASSESSMENT
61 y/o M with PMH of DM, hypertension, dyslipidemia, sleep apnea, Charcot foot reconstruction with external fixation, morbid obesity presenting to the hospital on 5/29 with worsening foot ulcer.  s/p Right Knee Disarticulation for necrotizing soft tissue infection of right lower extremity          ·	CANDY on CKD ( unknown stage last known creat 1.4 in 11/2019) / rule out ATN / obstructive uropathy/ sp CT angio (too early for FLO)/ doubt IRGN (infection related GN)/ doubt AIN (multiple antibx courses)/ high bicarb/chr resp acidosis  ·	non oliguric now / creat better now/ off CVVHD /   [facility] remove  ·	renal ultrasound noted w/o hydronephrosis, s/p terrazas catheter placement   ·	UA noted, minimal proteinuria    ·	appreciate ID f/u, on ceftarolien flagyl and zyvox  ·	sp debridement of DFU as per podiatry  ·	C3 C4 wnl SPEP polyclonal gammopathy / K/L noted expected for CANDY   ·	follow IP and Ca   ·	    will follow

## 2021-06-06 NOTE — PROGRESS NOTE ADULT - SUBJECTIVE AND OBJECTIVE BOX
Nephrology progress note    Patient was seen and examined, events over the last 24 h noted .  Cr stable  +UOP    Allergies:  No Known Allergies  statins (Other)    Hospital Medications:   MEDICATIONS  (STANDING):  ALBUTerol    90 MICROgram(s) HFA Inhaler 1 Puff(s) Inhalation once  aspirin  chewable 81 milliGRAM(s) Oral daily  ceftaroline fosamil IVPB 200 milliGRAM(s) IV Intermittent every 12 hours  chlorhexidine 0.12% Liquid 15 milliLiter(s) Oral Mucosa two times a day  chlorhexidine 4% Liquid 1 Application(s) Topical <User Schedule>  dextrose 40% Gel 15 Gram(s) Oral once  dextrose 5%. 1000 milliLiter(s) (50 mL/Hr) IV Continuous <Continuous>  dextrose 5%. 1000 milliLiter(s) (100 mL/Hr) IV Continuous <Continuous>  dextrose 50% Injectable 25 Gram(s) IV Push once  dextrose 50% Injectable 12.5 Gram(s) IV Push once  dextrose 50% Injectable 25 Gram(s) IV Push once  fentaNYL   Infusion. 0.5 MICROgram(s)/kG/Hr (8.21 mL/Hr) IV Continuous <Continuous>  furosemide   Injectable 60 milliGRAM(s) IV Push every 12 hours  glucagon  Injectable 1 milliGRAM(s) IntraMuscular once  insulin lispro (ADMELOG) corrective regimen sliding scale   SubCutaneous three times a day before meals  insulin NPH human recombinant 15 Unit(s) SubCutaneous every 12 hours  lactulose Syrup 10 Gram(s) Oral every 6 hours  linezolid  IVPB 600 milliGRAM(s) IV Intermittent every 12 hours  metroNIDAZOLE  IVPB 500 milliGRAM(s) IV Intermittent every 8 hours  mupirocin 2% Nasal 1 Application(s) Nasal two times a day  norepinephrine Infusion 0.05 MICROgram(s)/kG/Min (7.7 mL/Hr) IV Continuous <Continuous>  pantoprazole    Tablet 40 milliGRAM(s) Oral before breakfast  polyethylene glycol 3350 17 Gram(s) Oral daily  propofol Infusion 5.004 MICROgram(s)/kG/Min (4.93 mL/Hr) IV Continuous <Continuous>  senna 2 Tablet(s) Oral at bedtime  tiotropium 18 MICROgram(s) Capsule 1 Capsule(s) Inhalation once        VITALS:  T(F): 100 (21 @ 12:00), Max: 101 (21 @ 20:38)  HR: 106 (21 @ 12:00)  BP: --  RR: 23 (21 @ 12:00)  SpO2: 100% (21 @ 12:00)  Wt(kg): --     @ 07:01  -   @ 07:00  --------------------------------------------------------  IN: 3632.6 mL / OUT: 3290 mL / NET: 342.6 mL     @ 07:  -   @ 07:00  --------------------------------------------------------  IN: 3843 mL / OUT: 3010 mL / NET: 833 mL     @ 07:  -   @ 14:04  --------------------------------------------------------  IN: 412.6 mL / OUT: 800 mL / NET: -387.4 mL          PHYSICAL EXAM:  Constitutional: on MV   Neck: No JVD  Respiratory: Crackles at base   Cardiovascular: S1, S2, RRR  Gastrointestinal: BS+, soft, NT/ND  Extremities: No cyanosis or clubbing. plus trace edema   : positive terrazas   Skin: No rashes    LABS:      140  |  108  |  30<H>  ----------------------------<  167<H>  4.6   |  25  |  2.3<H>    Ca    7.3<L>      2021 04:30  Phos  2.1     06-  Mg     2.4     -    TPro  7.2  /  Alb  2.2<L>  /  TBili  0.2  /  DBili      /  AST  30  /  ALT  14  /  AlkPhos  132<H>  06-06                          7.7    16.37 )-----------( 167      ( 2021 04:30 )             25.7       Urine Studies:  Urinalysis Basic - ( 2021 01:10 )    Color: Light Yellow / Appearance: Clear / S.013 / pH:   Gluc:  / Ketone: Negative  / Bili: Negative / Urobili: <2 mg/dL   Blood:  / Protein: 30 mg/dL / Nitrite: Negative   Leuk Esterase: Negative / RBC: 14 /HPF / WBC 0 /HPF   Sq Epi:  / Non Sq Epi: 0 /HPF / Bacteria: Negative      Creatinine, Random Urine: 82 mg/dL ( @ 15:34)  Protein/Creatinine Ratio Calculation: 0.2 Ratio ( @ 15:34)  Calcium, Random Urine: <1 mg/dL ( @ 15:34)  Potassium, Random Urine: 23 mmol/L ( @ 15:34)  Sodium, Random Urine: 21.0 mmoL/L ( @ 15:34)  Chloride, Random Urine: <20 ( @ 15:34)    RADIOLOGY & ADDITIONAL STUDIES:

## 2021-06-07 LAB
ALBUMIN SERPL ELPH-MCNC: 2.3 G/DL — LOW (ref 3.5–5.2)
ALP SERPL-CCNC: 127 U/L — HIGH (ref 30–115)
ALT FLD-CCNC: 15 U/L — SIGNIFICANT CHANGE UP (ref 0–41)
ANION GAP SERPL CALC-SCNC: 8 MMOL/L — SIGNIFICANT CHANGE UP (ref 7–14)
APPEARANCE UR: ABNORMAL
AST SERPL-CCNC: 31 U/L — SIGNIFICANT CHANGE UP (ref 0–41)
BACTERIA # UR AUTO: NEGATIVE — SIGNIFICANT CHANGE UP
BILIRUB SERPL-MCNC: 0.3 MG/DL — SIGNIFICANT CHANGE UP (ref 0.2–1.2)
BILIRUB UR-MCNC: NEGATIVE — SIGNIFICANT CHANGE UP
BUN SERPL-MCNC: 34 MG/DL — HIGH (ref 10–20)
CA-I BLD-SCNC: 1.1 MMOL/L — LOW (ref 1.12–1.3)
CA-I BLD-SCNC: 1.11 MMOL/L — LOW (ref 1.12–1.3)
CALCIUM SERPL-MCNC: 7.4 MG/DL — LOW (ref 8.5–10.1)
CHLORIDE SERPL-SCNC: 109 MMOL/L — SIGNIFICANT CHANGE UP (ref 98–110)
CO2 SERPL-SCNC: 26 MMOL/L — SIGNIFICANT CHANGE UP (ref 17–32)
COLOR SPEC: YELLOW — SIGNIFICANT CHANGE UP
CREAT SERPL-MCNC: 2.1 MG/DL — HIGH (ref 0.7–1.5)
DIFF PNL FLD: ABNORMAL
EPI CELLS # UR: 1 /HPF — SIGNIFICANT CHANGE UP (ref 0–5)
GAS PNL BLDA: SIGNIFICANT CHANGE UP
GLUCOSE BLDC GLUCOMTR-MCNC: 169 MG/DL — HIGH (ref 70–99)
GLUCOSE BLDC GLUCOMTR-MCNC: 206 MG/DL — HIGH (ref 70–99)
GLUCOSE BLDC GLUCOMTR-MCNC: 214 MG/DL — HIGH (ref 70–99)
GLUCOSE BLDC GLUCOMTR-MCNC: 249 MG/DL — HIGH (ref 70–99)
GLUCOSE SERPL-MCNC: 207 MG/DL — HIGH (ref 70–99)
GLUCOSE UR QL: ABNORMAL
HCT VFR BLD CALC: 32.6 % — LOW (ref 42–52)
HGB BLD-MCNC: 9.6 G/DL — LOW (ref 14–18)
HYALINE CASTS # UR AUTO: 5 /LPF — SIGNIFICANT CHANGE UP (ref 0–7)
KETONES UR-MCNC: NEGATIVE — SIGNIFICANT CHANGE UP
LEUKOCYTE ESTERASE UR-ACNC: NEGATIVE — SIGNIFICANT CHANGE UP
MAGNESIUM SERPL-MCNC: 2.3 MG/DL — SIGNIFICANT CHANGE UP (ref 1.8–2.4)
MCHC RBC-ENTMCNC: 26.7 PG — LOW (ref 27–31)
MCHC RBC-ENTMCNC: 29.4 G/DL — LOW (ref 32–37)
MCV RBC AUTO: 90.6 FL — SIGNIFICANT CHANGE UP (ref 80–94)
NITRITE UR-MCNC: NEGATIVE — SIGNIFICANT CHANGE UP
NRBC # BLD: 0 /100 WBCS — SIGNIFICANT CHANGE UP (ref 0–0)
PH UR: 6 — SIGNIFICANT CHANGE UP (ref 5–8)
PHOSPHATE SERPL-MCNC: 1.7 MG/DL — LOW (ref 2.1–4.9)
PLATELET # BLD AUTO: 184 K/UL — SIGNIFICANT CHANGE UP (ref 130–400)
POTASSIUM SERPL-MCNC: 4.3 MMOL/L — SIGNIFICANT CHANGE UP (ref 3.5–5)
POTASSIUM SERPL-SCNC: 4.3 MMOL/L — SIGNIFICANT CHANGE UP (ref 3.5–5)
PROT SERPL-MCNC: 7.5 G/DL — SIGNIFICANT CHANGE UP (ref 6–8)
PROT UR-MCNC: ABNORMAL
RBC # BLD: 3.6 M/UL — LOW (ref 4.7–6.1)
RBC # FLD: 18.1 % — HIGH (ref 11.5–14.5)
RBC CASTS # UR COMP ASSIST: 35 /HPF — HIGH (ref 0–4)
SARS-COV-2 RNA SPEC QL NAA+PROBE: SIGNIFICANT CHANGE UP
SODIUM SERPL-SCNC: 143 MMOL/L — SIGNIFICANT CHANGE UP (ref 135–146)
SP GR SPEC: 1.02 — SIGNIFICANT CHANGE UP (ref 1.01–1.03)
SURGICAL PATHOLOGY STUDY: SIGNIFICANT CHANGE UP
UROBILINOGEN FLD QL: SIGNIFICANT CHANGE UP
WBC # BLD: 12.87 K/UL — HIGH (ref 4.8–10.8)
WBC # FLD AUTO: 12.87 K/UL — HIGH (ref 4.8–10.8)
WBC UR QL: 4 /HPF — SIGNIFICANT CHANGE UP (ref 0–5)

## 2021-06-07 PROCEDURE — 99291 CRITICAL CARE FIRST HOUR: CPT

## 2021-06-07 PROCEDURE — 99232 SBSQ HOSP IP/OBS MODERATE 35: CPT

## 2021-06-07 PROCEDURE — 71045 X-RAY EXAM CHEST 1 VIEW: CPT | Mod: 26

## 2021-06-07 PROCEDURE — 99024 POSTOP FOLLOW-UP VISIT: CPT

## 2021-06-07 RX ORDER — DAPTOMYCIN 500 MG/10ML
875 INJECTION, POWDER, LYOPHILIZED, FOR SOLUTION INTRAVENOUS
Refills: 0 | Status: DISCONTINUED | OUTPATIENT
Start: 2021-06-07 | End: 2021-06-11

## 2021-06-07 RX ORDER — ACETAMINOPHEN 500 MG
650 TABLET ORAL EVERY 6 HOURS
Refills: 0 | Status: DISCONTINUED | OUTPATIENT
Start: 2021-06-07 | End: 2021-06-11

## 2021-06-07 RX ADMIN — CEFTAROLINE FOSAMIL 50 MILLIGRAM(S): 600 POWDER, FOR SOLUTION INTRAVENOUS at 05:34

## 2021-06-07 RX ADMIN — Medication 100 MILLIGRAM(S): at 13:41

## 2021-06-07 RX ADMIN — CHLORHEXIDINE GLUCONATE 1 APPLICATION(S): 213 SOLUTION TOPICAL at 05:29

## 2021-06-07 RX ADMIN — SENNA PLUS 2 TABLET(S): 8.6 TABLET ORAL at 21:00

## 2021-06-07 RX ADMIN — Medication 650 MILLIGRAM(S): at 04:00

## 2021-06-07 RX ADMIN — Medication 7.7 MICROGRAM(S)/KG/MIN: at 09:30

## 2021-06-07 RX ADMIN — HUMAN INSULIN 15 UNIT(S): 100 INJECTION, SUSPENSION SUBCUTANEOUS at 17:11

## 2021-06-07 RX ADMIN — Medication 4: at 11:44

## 2021-06-07 RX ADMIN — Medication 100 MILLIGRAM(S): at 21:00

## 2021-06-07 RX ADMIN — Medication 60 MILLIGRAM(S): at 17:13

## 2021-06-07 RX ADMIN — CHLORHEXIDINE GLUCONATE 15 MILLILITER(S): 213 SOLUTION TOPICAL at 05:29

## 2021-06-07 RX ADMIN — LACTULOSE 10 GRAM(S): 10 SOLUTION ORAL at 05:30

## 2021-06-07 RX ADMIN — LINEZOLID 300 MILLIGRAM(S): 600 INJECTION, SOLUTION INTRAVENOUS at 05:34

## 2021-06-07 RX ADMIN — Medication 100 MILLIGRAM(S): at 05:48

## 2021-06-07 RX ADMIN — Medication 4: at 09:07

## 2021-06-07 RX ADMIN — DAPTOMYCIN 135 MILLIGRAM(S): 500 INJECTION, POWDER, LYOPHILIZED, FOR SOLUTION INTRAVENOUS at 17:10

## 2021-06-07 RX ADMIN — FENTANYL CITRATE 8.21 MICROGRAM(S)/KG/HR: 50 INJECTION INTRAVENOUS at 19:34

## 2021-06-07 RX ADMIN — PANTOPRAZOLE SODIUM 40 MILLIGRAM(S): 20 TABLET, DELAYED RELEASE ORAL at 05:35

## 2021-06-07 RX ADMIN — LACTULOSE 10 GRAM(S): 10 SOLUTION ORAL at 11:35

## 2021-06-07 RX ADMIN — Medication 650 MILLIGRAM(S): at 21:18

## 2021-06-07 RX ADMIN — LACTULOSE 10 GRAM(S): 10 SOLUTION ORAL at 17:13

## 2021-06-07 RX ADMIN — Medication 975 MILLIGRAM(S): at 19:29

## 2021-06-07 RX ADMIN — Medication 650 MILLIGRAM(S): at 04:30

## 2021-06-07 RX ADMIN — Medication 81 MILLIGRAM(S): at 11:35

## 2021-06-07 RX ADMIN — CHLORHEXIDINE GLUCONATE 15 MILLILITER(S): 213 SOLUTION TOPICAL at 17:13

## 2021-06-07 RX ADMIN — MUPIROCIN 1 APPLICATION(S): 20 OINTMENT TOPICAL at 17:15

## 2021-06-07 RX ADMIN — HUMAN INSULIN 15 UNIT(S): 100 INJECTION, SUSPENSION SUBCUTANEOUS at 05:42

## 2021-06-07 RX ADMIN — Medication 650 MILLIGRAM(S): at 21:00

## 2021-06-07 RX ADMIN — POLYETHYLENE GLYCOL 3350 17 GRAM(S): 17 POWDER, FOR SOLUTION ORAL at 11:35

## 2021-06-07 RX ADMIN — HEPARIN SODIUM 5000 UNIT(S): 5000 INJECTION INTRAVENOUS; SUBCUTANEOUS at 21:00

## 2021-06-07 RX ADMIN — HEPARIN SODIUM 5000 UNIT(S): 5000 INJECTION INTRAVENOUS; SUBCUTANEOUS at 13:41

## 2021-06-07 RX ADMIN — Medication 2: at 17:13

## 2021-06-07 RX ADMIN — Medication 60 MILLIGRAM(S): at 05:31

## 2021-06-07 RX ADMIN — MUPIROCIN 1 APPLICATION(S): 20 OINTMENT TOPICAL at 05:34

## 2021-06-07 RX ADMIN — HEPARIN SODIUM 5000 UNIT(S): 5000 INJECTION INTRAVENOUS; SUBCUTANEOUS at 05:29

## 2021-06-07 NOTE — PROGRESS NOTE ADULT - ASSESSMENT
59 y/o M with PMH of DM, hypertension, dyslipidemia, sleep apnea, Charcot foot reconstruction with external fixation, morbid obesity presented for worsening right foot infection.     # DFU with Hx of MRSA infection  # Septic shock   # Acute hypoxic respiratory failure secondary to septic shock  # Toxic metabolic encephalopathy   # Fever overnight 103F  - s/p intubation 5/31   - bacteremia from right foot abscess   - septic on admission, WBC 12,  + lactate 2.3. s/p cefepime flagyl and vanc in the ED  - Podiatry on board, f/u   - S/p disarticulation right knee 5/31 w/ vascular   - Cx (+) for Staph + enterococcus   - vascular following:   if improves by early next week - will take him for AKA on Tuesday 6/8/21  - ID following, f/u recs:  -linezolid 600 mg iv q12h  -Ceftaroline 200 mg iv q12h  -flagyl 500 mg iv q8h   - f/u repeat bcx cultures   - Echo 3/31: EF 55-60%, G2DD   - f/u ID to see if they want to add gram (-) coverage     #?Code stroke for possible CVA   - ptnt was code stroke on floors on 3/30 for AMS and confusion upgraded to CCU   - metabolic encephalopathy secondary to sepsis more likely than stroke  -  CT head neg for acute pathology, CTA w/ moderate stenoses at the junction of the precavernous and cavernous segments of both internal carotid arteries  - neuro recs for brain MR, can get repeat CT head when stable    # NSTEMI  - secondary to sepsis induced demand ischemia  - less likely true ACS  -monitor     #CANDY on CKD III /  rule out ATN - improving   - secondary to sepsis induced ATN, continue supportive care, monitor for now.   - creatinine 3.0 on admission, baseline around 1.4-1.8   - s/p CVVHD 6/1 and 6/2, holding CVVHD for now ptnt w/ good urine output   - nephro following    #anemia of chronic disease   - transfuse Hb < 7  - monitor   - s/p 3 units in total since admission  - 9.6 today    #Diabetes Mellitus  -on insulin regimen   - a1c 8.9   -Monitor      #Hypertension  -holding oral meds     #Dyslipidemia  -c/w home meds    #MATA/ OHS    #morbid obesity    Diet: npo w/ tube feeds   DVT ppx: heparin subq TID  GI ppx: protonix   Dispo: acute   61 y/o M with PMH of DM, hypertension, dyslipidemia, sleep apnea, Charcot foot reconstruction with external fixation, morbid obesity presented for worsening right foot infection.     # DFU with Hx of MRSA infection  # Septic shock   # Acute hypoxic respiratory failure secondary to septic shock  # Toxic metabolic encephalopathy   # Fever overnight 103F  - s/p intubation 5/31   - bacteremia from right foot abscess   - septic on admission, WBC 12,  + lactate 2.3. s/p cefepime flagyl and vanc in the ED  - Podiatry on board, f/u   - S/p disarticulation right knee 5/31 w/ vascular   - Cx (+) for Staph + enterococcus   - vascular following:   if improves by early next week - will take him for AKA on Tuesday 6/8/21  - ID following, f/u recs:  -linezolid 600 mg iv q12h  -Ceftaroline 200 mg iv q12h  -flagyl 500 mg iv q8h   - f/u repeat bcx cultures   - Echo 3/31: EF 55-60%, G2DD   - f/u ID to see if they want to add gram (-) coverage     #?Code stroke for possible CVA   - ptnt was code stroke on floors on 3/30 for AMS and confusion upgraded to CCU   - metabolic encephalopathy secondary to sepsis more likely than stroke  -  CT head neg for acute pathology, CTA w/ moderate stenoses at the junction of the precavernous and cavernous segments of both internal carotid arteries  - neuro recs for brain MR, can get repeat CT head when stable    # NSTEMI  - secondary to sepsis induced demand ischemia  - less likely true ACS  -monitor     #CANDY on CKD III /  rule out ATN - improving   - secondary to sepsis induced ATN, continue supportive care, monitor for now.   - creatinine 3.0 on admission, baseline around 1.4-1.8   - s/p CVVHD 6/1 and 6/2, holding CVVHD for now ptnt w/ good urine output   - nephro following    #anemia of chronic disease   - transfuse Hb < 7  - monitor   - s/p 3 units in total since admission  - 9.6 today    #Diabetes Mellitus  -on insulin regimen   - a1c 8.9   -Monitor      #Hypertension  -holding oral meds     #Dyslipidemia  -c/w home meds    #MATA/ OHS    #morbid obesity    Diet: npo w/ tube feeds   DVT ppx: heparin subq TID  GI ppx: protonix   Dispo: acute    called Gentry Callahan, no answer.

## 2021-06-07 NOTE — PRE-ANESTHESIA EVALUATION ADULT - NSANTHPMHFT_GEN_ALL_CORE
Patient is currently intubated with AV settings R-18 TV- 500 PEEP-8. Is on Fentanyl 5mcg/hr, Propofol 20 mcg/kg/min and Levophed .02 mcg/kg/min with titration imminent- drips are running through Left IJ TLC. Has Right Radial A-Line Currently febrile 100.6 with a cooling blanket. Patient is currently intubated with AV settings R-18 TV- 500 PEEP-8. Is on Fentanyl 5mcg/hr, Propofol 20 mcg/kg/min and Levophed .02 mcg/kg/min with titration imminent- drips are running through Left IJ TLC. Has Right Radial A-Line Currently febrile 100.6 with a cooling blanket.    ***Vascular Surgery Note states- DELAY for SURGERY due to recent spike in fever

## 2021-06-07 NOTE — PROGRESS NOTE ADULT - ASSESSMENT
· Assessment	  61 y/o M with PMH of DM, hypertension, dyslipidemia, sleep apnea, Charcot foot reconstruction with external fixation, super morbid obesity present for worsening right foot infection.  Pt reports he has had this foot ulcer at various stage of healing for 13 years. Patient has been admitted for IV antibiotics multiple times in the past for osteomyelitis of right foot. He has history of MRSA infection in foot. pt has baseline neuropathy in both feet so does not feel any pain.  Patient states his Infection has been gradually getting worse despite treatment with bactrim, he saw his podiatrists and he was sent in by podiatry for IV abx .    IMPRESSION;  #Fever Tm 103 with downtrending WBC normotensive ?drug fever ?Serotonin syndrome as on linezolid?    Rule out bacteremia , rule out line infection  #MSOF : resp failure with fio2 40%, on pressors, renal failure off  CVVH, metabolic encephalopathy  5/31 S/p disarticulation right knee   -5/28 BCx E fecalis, ORSA  -5/31 TTE no vegetation  -etiology of bacteremia was right foot abscess  -5/29 R foot : ORSA , Corynebacterium  -5/30 BCx NG  -6/4 BCx NG    RECOMMENDATIONS;  -f/u BCX  -TLC 5/31- remove if hemodynamic compromise, or BCX +   -Dapto 875mg q48h IV (8mg/kg of adjusted body wt)  -Check baseline CPK and monitor weekly   -flagyl 500 mg iv q8h   -if worsening hemodynamic compromise, add caspo IV  -Pending OR    Please call with any questions or send a message on [employer] Teams  Spectra [phone]

## 2021-06-07 NOTE — PROGRESS NOTE ADULT - SUBJECTIVE AND OBJECTIVE BOX
VASCULAR SURGERY PROGRESS NOTE    CC: right DFU  Hospital Day #  Post-Op Day #    Procedure:n s/p right knee disarticulation     Events of past 24 hours: fevers, max 101.3F, off pressors          ROS otherwise negative except per subjective and HPI      PAST MEDICAL & SURGICAL HISTORY:  MATA on CPAP    DM (diabetes mellitus)    HTN (hypertension)    High cholesterol    Charcot ankle, right    History of percutaneous angioplasty    H/O skin graft    Left toe amputee  4 TOES    Diabetic Charcot foot  Fixation with external device        Vital Signs Last 24 Hrs  T(C): 38.1 (2021 12:00), Max: 39.4 (2021 04:00)  T(F): 100.5 (2021 12:00), Max: 103 (2021 04:00)  HR: 78 (2021 13:00) (78 - 100)  BP: --  BP(mean): --  RR: 18 (2021 13:00) (0 - 22)  SpO2: 100% (2021 13:) (99% - 100%)    Pain (0-10):            Pain Control Adequate: [] YES [] N    Diet:    I&O's Detail    2021 07:01  -  2021 07:00  --------------------------------------------------------  IN:    Enteral Tube Flush: 240 mL    FentaNYL: 409.4 mL    IV PiggyBack: 450 mL    Propofol: 223 mL    Vital High Protein: 605 mL  Total IN: 1927.4 mL    OUT:    Indwelling Catheter - Urethral (mL): 4950 mL  Total OUT: 4950 mL    Total NET: -3022.6 mL      2021 07:01  -  2021 13:56  --------------------------------------------------------  IN:    Enteral Tube Flush: 100 mL    FentaNYL: 102 mL    IV PiggyBack: 100 mL    Norepinephrine: 7 mL    Propofol: 35 mL    Vital High Protein: 385 mL  Total IN: 729 mL    OUT:    Indwelling Catheter - Urethral (mL): 2100 mL  Total OUT: 2100 mL    Total NET: -1371 mL      PHYSICAL EXAM    Appearance: Normal	  HEENT:   Normal oral mucosa, PERRL, EOMI	  Neck: Supple, - JVD;  Cardiovascular: Normal S1 S2, No JVD, No murmurs,   Respiratory: Lungs clear to auscultation/No Rales, Rhonchi, Wheezing	  Gastrointestinal:  Soft, Non-tender, + BS	  Skin: No rashes, No ecchymoses, No cyanosis  Extremities: Normal range of motion, No clubbing, cyanosis   Right lower extremity s/p knee disarticulation + dressing over  Neurologic: Non-focal  Psychiatry: A & O x 3, Mood & affect appropriate        MEDICATIONS:   MEDICATIONS  (STANDING):  ALBUTerol    90 MICROgram(s) HFA Inhaler 1 Puff(s) Inhalation once  aspirin  chewable 81 milliGRAM(s) Oral daily  chlorhexidine 0.12% Liquid 15 milliLiter(s) Oral Mucosa two times a day  chlorhexidine 4% Liquid 1 Application(s) Topical <User Schedule>  DAPTOmycin IVPB 875 milliGRAM(s) IV Intermittent every 48 hours  dextrose 40% Gel 15 Gram(s) Oral once  dextrose 5%. 1000 milliLiter(s) (50 mL/Hr) IV Continuous <Continuous>  dextrose 5%. 1000 milliLiter(s) (100 mL/Hr) IV Continuous <Continuous>  dextrose 50% Injectable 25 Gram(s) IV Push once  dextrose 50% Injectable 12.5 Gram(s) IV Push once  dextrose 50% Injectable 25 Gram(s) IV Push once  fentaNYL   Infusion. 0.5 MICROgram(s)/kG/Hr (8.21 mL/Hr) IV Continuous <Continuous>  furosemide   Injectable 60 milliGRAM(s) IV Push every 12 hours  glucagon  Injectable 1 milliGRAM(s) IntraMuscular once  heparin   Injectable 5000 Unit(s) SubCutaneous every 8 hours  insulin lispro (ADMELOG) corrective regimen sliding scale   SubCutaneous three times a day before meals  insulin NPH human recombinant 15 Unit(s) SubCutaneous every 12 hours  lactulose Syrup 10 Gram(s) Oral every 6 hours  metroNIDAZOLE  IVPB 500 milliGRAM(s) IV Intermittent every 8 hours  mupirocin 2% Nasal 1 Application(s) Nasal two times a day  norepinephrine Infusion 0.05 MICROgram(s)/kG/Min (7.7 mL/Hr) IV Continuous <Continuous>  pantoprazole    Tablet 40 milliGRAM(s) Oral before breakfast  polyethylene glycol 3350 17 Gram(s) Oral daily  propofol Infusion 5.004 MICROgram(s)/kG/Min (4.93 mL/Hr) IV Continuous <Continuous>  senna 2 Tablet(s) Oral at bedtime  tiotropium 18 MICROgram(s) Capsule 1 Capsule(s) Inhalation once    MEDICATIONS  (PRN):  acetaminophen   Tablet .. 650 milliGRAM(s) Oral every 6 hours PRN Temp greater or equal to 38C (100.4F), Mild Pain (1 - 3)      DVT PROPHYLAXIS: [x] YES [] NO  GI PROPHYLAXIS: [x] YES [] NO  ANTIPLATELETS: [] YES [x] NO  ANTICOAGULATION: [] YES [x] NO  ANTIBIOTICS: [x] YES [] NO    LAB/STUDIES:                        9.6    12.87 )-----------( 184      ( 2021 04:30 )             32.6     06-07    143  |  109  |  34<H>  ----------------------------<  207<H>  4.3   |  26  |  2.1<H>    Ca    7.4<L>      2021 04:30  Phos  1.7     06-  Mg     2.3     -    TPro  7.5  /  Alb  2.3<L>  /  TBili  0.3  /  DBili  x   /  AST  31  /  ALT  15  /  AlkPhos  127<H>      PT/INR - ( 2021 20:51 )   PT: 13.20 sec;   INR: 1.15 ratio         PTT - ( 2021 20:51 )  PTT:32.3 sec  LIVER FUNCTIONS - ( 2021 04:30 )  Alb: 2.3 g/dL / Pro: 7.5 g/dL / ALK PHOS: 127 U/L / ALT: 15 U/L / AST: 31 U/L / GGT: x             Urinalysis Basic - ( 2021 12:43 )    Color: Yellow / Appearance: Slightly Turbid / S.020 / pH: x  Gluc: x / Ketone: Negative  / Bili: Negative / Urobili: <2 mg/dL   Blood: x / Protein: 100 mg/dL / Nitrite: Negative   Leuk Esterase: Negative / RBC: 35 /HPF / WBC 4 /HPF   Sq Epi: x / Non Sq Epi: 1 /HPF / Bacteria: Negative              ABG - ( 2021 13:23 )  pH, Arterial: 7.43  pH, Blood: x     /  pCO2: 48    /  pO2: 123   / HCO3: 32    / Base Excess: 7.0   /  SaO2: 99               VASCULAR SURGERY PROGRESS NOTE    CC: right DFU  Hospital Day #  Post-Op Day #    Procedure:n s/p right knee disarticulation     Events of past 24 hours: fevers, max 101.3F, resumed on Norepinephrine          ROS otherwise negative except per subjective and HPI      PAST MEDICAL & SURGICAL HISTORY:  MATA on CPAP    DM (diabetes mellitus)    HTN (hypertension)    High cholesterol    Charcot ankle, right    History of percutaneous angioplasty    H/O skin graft    Left toe amputee  4 TOES    Diabetic Charcot foot  Fixation with external device        Vital Signs Last 24 Hrs  T(C): 38.1 (2021 12:00), Max: 39.4 (2021 04:00)  T(F): 100.5 (2021 12:00), Max: 103 (2021 04:00)  HR: 78 (2021 13:00) (78 - 100)  BP: --  BP(mean): --  RR: 18 (2021 13:00) (0 - 22)  SpO2: 100% (2021 13:00) (99% - 100%)    Pain (0-10):            Pain Control Adequate: [] YES [] N    Diet:    I&O's Detail    2021 07:01  -  2021 07:00  --------------------------------------------------------  IN:    Enteral Tube Flush: 240 mL    FentaNYL: 409.4 mL    IV PiggyBack: 450 mL    Propofol: 223 mL    Vital High Protein: 605 mL  Total IN: 1927.4 mL    OUT:    Indwelling Catheter - Urethral (mL): 4950 mL  Total OUT: 4950 mL    Total NET: -3022.6 mL      2021 07:01  -  2021 13:56  --------------------------------------------------------  IN:    Enteral Tube Flush: 100 mL    FentaNYL: 102 mL    IV PiggyBack: 100 mL    Norepinephrine: 7 mL    Propofol: 35 mL    Vital High Protein: 385 mL  Total IN: 729 mL    OUT:    Indwelling Catheter - Urethral (mL): 2100 mL  Total OUT: 2100 mL    Total NET: -1371 mL      PHYSICAL EXAM    Appearance: Normal	  HEENT:   Normal oral mucosa, PERRL, EOMI	  Neck: Supple, - JVD;  Cardiovascular: Normal S1 S2, No JVD, No murmurs,   Respiratory: Lungs clear to auscultation/No Rales, Rhonchi, Wheezing	  Gastrointestinal:  Soft, Non-tender, + BS	  Skin: No rashes, No ecchymoses, No cyanosis  Extremities: Normal range of motion, No clubbing, cyanosis   Right lower extremity s/p knee disarticulation + dressing over  Neurologic: Non-focal  Psychiatry: A & O x 3, Mood & affect appropriate        MEDICATIONS:   MEDICATIONS  (STANDING):  ALBUTerol    90 MICROgram(s) HFA Inhaler 1 Puff(s) Inhalation once  aspirin  chewable 81 milliGRAM(s) Oral daily  chlorhexidine 0.12% Liquid 15 milliLiter(s) Oral Mucosa two times a day  chlorhexidine 4% Liquid 1 Application(s) Topical <User Schedule>  DAPTOmycin IVPB 875 milliGRAM(s) IV Intermittent every 48 hours  dextrose 40% Gel 15 Gram(s) Oral once  dextrose 5%. 1000 milliLiter(s) (50 mL/Hr) IV Continuous <Continuous>  dextrose 5%. 1000 milliLiter(s) (100 mL/Hr) IV Continuous <Continuous>  dextrose 50% Injectable 25 Gram(s) IV Push once  dextrose 50% Injectable 12.5 Gram(s) IV Push once  dextrose 50% Injectable 25 Gram(s) IV Push once  fentaNYL   Infusion. 0.5 MICROgram(s)/kG/Hr (8.21 mL/Hr) IV Continuous <Continuous>  furosemide   Injectable 60 milliGRAM(s) IV Push every 12 hours  glucagon  Injectable 1 milliGRAM(s) IntraMuscular once  heparin   Injectable 5000 Unit(s) SubCutaneous every 8 hours  insulin lispro (ADMELOG) corrective regimen sliding scale   SubCutaneous three times a day before meals  insulin NPH human recombinant 15 Unit(s) SubCutaneous every 12 hours  lactulose Syrup 10 Gram(s) Oral every 6 hours  metroNIDAZOLE  IVPB 500 milliGRAM(s) IV Intermittent every 8 hours  mupirocin 2% Nasal 1 Application(s) Nasal two times a day  norepinephrine Infusion 0.05 MICROgram(s)/kG/Min (7.7 mL/Hr) IV Continuous <Continuous>  pantoprazole    Tablet 40 milliGRAM(s) Oral before breakfast  polyethylene glycol 3350 17 Gram(s) Oral daily  propofol Infusion 5.004 MICROgram(s)/kG/Min (4.93 mL/Hr) IV Continuous <Continuous>  senna 2 Tablet(s) Oral at bedtime  tiotropium 18 MICROgram(s) Capsule 1 Capsule(s) Inhalation once    MEDICATIONS  (PRN):  acetaminophen   Tablet .. 650 milliGRAM(s) Oral every 6 hours PRN Temp greater or equal to 38C (100.4F), Mild Pain (1 - 3)      DVT PROPHYLAXIS: [x] YES [] NO  GI PROPHYLAXIS: [x] YES [] NO  ANTIPLATELETS: [] YES [x] NO  ANTICOAGULATION: [] YES [x] NO  ANTIBIOTICS: [x] YES [] NO    LAB/STUDIES:                        9.6    12.87 )-----------( 184      ( 2021 04:30 )             32.6     06-07    143  |  109  |  34<H>  ----------------------------<  207<H>  4.3   |  26  |  2.1<H>    Ca    7.4<L>      2021 04:30  Phos  1.7     06-  Mg     2.3     -    TPro  7.5  /  Alb  2.3<L>  /  TBili  0.3  /  DBili  x   /  AST  31  /  ALT  15  /  AlkPhos  127<H>      PT/INR - ( 2021 20:51 )   PT: 13.20 sec;   INR: 1.15 ratio         PTT - ( 2021 20:51 )  PTT:32.3 sec  LIVER FUNCTIONS - ( 2021 04:30 )  Alb: 2.3 g/dL / Pro: 7.5 g/dL / ALK PHOS: 127 U/L / ALT: 15 U/L / AST: 31 U/L / GGT: x             Urinalysis Basic - ( 2021 12:43 )    Color: Yellow / Appearance: Slightly Turbid / S.020 / pH: x  Gluc: x / Ketone: Negative  / Bili: Negative / Urobili: <2 mg/dL   Blood: x / Protein: 100 mg/dL / Nitrite: Negative   Leuk Esterase: Negative / RBC: 35 /HPF / WBC 4 /HPF   Sq Epi: x / Non Sq Epi: 1 /HPF / Bacteria: Negative              ABG - ( 2021 13:23 )  pH, Arterial: 7.43  pH, Blood: x     /  pCO2: 48    /  pO2: 123   / HCO3: 32    / Base Excess: 7.0   /  SaO2: 99

## 2021-06-07 NOTE — PROGRESS NOTE ADULT - SUBJECTIVE AND OBJECTIVE BOX
FERNANDEZ GUZMAN  60y, Male  Allergy: No Known Allergies  statins (Other)      LOS  9d    CHIEF COMPLAINT: dfu (2021 13:02)      INTERVAL EVENTS/HPI  - T(F): , Max: 103 (21 @ 04:00)- febrile, WBC downtrending - normotensive  - WBC Count: 12.87 (21 @ 04:30)  WBC Count: 16.37 (21 @ 04:30)  - Creatinine, Serum: 2.1 (21 @ 04:30)  Creatinine, Serum: 2.3 (21 @ 04:30)      ROS  cannot obtain secondary to patient's sedation and/or mental status    VITALS:  T(F): 100.8, Max: 103 (21 @ 04:00)  HR: 92  BP: --  RR: 18Vital Signs Last 24 Hrs  T(C): 38.2 (2021 07:00), Max: 39.4 (2021 04:00)  T(F): 100.8 (2021 07:00), Max: 103 (2021 04:00)  HR: 92 (2021 08:00) (78 - 100)  BP: --  BP(mean): --  RR: 18 (2021 08:00) (0 - 21)  SpO2: 100% (2021 08:00) (99% - 100%)    PHYSICAL EXAM:  Gen: Intubated under cooling blanket- obese  CV: RRR  Lungs: Decreased BS at bases  Abd: Soft  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   Neuro: awake, opens eyes  Lines clean, no phlebitis    FH: Non-contributory  Social Hx: Non-contributory    TESTS & MEASUREMENTS:                        9.6    12.87 )-----------( 184      ( 2021 04:30 )             32.6     06-07    143  |  109  |  34<H>  ----------------------------<  207<H>  4.3   |  26  |  2.1<H>    Ca    7.4<L>      2021 04:30  Phos  1.7     06-07  Mg     2.3     06-07    TPro  7.5  /  Alb  2.3<L>  /  TBili  0.3  /  DBili  x   /  AST  31  /  ALT  15  /  AlkPhos  127<H>      eGFR if Non African American: 33 mL/min/1.73M2 (21 @ 04:30)  eGFR if African American: 38 mL/min/1.73M2 (21 @ 04:30)    LIVER FUNCTIONS - ( 2021 04:30 )  Alb: 2.3 g/dL / Pro: 7.5 g/dL / ALK PHOS: 127 U/L / ALT: 15 U/L / AST: 31 U/L / GGT: x           Urinalysis Basic - ( 2021 12:43 )    Color: Yellow / Appearance: Slightly Turbid / S.020 / pH: x  Gluc: x / Ketone: Negative  / Bili: Negative / Urobili: <2 mg/dL   Blood: x / Protein: 100 mg/dL / Nitrite: Negative   Leuk Esterase: Negative / RBC: x / WBC x   Sq Epi: x / Non Sq Epi: x / Bacteria: x        Culture - Blood (collected 21 @ 01:00)  Source: .Blood None  Preliminary Report (21 @ 08:01):    No growth to date.    Culture - Blood (collected 21 @ 22:09)  Source: .Blood Blood-Peripheral  Final Report (21 @ 08:01):    No Growth Final    Culture - Blood (collected 21 @ 22:09)  Source: .Blood Blood-Peripheral  Final Report (21 @ 08:01):    No Growth Final    Culture - Acid Fast - Other w/Smear (collected 21 @ 06:10)  Source: .Other None(R-FOOT)  Preliminary Report (21 @ 15:07):    Culture is being performed.    Culture - Surgical Swab (collected 21 @ 06:10)  Source: .Surgical Swab None  Final Report (21 @ 16:27):    No growth at 5 days    Culture - Acid Fast - Other w/Smear (collected 21 @ 06:10)  Source: .Other None (R-FOOT)  Preliminary Report (21 @ 15:07):    Culture is being performed.    Culture - Surgical Swab (collected 21 @ 06:10)  Source: .Surgical Swab None  Final Report (21 @ 17:24):    Numerous Methicillin resistant Staphylococcus aureus    Rare Corynebacterium species "Susceptibilities not performed"  Organism: Methicillin resistant Staphylococcus aureus (21 @ 17:24)  Organism: Methicillin resistant Staphylococcus aureus (21 @ 17:24)      -  Ampicillin/Sulbactam: R 16/8      -  Cefazolin: R 8      -  Clindamycin: S <=0.25      -  Daptomycin: S 0.5      -  Erythromycin: R >4      -  Gentamicin: S <=1 Should not be used as monotherapy      -  Linezolid: S 1      -  Oxacillin: R >2      -  Penicillin: R >8      -  RIF- Rifampin: S <=1 Should not be used as monotherapy      -  Tetra/Doxy: S <=1      -  Trimethoprim/Sulfamethoxazole: S <=0.5/9.5      -  Vancomycin: S 2      Method Type: DAVIDA    Culture - Urine (collected 21 @ 01:20)  Source: .Urine Clean Catch (Midstream)  Final Report (21 @ 08:11):    <10,000 CFU/mL Normal Urogenital Mary    Culture - Abscess with Gram Stain (collected 21 @ 19:51)  Source: .Abscess Right - Foot  Final Report (21 @ 09:25):    Moderate Methicillin resistant Staphylococcus aureus    Few Corynebacterium species "Susceptibilities not performed"  Organism: Methicillin resistant Staphylococcus aureus (21 @ 09:25)  Organism: Methicillin resistant Staphylococcus aureus (21 @ 09:25)      -  Ampicillin/Sulbactam: R 16/8      -  Cefazolin: R 8      -  Clindamycin: S <=0.25      -  Daptomycin: S 0.5      -  Erythromycin: R >4      -  Gentamicin: S <=1 Should not be used as monotherapy      -  Linezolid: S 2      -  Oxacillin: R >2      -  Penicillin: R >8      -  RIF- Rifampin: S <=1 Should not be used as monotherapy      -  Tetra/Doxy: S <=1      -  Trimethoprim/Sulfamethoxazole: S 1/19      -  Vancomycin: S 2      Method Type: DAVIDA    Culture - Other (collected 21 @ 18:32)  Source: .Other R foot DFU  Final Report (21 @ 15:44):    Moderate Methicillin resistant Staphylococcus aureus    Normal skin mary isolated  Organism: Methicillin resistant Staphylococcus aureus (21 @ 15:44)  Organism: Methicillin resistant Staphylococcus aureus (21 @ 15:44)      -  Ampicillin/Sulbactam: R 16/8      -  Cefazolin: R 16      -  Clindamycin: S <=0.25      -  Daptomycin: S 0.5      -  Erythromycin: R >4      -  Gentamicin: S <=1 Should not be used as monotherapy      -  Linezolid: S 2      -  Oxacillin: R >2      -  Penicillin: R >8      -  RIF- Rifampin: S <=1 Should not be used as monotherapy      -  Tetra/Doxy: S <=1      -  Trimethoprim/Sulfamethoxazole: S <=0.5/9.5      -  Vancomycin: S 2      Method Type: DAVIDA    Culture - Blood (collected 21 @ 18:28)  Source: .Blood Blood-Peripheral  Gram Stain (21 @ 18:30):    Growth in anaerobic bottle: Gram positive cocci in pairs    Growth in aerobic bottle: Gram positive cocci in pairs  Final Report (21 @ 15:45):    Growth in anaerobic bottle: Enterococcus faecalis    Growth in aerobic and anaerobic bottles: Methicillin resistant    Staphylococcus aureus    ***Blood Panel PCR results on this specimen are available    approximately 3 hours after the Gram stain result.***    Gram stain, PCR, and/or culture results may not always    correspond due to difference in methodologies.    ************************************************************    This PCR assay was performed by multiplex PCR. This    Assay tests for 66 bacterialand resistance gene targets.    Please refer to the [facility] test directory    at [email or web address] for details.  Organism: Blood Culture PCR  Enterococcus faecalis  Methicillin resistant Staphylococcus aureus (21 @ 15:45)  Organism: Methicillin resistant Staphylococcus aureus (21 @ 15:45)      -  Ampicillin/Sulbactam: R 16/8      -  Cefazolin: R 16      -  Clindamycin: S <=0.25      -  Daptomycin: S 0.5      -  Erythromycin: R >4      -  Gentamicin: S <=1 Should not be used as monotherapy      -  Linezolid: S 2      -  Oxacillin: R >2      -  Penicillin: R >8      -  RIF- Rifampin: S <=1 Should not be used as monotherapy      -  Tetra/Doxy: S <=1      -  Trimethoprim/Sulfamethoxazole: S <=0.5/9.5      -  Vancomycin: S 1      Method Type: DAVIDA  Organism: Enterococcus faecalis (21 @ 15:45)      -  Ampicillin: S <=2 Predicts results to ampicillin/sulbactam, amoxacillin-clavulanate and  piperacillin-tazobactam.      -  Gentamicin synergy: S      -  Vancomycin: S 1      Method Type: DAVIDA  Organism: Blood Culture PCR (21 @ 15:45)      -  Coagulase negative Staphylococcus: Detec      -  Enterococcus faecalis: Detec      Method Type: PCR    Culture - Blood (collected 21 @ 18:28)  Source: .Blood Blood-Peripheral  Gram Stain (21 @ 06:00):    Growth in aerobic bottle: Gram positive cocci in pairs    Growth in anaerobic bottle: Gram positive cocci in pairs  Final Report (21 @ 15:46):    Growth in aerobic and anaerobic bottles: Methicillin resistant    Staphylococcus aureus    See previous culture [id number]        Lactate, Blood: 0.8 mmol/L (21 @ 04:30)  Lactate, Blood: 1.1 mmol/L (21 @ 05:50)  Lactate, Blood: 1.1 mmol/L (21 @ 03:50)      INFECTIOUS DISEASES TESTING  COVID-19 PCR: NotDetec (21 @ 06:53)  MRSA PCR Result.: Positive (21 @ 09:26)  COVID-19 PCR: NotDetec (21 @ 18:28)      INFLAMMATORY MARKERS  Sedimentation Rate, Erythrocyte: 126 mm/Hr (21 @ 21:19)  C-Reactive Protein, Serum: 390 mg/L (21 @ 21:19)      RADIOLOGY & ADDITIONAL TESTS:  I have personally reviewed the last available Chest xray  CXR      CT      CARDIOLOGY TESTING  12 Lead ECG:   Ventricular Rate 104 BPM    Atrial Rate 105 BPM    P-R Interval 200 ms    QRS Duration 164 ms    Q-T Interval 392 ms    QTC Calculation(Bazett) 515 ms    P Axis 33 degrees    R Axis 263 degrees    T Axis 45 degrees    Diagnosis Line Sinus tachycardia  Right bundle branch block  Abnormal ECG    Confirmed by Cheko Lux ([id number]) on 2021 9:08:21 PM (21 @ 19:49)  12 Lead ECG:   Ventricular Rate 92 BPM    Atrial Rate 92 BPM    P-R Interval 208 ms    QRS Duration 162 ms    Q-T Interval 410 ms    QTC Calculation(Bazett) 507 ms    P Axis 38 degrees    R Axis 265 degrees    T Axis 49 degrees    Diagnosis Line Normal sinus rhythm  Right bundle branch block  Abnormal ECG    Confirmed by Cheko Lux ([id number]) on 2021 12:48:16 PM (21 @ 12:28)      MEDICATIONS  ALBUTerol    90 MICROgram(s) HFA Inhaler 1  aspirin  chewable 81  ceftaroline fosamil IVPB 200  chlorhexidine 0.12% Liquid 15  chlorhexidine 4% Liquid 1  dextrose 40% Gel 15  dextrose 5%. 1000  dextrose 5%. 1000  dextrose 50% Injectable 25  dextrose 50% Injectable 12.5  dextrose 50% Injectable 25  fentaNYL   Infusion. 0.5  furosemide   Injectable 60  glucagon  Injectable 1  heparin   Injectable 5000  insulin lispro (ADMELOG) corrective regimen sliding scale   insulin NPH human recombinant 15  lactulose Syrup 10  linezolid  IVPB 600  metroNIDAZOLE  IVPB 500  mupirocin 2% Nasal 1  norepinephrine Infusion 0.05  pantoprazole    Tablet 40  polyethylene glycol 3350 17  propofol Infusion 5.004  senna 2  tiotropium 18 MICROgram(s) Capsule 1      WEIGHT  Weight (kg): 164.2 (21 @ 15:48)  Creatinine, Serum: 2.1 mg/dL (21 @ 04:30)      ANTIBIOTICS:  ceftaroline fosamil IVPB 200 milliGRAM(s) IV Intermittent every 12 hours  linezolid  IVPB 600 milliGRAM(s) IV Intermittent every 12 hours  metroNIDAZOLE  IVPB 500 milliGRAM(s) IV Intermittent every 8 hours      All available historical records have been reviewed

## 2021-06-07 NOTE — PRE-ANESTHESIA EVALUATION ADULT - MALLAMPATI CLASS
Class IV (difficult) - the soft palate is not visible at all
Class III - visualization of the soft palate and the base of the uvula

## 2021-06-07 NOTE — PROGRESS NOTE ADULT - SUBJECTIVE AND OBJECTIVE BOX
Patient is a 60y old  Male who presents with a chief complaint of dfu (06 Jun 2021 14:03)      Over Night Events:  Patient seen and examined.   still vented spike temp 103 on propofol and fentanyl   no pressors   passed SBT   ROS:  See HPI    PHYSICAL EXAM    ICU Vital Signs Last 24 Hrs  T(C): 38.2 (07 Jun 2021 07:00), Max: 39.4 (07 Jun 2021 04:00)  T(F): 100.8 (07 Jun 2021 07:00), Max: 103 (07 Jun 2021 04:00)  HR: 94 (07 Jun 2021 07:35) (74 - 114)  BP: --  BP(mean): --  ABP: 134/50 (07 Jun 2021 07:00) (102/50 - 184/74)  ABP(mean): 74 (07 Jun 2021 07:00) (64 - 106)  RR: 18 (07 Jun 2021 07:00) (0 - 24)  SpO2: 100% (07 Jun 2021 07:35) (99% - 100%)      General: on sedation open eyes   HEENT:    et tube             Lymph Nodes: NO cervical LN   Lungs: Bilateral BS  Cardiovascular: Regular   Abdomen: Soft, Positive BS  Extremities: No clubbing   Skin: warm   Neurological:  no focal deficit   Musculoskeletal: move all ext     I&O's Detail    06 Jun 2021 07:01  -  07 Jun 2021 07:00  --------------------------------------------------------  IN:    Enteral Tube Flush: 240 mL    FentaNYL: 409.4 mL    IV PiggyBack: 450 mL    Propofol: 223 mL    Vital High Protein: 605 mL  Total IN: 1927.4 mL    OUT:    Indwelling Catheter - Urethral (mL): 4950 mL  Total OUT: 4950 mL    Total NET: -3022.6 mL          LABS:                          9.6    12.87 )-----------( 184      ( 07 Jun 2021 04:30 )             32.6         07 Jun 2021 04:30    143    |  109    |  34     ----------------------------<  207    4.3     |  26     |  2.1      Ca    7.4        07 Jun 2021 04:30  Phos  1.7       07 Jun 2021 04:30  Mg     2.3       07 Jun 2021 04:30    TPro  7.5    /  Alb  2.3    /  TBili  0.3    /  DBili  x      /  AST  31     /  ALT  15     /  AlkPhos  127    07 Jun 2021 04:30  Amylase x     lipase x                                                 PT/INR - ( 05 Jun 2021 20:51 )   PT: 13.20 sec;   INR: 1.15 ratio         PTT - ( 05 Jun 2021 20:51 )  PTT:32.3 sec                                           Lactate, Blood: 0.8 mmol/L (06-05-21 @ 04:30)                                                                                                       Mode: AC/ CMV (Assist Control/ Continuous Mandatory Ventilation)  RR (machine): 18  TV (machine): 480  FiO2: 40  PEEP: 8  ITime: 1  MAP: 16  PIP: 29                                      ABG - ( 07 Jun 2021 03:57 )  pH, Arterial: 7.40  pH, Blood: x     /  pCO2: 49    /  pO2: 66    / HCO3: 30    / Base Excess: 4.4   /  SaO2: 94                  MEDICATIONS  (STANDING):  ALBUTerol    90 MICROgram(s) HFA Inhaler 1 Puff(s) Inhalation once  aspirin  chewable 81 milliGRAM(s) Oral daily  ceftaroline fosamil IVPB 200 milliGRAM(s) IV Intermittent every 12 hours  chlorhexidine 0.12% Liquid 15 milliLiter(s) Oral Mucosa two times a day  chlorhexidine 4% Liquid 1 Application(s) Topical <User Schedule>  dextrose 40% Gel 15 Gram(s) Oral once  dextrose 5%. 1000 milliLiter(s) (50 mL/Hr) IV Continuous <Continuous>  dextrose 5%. 1000 milliLiter(s) (100 mL/Hr) IV Continuous <Continuous>  dextrose 50% Injectable 25 Gram(s) IV Push once  dextrose 50% Injectable 12.5 Gram(s) IV Push once  dextrose 50% Injectable 25 Gram(s) IV Push once  fentaNYL   Infusion. 0.5 MICROgram(s)/kG/Hr (8.21 mL/Hr) IV Continuous <Continuous>  furosemide   Injectable 60 milliGRAM(s) IV Push every 12 hours  glucagon  Injectable 1 milliGRAM(s) IntraMuscular once  heparin   Injectable 5000 Unit(s) SubCutaneous every 8 hours  insulin lispro (ADMELOG) corrective regimen sliding scale   SubCutaneous three times a day before meals  insulin NPH human recombinant 15 Unit(s) SubCutaneous every 12 hours  lactulose Syrup 10 Gram(s) Oral every 6 hours  linezolid  IVPB 600 milliGRAM(s) IV Intermittent every 12 hours  metroNIDAZOLE  IVPB 500 milliGRAM(s) IV Intermittent every 8 hours  mupirocin 2% Nasal 1 Application(s) Nasal two times a day  norepinephrine Infusion 0.05 MICROgram(s)/kG/Min (7.7 mL/Hr) IV Continuous <Continuous>  pantoprazole    Tablet 40 milliGRAM(s) Oral before breakfast  polyethylene glycol 3350 17 Gram(s) Oral daily  propofol Infusion 5.004 MICROgram(s)/kG/Min (4.93 mL/Hr) IV Continuous <Continuous>  senna 2 Tablet(s) Oral at bedtime  tiotropium 18 MICROgram(s) Capsule 1 Capsule(s) Inhalation once    MEDICATIONS  (PRN):  acetaminophen   Tablet .. 650 milliGRAM(s) Oral every 6 hours PRN Temp greater or equal to 38C (100.4F), Mild Pain (1 - 3)          Xrays:  TLC:  OG:  ET tube:                                                                                    b/l congestion    ECHO:  CAM ICU:

## 2021-06-07 NOTE — PROGRESS NOTE ADULT - SUBJECTIVE AND OBJECTIVE BOX
Nephrology progress note    THIS IS AN INCOMPLETE NOTE . FULL NOTE TO FOLLOW SHORTLY    Patient is seen and examined, events over the last 24 h noted .    Allergies:  No Known Allergies  statins (Other)    Hospital Medications:   MEDICATIONS  (STANDING):  ALBUTerol    90 MICROgram(s) HFA Inhaler 1 Puff(s) Inhalation once  aspirin  chewable 81 milliGRAM(s) Oral daily  ceftaroline fosamil IVPB 200 milliGRAM(s) IV Intermittent every 12 hours  chlorhexidine 0.12% Liquid 15 milliLiter(s) Oral Mucosa two times a day  chlorhexidine 4% Liquid 1 Application(s) Topical <User Schedule>  dextrose 40% Gel 15 Gram(s) Oral once  dextrose 5%. 1000 milliLiter(s) (50 mL/Hr) IV Continuous <Continuous>  dextrose 5%. 1000 milliLiter(s) (100 mL/Hr) IV Continuous <Continuous>  dextrose 50% Injectable 25 Gram(s) IV Push once  dextrose 50% Injectable 12.5 Gram(s) IV Push once  dextrose 50% Injectable 25 Gram(s) IV Push once  fentaNYL   Infusion. 0.5 MICROgram(s)/kG/Hr (8.21 mL/Hr) IV Continuous <Continuous>  furosemide   Injectable 60 milliGRAM(s) IV Push every 12 hours  glucagon  Injectable 1 milliGRAM(s) IntraMuscular once  heparin   Injectable 5000 Unit(s) SubCutaneous every 8 hours  insulin lispro (ADMELOG) corrective regimen sliding scale   SubCutaneous three times a day before meals  insulin NPH human recombinant 15 Unit(s) SubCutaneous every 12 hours  lactulose Syrup 10 Gram(s) Oral every 6 hours  linezolid  IVPB 600 milliGRAM(s) IV Intermittent every 12 hours  metroNIDAZOLE  IVPB 500 milliGRAM(s) IV Intermittent every 8 hours  mupirocin 2% Nasal 1 Application(s) Nasal two times a day  norepinephrine Infusion 0.05 MICROgram(s)/kG/Min (7.7 mL/Hr) IV Continuous <Continuous>  pantoprazole    Tablet 40 milliGRAM(s) Oral before breakfast  polyethylene glycol 3350 17 Gram(s) Oral daily  propofol Infusion 5.004 MICROgram(s)/kG/Min (4.93 mL/Hr) IV Continuous <Continuous>  senna 2 Tablet(s) Oral at bedtime  tiotropium 18 MICROgram(s) Capsule 1 Capsule(s) Inhalation once        VITALS:  T(F): 100.8 (21 @ 07:00), Max: 103 (21 @ 04:00)  HR: 94 (21 @ 07:35)  BP: --  RR: 18 (21 @ 07:00)  SpO2: 100% (21 @ 07:35)  Wt(kg): --     @ 07:01  -   @ 07:00  --------------------------------------------------------  IN: 3843 mL / OUT: 3010 mL / NET: 833 mL    :01  -   @ 07:00  --------------------------------------------------------  IN: 1927.4 mL / OUT: 4950 mL / NET: -3022.6 mL          PHYSICAL EXAM:  Constitutional: NAD  HEENT: anicteric sclera, oropharynx clear, MMM  Neck: No JVD  Respiratory: CTAB, no wheezes, rales or rhonchi  Cardiovascular: S1, S2, RRR  Gastrointestinal: BS+, soft, NT/ND  Extremities: No cyanosis or clubbing. No peripheral edema  :  No terrazas.   Skin: No rashes    LABS:      143  |  109  |  34<H>  ----------------------------<  207<H>  4.3   |  26  |  2.1<H>    Ca    7.4<L>      2021 04:30  Phos  1.7       Mg     2.3         TPro  7.5  /  Alb  2.3<L>  /  TBili  0.3  /  DBili      /  AST  31  /  ALT  15  /  AlkPhos  127<H>                            9.6    12.87 )-----------( 184      ( 2021 04:30 )             32.6       Urine Studies:  Urinalysis Basic - ( 2021 01:10 )    Color: Light Yellow / Appearance: Clear / S.013 / pH:   Gluc:  / Ketone: Negative  / Bili: Negative / Urobili: <2 mg/dL   Blood:  / Protein: 30 mg/dL / Nitrite: Negative   Leuk Esterase: Negative / RBC: 14 /HPF / WBC 0 /HPF   Sq Epi:  / Non Sq Epi: 0 /HPF / Bacteria: Negative        RADIOLOGY & ADDITIONAL STUDIES:   Nephrology progress note  Patient is seen and examined, events over the last 24 h noted .  Seen with niece at bedside  still intubated non oliguric     Allergies:  No Known Allergies  statins (Other)    Hospital Medications:   MEDICATIONS  (STANDING):  ALBUTerol    90 MICROgram(s) HFA Inhaler 1 Puff(s) Inhalation once  aspirin  chewable 81 milliGRAM(s) Oral daily  ceftaroline fosamil IVPB 200 milliGRAM(s) IV Intermittent every 12 hours  dextrose 40% Gel 15 Gram(s) Oral once  dextrose 5%. 1000 milliLiter(s) (50 mL/Hr) IV Continuous <Continuous>  fentaNYL   Infusion. 0.5 MICROgram(s)/kG/Hr (8.21 mL/Hr) IV Continuous <Continuous>  furosemide   Injectable 60 milliGRAM(s) IV Push every 12 hours  glucagon  Injectable 1 milliGRAM(s) IntraMuscular once  heparin   Injectable 5000 Unit(s) SubCutaneous every 8 hours  insulin lispro (ADMELOG) corrective regimen sliding scale   SubCutaneous three times a day before meals  insulin NPH human recombinant 15 Unit(s) SubCutaneous every 12 hours  lactulose Syrup 10 Gram(s) Oral every 6 hours  linezolid  IVPB 600 milliGRAM(s) IV Intermittent every 12 hours  metroNIDAZOLE  IVPB 500 milliGRAM(s) IV Intermittent every 8 hours  mupirocin 2% Nasal 1 Application(s) Nasal two times a day  norepinephrine Infusion 0.05 MICROgram(s)/kG/Min (7.7 mL/Hr) IV Continuous <Continuous>  pantoprazole    Tablet 40 milliGRAM(s) Oral before breakfast  polyethylene glycol 3350 17 Gram(s) Oral daily  propofol Infusion 5.004 MICROgram(s)/kG/Min (4.93 mL/Hr) IV Continuous <Continuous>  senna 2 Tablet(s) Oral at bedtime  tiotropium 18 MICROgram(s) Capsule 1 Capsule(s) Inhalation once        VITALS:  T(F): 100.8 (21 @ 07:00), Max: 103 (21 @ 04:00)  HR: 94 (21 @ 07:35)  RR: 18 (21 @ 07:00)  SpO2: 100% (21 @ 07:35)      06-05 @ 07:01  -   @ 07:00  --------------------------------------------------------  IN: 3843 mL / OUT: 3010 mL / NET: 833 mL     @ 07:01  -   @ 07:00  --------------------------------------------------------  IN: 1927.4 mL / OUT: 4950 mL / NET: -3022.6 mL          PHYSICAL EXAM:  Constitutional: intubated on MV   Neck: No JVD  Respiratory: CTAB,  Cardiovascular: S1, S2, RRR  Gastrointestinal: BS+, soft, NT/ND  Extremities: No cyanosis or clubbing. right BKA   : positive terrazas    Skin: No rashes    LABS:      143  |  109  |  34<H>  ----------------------------<  207<H>  4.3   |  26  |  2.1<H>  Creatinine Trend: 2.1<--, 2.3<--, 2.1<--, 2.2<--, 2.4<--, 2.2<--  Ca    7.4<L>      2021 04:30  Phos  1.7       Mg     2.3         TPro  7.5  /  Alb  2.3<L>  /  TBili  0.3  /  DBili      /  AST  31  /  ALT  15  /  AlkPhos  127<H>                            9.6    12.87 )-----------( 184      ( 2021 04:30 )             32.6       Urine Studies:  Urinalysis Basic - ( 2021 01:10 )    Color: Light Yellow / Appearance: Clear / S.013 / pH:   Gluc:  / Ketone: Negative  / Bili: Negative / Urobili: <2 mg/dL   Blood:  / Protein: 30 mg/dL / Nitrite: Negative   Leuk Esterase: Negative / RBC: 14 /HPF / WBC 0 /HPF   Sq Epi:  / Non Sq Epi: 0 /HPF / Bacteria: Negative        RADIOLOGY & ADDITIONAL STUDIES:

## 2021-06-07 NOTE — PROGRESS NOTE ADULT - SUBJECTIVE AND OBJECTIVE BOX
Pt. seen and examined this morning w/ Neurocritical care team.     Last 24 hour updates: Pt. remains off CVVH;  transiently off sedation sec. to hypotension, and Levo gtt was being initiated. Pt. remains persistently febrile, Tm 103F    Labs: Reviewed               9.6    12.87 )-----------( 184      ( 2021 04:30 )             32.6     06-07    143  |  109  |  34<H>  ----------------------------<  207<H>  4.3   |  26  |  2.1<H>    Ca    7.4<L>      2021 04:30  Phos  1.7     06-  Mg     2.3     06-    TPro  7.5  /  Alb  2.3<L>  /  TBili  0.3  /  DBili  x   /  AST  31  /  ALT  15  /  AlkPhos  127<H>  -    PT/INR - ( 2021 20:51 )   PT: 13.20 sec;   INR: 1.15 ratio       PTT - ( 2021 20:51 )  PTT:32.3 sec  Urinalysis Basic - ( 2021 12:43 )    Color: Yellow / Appearance: Slightly Turbid / S.020 / pH: x  Gluc: x / Ketone: Negative  / Bili: Negative / Urobili: <2 mg/dL   Blood: x / Protein: 100 mg/dL / Nitrite: Negative   Leuk Esterase: Negative / RBC: 35 /HPF / WBC 4 /HPF   Sq Epi: x / Non Sq Epi: 1 /HPF / Bacteria: Negative    LIVER FUNCTIONS - ( 2021 04:30 )  Alb: 2.3 g/dL / Pro: 7.5 g/dL / ALK PHOS: 127 U/L / ALT: 15 U/L / AST: 31 U/L / GGT: x               24hr I&O:     IN:    Enteral Tube Flush: 240 mL    FentaNYL: 409.4 mL    IV PiggyBack: 450 mL    Propofol: 223 mL    Vital High Protein: 605 mL  Total IN: 1927.4 mL    OUT:    Indwelling Catheter - Urethral (mL): 4950 mL  Total OUT: 4950 mL    Total NET: -3022.6 mL        Neuro Exam:    Pt. is intubated/ off sedation  Pt. demonstrates no spontaneous eye opening to voice or noxious tactile stimuli  BTT b/l, pupils symmetric/ briskly reactive, corneals intact, unable to track to gaze, no gaze deviation, OCR intact  Face symmetric, intubated, cough reflex intact  Pt. s/p RLE amputation, no spontaneous movement or response to verbal commands, no localization/ withdrawal noted to noxious stimuli        Impression:     61 y/o morbidly obese M with PMHx of DM-II, HTN, HLD, MATA, Charcot foot (s/p reconstruction w/ external fixation], admitted  for worsening chronic Rt. foot infection [s/p Rt. knee disarticulation on  ] w/ concomitant metabolic/ septic encephalopathy. Neurology consulted for worsening mental status , and pt. has thus far had negative CTH and vEEG unremarkable for seizures.     [ ] ID recommendations appreciated __ cont. Daptomycin + Flagyl __ monitor blood Cx [s/p clearance of MRSA bacteremia]    [ ] Will recommend MRI brain as soon as is feasible for prognostication as well as LP by IR to evaluate for meningitis given persistent fevers/ encephalopathy  [ ] Cont. supportive care per CCU team  [ ] Will cont. to follow       Pt. seen and examined this morning w/ Neurocritical care team.     Last 24 hour updates: Pt. remains off CVVH;  transiently off sedation sec. to hypotension, and Levo gtt was being initiated. Pt. remains persistently febrile, Tm 103F    Labs: Reviewed               9.6    12.87 )-----------( 184      ( 2021 04:30 )             32.6     06-07    143  |  109  |  34<H>  ----------------------------<  207<H>  4.3   |  26  |  2.1<H>    Ca    7.4<L>      2021 04:30  Phos  1.7     06-  Mg     2.3     06-    TPro  7.5  /  Alb  2.3<L>  /  TBili  0.3  /  DBili  x   /  AST  31  /  ALT  15  /  AlkPhos  127<H>  -    PT/INR - ( 2021 20:51 )   PT: 13.20 sec;   INR: 1.15 ratio       PTT - ( 2021 20:51 )  PTT:32.3 sec  Urinalysis Basic - ( 2021 12:43 )    Color: Yellow / Appearance: Slightly Turbid / S.020 / pH: x  Gluc: x / Ketone: Negative  / Bili: Negative / Urobili: <2 mg/dL   Blood: x / Protein: 100 mg/dL / Nitrite: Negative   Leuk Esterase: Negative / RBC: 35 /HPF / WBC 4 /HPF   Sq Epi: x / Non Sq Epi: 1 /HPF / Bacteria: Negative    LIVER FUNCTIONS - ( 2021 04:30 )  Alb: 2.3 g/dL / Pro: 7.5 g/dL / ALK PHOS: 127 U/L / ALT: 15 U/L / AST: 31 U/L / GGT: x               24hr I&O:     IN:    Enteral Tube Flush: 240 mL    FentaNYL: 409.4 mL    IV PiggyBack: 450 mL    Propofol: 223 mL    Vital High Protein: 605 mL  Total IN: 1927.4 mL    OUT:    Indwelling Catheter - Urethral (mL): 4950 mL  Total OUT: 4950 mL    Total NET: -3022.6 mL        Neuro Exam:    Pt. is intubated/ off sedation  Pt. demonstrates no spontaneous eye opening to voice or noxious tactile stimuli  BTT b/l, pupils symmetric/ briskly reactive, corneals intact, unable to track to gaze, no gaze deviation, OCR intact  Face symmetric, intubated, cough reflex intact  Pt. s/p RLE amputation, no spontaneous movement or response to verbal commands, no localization/ withdrawal noted to noxious stimuli        Impression:     61 y/o morbidly obese M with PMHx of DM-II, HTN, HLD, MATA, Charcot foot (s/p reconstruction w/ external fixation], admitted  for worsening chronic Rt. foot infection [s/p Rt. knee disarticulation on  ] w/ concomitant metabolic/ septic encephalopathy. Neurology consulted for worsening mental status , and pt. has thus far had negative CTH and vEEG unremarkable for seizures.     Recommendations:    [ ] ID recommendations appreciated __ cont. Daptomycin + Flagyl __ obtain rpt. PanCx [s/p clearance of MRSA bacteremia]    [ ] Will recommend MRI brain as soon as is feasible for prognostication as well as LP by IR to evaluate for meningitis given persistent fevers/ encephalopathy  [ ] Cont. supportive care per CCU team  [ ] Will cont. to follow

## 2021-06-07 NOTE — PROGRESS NOTE ADULT - ASSESSMENT
IMPRESSION    Acute Hypoxemic respiratory failure with hypercapnia s/p intubation on 40%  Septic Shock 2/2 to Bacteremia from DFU (Enterococcus,  Staph Aureus ) repeat cx neg  DFU s/p Right Knee Disarticulation for necrotizing soft tissue infection  CANDY on CKD s/p CVVHD sp dc u dall  AMS 2/2 Toxic-Metabolic Encephelopathy s/p CT head neg for acute pathology  MATA/ OHS  Morbid obesity  NSTEMI    PLAN:    CNS: SAT.     HEENT: ETT care and Oral care    PULMONARY: HOB elevation 45 degrees. Aspiration precautions. No vent changes. Duonebs. pending Above knee amputation zee   possible weaning trial after   CARDIOVASCULAR: off levophed keep is < os   continue lasix     GI: GI prophylaxis.  OG Feeding as tolerated    RENAL: F/U nephrology , IV lasix    INFECTIOUS DISEASE:  will continue empiric abx as per ID. f/u repeat cultures neg  recall ID see if need to add gram negative coverage  bld cx today     HEMATOLOGICAL:  DVT prophylaxis. Transfuse to keep Hb >7. Maintain active Type and screen.  follow h/h     ENDOCRINE:  Follow up FS.  Insulin protocol if needed.    MUSCULOSKELETAL: Bedrest. f/u vascular surgery  Poor prognosis  Palliative care f/u

## 2021-06-07 NOTE — PRE-ANESTHESIA EVALUATION ADULT - NSRADCARDRESULTSFT_GEN_ALL_CORE
Summary:   1. Left ventricular ejection fraction, by visual estimation, is 55 to 60%.   2. Normal global left ventricular systolic function.   3. Elevated left ventricular end-diastolic pressure.   4. Mildly increased LV wall thickness.   5. Normal left ventricular internal cavity size.   6. Spectral Doppler shows pseudonormal pattern of left ventricular myocardial filling (Grade II diastolic dysfunction).   7. Sigmoid septum present. and mild concentric LV hypertrophy.   8. Normal left atrial size.   9. Normal right atrial size.  10. Mild to moderate mitral annular calcification.  11. No evidence of mitral valve regurgitation.  12. Structurally normal mitral valve, with normal leaflet excursion.  13. Sclerotic aortic valve with decreased opening.  14. Mildly reduced AV opening with mild AS by doppler. 15 mm peak AV gradient.  15. Ascending aorta mildly dilated to 4.2cm.  16. LA volume Index is 27.3 ml/m² ml/m2.    PHYSICIAN INTERPRETATION:  Left Ventricle: The left ventricular internal cavity size is normal. Left ventricular wall thickness is mildly increased. Global LV systolic function was normal. Left ventricular ejection fraction, by visual estimation, is 55 to 60%. Spectral Doppler shows pseudonormal pattern of left ventricular myocardial filling (Grade II diastolic dysfunction). Elevated left ventricular end-diastolic pressure. Sigmoid septum present. and mild concentric LV hypertrophy.  Right Ventricle: Normal right ventricular size and function.  Left Atrium: Normal left atrial size. LA volume Index is 27.3 ml/m² ml/m2.  Right Atrium: Normal right atrial size.  Pericardium: There is no evidence of pericardial effusion.  Mitral Valve: Structurally normal mitral valve, with normal leaflet excursion. There is mild to moderate mitral annular calcification. No evidence of mitral valve regurgitation is seen.  Tricuspid Valve: Structurally normal tricuspid valve, with normal leaflet excursion. No tricuspid regurgitation is visualized. Adequate TR velocity was not obtained to accurately assess RVSP.  Aortic Valve: The aortic valve is trileaflet. Sclerotic aortic valve with decreased opening. No evidence of aortic valve regurgitation is seen. Mildly reduced AV opening with mild AS by doppler. 15 mm peak AV gradient.  Pulmonic Valve: Structurally normal pulmonic valve, with normal leaflet excursion. Trace pulmonic valve regurgitation.  Aorta: Ascending aorta mildly dilated to 4.2cm.  Pulmonary Artery: The main pulmonary artery is normal in size.  Venous: The inferior vena cava was normal sized, with respiratory size variation less than 50%.
EKG: sinus tachycardia and RBBB

## 2021-06-07 NOTE — PROGRESS NOTE ADULT - SUBJECTIVE AND OBJECTIVE BOX
FERNANDEZ GUZMAN             MRN-[id number]    CC: weakness     HPI:  Patient is a 61 y/o M with PMH of DM, hypertension, dyslipidemia, sleep apnea, Charcot foot reconstruction with external fixation, super morbid obesity present for worsening right foot infection.  Pt reports he has had this foot ulcer at various stage of healing for 13 years. Patient has been admitted for IV antibiotics multiple times in the past for osteomyelitis of right foot. He has history of MRSA infection in foot. pt has baseline neuropathy in both feet so does not feel any pain.  Patient states his Infection has been gradually getting worse despite treatment with bactrim, he saw his podiatrists and he was sent in by podiatry for IV abx . No fever, chills, cp, sob, abd pain, nausea, vomiting, dysuria, calf pain.     In ED  T(C): 37.3 (05-29-21 @ 01:31), Max: 38 (05-28-21 @ 20:27)  HR: 100 (05-29-21 @ 01:23) (99 - 118)  BP: 119/59 (05-29-21 @ 01:23) (119/59 - 139/69)  RR: 18 (05-29-21 @ 01:23) (17 - 18)  SpO2: 96% (05-29-21 @ 01:23) (86% - 100%)  there is a Right foot ulcer on the lateral aspect of the plantar surface measuring 8.5-3cm. Ulcer is surrounded by macerated tissue. Serous drainage from wound. Kurlex and sponge dressing in place, clean, dry and intact. Patient was I&D'd bedside by podiatry in the ed. purulent drainage was evacuated and sent to the lab for culture. Patient is scheduled for OR with podiatry in the AM.   (29 May 2021 02:29)      SUBJECTIVE: Patient seen and examined at bedside. Patient was febrile, has cooling blanket.   Above knee amputation planned for tomorrow.     ROS:  UNABLE TO OBTAIN  due to: intubated     PEx:  60y            General: vented sedated; NAD; morbidly obese  HEENT:  NCAT   CVS:NSR; +edema  Resp: Unlabored Non tachypneic No increased WOB  GI:  obese  :  [place]   Musc: No C/C/E    Neuro: sedated    Last BM: no stool documented;    ALLERGIES:  NKDA     OPIATE NAÏVE (Y/N): n    MEDICATIONS: REVIEWED  MEDICATIONS  (STANDING):  ALBUTerol    90 MICROgram(s) HFA Inhaler 1 Puff(s) Inhalation once  aspirin  chewable 81 milliGRAM(s) Oral daily  ceftaroline fosamil IVPB 200 milliGRAM(s) IV Intermittent every 12 hours  chlorhexidine 0.12% Liquid 15 milliLiter(s) Oral Mucosa two times a day  chlorhexidine 4% Liquid 1 Application(s) Topical <User Schedule>  dextrose 40% Gel 15 Gram(s) Oral once  dextrose 5%. 1000 milliLiter(s) (50 mL/Hr) IV Continuous <Continuous>  dextrose 5%. 1000 milliLiter(s) (100 mL/Hr) IV Continuous <Continuous>  dextrose 50% Injectable 25 Gram(s) IV Push once  dextrose 50% Injectable 12.5 Gram(s) IV Push once  dextrose 50% Injectable 25 Gram(s) IV Push once  fentaNYL   Infusion. 0.5 MICROgram(s)/kG/Hr (8.21 mL/Hr) IV Continuous <Continuous>  furosemide   Injectable 60 milliGRAM(s) IV Push every 12 hours  glucagon  Injectable 1 milliGRAM(s) IntraMuscular once  heparin   Injectable 5000 Unit(s) SubCutaneous every 8 hours  insulin lispro (ADMELOG) corrective regimen sliding scale   SubCutaneous three times a day before meals  insulin NPH human recombinant 15 Unit(s) SubCutaneous every 12 hours  lactulose Syrup 10 Gram(s) Oral every 6 hours  linezolid  IVPB 600 milliGRAM(s) IV Intermittent every 12 hours  metroNIDAZOLE  IVPB 500 milliGRAM(s) IV Intermittent every 8 hours  mupirocin 2% Nasal 1 Application(s) Nasal two times a day  norepinephrine Infusion 0.05 MICROgram(s)/kG/Min (7.7 mL/Hr) IV Continuous <Continuous>  pantoprazole    Tablet 40 milliGRAM(s) Oral before breakfast  polyethylene glycol 3350 17 Gram(s) Oral daily  propofol Infusion 5.004 MICROgram(s)/kG/Min (4.93 mL/Hr) IV Continuous <Continuous>  senna 2 Tablet(s) Oral at bedtime  tiotropium 18 MICROgram(s) Capsule 1 Capsule(s) Inhalation once    MEDICATIONS  (PRN):  acetaminophen   Tablet .. 650 milliGRAM(s) Oral every 6 hours PRN Temp greater or equal to 38C (100.4F), Mild Pain (1 - 3)      LABS: REVIEWED  CBC:                        9.6    12.87 )-----------( 184      ( 07 Jun 2021 04:30 )             32.6     CMP:    06-07    143  |  109  |  34<H>  ----------------------------<  207<H>  4.3   |  26  |  2.1<H>    Ca    7.4<L>      07 Jun 2021 04:30  Phos  1.7     06-07  Mg     2.3     06-07    TPro  7.5  /  Alb  2.3<L>  /  TBili  0.3  /  DBili  x   /  AST  31  /  ALT  15  /  AlkPhos  127<H>  06-07  Albumin, Serum: 2.3 g/dL (06-07-21 @ 04:30)      ADVANCED DIRECTIVES:            FULL CODE             DECISION MAKER: cristiane Truong  LEGAL SURROGATE: Adan Truong     PSYCHOSOCIAL-SPIRITUAL ASSESSMENT:       Reviewed       Care plan unchanged           GOALS OF CARE DISCUSSION          Pal care introduced 6/1 and remains available PRN       CURRENT DISPO PLAN:     WILL REMAIN IN HOSPITAL    REFERRALS	        Palliative Med        Unit SW/Case Mgmt

## 2021-06-07 NOTE — PROGRESS NOTE ADULT - ASSESSMENT
fevers  needs repeat blood cultures  will postpone his closure until cleared    SPECTRA 6060 fevers, back on Norepinephrine  needs repeat blood cultures  will postpone his closure until cleared    SPECTRA [id number]

## 2021-06-07 NOTE — PROGRESS NOTE ADULT - ASSESSMENT
59 y/o M with PMH of DM, hypertension, dyslipidemia, sleep apnea, Charcot foot reconstruction with external fixation, morbid obesity presenting to the hospital on 5/29 with worsening foot ulcer.  s/p Right Knee Disarticulation for necrotizing soft tissue infection of right lower extremity          ·	CANDY on CKD ( unknown stage last known creat 1.4 in 11/2019) / rule out ATN / obstructive uropathy/ sp CT angio (too early for FLO)/ doubt IRGN (infection related GN)/ doubt AIN (multiple antibx courses)/ high bicarb/chr resp acidosis  ·	non oliguric now / creat better now/ off CVVHD /   [place] removed on lasix IV continue   ·	renal ultrasound noted w/o hydronephrosis, s/p terrazas catheter placement   ·	UA noted, minimal proteinuria    ·	appreciate ID f/u, on ceftarolien flagyl and zyvox  ·	sp debridement of DFU as per podiatry/ may need OR in AM   ·	C3 C4 wnl SPEP polyclonal gammopathy / K/L noted expected for CANDY   ·	follow IP and Ca   ·	will follow

## 2021-06-07 NOTE — PROGRESS NOTE ADULT - ASSESSMENT
60yMale  DM, hypertension, dyslipidemia, sleep apnea, Charcot foot reconstruction with external fixation, super morbid obesity presented for worsening right footinfection; since admission -  Acute Hypoxic Hypercarbic Respiratory Failure, patient is on ventilator. CVVHD stopped.  Patient had fever overnight.  No family at bedside.     Morphine Equivalent Daily Dose (MEDD) on fent gtt      See Recs below. D/W primary team and bedside RN     Please call or [phone]/7  with questions or concerns.   We will continue to follow.

## 2021-06-07 NOTE — PROGRESS NOTE ADULT - SUBJECTIVE AND OBJECTIVE BOX
Patient is a 60y old  Male who presents with a chief complaint of dfu (07 Jun 2021 08:42)      INTERVAL HPI/OVERNIGHT EVENTS:  Pt febrile to 103F, given tylenol, put on cooling blanket, temp now 100.8     ICU Vital Signs Last 24 Hrs  T(C): 38.2 (07 Jun 2021 07:00), Max: 39.4 (07 Jun 2021 04:00)  T(F): 100.8 (07 Jun 2021 07:00), Max: 103 (07 Jun 2021 04:00)  HR: 92 (07 Jun 2021 08:00) (78 - 114)  BP: --  BP(mean): --  ABP: 132/50 (07 Jun 2021 08:00) (102/50 - 184/74)  ABP(mean): 74 (07 Jun 2021 08:00) (64 - 106)  RR: 18 (07 Jun 2021 08:00) (0 - 23)  SpO2: 100% (07 Jun 2021 08:00) (99% - 100%)    I&O's Summary    06 Jun 2021 07:01  -  07 Jun 2021 07:00  --------------------------------------------------------  IN: 1927.4 mL / OUT: 4950 mL / NET: -3022.6 mL      Mode: AC/ CMV (Assist Control/ Continuous Mandatory Ventilation)  RR (machine): 18  TV (machine): 480  FiO2: 40  PEEP: 8  ITime: 1  MAP: 16  PIP: 29      LABS:                        9.6    12.87 )-----------( 184      ( 07 Jun 2021 04:30 )             32.6     06-07    143  |  109  |  34<H>  ----------------------------<  207<H>  4.3   |  26  |  2.1<H>    Ca    7.4<L>      07 Jun 2021 04:30  Phos  1.7     06-07  Mg     2.3     06-07    TPro  7.5  /  Alb  2.3<L>  /  TBili  0.3  /  DBili  x   /  AST  31  /  ALT  15  /  AlkPhos  127<H>  06-07    PT/INR - ( 05 Jun 2021 20:51 )   PT: 13.20 sec;   INR: 1.15 ratio         PTT - ( 05 Jun 2021 20:51 )  PTT:32.3 sec    CAPILLARY BLOOD GLUCOSE      POCT Blood Glucose.: 206 mg/dL (07 Jun 2021 11:43)  POCT Blood Glucose.: 249 mg/dL (07 Jun 2021 09:05)  POCT Blood Glucose.: 214 mg/dL (07 Jun 2021 05:40)  POCT Blood Glucose.: 231 mg/dL (06 Jun 2021 21:30)  POCT Blood Glucose.: 211 mg/dL (06 Jun 2021 17:35)    ABG - ( 07 Jun 2021 03:57 )  pH, Arterial: 7.40  pH, Blood: x     /  pCO2: 49    /  pO2: 66    / HCO3: 30    / Base Excess: 4.4   /  SaO2: 94                  RADIOLOGY & ADDITIONAL TESTS:    Consultant(s) Notes Reviewed:  [x ] YES  [ ] NO    MEDICATIONS  (STANDING):  ALBUTerol    90 MICROgram(s) HFA Inhaler 1 Puff(s) Inhalation once  aspirin  chewable 81 milliGRAM(s) Oral daily  ceftaroline fosamil IVPB 200 milliGRAM(s) IV Intermittent every 12 hours  chlorhexidine 0.12% Liquid 15 milliLiter(s) Oral Mucosa two times a day  chlorhexidine 4% Liquid 1 Application(s) Topical <User Schedule>  dextrose 40% Gel 15 Gram(s) Oral once  dextrose 5%. 1000 milliLiter(s) (50 mL/Hr) IV Continuous <Continuous>  dextrose 5%. 1000 milliLiter(s) (100 mL/Hr) IV Continuous <Continuous>  dextrose 50% Injectable 25 Gram(s) IV Push once  dextrose 50% Injectable 12.5 Gram(s) IV Push once  dextrose 50% Injectable 25 Gram(s) IV Push once  fentaNYL   Infusion. 0.5 MICROgram(s)/kG/Hr (8.21 mL/Hr) IV Continuous <Continuous>  furosemide   Injectable 60 milliGRAM(s) IV Push every 12 hours  glucagon  Injectable 1 milliGRAM(s) IntraMuscular once  heparin   Injectable 5000 Unit(s) SubCutaneous every 8 hours  insulin lispro (ADMELOG) corrective regimen sliding scale   SubCutaneous three times a day before meals  insulin NPH human recombinant 15 Unit(s) SubCutaneous every 12 hours  lactulose Syrup 10 Gram(s) Oral every 6 hours  linezolid  IVPB 600 milliGRAM(s) IV Intermittent every 12 hours  metroNIDAZOLE  IVPB 500 milliGRAM(s) IV Intermittent every 8 hours  mupirocin 2% Nasal 1 Application(s) Nasal two times a day  norepinephrine Infusion 0.05 MICROgram(s)/kG/Min (7.7 mL/Hr) IV Continuous <Continuous>  pantoprazole    Tablet 40 milliGRAM(s) Oral before breakfast  polyethylene glycol 3350 17 Gram(s) Oral daily  propofol Infusion 5.004 MICROgram(s)/kG/Min (4.93 mL/Hr) IV Continuous <Continuous>  senna 2 Tablet(s) Oral at bedtime  tiotropium 18 MICROgram(s) Capsule 1 Capsule(s) Inhalation once    MEDICATIONS  (PRN):  acetaminophen   Tablet .. 650 milliGRAM(s) Oral every 6 hours PRN Temp greater or equal to 38C (100.4F), Mild Pain (1 - 3)      PHYSICAL EXAM:  GENERAL: intubated, sedated on prop and fent  NERVOUS SYSTEM:  intuabted, opens eyes to external stimuli   CHEST/LUNG: breath sounds bl   HEART: S1S2 normal, no S3, Regular rate and rhythm; No murmurs, rubs, or gallops  ABDOMEN: Soft, obese, distended   EXTREMITIES:  r BKA, Lt no edema   LYMPH: No lymphadenopathy noted  SKIN: No rashes or lesions    Care Discussed with Consultants/Other Providers [ x] YES  [ ] NO

## 2021-06-07 NOTE — PRE-ANESTHESIA EVALUATION ADULT - NSANTHPEFT_GEN_ALL_CORE
Patient is currently intubated and sedated.
AOx3  RRR  CTA b/l
Morbid Obesity   Intubated, breath sounds equal  To OR on monitors, iv infusions

## 2021-06-08 LAB
ALBUMIN SERPL ELPH-MCNC: 2.2 G/DL — LOW (ref 3.5–5.2)
ALP SERPL-CCNC: 122 U/L — HIGH (ref 30–115)
ALT FLD-CCNC: 18 U/L — SIGNIFICANT CHANGE UP (ref 0–41)
ANION GAP SERPL CALC-SCNC: 6 MMOL/L — LOW (ref 7–14)
AST SERPL-CCNC: 51 U/L — HIGH (ref 0–41)
BASOPHILS # BLD AUTO: 0.02 K/UL — SIGNIFICANT CHANGE UP (ref 0–0.2)
BASOPHILS NFR BLD AUTO: 0.1 % — SIGNIFICANT CHANGE UP (ref 0–1)
BILIRUB SERPL-MCNC: 0.3 MG/DL — SIGNIFICANT CHANGE UP (ref 0.2–1.2)
BUN SERPL-MCNC: 42 MG/DL — HIGH (ref 10–20)
CALCIUM SERPL-MCNC: 7.7 MG/DL — LOW (ref 8.5–10.1)
CHLORIDE SERPL-SCNC: 109 MMOL/L — SIGNIFICANT CHANGE UP (ref 98–110)
CK SERPL-CCNC: 1049 U/L — HIGH (ref 0–225)
CK SERPL-CCNC: 1367 U/L — HIGH (ref 0–225)
CO2 SERPL-SCNC: 29 MMOL/L — SIGNIFICANT CHANGE UP (ref 17–32)
CREAT SERPL-MCNC: 2 MG/DL — HIGH (ref 0.7–1.5)
EOSINOPHIL # BLD AUTO: 0.4 K/UL — SIGNIFICANT CHANGE UP (ref 0–0.7)
EOSINOPHIL NFR BLD AUTO: 2.7 % — SIGNIFICANT CHANGE UP (ref 0–8)
GAS PNL BLDA: SIGNIFICANT CHANGE UP
GAS PNL BLDA: SIGNIFICANT CHANGE UP
GLUCOSE BLDC GLUCOMTR-MCNC: 164 MG/DL — HIGH (ref 70–99)
GLUCOSE BLDC GLUCOMTR-MCNC: 189 MG/DL — HIGH (ref 70–99)
GLUCOSE BLDC GLUCOMTR-MCNC: 214 MG/DL — HIGH (ref 70–99)
GLUCOSE SERPL-MCNC: 211 MG/DL — HIGH (ref 70–99)
HCT VFR BLD CALC: 26.6 % — LOW (ref 42–52)
HGB BLD-MCNC: 8 G/DL — LOW (ref 14–18)
IMM GRANULOCYTES NFR BLD AUTO: 1.9 % — HIGH (ref 0.1–0.3)
LYMPHOCYTES # BLD AUTO: 1.55 K/UL — SIGNIFICANT CHANGE UP (ref 1.2–3.4)
LYMPHOCYTES # BLD AUTO: 10.5 % — LOW (ref 20.5–51.1)
MAGNESIUM SERPL-MCNC: 2.2 MG/DL — SIGNIFICANT CHANGE UP (ref 1.8–2.4)
MCHC RBC-ENTMCNC: 27.3 PG — SIGNIFICANT CHANGE UP (ref 27–31)
MCHC RBC-ENTMCNC: 30.1 G/DL — LOW (ref 32–37)
MCV RBC AUTO: 90.8 FL — SIGNIFICANT CHANGE UP (ref 80–94)
MONOCYTES # BLD AUTO: 1.9 K/UL — HIGH (ref 0.1–0.6)
MONOCYTES NFR BLD AUTO: 12.8 % — HIGH (ref 1.7–9.3)
NEUTROPHILS # BLD AUTO: 10.66 K/UL — HIGH (ref 1.4–6.5)
NEUTROPHILS NFR BLD AUTO: 72 % — SIGNIFICANT CHANGE UP (ref 42.2–75.2)
NRBC # BLD: 0 /100 WBCS — SIGNIFICANT CHANGE UP (ref 0–0)
PHOSPHATE SERPL-MCNC: 2.6 MG/DL — SIGNIFICANT CHANGE UP (ref 2.1–4.9)
PLATELET # BLD AUTO: 220 K/UL — SIGNIFICANT CHANGE UP (ref 130–400)
POTASSIUM SERPL-MCNC: 4.1 MMOL/L — SIGNIFICANT CHANGE UP (ref 3.5–5)
POTASSIUM SERPL-SCNC: 4.1 MMOL/L — SIGNIFICANT CHANGE UP (ref 3.5–5)
PROT SERPL-MCNC: 7.8 G/DL — SIGNIFICANT CHANGE UP (ref 6–8)
RBC # BLD: 2.93 M/UL — LOW (ref 4.7–6.1)
RBC # FLD: 18.1 % — HIGH (ref 11.5–14.5)
SODIUM SERPL-SCNC: 144 MMOL/L — SIGNIFICANT CHANGE UP (ref 135–146)
WBC # BLD: 14.81 K/UL — HIGH (ref 4.8–10.8)
WBC # FLD AUTO: 14.81 K/UL — HIGH (ref 4.8–10.8)

## 2021-06-08 PROCEDURE — 71045 X-RAY EXAM CHEST 1 VIEW: CPT | Mod: 26

## 2021-06-08 PROCEDURE — 99291 CRITICAL CARE FIRST HOUR: CPT

## 2021-06-08 PROCEDURE — 99233 SBSQ HOSP IP/OBS HIGH 50: CPT

## 2021-06-08 PROCEDURE — 71045 X-RAY EXAM CHEST 1 VIEW: CPT | Mod: 26,77

## 2021-06-08 RX ORDER — PROPOFOL 10 MG/ML
5 INJECTION, EMULSION INTRAVENOUS
Qty: 1000 | Refills: 0 | Status: DISCONTINUED | OUTPATIENT
Start: 2021-06-08 | End: 2021-06-11

## 2021-06-08 RX ORDER — NYSTATIN CREAM 100000 [USP'U]/G
1 CREAM TOPICAL
Refills: 0 | Status: DISCONTINUED | OUTPATIENT
Start: 2021-06-08 | End: 2021-06-11

## 2021-06-08 RX ORDER — FENTANYL CITRATE 50 UG/ML
0.5 INJECTION INTRAVENOUS
Qty: 2500 | Refills: 0 | Status: DISCONTINUED | OUTPATIENT
Start: 2021-06-08 | End: 2021-06-11

## 2021-06-08 RX ORDER — FUROSEMIDE 40 MG
40 TABLET ORAL EVERY 12 HOURS
Refills: 0 | Status: DISCONTINUED | OUTPATIENT
Start: 2021-06-08 | End: 2021-06-09

## 2021-06-08 RX ADMIN — Medication 40 MILLIGRAM(S): at 16:21

## 2021-06-08 RX ADMIN — Medication 100 MILLIGRAM(S): at 21:00

## 2021-06-08 RX ADMIN — HUMAN INSULIN 15 UNIT(S): 100 INJECTION, SUSPENSION SUBCUTANEOUS at 05:02

## 2021-06-08 RX ADMIN — Medication 60 MILLIGRAM(S): at 06:01

## 2021-06-08 RX ADMIN — POLYETHYLENE GLYCOL 3350 17 GRAM(S): 17 POWDER, FOR SOLUTION ORAL at 11:19

## 2021-06-08 RX ADMIN — FENTANYL CITRATE 8.21 MICROGRAM(S)/KG/HR: 50 INJECTION INTRAVENOUS at 08:53

## 2021-06-08 RX ADMIN — Medication 4: at 05:04

## 2021-06-08 RX ADMIN — Medication 2: at 17:02

## 2021-06-08 RX ADMIN — Medication 2: at 11:25

## 2021-06-08 RX ADMIN — PROPOFOL 4.93 MICROGRAM(S)/KG/MIN: 10 INJECTION, EMULSION INTRAVENOUS at 08:53

## 2021-06-08 RX ADMIN — SENNA PLUS 2 TABLET(S): 8.6 TABLET ORAL at 21:00

## 2021-06-08 RX ADMIN — Medication 100 MILLIGRAM(S): at 14:01

## 2021-06-08 RX ADMIN — MUPIROCIN 1 APPLICATION(S): 20 OINTMENT TOPICAL at 17:09

## 2021-06-08 RX ADMIN — HEPARIN SODIUM 5000 UNIT(S): 5000 INJECTION INTRAVENOUS; SUBCUTANEOUS at 21:00

## 2021-06-08 RX ADMIN — HUMAN INSULIN 15 UNIT(S): 100 INJECTION, SUSPENSION SUBCUTANEOUS at 17:03

## 2021-06-08 RX ADMIN — PANTOPRAZOLE SODIUM 40 MILLIGRAM(S): 20 TABLET, DELAYED RELEASE ORAL at 06:01

## 2021-06-08 RX ADMIN — Medication 100 MILLIGRAM(S): at 05:02

## 2021-06-08 RX ADMIN — CHLORHEXIDINE GLUCONATE 15 MILLILITER(S): 213 SOLUTION TOPICAL at 05:02

## 2021-06-08 RX ADMIN — Medication 650 MILLIGRAM(S): at 17:07

## 2021-06-08 RX ADMIN — NYSTATIN CREAM 1 APPLICATION(S): 100000 CREAM TOPICAL at 17:08

## 2021-06-08 RX ADMIN — Medication 650 MILLIGRAM(S): at 19:11

## 2021-06-08 RX ADMIN — CHLORHEXIDINE GLUCONATE 15 MILLILITER(S): 213 SOLUTION TOPICAL at 17:03

## 2021-06-08 RX ADMIN — MUPIROCIN 1 APPLICATION(S): 20 OINTMENT TOPICAL at 05:03

## 2021-06-08 RX ADMIN — CHLORHEXIDINE GLUCONATE 1 APPLICATION(S): 213 SOLUTION TOPICAL at 05:03

## 2021-06-08 RX ADMIN — Medication 81 MILLIGRAM(S): at 11:19

## 2021-06-08 RX ADMIN — HEPARIN SODIUM 5000 UNIT(S): 5000 INJECTION INTRAVENOUS; SUBCUTANEOUS at 05:02

## 2021-06-08 RX ADMIN — LACTULOSE 10 GRAM(S): 10 SOLUTION ORAL at 00:08

## 2021-06-08 RX ADMIN — PROPOFOL 4.93 MICROGRAM(S)/KG/MIN: 10 INJECTION, EMULSION INTRAVENOUS at 00:09

## 2021-06-08 NOTE — PROGRESS NOTE ADULT - PROBLEM SELECTOR PLAN 1
r/t MRSA worsening R foot ulcer; now post up day 3 Right Knee Disarticulation for necrotizing soft tissue infection of right lower extremity
r/t MRSA worsening R foot ulcer; now post up day 3 Right Knee Disarticulation for necrotizing soft tissue infection of right lower extremity
r/t MRSA worsening R foot ulcer; now post up day 1 Right Knee Disarticulation for necrotizing soft tissue infection of right lower extremity
r/t MRSA worsening R foot ulcer; s/p Right Knee Disarticulation for necrotizing soft tissue infection of right lower extremity.  Patient had fever , receiving antibiotics.  OR on hold due to fever
r/t MRSA worsening R foot ulcer; s/p Right Knee Disarticulation for necrotizing soft tissue infection of right lower extremity.  Patient had fever overnight.  Possible AKA planned for tomorrow.

## 2021-06-08 NOTE — CHART NOTE - NSCHARTNOTEFT_GEN_A_CORE
Registered Dietitian Follow-Up     Patient Profile Reviewed                           Yes [x]   No []     Nutrition History Previously Obtained        Yes []  No [x]  pt remains intubated      Pertinent Medical Interventions:      Diet order: Vital HP @ 55mL/hr + 7packs/day Beneprotein (1627kcal (including beneprotein & propofol), 158g protein, 84%RDIs & 1008mL free H2O).      Anthropometrics:  - Ht. 175.26cm   - Wt.  164.2kg ()   163.2kg ()   163.3kg ()  164.2kg () dosing wt  155.2kg () likely error as wt remains relatively stable, will continue to monitor   - BMI: 53.6 (dosing wt)   - IBW 73kg      Pertinent Lab Data: (21) WBC 14.81, RBC 2.93, H/H 8.0/26.6, BUN 42, Cr 2.0, glucose 211, corrected Ca 9.14, GFR 35      Pertinent Meds: heparin, aspirin, abx, humulin, fentanyl, levophed, propofol @5mL/hr (132kcal), lasix, albuterol, senna, protonix      Physical Findings:  - Appearance: intubated   - GI function: abdomen soft/non tender, LBM    - Tubes: OGT   - Oral/Mouth cavity: NPO   - Skin: skin intact; 2+ edema (generalized) per RN flowsheet      Nutrition Requirements  Weight Used: 164.2kg dosing wt; 73kg IBW (kcal needs updated using Ve: 7.2, Tmax: 38.7)      Estimated Energy Needs    Adjust [x] 9091-4015 kcal/day   [obtained [id number] (60-70% PSE  (2394) vs 2990-0905 (11-14 kcal/kg CBW) vs. 6703-2863 (22-25 kcal/kg IBW)] -     Estimated Protein Needs   Adjust [x] 145-161 gm/day (2.0-2.2 g/kg IBW d/t BMI >40 & intubation) CVVHD stopped     Estimated Fluid Needs        Continue []  per CCU team      Nutrient Intake: TF + propofol meeting 84% higher end kcal & 98% higher end protein needs     [x] No active nutrition diagnosis identified at this time. continue to follow as pt remains intubated      Nutrition Intervention: enteral nutrition      Goal/Expected Outcome: TF to continue to provide % est needs upon f/u in 3 days.      Indicator/Monitoring: Diet order, energy intake, nutrition focused physical findings, glucose, renal and anemia profile Registered Dietitian Follow-Up     Patient Profile Reviewed                           Yes [x]   No []     Nutrition History Previously Obtained        Yes []  No [x]  pt remains intubated      Pertinent Medical Interventions:   1. Acute hypoxic respiratory failure  Septic shock--remains intubated since ; sedated w. fentanyl & propofol, pressor support in place   2. Septic shock  DFU w. bacteremia; s/p disarticulation right knee --Cx+ staph + enterococcus; febrile overnight , f/u ID.   3. Code stroke , possible CVA--per Neuro: recs brain MRI & repeat CT head when stable   4. NSTEMI  sepsis induced demand ischemia   5. CANDY on CKD III, r/o ATN--improving; off CVVHD since  & [place] removed   6. Anemia of chronic disease s/p 3u PRBC since admit.  7. T2DM     Diet order: Vital HP @ 55mL/hr + 7packs/day Beneprotein (1627kcal (including beneprotein & propofol), 158g protein, 84%RDIs & 1008mL free H2O).      Anthropometrics:  - Ht. 175.26cm   - Wt.  164.2kg ()   163.2kg ()   163.3kg ()  164.2kg () dosing wt  155.2kg () likely error as wt remains relatively stable, will continue to monitor   - BMI: 53.6 (dosing wt)   - IBW 73kg      Pertinent Lab Data: (21) WBC 14.81, RBC 2.93, H/H 8.0/26.6, BUN 42, Cr 2.0, glucose 211, corrected Ca 9.14, GFR 35      Pertinent Meds: heparin, aspirin, abx, humulin, fentanyl, levophed, propofol @5mL/hr (132kcal), lasix, albuterol, senna, protonix      Physical Findings:  - Appearance: intubated   - GI function: abdomen soft/non tender, LBM    - Tubes: OGT   - Oral/Mouth cavity: NPO   - Skin: skin intact; 2+ edema (generalized) per RN flowsheet      Nutrition Requirements  Weight Used: 164.2kg dosing wt; 73kg IBW (kcal needs updated using Ve: 7.2, Tmax: 38.7)      Estimated Energy Needs    Adjust [x] 5928-2300 kcal/day   [obtained [id number] (60-70% PSE  (2394) vs 8090-0198 (11-14 kcal/kg CBW) vs. 6253-3727 (22-25 kcal/kg IBW)]   Estimated Protein Needs   Adjust [x] 145-161 gm/day (2.0-2.2 g/kg IBW d/t BMI >40 & intubation) CVVHD stopped  Estimated Fluid Needs        Continue []  per CCU team      Nutrient Intake: TF + propofol meeting 84% higher end kcal & 98% higher end protein needs     [x] No active nutrition diagnosis identified at this time. continue to follow as pt remains intubated      Nutrition Intervention: enteral nutrition      Goal/Expected Outcome: TF to continue to provide % est needs upon f/u in 3 days.      Indicator/Monitoring: Diet order, energy intake, nutrition focused physical findings, glucose, renal and anemia profile Registered Dietitian Follow-Up     Patient Profile Reviewed                           Yes [x]   No []     Nutrition History Previously Obtained        Yes []  No [x]  pt remains intubated      Pertinent Medical Interventions:   1. Acute hypoxic respiratory failure / Septic shock--remains intubated since ; sedated w. fentanyl & propofol, pressor support in place   2. Septic shock  DFU w. bacteremia; s/p disarticulation right knee --Cx+ staph + enterococcus; febrile overnight , f/u ID. possible AKA when no longer febrile  3. Code stroke , possible CVA--per Neuro: recs brain MRI & repeat CT head when stable   4. NSTEMI  sepsis induced demand ischemia   5. CANDY on CKD III, r/o ATN--improving; off CVVHD since  & [place] removed   6. Anemia of chronic disease s/p 3u PRBC since admit.  7. T2DM     Diet order: Vital HP @ 55mL/hr + 7packs/day Beneprotein (1627kcal (including beneprotein & propofol), 158g protein, 84%RDIs & 1008mL free H2O). TF remains at goal rate, tolerating well. No GI s/s intolerance. Constipation improving w. bowel regimen. RD to continue to f/u POC  MAP: 58 @10:00, 90 @9:00, 64 @8:00, 74 @6:00, 70@5:00, 76 @4:00      Anthropometrics:  - Ht. 175.26cm   - Wt.  164.2kg ()   163.2kg ()   163.3kg ()  164.2kg () dosing wt  155.2kg () likely error as wt remains relatively stable, will continue to monitor   - BMI: 53.6 (dosing wt)   - IBW 73kg      Pertinent Lab Data: (21) WBC 14.81, RBC 2.93, H/H 8.0/26.6, BUN 42, Cr 2.0, glucose 211, corrected Ca 9.14, GFR 35      Pertinent Meds: heparin, aspirin, abx, humulin, fentanyl, levophed, propofol @5mL/hr (132kcal), lasix, albuterol, senna, protonix      Physical Findings:  - Appearance: intubated   - GI function: abdomen soft/non tender, LBM 6/8   - Tubes: OGT   - Oral/Mouth cavity: NPO   - Skin: skin intact; 2+ edema (generalized) per RN flowsheet      Nutrition Requirements  Weight Used: 164.2kg dosing wt; 73kg IBW (kcal needs updated using Ve: 7.2, Tmax: 38.7)      Estimated Energy Needs    Adjust [x] 9169-2777 kcal/day   [obtained [id number] (60-70% PSE b (2394) vs 0097-3399 (11-14 kcal/kg CBW) vs. 8816-4187 (22-25 kcal/kg IBW)]   Estimated Protein Needs   Adjust [x] 145-161 gm/day (2.0-2.2 g/kg IBW d/t BMI >40 & intubation) CVVHD stopped  Estimated Fluid Needs        Continue []  per CCU team      Nutrient Intake: TF + propofol meeting 84% higher end kcal & 98% higher end protein needs     [x] No active nutrition diagnosis identified at this time. continue to follow as pt remains intubated      Nutrition Intervention: enteral nutrition   Recommendations:  1. As pt remains intubated, continue current regimen of Vital HP @ 55mL/hr + 7packs/day Beneprotein.  --TF at goal rate providing 1627kcal (including beneprotein & propofol), 158g protein, 84%RDIs & 1008mL free H2O. Flushes per CCU team.  2. HOLD FEED if MAP persistently <65 or GI s/s intolerance noted.   3. Continue bowel regimen.       Goal/Expected Outcome: TF to continue to provide % est needs upon f/u in 3 days.      Indicator/Monitoring: Diet order, energy intake, nutrition focused physical findings, glucose, renal and anemia profile

## 2021-06-08 NOTE — PROGRESS NOTE ADULT - NUTRITIONAL ASSESSMENT
59 y/o M with PMH of DM, hypertension, dyslipidemia, sleep apnea, Charcot foot reconstruction with external fixation, morbid obesity presenting to the hospital on 5/29 with worsening foot ulcer.  s/p Right Knee Disarticulation for necrotizing soft tissue infection of right lower extremity          CANDY on CKD ( unknown stage last known creat 1.4 in 11/2019) / rule out ATN / obstructive uropathy/ sp CT angio (too early for FLO)/ doubt IRGN (infection related GN)/ doubt AIN (multiple antibx courses)/ high bicarb/chr resp acidosis  non oliguric now / creat better now/ off CVVHD /   [place] removed on lasix IV continue   renal ultrasound noted w/o hydronephrosis, s/p terrazas catheter placement   UA noted, minimal proteinuria    appreciate ID f/u, on ceftarolien flagyl and zyvox  sp debridement of DFU as per podiatry/ may need OR in AM   C3 C4 wnl SPEP polyclonal gammopathy / K/L noted expected for CANDY   follow IP and Ca   will follow

## 2021-06-08 NOTE — PROGRESS NOTE ADULT - ASSESSMENT
IMPRESSION    Acute Hypoxemic respiratory failure with hypercapnia s/p intubation on 40%  Septic Shock 2/2 to Bacteremia from DFU (Enterococcus,  Staph Aureus ) repeat cx neg  DFU s/p Right Knee Disarticulation for necrotizing soft tissue infection  CANDY on CKD s/p CVVHD sp dc u dall  AMS 2/2 Toxic-Metabolic Encephelopathy s/p CT head neg for acute pathology  MATA/ OHS  Morbid obesity  NSTEMI    PLAN:    CNS: SAT. clarify with radiology if he can fit on MRI machine     HEENT: ETT care and Oral care    PULMONARY: HOB elevation 45 degrees. Aspiration precautions.  lower PEEP to 5   and then will decide regarding weaning trail     CARDIOVASCULAR:  levophed keep map 65    is < os   lower lasix to 40 mg Q 12 hrs     GI: GI prophylaxis.  OG Feeding as tolerated    RENAL: F/U nephrology , IV lasix    INFECTIOUS DISEASE:  ABX as per ID    f/u repeat cultures neg  bld cx today   change tlc and d/c old one     HEMATOLOGICAL:  DVT prophylaxis. Transfuse to keep Hb >7. Maintain active Type and screen.  follow h/h     ENDOCRINE:  Follow up FS.  Insulin protocol if needed.    MUSCULOSKELETAL: Bedrest. f/u vascular surgery follow surgery   Poor prognosis  Palliative care f/u

## 2021-06-08 NOTE — PROGRESS NOTE ADULT - SUBJECTIVE AND OBJECTIVE BOX
Patient is a 60y old  Male who presents with a chief complaint of dfu (2021 13:56)      Over Night Events:  Patient seen and examined.   still vented awake follow command   still spiking     ROS:  See HPI    PHYSICAL EXAM    ICU Vital Signs Last 24 Hrs  T(C): 36.9 (2021 06:00), Max: 38.6 (2021 00:00)  T(F): 98.5 (2021 06:00), Max: 101.5 (2021 00:00)  HR: 85 (2021 07:19) (74 - 96)  BP: --  BP(mean): --  ABP: 132/54 (2021 06:00) (86/38 - 160/58)  ABP(mean): 74 (2021 06:00) (54 - 92)  RR: 18 (2021 06:00) (3 - 28)  SpO2: 100% (2021 07:19) (100% - 100%)      General: AOx3  HEENT:  et tube             Lymph Nodes: NO cervical LN   Lungs: Bilateral BS  Cardiovascular: Regular   Abdomen: Soft, Positive BS  Extremities: No clubbing   Skin: warm   Neurological: no focal   Musculoskeletal: move all ext     I&O's Detail    2021 07:01  -  2021 07:00  --------------------------------------------------------  IN:    Enteral Tube Flush: 500 mL    FentaNYL: 430 mL    IV PiggyBack: 350 mL    Norepinephrine: 32 mL    Propofol: 120 mL    Vital High Protein: 1320 mL  Total IN: 2752 mL    OUT:    Indwelling Catheter - Urethral (mL): 6070 mL  Total OUT: 6070 mL    Total NET: -3318 mL          LABS:                          8.0    14.81 )-----------( 220      ( 2021 04:50 )             26.6         2021 04:50    144    |  109    |  42     ----------------------------<  211    4.1     |  29     |  2.0      Ca    7.7        2021 04:50  Phos  2.6       2021 04:50  Mg     2.2       2021 04:50    TPro  7.8    /  Alb  2.2    /  TBili  0.3    /  DBili  x      /  AST  51     /  ALT  18     /  AlkPhos  122    2021 04:50  Amylase x     lipase x                                                                                        Urinalysis Basic - ( 2021 12:43 )    Color: Yellow / Appearance: Slightly Turbid / S.020 / pH: x  Gluc: x / Ketone: Negative  / Bili: Negative / Urobili: <2 mg/dL   Blood: x / Protein: 100 mg/dL / Nitrite: Negative   Leuk Esterase: Negative / RBC: 35 /HPF / WBC 4 /HPF   Sq Epi: x / Non Sq Epi: 1 /HPF / Bacteria: Negative          CARDIAC MARKERS ( 2021 04:50 )  x     / x     / 1367 U/L / x     / x                                                                                                         Mode: AC/ CMV (Assist Control/ Continuous Mandatory Ventilation)  RR (machine): 18  TV (machine): 480  FiO2: 30  PEEP: 8  ITime: 1  MAP: 16  PIP: 28                                      ABG - ( 2021 02:33 )  pH, Arterial: 7.42  pH, Blood: x     /  pCO2: 50    /  pO2: 85    / HCO3: 32    / Base Excess: 6.8   /  SaO2: 96                  MEDICATIONS  (STANDING):  ALBUTerol    90 MICROgram(s) HFA Inhaler 1 Puff(s) Inhalation once  aspirin  chewable 81 milliGRAM(s) Oral daily  chlorhexidine 0.12% Liquid 15 milliLiter(s) Oral Mucosa two times a day  chlorhexidine 4% Liquid 1 Application(s) Topical <User Schedule>  DAPTOmycin IVPB 875 milliGRAM(s) IV Intermittent every 48 hours  dextrose 40% Gel 15 Gram(s) Oral once  dextrose 5%. 1000 milliLiter(s) (50 mL/Hr) IV Continuous <Continuous>  dextrose 5%. 1000 milliLiter(s) (100 mL/Hr) IV Continuous <Continuous>  dextrose 50% Injectable 25 Gram(s) IV Push once  dextrose 50% Injectable 12.5 Gram(s) IV Push once  dextrose 50% Injectable 25 Gram(s) IV Push once  fentaNYL   Infusion. 0.5 MICROgram(s)/kG/Hr (8.21 mL/Hr) IV Continuous <Continuous>  furosemide   Injectable 60 milliGRAM(s) IV Push every 12 hours  glucagon  Injectable 1 milliGRAM(s) IntraMuscular once  heparin   Injectable 5000 Unit(s) SubCutaneous every 8 hours  insulin lispro (ADMELOG) corrective regimen sliding scale   SubCutaneous three times a day before meals  insulin NPH human recombinant 15 Unit(s) SubCutaneous every 12 hours  metroNIDAZOLE  IVPB 500 milliGRAM(s) IV Intermittent every 8 hours  mupirocin 2% Nasal 1 Application(s) Nasal two times a day  norepinephrine Infusion 0.05 MICROgram(s)/kG/Min (7.7 mL/Hr) IV Continuous <Continuous>  pantoprazole    Tablet 40 milliGRAM(s) Oral before breakfast  polyethylene glycol 3350 17 Gram(s) Oral daily  propofol Infusion 5.004 MICROgram(s)/kG/Min (4.93 mL/Hr) IV Continuous <Continuous>  senna 2 Tablet(s) Oral at bedtime  tiotropium 18 MICROgram(s) Capsule 1 Capsule(s) Inhalation once    MEDICATIONS  (PRN):  acetaminophen   Tablet .. 650 milliGRAM(s) Oral every 6 hours PRN Temp greater or equal to 38C (100.4F), Mild Pain (1 - 3)          Xrays:  TLC:  OG:  ET tube:                                                                                    mild b/l congestion    ECHO:  [facility] ICU:

## 2021-06-08 NOTE — PROGRESS NOTE ADULT - SUBJECTIVE AND OBJECTIVE BOX
Specialty: Neuro Critical Care     Interval Hx: 60-year-old male p/w worsening chronic right foot infection. NCC c/s for worsening mental status and gaze deviation. Clinical presentation more consistent with metabolic and septic encephalopathy. S/P right knee disarticulation, 2021. Patient remains mechanically vented, sedated on Propofol. Remains off CVVH. No acute events overnight.     HPI:  Patient is a 61 y/o M with PMH of DM, hypertension, dyslipidemia, sleep apnea, Charcot foot reconstruction with external fixation, super morbid obesity present for worsening right foot infection.  Pt reports he has had this foot ulcer at various stage of healing for 13 years. Patient has been admitted for IV antibiotics multiple times in the past for osteomyelitis of right foot. He has history of MRSA infection in foot. pt has baseline neuropathy in both feet so does not feel any pain.  Patient states his Infection has been gradually getting worse despite treatment with bactrim, he saw his podiatrists and he was sent in by podiatry for IV abx . No fever, chills, cp, sob, abd pain, nausea, vomiting, dysuria, calf pain.   there is a Right foot ulcer on the lateral aspect of the plantar surface measuring 8.5-3cm. Ulcer is surrounded by macerated tissue. Serous drainage from wound. Kurlex and sponge dressing in place, clean, dry and intact. Patient was I&D'd bedside by podiatry in the ed. purulent drainage was evacuated and sent to the lab for culture. Patient is scheduled for OR with podiatry in the AM.  (29 May 2021 02:29)    Past Medical and Surgical History:  MATA on CPAP  DM (diabetes mellitus)  HTN (hypertension)  High cholesterol  Charcot ankle, right  History of percutaneous angioplasty  H/O skin graft  Left toe amputee  4 TOES  Diabetic Charcot foot  Fixation with external device    Allergies:   No Known Allergies  statins (Other)    ROS: Patient unable to participate in ROS due to neurologic status.     PE:  General: ill-appearing, mechanically vented, male of appropriately stated age, sedated on Propofol, lying supine in bed. NAD  Mental status: Mechanically vented, sedated on Propofol. Drowsy yet arosuable with repeated verbal stimulation. Able to open eyes to voice and follow simple commands. Does not track to voice.   Cranial Nerves: Pupils 3mm brisk bilaterally. (+) blink to threat bilaterally. No gaze preference or nystagmus noted. (+) corneal reflex bilaterally (+) oculocephalic reflex (+) cough reflex (+) gag reflex. Face grossly symmetrical with no loss of nasolabial folds.   Motor: Grossly deconditioned. Notable right BKA. No movement to noxious stimuli bilaterally throughout.   Sensation: Facial grimacing noted to noxious stimuli bilaterally throughout.     Vital Signs:  ICU Vital Signs Last 24 Hrs  T(C): 38.1 (2021 08:00), Max: 38.6 (2021 00:00)  T(F): 100.5 (2021 08:00), Max: 101.5 (2021 00:00)  HR: 82 (2021 10:00) (74 - 96)  BP: --  BP(mean): --  ABP: 96/42 (2021 10:00) (92/42 - 156/62)  ABP(mean): 58 (2021 10:00) (58 - 92)  RR: 18 (2021 10:00) (3 - 28)  SpO2: 97% (2021 10:00) (97% - 100%)    Ventilator:  Mode: AC/ CMV (Assist Control/ Continuous Mandatory Ventilation)  RR (machine): 18  TV (machine): 480  FiO2: 30  PEEP: 5  ITime: 1  MAP: 16  PIP: 28    I&O's Detail:  2021 07:01  -  2021 07:00  --------------------------------------------------------  IN:    Enteral Tube Flush: 500 mL    FentaNYL: 430 mL    IV PiggyBack: 350 mL    Norepinephrine: 32 mL    Propofol: 120 mL    Vital High Protein: 1320 mL  Total IN: 2752 mL    OUT:    Indwelling Catheter - Urethral (mL): 6070 mL  Total OUT: 6070 mL    Total NET: -3318 mL    2021 07:01  -  2021 11:19  --------------------------------------------------------  IN:    FentaNYL: 30 mL    Norepinephrine: 1 mL    Propofol: 9 mL    Vital High Protein: 220 mL  Total IN: 260 mL    OUT:    FentaNYL: 0 mL    Indwelling Catheter - Urethral (mL): 700 mL    Propofol: 0 mL  Total OUT: 700 mL    Total NET: -440 mL    Labs:      144  |  109  |  42<H>  ----------------------------<  211<H>  4.1   |  29  |  2.0<H>    Ca    7.7<L>      2021 04:50  Phos  2.6       Mg     2.2         TPro  7.8  /  Alb  2.2<L>  /  TBili  0.3  /  DBili  x   /  AST  51<H>  /  ALT  18  /  AlkPhos  122<H>                          8.0    14.81 )-----------( 220      ( 2021 04:50 )             26.6     LIVER FUNCTIONS - ( 2021 04:50 )  Alb: 2.2 g/dL / Pro: 7.8 g/dL / ALK PHOS: 122 U/L / ALT: 18 U/L / AST: 51 U/L / GGT: x           ABG - ( 2021 02:33 )  pH, Arterial: 7.42  pH, Blood: x     /  pCO2: 50    /  pO2: 85    / HCO3: 32    / Base Excess: 6.8   /  SaO2: 96        Microbiology:  Urinalysis Basic - ( 2021 12:43 )  Color: Yellow / Appearance: Slightly Turbid / S.020 / pH: x  Gluc: x / Ketone: Negative  / Bili: Negative / Urobili: <2 mg/dL   Blood: x / Protein: 100 mg/dL / Nitrite: Negative   Leuk Esterase: Negative / RBC: 35 /HPF / WBC 4 /HPF   Sq Epi: x / Non Sq Epi: 1 /HPF / Bacteria: Negative    Medications Current and PRN:  MEDICATIONS  (STANDING):  ALBUTerol    90 MICROgram(s) HFA Inhaler 1 Puff(s) Inhalation once  aspirin  chewable 81 milliGRAM(s) Oral daily  chlorhexidine 0.12% Liquid 15 milliLiter(s) Oral Mucosa two times a day  chlorhexidine 4% Liquid 1 Application(s) Topical <User Schedule>  DAPTOmycin IVPB 875 milliGRAM(s) IV Intermittent every 48 hours  dextrose 40% Gel 15 Gram(s) Oral once  dextrose 5%. 1000 milliLiter(s) (50 mL/Hr) IV Continuous <Continuous>  dextrose 5%. 1000 milliLiter(s) (100 mL/Hr) IV Continuous <Continuous>  dextrose 50% Injectable 25 Gram(s) IV Push once  dextrose 50% Injectable 12.5 Gram(s) IV Push once  dextrose 50% Injectable 25 Gram(s) IV Push once  fentaNYL   Infusion. 0.5 MICROgram(s)/kG/Hr (8.21 mL/Hr) IV Continuous <Continuous>  furosemide   Injectable 40 milliGRAM(s) IV Push every 12 hours  glucagon  Injectable 1 milliGRAM(s) IntraMuscular once  heparin   Injectable 5000 Unit(s) SubCutaneous every 8 hours  insulin lispro (ADMELOG) corrective regimen sliding scale   SubCutaneous three times a day before meals  insulin NPH human recombinant 15 Unit(s) SubCutaneous every 12 hours  metroNIDAZOLE  IVPB 500 milliGRAM(s) IV Intermittent every 8 hours  mupirocin 2% Nasal 1 Application(s) Nasal two times a day  norepinephrine Infusion 0.05 MICROgram(s)/kG/Min (7.7 mL/Hr) IV Continuous <Continuous>  pantoprazole    Tablet 40 milliGRAM(s) Oral before breakfast  polyethylene glycol 3350 17 Gram(s) Oral daily  propofol Infusion 5.004 MICROgram(s)/kG/Min (4.93 mL/Hr) IV Continuous <Continuous>  senna 2 Tablet(s) Oral at bedtime  tiotropium 18 MICROgram(s) Capsule 1 Capsule(s) Inhalation once    MEDICATIONS  (PRN):  acetaminophen   Tablet .. 650 milliGRAM(s) Oral every 6 hours PRN Temp greater or equal to 38C (100.4F), Mild Pain (1 - 3)    Imaging:  EXAM:  ECHO TTE WITH CON COMP W DOPP (2021):   Summary:   1. Left ventricular ejection fraction, by visual estimation, is 55 to 60%.   2. Normal global left ventricular systolic function.   3. Elevated left ventricular end-diastolic pressure.   4. Mildly increased LV wall thickness.   5. Normal left ventricular internal cavity size.   6. Spectral Doppler shows pseudonormal pattern of left ventricular myocardial filling (Grade II diastolic dysfunction).   7. Sigmoid septum present. and mild concentric LV hypertrophy.   8. Normal left atrial size.   9. Normal right atrial size.  10. Mild to moderate mitral annular calcification.  11. No evidence of mitral valve regurgitation.  12. Structurally normal mitral valve, with normal leaflet excursion.  13.Sclerotic aortic valve with decreased opening.  14. Mildly reduced AV opening with mild AS by doppler. 15 mm peak AV gradient.  15. Ascending aorta mildly dilated to 4.2cm.  16. LA volume Index is 27.3 ml/m² ml/m2.    EXAM:  EEG AWAKE AND DROWSY (2021):   State: Drowsy  Symmetry: Symmetric  Organization: Well organized  PDR: Continuous/Background: 5-6 hz  Generalized Slowing: Yes, borderline - mild  Focal Slowing: No  Breach Artifact: No  Activation Procedure/Hyper Ventilation: No  Photic Stimulation: No  Epileptiform Activity: No  Events: No  Impression:  Abnormal due to the presence of: generalized slowing as above  Clinical Correlation & Recommendations  Consistent with diffuse cerebral electrophysiological dysfunction.  Secondary to toxic metabolic cause.    EXAM:  CT ANGIO BRAIN (W)AW IC/CT ANGIO NECK (W)AW IC/CT PERFUSION W MAPS IC (2021):   IMPRESSION:  CT PERFUSION: Severely limited by motion artifact.  Perfusion maps are severely motion degraded.  Detected CBF abnormality of 11 mL and Tmax abnormality of 134 mL are in nonvascular distributions and may be artifactual.  Follow-up MRI may be obtained to exclude a small infarct.  CT ANGIOGRAPHY NECK:  1.  A 9 x 6 x 10 mm medially directed saccular outpouching arising from the RIGHT carotidbulb may represent penetrating atherosclerotic ulcer or a chronic focal dissection with pseudoaneurysm.  The outpouching appears to compress the parent vessel resulting in a mild to moderate stenosis in the RIGHT carotid bulb that measures 40-50% using NASCET criteria.  2.  Mild flattening the left carotid bulb.  No hemodynamically significant left internal internal carotid artery stenosis using NASCET criteria.  3.  No acute carotid dissection or carotid occlusion.  4.  Dominant left vertebral artery and hypoplastic right vertebral artery are grossly patent without evidence of flow-limiting stenosis or dissection.  5.  Incidental findings as discussed above.  There are superior mediastinal lymph nodes measuring up to 1.2 cm short axis.  There are mid and lower cervical lymph nodes measuring up to 8 mm short axis.  CT ANGIOGRAPHY BRAIN:  1.  No vessel occlusion or aneurysm about the [place].  2.  Moderate stenoses at the junction of the precavernous and cavernous segments of both internal carotid arteries.  Diffuse mild stenosis in the cavernous and proximal supraclinoid segments of both internal carotid arteries.  No flow-limiting stenosis in the anterior cerebral arteries, middle cerebral arteries, vertebrobasilar system or posterior cerebral arteries  3.  No evidence of acute territorial infarct.  No suspicious hypoattenuation in the brain parenchyma or loss of gray matter-white matter differentiation is visualized on the CTA source data.    EXAM:  CT BRAIN STROKE PROTOCOL (2021):   IMPRESSION:  No evidence of acute intracranial pathology.    Assessment:  60-year-old male with PMHx DM, HTN, MATA, on CPAP, HLD, right Charcot foot reconstruction w/ external fixation, h/o MRSA infection right foot, h/o osteomyelitis right foot, B/L feet neuropathy, h/o percutaneous angioplasty, h/o left toe amputation, morbid obesity, p/w worsening chronic right foot infection, 2021. Stroke code, 2021, for worsening mental status and gaze deviation. CTH, 2021, revealed no evidence of acute intracranial pathology. Clinical presentation more consistent with metabolic and septic encephalopathy. S/P right knee disarticulation, 2021. Post-operative, patient required CVVH, now off. Video EEG, 2021, revealed borderline to mild generalized slowing, with no epileptiform activity. On examination today, patient is mechanically vented, sedated on Propofol, drowsy yet arousable with repeated verbal stimulation, able to open eyes to voice and follow simple commands, with no movement to noxious stimuli noted bilaterally throughout with intact brainstem reflexes.      Plan:  - MRI brain when able  - LP by IR to evaluate for meningitis 2/2 persistent fevers and encephalopathy  - ID recs appreciated: F/U pan cultures; continue Daptomycin and Flagyl; trend CPK; if worsening hemodynamic compromise, add caspo IV while BCX pending  - Stat CTH if any worsening or deterioration in neurological examination and call NCC/Neurology  - Medical management/ventilator management as per primary team      MARGARET Kemp-BC  Please feel free to contact for any questions or concerns   #[phone]   Specialty: Neuro Critical Care     Interval Hx: 60-year-old male p/w worsening chronic right foot infection. NCC c/s for worsening mental status and gaze deviation. Clinical presentation more consistent with metabolic and septic encephalopathy. S/P right knee disarticulation, 2021. Patient remains mechanically vented, sedated on Propofol. Remains off CVVH. No acute events overnight.     HPI:  Patient is a 59 y/o M with PMH of DM, hypertension, dyslipidemia, sleep apnea, Charcot foot reconstruction with external fixation, super morbid obesity present for worsening right foot infection.  Pt reports he has had this foot ulcer at various stage of healing for 13 years. Patient has been admitted for IV antibiotics multiple times in the past for osteomyelitis of right foot. He has history of MRSA infection in foot. pt has baseline neuropathy in both feet so does not feel any pain.  Patient states his Infection has been gradually getting worse despite treatment with bactrim, he saw his podiatrists and he was sent in by podiatry for IV abx . No fever, chills, cp, sob, abd pain, nausea, vomiting, dysuria, calf pain.   there is a Right foot ulcer on the lateral aspect of the plantar surface measuring 8.5-3cm. Ulcer is surrounded by macerated tissue. Serous drainage from wound. Kurlex and sponge dressing in place, clean, dry and intact. Patient was I&D'd bedside by podiatry in the ed. purulent drainage was evacuated and sent to the lab for culture. Patient is scheduled for OR with podiatry in the AM.  (29 May 2021 02:29)    Past Medical and Surgical History:  MATA on CPAP  DM (diabetes mellitus)  HTN (hypertension)  High cholesterol  Charcot ankle, right  History of percutaneous angioplasty  H/O skin graft  Left toe amputee  4 TOES  Diabetic Charcot foot  Fixation with external device    Allergies:   No Known Allergies  statins (Other)    ROS: Patient unable to participate in ROS due to neurologic status.     PE:  General: ill-appearing, mechanically vented, male of appropriately stated age, sedated on Propofol, lying supine in bed. NAD  Mental status: Mechanically vented, sedated on Propofol. Drowsy yet arosuable with repeated verbal stimulation. Able to open eyes to voice and follow simple commands. Does not track to voice.   Cranial Nerves: Pupils 3mm brisk bilaterally. (+) blink to threat bilaterally. No gaze preference or nystagmus noted. (+) corneal reflex bilaterally (+) oculocephalic reflex (+) cough reflex (+) gag reflex. Face grossly symmetrical with no loss of nasolabial folds.   Motor: Grossly deconditioned. Notable right BKA. No movement to noxious stimuli bilaterally throughout.   Sensation: Facial grimacing noted to noxious stimuli bilaterally throughout.     Vital Signs:  ICU Vital Signs Last 24 Hrs  T(C): 38.1 (2021 08:00), Max: 38.6 (2021 00:00)  T(F): 100.5 (2021 08:00), Max: 101.5 (2021 00:00)  HR: 82 (2021 10:00) (74 - 96)  BP: --  BP(mean): --  ABP: 96/42 (2021 10:00) (92/42 - 156/62)  ABP(mean): 58 (2021 10:00) (58 - 92)  RR: 18 (2021 10:00) (3 - 28)  SpO2: 97% (2021 10:00) (97% - 100%)    Ventilator:  Mode: AC/ CMV (Assist Control/ Continuous Mandatory Ventilation)  RR (machine): 18  TV (machine): 480  FiO2: 30  PEEP: 5  ITime: 1  MAP: 16  PIP: 28    I&O's Detail:  2021 07:01  -  2021 07:00  --------------------------------------------------------  IN:    Enteral Tube Flush: 500 mL    FentaNYL: 430 mL    IV PiggyBack: 350 mL    Norepinephrine: 32 mL    Propofol: 120 mL    Vital High Protein: 1320 mL  Total IN: 2752 mL    OUT:    Indwelling Catheter - Urethral (mL): 6070 mL  Total OUT: 6070 mL    Total NET: -3318 mL    2021 07:01  -  2021 11:19  --------------------------------------------------------  IN:    FentaNYL: 30 mL    Norepinephrine: 1 mL    Propofol: 9 mL    Vital High Protein: 220 mL  Total IN: 260 mL    OUT:    FentaNYL: 0 mL    Indwelling Catheter - Urethral (mL): 700 mL    Propofol: 0 mL  Total OUT: 700 mL    Total NET: -440 mL    Labs:      144  |  109  |  42<H>  ----------------------------<  211<H>  4.1   |  29  |  2.0<H>    Ca    7.7<L>      2021 04:50  Phos  2.6       Mg     2.2         TPro  7.8  /  Alb  2.2<L>  /  TBili  0.3  /  DBili  x   /  AST  51<H>  /  ALT  18  /  AlkPhos  122<H>                          8.0    14.81 )-----------( 220      ( 2021 04:50 )             26.6     LIVER FUNCTIONS - ( 2021 04:50 )  Alb: 2.2 g/dL / Pro: 7.8 g/dL / ALK PHOS: 122 U/L / ALT: 18 U/L / AST: 51 U/L / GGT: x           ABG - ( 2021 02:33 )  pH, Arterial: 7.42  pH, Blood: x     /  pCO2: 50    /  pO2: 85    / HCO3: 32    / Base Excess: 6.8   /  SaO2: 96        Microbiology:  Urinalysis Basic - ( 2021 12:43 )  Color: Yellow / Appearance: Slightly Turbid / S.020 / pH: x  Gluc: x / Ketone: Negative  / Bili: Negative / Urobili: <2 mg/dL   Blood: x / Protein: 100 mg/dL / Nitrite: Negative   Leuk Esterase: Negative / RBC: 35 /HPF / WBC 4 /HPF   Sq Epi: x / Non Sq Epi: 1 /HPF / Bacteria: Negative    Medications Current and PRN:  MEDICATIONS  (STANDING):  ALBUTerol    90 MICROgram(s) HFA Inhaler 1 Puff(s) Inhalation once  aspirin  chewable 81 milliGRAM(s) Oral daily  chlorhexidine 0.12% Liquid 15 milliLiter(s) Oral Mucosa two times a day  chlorhexidine 4% Liquid 1 Application(s) Topical <User Schedule>  DAPTOmycin IVPB 875 milliGRAM(s) IV Intermittent every 48 hours  dextrose 40% Gel 15 Gram(s) Oral once  dextrose 5%. 1000 milliLiter(s) (50 mL/Hr) IV Continuous <Continuous>  dextrose 5%. 1000 milliLiter(s) (100 mL/Hr) IV Continuous <Continuous>  dextrose 50% Injectable 25 Gram(s) IV Push once  dextrose 50% Injectable 12.5 Gram(s) IV Push once  dextrose 50% Injectable 25 Gram(s) IV Push once  fentaNYL   Infusion. 0.5 MICROgram(s)/kG/Hr (8.21 mL/Hr) IV Continuous <Continuous>  furosemide   Injectable 40 milliGRAM(s) IV Push every 12 hours  glucagon  Injectable 1 milliGRAM(s) IntraMuscular once  heparin   Injectable 5000 Unit(s) SubCutaneous every 8 hours  insulin lispro (ADMELOG) corrective regimen sliding scale   SubCutaneous three times a day before meals  insulin NPH human recombinant 15 Unit(s) SubCutaneous every 12 hours  metroNIDAZOLE  IVPB 500 milliGRAM(s) IV Intermittent every 8 hours  mupirocin 2% Nasal 1 Application(s) Nasal two times a day  norepinephrine Infusion 0.05 MICROgram(s)/kG/Min (7.7 mL/Hr) IV Continuous <Continuous>  pantoprazole    Tablet 40 milliGRAM(s) Oral before breakfast  polyethylene glycol 3350 17 Gram(s) Oral daily  propofol Infusion 5.004 MICROgram(s)/kG/Min (4.93 mL/Hr) IV Continuous <Continuous>  senna 2 Tablet(s) Oral at bedtime  tiotropium 18 MICROgram(s) Capsule 1 Capsule(s) Inhalation once    MEDICATIONS  (PRN):  acetaminophen   Tablet .. 650 milliGRAM(s) Oral every 6 hours PRN Temp greater or equal to 38C (100.4F), Mild Pain (1 - 3)    Imaging:  EXAM:  ECHO TTE WITH CON COMP W DOPP (2021):   Summary:   1. Left ventricular ejection fraction, by visual estimation, is 55 to 60%.   2. Normal global left ventricular systolic function.   3. Elevated left ventricular end-diastolic pressure.   4. Mildly increased LV wall thickness.   5. Normal left ventricular internal cavity size.   6. Spectral Doppler shows pseudonormal pattern of left ventricular myocardial filling (Grade II diastolic dysfunction).   7. Sigmoid septum present. and mild concentric LV hypertrophy.   8. Normal left atrial size.   9. Normal right atrial size.  10. Mild to moderate mitral annular calcification.  11. No evidence of mitral valve regurgitation.  12. Structurally normal mitral valve, with normal leaflet excursion.  13.Sclerotic aortic valve with decreased opening.  14. Mildly reduced AV opening with mild AS by doppler. 15 mm peak AV gradient.  15. Ascending aorta mildly dilated to 4.2cm.  16. LA volume Index is 27.3 ml/m² ml/m2.    EXAM:  EEG AWAKE AND DROWSY (2021):   State: Drowsy  Symmetry: Symmetric  Organization: Well organized  PDR: Continuous/Background: 5-6 hz  Generalized Slowing: Yes, borderline - mild  Focal Slowing: No  Breach Artifact: No  Activation Procedure/Hyper Ventilation: No  Photic Stimulation: No  Epileptiform Activity: No  Events: No  Impression:  Abnormal due to the presence of: generalized slowing as above  Clinical Correlation & Recommendations  Consistent with diffuse cerebral electrophysiological dysfunction.  Secondary to toxic metabolic cause.    EXAM:  CT ANGIO BRAIN (W)AW IC/CT ANGIO NECK (W)AW IC/CT PERFUSION W MAPS IC (2021):   IMPRESSION:  CT PERFUSION: Severely limited by motion artifact.  Perfusion maps are severely motion degraded.  Detected CBF abnormality of 11 mL and Tmax abnormality of 134 mL are in nonvascular distributions and may be artifactual.  Follow-up MRI may be obtained to exclude a small infarct.  CT ANGIOGRAPHY NECK:  1.  A 9 x 6 x 10 mm medially directed saccular outpouching arising from the RIGHT carotidbulb may represent penetrating atherosclerotic ulcer or a chronic focal dissection with pseudoaneurysm.  The outpouching appears to compress the parent vessel resulting in a mild to moderate stenosis in the RIGHT carotid bulb that measures 40-50% using NASCET criteria.  2.  Mild flattening the left carotid bulb.  No hemodynamically significant left internal internal carotid artery stenosis using NASCET criteria.  3.  No acute carotid dissection or carotid occlusion.  4.  Dominant left vertebral artery and hypoplastic right vertebral artery are grossly patent without evidence of flow-limiting stenosis or dissection.  5.  Incidental findings as discussed above.  There are superior mediastinal lymph nodes measuring up to 1.2 cm short axis.  There are mid and lower cervical lymph nodes measuring up to 8 mm short axis.  CT ANGIOGRAPHY BRAIN:  1.  No vessel occlusion or aneurysm about the [place].  2.  Moderate stenoses at the junction of the precavernous and cavernous segments of both internal carotid arteries.  Diffuse mild stenosis in the cavernous and proximal supraclinoid segments of both internal carotid arteries.  No flow-limiting stenosis in the anterior cerebral arteries, middle cerebral arteries, vertebrobasilar system or posterior cerebral arteries  3.  No evidence of acute territorial infarct.  No suspicious hypoattenuation in the brain parenchyma or loss of gray matter-white matter differentiation is visualized on the CTA source data.    EXAM:  CT BRAIN STROKE PROTOCOL (2021):   IMPRESSION:  No evidence of acute intracranial pathology.    Assessment:  60-year-old male with PMHx DM, HTN, MATA, on CPAP, HLD, right Charcot foot reconstruction w/ external fixation, h/o MRSA infection right foot, h/o osteomyelitis right foot, B/L feet neuropathy, h/o percutaneous angioplasty, h/o left toe amputation, morbid obesity, p/w worsening chronic right foot infection, 2021. Stroke code, 2021, for worsening mental status and gaze deviation. CTH, 2021, revealed no evidence of acute intracranial pathology. Clinical presentation more consistent with metabolic and septic encephalopathy. S/P right knee disarticulation, 2021. Post-operative, patient required CVVH, now off. Video EEG, 2021, revealed borderline to mild generalized slowing, with no epileptiform activity. On examination today, patient is mechanically vented, sedated on Propofol, drowsy yet arousable with repeated verbal stimulation, able to open eyes to voice and follow simple commands, with no movement to noxious stimuli noted bilaterally throughout with intact brainstem reflexes.      Plan:  - MRI brain when able  - Consider LP by IR to evaluate for meningitis 2/2 persistent fevers and encephalopathy if patient continues to be encephalopathic   - ID recs appreciated: F/U pan cultures; continue Daptomycin and Flagyl; trend CPK; if worsening hemodynamic compromise, add caspo IV while BCX pending  - Stat CTH if any worsening or deterioration in neurological examination and call NCC/Neurology  - Medical management/ventilator management as per primary team      Nola Simon, [facility]-BC  Please feel free to contact for any questions or concerns   #[phone]

## 2021-06-08 NOTE — PROGRESS NOTE ADULT - SUBJECTIVE AND OBJECTIVE BOX
FERNANDEZ GUZMAN  60y, Male  Allergy: No Known Allergies  statins (Other)      LOS  10d    CHIEF COMPLAINT: dfu (2021 07:58)      INTERVAL EVENTS/HPI  - febrile, low dose levo, fio2 30%  - T(F): , Max: 101.5 (21 @ 00:00)  - Tolerating medication  - WBC Count: 14.81 (21 @ 04:50)  WBC Count: 12.87 (21 @ 04:30)  - Creatinine, Serum: 2.0 (21 @ 04:50)  Creatinine, Serum: 2.1 (21 @ 04:30)       ROS  unable to obtain history secondary to patient's mental status and/or sedation     VITALS:  T(F): 98.5, Max: 101.5 (21 @ 00:00)  HR: 85  BP: --  RR: 18Vital Signs Last 24 Hrs  T(C): 36.9 (2021 06:00), Max: 38.6 (2021 00:00)  T(F): 98.5 (2021 06:00), Max: 101.5 (2021 00:00)  HR: 85 (2021 07:19) (74 - 96)  BP: --  BP(mean): --  RR: 18 (2021 06:00) (3 - 28)  SpO2: 100% (2021 07:19) (100% - 100%)    PHYSICAL EXAM:  ***    FH: Non-contributory  Social Hx: Non-contributory    TESTS & MEASUREMENTS:                        8.0    14.81 )-----------( 220      ( 2021 04:50 )             26.6     06-08    144  |  109  |  42<H>  ----------------------------<  211<H>  4.1   |  29  |  2.0<H>    Ca    7.7<L>      2021 04:50  Phos  2.6     06-08  Mg     2.2     06-08    TPro  7.8  /  Alb  2.2<L>  /  TBili  0.3  /  DBili  x   /  AST  51<H>  /  ALT  18  /  AlkPhos  122<H>      eGFR if Non African American: 35 mL/min/1.73M2 (21 @ 04:50)  eGFR if African American: 40 mL/min/1.73M2 (21 @ 04:50)    LIVER FUNCTIONS - ( 2021 04:50 )  Alb: 2.2 g/dL / Pro: 7.8 g/dL / ALK PHOS: 122 U/L / ALT: 18 U/L / AST: 51 U/L / GGT: x           Urinalysis Basic - ( 2021 12:43 )    Color: Yellow / Appearance: Slightly Turbid / S.020 / pH: x  Gluc: x / Ketone: Negative  / Bili: Negative / Urobili: <2 mg/dL   Blood: x / Protein: 100 mg/dL / Nitrite: Negative   Leuk Esterase: Negative / RBC: 35 /HPF / WBC 4 /HPF   Sq Epi: x / Non Sq Epi: 1 /HPF / Bacteria: Negative        Culture - Blood (collected 21 @ 01:00)  Source: .Blood None  Preliminary Report (21 @ 08:01):    No growth to date.    Culture - Blood (collected 21 @ 22:09)  Source: .Blood Blood-Peripheral  Final Report (21 @ 08:01):    No Growth Final    Culture - Blood (collected 21 @ 22:09)  Source: .Blood Blood-Peripheral  Final Report (21 @ 08:01):    No Growth Final    Culture - Acid Fast - Other w/Smear (collected 21 @ 06:10)  Source: .Other None(R-FOOT)  Preliminary Report (21 @ 15:07):    Culture is being performed.    Culture - Surgical Swab (collected 21 @ 06:10)  Source: .Surgical Swab None  Final Report (21 @ 16:27):    No growth at 5 days    Culture - Acid Fast - Other w/Smear (collected 21 @ 06:10)  Source: .Other None (R-FOOT)  Preliminary Report (21 @ 15:07):    Culture is being performed.    Culture - Surgical Swab (collected 21 @ 06:10)  Source: .Surgical Swab None  Final Report (21 @ 17:24):    Numerous Methicillin resistant Staphylococcus aureus    Rare Corynebacterium species "Susceptibilities not performed"  Organism: Methicillin resistant Staphylococcus aureus (21 @ 17:24)  Organism: Methicillin resistant Staphylococcus aureus (21 @ 17:24)      -  Ampicillin/Sulbactam: R 16/8      -  Cefazolin: R 8      -  Clindamycin: S <=0.25      -  Daptomycin: S 0.5      -  Erythromycin: R >4      -  Gentamicin: S <=1 Should not be used as monotherapy      -  Linezolid: S 1      -  Oxacillin: R >2      -  Penicillin: R >8      -  RIF- Rifampin: S <=1 Should not be used as monotherapy      -  Tetra/Doxy: S <=1      -  Trimethoprim/Sulfamethoxazole: S <=0.5/9.5      -  Vancomycin: S 2      Method Type: DAVIDA    Culture - Urine (collected 21 @ 01:20)  Source: .Urine Clean Catch (Midstream)  Final Report (21 @ 08:11):    <10,000 CFU/mL Normal Urogenital Mary    Culture - Abscess with Gram Stain (collected 21 @ 19:51)  Source: .Abscess Right - Foot  Final Report (21 @ 09:25):    Moderate Methicillin resistant Staphylococcus aureus    Few Corynebacterium species "Susceptibilities not performed"  Organism: Methicillin resistant Staphylococcus aureus (21 @ 09:25)  Organism: Methicillin resistant Staphylococcus aureus (21 @ 09:25)      -  Ampicillin/Sulbactam: R 16/8      -  Cefazolin: R 8      -  Clindamycin: S <=0.25      -  Daptomycin: S 0.5      -  Erythromycin: R >4      -  Gentamicin: S <=1 Should not be used as monotherapy      -  Linezolid: S 2      -  Oxacillin: R >2      -  Penicillin: R >8      -  RIF- Rifampin: S <=1 Should not be used as monotherapy      -  Tetra/Doxy: S <=1      -  Trimethoprim/Sulfamethoxazole: S 1/19      -  Vancomycin: S 2      Method Type: DAVIDA    Culture - Other (collected 21 @ 18:32)  Source: .Other R foot DFU  Final Report (21 @ 15:44):    Moderate Methicillin resistant Staphylococcus aureus    Normal skin mary isolated  Organism: Methicillin resistant Staphylococcus aureus (21 @ 15:44)  Organism: Methicillin resistant Staphylococcus aureus (21 @ 15:44)      -  Ampicillin/Sulbactam: R 16/8      -  Cefazolin: R 16      -  Clindamycin: S <=0.25      -  Daptomycin: S 0.5      -  Erythromycin: R >4      -  Gentamicin: S <=1 Should not be used as monotherapy      -  Linezolid: S 2      -  Oxacillin: R >2      -  Penicillin: R >8      -  RIF- Rifampin: S <=1 Should not be used as monotherapy      -  Tetra/Doxy: S <=1      -  Trimethoprim/Sulfamethoxazole: S <=0.5/9.5      -  Vancomycin: S 2      Method Type: DAVIDA    Culture - Blood (collected 21 @ 18:28)  Source: .Blood Blood-Peripheral  Gram Stain (21 @ 18:30):    Growth in anaerobic bottle: Gram positive cocci in pairs    Growth in aerobic bottle: Gram positive cocci in pairs  Final Report (21 @ 15:45):    Growth in anaerobic bottle: Enterococcus faecalis    Growth in aerobic and anaerobic bottles: Methicillin resistant    Staphylococcus aureus    ***Blood Panel PCR results on this specimen are available    approximately 3 hours after the Gram stain result.***    Gram stain, PCR, and/or culture results may not always    correspond due to difference in methodologies.    ************************************************************    This PCR assay was performed by multiplex PCR. This    Assay tests for 66 bacterialand resistance gene targets.    Please refer to the [facility] test directory    at [email or web address] for details.  Organism: Blood Culture PCR  Enterococcus faecalis  Methicillin resistant Staphylococcus aureus (21 @ 15:45)  Organism: Methicillin resistant Staphylococcus aureus (21 @ 15:45)      -  Ampicillin/Sulbactam: R 16/8      -  Cefazolin: R 16      -  Clindamycin: S <=0.25      -  Daptomycin: S 0.5      -  Erythromycin: R >4      -  Gentamicin: S <=1 Should not be used as monotherapy      -  Linezolid: S 2      -  Oxacillin: R >2      -  Penicillin: R >8      -  RIF- Rifampin: S <=1 Should not be used as monotherapy      -  Tetra/Doxy: S <=1      -  Trimethoprim/Sulfamethoxazole: S <=0.5/9.5      -  Vancomycin: S 1      Method Type: DAVIDA  Organism: Enterococcus faecalis (21 @ 15:45)      -  Ampicillin: S <=2 Predicts results to ampicillin/sulbactam, amoxacillin-clavulanate and  piperacillin-tazobactam.      -  Gentamicin synergy: S      -  Vancomycin: S 1      Method Type: DAVIDA  Organism: Blood Culture PCR (21 @ 15:45)      -  Coagulase negative Staphylococcus: Detec      -  Enterococcus faecalis: Detec      Method Type: PCR    Culture - Blood (collected 21 @ 18:28)  Source: .Blood Blood-Peripheral  Gram Stain (21 @ 06:00):    Growth in aerobic bottle: Gram positive cocci in pairs    Growth in anaerobic bottle: Gram positive cocci in pairs  Final Report (21 @ 15:46):    Growth in aerobic and anaerobic bottles: Methicillin resistant    Staphylococcus aureus    See previous culture [id number]        Lactate, Blood: 0.8 mmol/L (21 @ 04:30)  Lactate, Blood: 1.1 mmol/L (21 @ 05:50)      INFECTIOUS DISEASES TESTING  COVID-19 PCR: NotDetec (21 @ 06:53)  MRSA PCR Result.: Positive (21 @ 09:26)  COVID-19 PCR: NotDetec (21 @ 18:28)      INFLAMMATORY MARKERS  Sedimentation Rate, Erythrocyte: 126 mm/Hr (21 @ 21:19)  C-Reactive Protein, Serum: 390 mg/L (21 @ 21:19)      RADIOLOGY & ADDITIONAL TESTS:  I have personally reviewed the last available Chest xray  CXR      CT      CARDIOLOGY TESTING  12 Lead ECG:   Ventricular Rate 104 BPM    Atrial Rate 105 BPM    P-R Interval 200 ms    QRS Duration 164 ms    Q-T Interval 392 ms    QTC Calculation(Bazett) 515 ms    P Axis 33 degrees    R Axis 263 degrees    T Axis 45 degrees    Diagnosis Line Sinus tachycardia  Right bundle branch block  Abnormal ECG    Confirmed by Cheko Lux ([id number]) on 2021 9:08:21 PM (21 @ 19:49)  12 Lead ECG:   Ventricular Rate 92 BPM    Atrial Rate 92 BPM    P-R Interval 208 ms    QRS Duration 162 ms    Q-T Interval 410 ms    QTC Calculation(Bazett) 507 ms    P Axis 38 degrees    R Axis 265 degrees    T Axis 49 degrees    Diagnosis Line Normal sinus rhythm  Right bundle branch block  Abnormal ECG    Confirmed by Cheko Lux ([id number]) on 2021 12:48:16 PM (21 @ 12:28)      MEDICATIONS  ALBUTerol    90 MICROgram(s) HFA Inhaler 1 Inhalation once  aspirin  chewable 81 Oral daily  chlorhexidine 0.12% Liquid 15 Oral Mucosa two times a day  chlorhexidine 4% Liquid 1 Topical <User Schedule>  DAPTOmycin IVPB 875 IV Intermittent every 48 hours  dextrose 40% Gel 15 Oral once  dextrose 5%. 1000 IV Continuous <Continuous>  dextrose 5%. 1000 IV Continuous <Continuous>  dextrose 50% Injectable 25 IV Push once  dextrose 50% Injectable 12.5 IV Push once  dextrose 50% Injectable 25 IV Push once  fentaNYL   Infusion. 0.5 IV Continuous <Continuous>  furosemide   Injectable 40 IV Push every 12 hours  glucagon  Injectable 1 IntraMuscular once  heparin   Injectable 5000 SubCutaneous every 8 hours  insulin lispro (ADMELOG) corrective regimen sliding scale  SubCutaneous three times a day before meals  insulin NPH human recombinant 15 SubCutaneous every 12 hours  metroNIDAZOLE  IVPB 500 IV Intermittent every 8 hours  mupirocin 2% Nasal 1 Nasal two times a day  norepinephrine Infusion 0.05 IV Continuous <Continuous>  pantoprazole    Tablet 40 Oral before breakfast  polyethylene glycol 3350 17 Oral daily  propofol Infusion 5.004 IV Continuous <Continuous>  senna 2 Oral at bedtime  tiotropium 18 MICROgram(s) Capsule 1 Inhalation once      WEIGHT  Weight (kg): 164.2 (21 @ 17:38)  Creatinine, Serum: 2.0 mg/dL (21 @ 04:50)      ANTIBIOTICS:  DAPTOmycin IVPB 875 milliGRAM(s) IV Intermittent every 48 hours  metroNIDAZOLE  IVPB 500 milliGRAM(s) IV Intermittent every 8 hours      All available historical records have been reviewed       FERNANEDZ GUZMAN  60y, Male  Allergy: No Known Allergies  statins (Other)      LOS  10d    CHIEF COMPLAINT: dfu (2021 07:58)      INTERVAL EVENTS/HPI  - febrile, low dose levo, fio2 30%  - T(F): , Max: 101.5 (21 @ 00:00)  - Tolerating medication  - WBC Count: 14.81 (21 @ 04:50)  WBC Count: 12.87 (21 @ 04:30)  - Creatinine, Serum: 2.0 (21 @ 04:50)  Creatinine, Serum: 2.1 (21 @ 04:30)       ROS  unable to obtain history secondary to patient's mental status and/or sedation     VITALS:  T(F): 98.5, Max: 101.5 (21 @ 00:00)  HR: 85  BP: --  RR: 18Vital Signs Last 24 Hrs  T(C): 36.9 (2021 06:00), Max: 38.6 (2021 00:00)  T(F): 98.5 (2021 06:00), Max: 101.5 (2021 00:00)  HR: 85 (2021 07:19) (74 - 96)  BP: --  BP(mean): --  RR: 18 (2021 06:00) (3 - 28)  SpO2: 100% (2021 07:19) (100% - 100%)    PHYSICAL EXAM:  General: intubated  HEENT: NCAT  CV: RRR  Lungs: symmetric chest expansion, decreased BS at bases  Abd: Soft  Skin: no rash  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  Neuro: awake not following commands  Lines: no phlebitis     FH: Non-contributory  Social Hx: Non-contributory    TESTS & MEASUREMENTS:                        8.0    14.81 )-----------( 220      ( 2021 04:50 )             26.6     -    144  |  109  |  42<H>  ----------------------------<  211<H>  4.1   |  29  |  2.0<H>    Ca    7.7<L>      2021 04:50  Phos  2.6     -  Mg     2.2     -    TPro  7.8  /  Alb  2.2<L>  /  TBili  0.3  /  DBili  x   /  AST  51<H>  /  ALT  18  /  AlkPhos  122<H>      eGFR if Non African American: 35 mL/min/1.73M2 (21 @ 04:50)  eGFR if African American: 40 mL/min/1.73M2 (21 @ 04:50)    LIVER FUNCTIONS - ( 2021 04:50 )  Alb: 2.2 g/dL / Pro: 7.8 g/dL / ALK PHOS: 122 U/L / ALT: 18 U/L / AST: 51 U/L / GGT: x           Urinalysis Basic - ( 2021 12:43 )    Color: Yellow / Appearance: Slightly Turbid / S.020 / pH: x  Gluc: x / Ketone: Negative  / Bili: Negative / Urobili: <2 mg/dL   Blood: x / Protein: 100 mg/dL / Nitrite: Negative   Leuk Esterase: Negative / RBC: 35 /HPF / WBC 4 /HPF   Sq Epi: x / Non Sq Epi: 1 /HPF / Bacteria: Negative        Culture - Blood (collected 21 @ 01:00)  Source: .Blood None  Preliminary Report (21 @ 08:01):    No growth to date.    Culture - Blood (collected 21 @ 22:09)  Source: .Blood Blood-Peripheral  Final Report (21 @ 08:01):    No Growth Final    Culture - Blood (collected 21 @ 22:09)  Source: .Blood Blood-Peripheral  Final Report (21 @ 08:01):    No Growth Final    Culture - Acid Fast - Other w/Smear (collected 21 @ 06:10)  Source: .Other None(R-FOOT)  Preliminary Report (21 @ 15:07):    Culture is being performed.    Culture - Surgical Swab (collected 21 @ 06:10)  Source: .Surgical Swab None  Final Report (21 @ 16:27):    No growth at 5 days    Culture - Acid Fast - Other w/Smear (collected 21 @ 06:10)  Source: .Other None (R-FOOT)  Preliminary Report (21 @ 15:07):    Culture is being performed.    Culture - Surgical Swab (collected 21 @ 06:10)  Source: .Surgical Swab None  Final Report (21 @ 17:24):    Numerous Methicillin resistant Staphylococcus aureus    Rare Corynebacterium species "Susceptibilities not performed"  Organism: Methicillin resistant Staphylococcus aureus (21 @ 17:24)  Organism: Methicillin resistant Staphylococcus aureus (21 @ 17:24)      -  Ampicillin/Sulbactam: R 16/8      -  Cefazolin: R 8      -  Clindamycin: S <=0.25      -  Daptomycin: S 0.5      -  Erythromycin: R >4      -  Gentamicin: S <=1 Should not be used as monotherapy      -  Linezolid: S 1      -  Oxacillin: R >2      -  Penicillin: R >8      -  RIF- Rifampin: S <=1 Should not be used as monotherapy      -  Tetra/Doxy: S <=1      -  Trimethoprim/Sulfamethoxazole: S <=0.5/9.5      -  Vancomycin: S 2      Method Type: DAVIDA    Culture - Urine (collected 21 @ 01:20)  Source: .Urine Clean Catch (Midstream)  Final Report (21 @ 08:11):    <10,000 CFU/mL Normal Urogenital Mary    Culture - Abscess with Gram Stain (collected 21 @ 19:51)  Source: .Abscess Right - Foot  Final Report (21 @ 09:25):    Moderate Methicillin resistant Staphylococcus aureus    Few Corynebacterium species "Susceptibilities not performed"  Organism: Methicillin resistant Staphylococcus aureus (21 @ 09:25)  Organism: Methicillin resistant Staphylococcus aureus (21 @ 09:25)      -  Ampicillin/Sulbactam: R 16/8      -  Cefazolin: R 8      -  Clindamycin: S <=0.25      -  Daptomycin: S 0.5      -  Erythromycin: R >4      -  Gentamicin: S <=1 Should not be used as monotherapy      -  Linezolid: S 2      -  Oxacillin: R >2      -  Penicillin: R >8      -  RIF- Rifampin: S <=1 Should not be used as monotherapy      -  Tetra/Doxy: S <=1      -  Trimethoprim/Sulfamethoxazole: S 1/19      -  Vancomycin: S 2      Method Type: DAVIDA    Culture - Other (collected 21 @ 18:32)  Source: .Other R foot DFU  Final Report (21 @ 15:44):    Moderate Methicillin resistant Staphylococcus aureus    Normal skin mary isolated  Organism: Methicillin resistant Staphylococcus aureus (21 @ 15:44)  Organism: Methicillin resistant Staphylococcus aureus (21 @ 15:44)      -  Ampicillin/Sulbactam: R 16/8      -  Cefazolin: R 16      -  Clindamycin: S <=0.25      -  Daptomycin: S 0.5      -  Erythromycin: R >4      -  Gentamicin: S <=1 Should not be used as monotherapy      -  Linezolid: S 2      -  Oxacillin: R >2      -  Penicillin: R >8      -  RIF- Rifampin: S <=1 Should not be used as monotherapy      -  Tetra/Doxy: S <=1      -  Trimethoprim/Sulfamethoxazole: S <=0.5/9.5      -  Vancomycin: S 2      Method Type: DAVIDA    Culture - Blood (collected 21 @ 18:28)  Source: .Blood Blood-Peripheral  Gram Stain (21 @ 18:30):    Growth in anaerobic bottle: Gram positive cocci in pairs    Growth in aerobic bottle: Gram positive cocci in pairs  Final Report (21 @ 15:45):    Growth in anaerobic bottle: Enterococcus faecalis    Growth in aerobic and anaerobic bottles: Methicillin resistant    Staphylococcus aureus    ***Blood Panel PCR results on this specimen are available    approximately 3 hours after the Gram stain result.***    Gram stain, PCR, and/or culture results may not always    correspond due to difference in methodologies.    ************************************************************    This PCR assay was performed by multiplex PCR. This    Assay tests for 66 bacterialand resistance gene targets.    Please refer to the [facility] test directory    at [email or web address] for details.  Organism: Blood Culture PCR  Enterococcus faecalis  Methicillin resistant Staphylococcus aureus (21 @ 15:45)  Organism: Methicillin resistant Staphylococcus aureus (21 @ 15:45)      -  Ampicillin/Sulbactam: R 16/8      -  Cefazolin: R 16      -  Clindamycin: S <=0.25      -  Daptomycin: S 0.5      -  Erythromycin: R >4      -  Gentamicin: S <=1 Should not be used as monotherapy      -  Linezolid: S 2      -  Oxacillin: R >2      -  Penicillin: R >8      -  RIF- Rifampin: S <=1 Should not be used as monotherapy      -  Tetra/Doxy: S <=1      -  Trimethoprim/Sulfamethoxazole: S <=0.5/9.5      -  Vancomycin: S 1      Method Type: DAVIDA  Organism: Enterococcus faecalis (21 @ 15:45)      -  Ampicillin: S <=2 Predicts results to ampicillin/sulbactam, amoxacillin-clavulanate and  piperacillin-tazobactam.      -  Gentamicin synergy: S      -  Vancomycin: S 1      Method Type: DAVIDA  Organism: Blood Culture PCR (21 @ 15:45)      -  Coagulase negative Staphylococcus: Detec      -  Enterococcus faecalis: Detec      Method Type: PCR    Culture - Blood (collected 21 @ 18:28)  Source: .Blood Blood-Peripheral  Gram Stain (21 @ 06:00):    Growth in aerobic bottle: Gram positive cocci in pairs    Growth in anaerobic bottle: Gram positive cocci in pairs  Final Report (21 @ 15:46):    Growth in aerobic and anaerobic bottles: Methicillin resistant    Staphylococcus aureus    See previous culture [id number]        Lactate, Blood: 0.8 mmol/L (21 @ 04:30)  Lactate, Blood: 1.1 mmol/L (21 @ 05:50)      INFECTIOUS DISEASES TESTING  COVID-19 PCR: NotDetec (21 @ 06:53)  MRSA PCR Result.: Positive (21 @ 09:26)  COVID-19 PCR: NotDetec (21 @ 18:28)      INFLAMMATORY MARKERS  Sedimentation Rate, Erythrocyte: 126 mm/Hr (21 @ 21:19)  C-Reactive Protein, Serum: 390 mg/L (21 @ 21:19)      RADIOLOGY & ADDITIONAL TESTS:  I have personally reviewed the last available Chest xray  CXR      CT      CARDIOLOGY TESTING  12 Lead ECG:   Ventricular Rate 104 BPM    Atrial Rate 105 BPM    P-R Interval 200 ms    QRS Duration 164 ms    Q-T Interval 392 ms    QTC Calculation(Bazett) 515 ms    P Axis 33 degrees    R Axis 263 degrees    T Axis 45 degrees    Diagnosis Line Sinus tachycardia  Right bundle branch block  Abnormal ECG    Confirmed by Cheko Lux ([id number]) on 2021 9:08:21 PM (21 @ 19:49)  12 Lead ECG:   Ventricular Rate 92 BPM    Atrial Rate 92 BPM    P-R Interval 208 ms    QRS Duration 162 ms    Q-T Interval 410 ms    QTC Calculation(Bazett) 507 ms    P Axis 38 degrees    R Axis 265 degrees    T Axis 49 degrees    Diagnosis Line Normal sinus rhythm  Right bundle branch block  Abnormal ECG    Confirmed by Cheko Lux ([id number]) on 2021 12:48:16 PM (21 @ 12:28)      MEDICATIONS  ALBUTerol    90 MICROgram(s) HFA Inhaler 1 Inhalation once  aspirin  chewable 81 Oral daily  chlorhexidine 0.12% Liquid 15 Oral Mucosa two times a day  chlorhexidine 4% Liquid 1 Topical <User Schedule>  DAPTOmycin IVPB 875 IV Intermittent every 48 hours  dextrose 40% Gel 15 Oral once  dextrose 5%. 1000 IV Continuous <Continuous>  dextrose 5%. 1000 IV Continuous <Continuous>  dextrose 50% Injectable 25 IV Push once  dextrose 50% Injectable 12.5 IV Push once  dextrose 50% Injectable 25 IV Push once  fentaNYL   Infusion. 0.5 IV Continuous <Continuous>  furosemide   Injectable 40 IV Push every 12 hours  glucagon  Injectable 1 IntraMuscular once  heparin   Injectable 5000 SubCutaneous every 8 hours  insulin lispro (ADMELOG) corrective regimen sliding scale  SubCutaneous three times a day before meals  insulin NPH human recombinant 15 SubCutaneous every 12 hours  metroNIDAZOLE  IVPB 500 IV Intermittent every 8 hours  mupirocin 2% Nasal 1 Nasal two times a day  norepinephrine Infusion 0.05 IV Continuous <Continuous>  pantoprazole    Tablet 40 Oral before breakfast  polyethylene glycol 3350 17 Oral daily  propofol Infusion 5.004 IV Continuous <Continuous>  senna 2 Oral at bedtime  tiotropium 18 MICROgram(s) Capsule 1 Inhalation once      WEIGHT  Weight (kg): 164.2 (21 @ 17:38)  Creatinine, Serum: 2.0 mg/dL (21 @ 04:50)      ANTIBIOTICS:  DAPTOmycin IVPB 875 milliGRAM(s) IV Intermittent every 48 hours  metroNIDAZOLE  IVPB 500 milliGRAM(s) IV Intermittent every 8 hours      All available historical records have been reviewed

## 2021-06-08 NOTE — PROGRESS NOTE ADULT - ASSESSMENT
· Assessment	  61 y/o M with PMH of DM, hypertension, dyslipidemia, sleep apnea, Charcot foot reconstruction with external fixation, super morbid obesity present for worsening right foot infection.  Pt reports he has had this foot ulcer at various stage of healing for 13 years. Patient has been admitted for IV antibiotics multiple times in the past for osteomyelitis of right foot. He has history of MRSA infection in foot. pt has baseline neuropathy in both feet so does not feel any pain.  Patient states his Infection has been gradually getting worse despite treatment with bactrim, he saw his podiatrists and he was sent in by podiatry for IV abx .    IMPRESSION;  #Fever Tm 103 with downtrending WBC normotensive ?drug fever ?Serotonin syndrome as on linezolid?    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   -5/28 BCx E fecalis, ORSA  -5/31 TTE no vegetation  -etiology of bacteremia was right foot abscess  -5/29 R foot : ORSA , Corynebacterium  -5/30 BCx NG  -6/4 BCx NG    RECOMMENDATIONS;  -f/u BCX  -TLC 5/31- remove if hemodynamic compromise, or BCX +   -Dapto 875mg q48h IV (8mg/kg of adjusted body wt)  -Check CPK today- if uptrending , then D/C dapto and dose Vanc 1.5 x1 and check AM level 6/9   -Creatine Kinase, Serum: 1367 U/L (06.08.21 @ 04:50)  -flagyl 500 mg iv q8h   -if worsening hemodynamic compromise, add caspo IV while BCX pending  -Pending OR, on hold ruling out bacteremia     Please call with any questions or send a message on [employer]  Spectra [phone]

## 2021-06-08 NOTE — PROGRESS NOTE ADULT - SUBJECTIVE AND OBJECTIVE BOX
FERNANDEZ GUZMAN             MRN-[id number]    CC: weakness     HPI:  Patient is a 59 y/o M with PMH of DM, hypertension, dyslipidemia, sleep apnea, Charcot foot reconstruction with external fixation, super morbid obesity present for worsening right foot infection.  Pt reports he has had this foot ulcer at various stage of healing for 13 years. Patient has been admitted for IV antibiotics multiple times in the past for osteomyelitis of right foot. He has history of MRSA infection in foot. pt has baseline neuropathy in both feet so does not feel any pain.  Patient states his Infection has been gradually getting worse despite treatment with bactrim, he saw his podiatrists and he was sent in by podiatry for IV abx . No fever, chills, cp, sob, abd pain, nausea, vomiting, dysuria, calf pain.     In ED  T(C): 37.3 (05-29-21 @ 01:31), Max: 38 (05-28-21 @ 20:27)  HR: 100 (05-29-21 @ 01:23) (99 - 118)  BP: 119/59 (05-29-21 @ 01:23) (119/59 - 139/69)  RR: 18 (05-29-21 @ 01:23) (17 - 18)  SpO2: 96% (05-29-21 @ 01:23) (86% - 100%)  there is a Right foot ulcer on the lateral aspect of the plantar surface measuring 8.5-3cm. Ulcer is surrounded by macerated tissue. Serous drainage from wound. Kurlex and sponge dressing in place, clean, dry and intact. Patient was I&D'd bedside by podiatry in the ed. purulent drainage was evacuated and sent to the lab for culture. Patient is scheduled for OR with podiatry in the AM.   (29 May 2021 02:29)      SUBJECTIVE: Patient seen and examined at bedside. He is intubated but wakes up and follows some commands.   OR on hold due to fever.    ROS:  UNABLE TO OBTAIN  due to: intubated     PEx:  60y            General: vented sedated; NAD; morbidly obese  HEENT:  NCAT   CVS:NSR; +edema  Resp: Unlabored Non tachypneic No increased WOB  GI:  obese  :  Lovelace   Musc: No C/C/E    Neuro: sedated     Last BM: no stool documented;    ALLERGIES:  NKDA     OPIATE NAÏVE (Y/N): n    MEDICATIONS: REVIEWED  MEDICATIONS  (STANDING):  ALBUTerol    90 MICROgram(s) HFA Inhaler 1 Puff(s) Inhalation once  aspirin  chewable 81 milliGRAM(s) Oral daily  chlorhexidine 0.12% Liquid 15 milliLiter(s) Oral Mucosa two times a day  chlorhexidine 4% Liquid 1 Application(s) Topical <User Schedule>  DAPTOmycin IVPB 875 milliGRAM(s) IV Intermittent every 48 hours  dextrose 40% Gel 15 Gram(s) Oral once  dextrose 5%. 1000 milliLiter(s) (50 mL/Hr) IV Continuous <Continuous>  dextrose 5%. 1000 milliLiter(s) (100 mL/Hr) IV Continuous <Continuous>  dextrose 50% Injectable 25 Gram(s) IV Push once  dextrose 50% Injectable 12.5 Gram(s) IV Push once  dextrose 50% Injectable 25 Gram(s) IV Push once  fentaNYL   Infusion. 0.5 MICROgram(s)/kG/Hr (8.21 mL/Hr) IV Continuous <Continuous>  furosemide   Injectable 40 milliGRAM(s) IV Push every 12 hours  glucagon  Injectable 1 milliGRAM(s) IntraMuscular once  heparin   Injectable 5000 Unit(s) SubCutaneous every 8 hours  insulin lispro (ADMELOG) corrective regimen sliding scale   SubCutaneous three times a day before meals  insulin NPH human recombinant 15 Unit(s) SubCutaneous every 12 hours  metroNIDAZOLE  IVPB 500 milliGRAM(s) IV Intermittent every 8 hours  mupirocin 2% Nasal 1 Application(s) Nasal two times a day  norepinephrine Infusion 0.05 MICROgram(s)/kG/Min (7.7 mL/Hr) IV Continuous <Continuous>  pantoprazole    Tablet 40 milliGRAM(s) Oral before breakfast  polyethylene glycol 3350 17 Gram(s) Oral daily  propofol Infusion 5.004 MICROgram(s)/kG/Min (4.93 mL/Hr) IV Continuous <Continuous>  senna 2 Tablet(s) Oral at bedtime  tiotropium 18 MICROgram(s) Capsule 1 Capsule(s) Inhalation once    MEDICATIONS  (PRN):  acetaminophen   Tablet .. 650 milliGRAM(s) Oral every 6 hours PRN Temp greater or equal to 38C (100.4F), Mild Pain (1 - 3)      LABS: REVIEWED  CBC:                        8.0    14.81 )-----------( 220      ( 08 Jun 2021 04:50 )             26.6     CMP:    06-08    144  |  109  |  42<H>  ----------------------------<  211<H>  4.1   |  29  |  2.0<H>    Ca    7.7<L>      08 Jun 2021 04:50  Phos  2.6     06-08  Mg     2.2     06-08    TPro  7.8  /  Alb  2.2<L>  /  TBili  0.3  /  DBili  x   /  AST  51<H>  /  ALT  18  /  AlkPhos  122<H>  06-08  Albumin, Serum: 2.2 g/dL (06-08-21 @ 04:50)      ADVANCED DIRECTIVES:            FULL CODE             DECISION MAKER: cristiane Truong  LEGAL SURROGATE: Adan Truong     PSYCHOSOCIAL-SPIRITUAL ASSESSMENT:       Reviewed       Care plan unchanged           GOALS OF CARE DISCUSSION          Pal care introduced 6/1 and remains available PRN       CURRENT DISPO PLAN:     WILL REMAIN IN HOSPITAL    REFERRALS	        Palliative Med        Unit SW/Case Mgmt

## 2021-06-08 NOTE — PROGRESS NOTE ADULT - ASSESSMENT
61 y/o M with PMH of DM, hypertension, dyslipidemia, sleep apnea, Charcot foot reconstruction with external fixation, morbid obesity presented for worsening right foot infection.     # DFU with Hx of MRSA infection  # Septic shock   # Acute hypoxic respiratory failure secondary to septic shock  # Toxic metabolic encephalopathy   # Fever overnight 103F  - s/p intubation 5/31   - bacteremia from right foot abscess   - septic on admission, WBC 12,  + lactate 2.3. s/p cefepime flagyl and vanc in the ED  - Podiatry on board, f/u   - S/p disarticulation right knee 5/31 w/ vascular   - Cx (+) for Staph + enterococcus   - vascular following:   if improves by early next week - will take him for AKA on Tuesday 6/8/21  - ID following, f/u recs:  -linezolid 600 mg iv q12h  -Ceftaroline 200 mg iv q12h  -flagyl 500 mg iv q8h   - f/u repeat bcx cultures   - Echo 3/31: EF 55-60%, G2DD   - f/u ID to see if they want to add gram (-) coverage     #?Code stroke for possible CVA   - ptnt was code stroke on floors on 3/30 for AMS and confusion upgraded to CCU   - metabolic encephalopathy secondary to sepsis more likely than stroke  -  CT head neg for acute pathology, CTA w/ moderate stenoses at the junction of the precavernous and cavernous segments of both internal carotid arteries  - neuro recs for brain MR, can get repeat CT head when stable    # NSTEMI  - secondary to sepsis induced demand ischemia  - less likely true ACS  -monitor     #CANDY on CKD III /  rule out ATN - improving   - secondary to sepsis induced ATN, continue supportive care, monitor for now.   - creatinine 3.0 on admission, baseline around 1.4-1.8   - s/p CVVHD 6/1 and 6/2, holding CVVHD for now ptnt w/ good urine output   - nephro following    #anemia of chronic disease   - transfuse Hb < 7  - monitor   - s/p 3 units in total since admission  - 9.6 today    #Diabetes Mellitus  -on insulin regimen   - a1c 8.9   -Monitor      #Hypertension  -holding oral meds     #Dyslipidemia  -c/w home meds    #MATA/ OHS    #morbid obesity    Diet: npo w/ tube feeds   DVT ppx: heparin subq TID  GI ppx: protonix   Dispo: acute    called Gentry Callahan, no answer.    59 y/o M with PMH of DM, hypertension, dyslipidemia, sleep apnea, Charcot foot reconstruction with external fixation, morbid obesity presented for worsening right foot infection.     # DFU with Hx of MRSA infection  # Septic shock   # Acute hypoxic respiratory failure secondary to septic shock  # Toxic metabolic encephalopathy   # Fever overnight 101.5F, currently afebrile   - s/p intubation 5/31   - bacteremia from right foot abscess   - septic on admission, WBC 12,  + lactate 2.3. s/p cefepime flagyl and vanc in the ED  - Podiatry on board, f/u   - S/p disarticulation right knee 5/31 w/ vascular   - Cx (+) for Staph + enterococcus   - Echo 3/31: EF 55-60%, G2DD   - vascular following:   - surg postponed because of fevers, will schedule when more stable.  - ID following, f/u recs:  -d/c linezolid and Ceftaroline   -c/w flagyl 500 mg iv q8h   - start with dapto 875mg q12   - f/u repeat bcx cultures   - CPK >1300, repeat, if uptranding, d/c dapto and give 1x dose of vanc, repeat in the am  - if worsening hemodynamic compromise, add caspo IV  - currently on propofol, fent, and levo    #?Code stroke for possible CVA   - ptnt was code stroke on floors on 3/30 for AMS and confusion upgraded to CCU   - metabolic encephalopathy secondary to sepsis more likely than stroke  -  CT head neg for acute pathology, CTA w/ moderate stenoses at the junction of the precavernous and cavernous segments of both internal carotid arteries  - neuro recs for brain MR, can get repeat CT head when stable, will not fit in MR machine   - neuro rec LP, as per pulm, as pt is following commands now, little clinical benefit to preforming as of now.     # NSTEMI  - secondary to sepsis induced demand ischemia  - less likely true ACS  -monitor     #CANDY on CKD III /  rule out ATN - improving   - secondary to sepsis induced ATN, continue supportive care, monitor for now.   - creatinine 3.0 on admission, baseline around 1.4-1.8   - s/p CVVHD 6/1 and 6/2, holding CVVHD for now ptnt w/ good urine output   - nephro following    #anemia of chronic disease   - transfuse Hb < 7  - monitor   - s/p 3 units in total since admission  - stable today    #Diabetes Mellitus  -on insulin regimen   - a1c 8.9   -Monitor      #Hypertension  -holding oral meds     #Dyslipidemia  -c/w home meds    #MATA/ OHS    #morbid obesity    Diet: npo w/ tube feeds   DVT ppx: heparin subq TID  GI ppx: protonix   Dispo: acute    called Gentry Callahan, spoke to her on phone and updated her.

## 2021-06-08 NOTE — PROGRESS NOTE ADULT - PROBLEM SELECTOR PROBLEM 3
CANDY (acute kidney injury)

## 2021-06-08 NOTE — PROGRESS NOTE ADULT - PROBLEM SELECTOR PLAN 2
continue ventilator per ICU management  Continue fentanyl gtt
continue ventilator/ICU management
continue ventilator/ICU management  Continue fentanyl drip.
continue ventilator per ICU management
continue ventilator per ICU management  Continue fentanyl gtt

## 2021-06-08 NOTE — PROGRESS NOTE ADULT - PROBLEM SELECTOR PLAN 3
continue care per nephrologist/ICU team.
continue care per nephrologist/ICU team.  Off CVVHD for now
continue care per nephrologist/ICU team.
continue care per nephrologist/ICU team.
continue care per nephrologist/ICU team.  Off CVVHD for now

## 2021-06-08 NOTE — PROGRESS NOTE ADULT - SUBJECTIVE AND OBJECTIVE BOX
Patient is a 60y old  Male who presents with a chief complaint of dfu (2021 08:14)      INTERVAL HPI/OVERNIGHT EVENTS:  ICU Vital Signs Last 24 Hrs  T(C): 38.1 (2021 08:00), Max: 38.6 (2021 00:00)  T(F): 100.5 (2021 08:00), Max: 101.5 (2021 00:00)  HR: 82 (2021 10:00) (74 - 96)  BP: --  BP(mean): --  ABP: 96/42 (2021 10:00) (92/42 - 156/62)  ABP(mean): 58 (2021 10:00) (58 - 92)  RR: 18 (2021 10:00) (3 - 28)  SpO2: 97% (2021 10:00) (97% - 100%)    I&O's Summary    2021 07:01  -  2021 07:00  --------------------------------------------------------  IN: 2752 mL / OUT: 6070 mL / NET: -3318 mL    2021 07:01  -  2021 10:58  --------------------------------------------------------  IN: 260 mL / OUT: 700 mL / NET: -440 mL      Mode: AC/ CMV (Assist Control/ Continuous Mandatory Ventilation)  RR (machine): 18  TV (machine): 480  FiO2: 30  PEEP: 5  ITime: 1  MAP: 16  PIP: 28      LABS:                        8.0    14.81 )-----------( 220      ( 2021 04:50 )             26.6     06-08    144  |  109  |  42<H>  ----------------------------<  211<H>  4.1   |  29  |  2.0<H>    Ca    7.7<L>      2021 04:50  Phos  2.6       Mg     2.2         TPro  7.8  /  Alb  2.2<L>  /  TBili  0.3  /  DBili  x   /  AST  51<H>  /  ALT  18  /  AlkPhos  122<H>        Urinalysis Basic - ( 2021 12:43 )    Color: Yellow / Appearance: Slightly Turbid / S.020 / pH: x  Gluc: x / Ketone: Negative  / Bili: Negative / Urobili: <2 mg/dL   Blood: x / Protein: 100 mg/dL / Nitrite: Negative   Leuk Esterase: Negative / RBC: 35 /HPF / WBC 4 /HPF   Sq Epi: x / Non Sq Epi: 1 /HPF / Bacteria: Negative      CAPILLARY BLOOD GLUCOSE      POCT Blood Glucose.: 214 mg/dL (2021 05:00)  POCT Blood Glucose.: 169 mg/dL (2021 17:05)  POCT Blood Glucose.: 206 mg/dL (2021 11:43)    ABG - ( 2021 02:33 )  pH, Arterial: 7.42  pH, Blood: x     /  pCO2: 50    /  pO2: 85    / HCO3: 32    / Base Excess: 6.8   /  SaO2: 96                  RADIOLOGY & ADDITIONAL TESTS:    Consultant(s) Notes Reviewed:  [x ] YES  [ ] NO    MEDICATIONS  (STANDING):  ALBUTerol    90 MICROgram(s) HFA Inhaler 1 Puff(s) Inhalation once  aspirin  chewable 81 milliGRAM(s) Oral daily  chlorhexidine 0.12% Liquid 15 milliLiter(s) Oral Mucosa two times a day  chlorhexidine 4% Liquid 1 Application(s) Topical <User Schedule>  DAPTOmycin IVPB 875 milliGRAM(s) IV Intermittent every 48 hours  dextrose 40% Gel 15 Gram(s) Oral once  dextrose 5%. 1000 milliLiter(s) (50 mL/Hr) IV Continuous <Continuous>  dextrose 5%. 1000 milliLiter(s) (100 mL/Hr) IV Continuous <Continuous>  dextrose 50% Injectable 25 Gram(s) IV Push once  dextrose 50% Injectable 12.5 Gram(s) IV Push once  dextrose 50% Injectable 25 Gram(s) IV Push once  fentaNYL   Infusion. 0.5 MICROgram(s)/kG/Hr (8.21 mL/Hr) IV Continuous <Continuous>  furosemide   Injectable 40 milliGRAM(s) IV Push every 12 hours  glucagon  Injectable 1 milliGRAM(s) IntraMuscular once  heparin   Injectable 5000 Unit(s) SubCutaneous every 8 hours  insulin lispro (ADMELOG) corrective regimen sliding scale   SubCutaneous three times a day before meals  insulin NPH human recombinant 15 Unit(s) SubCutaneous every 12 hours  metroNIDAZOLE  IVPB 500 milliGRAM(s) IV Intermittent every 8 hours  mupirocin 2% Nasal 1 Application(s) Nasal two times a day  norepinephrine Infusion 0.05 MICROgram(s)/kG/Min (7.7 mL/Hr) IV Continuous <Continuous>  pantoprazole    Tablet 40 milliGRAM(s) Oral before breakfast  polyethylene glycol 3350 17 Gram(s) Oral daily  propofol Infusion 5.004 MICROgram(s)/kG/Min (4.93 mL/Hr) IV Continuous <Continuous>  senna 2 Tablet(s) Oral at bedtime  tiotropium 18 MICROgram(s) Capsule 1 Capsule(s) Inhalation once    MEDICATIONS  (PRN):  acetaminophen   Tablet .. 650 milliGRAM(s) Oral every 6 hours PRN Temp greater or equal to 38C (100.4F), Mild Pain (1 - 3)      PHYSICAL EXAM:  GENERAL: well built, well nourished  HEAD:  Atraumatic, Normocephalic  EYES: EOMI, PERRLA, conjunctiva and sclera clear  ENT: No tonsillar erythema, exudates, or enlargement; Moist mucous membranes, Good dentition, No lesions  NECK: Supple, No JVD, Normal thyroid, no enlarged nodes  NERVOUS SYSTEM:  Alert & Oriented X3, Good concentration; Motor Strength 5/5 B/L upper and lower extremities; DTRs 2+ intact and symmetric, sensory intact  CHEST/LUNG: B/L good air entry; No rales, rhonchi, or wheezing  HEART: S1S2 normal, no S3, Regular rate and rhythm; No murmurs, rubs, or gallops  ABDOMEN: Soft, Nontender, Nondistended; Bowel sounds present  EXTREMITIES:  2+ Peripheral Pulses, No clubbing, cyanosis, or edema  LYMPH: No lymphadenopathy noted  SKIN: No rashes or lesions    Care Discussed with Consultants/Other Providers [ x] YES  [ ] NO Patient is a 60y old  Male who presents with a chief complaint of dfu (2021 08:14)      INTERVAL HPI/OVERNIGHT EVENTS:  ICU Vital Signs Last 24 Hrs  T(C): 38.1 (2021 08:00), Max: 38.6 (2021 00:00)  T(F): 100.5 (2021 08:00), Max: 101.5 (2021 00:00)  HR: 82 (2021 10:00) (74 - 96)  BP: --  BP(mean): --  ABP: 96/42 (2021 10:00) (92/42 - 156/62)  ABP(mean): 58 (2021 10:00) (58 - 92)  RR: 18 (2021 10:00) (3 - 28)  SpO2: 97% (2021 10:00) (97% - 100%)    I&O's Summary    2021 07:01  -  2021 07:00  --------------------------------------------------------  IN: 2752 mL / OUT: 6070 mL / NET: -3318 mL    2021 07:01  -  2021 10:58  --------------------------------------------------------  IN: 260 mL / OUT: 700 mL / NET: -440 mL      Mode: AC/ CMV (Assist Control/ Continuous Mandatory Ventilation)  RR (machine): 18  TV (machine): 480  FiO2: 30  PEEP: 5  ITime: 1  MAP: 16  PIP: 28      LABS:                        8.0    14.81 )-----------( 220      ( 2021 04:50 )             26.6     06-08    144  |  109  |  42<H>  ----------------------------<  211<H>  4.1   |  29  |  2.0<H>    Ca    7.7<L>      2021 04:50  Phos  2.6       Mg     2.2         TPro  7.8  /  Alb  2.2<L>  /  TBili  0.3  /  DBili  x   /  AST  51<H>  /  ALT  18  /  AlkPhos  122<H>        Urinalysis Basic - ( 2021 12:43 )    Color: Yellow / Appearance: Slightly Turbid / S.020 / pH: x  Gluc: x / Ketone: Negative  / Bili: Negative / Urobili: <2 mg/dL   Blood: x / Protein: 100 mg/dL / Nitrite: Negative   Leuk Esterase: Negative / RBC: 35 /HPF / WBC 4 /HPF   Sq Epi: x / Non Sq Epi: 1 /HPF / Bacteria: Negative      CAPILLARY BLOOD GLUCOSE      POCT Blood Glucose.: 214 mg/dL (2021 05:00)  POCT Blood Glucose.: 169 mg/dL (2021 17:05)  POCT Blood Glucose.: 206 mg/dL (2021 11:43)    ABG - ( 2021 02:33 )  pH, Arterial: 7.42  pH, Blood: x     /  pCO2: 50    /  pO2: 85    / HCO3: 32    / Base Excess: 6.8   /  SaO2: 96                  RADIOLOGY & ADDITIONAL TESTS:    Consultant(s) Notes Reviewed:  [x ] YES  [ ] NO    MEDICATIONS  (STANDING):  ALBUTerol    90 MICROgram(s) HFA Inhaler 1 Puff(s) Inhalation once  aspirin  chewable 81 milliGRAM(s) Oral daily  chlorhexidine 0.12% Liquid 15 milliLiter(s) Oral Mucosa two times a day  chlorhexidine 4% Liquid 1 Application(s) Topical <User Schedule>  DAPTOmycin IVPB 875 milliGRAM(s) IV Intermittent every 48 hours  dextrose 40% Gel 15 Gram(s) Oral once  dextrose 5%. 1000 milliLiter(s) (50 mL/Hr) IV Continuous <Continuous>  dextrose 5%. 1000 milliLiter(s) (100 mL/Hr) IV Continuous <Continuous>  dextrose 50% Injectable 25 Gram(s) IV Push once  dextrose 50% Injectable 12.5 Gram(s) IV Push once  dextrose 50% Injectable 25 Gram(s) IV Push once  fentaNYL   Infusion. 0.5 MICROgram(s)/kG/Hr (8.21 mL/Hr) IV Continuous <Continuous>  furosemide   Injectable 40 milliGRAM(s) IV Push every 12 hours  glucagon  Injectable 1 milliGRAM(s) IntraMuscular once  heparin   Injectable 5000 Unit(s) SubCutaneous every 8 hours  insulin lispro (ADMELOG) corrective regimen sliding scale   SubCutaneous three times a day before meals  insulin NPH human recombinant 15 Unit(s) SubCutaneous every 12 hours  metroNIDAZOLE  IVPB 500 milliGRAM(s) IV Intermittent every 8 hours  mupirocin 2% Nasal 1 Application(s) Nasal two times a day  norepinephrine Infusion 0.05 MICROgram(s)/kG/Min (7.7 mL/Hr) IV Continuous <Continuous>  pantoprazole    Tablet 40 milliGRAM(s) Oral before breakfast  polyethylene glycol 3350 17 Gram(s) Oral daily  propofol Infusion 5.004 MICROgram(s)/kG/Min (4.93 mL/Hr) IV Continuous <Continuous>  senna 2 Tablet(s) Oral at bedtime  tiotropium 18 MICROgram(s) Capsule 1 Capsule(s) Inhalation once    MEDICATIONS  (PRN):  acetaminophen   Tablet .. 650 milliGRAM(s) Oral every 6 hours PRN Temp greater or equal to 38C (100.4F), Mild Pain (1 - 3)      PHYSICAL EXAM:  GENERAL: intubated, sedated on prop and fent  NERVOUS SYSTEM:  intuabted, opens eyes to external stimuli   CHEST/LUNG: breath sounds bl   HEART: S1S2 normal, no S3, Regular rate and rhythm; No murmurs, rubs, or gallops  ABDOMEN: Soft, obese, distended   EXTREMITIES:  r BKA, Lt no edema   LYMPH: No lymphadenopathy noted  SKIN: No rashes or lesions      Care Discussed with Consultants/Other Providers [ x] YES  [ ] NO

## 2021-06-08 NOTE — PROGRESS NOTE ADULT - SUBJECTIVE AND OBJECTIVE BOX
Nephrology progress note    Patient was seen and examined, events over the last 24 h noted .  remains off CVVH    Allergies:  No Known Allergies  statins (Other)    Hospital Medications:   MEDICATIONS  (STANDING):  ALBUTerol    90 MICROgram(s) HFA Inhaler 1 Puff(s) Inhalation once  aspirin  chewable 81 milliGRAM(s) Oral daily  chlorhexidine 0.12% Liquid 15 milliLiter(s) Oral Mucosa two times a day  chlorhexidine 4% Liquid 1 Application(s) Topical <User Schedule>  DAPTOmycin IVPB 875 milliGRAM(s) IV Intermittent every 48 hours  dextrose 40% Gel 15 Gram(s) Oral once  dextrose 5%. 1000 milliLiter(s) (50 mL/Hr) IV Continuous <Continuous>  dextrose 5%. 1000 milliLiter(s) (100 mL/Hr) IV Continuous <Continuous>  dextrose 50% Injectable 25 Gram(s) IV Push once  dextrose 50% Injectable 12.5 Gram(s) IV Push once  dextrose 50% Injectable 25 Gram(s) IV Push once  fentaNYL   Infusion. 0.5 MICROgram(s)/kG/Hr (8.21 mL/Hr) IV Continuous <Continuous>  furosemide   Injectable 40 milliGRAM(s) IV Push every 12 hours  glucagon  Injectable 1 milliGRAM(s) IntraMuscular once  heparin   Injectable 5000 Unit(s) SubCutaneous every 8 hours  insulin lispro (ADMELOG) corrective regimen sliding scale   SubCutaneous three times a day before meals  insulin NPH human recombinant 15 Unit(s) SubCutaneous every 12 hours  metroNIDAZOLE  IVPB 500 milliGRAM(s) IV Intermittent every 8 hours  mupirocin 2% Nasal 1 Application(s) Nasal two times a day  norepinephrine Infusion 0.05 MICROgram(s)/kG/Min (7.7 mL/Hr) IV Continuous <Continuous>  pantoprazole    Tablet 40 milliGRAM(s) Oral before breakfast  polyethylene glycol 3350 17 Gram(s) Oral daily  propofol Infusion 5.004 MICROgram(s)/kG/Min (4.93 mL/Hr) IV Continuous <Continuous>  senna 2 Tablet(s) Oral at bedtime  tiotropium 18 MICROgram(s) Capsule 1 Capsule(s) Inhalation once        VITALS:  T(F): 100.5 (21 @ 08:00), Max: 101.5 (21 @ 00:00)  HR: 82 (21 @ 10:00)  BP: --  RR: 18 (21 @ 10:00)  SpO2: 97% (21 @ 10:00)  Wt(kg): --     @ 07:01  -   @ 07:00  --------------------------------------------------------  IN: 1927.4 mL / OUT: 4950 mL / NET: -3022.6 mL     @ 07:01  -   @ 07:00  --------------------------------------------------------  IN: 2752 mL / OUT: 6070 mL / NET: -3318 mL     @ 07:  -   @ 12:49  --------------------------------------------------------  IN: 260 mL / OUT: 700 mL / NET: -440 mL      Height (cm): 327.7 ( @ 17:38)  Weight (kg): 164.2 ( @ 17:38)  BMI (kg/m2): 15.3 ( @ 17:38)  BSA (m2): 4.18 ( @ 17:38)    PHYSICAL EXAM:  Constitutional: intubated on MV   Neck: No JVD  Respiratory: CTAB,  Cardiovascular: S1, S2, RRR  Gastrointestinal: BS+, soft, NT/ND  Extremities: No cyanosis or clubbing. right BKA   : positive terrazas    Skin: No rashes      LABS:      144  |  109  |  42<H>  ----------------------------<  211<H>  4.1   |  29  |  2.0<H>    Ca    7.7<L>      2021 04:50  Phos  2.6     -  Mg     2.2     -08    TPro  7.8  /  Alb  2.2<L>  /  TBili  0.3  /  DBili      /  AST  51<H>  /  ALT  18  /  AlkPhos  122<H>                            8.0    14.81 )-----------( 220      ( 2021 04:50 )             26.6       Urine Studies:  Urinalysis Basic - ( 2021 12:43 )    Color: Yellow / Appearance: Slightly Turbid / S.020 / pH:   Gluc:  / Ketone: Negative  / Bili: Negative / Urobili: <2 mg/dL   Blood:  / Protein: 100 mg/dL / Nitrite: Negative   Leuk Esterase: Negative / RBC: 35 /HPF / WBC 4 /HPF   Sq Epi:  / Non Sq Epi: 1 /HPF / Bacteria: Negative        RADIOLOGY & ADDITIONAL STUDIES:

## 2021-06-08 NOTE — PROCEDURE NOTE - NSPOSTPRCRAD_GEN_A_CORE
no pneumothorax/post-procedure radiography performed
central line located in the superior vena cava/no pneumothorax/post-procedure radiography performed
central line located in the
post-procedure radiography performed

## 2021-06-08 NOTE — PROGRESS NOTE ADULT - PROBLEM SELECTOR PROBLEM 2
Respiratory failure requiring intubation

## 2021-06-09 LAB
ALBUMIN SERPL ELPH-MCNC: 2.4 G/DL — LOW (ref 3.5–5.2)
ALP SERPL-CCNC: 135 U/L — HIGH (ref 30–115)
ALT FLD-CCNC: 22 U/L — SIGNIFICANT CHANGE UP (ref 0–41)
ANION GAP SERPL CALC-SCNC: 9 MMOL/L — SIGNIFICANT CHANGE UP (ref 7–14)
AST SERPL-CCNC: 57 U/L — HIGH (ref 0–41)
BASOPHILS # BLD AUTO: 0.03 K/UL — SIGNIFICANT CHANGE UP (ref 0–0.2)
BASOPHILS NFR BLD AUTO: 0.2 % — SIGNIFICANT CHANGE UP (ref 0–1)
BILIRUB SERPL-MCNC: 0.3 MG/DL — SIGNIFICANT CHANGE UP (ref 0.2–1.2)
BUN SERPL-MCNC: 55 MG/DL — HIGH (ref 10–20)
CALCIUM SERPL-MCNC: 7.9 MG/DL — LOW (ref 8.5–10.1)
CHLORIDE SERPL-SCNC: 106 MMOL/L — SIGNIFICANT CHANGE UP (ref 98–110)
CK SERPL-CCNC: 658 U/L — HIGH (ref 0–225)
CO2 SERPL-SCNC: 29 MMOL/L — SIGNIFICANT CHANGE UP (ref 17–32)
CREAT SERPL-MCNC: 2.3 MG/DL — HIGH (ref 0.7–1.5)
CULTURE RESULTS: SIGNIFICANT CHANGE UP
EOSINOPHIL # BLD AUTO: 0.29 K/UL — SIGNIFICANT CHANGE UP (ref 0–0.7)
EOSINOPHIL NFR BLD AUTO: 2.1 % — SIGNIFICANT CHANGE UP (ref 0–8)
GAS PNL BLDA: SIGNIFICANT CHANGE UP
GLUCOSE BLDC GLUCOMTR-MCNC: 223 MG/DL — HIGH (ref 70–99)
GLUCOSE BLDC GLUCOMTR-MCNC: 239 MG/DL — HIGH (ref 70–99)
GLUCOSE BLDC GLUCOMTR-MCNC: 260 MG/DL — HIGH (ref 70–99)
GLUCOSE SERPL-MCNC: 230 MG/DL — HIGH (ref 70–99)
HCT VFR BLD CALC: 28.1 % — LOW (ref 42–52)
HGB BLD-MCNC: 8.4 G/DL — LOW (ref 14–18)
IMM GRANULOCYTES NFR BLD AUTO: 1.7 % — HIGH (ref 0.1–0.3)
LYMPHOCYTES # BLD AUTO: 1.83 K/UL — SIGNIFICANT CHANGE UP (ref 1.2–3.4)
LYMPHOCYTES # BLD AUTO: 13.5 % — LOW (ref 20.5–51.1)
MAGNESIUM SERPL-MCNC: 2.3 MG/DL — SIGNIFICANT CHANGE UP (ref 1.8–2.4)
MCHC RBC-ENTMCNC: 27.4 PG — SIGNIFICANT CHANGE UP (ref 27–31)
MCHC RBC-ENTMCNC: 29.9 G/DL — LOW (ref 32–37)
MCV RBC AUTO: 91.5 FL — SIGNIFICANT CHANGE UP (ref 80–94)
MONOCYTES # BLD AUTO: 1.61 K/UL — HIGH (ref 0.1–0.6)
MONOCYTES NFR BLD AUTO: 11.9 % — HIGH (ref 1.7–9.3)
NEUTROPHILS # BLD AUTO: 9.58 K/UL — HIGH (ref 1.4–6.5)
NEUTROPHILS NFR BLD AUTO: 70.6 % — SIGNIFICANT CHANGE UP (ref 42.2–75.2)
NRBC # BLD: 0 /100 WBCS — SIGNIFICANT CHANGE UP (ref 0–0)
PHOSPHATE SERPL-MCNC: 2.3 MG/DL — SIGNIFICANT CHANGE UP (ref 2.1–4.9)
PLATELET # BLD AUTO: 269 K/UL — SIGNIFICANT CHANGE UP (ref 130–400)
POTASSIUM SERPL-MCNC: 4 MMOL/L — SIGNIFICANT CHANGE UP (ref 3.5–5)
POTASSIUM SERPL-SCNC: 4 MMOL/L — SIGNIFICANT CHANGE UP (ref 3.5–5)
PROT SERPL-MCNC: 8.1 G/DL — HIGH (ref 6–8)
RBC # BLD: 3.07 M/UL — LOW (ref 4.7–6.1)
RBC # FLD: 18.4 % — HIGH (ref 11.5–14.5)
SODIUM SERPL-SCNC: 144 MMOL/L — SIGNIFICANT CHANGE UP (ref 135–146)
SPECIMEN SOURCE: SIGNIFICANT CHANGE UP
WBC # BLD: 13.57 K/UL — HIGH (ref 4.8–10.8)
WBC # FLD AUTO: 13.57 K/UL — HIGH (ref 4.8–10.8)

## 2021-06-09 PROCEDURE — 99291 CRITICAL CARE FIRST HOUR: CPT

## 2021-06-09 PROCEDURE — 71045 X-RAY EXAM CHEST 1 VIEW: CPT | Mod: 26

## 2021-06-09 RX ORDER — INSULIN LISPRO 100/ML
VIAL (ML) SUBCUTANEOUS EVERY 6 HOURS
Refills: 0 | Status: DISCONTINUED | OUTPATIENT
Start: 2021-06-09 | End: 2021-06-11

## 2021-06-09 RX ORDER — FUROSEMIDE 40 MG
40 TABLET ORAL EVERY 24 HOURS
Refills: 0 | Status: DISCONTINUED | OUTPATIENT
Start: 2021-06-09 | End: 2021-06-10

## 2021-06-09 RX ORDER — CASPOFUNGIN ACETATE 7 MG/ML
50 INJECTION, POWDER, LYOPHILIZED, FOR SOLUTION INTRAVENOUS EVERY 24 HOURS
Refills: 0 | Status: DISCONTINUED | OUTPATIENT
Start: 2021-06-10 | End: 2021-06-11

## 2021-06-09 RX ORDER — CASPOFUNGIN ACETATE 7 MG/ML
70 INJECTION, POWDER, LYOPHILIZED, FOR SOLUTION INTRAVENOUS ONCE
Refills: 0 | Status: COMPLETED | OUTPATIENT
Start: 2021-06-09 | End: 2021-06-09

## 2021-06-09 RX ORDER — CASPOFUNGIN ACETATE 7 MG/ML
INJECTION, POWDER, LYOPHILIZED, FOR SOLUTION INTRAVENOUS
Refills: 0 | Status: DISCONTINUED | OUTPATIENT
Start: 2021-06-09 | End: 2021-06-11

## 2021-06-09 RX ORDER — MEROPENEM 1 G/30ML
1000 INJECTION INTRAVENOUS EVERY 12 HOURS
Refills: 0 | Status: DISCONTINUED | OUTPATIENT
Start: 2021-06-09 | End: 2021-06-11

## 2021-06-09 RX ADMIN — Medication 650 MILLIGRAM(S): at 17:13

## 2021-06-09 RX ADMIN — Medication 100 MILLIGRAM(S): at 05:12

## 2021-06-09 RX ADMIN — CASPOFUNGIN ACETATE 70 MILLIGRAM(S): 7 INJECTION, POWDER, LYOPHILIZED, FOR SOLUTION INTRAVENOUS at 15:39

## 2021-06-09 RX ADMIN — Medication 81 MILLIGRAM(S): at 12:17

## 2021-06-09 RX ADMIN — Medication 650 MILLIGRAM(S): at 16:37

## 2021-06-09 RX ADMIN — HUMAN INSULIN 15 UNIT(S): 100 INJECTION, SUSPENSION SUBCUTANEOUS at 17:12

## 2021-06-09 RX ADMIN — FENTANYL CITRATE 8.21 MICROGRAM(S)/KG/HR: 50 INJECTION INTRAVENOUS at 08:21

## 2021-06-09 RX ADMIN — PANTOPRAZOLE SODIUM 40 MILLIGRAM(S): 20 TABLET, DELAYED RELEASE ORAL at 06:10

## 2021-06-09 RX ADMIN — NYSTATIN CREAM 1 APPLICATION(S): 100000 CREAM TOPICAL at 17:09

## 2021-06-09 RX ADMIN — Medication 40 MILLIGRAM(S): at 02:41

## 2021-06-09 RX ADMIN — CHLORHEXIDINE GLUCONATE 1 APPLICATION(S): 213 SOLUTION TOPICAL at 05:12

## 2021-06-09 RX ADMIN — MEROPENEM 100 MILLIGRAM(S): 1 INJECTION INTRAVENOUS at 16:14

## 2021-06-09 RX ADMIN — HEPARIN SODIUM 5000 UNIT(S): 5000 INJECTION INTRAVENOUS; SUBCUTANEOUS at 05:12

## 2021-06-09 RX ADMIN — Medication 6: at 12:27

## 2021-06-09 RX ADMIN — HEPARIN SODIUM 5000 UNIT(S): 5000 INJECTION INTRAVENOUS; SUBCUTANEOUS at 21:04

## 2021-06-09 RX ADMIN — Medication 650 MILLIGRAM(S): at 09:59

## 2021-06-09 RX ADMIN — HEPARIN SODIUM 5000 UNIT(S): 5000 INJECTION INTRAVENOUS; SUBCUTANEOUS at 13:39

## 2021-06-09 RX ADMIN — PROPOFOL 4.93 MICROGRAM(S)/KG/MIN: 10 INJECTION, EMULSION INTRAVENOUS at 21:04

## 2021-06-09 RX ADMIN — POLYETHYLENE GLYCOL 3350 17 GRAM(S): 17 POWDER, FOR SOLUTION ORAL at 12:16

## 2021-06-09 RX ADMIN — DAPTOMYCIN 135 MILLIGRAM(S): 500 INJECTION, POWDER, LYOPHILIZED, FOR SOLUTION INTRAVENOUS at 13:39

## 2021-06-09 RX ADMIN — Medication 4: at 05:13

## 2021-06-09 RX ADMIN — CHLORHEXIDINE GLUCONATE 15 MILLILITER(S): 213 SOLUTION TOPICAL at 05:13

## 2021-06-09 RX ADMIN — PROPOFOL 4.93 MICROGRAM(S)/KG/MIN: 10 INJECTION, EMULSION INTRAVENOUS at 00:12

## 2021-06-09 RX ADMIN — CHLORHEXIDINE GLUCONATE 15 MILLILITER(S): 213 SOLUTION TOPICAL at 17:08

## 2021-06-09 RX ADMIN — Medication 4: at 17:13

## 2021-06-09 RX ADMIN — HUMAN INSULIN 15 UNIT(S): 100 INJECTION, SUSPENSION SUBCUTANEOUS at 05:13

## 2021-06-09 RX ADMIN — SENNA PLUS 2 TABLET(S): 8.6 TABLET ORAL at 21:04

## 2021-06-09 RX ADMIN — Medication 650 MILLIGRAM(S): at 02:53

## 2021-06-09 RX ADMIN — Medication 650 MILLIGRAM(S): at 02:41

## 2021-06-09 RX ADMIN — NYSTATIN CREAM 1 APPLICATION(S): 100000 CREAM TOPICAL at 05:12

## 2021-06-09 NOTE — PROGRESS NOTE ADULT - SUBJECTIVE AND OBJECTIVE BOX
Patient is a 60y old  Male who presents with a chief complaint of dfu (2021 12:49)      Over Night Events:  Patient seen and examined.   still spiking temp     ROS:  See HPI    PHYSICAL EXAM    ICU Vital Signs Last 24 Hrs  T(C): 39.1 (2021 06:00), Max: 39.1 (2021 06:00)  T(F): 102.4 (2021 06:00), Max: 102.4 (2021 06:00)  HR: 82 (2021 07:00) (82 - 104)  BP: --  BP(mean): --  ABP: 104/44 (2021 07:00) (88/38 - 176/58)  ABP(mean): 60 (2021 07:00) (52 - 92)  RR: 18 (2021 07:00) (17 - 211)  SpO2: 98% (2021 07:00) (93% - 99%)      General: Awake   HEENT:    et tube             Lymph Nodes: NO cervical LN   Lungs: Bilateral BS  Cardiovascular: Regular   Abdomen: Soft, Positive BS  Extremities: No clubbing   Skin: warm   Neurological: no focal   Musculoskeletal: move all ext     I&O's Detail    2021 07:01  -  2021 07:00  --------------------------------------------------------  IN:    Enteral Tube Flush: 420 mL    FentaNYL: 220 mL    IV PiggyBack: 300 mL    Norepinephrine: 46 mL    Propofol: 172 mL    Vital High Protein: 1155 mL  Total IN: 2313 mL    OUT:    FentaNYL: 0 mL    Indwelling Catheter - Urethral (mL): 5265 mL    Propofol: 0 mL  Total OUT: 5265 mL    Total NET: -2952 mL          LABS:                          8.4    13.57 )-----------( 269      ( 2021 04:50 )             28.1         2021 04:50    144    |  106    |  55     ----------------------------<  230    4.0     |  29     |  2.3      Ca    7.9        2021 04:50  Phos  2.3       2021 04:50  Mg     2.3       2021 04:50    TPro  8.1    /  Alb  2.4    /  TBili  0.3    /  DBili  x      /  AST  57     /  ALT  22     /  AlkPhos  135    2021 04:50  Amylase x     lipase x                                                                                        Urinalysis Basic - ( 2021 12:43 )    Color: Yellow / Appearance: Slightly Turbid / S.020 / pH: x  Gluc: x / Ketone: Negative  / Bili: Negative / Urobili: <2 mg/dL   Blood: x / Protein: 100 mg/dL / Nitrite: Negative   Leuk Esterase: Negative / RBC: 35 /HPF / WBC 4 /HPF   Sq Epi: x / Non Sq Epi: 1 /HPF / Bacteria: Negative          CARDIAC MARKERS ( 2021 04:50 )  x     / x     / 658 U/L / x     / x      CARDIAC MARKERS ( 2021 16:46 )  x     / x     / 1049 U/L / x     / x      CARDIAC MARKERS ( 2021 04:50 )  x     / x     / 1367 U/L / x     / x                                                            Culture - Blood (collected 2021 11:00)  Source: .Blood Blood  Preliminary Report (2021 22:02):    No growth to date.                                                   Mode: AC/ CMV (Assist Control/ Continuous Mandatory Ventilation)  RR (machine): 18  TV (machine): 480  FiO2: 30  PEEP: 5  ITime: 1  MAP: 14  PIP: 28                                      ABG - ( 2021 02:50 )  pH, Arterial: 7.46  pH, Blood: x     /  pCO2: 46    /  pO2: 85    / HCO3: 33    / Base Excess: 8.1   /  SaO2: 97                  MEDICATIONS  (STANDING):  ALBUTerol    90 MICROgram(s) HFA Inhaler 1 Puff(s) Inhalation once  aspirin  chewable 81 milliGRAM(s) Oral daily  chlorhexidine 0.12% Liquid 15 milliLiter(s) Oral Mucosa two times a day  chlorhexidine 4% Liquid 1 Application(s) Topical <User Schedule>  DAPTOmycin IVPB 875 milliGRAM(s) IV Intermittent every 48 hours  dextrose 40% Gel 15 Gram(s) Oral once  dextrose 5%. 1000 milliLiter(s) (50 mL/Hr) IV Continuous <Continuous>  dextrose 5%. 1000 milliLiter(s) (100 mL/Hr) IV Continuous <Continuous>  dextrose 50% Injectable 25 Gram(s) IV Push once  dextrose 50% Injectable 12.5 Gram(s) IV Push once  dextrose 50% Injectable 25 Gram(s) IV Push once  fentaNYL   Infusion. 0.5 MICROgram(s)/kG/Hr (8.21 mL/Hr) IV Continuous <Continuous>  furosemide   Injectable 40 milliGRAM(s) IV Push every 12 hours  glucagon  Injectable 1 milliGRAM(s) IntraMuscular once  heparin   Injectable 5000 Unit(s) SubCutaneous every 8 hours  insulin lispro (ADMELOG) corrective regimen sliding scale   SubCutaneous three times a day before meals  insulin NPH human recombinant 15 Unit(s) SubCutaneous every 12 hours  metroNIDAZOLE  IVPB 500 milliGRAM(s) IV Intermittent every 8 hours  norepinephrine Infusion 0.05 MICROgram(s)/kG/Min (7.7 mL/Hr) IV Continuous <Continuous>  nystatin Ointment 1 Application(s) Topical two times a day  pantoprazole    Tablet 40 milliGRAM(s) Oral before breakfast  polyethylene glycol 3350 17 Gram(s) Oral daily  propofol Infusion 5.004 MICROgram(s)/kG/Min (4.93 mL/Hr) IV Continuous <Continuous>  senna 2 Tablet(s) Oral at bedtime  tiotropium 18 MICROgram(s) Capsule 1 Capsule(s) Inhalation once    MEDICATIONS  (PRN):  acetaminophen   Tablet .. 650 milliGRAM(s) Oral every 6 hours PRN Temp greater or equal to 38C (100.4F), Mild Pain (1 - 3)          Xrays:  TLC:  OG:  ET tube:                                                                                    no effusion and opacity   ECHO:  [facility] ICU:

## 2021-06-09 NOTE — PROGRESS NOTE ADULT - SUBJECTIVE AND OBJECTIVE BOX
Patient is a 60y old  Male who presents with a chief complaint of dfu (2021 09:20)      INTERVAL HPI/OVERNIGHT EVENTS:  Febrile overnight, no other overnight events.    ICU Vital Signs Last 24 Hrs  T(C): 38.7 (2021 12:00), Max: 39.1 (2021 06:00)  T(F): 101.6 (2021 12:00), Max: 102.4 (2021 06:00)  HR: 82 (2021 12:00) (78 - 104)  BP: --  BP(mean): --  ABP: 124/56 (2021 12:00) (88/38 - 176/58)  ABP(mean): 74 (2021 12:00) (52 - 92)  RR: 26 (2021 12:00) (17 - 211)  SpO2: 98% (2021 12:00) (96% - 99%)    I&O's Summary    2021 07:01  -  2021 07:00  --------------------------------------------------------  IN: 2313 mL / OUT: 5265 mL / NET: -2952 mL    2021 07:01  -  2021 12:34  --------------------------------------------------------  IN: 542 mL / OUT: 680 mL / NET: -138 mL      Mode: AC/ CMV (Assist Control/ Continuous Mandatory Ventilation)  RR (machine): 18  TV (machine): 480  FiO2: 30  PEEP: 5  ITime: 1  MAP: 14  PIP: 26      LABS:                        8.4    13.57 )-----------( 269      ( 2021 04:50 )             28.1     06-09    144  |  106  |  55<H>  ----------------------------<  230<H>  4.0   |  29  |  2.3<H>    Ca    7.9<L>      2021 04:50  Phos  2.3       Mg     2.3         TPro  8.1<H>  /  Alb  2.4<L>  /  TBili  0.3  /  DBili  x   /  AST  57<H>  /  ALT  22  /  AlkPhos  135<H>        Urinalysis Basic - ( 2021 12:43 )    Color: Yellow / Appearance: Slightly Turbid / S.020 / pH: x  Gluc: x / Ketone: Negative  / Bili: Negative / Urobili: <2 mg/dL   Blood: x / Protein: 100 mg/dL / Nitrite: Negative   Leuk Esterase: Negative / RBC: 35 /HPF / WBC 4 /HPF   Sq Epi: x / Non Sq Epi: 1 /HPF / Bacteria: Negative      CAPILLARY BLOOD GLUCOSE      POCT Blood Glucose.: 260 mg/dL (2021 12:22)  POCT Blood Glucose.: 239 mg/dL (2021 05:01)  POCT Blood Glucose.: 164 mg/dL (2021 16:33)    ABG - ( 2021 02:50 )  pH, Arterial: 7.46  pH, Blood: x     /  pCO2: 46    /  pO2: 85    / HCO3: 33    / Base Excess: 8.1   /  SaO2: 97                  RADIOLOGY & ADDITIONAL TESTS:    Consultant(s) Notes Reviewed:  [x ] YES  [ ] NO    MEDICATIONS  (STANDING):  ALBUTerol    90 MICROgram(s) HFA Inhaler 1 Puff(s) Inhalation once  aspirin  chewable 81 milliGRAM(s) Oral daily  chlorhexidine 0.12% Liquid 15 milliLiter(s) Oral Mucosa two times a day  chlorhexidine 4% Liquid 1 Application(s) Topical <User Schedule>  DAPTOmycin IVPB 875 milliGRAM(s) IV Intermittent every 48 hours  dextrose 40% Gel 15 Gram(s) Oral once  dextrose 5%. 1000 milliLiter(s) (50 mL/Hr) IV Continuous <Continuous>  dextrose 5%. 1000 milliLiter(s) (100 mL/Hr) IV Continuous <Continuous>  dextrose 50% Injectable 25 Gram(s) IV Push once  dextrose 50% Injectable 12.5 Gram(s) IV Push once  dextrose 50% Injectable 25 Gram(s) IV Push once  fentaNYL   Infusion. 0.5 MICROgram(s)/kG/Hr (8.21 mL/Hr) IV Continuous <Continuous>  furosemide   Injectable 40 milliGRAM(s) IV Push every 12 hours  glucagon  Injectable 1 milliGRAM(s) IntraMuscular once  heparin   Injectable 5000 Unit(s) SubCutaneous every 8 hours  insulin lispro (ADMELOG) corrective regimen sliding scale   SubCutaneous every 6 hours  insulin NPH human recombinant 15 Unit(s) SubCutaneous every 12 hours  metroNIDAZOLE  IVPB 500 milliGRAM(s) IV Intermittent every 8 hours  norepinephrine Infusion 0.05 MICROgram(s)/kG/Min (7.7 mL/Hr) IV Continuous <Continuous>  nystatin Ointment 1 Application(s) Topical two times a day  pantoprazole    Tablet 40 milliGRAM(s) Oral before breakfast  polyethylene glycol 3350 17 Gram(s) Oral daily  propofol Infusion 5.004 MICROgram(s)/kG/Min (4.93 mL/Hr) IV Continuous <Continuous>  senna 2 Tablet(s) Oral at bedtime  tiotropium 18 MICROgram(s) Capsule 1 Capsule(s) Inhalation once    MEDICATIONS  (PRN):  acetaminophen   Tablet .. 650 milliGRAM(s) Oral every 6 hours PRN Temp greater or equal to 38C (100.4F), Mild Pain (1 - 3)      PHYSICAL EXAM:  GENERAL: intubated, sedated on prop and fent  NERVOUS SYSTEM:  intuabted, opens eyes to external stimuli   CHEST/LUNG: breath sounds bl   HEART: S1S2 normal, no S3, Regular rate and rhythm; No murmurs, rubs, or gallops  ABDOMEN: Soft, obese, distended   EXTREMITIES:  r BKA, Lt no edema   LYMPH: No lymphadenopathy noted  SKIN: No rashes or lesions      Care Discussed with Consultants/Other Providers [ x] YES  [ ] NO

## 2021-06-09 NOTE — PROGRESS NOTE ADULT - ASSESSMENT
IMPRESSION    Acute Hypoxemic respiratory failure with hypercapnia s/p intubation on 40%  Septic Shock 2/2 to Bacteremia from DFU (Enterococcus,  Staph Aureus ) repeat cx neg  DFU s/p Right Knee Disarticulation for necrotizing soft tissue infection  CANDY on CKD s/p CVVHD sp dc u dall  AMS 2/2 Toxic-Metabolic Encephelopathy s/p CT head neg for acute pathology  MATA/ OHS  Morbid obesity  NSTEMI    PLAN:    CNS: SAT.  awake  follow command  can not fit on MRI machine      HEENT: ETT care and Oral care    PULMONARY: cpap weaning trail and then assess for extubation need anesthesia stand by   and will extubate to bipap if passed weaning trail   patient comfortable awake follow command   not going to surgery     CARDIOVASCULAR:  levophed keep map 65    is < os   lower lasix to 40 mg Q 12 hrs     GI: GI prophylaxis.  OG Feeding as tolerated    RENAL: F/U nephrology , IV lasix    INFECTIOUS DISEASE:  ABX as per ID    f/u repeat cultures neg  bld cx today   tlc was changed     HEMATOLOGICAL:  DVT prophylaxis. Transfuse to keep Hb >7. Maintain active Type and screen.  follow h/h     ENDOCRINE:  Follow up FS.  Insulin protocol if needed.    MUSCULOSKELETAL: Bedrest. f/u vascular surgery follow surgery   Poor prognosis  Palliative care f/u  d/c [place]

## 2021-06-09 NOTE — PROGRESS NOTE ADULT - ASSESSMENT
59 y/o M with PMH of DM, hypertension, dyslipidemia, sleep apnea, Charcot foot reconstruction with external fixation, morbid obesity presenting to the hospital on 5/29 with worsening foot ulcer.  s/p Right Knee Disarticulation for necrotizing soft tissue infection of right lower extremity    #CANDY on CKD ( unknown stage last known creat 1.4 in 11/2019) / rule out ATN / obstructive uropathy/ sp CT angio (too early for FLO)/ doubt IRGN (infection related GN)/ doubt AIN (multiple antibx courses)/ high bicarb/chr resp acidosis  non oliguric now / creat better / off CVVHD /   Flushing removed on lasix IV continue decrease to 40 mg IV q24h   renal ultrasound noted w/o hydronephrosis, s/p terrazas catheter placement   UA noted, minimal proteinuria    #appreciate ID f/u, on Dapto and flagyl   sp debridement of DFU as per podiatry/ may need OR again   C3 C4 wnl SPEP polyclonal gammopathy / K/L noted expected for CANDY   follow IP and Ca   will follow/ no need for RRT

## 2021-06-09 NOTE — CHART NOTE - NSCHARTNOTEFT_GEN_A_CORE
Discussed case with resident. Plan is for ongoing medical management. Goals are clear. palliative care to sign off, please re consult if needed.

## 2021-06-09 NOTE — PROGRESS NOTE ADULT - ASSESSMENT
59 y/o M with PMH of DM, hypertension, dyslipidemia, sleep apnea, Charcot foot reconstruction with external fixation, morbid obesity presented for worsening right foot infection.     # DFU with Hx of MRSA infection s/p BKA  # Acute hypoxic respiratory failure secondary to septic shock  # Toxic metabolic encephalopathy - improved   # Fever overnight >102, currently febrile   - s/p intubation 5/31   - bacteremia from right foot abscess   - septic on admission, WBC 12,  + lactate 2.3. s/p cefepime flagyl and vanc in the ED  - Podiatry on board, f/u   - S/p disarticulation right knee 5/31 w/ vascular   - Wound Cx (+) for Staph + enterococcus   - Echo 3/31: EF 55-60%, G2DD   - ID following, f/u recs:  -d/c linezolid and Ceftaroline   -c/w flagyl 500 mg iv q8h with dapto 875mg q12   - CPK >1300 -> 600~, downtrending, monitor daily  - if worsening hemodynamic compromise, add caspo IV / meropenem   - currently on propofol, fent, and levo  - bcx (-) 6/7, spoke to ID, okay for surgery, f/u with vascular. AKA surgery for Friday 6/11.     #?Code stroke for possible CVA   - ptnt was code stroke on floors on 3/30 for AMS and confusion upgraded to CCU   - metabolic encephalopathy secondary to sepsis more likely than stroke  -  CT head neg for acute pathology, CTA w/ moderate stenoses at the junction of the precavernous and cavernous segments of both internal carotid arteries  - neuro recs for brain MR, can get repeat CT head when stable, will not fit in MR machine   - neuro rec LP, as per pulm, as pt is following commands now, little clinical benefit to preforming as of now.     # NSTEMI  - secondary to sepsis induced demand ischemia  - less likely true ACS  -monitor     #CANDY on CKD III /  rule out ATN - worsening today   - secondary to sepsis induced ATN, continue supportive care, monitor for now.   - creatinine 3.0 on admission, baseline around 1.4-1.8   - s/p CVVHD 6/1 and 6/2, holding CVVHD for now ptnt w/ good urine output   - nephro following    #anemia of chronic disease   - transfuse Hb < 7  - monitor   - s/p 3 units in total since admission  - stable today    #Diabetes Mellitus  -on insulin regimen   - a1c 8.9   -Monitor      #Hypertension  -holding oral meds     #Dyslipidemia  -c/w home meds    #MATA/ OHS    #morbid obesity    Diet: npo w/ tube feeds   DVT ppx: heparin subq TID  GI ppx: protonix   Dispo: acute    Spoke to Gentry Callahan, updated her on clinical presentation.    59 y/o M with PMH of DM, hypertension, dyslipidemia, sleep apnea, Charcot foot reconstruction with external fixation, morbid obesity presented for worsening right foot infection.     # DFU with Hx of MRSA infection s/p BKA  # Acute hypoxic respiratory failure secondary to septic shock  # Toxic metabolic encephalopathy - improved   # Fever overnight >102, currently febrile   - s/p intubation 5/31   - bacteremia from right foot abscess   - septic on admission, WBC 12,  + lactate 2.3. s/p cefepime flagyl and vanc in the ED  - Podiatry on board, f/u   - S/p disarticulation right knee 5/31 w/ vascular   - Wound Cx (+) for Staph + enterococcus   - Echo 3/31: EF 55-60%, G2DD   - ID following, f/u recs:  -d/c linezolid and Ceftaroline   -c/w flagyl 500 mg iv q8h with dapto 875mg q12   - CPK >1300 -> 600~, downtrending, monitor daily  - if worsening hemodynamic compromise, add caspo IV / meropenem   - currently on propofol, fent, and levo  - bcx (-) 6/7, spoke to ID, okay for surgery, f/u with vascular. AKA surgery for Friday 6/11.   - plans for SBT and to try extubation if tolerated well    #?Code stroke for possible CVA   - ptnt was code stroke on floors on 3/30 for AMS and confusion upgraded to CCU   - metabolic encephalopathy secondary to sepsis more likely than stroke  -  CT head neg for acute pathology, CTA w/ moderate stenoses at the junction of the precavernous and cavernous segments of both internal carotid arteries  - neuro recs for brain MR, can get repeat CT head when stable, will not fit in MR machine   - neuro rec LP, as per pulm, as pt is following commands now, little clinical benefit to preforming as of now.     # NSTEMI  - secondary to sepsis induced demand ischemia  - less likely true ACS  -monitor     #CANDY on CKD III /  rule out ATN - worsening today   - secondary to sepsis induced ATN, continue supportive care, monitor for now.   - creatinine 3.0 on admission, baseline around 1.4-1.8   - s/p CVVHD 6/1 and 6/2, holding CVVHD for now ptnt w/ good urine output   - nephro following    #anemia of chronic disease   - transfuse Hb < 7  - monitor   - s/p 3 units in total since admission  - stable today    #Diabetes Mellitus  -on insulin regimen   - a1c 8.9   -Monitor      #Hypertension  -holding oral meds     #Dyslipidemia  -c/w home meds    #MATA/ OHS    #morbid obesity    Diet: npo w/ tube feeds   DVT ppx: heparin subq TID  GI ppx: protonix   Dispo: acute    Spoke to Gentry Callahan, updated her on clinical presentation.

## 2021-06-09 NOTE — PROGRESS NOTE ADULT - SUBJECTIVE AND OBJECTIVE BOX
Nephrology progress note  Patient is seen and examined, events over the last 24 h noted   Intubated on MV     Allergies:  No Known Allergies  statins (Other)    Hospital Medications:   MEDICATIONS  (STANDING):  ALBUTerol    90 MICROgram(s) HFA Inhaler 1 Puff(s) Inhalation once  aspirin  chewable 81 milliGRAM(s) Oral daily  DAPTOmycin IVPB 875 milliGRAM(s) IV Intermittent every 48 hours  fentaNYL   Infusion. 0.5 MICROgram(s)/kG/Hr (8.21 mL/Hr) IV Continuous <Continuous>  furosemide   Injectable 40 milliGRAM(s) IV Push every 12 hours  glucagon  Injectable 1 milliGRAM(s) IntraMuscular once  heparin   Injectable 5000 Unit(s) SubCutaneous every 8 hours  insulin lispro (ADMELOG) corrective regimen sliding scale   SubCutaneous every 6 hours  insulin NPH human recombinant 15 Unit(s) SubCutaneous every 12 hours  metroNIDAZOLE  IVPB 500 milliGRAM(s) IV Intermittent every 8 hours  norepinephrine Infusion 0.05 MICROgram(s)/kG/Min (7.7 mL/Hr) IV Continuous <Continuous>  nystatin Ointment 1 Application(s) Topical two times a day  pantoprazole    Tablet 40 milliGRAM(s) Oral before breakfast  polyethylene glycol 3350 17 Gram(s) Oral daily  propofol Infusion 5.004 MICROgram(s)/kG/Min (4.93 mL/Hr) IV Continuous <Continuous>  senna 2 Tablet(s) Oral at bedtime  tiotropium 18 MICROgram(s) Capsule 1 Capsule(s) Inhalation once        VITALS:  T(F): 101.6 (21 @ 12:00), Max: 102.4 (21 @ 06:00)  HR: 82 (21 @ 12:00)  RR: 26 (21 @ 12:00)  SpO2: 98% (21 @ 12:00)  Wt(kg): --     @ :  -   @ 07:00  --------------------------------------------------------  IN: 2752 mL / OUT: 6070 mL / NET: -3318 mL     @ 07:01  -   @ 07:00  --------------------------------------------------------  IN: 2313 mL / OUT: 5265 mL / NET: -2952 mL     @ 07:01  -   @ 12:44  --------------------------------------------------------  IN: 542 mL / OUT: 680 mL / NET: -138 mL          PHYSICAL EXAM:  Constitutional: intubated on MV   Neck: No JVD  Respiratory: CTAB,  Cardiovascular: S1, S2, RRR  Gastrointestinal: BS+, soft, NT/ND  Extremities: No cyanosis or clubbing. No peripheral edema  :  No terrazas.   Skin: No rashes    LABS:      144  |  106  |  55<H>  ----------------------------<  230<H>  4.0   |  29  |  2.3<H>    Ca    7.9<L>      2021 04:50  Phos  2.3       Mg     2.3         TPro  8.1<H>  /  Alb  2.4<L>  /  TBili  0.3  /  DBili      /  AST  57<H>  /  ALT  22  /  AlkPhos  135<H>                            8.4    13.57 )-----------( 269      ( 2021 04:50 )             28.1       Urine Studies:  Urinalysis Basic - ( 2021 12:43 )    Color: Yellow / Appearance: Slightly Turbid / S.020 / pH:   Gluc:  / Ketone: Negative  / Bili: Negative / Urobili: <2 mg/dL   Blood:  / Protein: 100 mg/dL / Nitrite: Negative   Leuk Esterase: Negative / RBC: 35 /HPF / WBC 4 /HPF   Sq Epi:  / Non Sq Epi: 1 /HPF / Bacteria: Negative        RADIOLOGY & ADDITIONAL STUDIES:

## 2021-06-09 NOTE — PROGRESS NOTE ADULT - ASSESSMENT
· Assessment	  59 y/o M with PMH of DM, hypertension, dyslipidemia, sleep apnea, Charcot foot reconstruction with external fixation, super morbid obesity present for worsening right foot infection.  Pt reports he has had this foot ulcer at various stage of healing for 13 years. Patient has been admitted for IV antibiotics multiple times in the past for osteomyelitis of right foot. He has history of MRSA infection in foot. pt has baseline neuropathy in both feet so does not feel any pain.  Patient states his Infection has been gradually getting worse despite treatment with bactrim, he saw his podiatrists and he was sent in by podiatry for IV abx .    IMPRESSION;  #Fever Tm 103 with downtrending WBC normotensive ?drug fever ?Serotonin syndrome as on linezolid?    6/7 BCX NG    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   -5/28 BCx E fecalis, ORSA  -5/31 TTE no vegetation  -etiology of bacteremia was right foot abscess  -5/29 R foot : ORSA , Corynebacterium  -5/30 BCx NG  -6/4 BCx NG  Creatinine, Serum: 2.3 mg/dL (06.09.21 @ 04:50)        RECOMMENDATIONS;  - f/u BCX final   - Dapto 875mg q48h IV (8mg/kg of adjusted body wt)  - check daily CPK  - if worsening hemodynamic compromise, start meropenem IV  - downtrending Creatine Kinase, Serum: 1367 U/L (06.08.21 @ 04:50) Creatine Kinase, Serum: 658 U/L (06.09.21 @ 04:50)  - flagyl 500 mg iv q8h   - if worsening hemodynamic compromise, add caspo IV while BCX final pending  - Pending OR, on hold    Please call with any questions or send a message on [employer] Teams  Spectra [phone]  · Assessment	  59 y/o M with PMH of DM, hypertension, dyslipidemia, sleep apnea, Charcot foot reconstruction with external fixation, super morbid obesity present for worsening right foot infection.  Pt reports he has had this foot ulcer at various stage of healing for 13 years. Patient has been admitted for IV antibiotics multiple times in the past for osteomyelitis of right foot. He has history of MRSA infection in foot. pt has baseline neuropathy in both feet so does not feel any pain.  Patient states his Infection has been gradually getting worse despite treatment with bactrim, he saw his podiatrists and he was sent in by podiatry for IV abx .    IMPRESSION;  #Fever Tm 103 with downtrending WBC normotensive ?BKA source control , no change after stopping linezolid    6/7 BCX NG    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   -5/28 BCx E fecalis, ORSA  -5/31 TTE no vegetation  -etiology of bacteremia was right foot abscess  -5/29 R foot : ORSA , Corynebacterium  -5/30 BCx NG  -6/4 BCx NG  Creatinine, Serum: 2.3 mg/dL (06.09.21 @ 04:50)        RECOMMENDATIONS;  - f/u BCX final   - OR with Vascular for source control   - ADD Caspo 70mg x1 then 50mg q24h IV and Dk 1g q12h IV  - Dapto 875mg q48h IV (8mg/kg of adjusted body wt)  - check daily CPK  - downtrending Creatine Kinase, Serum: 1367 U/L (06.08.21 @ 04:50) Creatine Kinase, Serum: 658 U/L (06.09.21 @ 04:50)  - D/C flagyl 500 mg iv q8h as anaerobic coverage w/ dk      Please call with any questions or send a message on [employer] Teams  Spectra [phone]

## 2021-06-09 NOTE — PROGRESS NOTE ADULT - SUBJECTIVE AND OBJECTIVE BOX
FERNANDEZ GUZMAN  60y, Male  Allergy: No Known Allergies  statins (Other)      LOS  11d    CHIEF COMPLAINT: dfu (2021 08:02)      INTERVAL EVENTS/HPI  - febrile, WBC downtrending , BCX NG  - CPK down   - T(F): , Max: 102.4 (21 @ 06:00)  - WBC Count: 13.57 (21 @ 04:50)  WBC Count: 14.81 (21 @ 04:50)     - Creatinine, Serum: 2.3 (21 @ 04:50)  Creatinine, Serum: 2.0 (21 @ 04:50)       ROS  unable to obtain history secondary to patient's mental status and/or sedation     VITALS:  T(F): 102.1, Max: 102.4 (21 @ 06:00)  HR: 98  BP: --  RR: 18Vital Signs Last 24 Hrs  T(C): 38.9 (2021 09:00), Max: 39.1 (2021 06:00)  T(F): 102.1 (2021 09:00), Max: 102.4 (2021 06:00)  HR: 98 (2021 09:00) (82 - 104)  BP: --  BP(mean): --  RR: 18 (2021 09:00) (17 - 211)  SpO2: 98% (2021 09:00) (93% - 99%)    PHYSICAL EXAM:  ***    FH: Non-contributory  Social Hx: Non-contributory    TESTS & MEASUREMENTS:                        8.4    13.57 )-----------( 269      ( 2021 04:50 )             28.1         144  |  106  |  55<H>  ----------------------------<  230<H>  4.0   |  29  |  2.3<H>    Ca    7.9<L>      2021 04:50  Phos  2.3     -  Mg     2.3     -    TPro  8.1<H>  /  Alb  2.4<L>  /  TBili  0.3  /  DBili  x   /  AST  57<H>  /  ALT  22  /  AlkPhos  135<H>      eGFR if Non African American: 29 mL/min/1.73M2 (21 @ 04:50)  eGFR if African American: 34 mL/min/1.73M2 (21 @ 04:50)    LIVER FUNCTIONS - ( 2021 04:50 )  Alb: 2.4 g/dL / Pro: 8.1 g/dL / ALK PHOS: 135 U/L / ALT: 22 U/L / AST: 57 U/L / GGT: x           Urinalysis Basic - ( 2021 12:43 )    Color: Yellow / Appearance: Slightly Turbid / S.020 / pH: x  Gluc: x / Ketone: Negative  / Bili: Negative / Urobili: <2 mg/dL   Blood: x / Protein: 100 mg/dL / Nitrite: Negative   Leuk Esterase: Negative / RBC: 35 /HPF / WBC 4 /HPF   Sq Epi: x / Non Sq Epi: 1 /HPF / Bacteria: Negative        Culture - Blood (collected 21 @ 11:00)  Source: .Blood Blood  Preliminary Report (21 @ 22:02):    No growth to date.    Culture - Blood (collected 21 @ 01:00)  Source: .Blood None  Final Report (21 @ 08:01):    No Growth Final    Culture - Blood (collected 21 @ 22:09)  Source: .Blood Blood-Peripheral  Final Report (21 @ 08:01):    No Growth Final    Culture - Blood (collected 21 @ 22:09)  Source: .Blood Blood-Peripheral  Final Report (21 @ 08:01):    No Growth Final    Culture - Acid Fast - Other w/Smear (collected 21 @ 06:10)  Source: .Other None(R-FOOT)  Preliminary Report (21 @ 15:07):    Culture is being performed.    Culture - Surgical Swab (collected 21 @ 06:10)  Source: .Surgical Swab None  Final Report (21 @ 16:27):    No growth at 5 days    Culture - Acid Fast - Other w/Smear (collected 21 @ 06:10)  Source: .Other None (R-FOOT)  Preliminary Report (21 @ 15:07):    Culture is being performed.    Culture - Surgical Swab (collected 21 @ 06:10)  Source: .Surgical Swab None  Final Report (21 @ 17:24):    Numerous Methicillin resistant Staphylococcus aureus    Rare Corynebacterium species "Susceptibilities not performed"  Organism: Methicillin resistant Staphylococcus aureus (21 @ 17:24)  Organism: Methicillin resistant Staphylococcus aureus (21 @ 17:24)      -  Ampicillin/Sulbactam: R 16/8      -  Cefazolin: R 8      -  Clindamycin: S <=0.25      -  Daptomycin: S 0.5      -  Erythromycin: R >4      -  Gentamicin: S <=1 Should not be used as monotherapy      -  Linezolid: S 1      -  Oxacillin: R >2      -  Penicillin: R >8      -  RIF- Rifampin: S <=1 Should not be used as monotherapy      -  Tetra/Doxy: S <=1      -  Trimethoprim/Sulfamethoxazole: S <=0.5/9.5      -  Vancomycin: S 2      Method Type: DAVIDA    Culture - Urine (collected 21 @ 01:20)  Source: .Urine Clean Catch (Midstream)  Final Report (21 @ 08:11):    <10,000 CFU/mL Normal Urogenital Mary    Culture - Abscess with Gram Stain (collected 21 @ 19:51)  Source: .Abscess Right - Foot  Final Report (21 @ 09:25):    Moderate Methicillin resistant Staphylococcus aureus    Few Corynebacterium species "Susceptibilities not performed"  Organism: Methicillin resistant Staphylococcus aureus (21 @ 09:25)  Organism: Methicillin resistant Staphylococcus aureus (21 @ 09:25)      -  Ampicillin/Sulbactam: R 16/8      -  Cefazolin: R 8      -  Clindamycin: S <=0.25      -  Daptomycin: S 0.5      -  Erythromycin: R >4      -  Gentamicin: S <=1 Should not be used as monotherapy      -  Linezolid: S 2      -  Oxacillin: R >2      -  Penicillin: R >8      -  RIF- Rifampin: S <=1 Should not be used as monotherapy      -  Tetra/Doxy: S <=1      -  Trimethoprim/Sulfamethoxazole: S 1/19      -  Vancomycin: S 2      Method Type: DAVIDA    Culture - Other (collected 21 @ 18:32)  Source: .Other R foot DFU  Final Report (21 @ 15:44):    Moderate Methicillin resistant Staphylococcus aureus    Normal skin mary isolated  Organism: Methicillin resistant Staphylococcus aureus (21 @ 15:44)  Organism: Methicillin resistant Staphylococcus aureus (21 @ 15:44)      -  Ampicillin/Sulbactam: R 16/8      -  Cefazolin: R 16      -  Clindamycin: S <=0.25      -  Daptomycin: S 0.5      -  Erythromycin: R >4      -  Gentamicin: S <=1 Should not be used as monotherapy      -  Linezolid: S 2      -  Oxacillin: R >2      -  Penicillin: R >8      -  RIF- Rifampin: S <=1 Should not be used as monotherapy      -  Tetra/Doxy: S <=1      -  Trimethoprim/Sulfamethoxazole: S <=0.5/9.5      -  Vancomycin: S 2      Method Type: DAVIDA    Culture - Blood (collected 21 @ 18:28)  Source: .Blood Blood-Peripheral  Gram Stain (21 @ 18:30):    Growth in anaerobic bottle: Gram positive cocci in pairs    Growth in aerobic bottle: Gram positive cocci in pairs  Final Report (21 @ 15:45):    Growth in anaerobic bottle: Enterococcus faecalis    Growth in aerobic and anaerobic bottles: Methicillin resistant    Staphylococcus aureus    ***Blood Panel PCR results on this specimen are available    approximately 3 hours after the Gram stain result.***    Gram stain, PCR, and/or culture results may not always    correspond due to difference in methodologies.    ************************************************************    This PCR assay was performed by multiplex PCR. This    Assay tests for 66 bacterialand resistance gene targets.    Please refer to the [facility] test directory    at [email or web address] for details.  Organism: Blood Culture PCR  Enterococcus faecalis  Methicillin resistant Staphylococcus aureus (21 @ 15:45)  Organism: Methicillin resistant Staphylococcus aureus (21 @ 15:45)      -  Ampicillin/Sulbactam: R 16/8      -  Cefazolin: R 16      -  Clindamycin: S <=0.25      -  Daptomycin: S 0.5      -  Erythromycin: R >4      -  Gentamicin: S <=1 Should not be used as monotherapy      -  Linezolid: S 2      -  Oxacillin: R >2      -  Penicillin: R >8      -  RIF- Rifampin: S <=1 Should not be used as monotherapy      -  Tetra/Doxy: S <=1      -  Trimethoprim/Sulfamethoxazole: S <=0.5/9.5      -  Vancomycin: S 1      Method Type: DAVIDA  Organism: Enterococcus faecalis (21 @ 15:45)      -  Ampicillin: S <=2 Predicts results to ampicillin/sulbactam, amoxacillin-clavulanate and  piperacillin-tazobactam.      -  Gentamicin synergy: S      -  Vancomycin: S 1      Method Type: DAVIDA  Organism: Blood Culture PCR (21 @ 15:45)      -  Coagulase negative Staphylococcus: Detec      -  Enterococcus faecalis: Detec      Method Type: PCR    Culture - Blood (collected 21 @ 18:28)  Source: .Blood Blood-Peripheral  Gram Stain (21 @ 06:00):    Growth in aerobic bottle: Gram positive cocci in pairs    Growth in anaerobic bottle: Gram positive cocci in pairs  Final Report (21 @ 15:46):    Growth in aerobic and anaerobic bottles: Methicillin resistant    Staphylococcus aureus    See previous culture [id number]        Lactate, Blood: 0.8 mmol/L (21 @ 04:30)      INFECTIOUS DISEASES TESTING  COVID-19 PCR: NotDetec (21 @ 06:53)  MRSA PCR Result.: Positive (21 @ 09:26)  COVID-19 PCR: NotDetec (21 @ 18:28)      INFLAMMATORY MARKERS  Sedimentation Rate, Erythrocyte: 126 mm/Hr (21 @ 21:19)  C-Reactive Protein, Serum: 390 mg/L (21 @ 21:19)      RADIOLOGY & ADDITIONAL TESTS:  I have personally reviewed the last available Chest xray  CXR      CT      CARDIOLOGY TESTING  12 Lead ECG:   Ventricular Rate 104 BPM    Atrial Rate 105 BPM    P-R Interval 200 ms    QRS Duration 164 ms    Q-T Interval 392 ms    QTC Calculation(Bazett) 515 ms    P Axis 33 degrees    R Axis 263 degrees    T Axis 45 degrees    Diagnosis Line Sinus tachycardia  Right bundle branch block  Abnormal ECG    Confirmed by Cheko Lux ([id number]) on 2021 9:08:21 PM (21 @ 19:49)  12 Lead ECG:   Ventricular Rate 92 BPM    Atrial Rate 92 BPM    P-R Interval 208 ms    QRS Duration 162 ms    Q-T Interval 410 ms    QTC Calculation(Bazett) 507 ms    P Axis 38 degrees    R Axis 265 degrees    T Axis 49 degrees    Diagnosis Line Normal sinus rhythm  Right bundle branch block  Abnormal ECG    Confirmed by Cheko Lux ([id number]) on 2021 12:48:16 PM (21 @ 12:28)      MEDICATIONS  ALBUTerol    90 MICROgram(s) HFA Inhaler 1 Inhalation once  aspirin  chewable 81 Oral daily  chlorhexidine 0.12% Liquid 15 Oral Mucosa two times a day  chlorhexidine 4% Liquid 1 Topical <User Schedule>  DAPTOmycin IVPB 875 IV Intermittent every 48 hours  dextrose 40% Gel 15 Oral once  dextrose 5%. 1000 IV Continuous <Continuous>  dextrose 5%. 1000 IV Continuous <Continuous>  dextrose 50% Injectable 25 IV Push once  dextrose 50% Injectable 12.5 IV Push once  dextrose 50% Injectable 25 IV Push once  fentaNYL   Infusion. 0.5 IV Continuous <Continuous>  furosemide   Injectable 40 IV Push every 12 hours  glucagon  Injectable 1 IntraMuscular once  heparin   Injectable 5000 SubCutaneous every 8 hours  insulin lispro (ADMELOG) corrective regimen sliding scale  SubCutaneous three times a day before meals  insulin NPH human recombinant 15 SubCutaneous every 12 hours  metroNIDAZOLE  IVPB 500 IV Intermittent every 8 hours  norepinephrine Infusion 0.05 IV Continuous <Continuous>  nystatin Ointment 1 Topical two times a day  pantoprazole    Tablet 40 Oral before breakfast  polyethylene glycol 3350 17 Oral daily  propofol Infusion 5.004 IV Continuous <Continuous>  senna 2 Oral at bedtime  tiotropium 18 MICROgram(s) Capsule 1 Inhalation once      WEIGHT  Weight (kg): 164.2 (21 @ 17:38)  Creatinine, Serum: 2.3 mg/dL (21 @ 04:50)      ANTIBIOTICS:  DAPTOmycin IVPB 875 milliGRAM(s) IV Intermittent every 48 hours  metroNIDAZOLE  IVPB 500 milliGRAM(s) IV Intermittent every 8 hours      All available historical records have been reviewed       FERNANDEZ GUZMAN  60y, Male  Allergy: No Known Allergies  statins (Other)      LOS  11d    CHIEF COMPLAINT: dfu (2021 08:02)      INTERVAL EVENTS/HPI  - febrile, WBC downtrending , BCX NG  - CPK down   - T(F): , Max: 102.4 (21 @ 06:00)  - WBC Count: 13.57 (21 @ 04:50)  WBC Count: 14.81 (21 @ 04:50)     - Creatinine, Serum: 2.3 (21 @ 04:50)  Creatinine, Serum: 2.0 (21 @ 04:50)       ROS  unable to obtain history secondary to patient's mental status and/or sedation     VITALS:  T(F): 102.1, Max: 102.4 (21 @ 06:00)  HR: 98  BP: --  RR: 18Vital Signs Last 24 Hrs  T(C): 38.9 (2021 09:00), Max: 39.1 (2021 06:00)  T(F): 102.1 (2021 09:00), Max: 102.4 (2021 06:00)  HR: 98 (2021 09:00) (82 - 104)  BP: --  BP(mean): --  RR: 18 (2021 09:00) (17 - 211)  SpO2: 98% (2021 09:00) (93% - 99%)    PHYSICAL EXAM:  General: intubated obese cooling blanket  HEENT: NCAT  CV: RRR  Lungs: symmetric chest expansion, decreased BS at bases  Abd: Soft  Skin: no rash  Extr: R BKA- bone necrotic, no sohail purulence  Neuro: sedated  Lines: no phlebitis     FH: Non-contributory  Social Hx: Non-contributory    TESTS & MEASUREMENTS:                        8.4    13.57 )-----------( 269      ( 2021 04:50 )             28.1         144  |  106  |  55<H>  ----------------------------<  230<H>  4.0   |  29  |  2.3<H>    Ca    7.9<L>      2021 04:50  Phos  2.3       Mg     2.3         TPro  8.1<H>  /  Alb  2.4<L>  /  TBili  0.3  /  DBili  x   /  AST  57<H>  /  ALT  22  /  AlkPhos  135<H>      eGFR if Non African American: 29 mL/min/1.73M2 (21 @ 04:50)  eGFR if African American: 34 mL/min/1.73M2 (21 @ 04:50)    LIVER FUNCTIONS - ( 2021 04:50 )  Alb: 2.4 g/dL / Pro: 8.1 g/dL / ALK PHOS: 135 U/L / ALT: 22 U/L / AST: 57 U/L / GGT: x           Urinalysis Basic - ( 2021 12:43 )    Color: Yellow / Appearance: Slightly Turbid / S.020 / pH: x  Gluc: x / Ketone: Negative  / Bili: Negative / Urobili: <2 mg/dL   Blood: x / Protein: 100 mg/dL / Nitrite: Negative   Leuk Esterase: Negative / RBC: 35 /HPF / WBC 4 /HPF   Sq Epi: x / Non Sq Epi: 1 /HPF / Bacteria: Negative        Culture - Blood (collected 21 @ 11:00)  Source: .Blood Blood  Preliminary Report (21 @ 22:02):    No growth to date.    Culture - Blood (collected 21 @ 01:00)  Source: .Blood None  Final Report (21 @ 08:01):    No Growth Final    Culture - Blood (collected 21 @ 22:09)  Source: .Blood Blood-Peripheral  Final Report (21 @ 08:01):    No Growth Final    Culture - Blood (collected 21 @ 22:09)  Source: .Blood Blood-Peripheral  Final Report (21 @ 08:01):    No Growth Final    Culture - Acid Fast - Other w/Smear (collected 21 @ 06:10)  Source: .Other None(R-FOOT)  Preliminary Report (21 @ 15:07):    Culture is being performed.    Culture - Surgical Swab (collected 21 @ 06:10)  Source: .Surgical Swab None  Final Report (21 @ 16:27):    No growth at 5 days    Culture - Acid Fast - Other w/Smear (collected 21 @ 06:10)  Source: .Other None (R-FOOT)  Preliminary Report (21 @ 15:07):    Culture is being performed.    Culture - Surgical Swab (collected 21 @ 06:10)  Source: .Surgical Swab None  Final Report (21 @ 17:24):    Numerous Methicillin resistant Staphylococcus aureus    Rare Corynebacterium species "Susceptibilities not performed"  Organism: Methicillin resistant Staphylococcus aureus (21 @ 17:24)  Organism: Methicillin resistant Staphylococcus aureus (21 @ 17:24)      -  Ampicillin/Sulbactam: R 16/8      -  Cefazolin: R 8      -  Clindamycin: S <=0.25      -  Daptomycin: S 0.5      -  Erythromycin: R >4      -  Gentamicin: S <=1 Should not be used as monotherapy      -  Linezolid: S 1      -  Oxacillin: R >2      -  Penicillin: R >8      -  RIF- Rifampin: S <=1 Should not be used as monotherapy      -  Tetra/Doxy: S <=1      -  Trimethoprim/Sulfamethoxazole: S <=0.5/9.5      -  Vancomycin: S 2      Method Type: DAVIDA    Culture - Urine (collected 21 @ 01:20)  Source: .Urine Clean Catch (Midstream)  Final Report (21 @ 08:11):    <10,000 CFU/mL Normal Urogenital Mary    Culture - Abscess with Gram Stain (collected 21 @ 19:51)  Source: .Abscess Right - Foot  Final Report (21 @ 09:25):    Moderate Methicillin resistant Staphylococcus aureus    Few Corynebacterium species "Susceptibilities not performed"  Organism: Methicillin resistant Staphylococcus aureus (21 @ 09:25)  Organism: Methicillin resistant Staphylococcus aureus (21 @ 09:25)      -  Ampicillin/Sulbactam: R 16/8      -  Cefazolin: R 8      -  Clindamycin: S <=0.25      -  Daptomycin: S 0.5      -  Erythromycin: R >4      -  Gentamicin: S <=1 Should not be used as monotherapy      -  Linezolid: S 2      -  Oxacillin: R >2      -  Penicillin: R >8      -  RIF- Rifampin: S <=1 Should not be used as monotherapy      -  Tetra/Doxy: S <=1      -  Trimethoprim/Sulfamethoxazole: S 1/19      -  Vancomycin: S 2      Method Type: DAVIDA    Culture - Other (collected 21 @ 18:32)  Source: .Other R foot DFU  Final Report (21 @ 15:44):    Moderate Methicillin resistant Staphylococcus aureus    Normal skin mary isolated  Organism: Methicillin resistant Staphylococcus aureus (21 @ 15:44)  Organism: Methicillin resistant Staphylococcus aureus (21 @ 15:44)      -  Ampicillin/Sulbactam: R 16/8      -  Cefazolin: R 16      -  Clindamycin: S <=0.25      -  Daptomycin: S 0.5      -  Erythromycin: R >4      -  Gentamicin: S <=1 Should not be used as monotherapy      -  Linezolid: S 2      -  Oxacillin: R >2      -  Penicillin: R >8      -  RIF- Rifampin: S <=1 Should not be used as monotherapy      -  Tetra/Doxy: S <=1      -  Trimethoprim/Sulfamethoxazole: S <=0.5/9.5      -  Vancomycin: S 2      Method Type: DAVIDA    Culture - Blood (collected 21 @ 18:28)  Source: .Blood Blood-Peripheral  Gram Stain (21 @ 18:30):    Growth in anaerobic bottle: Gram positive cocci in pairs    Growth in aerobic bottle: Gram positive cocci in pairs  Final Report (21 @ 15:45):    Growth in anaerobic bottle: Enterococcus faecalis    Growth in aerobic and anaerobic bottles: Methicillin resistant    Staphylococcus aureus    ***Blood Panel PCR results on this specimen are available    approximately 3 hours after the Gram stain result.***    Gram stain, PCR, and/or culture results may not always    correspond due to difference in methodologies.    ************************************************************    This PCR assay was performed by multiplex PCR. This    Assay tests for 66 bacterialand resistance gene targets.    Please refer to the [email or web address] test directory    at [email or web address] for details.  Organism: Blood Culture PCR  Enterococcus faecalis  Methicillin resistant Staphylococcus aureus (21 @ 15:45)  Organism: Methicillin resistant Staphylococcus aureus (21 @ 15:45)      -  Ampicillin/Sulbactam: R 16/8      -  Cefazolin: R 16      -  Clindamycin: S <=0.25      -  Daptomycin: S 0.5      -  Erythromycin: R >4      -  Gentamicin: S <=1 Should not be used as monotherapy      -  Linezolid: S 2      -  Oxacillin: R >2      -  Penicillin: R >8      -  RIF- Rifampin: S <=1 Should not be used as monotherapy      -  Tetra/Doxy: S <=1      -  Trimethoprim/Sulfamethoxazole: S <=0.5/9.5      -  Vancomycin: S 1      Method Type: DAVIDA  Organism: Enterococcus faecalis (21 @ 15:45)      -  Ampicillin: S <=2 Predicts results to ampicillin/sulbactam, amoxacillin-clavulanate and  piperacillin-tazobactam.      -  Gentamicin synergy: S      -  Vancomycin: S 1      Method Type: DAVIDA  Organism: Blood Culture PCR (21 @ 15:45)      -  Coagulase negative Staphylococcus: Detec      -  Enterococcus faecalis: Detec      Method Type: PCR    Culture - Blood (collected 21 @ 18:28)  Source: .Blood Blood-Peripheral  Gram Stain (21 @ 06:00):    Growth in aerobic bottle: Gram positive cocci in pairs    Growth in anaerobic bottle: Gram positive cocci in pairs  Final Report (21 @ 15:46):    Growth in aerobic and anaerobic bottles: Methicillin resistant    Staphylococcus aureus    See previous culture [id number]        Lactate, Blood: 0.8 mmol/L (21 @ 04:30)      INFECTIOUS DISEASES TESTING  COVID-19 PCR: NotDetec (21 @ 06:53)  MRSA PCR Result.: Positive (21 @ 09:26)  COVID-19 PCR: NotDetec (21 @ 18:28)      INFLAMMATORY MARKERS  Sedimentation Rate, Erythrocyte: 126 mm/Hr (21 @ 21:19)  C-Reactive Protein, Serum: 390 mg/L (21 @ 21:19)      RADIOLOGY & ADDITIONAL TESTS:  I have personally reviewed the last available Chest xray  CXR      CT      CARDIOLOGY TESTING  12 Lead ECG:   Ventricular Rate 104 BPM    Atrial Rate 105 BPM    P-R Interval 200 ms    QRS Duration 164 ms    Q-T Interval 392 ms    QTC Calculation(Bazett) 515 ms    P Axis 33 degrees    R Axis 263 degrees    T Axis 45 degrees    Diagnosis Line Sinus tachycardia  Right bundle branch block  Abnormal ECG    Confirmed by Cheko Lux ([id number]) on 2021 9:08:21 PM (21 @ 19:49)  12 Lead ECG:   Ventricular Rate 92 BPM    Atrial Rate 92 BPM    P-R Interval 208 ms    QRS Duration 162 ms    Q-T Interval 410 ms    QTC Calculation(Bazett) 507 ms    P Axis 38 degrees    R Axis 265 degrees    T Axis 49 degrees    Diagnosis Line Normal sinus rhythm  Right bundle branch block  Abnormal ECG    Confirmed by Cheko Lux ([id number]) on 2021 12:48:16 PM (21 @ 12:28)      MEDICATIONS  ALBUTerol    90 MICROgram(s) HFA Inhaler 1 Inhalation once  aspirin  chewable 81 Oral daily  chlorhexidine 0.12% Liquid 15 Oral Mucosa two times a day  chlorhexidine 4% Liquid 1 Topical <User Schedule>  DAPTOmycin IVPB 875 IV Intermittent every 48 hours  dextrose 40% Gel 15 Oral once  dextrose 5%. 1000 IV Continuous <Continuous>  dextrose 5%. 1000 IV Continuous <Continuous>  dextrose 50% Injectable 25 IV Push once  dextrose 50% Injectable 12.5 IV Push once  dextrose 50% Injectable 25 IV Push once  fentaNYL   Infusion. 0.5 IV Continuous <Continuous>  furosemide   Injectable 40 IV Push every 12 hours  glucagon  Injectable 1 IntraMuscular once  heparin   Injectable 5000 SubCutaneous every 8 hours  insulin lispro (ADMELOG) corrective regimen sliding scale  SubCutaneous three times a day before meals  insulin NPH human recombinant 15 SubCutaneous every 12 hours  metroNIDAZOLE  IVPB 500 IV Intermittent every 8 hours  norepinephrine Infusion 0.05 IV Continuous <Continuous>  nystatin Ointment 1 Topical two times a day  pantoprazole    Tablet 40 Oral before breakfast  polyethylene glycol 3350 17 Oral daily  propofol Infusion 5.004 IV Continuous <Continuous>  senna 2 Oral at bedtime  tiotropium 18 MICROgram(s) Capsule 1 Inhalation once      WEIGHT  Weight (kg): 164.2 (21 @ 17:38)  Creatinine, Serum: 2.3 mg/dL (21 @ 04:50)      ANTIBIOTICS:  DAPTOmycin IVPB 875 milliGRAM(s) IV Intermittent every 48 hours  metroNIDAZOLE  IVPB 500 milliGRAM(s) IV Intermittent every 8 hours      All available historical records have been reviewed

## 2021-06-10 LAB
ALBUMIN SERPL ELPH-MCNC: 2.3 G/DL — LOW (ref 3.5–5.2)
ALP SERPL-CCNC: 141 U/L — HIGH (ref 30–115)
ALT FLD-CCNC: 39 U/L — SIGNIFICANT CHANGE UP (ref 0–41)
ANION GAP SERPL CALC-SCNC: 6 MMOL/L — LOW (ref 7–14)
ANION GAP SERPL CALC-SCNC: 8 MMOL/L — SIGNIFICANT CHANGE UP (ref 7–14)
APTT BLD: 32.6 SEC — SIGNIFICANT CHANGE UP (ref 27–39.2)
AST SERPL-CCNC: 100 U/L — HIGH (ref 0–41)
BASOPHILS # BLD AUTO: 0.06 K/UL — SIGNIFICANT CHANGE UP (ref 0–0.2)
BASOPHILS NFR BLD AUTO: 0.4 % — SIGNIFICANT CHANGE UP (ref 0–1)
BILIRUB SERPL-MCNC: 0.3 MG/DL — SIGNIFICANT CHANGE UP (ref 0.2–1.2)
BLD GP AB SCN SERPL QL: SIGNIFICANT CHANGE UP
BUN SERPL-MCNC: 61 MG/DL — CRITICAL HIGH (ref 10–20)
BUN SERPL-MCNC: 64 MG/DL — CRITICAL HIGH (ref 10–20)
CALCIUM SERPL-MCNC: 8.1 MG/DL — LOW (ref 8.5–10.1)
CALCIUM SERPL-MCNC: 8.1 MG/DL — LOW (ref 8.5–10.1)
CHLORIDE SERPL-SCNC: 114 MMOL/L — HIGH (ref 98–110)
CHLORIDE SERPL-SCNC: 115 MMOL/L — HIGH (ref 98–110)
CK SERPL-CCNC: 341 U/L — HIGH (ref 0–225)
CO2 SERPL-SCNC: 31 MMOL/L — SIGNIFICANT CHANGE UP (ref 17–32)
CO2 SERPL-SCNC: 32 MMOL/L — SIGNIFICANT CHANGE UP (ref 17–32)
CREAT SERPL-MCNC: 2.1 MG/DL — HIGH (ref 0.7–1.5)
CREAT SERPL-MCNC: 2.1 MG/DL — HIGH (ref 0.7–1.5)
D DIMER BLD IA.RAPID-MCNC: 2558 NG/ML DDU — HIGH (ref 0–230)
EOSINOPHIL # BLD AUTO: 0.32 K/UL — SIGNIFICANT CHANGE UP (ref 0–0.7)
EOSINOPHIL NFR BLD AUTO: 2.3 % — SIGNIFICANT CHANGE UP (ref 0–8)
ERYTHROCYTE [SEDIMENTATION RATE] IN BLOOD: 58 MM/HR — HIGH (ref 0–10)
GAS PNL BLDA: SIGNIFICANT CHANGE UP
GLUCOSE BLDC GLUCOMTR-MCNC: 241 MG/DL — HIGH (ref 70–99)
GLUCOSE BLDC GLUCOMTR-MCNC: 246 MG/DL — HIGH (ref 70–99)
GLUCOSE BLDC GLUCOMTR-MCNC: 250 MG/DL — HIGH (ref 70–99)
GLUCOSE BLDC GLUCOMTR-MCNC: 267 MG/DL — HIGH (ref 70–99)
GLUCOSE SERPL-MCNC: 246 MG/DL — HIGH (ref 70–99)
GLUCOSE SERPL-MCNC: 247 MG/DL — HIGH (ref 70–99)
HCT VFR BLD CALC: 27.9 % — LOW (ref 42–52)
HGB BLD-MCNC: 8.1 G/DL — LOW (ref 14–18)
IMM GRANULOCYTES NFR BLD AUTO: 0.9 % — HIGH (ref 0.1–0.3)
INR BLD: 1.41 RATIO — HIGH (ref 0.65–1.3)
LYMPHOCYTES # BLD AUTO: 14.7 % — LOW (ref 20.5–51.1)
LYMPHOCYTES # BLD AUTO: 2.02 K/UL — SIGNIFICANT CHANGE UP (ref 1.2–3.4)
MAGNESIUM SERPL-MCNC: 2.5 MG/DL — HIGH (ref 1.8–2.4)
MCHC RBC-ENTMCNC: 26.7 PG — LOW (ref 27–31)
MCHC RBC-ENTMCNC: 29 G/DL — LOW (ref 32–37)
MCV RBC AUTO: 92.1 FL — SIGNIFICANT CHANGE UP (ref 80–94)
MONOCYTES # BLD AUTO: 1.54 K/UL — HIGH (ref 0.1–0.6)
MONOCYTES NFR BLD AUTO: 11.2 % — HIGH (ref 1.7–9.3)
NEUTROPHILS # BLD AUTO: 9.67 K/UL — HIGH (ref 1.4–6.5)
NEUTROPHILS NFR BLD AUTO: 70.5 % — SIGNIFICANT CHANGE UP (ref 42.2–75.2)
NRBC # BLD: 0 /100 WBCS — SIGNIFICANT CHANGE UP (ref 0–0)
PHOSPHATE SERPL-MCNC: 2.1 MG/DL — SIGNIFICANT CHANGE UP (ref 2.1–4.9)
PLATELET # BLD AUTO: 286 K/UL — SIGNIFICANT CHANGE UP (ref 130–400)
POTASSIUM SERPL-MCNC: 4 MMOL/L — SIGNIFICANT CHANGE UP (ref 3.5–5)
POTASSIUM SERPL-MCNC: 4.1 MMOL/L — SIGNIFICANT CHANGE UP (ref 3.5–5)
POTASSIUM SERPL-SCNC: 4 MMOL/L — SIGNIFICANT CHANGE UP (ref 3.5–5)
POTASSIUM SERPL-SCNC: 4.1 MMOL/L — SIGNIFICANT CHANGE UP (ref 3.5–5)
PROT SERPL-MCNC: 7.9 G/DL — SIGNIFICANT CHANGE UP (ref 6–8)
PROTHROM AB SERPL-ACNC: 16.2 SEC — HIGH (ref 9.95–12.87)
RBC # BLD: 3.03 M/UL — LOW (ref 4.7–6.1)
RBC # FLD: 18.7 % — HIGH (ref 11.5–14.5)
SARS-COV-2 RNA SPEC QL NAA+PROBE: SIGNIFICANT CHANGE UP
SODIUM SERPL-SCNC: 152 MMOL/L — HIGH (ref 135–146)
SODIUM SERPL-SCNC: 154 MMOL/L — HIGH (ref 135–146)
WBC # BLD: 13.74 K/UL — HIGH (ref 4.8–10.8)
WBC # FLD AUTO: 13.74 K/UL — HIGH (ref 4.8–10.8)

## 2021-06-10 PROCEDURE — 99291 CRITICAL CARE FIRST HOUR: CPT

## 2021-06-10 PROCEDURE — 93970 EXTREMITY STUDY: CPT | Mod: 26

## 2021-06-10 PROCEDURE — 93010 ELECTROCARDIOGRAM REPORT: CPT

## 2021-06-10 PROCEDURE — 71045 X-RAY EXAM CHEST 1 VIEW: CPT | Mod: 26

## 2021-06-10 RX ORDER — SODIUM CHLORIDE 9 MG/ML
1000 INJECTION, SOLUTION INTRAVENOUS
Refills: 0 | Status: DISCONTINUED | OUTPATIENT
Start: 2021-06-10 | End: 2021-06-11

## 2021-06-10 RX ADMIN — SODIUM CHLORIDE 50 MILLILITER(S): 9 INJECTION, SOLUTION INTRAVENOUS at 08:30

## 2021-06-10 RX ADMIN — Medication 4: at 05:01

## 2021-06-10 RX ADMIN — MEROPENEM 100 MILLIGRAM(S): 1 INJECTION INTRAVENOUS at 05:02

## 2021-06-10 RX ADMIN — HUMAN INSULIN 15 UNIT(S): 100 INJECTION, SUSPENSION SUBCUTANEOUS at 05:02

## 2021-06-10 RX ADMIN — Medication 650 MILLIGRAM(S): at 23:00

## 2021-06-10 RX ADMIN — HUMAN INSULIN 15 UNIT(S): 100 INJECTION, SUSPENSION SUBCUTANEOUS at 17:19

## 2021-06-10 RX ADMIN — PANTOPRAZOLE SODIUM 40 MILLIGRAM(S): 20 TABLET, DELAYED RELEASE ORAL at 12:07

## 2021-06-10 RX ADMIN — Medication 81 MILLIGRAM(S): at 12:08

## 2021-06-10 RX ADMIN — CASPOFUNGIN ACETATE 260 MILLIGRAM(S): 7 INJECTION, POWDER, LYOPHILIZED, FOR SOLUTION INTRAVENOUS at 13:46

## 2021-06-10 RX ADMIN — Medication 650 MILLIGRAM(S): at 21:13

## 2021-06-10 RX ADMIN — Medication 4: at 17:19

## 2021-06-10 RX ADMIN — Medication 650 MILLIGRAM(S): at 15:00

## 2021-06-10 RX ADMIN — POLYETHYLENE GLYCOL 3350 17 GRAM(S): 17 POWDER, FOR SOLUTION ORAL at 12:08

## 2021-06-10 RX ADMIN — CHLORHEXIDINE GLUCONATE 15 MILLILITER(S): 213 SOLUTION TOPICAL at 17:14

## 2021-06-10 RX ADMIN — NYSTATIN CREAM 1 APPLICATION(S): 100000 CREAM TOPICAL at 17:14

## 2021-06-10 RX ADMIN — NYSTATIN CREAM 1 APPLICATION(S): 100000 CREAM TOPICAL at 05:03

## 2021-06-10 RX ADMIN — Medication 6: at 12:09

## 2021-06-10 RX ADMIN — Medication 40 MILLIGRAM(S): at 05:02

## 2021-06-10 RX ADMIN — HEPARIN SODIUM 5000 UNIT(S): 5000 INJECTION INTRAVENOUS; SUBCUTANEOUS at 13:06

## 2021-06-10 RX ADMIN — HEPARIN SODIUM 5000 UNIT(S): 5000 INJECTION INTRAVENOUS; SUBCUTANEOUS at 05:02

## 2021-06-10 RX ADMIN — HEPARIN SODIUM 5000 UNIT(S): 5000 INJECTION INTRAVENOUS; SUBCUTANEOUS at 21:13

## 2021-06-10 RX ADMIN — CHLORHEXIDINE GLUCONATE 1 APPLICATION(S): 213 SOLUTION TOPICAL at 05:03

## 2021-06-10 RX ADMIN — SENNA PLUS 2 TABLET(S): 8.6 TABLET ORAL at 21:14

## 2021-06-10 RX ADMIN — CHLORHEXIDINE GLUCONATE 15 MILLILITER(S): 213 SOLUTION TOPICAL at 05:02

## 2021-06-10 RX ADMIN — Medication 4: at 00:33

## 2021-06-10 RX ADMIN — Medication 650 MILLIGRAM(S): at 14:09

## 2021-06-10 RX ADMIN — FENTANYL CITRATE 8.21 MICROGRAM(S)/KG/HR: 50 INJECTION INTRAVENOUS at 00:25

## 2021-06-10 RX ADMIN — MEROPENEM 100 MILLIGRAM(S): 1 INJECTION INTRAVENOUS at 17:14

## 2021-06-10 RX ADMIN — PROPOFOL 4.93 MICROGRAM(S)/KG/MIN: 10 INJECTION, EMULSION INTRAVENOUS at 14:09

## 2021-06-10 NOTE — PROGRESS NOTE ADULT - SUBJECTIVE AND OBJECTIVE BOX
Patient is a 60y old  Male who presents with a chief complaint of DFU (09 Jun 2021 12:43)      Over Night Events:  Patient seen and examined.   failed weaning trial yesterday   still has fever  wbc stable   ROS:  See HPI    PHYSICAL EXAM    ICU Vital Signs Last 24 Hrs  T(C): 38.9 (10 Juan F 2021 06:00), Max: 39.5 (09 Jun 2021 18:00)  T(F): 102.1 (10 Juan F 2021 06:00), Max: 103.1 (09 Jun 2021 18:00)  HR: 82 (10 Juan F 2021 06:00) (78 - 98)  BP: --  BP(mean): --  ABP: 128/48 (10 Juan F 2021 06:00) (92/40 - 155/686)  ABP(mean): 70 (10 Juan F 2021 06:00) (54 - 91)  RR: 18 (10 Juan F 2021 06:00) (17 - 28)  SpO2: 98% (10 Juan F 2021 06:00) (97% - 99%)      General: awake follow command   HEENT:    et tube             Lymph Nodes: NO cervical LN   Lungs: Bilateral BS  Cardiovascular: Regular   Abdomen: Soft, Positive BS  Extremities: No clubbing   Skin: warm   Neurological: no focal   Musculoskeletal: move all ext     I&O's Detail    09 Jun 2021 07:01  -  10 Juan F 2021 07:00  --------------------------------------------------------  IN:    Enteral Tube Flush: 190 mL    FentaNYL: 377.2 mL    IV PiggyBack: 350 mL    Norepinephrine: 60 mL    Propofol: 184 mL    Vital High Protein: 1320 mL  Total IN: 2481.2 mL    OUT:    Indwelling Catheter - Urethral (mL): 3895 mL  Total OUT: 3895 mL    Total NET: -1413.8 mL          LABS:                          8.1    13.74 )-----------( 286      ( 10 Juan F 2021 04:20 )             27.9         10 Juan F 2021 04:20    154    |  115    |  61     ----------------------------<  247    4.1     |  31     |  2.1      Ca    8.1        10 Juan F 2021 04:20  Phos  2.1       10 Juan F 2021 04:20  Mg     2.5       10 Juan F 2021 04:20    TPro  7.9    /  Alb  2.3    /  TBili  0.3    /  DBili  x      /  AST  100    /  ALT  39     /  AlkPhos  141    10 Juan F 2021 04:20  Amylase x     lipase x                                                 PT/INR - ( 10 Juan F 2021 04:20 )   PT: 16.20 sec;   INR: 1.41 ratio         PTT - ( 10 Juan F 2021 04:20 )  PTT:32.6 sec                                             CARDIAC MARKERS ( 10 Juan F 2021 04:20 )  x     / x     / 341 U/L / x     / x      CARDIAC MARKERS ( 09 Jun 2021 04:50 )  x     / x     / 658 U/L / x     / x      CARDIAC MARKERS ( 08 Jun 2021 16:46 )  x     / x     / 1049 U/L / x     / x                                                            Culture - Blood (collected 07 Jun 2021 11:00)  Source: .Blood Blood  Preliminary Report (08 Jun 2021 22:02):    No growth to date.                                                   Mode: AC/ CMV (Assist Control/ Continuous Mandatory Ventilation)  RR (machine): 18  TV (machine): 480  FiO2: 30  PEEP: 5  ITime: 1  MAP: 14  PIP: 28                                      ABG - ( 10 Juan F 2021 03:15 )  pH, Arterial: 7.44  pH, Blood: x     /  pCO2: 49    /  pO2: 79    / HCO3: 33    / Base Excess: 8.2   /  SaO2: 96                  MEDICATIONS  (STANDING):  ALBUTerol    90 MICROgram(s) HFA Inhaler 1 Puff(s) Inhalation once  aspirin  chewable 81 milliGRAM(s) Oral daily  caspofungin IVPB      caspofungin IVPB 50 milliGRAM(s) IV Intermittent every 24 hours  chlorhexidine 0.12% Liquid 15 milliLiter(s) Oral Mucosa two times a day  chlorhexidine 4% Liquid 1 Application(s) Topical <User Schedule>  DAPTOmycin IVPB 875 milliGRAM(s) IV Intermittent every 48 hours  dextrose 40% Gel 15 Gram(s) Oral once  dextrose 5%. 1000 milliLiter(s) (50 mL/Hr) IV Continuous <Continuous>  dextrose 5%. 1000 milliLiter(s) (100 mL/Hr) IV Continuous <Continuous>  dextrose 50% Injectable 25 Gram(s) IV Push once  dextrose 50% Injectable 12.5 Gram(s) IV Push once  dextrose 50% Injectable 25 Gram(s) IV Push once  fentaNYL   Infusion. 0.5 MICROgram(s)/kG/Hr (8.21 mL/Hr) IV Continuous <Continuous>  furosemide   Injectable 40 milliGRAM(s) IV Push every 24 hours  glucagon  Injectable 1 milliGRAM(s) IntraMuscular once  heparin   Injectable 5000 Unit(s) SubCutaneous every 8 hours  insulin lispro (ADMELOG) corrective regimen sliding scale   SubCutaneous every 6 hours  insulin NPH human recombinant 15 Unit(s) SubCutaneous every 12 hours  meropenem  IVPB 1000 milliGRAM(s) IV Intermittent every 12 hours  norepinephrine Infusion 0.05 MICROgram(s)/kG/Min (7.7 mL/Hr) IV Continuous <Continuous>  nystatin Ointment 1 Application(s) Topical two times a day  pantoprazole    Tablet 40 milliGRAM(s) Oral before breakfast  polyethylene glycol 3350 17 Gram(s) Oral daily  propofol Infusion 5.004 MICROgram(s)/kG/Min (4.93 mL/Hr) IV Continuous <Continuous>  senna 2 Tablet(s) Oral at bedtime  tiotropium 18 MICROgram(s) Capsule 1 Capsule(s) Inhalation once    MEDICATIONS  (PRN):  acetaminophen   Tablet .. 650 milliGRAM(s) Oral every 6 hours PRN Temp greater or equal to 38C (100.4F), Mild Pain (1 - 3)          Xrays:  TLC:  OG:  ET tube:                                                                                    mild congestion    ECHO:  CAM ICU:

## 2021-06-10 NOTE — PROGRESS NOTE ADULT - ASSESSMENT
IMPRESSION    Acute Hypoxemic respiratory failure with hypercapnia s/p intubation on 40%  Septic Shock 2/2 to Bacteremia from DFU (Enterococcus,  Staph Aureus ) repeat cx neg  DFU s/p Right Knee Disarticulation for necrotizing soft tissue infection  CANDY on CKD s/p CVVHD sp dc u dall  AMS 2/2 Toxic-Metabolic Encephelopathy s/p CT head neg for acute pathology  MATA/ OHS  Morbid obesity  NSTEMI  hypernatremia   PLAN:    CNS: SAT.  awake  follow command  can not fit on MRI machine      HEENT: ETT care and Oral care    PULMONARY: keep same vent setting going to OR zee     CARDIOVASCULAR:  levophed keep map 65    is < os    lasix to 40 mg Q24hrs   start [id number]/ hr     GI: GI prophylaxis.  OG Feeding as tolerated start free water 250cc Q 4 hrs     RENAL: F/U nephrology , IV lasix    INFECTIOUS DISEASE:  ABX as per ID  caspofungin was added   follow ck level   follow cx    f/u repeat cultures neg  bld cx today   tlc was changed     HEMATOLOGICAL:  DVT prophylaxis. Transfuse to keep Hb >7. Maintain active Type and screen.  follow h/h     ENDOCRINE:  Follow up FS.  Insulin protocol if needed.    MUSCULOSKELETAL: Bedrest. f/u vascular surgery follow surgery   Poor prognosis  Palliative care f/u  d/c Kortney

## 2021-06-10 NOTE — PROGRESS NOTE ADULT - SUBJECTIVE AND OBJECTIVE BOX
Nephrology progress note    THIS IS AN INCOMPLETE NOTE . FULL NOTE TO FOLLOW SHORTLY    Patient is seen and examined, events over the last 24 h noted .    Allergies:  No Known Allergies  statins (Other)    Hospital Medications:   MEDICATIONS  (STANDING):  ALBUTerol    90 MICROgram(s) HFA Inhaler 1 Puff(s) Inhalation once  aspirin  chewable 81 milliGRAM(s) Oral daily  caspofungin IVPB      caspofungin IVPB 50 milliGRAM(s) IV Intermittent every 24 hours  chlorhexidine 0.12% Liquid 15 milliLiter(s) Oral Mucosa two times a day  chlorhexidine 4% Liquid 1 Application(s) Topical <User Schedule>  DAPTOmycin IVPB 875 milliGRAM(s) IV Intermittent every 48 hours  dextrose 40% Gel 15 Gram(s) Oral once  dextrose 5%. 1000 milliLiter(s) (50 mL/Hr) IV Continuous <Continuous>  dextrose 5%. 1000 milliLiter(s) (50 mL/Hr) IV Continuous <Continuous>  dextrose 5%. 1000 milliLiter(s) (100 mL/Hr) IV Continuous <Continuous>  dextrose 50% Injectable 25 Gram(s) IV Push once  dextrose 50% Injectable 12.5 Gram(s) IV Push once  dextrose 50% Injectable 25 Gram(s) IV Push once  fentaNYL   Infusion. 0.5 MICROgram(s)/kG/Hr (8.21 mL/Hr) IV Continuous <Continuous>  furosemide   Injectable 40 milliGRAM(s) IV Push every 24 hours  glucagon  Injectable 1 milliGRAM(s) IntraMuscular once  heparin   Injectable 5000 Unit(s) SubCutaneous every 8 hours  insulin lispro (ADMELOG) corrective regimen sliding scale   SubCutaneous every 6 hours  insulin NPH human recombinant 15 Unit(s) SubCutaneous every 12 hours  meropenem  IVPB 1000 milliGRAM(s) IV Intermittent every 12 hours  norepinephrine Infusion 0.05 MICROgram(s)/kG/Min (7.7 mL/Hr) IV Continuous <Continuous>  nystatin Ointment 1 Application(s) Topical two times a day  pantoprazole    Tablet 40 milliGRAM(s) Oral before breakfast  polyethylene glycol 3350 17 Gram(s) Oral daily  propofol Infusion 5.004 MICROgram(s)/kG/Min (4.93 mL/Hr) IV Continuous <Continuous>  senna 2 Tablet(s) Oral at bedtime  tiotropium 18 MICROgram(s) Capsule 1 Capsule(s) Inhalation once        VITALS:  T(F): 101.9 (06-10-21 @ 08:00), Max: 103.1 (21 @ 18:00)  HR: 82 (06-10-21 @ 08:00)  BP: --  RR: 18 (06-10-21 @ 08:00)  SpO2: 97% (06-10-21 @ 08:00)  Wt(kg): --     @ 07:01  -   07:00  --------------------------------------------------------  IN: 2313 mL / OUT: 5265 mL / NET: -2952 mL     @ 07:01  -  06-10 @ 07:00  --------------------------------------------------------  IN: 2481.2 mL / OUT: 3895 mL / NET: -1413.8 mL    06-10 @ 07:01  -  06-10 @ 08:57  --------------------------------------------------------  IN: 85.4 mL / OUT: 280 mL / NET: -194.6 mL          PHYSICAL EXAM:  Constitutional: NAD  HEENT: anicteric sclera, oropharynx clear, MMM  Neck: No JVD  Respiratory: CTAB, no wheezes, rales or rhonchi  Cardiovascular: S1, S2, RRR  Gastrointestinal: BS+, soft, NT/ND  Extremities: No cyanosis or clubbing. No peripheral edema  :  No terrazas.   Skin: No rashes    LABS:  06-10    154<H>  |  115<H>  |  61<HH>  ----------------------------<  247<H>  4.1   |  31  |  2.1<H>    Ca    8.1<L>      10 Juan F 2021 04:20  Phos  2.1     06-10  Mg     2.5     -10    TPro  7.9  /  Alb  2.3<L>  /  TBili  0.3  /  DBili      /  AST  100<H>  /  ALT  39  /  AlkPhos  141<H>  06-10                          8.1    13.74 )-----------( 286      ( 10 Juan F 2021 04:20 )             27.9       Urine Studies:  Urinalysis Basic - ( 2021 12:43 )    Color: Yellow / Appearance: Slightly Turbid / S.020 / pH:   Gluc:  / Ketone: Negative  / Bili: Negative / Urobili: <2 mg/dL   Blood:  / Protein: 100 mg/dL / Nitrite: Negative   Leuk Esterase: Negative / RBC: 35 /HPF / WBC 4 /HPF   Sq Epi:  / Non Sq Epi: 1 /HPF / Bacteria: Negative        RADIOLOGY & ADDITIONAL STUDIES:   Nephrology progress note  Patient is seen and examined, events over the last 24 h noted .  still intubated on MV     Allergies:  No Known Allergies  statins (Other)    Hospital Medications:   MEDICATIONS  (STANDING):  ALBUTerol    90 MICROgram(s) HFA Inhaler 1 Puff(s) Inhalation once  aspirin  chewable 81 milliGRAM(s) Oral daily  caspofungin IVPB 50 milliGRAM(s) IV Intermittent every 24 hours  DAPTOmycin IVPB 875 milliGRAM(s) IV Intermittent every 48 hours  dextrose 40% Gel 15 Gram(s) Oral once  fentaNYL   Infusion. 0.5 MICROgram(s)/kG/Hr (8.21 mL/Hr) IV Continuous <Continuous>  furosemide   Injectable 40 milliGRAM(s) IV Push every 24 hours  glucagon  Injectable 1 milliGRAM(s) IntraMuscular once  heparin   Injectable 5000 Unit(s) SubCutaneous every 8 hours  insulin lispro (ADMELOG) corrective regimen sliding scale   SubCutaneous every 6 hours  insulin NPH human recombinant 15 Unit(s) SubCutaneous every 12 hours  meropenem  IVPB 1000 milliGRAM(s) IV Intermittent every 12 hours  norepinephrine Infusion 0.05 MICROgram(s)/kG/Min (7.7 mL/Hr) IV Continuous <Continuous>  nystatin Ointment 1 Application(s) Topical two times a day  pantoprazole    Tablet 40 milliGRAM(s) Oral before breakfast  polyethylene glycol 3350 17 Gram(s) Oral daily  propofol Infusion 5.004 MICROgram(s)/kG/Min (4.93 mL/Hr) IV Continuous <Continuous>  senna 2 Tablet(s) Oral at bedtime  tiotropium 18 MICROgram(s) Capsule 1 Capsule(s) Inhalation once        VITALS:  T(F): 101.9 (06-10-21 @ 08:00), Max: 103.1 (21 @ 18:00)  HR: 82 (06-10-21 @ 08:00)  RR: 18 (06-10-21 @ 08:00)  SpO2: 97% (06-10-21 @ 08:00)       @ 07:  -   @ 07:00  --------------------------------------------------------  IN: 2313 mL / OUT: 5265 mL / NET: -2952 mL     @ 07:01  -  06-10 @ 07:00  --------------------------------------------------------  IN: 2481.2 mL / OUT: 3895 mL / NET: -1413.8 mL    06-10 @ 07:01  -  06-10 @ 08:57  --------------------------------------------------------  IN: 85.4 mL / OUT: 280 mL / NET: -194.6 mL          PHYSICAL EXAM:  Constitutional: NAD  HEENT: intubated on MV   Neck: No JVD  Respiratory: CTAB   Cardiovascular: S1, S2, RRR  Gastrointestinal: BS+, soft, NT/ND  Extremities: No cyanosis or clubbing. No peripheral edema/ right BKA   :   terrazas.   Skin: No rashes    LABS:  06-10    154<H>  |  115<H>  |  61<HH>  ----------------------------<  247<H>  4.1   |  31  |  2.1<H>    Ca    8.1<L>      10 Juan F 2021 04:20  Phos  2.1     06-10  Mg     2.5     06-10    TPro  7.9  /  Alb  2.3<L>  /  TBili  0.3  /  DBili      /  AST  100<H>  /  ALT  39  /  AlkPhos  141<H>  06-10                          8.1    13.74 )-----------( 286      ( 10 Juan F 2021 04:20 )             27.9       Urine Studies:  Urinalysis Basic - ( 2021 12:43 )    Color: Yellow / Appearance: Slightly Turbid / S.020 / pH:   Gluc:  / Ketone: Negative  / Bili: Negative / Urobili: <2 mg/dL   Blood:  / Protein: 100 mg/dL / Nitrite: Negative   Leuk Esterase: Negative / RBC: 35 /HPF / WBC 4 /HPF   Sq Epi:  / Non Sq Epi: 1 /HPF / Bacteria: Negative        RADIOLOGY & ADDITIONAL STUDIES:

## 2021-06-10 NOTE — PRE-ANESTHESIA EVALUATION ADULT - NSANTHPMHFT_GEN_ALL_CORE
Chart reviewed, Pt on the vent, pt examined. Labs, EKG seen. Chart reviewed, Pulmonary and Renal evaluation and F/U notes seen. Pt on the vent, pt examined. Labs, EKG seen.

## 2021-06-10 NOTE — PROGRESS NOTE ADULT - SUBJECTIVE AND OBJECTIVE BOX
THOMAS FERNANDEZ  60y, Male  Allergy: No Known Allergies  statins (Other)      LOS  12d    CHIEF COMPLAINT: dfu (10 Juan F 2021 08:56)      INTERVAL EVENTS/HPI  - continued to spike fever  - T(F): , Max: 103.1 (06-09-21 @ 18:00)  - WBC Count: 13.74 (06-10-21 @ 04:20)  WBC Count: 13.57 (06-09-21 @ 04:50)  - Creatinine, Serum: 2.1 (06-10-21 @ 04:20)  Creatinine, Serum: 2.3 (06-09-21 @ 04:50)       ROS  unable to obtain history secondary to patient's mental status and/or sedation     VITALS:  T(F): 101.9, Max: 103.1 (06-09-21 @ 18:00)  HR: 82  BP: --  RR: 18Vital Signs Last 24 Hrs  T(C): 38.8 (10 Juan F 2021 08:00), Max: 39.5 (09 Jun 2021 18:00)  T(F): 101.9 (10 Juan F 2021 08:00), Max: 103.1 (09 Jun 2021 18:00)  HR: 82 (10 Juan F 2021 08:00) (78 - 98)  BP: --  BP(mean): --  RR: 18 (10 Juan F 2021 08:00) (17 - 28)  SpO2: 97% (10 Juan F 2021 08:00) (97% - 99%)    PHYSICAL EXAM:  ***    FH: Non-contributory  Social Hx: Non-contributory    TESTS & MEASUREMENTS:                        8.1    13.74 )-----------( 286      ( 10 Juan F 2021 04:20 )             27.9     06-10    154<H>  |  115<H>  |  61<HH>  ----------------------------<  247<H>  4.1   |  31  |  2.1<H>    Ca    8.1<L>      10 Juan F 2021 04:20  Phos  2.1     06-10  Mg     2.5     06-10    TPro  7.9  /  Alb  2.3<L>  /  TBili  0.3  /  DBili  x   /  AST  100<H>  /  ALT  39  /  AlkPhos  141<H>  06-10    eGFR if Non African American: 33 mL/min/1.73M2 (06-10-21 @ 04:20)  eGFR if African American: 38 mL/min/1.73M2 (06-10-21 @ 04:20)    LIVER FUNCTIONS - ( 10 Juan F 2021 04:20 )  Alb: 2.3 g/dL / Pro: 7.9 g/dL / ALK PHOS: 141 U/L / ALT: 39 U/L / AST: 100 U/L / GGT: x               Culture - Blood (collected 06-07-21 @ 11:00)  Source: .Blood Blood  Preliminary Report (06-08-21 @ 22:02):    No growth to date.    Culture - Blood (collected 06-04-21 @ 01:00)  Source: .Blood None  Final Report (06-09-21 @ 08:01):    No Growth Final    Culture - Blood (collected 05-30-21 @ 22:09)  Source: .Blood Blood-Peripheral  Final Report (06-05-21 @ 08:01):    No Growth Final    Culture - Blood (collected 05-30-21 @ 22:09)  Source: .Blood Blood-Peripheral  Final Report (06-05-21 @ 08:01):    No Growth Final    Culture - Acid Fast - Other w/Smear (collected 05-29-21 @ 06:10)  Source: .Other None(R-FOOT)  Preliminary Report (06-09-21 @ 15:02):    No growth at 1 week.    Culture - Surgical Swab (collected 05-29-21 @ 06:10)  Source: .Surgical Swab None  Final Report (06-03-21 @ 16:27):    No growth at 5 days    Culture - Acid Fast - Other w/Smear (collected 05-29-21 @ 06:10)  Source: .Other None (R-FOOT)  Preliminary Report (06-09-21 @ 15:02):    No growth at 1 week.    Culture - Surgical Swab (collected 05-29-21 @ 06:10)  Source: .Surgical Swab None  Final Report (06-03-21 @ 17:24):    Numerous Methicillin resistant Staphylococcus aureus    Rare Corynebacterium species "Susceptibilities not performed"  Organism: Methicillin resistant Staphylococcus aureus (06-03-21 @ 17:24)  Organism: Methicillin resistant Staphylococcus aureus (06-03-21 @ 17:24)      -  Ampicillin/Sulbactam: R 16/8      -  Cefazolin: R 8      -  Clindamycin: S <=0.25      -  Daptomycin: S 0.5      -  Erythromycin: R >4      -  Gentamicin: S <=1 Should not be used as monotherapy      -  Linezolid: S 1      -  Oxacillin: R >2      -  Penicillin: R >8      -  RIF- Rifampin: S <=1 Should not be used as monotherapy      -  Tetra/Doxy: S <=1      -  Trimethoprim/Sulfamethoxazole: S <=0.5/9.5      -  Vancomycin: S 2      Method Type: DAVIDA    Culture - Urine (collected 05-29-21 @ 01:20)  Source: .Urine Clean Catch (Midstream)  Final Report (05-30-21 @ 08:11):    <10,000 CFU/mL Normal Urogenital Mary    Culture - Abscess with Gram Stain (collected 05-28-21 @ 19:51)  Source: .Abscess Right - Foot  Final Report (06-03-21 @ 09:25):    Moderate Methicillin resistant Staphylococcus aureus    Few Corynebacterium species "Susceptibilities not performed"  Organism: Methicillin resistant Staphylococcus aureus (06-03-21 @ 09:25)  Organism: Methicillin resistant Staphylococcus aureus (06-03-21 @ 09:25)      -  Ampicillin/Sulbactam: R 16/8      -  Cefazolin: R 8      -  Clindamycin: S <=0.25      -  Daptomycin: S 0.5      -  Erythromycin: R >4      -  Gentamicin: S <=1 Should not be used as monotherapy      -  Linezolid: S 2      -  Oxacillin: R >2      -  Penicillin: R >8      -  RIF- Rifampin: S <=1 Should not be used as monotherapy      -  Tetra/Doxy: S <=1      -  Trimethoprim/Sulfamethoxazole: S 1/19      -  Vancomycin: S 2      Method Type: DAVIDA    Culture - Other (collected 05-28-21 @ 18:32)  Source: .Other R foot DFU  Final Report (05-30-21 @ 15:44):    Moderate Methicillin resistant Staphylococcus aureus    Normal skin mary isolated  Organism: Methicillin resistant Staphylococcus aureus (05-30-21 @ 15:44)  Organism: Methicillin resistant Staphylococcus aureus (05-30-21 @ 15:44)      -  Ampicillin/Sulbactam: R 16/8      -  Cefazolin: R 16      -  Clindamycin: S <=0.25      -  Daptomycin: S 0.5      -  Erythromycin: R >4      -  Gentamicin: S <=1 Should not be used as monotherapy      -  Linezolid: S 2      -  Oxacillin: R >2      -  Penicillin: R >8      -  RIF- Rifampin: S <=1 Should not be used as monotherapy      -  Tetra/Doxy: S <=1      -  Trimethoprim/Sulfamethoxazole: S <=0.5/9.5      -  Vancomycin: S 2      Method Type: DAVIDA    Culture - Blood (collected 05-28-21 @ 18:28)  Source: .Blood Blood-Peripheral  Gram Stain (05-29-21 @ 18:30):    Growth in anaerobic bottle: Gram positive cocci in pairs    Growth in aerobic bottle: Gram positive cocci in pairs  Final Report (05-31-21 @ 15:45):    Growth in anaerobic bottle: Enterococcus faecalis    Growth in aerobic and anaerobic bottles: Methicillin resistant    Staphylococcus aureus    ***Blood Panel PCR results on this specimen are available    approximately 3 hours after the Gram stain result.***    Gram stain, PCR, and/or culture results may not always    correspond due to difference in methodologies.    ************************************************************    This PCR assay was performed by multiplex PCR. This    Assay tests for 66 bacterialand resistance gene targets.    Please refer to the [facility] test directory    at [email or web address] for details.  Organism: Blood Culture PCR  Enterococcus faecalis  Methicillin resistant Staphylococcus aureus (05-31-21 @ 15:45)  Organism: Methicillin resistant Staphylococcus aureus (05-31-21 @ 15:45)      -  Ampicillin/Sulbactam: R 16/8      -  Cefazolin: R 16      -  Clindamycin: S <=0.25      -  Daptomycin: S 0.5      -  Erythromycin: R >4      -  Gentamicin: S <=1 Should not be used as monotherapy      -  Linezolid: S 2      -  Oxacillin: R >2      -  Penicillin: R >8      -  RIF- Rifampin: S <=1 Should not be used as monotherapy      -  Tetra/Doxy: S <=1      -  Trimethoprim/Sulfamethoxazole: S <=0.5/9.5      -  Vancomycin: S 1      Method Type: DAVIDA  Organism: Enterococcus faecalis (05-31-21 @ 15:45)      -  Ampicillin: S <=2 Predicts results to ampicillin/sulbactam, amoxacillin-clavulanate and  piperacillin-tazobactam.      -  Gentamicin synergy: S      -  Vancomycin: S 1      Method Type: DAVIDA  Organism: Blood Culture PCR (05-31-21 @ 15:45)      -  Coagulase negative Staphylococcus: Detec      -  Enterococcus faecalis: Detec      Method Type: PCR    Culture - Blood (collected 05-28-21 @ 18:28)  Source: .Blood Blood-Peripheral  Gram Stain (05-30-21 @ 06:00):    Growth in aerobic bottle: Gram positive cocci in pairs    Growth in anaerobic bottle: Gram positive cocci in pairs  Final Report (05-31-21 @ 15:46):    Growth in aerobic and anaerobic bottles: Methicillin resistant    Staphylococcus aureus    See previous culture [id number]            INFECTIOUS DISEASES TESTING  COVID-19 PCR: NotDetec (06-07-21 @ 06:53)  MRSA PCR Result.: Positive (05-29-21 @ 09:26)  COVID-19 PCR: NotDetec (05-28-21 @ 18:28)      INFLAMMATORY MARKERS  Sedimentation Rate, Erythrocyte: 126 mm/Hr (05-28-21 @ 21:19)  C-Reactive Protein, Serum: 390 mg/L (05-28-21 @ 21:19)      RADIOLOGY & ADDITIONAL TESTS:  I have personally reviewed the last available Chest xray  CXR      CT      CARDIOLOGY TESTING  12 Lead ECG:   Ventricular Rate 85 BPM    Atrial Rate 85 BPM    P-R Interval 186 ms    QRS Duration 142 ms    Q-T Interval 402 ms    QTC Calculation(Bazett) 478 ms    P Axis 49 degrees    R Axis -74 degrees    T Axis 254 degrees    Diagnosis Line Normal sinus rhythm  Left axis deviation  Right bundle branch block  T wave abnormality, consider inferolateral ischemia  Abnormal ECG    Confirmed by Keenan Matias ([id number]) on 6/10/2021 7:58:42 AM (06-10-21 @ 05:31)  12 Lead ECG:   Ventricular Rate 104 BPM    Atrial Rate 105 BPM    P-R Interval 200 ms    QRS Duration 164 ms    Q-T Interval 392 ms    QTC Calculation(Bazett) 515 ms    P Axis 33 degrees    R Axis 263 degrees    T Axis 45 degrees    Diagnosis Line Sinus tachycardia  Right bundle branch block  Abnormal ECG    Confirmed by Cheko Lux ([id number]) on 5/30/2021 9:08:21 PM (05-30-21 @ 19:49)      MEDICATIONS  ALBUTerol    90 MICROgram(s) HFA Inhaler 1 Inhalation once  aspirin  chewable 81 Oral daily  caspofungin IVPB     caspofungin IVPB 50 IV Intermittent every 24 hours  chlorhexidine 0.12% Liquid 15 Oral Mucosa two times a day  chlorhexidine 4% Liquid 1 Topical <User Schedule>  DAPTOmycin IVPB 875 IV Intermittent every 48 hours  dextrose 40% Gel 15 Oral once  dextrose 5%. 1000 IV Continuous <Continuous>  dextrose 5%. 1000 IV Continuous <Continuous>  dextrose 5%. 1000 IV Continuous <Continuous>  dextrose 50% Injectable 25 IV Push once  dextrose 50% Injectable 12.5 IV Push once  dextrose 50% Injectable 25 IV Push once  fentaNYL   Infusion. 0.5 IV Continuous <Continuous>  furosemide   Injectable 40 IV Push every 24 hours  glucagon  Injectable 1 IntraMuscular once  heparin   Injectable 5000 SubCutaneous every 8 hours  insulin lispro (ADMELOG) corrective regimen sliding scale  SubCutaneous every 6 hours  insulin NPH human recombinant 15 SubCutaneous every 12 hours  meropenem  IVPB 1000 IV Intermittent every 12 hours  norepinephrine Infusion 0.05 IV Continuous <Continuous>  nystatin Ointment 1 Topical two times a day  pantoprazole    Tablet 40 Oral before breakfast  polyethylene glycol 3350 17 Oral daily  propofol Infusion 5.004 IV Continuous <Continuous>  senna 2 Oral at bedtime  tiotropium 18 MICROgram(s) Capsule 1 Inhalation once      WEIGHT  Weight (kg): 164.2 (06-07-21 @ 17:38)  Creatinine, Serum: 2.1 mg/dL (06-10-21 @ 04:20)      ANTIBIOTICS:  caspofungin IVPB      caspofungin IVPB 50 milliGRAM(s) IV Intermittent every 24 hours  DAPTOmycin IVPB 875 milliGRAM(s) IV Intermittent every 48 hours  meropenem  IVPB 1000 milliGRAM(s) IV Intermittent every 12 hours      All available historical records have been reviewed       FERNANDEZ GUZMAN  60y, Male  Allergy: No Known Allergies  statins (Other)      LOS  12d    CHIEF COMPLAINT: dfu (10 Juan F 2021 08:56)      INTERVAL EVENTS/HPI  - continued to spike fever  - T(F): , Max: 103.1 (06-09-21 @ 18:00)  - WBC Count: 13.74 (06-10-21 @ 04:20)  WBC Count: 13.57 (06-09-21 @ 04:50)  - Creatinine, Serum: 2.1 (06-10-21 @ 04:20)  Creatinine, Serum: 2.3 (06-09-21 @ 04:50)       ROS  unable to obtain history secondary to patient's mental status and/or sedation     VITALS:  T(F): 101.9, Max: 103.1 (06-09-21 @ 18:00)  HR: 82  BP: --  RR: 18Vital Signs Last 24 Hrs  T(C): 38.8 (10 Juan F 2021 08:00), Max: 39.5 (09 Jun 2021 18:00)  T(F): 101.9 (10 Juan F 2021 08:00), Max: 103.1 (09 Jun 2021 18:00)  HR: 82 (10 Juan F 2021 08:00) (78 - 98)  BP: --  BP(mean): --  RR: 18 (10 Juan F 2021 08:00) (17 - 28)  SpO2: 97% (10 Juan F 2021 08:00) (97% - 99%)    PHYSICAL EXAM:  General: intubated cooling blanket  HEENT: NCAT  CV: RRR  Lungs: symmetric chest expansion, decreased BS at bases  Abd: Soft  Skin: no rash  R BKA  Neuro: sedated  Lines: no phlebitis     FH: Non-contributory  Social Hx: Non-contributory    TESTS & MEASUREMENTS:                        8.1    13.74 )-----------( 286      ( 10 Juan F 2021 04:20 )             27.9     06-10    154<H>  |  115<H>  |  61<HH>  ----------------------------<  247<H>  4.1   |  31  |  2.1<H>    Ca    8.1<L>      10 Juan F 2021 04:20  Phos  2.1     06-10  Mg     2.5     06-10    TPro  7.9  /  Alb  2.3<L>  /  TBili  0.3  /  DBili  x   /  AST  100<H>  /  ALT  39  /  AlkPhos  141<H>  06-10    eGFR if Non African American: 33 mL/min/1.73M2 (06-10-21 @ 04:20)  eGFR if African American: 38 mL/min/1.73M2 (06-10-21 @ 04:20)    LIVER FUNCTIONS - ( 10 Juan F 2021 04:20 )  Alb: 2.3 g/dL / Pro: 7.9 g/dL / ALK PHOS: 141 U/L / ALT: 39 U/L / AST: 100 U/L / GGT: x               Culture - Blood (collected 06-07-21 @ 11:00)  Source: .Blood Blood  Preliminary Report (06-08-21 @ 22:02):    No growth to date.    Culture - Blood (collected 06-04-21 @ 01:00)  Source: .Blood None  Final Report (06-09-21 @ 08:01):    No Growth Final    Culture - Blood (collected 05-30-21 @ 22:09)  Source: .Blood Blood-Peripheral  Final Report (06-05-21 @ 08:01):    No Growth Final    Culture - Blood (collected 05-30-21 @ 22:09)  Source: .Blood Blood-Peripheral  Final Report (06-05-21 @ 08:01):    No Growth Final    Culture - Acid Fast - Other w/Smear (collected 05-29-21 @ 06:10)  Source: .Other None(R-FOOT)  Preliminary Report (06-09-21 @ 15:02):    No growth at 1 week.    Culture - Surgical Swab (collected 05-29-21 @ 06:10)  Source: .Surgical Swab None  Final Report (06-03-21 @ 16:27):    No growth at 5 days    Culture - Acid Fast - Other w/Smear (collected 05-29-21 @ 06:10)  Source: .Other None (R-FOOT)  Preliminary Report (06-09-21 @ 15:02):    No growth at 1 week.    Culture - Surgical Swab (collected 05-29-21 @ 06:10)  Source: .Surgical Swab None  Final Report (06-03-21 @ 17:24):    Numerous Methicillin resistant Staphylococcus aureus    Rare Corynebacterium species "Susceptibilities not performed"  Organism: Methicillin resistant Staphylococcus aureus (06-03-21 @ 17:24)  Organism: Methicillin resistant Staphylococcus aureus (06-03-21 @ 17:24)      -  Ampicillin/Sulbactam: R 16/8      -  Cefazolin: R 8      -  Clindamycin: S <=0.25      -  Daptomycin: S 0.5      -  Erythromycin: R >4      -  Gentamicin: S <=1 Should not be used as monotherapy      -  Linezolid: S 1      -  Oxacillin: R >2      -  Penicillin: R >8      -  RIF- Rifampin: S <=1 Should not be used as monotherapy      -  Tetra/Doxy: S <=1      -  Trimethoprim/Sulfamethoxazole: S <=0.5/9.5      -  Vancomycin: S 2      Method Type: DAVIDA    Culture - Urine (collected 05-29-21 @ 01:20)  Source: .Urine Clean Catch (Midstream)  Final Report (05-30-21 @ 08:11):    <10,000 CFU/mL Normal Urogenital Mary    Culture - Abscess with Gram Stain (collected 05-28-21 @ 19:51)  Source: .Abscess Right - Foot  Final Report (06-03-21 @ 09:25):    Moderate Methicillin resistant Staphylococcus aureus    Few Corynebacterium species "Susceptibilities not performed"  Organism: Methicillin resistant Staphylococcus aureus (06-03-21 @ 09:25)  Organism: Methicillin resistant Staphylococcus aureus (06-03-21 @ 09:25)      -  Ampicillin/Sulbactam: R 16/8      -  Cefazolin: R 8      -  Clindamycin: S <=0.25      -  Daptomycin: S 0.5      -  Erythromycin: R >4      -  Gentamicin: S <=1 Should not be used as monotherapy      -  Linezolid: S 2      -  Oxacillin: R >2      -  Penicillin: R >8      -  RIF- Rifampin: S <=1 Should not be used as monotherapy      -  Tetra/Doxy: S <=1      -  Trimethoprim/Sulfamethoxazole: S 1/19      -  Vancomycin: S 2      Method Type: DAVIDA    Culture - Other (collected 05-28-21 @ 18:32)  Source: .Other R foot DFU  Final Report (05-30-21 @ 15:44):    Moderate Methicillin resistant Staphylococcus aureus    Normal skin mary isolated  Organism: Methicillin resistant Staphylococcus aureus (05-30-21 @ 15:44)  Organism: Methicillin resistant Staphylococcus aureus (05-30-21 @ 15:44)      -  Ampicillin/Sulbactam: R 16/8      -  Cefazolin: R 16      -  Clindamycin: S <=0.25      -  Daptomycin: S 0.5      -  Erythromycin: R >4      -  Gentamicin: S <=1 Should not be used as monotherapy      -  Linezolid: S 2      -  Oxacillin: R >2      -  Penicillin: R >8      -  RIF- Rifampin: S <=1 Should not be used as monotherapy      -  Tetra/Doxy: S <=1      -  Trimethoprim/Sulfamethoxazole: S <=0.5/9.5      -  Vancomycin: S 2      Method Type: DAVIDA    Culture - Blood (collected 05-28-21 @ 18:28)  Source: .Blood Blood-Peripheral  Gram Stain (05-29-21 @ 18:30):    Growth in anaerobic bottle: Gram positive cocci in pairs    Growth in aerobic bottle: Gram positive cocci in pairs  Final Report (05-31-21 @ 15:45):    Growth in anaerobic bottle: Enterococcus faecalis    Growth in aerobic and anaerobic bottles: Methicillin resistant    Staphylococcus aureus    ***Blood Panel PCR results on this specimen are available    approximately 3 hours after the Gram stain result.***    Gram stain, PCR, and/or culture results may not always    correspond due to difference in methodologies.    ************************************************************    This PCR assay was performed by multiplex PCR. This    Assay tests for 66 bacterialand resistance gene targets.    Please refer to the [facility] test directory    at [email or web address] for details.  Organism: Blood Culture PCR  Enterococcus faecalis  Methicillin resistant Staphylococcus aureus (05-31-21 @ 15:45)  Organism: Methicillin resistant Staphylococcus aureus (05-31-21 @ 15:45)      -  Ampicillin/Sulbactam: R 16/8      -  Cefazolin: R 16      -  Clindamycin: S <=0.25      -  Daptomycin: S 0.5      -  Erythromycin: R >4      -  Gentamicin: S <=1 Should not be used as monotherapy      -  Linezolid: S 2      -  Oxacillin: R >2      -  Penicillin: R >8      -  RIF- Rifampin: S <=1 Should not be used as monotherapy      -  Tetra/Doxy: S <=1      -  Trimethoprim/Sulfamethoxazole: S <=0.5/9.5      -  Vancomycin: S 1      Method Type: DAVIDA  Organism: Enterococcus faecalis (05-31-21 @ 15:45)      -  Ampicillin: S <=2 Predicts results to ampicillin/sulbactam, amoxacillin-clavulanate and  piperacillin-tazobactam.      -  Gentamicin synergy: S      -  Vancomycin: S 1      Method Type: DAVIDA  Organism: Blood Culture PCR (05-31-21 @ 15:45)      -  Coagulase negative Staphylococcus: Detec      -  Enterococcus faecalis: Detec      Method Type: PCR    Culture - Blood (collected 05-28-21 @ 18:28)  Source: .Blood Blood-Peripheral  Gram Stain (05-30-21 @ 06:00):    Growth in aerobic bottle: Gram positive cocci in pairs    Growth in anaerobic bottle: Gram positive cocci in pairs  Final Report (05-31-21 @ 15:46):    Growth in aerobic and anaerobic bottles: Methicillin resistant    Staphylococcus aureus    See previous culture [id number]            INFECTIOUS DISEASES TESTING  COVID-19 PCR: NotDetec (06-07-21 @ 06:53)  MRSA PCR Result.: Positive (05-29-21 @ 09:26)  COVID-19 PCR: NotDetec (05-28-21 @ 18:28)      INFLAMMATORY MARKERS  Sedimentation Rate, Erythrocyte: 126 mm/Hr (05-28-21 @ 21:19)  C-Reactive Protein, Serum: 390 mg/L (05-28-21 @ 21:19)      RADIOLOGY & ADDITIONAL TESTS:  I have personally reviewed the last available Chest xray  CXR      CT      CARDIOLOGY TESTING  12 Lead ECG:   Ventricular Rate 85 BPM    Atrial Rate 85 BPM    P-R Interval 186 ms    QRS Duration 142 ms    Q-T Interval 402 ms    QTC Calculation(Bazett) 478 ms    P Axis 49 degrees    R Axis -74 degrees    T Axis 254 degrees    Diagnosis Line Normal sinus rhythm  Left axis deviation  Right bundle branch block  T wave abnormality, consider inferolateral ischemia  Abnormal ECG    Confirmed by Keenan Matias ([id number]) on 6/10/2021 7:58:42 AM (06-10-21 @ 05:31)  12 Lead ECG:   Ventricular Rate 104 BPM    Atrial Rate 105 BPM    P-R Interval 200 ms    QRS Duration 164 ms    Q-T Interval 392 ms    QTC Calculation(Bazett) 515 ms    P Axis 33 degrees    R Axis 263 degrees    T Axis 45 degrees    Diagnosis Line Sinus tachycardia  Right bundle branch block  Abnormal ECG    Confirmed by Cheko Lux ([id number]) on 5/30/2021 9:08:21 PM (05-30-21 @ 19:49)      MEDICATIONS  ALBUTerol    90 MICROgram(s) HFA Inhaler 1 Inhalation once  aspirin  chewable 81 Oral daily  caspofungin IVPB     caspofungin IVPB 50 IV Intermittent every 24 hours  chlorhexidine 0.12% Liquid 15 Oral Mucosa two times a day  chlorhexidine 4% Liquid 1 Topical <User Schedule>  DAPTOmycin IVPB 875 IV Intermittent every 48 hours  dextrose 40% Gel 15 Oral once  dextrose 5%. 1000 IV Continuous <Continuous>  dextrose 5%. 1000 IV Continuous <Continuous>  dextrose 5%. 1000 IV Continuous <Continuous>  dextrose 50% Injectable 25 IV Push once  dextrose 50% Injectable 12.5 IV Push once  dextrose 50% Injectable 25 IV Push once  fentaNYL   Infusion. 0.5 IV Continuous <Continuous>  furosemide   Injectable 40 IV Push every 24 hours  glucagon  Injectable 1 IntraMuscular once  heparin   Injectable 5000 SubCutaneous every 8 hours  insulin lispro (ADMELOG) corrective regimen sliding scale  SubCutaneous every 6 hours  insulin NPH human recombinant 15 SubCutaneous every 12 hours  meropenem  IVPB 1000 IV Intermittent every 12 hours  norepinephrine Infusion 0.05 IV Continuous <Continuous>  nystatin Ointment 1 Topical two times a day  pantoprazole    Tablet 40 Oral before breakfast  polyethylene glycol 3350 17 Oral daily  propofol Infusion 5.004 IV Continuous <Continuous>  senna 2 Oral at bedtime  tiotropium 18 MICROgram(s) Capsule 1 Inhalation once      WEIGHT  Weight (kg): 164.2 (06-07-21 @ 17:38)  Creatinine, Serum: 2.1 mg/dL (06-10-21 @ 04:20)      ANTIBIOTICS:  caspofungin IVPB      caspofungin IVPB 50 milliGRAM(s) IV Intermittent every 24 hours  DAPTOmycin IVPB 875 milliGRAM(s) IV Intermittent every 48 hours  meropenem  IVPB 1000 milliGRAM(s) IV Intermittent every 12 hours      All available historical records have been reviewed

## 2021-06-10 NOTE — PROGRESS NOTE ADULT - SUBJECTIVE AND OBJECTIVE BOX
VASCULAR SURGERY PROGRESS NOTE    CC: DFU    Procedure: s/p right knee disarticulation    Events of past 24 hours: continues to spike fevers, blood cx negative          ROS otherwise negative except per subjective and HPI      PAST MEDICAL & SURGICAL HISTORY:  MATA on CPAP    DM (diabetes mellitus)    HTN (hypertension)    High cholesterol    Charcot ankle, right    History of percutaneous angioplasty    H/O skin graft    Left toe amputee  4 TOES    Diabetic Charcot foot  Fixation with external device        Vital Signs Last 24 Hrs  T(C): 38.1 (10 Juan F 2021 12:00), Max: 39.5 (09 Jun 2021 18:00)  T(F): 100.5 (10 Juan F 2021 12:00), Max: 103.1 (09 Jun 2021 18:00)  HR: 78 (10 Juan F 2021 12:00) (78 - 92)  BP: --  BP(mean): --  RR: 27 (10 Juan F 2021 12:00) (17 - 28)  SpO2: 98% (10 Juan F 2021 12:00) (97% - 98%)    Pain (0-10):            Pain Control Adequate: [] YES [] N    Diet:    I&O's Detail    09 Jun 2021 07:01  -  10 Juan F 2021 07:00  --------------------------------------------------------  IN:    Enteral Tube Flush: 190 mL    FentaNYL: 377.2 mL    IV PiggyBack: 350 mL    Norepinephrine: 60 mL    Propofol: 184 mL    Vital High Protein: 1320 mL  Total IN: 2481.2 mL    OUT:    Indwelling Catheter - Urethral (mL): 3895 mL  Total OUT: 3895 mL    Total NET: -1413.8 mL      10 Juan F 2021 07:01  -  10 Juan F 2021 13:07  --------------------------------------------------------  IN:    FentaNYL: 82 mL    Free Water: 250 mL    Norepinephrine: 16 mL    Propofol: 40 mL    Vital High Protein: 275 mL  Total IN: 663 mL    OUT:    Indwelling Catheter - Urethral (mL): 740 mL  Total OUT: 740 mL    Total NET: -77 mL        PHYSICAL EXAM    Appearance: intubated	  HEENT:   Normal oral mucosa, PERRL, EOMI	  Neck: Supple, - JVD;   Cardiovascular: Normal S1 S2, No JVD, No murmurs,   Respiratory: Lungs clear to auscultation/No Rales, Rhonchi, Wheezing	  Gastrointestinal:  Soft, Non-tender, + BS	  Skin: No rashes, No ecchymoses, No cyanosis  Extremities: Normal range of motion, No clubbing, cyanosis   Right knee disarticulation: dressing changed, healthy viable tissue, wet to dry gauze with kerlix and ace wrap applied  Neurologic: Non-focal  Psychiatry: arousable          MEDICATIONS:   MEDICATIONS  (STANDING):  ALBUTerol    90 MICROgram(s) HFA Inhaler 1 Puff(s) Inhalation once  aspirin  chewable 81 milliGRAM(s) Oral daily  caspofungin IVPB      caspofungin IVPB 50 milliGRAM(s) IV Intermittent every 24 hours  chlorhexidine 0.12% Liquid 15 milliLiter(s) Oral Mucosa two times a day  chlorhexidine 4% Liquid 1 Application(s) Topical <User Schedule>  DAPTOmycin IVPB 875 milliGRAM(s) IV Intermittent every 48 hours  dextrose 40% Gel 15 Gram(s) Oral once  dextrose 5%. 1000 milliLiter(s) (50 mL/Hr) IV Continuous <Continuous>  dextrose 5%. 1000 milliLiter(s) (50 mL/Hr) IV Continuous <Continuous>  dextrose 5%. 1000 milliLiter(s) (100 mL/Hr) IV Continuous <Continuous>  dextrose 50% Injectable 25 Gram(s) IV Push once  dextrose 50% Injectable 12.5 Gram(s) IV Push once  dextrose 50% Injectable 25 Gram(s) IV Push once  fentaNYL   Infusion. 0.5 MICROgram(s)/kG/Hr (8.21 mL/Hr) IV Continuous <Continuous>  furosemide   Injectable 40 milliGRAM(s) IV Push every 24 hours  glucagon  Injectable 1 milliGRAM(s) IntraMuscular once  heparin   Injectable 5000 Unit(s) SubCutaneous every 8 hours  insulin lispro (ADMELOG) corrective regimen sliding scale   SubCutaneous every 6 hours  insulin NPH human recombinant 15 Unit(s) SubCutaneous every 12 hours  meropenem  IVPB 1000 milliGRAM(s) IV Intermittent every 12 hours  norepinephrine Infusion 0.05 MICROgram(s)/kG/Min (7.7 mL/Hr) IV Continuous <Continuous>  nystatin Ointment 1 Application(s) Topical two times a day  pantoprazole    Tablet 40 milliGRAM(s) Oral before breakfast  polyethylene glycol 3350 17 Gram(s) Oral daily  propofol Infusion 5.004 MICROgram(s)/kG/Min (4.93 mL/Hr) IV Continuous <Continuous>  senna 2 Tablet(s) Oral at bedtime  tiotropium 18 MICROgram(s) Capsule 1 Capsule(s) Inhalation once    MEDICATIONS  (PRN):  acetaminophen   Tablet .. 650 milliGRAM(s) Oral every 6 hours PRN Temp greater or equal to 38C (100.4F), Mild Pain (1 - 3)      DVT PROPHYLAXIS: [x] YES [] NO  GI PROPHYLAXIS: [x] YES [] NO  ANTIPLATELETS: [] YES [x] NO  ANTICOAGULATION: [] YES [x] NO  ANTIBIOTICS: [x] YES [] NO    LAB/STUDIES:                        8.1    13.74 )-----------( 286      ( 10 Juan F 2021 04:20 )             27.9     06-10    154<H>  |  115<H>  |  61<HH>  ----------------------------<  247<H>  4.1   |  31  |  2.1<H>    Ca    8.1<L>      10 Juan F 2021 04:20  Phos  2.1     06-10  Mg     2.5     06-10    TPro  7.9  /  Alb  2.3<L>  /  TBili  0.3  /  DBili  x   /  AST  100<H>  /  ALT  39  /  AlkPhos  141<H>  06-10    PT/INR - ( 10 Juan F 2021 04:20 )   PT: 16.20 sec;   INR: 1.41 ratio         PTT - ( 10 Juan F 2021 04:20 )  PTT:32.6 sec  LIVER FUNCTIONS - ( 10 Juan F 2021 04:20 )  Alb: 2.3 g/dL / Pro: 7.9 g/dL / ALK PHOS: 141 U/L / ALT: 39 U/L / AST: 100 U/L / GGT: x               CARDIAC MARKERS ( 10 Juan F 2021 04:20 )  x     / x     / 341 U/L / x     / x      CARDIAC MARKERS ( 09 Jun 2021 04:50 )  x     / x     / 658 U/L / x     / x      CARDIAC MARKERS ( 08 Jun 2021 16:46 )  x     / x     / 1049 U/L / x     / x              ABG - ( 10 Juan F 2021 03:15 )  pH, Arterial: 7.44  pH, Blood: x     /  pCO2: 49    /  pO2: 79    / HCO3: 33    / Base Excess: 8.2   /  SaO2: 96        < from: Xray Chest 1 View- PORTABLE-Routine (Xray Chest 1 View- PORTABLE-Routine in AM.) (06.10.21 @ 06:24) >  No consolidation effusion or pneumothorax.    Support devices, in satisfactory position.    EKG 6/10/21

## 2021-06-10 NOTE — PRE-ANESTHESIA EVALUATION ADULT - NSANTHRISKSRD_GEN_ALL_CORE
The patient is a 45y Female complaining of chest pain.
ASA of 4, 4E, 5 or 5E
difficult airway/ASA of 4, 4E, 5 or 5E
BMI greater than 45/ASA of 4, 4E, 5 or 5E
ASA of 4, 4E, 5 or 5E

## 2021-06-10 NOTE — PROGRESS NOTE ADULT - ASSESSMENT
59 y/o M with PMH of DM, hypertension, dyslipidemia, sleep apnea, Charcot foot reconstruction with external fixation, morbid obesity presented for worsening right foot infection.     # DFU with Hx of MRSA infection s/p BKA  # Acute hypoxic respiratory failure secondary to septic shock - intubated  # Toxic metabolic encephalopathy - improved   # Fever overnight >102, currently febrile   - s/p intubation 5/31   - bacteremia from right foot abscess   - septic on admission, WBC 12,  + lactate 2.3. s/p cefepime flagyl and vanc in the ED  - Podiatry on board, f/u   - S/p disarticulation right knee 5/31 w/ vascular   - Wound Cx (+) for Staph + enterococcus   - Echo 3/31: EF 55-60%, G2DD   - ID following, f/u recs:  -d/c linezolid and Ceftaroline, flagyl  -c/w dapto 875mg q12, start on caspofungin 50mg and eleuterio 1g q12  - CPK >1300 -> 600~ ->300~, downtrending, monitor daily   - currently on propofol, fent, and levo  - bcx (-) 6/7, spoke to ID, okay for surgery, f/u with vascular. AKA surgery for Friday 6/11.   - plans for SBT and to try extubation if tolerated well  - pt still having fevers, unlikely sepsis per labs, (drug fever? encephalopathy less likely as pt is following commands off sedation)  - f/u ddimer, procal, and inflammatory markers  - keep on cooling blanket and reassess after surgery tomorrow    #?Code stroke for possible CVA   - ptnt was code stroke on floors on 3/30 for AMS and confusion upgraded to CCU   - metabolic encephalopathy secondary to sepsis more likely than stroke  -  CT head neg for acute pathology, CTA w/ moderate stenoses at the junction of the precavernous and cavernous segments of both internal carotid arteries  - neuro recs for brain MR, can get repeat CT head when stable, will not fit in MR machine   - neuro rec LP, as per pulm, as pt is following commands now, little clinical benefit to preforming as of now.     # Hypernatremia   - na 154 today, likely dehydration   - start on gentyl hydration + free water 250ml q4hrs  - f/u BMP @4PM    # NSTEMI  - trops elevated on admission .11->.12  - secondary to sepsis induced demand ischemia  - less likely true ACS  -monitor     #CANDY on CKD III /  rule out ATN - worsening today   - secondary to sepsis induced ATN, continue supportive care, monitor for now.   - creatinine 3.0 on admission, baseline around 1.4-1.8   - s/p CVVHD 6/1 and 6/2, holding CVVHD for now ptnt w/ good urine output   - nephro following    #anemia of chronic disease   - transfuse Hb < 7  - monitor   - s/p 3 units in total since admission  - stable today    #Diabetes Mellitus  -on insulin regimen   - a1c 8.9   -Monitor      #Hypertension  -holding oral meds     #Dyslipidemia  -c/w home meds    #MATA/ OHS    #morbid obesity    Diet: npo w/ tube feeds   DVT ppx: heparin subq TID  GI ppx: protonix   Dispo: acute    Spoke to Gentry Callahan, updated her on clinical presentation.    59 y/o M with PMH of DM, hypertension, dyslipidemia, sleep apnea, Charcot foot reconstruction with external fixation, morbid obesity presented for worsening right foot infection.     # DFU with Hx of MRSA infection s/p BKA  # Acute hypoxic respiratory failure secondary to septic shock - intubated  # Toxic metabolic encephalopathy - improved   # Fever overnight >102, currently febrile   - s/p intubation 5/31   - bacteremia from right foot abscess   - septic on admission, WBC 12,  + lactate 2.3. s/p cefepime flagyl and vanc in the ED  - Podiatry on board, f/u   - S/p disarticulation right knee 5/31 w/ vascular   - 5/28 BCx E fecalis, ORSA, 5/30 BCx NG, 6/4 BCx NG  - 5/29 R foot : ORSA , Corynebacterium  - Echo 3/31: EF 55-60%, G2DD, no vegetations   - ID following, f/u recs:  -d/c linezolid and Ceftaroline, flagyl  -c/w dapto 875mg q12, start on caspofungin 50mg and eleuterio 1g q12  - CPK >1300 -> 600~ ->300~, downtrending, monitor daily   - currently on propofol, fent, and levo  - bcx (-) 6/7, spoke to ID, okay for surgery, f/u with vascular. AKA surgery for Friday 6/11.   - plans for SBT and to try extubation if tolerated well  - pt still having fevers, unlikely infectious per labs, (drug fever? encephalopathy less likely as pt is following commands off sedation)  - f/u ddimer, procal, and inflammatory markers  - keep on cooling blanket and reassess after surgery tomorrow    #?Code stroke for possible CVA   - ptnt was code stroke on floors on 3/30 for AMS and confusion upgraded to CCU   - metabolic encephalopathy secondary to sepsis more likely than stroke  -  CT head neg for acute pathology, CTA w/ moderate stenoses at the junction of the precavernous and cavernous segments of both internal carotid arteries  - neuro recs for brain MR, can get repeat CT head when stable, will not fit in MR machine   - neuro rec LP, as per pulm, as pt is following commands now, little clinical benefit to preforming as of now.     # Hypernatremia   - na 154 today, likely dehydration   - start on gentyl hydration + free water 250ml q4hrs  - f/u BMP @4PM    # NSTEMI  - trops elevated on admission .11->.12  - secondary to sepsis induced demand ischemia  - less likely true ACS  -monitor     #CANDY on CKD III /  rule out ATN - worsening today   - secondary to sepsis induced ATN, continue supportive care, monitor for now.   - creatinine 3.0 on admission, baseline around 1.4-1.8   - s/p CVVHD 6/1 and 6/2, holding CVVHD for now ptnt w/ good urine output   - nephro following    #anemia of chronic disease   - transfuse Hb < 7  - monitor   - s/p 3 units in total since admission  - stable today    #Diabetes Mellitus  -on insulin regimen   - a1c 8.9   -Monitor      #Hypertension  -holding oral meds     #Dyslipidemia  -c/w home meds    #MATA/ OHS    #morbid obesity    Diet: npo w/ tube feeds   DVT ppx: heparin subq TID  GI ppx: protonix   Dispo: acute    Spoke to Gentry Callahan, updated her on clinical presentation.

## 2021-06-10 NOTE — PRE-ANESTHESIA EVALUATION ADULT - NSANTHAIRWAYFT_ENT_ALL_CORE
Intubated
TRENT; 8.0 ETT at 24cm in situ on AC/VC MV
Patient is intubated. Edentalous. Mouth opening x>3FB. TMD x>6.5.

## 2021-06-10 NOTE — PROGRESS NOTE ADULT - ASSESSMENT
61 y/o M with PMH of DM, hypertension, dyslipidemia, sleep apnea, Charcot foot reconstruction with external fixation, morbid obesity presenting to the hospital on 5/29 with worsening foot ulcer.  s/p Right Knee Disarticulation for necrotizing soft tissue infection of right lower extremity    #CANDY on CKD ( unknown stage last known creat 1.4 in 11/2019) / rule out ATN / obstructive uropathy/ sp CT angio (too early for FLO)/ doubt IRGN (infection related GN)/ doubt AIN (multiple antibx courses)/ high bicarb/chr resp acidosis  non oliguric now / creat better / off CVVHD /   [place] removed   hold lasix bicarb is elevated and hypernatremic  agree with D5w and free water with diet / follow serial BMP   UA noted, minimal proteinuria    #appreciate ID f/u, on Dapto and merrem   sp debridement of DFU as per podiatry  follow IP and Ca   will follow/ no need for RRT

## 2021-06-10 NOTE — PROGRESS NOTE ADULT - ASSESSMENT
· Assessment	  61 y/o M with PMH of DM, hypertension, dyslipidemia, sleep apnea, Charcot foot reconstruction with external fixation, super morbid obesity present for worsening right foot infection.  Pt reports he has had this foot ulcer at various stage of healing for 13 years. Patient has been admitted for IV antibiotics multiple times in the past for osteomyelitis of right foot. He has history of MRSA infection in foot. pt has baseline neuropathy in both feet so does not feel any pain.  Patient states his Infection has been gradually getting worse despite treatment with bactrim, he saw his podiatrists and he was sent in by podiatry for IV abx .    IMPRESSION;  #Fevers Tm 103 with downtrending WBC normotensive ?BKA source control , no change after stopping linezolid, non-infectious?    6/7 BCX NG    6/7 UA  WBC 4    TLC was changed  #MSOF : resp failure with fio2 40%, on pressors, renal failure off  CVVH, metabolic encephalopathy  5/31 S/p disarticulation right knee   -5/28 BCx E fecalis, ORSA  -5/31 TTE no vegetation  -etiology of bacteremia was right foot abscess  -5/29 R foot : ORSA , Corynebacterium  -5/30 BCx NG  -6/4 BCx NG  Creatinine, Serum: 2.3 mg/dL (06.09.21 @ 04:50)        RECOMMENDATIONS;  - Repeat BCX  - OR with Vascular for source control   - rule out non-infectious causes of fever, DVT etc  - Caspo 50mg q24h IV 6/9-  - Dk 1g q12h IV 6/9-  - Dapto 875mg q48h IV (8mg/kg of adjusted body wt)  - check daily CPK- downtrending  Creatine Kinase, Serum: 341 U/L (06.10.21 @ 04:20)    Please call with any questions or send a message on [employer] Teams  Spectra [phone]

## 2021-06-10 NOTE — PROGRESS NOTE ADULT - SUBJECTIVE AND OBJECTIVE BOX
Patient is a 60y old  Male who presents with a chief complaint of dfu (10 Juan F 2021 08:57)      INTERVAL HPI/OVERNIGHT EVENTS:  no acute overnight events, pt still febrile but is following events.     ICU Vital Signs Last 24 Hrs  T(C): 38.8 (10 Juan F 2021 08:00), Max: 39.5 (09 Jun 2021 18:00)  T(F): 101.9 (10 Juan F 2021 08:00), Max: 103.1 (09 Jun 2021 18:00)  HR: 92 (10 Juan F 2021 09:00) (78 - 92)  BP: --  BP(mean): --  ABP: 172/68 (10 Juan F 2021 09:00) (92/40 - 172/68)  ABP(mean): 96 (10 Juan F 2021 09:00) (54 - 96)  RR: 18 (10 Juan F 2021 09:00) (17 - 28)  SpO2: 97% (10 Juan F 2021 09:00) (97% - 99%)    I&O's Summary    09 Jun 2021 07:01  -  10 Juan F 2021 07:00  --------------------------------------------------------  IN: 2481.2 mL / OUT: 3895 mL / NET: -1413.8 mL    10 Juan F 2021 07:01  -  10 Juan F 2021 10:14  --------------------------------------------------------  IN: 416.8 mL / OUT: 420 mL / NET: -3.2 mL      Mode: AC/ CMV (Assist Control/ Continuous Mandatory Ventilation)  RR (machine): 18  TV (machine): 450  FiO2: 30  PEEP: 5  ITime: 1  MAP: 14  PIP: 27      LABS:                        8.1    13.74 )-----------( 286      ( 10 Juan F 2021 04:20 )             27.9     06-10    154<H>  |  115<H>  |  61<HH>  ----------------------------<  247<H>  4.1   |  31  |  2.1<H>    Ca    8.1<L>      10 Juan F 2021 04:20  Phos  2.1     06-10  Mg     2.5     06-10    TPro  7.9  /  Alb  2.3<L>  /  TBili  0.3  /  DBili  x   /  AST  100<H>  /  ALT  39  /  AlkPhos  141<H>  06-10    PT/INR - ( 10 Juan F 2021 04:20 )   PT: 16.20 sec;   INR: 1.41 ratio         PTT - ( 10 Juan F 2021 04:20 )  PTT:32.6 sec    CAPILLARY BLOOD GLUCOSE      POCT Blood Glucose.: 246 mg/dL (10 Juan F 2021 04:21)  POCT Blood Glucose.: 241 mg/dL (10 Juan F 2021 00:31)  POCT Blood Glucose.: 223 mg/dL (09 Jun 2021 17:11)  POCT Blood Glucose.: 260 mg/dL (09 Jun 2021 12:22)    ABG - ( 10 Juan F 2021 03:15 )  pH, Arterial: 7.44  pH, Blood: x     /  pCO2: 49    /  pO2: 79    / HCO3: 33    / Base Excess: 8.2   /  SaO2: 96                  RADIOLOGY & ADDITIONAL TESTS:    Consultant(s) Notes Reviewed:  [x ] YES  [ ] NO    MEDICATIONS  (STANDING):  ALBUTerol    90 MICROgram(s) HFA Inhaler 1 Puff(s) Inhalation once  aspirin  chewable 81 milliGRAM(s) Oral daily  caspofungin IVPB      caspofungin IVPB 50 milliGRAM(s) IV Intermittent every 24 hours  chlorhexidine 0.12% Liquid 15 milliLiter(s) Oral Mucosa two times a day  chlorhexidine 4% Liquid 1 Application(s) Topical <User Schedule>  DAPTOmycin IVPB 875 milliGRAM(s) IV Intermittent every 48 hours  dextrose 40% Gel 15 Gram(s) Oral once  dextrose 5%. 1000 milliLiter(s) (50 mL/Hr) IV Continuous <Continuous>  dextrose 5%. 1000 milliLiter(s) (50 mL/Hr) IV Continuous <Continuous>  dextrose 5%. 1000 milliLiter(s) (100 mL/Hr) IV Continuous <Continuous>  dextrose 50% Injectable 25 Gram(s) IV Push once  dextrose 50% Injectable 12.5 Gram(s) IV Push once  dextrose 50% Injectable 25 Gram(s) IV Push once  fentaNYL   Infusion. 0.5 MICROgram(s)/kG/Hr (8.21 mL/Hr) IV Continuous <Continuous>  furosemide   Injectable 40 milliGRAM(s) IV Push every 24 hours  glucagon  Injectable 1 milliGRAM(s) IntraMuscular once  heparin   Injectable 5000 Unit(s) SubCutaneous every 8 hours  insulin lispro (ADMELOG) corrective regimen sliding scale   SubCutaneous every 6 hours  insulin NPH human recombinant 15 Unit(s) SubCutaneous every 12 hours  meropenem  IVPB 1000 milliGRAM(s) IV Intermittent every 12 hours  norepinephrine Infusion 0.05 MICROgram(s)/kG/Min (7.7 mL/Hr) IV Continuous <Continuous>  nystatin Ointment 1 Application(s) Topical two times a day  pantoprazole    Tablet 40 milliGRAM(s) Oral before breakfast  polyethylene glycol 3350 17 Gram(s) Oral daily  propofol Infusion 5.004 MICROgram(s)/kG/Min (4.93 mL/Hr) IV Continuous <Continuous>  senna 2 Tablet(s) Oral at bedtime  tiotropium 18 MICROgram(s) Capsule 1 Capsule(s) Inhalation once    MEDICATIONS  (PRN):  acetaminophen   Tablet .. 650 milliGRAM(s) Oral every 6 hours PRN Temp greater or equal to 38C (100.4F), Mild Pain (1 - 3)      PHYSICAL EXAM:  GENERAL: intubated, sedated on prop and fent  NERVOUS SYSTEM:  intuabted, opens eyes to external stimuli   CHEST/LUNG: breath sounds bl   HEART: S1S2 normal, no S3, Regular rate and rhythm; No murmurs, rubs, or gallops  ABDOMEN: Soft, obese, distended   EXTREMITIES:  r BKA, Lt no edema   LYMPH: No lymphadenopathy noted  SKIN: No rashes or lesions      Care Discussed with Consultants/Other Providers [ x] YES  [ ] NO

## 2021-06-11 ENCOUNTER — RESULT REVIEW (OUTPATIENT)
Age: 61
End: 2021-06-11

## 2021-06-11 LAB
ALBUMIN SERPL ELPH-MCNC: 2.3 G/DL — LOW (ref 3.5–5.2)
ALBUMIN SERPL ELPH-MCNC: 2.3 G/DL — LOW (ref 3.5–5.2)
ALP SERPL-CCNC: 141 U/L — HIGH (ref 30–115)
ALP SERPL-CCNC: 149 U/L — HIGH (ref 30–115)
ALT FLD-CCNC: 61 U/L — HIGH (ref 0–41)
ALT FLD-CCNC: 62 U/L — HIGH (ref 0–41)
ANION GAP SERPL CALC-SCNC: 10 MMOL/L — SIGNIFICANT CHANGE UP (ref 7–14)
ANION GAP SERPL CALC-SCNC: 7 MMOL/L — SIGNIFICANT CHANGE UP (ref 7–14)
ANION GAP SERPL CALC-SCNC: 9 MMOL/L — SIGNIFICANT CHANGE UP (ref 7–14)
AST SERPL-CCNC: 114 U/L — HIGH (ref 0–41)
AST SERPL-CCNC: 131 U/L — HIGH (ref 0–41)
BASOPHILS # BLD AUTO: 0.03 K/UL — SIGNIFICANT CHANGE UP (ref 0–0.2)
BASOPHILS # BLD AUTO: 0.03 K/UL — SIGNIFICANT CHANGE UP (ref 0–0.2)
BASOPHILS NFR BLD AUTO: 0.2 % — SIGNIFICANT CHANGE UP (ref 0–1)
BASOPHILS NFR BLD AUTO: 0.2 % — SIGNIFICANT CHANGE UP (ref 0–1)
BILIRUB SERPL-MCNC: 0.4 MG/DL — SIGNIFICANT CHANGE UP (ref 0.2–1.2)
BILIRUB SERPL-MCNC: 0.6 MG/DL — SIGNIFICANT CHANGE UP (ref 0.2–1.2)
BUN SERPL-MCNC: 58 MG/DL — HIGH (ref 10–20)
BUN SERPL-MCNC: 65 MG/DL — CRITICAL HIGH (ref 10–20)
BUN SERPL-MCNC: 67 MG/DL — CRITICAL HIGH (ref 10–20)
CALCIUM SERPL-MCNC: 8 MG/DL — LOW (ref 8.5–10.1)
CHLORIDE SERPL-SCNC: 110 MMOL/L — SIGNIFICANT CHANGE UP (ref 98–110)
CHLORIDE SERPL-SCNC: 113 MMOL/L — HIGH (ref 98–110)
CHLORIDE SERPL-SCNC: 113 MMOL/L — HIGH (ref 98–110)
CK SERPL-CCNC: 561 U/L — HIGH (ref 0–225)
CO2 SERPL-SCNC: 28 MMOL/L — SIGNIFICANT CHANGE UP (ref 17–32)
CO2 SERPL-SCNC: 30 MMOL/L — SIGNIFICANT CHANGE UP (ref 17–32)
CO2 SERPL-SCNC: 31 MMOL/L — SIGNIFICANT CHANGE UP (ref 17–32)
CREAT SERPL-MCNC: 1.7 MG/DL — HIGH (ref 0.7–1.5)
CREAT SERPL-MCNC: 2 MG/DL — HIGH (ref 0.7–1.5)
CREAT SERPL-MCNC: 2 MG/DL — HIGH (ref 0.7–1.5)
CRP SERPL-MCNC: 190 MG/L — HIGH
EOSINOPHIL # BLD AUTO: 0.42 K/UL — SIGNIFICANT CHANGE UP (ref 0–0.7)
EOSINOPHIL # BLD AUTO: 0.46 K/UL — SIGNIFICANT CHANGE UP (ref 0–0.7)
EOSINOPHIL NFR BLD AUTO: 3.3 % — SIGNIFICANT CHANGE UP (ref 0–8)
EOSINOPHIL NFR BLD AUTO: 3.8 % — SIGNIFICANT CHANGE UP (ref 0–8)
FERRITIN SERPL-MCNC: 751 NG/ML — HIGH (ref 30–400)
GLUCOSE BLDC GLUCOMTR-MCNC: 150 MG/DL — HIGH (ref 70–99)
GLUCOSE BLDC GLUCOMTR-MCNC: 171 MG/DL — HIGH (ref 70–99)
GLUCOSE BLDC GLUCOMTR-MCNC: 182 MG/DL — HIGH (ref 70–99)
GLUCOSE BLDC GLUCOMTR-MCNC: 183 MG/DL — HIGH (ref 70–99)
GLUCOSE BLDC GLUCOMTR-MCNC: 186 MG/DL — HIGH (ref 70–99)
GLUCOSE BLDC GLUCOMTR-MCNC: 244 MG/DL — HIGH (ref 70–99)
GLUCOSE SERPL-MCNC: 146 MG/DL — HIGH (ref 70–99)
GLUCOSE SERPL-MCNC: 245 MG/DL — HIGH (ref 70–99)
GLUCOSE SERPL-MCNC: 250 MG/DL — HIGH (ref 70–99)
HCT VFR BLD CALC: 26.9 % — LOW (ref 42–52)
HCT VFR BLD CALC: 29.3 % — LOW (ref 42–52)
HGB BLD-MCNC: 7.8 G/DL — LOW (ref 14–18)
HGB BLD-MCNC: 8.6 G/DL — LOW (ref 14–18)
IMM GRANULOCYTES NFR BLD AUTO: 0.5 % — HIGH (ref 0.1–0.3)
IMM GRANULOCYTES NFR BLD AUTO: 0.9 % — HIGH (ref 0.1–0.3)
LYMPHOCYTES # BLD AUTO: 1.3 K/UL — SIGNIFICANT CHANGE UP (ref 1.2–3.4)
LYMPHOCYTES # BLD AUTO: 1.77 K/UL — SIGNIFICANT CHANGE UP (ref 1.2–3.4)
LYMPHOCYTES # BLD AUTO: 10.7 % — LOW (ref 20.5–51.1)
LYMPHOCYTES # BLD AUTO: 13.8 % — LOW (ref 20.5–51.1)
MAGNESIUM SERPL-MCNC: 2.6 MG/DL — HIGH (ref 1.8–2.4)
MCHC RBC-ENTMCNC: 26.9 PG — LOW (ref 27–31)
MCHC RBC-ENTMCNC: 27.4 PG — SIGNIFICANT CHANGE UP (ref 27–31)
MCHC RBC-ENTMCNC: 29 G/DL — LOW (ref 32–37)
MCHC RBC-ENTMCNC: 29.4 G/DL — LOW (ref 32–37)
MCV RBC AUTO: 92.8 FL — SIGNIFICANT CHANGE UP (ref 80–94)
MCV RBC AUTO: 93.3 FL — SIGNIFICANT CHANGE UP (ref 80–94)
MONOCYTES # BLD AUTO: 0.8 K/UL — HIGH (ref 0.1–0.6)
MONOCYTES # BLD AUTO: 0.92 K/UL — HIGH (ref 0.1–0.6)
MONOCYTES NFR BLD AUTO: 6.6 % — SIGNIFICANT CHANGE UP (ref 1.7–9.3)
MONOCYTES NFR BLD AUTO: 7.2 % — SIGNIFICANT CHANGE UP (ref 1.7–9.3)
NEUTROPHILS # BLD AUTO: 9.48 K/UL — HIGH (ref 1.4–6.5)
NEUTROPHILS # BLD AUTO: 9.58 K/UL — HIGH (ref 1.4–6.5)
NEUTROPHILS NFR BLD AUTO: 74.6 % — SIGNIFICANT CHANGE UP (ref 42.2–75.2)
NEUTROPHILS NFR BLD AUTO: 78.2 % — HIGH (ref 42.2–75.2)
NRBC # BLD: 0 /100 WBCS — SIGNIFICANT CHANGE UP (ref 0–0)
NRBC # BLD: 0 /100 WBCS — SIGNIFICANT CHANGE UP (ref 0–0)
PHOSPHATE SERPL-MCNC: 2.8 MG/DL — SIGNIFICANT CHANGE UP (ref 2.1–4.9)
PLATELET # BLD AUTO: 273 K/UL — SIGNIFICANT CHANGE UP (ref 130–400)
PLATELET # BLD AUTO: 284 K/UL — SIGNIFICANT CHANGE UP (ref 130–400)
POTASSIUM SERPL-MCNC: 4 MMOL/L — SIGNIFICANT CHANGE UP (ref 3.5–5)
POTASSIUM SERPL-SCNC: 4 MMOL/L — SIGNIFICANT CHANGE UP (ref 3.5–5)
PROCALCITONIN SERPL-MCNC: 1.08 NG/ML — HIGH (ref 0.02–0.1)
PROT SERPL-MCNC: 7.6 G/DL — SIGNIFICANT CHANGE UP (ref 6–8)
PROT SERPL-MCNC: 8 G/DL — SIGNIFICANT CHANGE UP (ref 6–8)
RBC # BLD: 2.9 M/UL — LOW (ref 4.7–6.1)
RBC # BLD: 3.14 M/UL — LOW (ref 4.7–6.1)
RBC # FLD: 18.3 % — HIGH (ref 11.5–14.5)
RBC # FLD: 18.8 % — HIGH (ref 11.5–14.5)
SODIUM SERPL-SCNC: 148 MMOL/L — HIGH (ref 135–146)
SODIUM SERPL-SCNC: 151 MMOL/L — HIGH (ref 135–146)
SODIUM SERPL-SCNC: 152 MMOL/L — HIGH (ref 135–146)
WBC # BLD: 12.13 K/UL — HIGH (ref 4.8–10.8)
WBC # BLD: 12.83 K/UL — HIGH (ref 4.8–10.8)
WBC # FLD AUTO: 12.13 K/UL — HIGH (ref 4.8–10.8)
WBC # FLD AUTO: 12.83 K/UL — HIGH (ref 4.8–10.8)

## 2021-06-11 PROCEDURE — 27590 AMPUTATE LEG AT THIGH: CPT | Mod: GC,58

## 2021-06-11 PROCEDURE — 88311 DECALCIFY TISSUE: CPT | Mod: 26

## 2021-06-11 PROCEDURE — 99232 SBSQ HOSP IP/OBS MODERATE 35: CPT

## 2021-06-11 PROCEDURE — 99291 CRITICAL CARE FIRST HOUR: CPT

## 2021-06-11 PROCEDURE — 88307 TISSUE EXAM BY PATHOLOGIST: CPT | Mod: 26

## 2021-06-11 RX ORDER — DAPTOMYCIN 500 MG/10ML
875 INJECTION, POWDER, LYOPHILIZED, FOR SOLUTION INTRAVENOUS
Refills: 0 | Status: DISCONTINUED | OUTPATIENT
Start: 2021-06-11 | End: 2021-06-16

## 2021-06-11 RX ORDER — INSULIN LISPRO 100/ML
VIAL (ML) SUBCUTANEOUS EVERY 6 HOURS
Refills: 0 | Status: DISCONTINUED | OUTPATIENT
Start: 2021-06-11 | End: 2021-06-26

## 2021-06-11 RX ORDER — POLYETHYLENE GLYCOL 3350 17 G/17G
17 POWDER, FOR SOLUTION ORAL DAILY
Refills: 0 | Status: DISCONTINUED | OUTPATIENT
Start: 2021-06-11 | End: 2021-06-19

## 2021-06-11 RX ORDER — INSULIN GLARGINE 100 [IU]/ML
15 INJECTION, SOLUTION SUBCUTANEOUS AT BEDTIME
Refills: 0 | Status: DISCONTINUED | OUTPATIENT
Start: 2021-06-11 | End: 2021-06-13

## 2021-06-11 RX ORDER — DEXTROSE 50 % IN WATER 50 %
25 SYRINGE (ML) INTRAVENOUS ONCE
Refills: 0 | Status: DISCONTINUED | OUTPATIENT
Start: 2021-06-11 | End: 2021-06-12

## 2021-06-11 RX ORDER — PANTOPRAZOLE SODIUM 20 MG/1
40 TABLET, DELAYED RELEASE ORAL DAILY
Refills: 0 | Status: DISCONTINUED | OUTPATIENT
Start: 2021-06-11 | End: 2021-06-26

## 2021-06-11 RX ORDER — SODIUM CHLORIDE 9 MG/ML
1000 INJECTION, SOLUTION INTRAVENOUS
Refills: 0 | Status: DISCONTINUED | OUTPATIENT
Start: 2021-06-11 | End: 2021-06-12

## 2021-06-11 RX ORDER — NOREPINEPHRINE BITARTRATE/D5W 8 MG/250ML
0.05 PLASTIC BAG, INJECTION (ML) INTRAVENOUS
Qty: 16 | Refills: 0 | Status: DISCONTINUED | OUTPATIENT
Start: 2021-06-11 | End: 2021-06-13

## 2021-06-11 RX ORDER — ALBUTEROL 90 UG/1
1 AEROSOL, METERED ORAL ONCE
Refills: 0 | Status: DISCONTINUED | OUTPATIENT
Start: 2021-06-11 | End: 2021-06-26

## 2021-06-11 RX ORDER — CASPOFUNGIN ACETATE 7 MG/ML
50 INJECTION, POWDER, LYOPHILIZED, FOR SOLUTION INTRAVENOUS EVERY 24 HOURS
Refills: 0 | Status: DISCONTINUED | OUTPATIENT
Start: 2021-06-11 | End: 2021-06-21

## 2021-06-11 RX ORDER — HEPARIN SODIUM 5000 [USP'U]/ML
5000 INJECTION INTRAVENOUS; SUBCUTANEOUS EVERY 8 HOURS
Refills: 0 | Status: DISCONTINUED | OUTPATIENT
Start: 2021-06-11 | End: 2021-06-26

## 2021-06-11 RX ORDER — PROPOFOL 10 MG/ML
5 INJECTION, EMULSION INTRAVENOUS
Qty: 1000 | Refills: 0 | Status: DISCONTINUED | OUTPATIENT
Start: 2021-06-11 | End: 2021-06-13

## 2021-06-11 RX ORDER — INSULIN GLARGINE 100 [IU]/ML
15 INJECTION, SOLUTION SUBCUTANEOUS AT BEDTIME
Refills: 0 | Status: DISCONTINUED | OUTPATIENT
Start: 2021-06-11 | End: 2021-06-11

## 2021-06-11 RX ORDER — MEROPENEM 1 G/30ML
1000 INJECTION INTRAVENOUS EVERY 12 HOURS
Refills: 0 | Status: DISCONTINUED | OUTPATIENT
Start: 2021-06-11 | End: 2021-06-15

## 2021-06-11 RX ORDER — DEXTROSE 50 % IN WATER 50 %
12.5 SYRINGE (ML) INTRAVENOUS ONCE
Refills: 0 | Status: DISCONTINUED | OUTPATIENT
Start: 2021-06-11 | End: 2021-06-12

## 2021-06-11 RX ORDER — ACETAMINOPHEN 500 MG
650 TABLET ORAL EVERY 6 HOURS
Refills: 0 | Status: DISCONTINUED | OUTPATIENT
Start: 2021-06-11 | End: 2021-06-26

## 2021-06-11 RX ORDER — CHLORHEXIDINE GLUCONATE 213 G/1000ML
1 SOLUTION TOPICAL
Refills: 0 | Status: DISCONTINUED | OUTPATIENT
Start: 2021-06-11 | End: 2021-06-26

## 2021-06-11 RX ORDER — GLUCAGON INJECTION, SOLUTION 0.5 MG/.1ML
1 INJECTION, SOLUTION SUBCUTANEOUS ONCE
Refills: 0 | Status: DISCONTINUED | OUTPATIENT
Start: 2021-06-11 | End: 2021-06-26

## 2021-06-11 RX ORDER — TIOTROPIUM BROMIDE 18 UG/1
1 CAPSULE ORAL; RESPIRATORY (INHALATION) ONCE
Refills: 0 | Status: DISCONTINUED | OUTPATIENT
Start: 2021-06-11 | End: 2021-06-26

## 2021-06-11 RX ORDER — INSULIN LISPRO 100/ML
5 VIAL (ML) SUBCUTANEOUS
Refills: 0 | Status: DISCONTINUED | OUTPATIENT
Start: 2021-06-11 | End: 2021-06-11

## 2021-06-11 RX ORDER — INSULIN GLARGINE 100 [IU]/ML
1 INJECTION, SOLUTION SUBCUTANEOUS AT BEDTIME
Refills: 0 | Status: DISCONTINUED | OUTPATIENT
Start: 2021-06-11 | End: 2021-06-11

## 2021-06-11 RX ORDER — SENNA PLUS 8.6 MG/1
2 TABLET ORAL AT BEDTIME
Refills: 0 | Status: DISCONTINUED | OUTPATIENT
Start: 2021-06-11 | End: 2021-06-16

## 2021-06-11 RX ORDER — INSULIN LISPRO 100/ML
5 VIAL (ML) SUBCUTANEOUS
Refills: 0 | Status: DISCONTINUED | OUTPATIENT
Start: 2021-06-11 | End: 2021-06-13

## 2021-06-11 RX ORDER — FENTANYL CITRATE 50 UG/ML
0.5 INJECTION INTRAVENOUS
Qty: 2500 | Refills: 0 | Status: DISCONTINUED | OUTPATIENT
Start: 2021-06-11 | End: 2021-06-14

## 2021-06-11 RX ORDER — CHLORHEXIDINE GLUCONATE 213 G/1000ML
15 SOLUTION TOPICAL
Refills: 0 | Status: DISCONTINUED | OUTPATIENT
Start: 2021-06-11 | End: 2021-06-17

## 2021-06-11 RX ORDER — SODIUM CHLORIDE 9 MG/ML
1000 INJECTION, SOLUTION INTRAVENOUS
Refills: 0 | Status: DISCONTINUED | OUTPATIENT
Start: 2021-06-11 | End: 2021-06-11

## 2021-06-11 RX ORDER — PANTOPRAZOLE SODIUM 20 MG/1
40 TABLET, DELAYED RELEASE ORAL
Refills: 0 | Status: DISCONTINUED | OUTPATIENT
Start: 2021-06-11 | End: 2021-06-11

## 2021-06-11 RX ADMIN — CHLORHEXIDINE GLUCONATE 15 MILLILITER(S): 213 SOLUTION TOPICAL at 18:08

## 2021-06-11 RX ADMIN — HEPARIN SODIUM 5000 UNIT(S): 5000 INJECTION INTRAVENOUS; SUBCUTANEOUS at 06:06

## 2021-06-11 RX ADMIN — CHLORHEXIDINE GLUCONATE 1 APPLICATION(S): 213 SOLUTION TOPICAL at 12:32

## 2021-06-11 RX ADMIN — PANTOPRAZOLE SODIUM 40 MILLIGRAM(S): 20 TABLET, DELAYED RELEASE ORAL at 06:08

## 2021-06-11 RX ADMIN — POLYETHYLENE GLYCOL 3350 17 GRAM(S): 17 POWDER, FOR SOLUTION ORAL at 12:33

## 2021-06-11 RX ADMIN — FENTANYL CITRATE 8.21 MICROGRAM(S)/KG/HR: 50 INJECTION INTRAVENOUS at 04:02

## 2021-06-11 RX ADMIN — MEROPENEM 100 MILLIGRAM(S): 1 INJECTION INTRAVENOUS at 21:42

## 2021-06-11 RX ADMIN — MEROPENEM 100 MILLIGRAM(S): 1 INJECTION INTRAVENOUS at 12:18

## 2021-06-11 RX ADMIN — SENNA PLUS 2 TABLET(S): 8.6 TABLET ORAL at 21:41

## 2021-06-11 RX ADMIN — Medication 5 UNIT(S): at 16:32

## 2021-06-11 RX ADMIN — CASPOFUNGIN ACETATE 260 MILLIGRAM(S): 7 INJECTION, POWDER, LYOPHILIZED, FOR SOLUTION INTRAVENOUS at 12:26

## 2021-06-11 RX ADMIN — Medication 650 MILLIGRAM(S): at 12:53

## 2021-06-11 RX ADMIN — Medication 650 MILLIGRAM(S): at 22:00

## 2021-06-11 RX ADMIN — Medication 650 MILLIGRAM(S): at 21:41

## 2021-06-11 RX ADMIN — MEROPENEM 100 MILLIGRAM(S): 1 INJECTION INTRAVENOUS at 06:07

## 2021-06-11 RX ADMIN — Medication 2: at 19:01

## 2021-06-11 RX ADMIN — CHLORHEXIDINE GLUCONATE 15 MILLILITER(S): 213 SOLUTION TOPICAL at 06:08

## 2021-06-11 RX ADMIN — Medication 650 MILLIGRAM(S): at 12:52

## 2021-06-11 RX ADMIN — INSULIN GLARGINE 15 UNIT(S): 100 INJECTION, SOLUTION SUBCUTANEOUS at 22:02

## 2021-06-11 RX ADMIN — PANTOPRAZOLE SODIUM 40 MILLIGRAM(S): 20 TABLET, DELAYED RELEASE ORAL at 12:52

## 2021-06-11 RX ADMIN — HEPARIN SODIUM 5000 UNIT(S): 5000 INJECTION INTRAVENOUS; SUBCUTANEOUS at 21:41

## 2021-06-11 RX ADMIN — Medication 4: at 06:06

## 2021-06-11 RX ADMIN — HUMAN INSULIN 15 UNIT(S): 100 INJECTION, SUSPENSION SUBCUTANEOUS at 06:06

## 2021-06-11 RX ADMIN — DAPTOMYCIN 135 MILLIGRAM(S): 500 INJECTION, POWDER, LYOPHILIZED, FOR SOLUTION INTRAVENOUS at 12:31

## 2021-06-11 RX ADMIN — CHLORHEXIDINE GLUCONATE 1 APPLICATION(S): 213 SOLUTION TOPICAL at 06:07

## 2021-06-11 RX ADMIN — NYSTATIN CREAM 1 APPLICATION(S): 100000 CREAM TOPICAL at 06:12

## 2021-06-11 RX ADMIN — Medication 5 UNIT(S): at 12:19

## 2021-06-11 RX ADMIN — Medication 2: at 12:19

## 2021-06-11 NOTE — PROGRESS NOTE ADULT - SUBJECTIVE AND OBJECTIVE BOX
THOMASFERNANDEZ  60y, Male  Allergy: No Known Allergies  statins (Other)      LOS  13d    CHIEF COMPLAINT: dfu (11 Jun 2021 10:53)      INTERVAL EVENTS/HPI  - T(F): , Max: 101.5 (06-10-21 @ 16:00), febrile, s/p OR  - WBC Count: 12.83 (06-11-21 @ 05:30)  WBC Count: 13.74 (06-10-21 @ 04:20)     - Creatinine, Serum: 2.0 (06-11-21 @ 05:30)  Creatinine, Serum: 2.0 (06-11-21 @ 00:15)     -   -   -     ROS  cannot obtain secondary to patient's sedation and/or mental status    VITALS:  T(F): 100.6, Max: 101.5 (06-10-21 @ 16:00)  HR: 72  BP: --  RR: 18Vital Signs Last 24 Hrs  T(C): 38.1 (11 Jun 2021 12:00), Max: 38.8 (10 Juan F 2021 16:05)  T(F): 100.6 (11 Jun 2021 12:00), Max: 101.5 (10 Juan F 2021 16:00)  HR: 72 (11 Jun 2021 14:00) (66 - 78)  BP: --  BP(mean): --  RR: 18 (11 Jun 2021 14:00) (17 - 23)  SpO2: 99% (11 Jun 2021 14:00) (97% - 100%)    PHYSICAL EXAM:  Gen: Intubated  CV: RRR  Lungs: Decreased BS at bases  Abd: Soft  Extr: R AKA dressings  Neuro: Sedated  Lines clean, no phlebitis    FH: Non-contributory  Social Hx: Non-contributory    TESTS & MEASUREMENTS:                        7.8    12.83 )-----------( 284      ( 11 Jun 2021 05:30 )             26.9     06-11    151<H>  |  113<H>  |  65<HH>  ----------------------------<  245<H>  4.0   |  28  |  2.0<H>    Ca    8.0<L>      11 Jun 2021 05:30  Phos  2.8     06-11  Mg     2.6     06-11    TPro  8.0  /  Alb  2.3<L>  /  TBili  0.4  /  DBili  x   /  AST  131<H>  /  ALT  62<H>  /  AlkPhos  149<H>  06-11    eGFR if Non African American: 35 mL/min/1.73M2 (06-11-21 @ 05:30)  eGFR if African American: 40 mL/min/1.73M2 (06-11-21 @ 05:30)  eGFR if Non African American: 35 mL/min/1.73M2 (06-11-21 @ 00:15)  eGFR if African American: 40 mL/min/1.73M2 (06-11-21 @ 00:15)  eGFR if Non African American: 33 mL/min/1.73M2 (06-10-21 @ 16:00)  eGFR if : 38 mL/min/1.73M2 (06-10-21 @ 16:00)    LIVER FUNCTIONS - ( 11 Jun 2021 05:30 )  Alb: 2.3 g/dL / Pro: 8.0 g/dL / ALK PHOS: 149 U/L / ALT: 62 U/L / AST: 131 U/L / GGT: x               Culture - Blood (collected 06-07-21 @ 11:00)  Source: .Blood Blood  Preliminary Report (06-08-21 @ 22:02):    No growth to date.    Culture - Blood (collected 06-04-21 @ 01:00)  Source: .Blood None  Final Report (06-09-21 @ 08:01):    No Growth Final    Culture - Blood (collected 05-30-21 @ 22:09)  Source: .Blood Blood-Peripheral  Final Report (06-05-21 @ 08:01):    No Growth Final    Culture - Blood (collected 05-30-21 @ 22:09)  Source: .Blood Blood-Peripheral  Final Report (06-05-21 @ 08:01):    No Growth Final    Culture - Acid Fast - Other w/Smear (collected 05-29-21 @ 06:10)  Source: .Other None(R-FOOT)  Preliminary Report (06-09-21 @ 15:02):    No growth at 1 week.    Culture - Surgical Swab (collected 05-29-21 @ 06:10)  Source: .Surgical Swab None  Final Report (06-03-21 @ 16:27):    No growth at 5 days    Culture - Acid Fast - Other w/Smear (collected 05-29-21 @ 06:10)  Source: .Other None (R-FOOT)  Preliminary Report (06-09-21 @ 15:02):    No growth at 1 week.    Culture - Surgical Swab (collected 05-29-21 @ 06:10)  Source: .Surgical Swab None  Final Report (06-03-21 @ 17:24):    Numerous Methicillin resistant Staphylococcus aureus    Rare Corynebacterium species "Susceptibilities not performed"  Organism: Methicillin resistant Staphylococcus aureus (06-03-21 @ 17:24)  Organism: Methicillin resistant Staphylococcus aureus (06-03-21 @ 17:24)      -  Ampicillin/Sulbactam: R 16/8      -  Cefazolin: R 8      -  Clindamycin: S <=0.25      -  Daptomycin: S 0.5      -  Erythromycin: R >4      -  Gentamicin: S <=1 Should not be used as monotherapy      -  Linezolid: S 1      -  Oxacillin: R >2      -  Penicillin: R >8      -  RIF- Rifampin: S <=1 Should not be used as monotherapy      -  Tetra/Doxy: S <=1      -  Trimethoprim/Sulfamethoxazole: S <=0.5/9.5      -  Vancomycin: S 2      Method Type: DAVIDA    Culture - Urine (collected 05-29-21 @ 01:20)  Source: .Urine Clean Catch (Midstream)  Final Report (05-30-21 @ 08:11):    <10,000 CFU/mL Normal Urogenital Mary    Culture - Abscess with Gram Stain (collected 05-28-21 @ 19:51)  Source: .Abscess Right - Foot  Final Report (06-03-21 @ 09:25):    Moderate Methicillin resistant Staphylococcus aureus    Few Corynebacterium species "Susceptibilities not performed"  Organism: Methicillin resistant Staphylococcus aureus (06-03-21 @ 09:25)  Organism: Methicillin resistant Staphylococcus aureus (06-03-21 @ 09:25)      -  Ampicillin/Sulbactam: R 16/8      -  Cefazolin: R 8      -  Clindamycin: S <=0.25      -  Daptomycin: S 0.5      -  Erythromycin: R >4      -  Gentamicin: S <=1 Should not be used as monotherapy      -  Linezolid: S 2      -  Oxacillin: R >2      -  Penicillin: R >8      -  RIF- Rifampin: S <=1 Should not be used as monotherapy      -  Tetra/Doxy: S <=1      -  Trimethoprim/Sulfamethoxazole: S 1/19      -  Vancomycin: S 2      Method Type: DAVIDA    Culture - Other (collected 05-28-21 @ 18:32)  Source: .Other R foot DFU  Final Report (05-30-21 @ 15:44):    Moderate Methicillin resistant Staphylococcus aureus    Normal skin mary isolated  Organism: Methicillin resistant Staphylococcus aureus (05-30-21 @ 15:44)  Organism: Methicillin resistant Staphylococcus aureus (05-30-21 @ 15:44)      -  Ampicillin/Sulbactam: R 16/8      -  Cefazolin: R 16      -  Clindamycin: S <=0.25      -  Daptomycin: S 0.5      -  Erythromycin: R >4      -  Gentamicin: S <=1 Should not be used as monotherapy      -  Linezolid: S 2      -  Oxacillin: R >2      -  Penicillin: R >8      -  RIF- Rifampin: S <=1 Should not be used as monotherapy      -  Tetra/Doxy: S <=1      -  Trimethoprim/Sulfamethoxazole: S <=0.5/9.5      -  Vancomycin: S 2      Method Type: DAVIDA    Culture - Blood (collected 05-28-21 @ 18:28)  Source: .Blood Blood-Peripheral  Gram Stain (05-29-21 @ 18:30):    Growth in anaerobic bottle: Gram positive cocci in pairs    Growth in aerobic bottle: Gram positive cocci in pairs  Final Report (05-31-21 @ 15:45):    Growth in anaerobic bottle: Enterococcus faecalis    Growth in aerobic and anaerobic bottles: Methicillin resistant    Staphylococcus aureus    ***Blood Panel PCR results on this specimen are available    approximately 3 hours after the Gram stain result.***    Gram stain, PCR, and/or culture results may not always    correspond due to difference in methodologies.    ************************************************************    This PCR assay was performed by multiplex PCR. This    Assay tests for 66 bacterialand resistance gene targets.    Please refer to the [facility] test directory    at [email or web address] for details.  Organism: Blood Culture PCR  Enterococcus faecalis  Methicillin resistant Staphylococcus aureus (05-31-21 @ 15:45)  Organism: Methicillin resistant Staphylococcus aureus (05-31-21 @ 15:45)      -  Ampicillin/Sulbactam: R 16/8      -  Cefazolin: R 16      -  Clindamycin: S <=0.25      -  Daptomycin: S 0.5      -  Erythromycin: R >4      -  Gentamicin: S <=1 Should not be used as monotherapy      -  Linezolid: S 2      -  Oxacillin: R >2      -  Penicillin: R >8      -  RIF- Rifampin: S <=1 Should not be used as monotherapy      -  Tetra/Doxy: S <=1      -  Trimethoprim/Sulfamethoxazole: S <=0.5/9.5      -  Vancomycin: S 1      Method Type: DAVIDA  Organism: Enterococcus faecalis (05-31-21 @ 15:45)      -  Ampicillin: S <=2 Predicts results to ampicillin/sulbactam, amoxacillin-clavulanate and  piperacillin-tazobactam.      -  Gentamicin synergy: S      -  Vancomycin: S 1      Method Type: DAVIDA  Organism: Blood Culture PCR (05-31-21 @ 15:45)      -  Coagulase negative Staphylococcus: Detec      -  Enterococcus faecalis: Detec      Method Type: PCR    Culture - Blood (collected 05-28-21 @ 18:28)  Source: .Blood Blood-Peripheral  Gram Stain (05-30-21 @ 06:00):    Growth in aerobic bottle: Gram positive cocci in pairs    Growth in anaerobic bottle: Gram positive cocci in pairs  Final Report (05-31-21 @ 15:46):    Growth in aerobic and anaerobic bottles: Methicillin resistant    Staphylococcus aureus    See previous culture [id number]            INFECTIOUS DISEASES TESTING  COVID-19 PCR: NotDetec (06-10-21 @ 14:41)  Procalcitonin, Serum: 1.08 (06-10-21 @ 08:24)  COVID-19 PCR: NotDetec (06-07-21 @ 06:53)  MRSA PCR Result.: Positive (05-29-21 @ 09:26)  COVID-19 PCR: NotDetec (05-28-21 @ 18:28)      INFLAMMATORY MARKERS  Sedimentation Rate, Erythrocyte: 58 mm/Hr (06-10-21 @ 08:24)  C-Reactive Protein, Serum: 190 mg/L (06-10-21 @ 08:24)  Sedimentation Rate, Erythrocyte: 126 mm/Hr (05-28-21 @ 21:19)  C-Reactive Protein, Serum: 390 mg/L (05-28-21 @ 21:19)      RADIOLOGY & ADDITIONAL TESTS:  I have personally reviewed the last available Chest xray  CXR      CT      CARDIOLOGY TESTING  12 Lead ECG:   Ventricular Rate 85 BPM    Atrial Rate 85 BPM    P-R Interval 186 ms    QRS Duration 142 ms    Q-T Interval 402 ms    QTC Calculation(Bazett) 478 ms    P Axis 49 degrees    R Axis -74 degrees    T Axis 254 degrees    Diagnosis Line Normal sinus rhythm  Left axis deviation  Right bundle branch block  T wave abnormality, consider inferolateral ischemia  Abnormal ECG    Confirmed by Keenan Matias ([id number]) on 6/10/2021 7:58:42 AM (06-10-21 @ 05:31)  12 Lead ECG:   Ventricular Rate 104 BPM    Atrial Rate 105 BPM    P-R Interval 200 ms    QRS Duration 164 ms    Q-T Interval 392 ms    QTC Calculation(Bazett) 515 ms    P Axis 33 degrees    R Axis 263 degrees    T Axis 45 degrees    Diagnosis Line Sinus tachycardia  Right bundle branch block  Abnormal ECG    Confirmed by Cheko Lux ([id number]) on 5/30/2021 9:08:21 PM (05-30-21 @ 19:49)      MEDICATIONS  ALBUTerol    90 MICROgram(s) HFA Inhaler 1  caspofungin IVPB 50  chlorhexidine 0.12% Liquid 15  chlorhexidine 4% Liquid 1  DAPTOmycin IVPB 875  dextrose 5%. 1000  dextrose 5%. 1000  dextrose 5%. 1000  dextrose 50% Injectable 12.5  dextrose 50% Injectable 25  fentaNYL   Infusion. 0.5  glucagon  Injectable 1  heparin   Injectable 5000  insulin glargine Injectable (LANTUS) 15  insulin lispro (ADMELOG) corrective regimen sliding scale   insulin lispro Injectable (ADMELOG) 5  meropenem  IVPB 1000  norepinephrine Infusion 0.05  pantoprazole   Suspension 40  polyethylene glycol 3350 17  propofol Infusion 5.004  senna 2  tiotropium 18 MICROgram(s) Capsule 1      WEIGHT  Weight (kg): 164.2 (06-10-21 @ 16:05)  Creatinine, Serum: 2.0 mg/dL (06-11-21 @ 05:30)  Creatinine, Serum: 2.0 mg/dL (06-11-21 @ 00:15)  Creatinine, Serum: 2.1 mg/dL (06-10-21 @ 16:00)      ANTIBIOTICS:  caspofungin IVPB 50 milliGRAM(s) IV Intermittent every 24 hours  DAPTOmycin IVPB 875 milliGRAM(s) IV Intermittent every 48 hours  meropenem  IVPB 1000 milliGRAM(s) IV Intermittent every 12 hours      All available historical records have been reviewed

## 2021-06-11 NOTE — PROGRESS NOTE ADULT - SUBJECTIVE AND OBJECTIVE BOX
Patient is a 60y old  Male who presents with a chief complaint of dfu (11 Jun 2021 08:47)      INTERVAL HPI/OVERNIGHT EVENTS:  ICU Vital Signs Last 24 Hrs  T(C): 37.8 (11 Jun 2021 08:00), Max: 38.8 (10 Juan F 2021 14:00)  T(F): 100.1 (11 Jun 2021 08:00), Max: 101.8 (10 Juan F 2021 14:00)  HR: 70 (11 Jun 2021 10:45) (66 - 80)  BP: --  BP(mean): --  ABP: 100/44 (11 Jun 2021 08:00) (100/44 - 144/56)  ABP(mean): 60 (11 Jun 2021 08:00) (60 - 82)  RR: 17 (11 Jun 2021 08:00) (17 - 27)  SpO2: 97% (11 Jun 2021 10:45) (97% - 100%)    I&O's Summary    10 Juan F 2021 07:01  -  11 Jun 2021 07:00  --------------------------------------------------------  IN: 3612.3 mL / OUT: 2855 mL / NET: 757.3 mL    11 Jun 2021 07:01  -  11 Jun 2021 10:53  --------------------------------------------------------  IN: 206.8 mL / OUT: 0 mL / NET: 206.8 mL      Mode: AC/ CMV (Assist Control/ Continuous Mandatory Ventilation)  RR (machine): 18  TV (machine): 450  FiO2: 30  PEEP: 5  ITime: 1  MAP: 14  PIP: 27      LABS:                        7.8    12.83 )-----------( 284      ( 11 Jun 2021 05:30 )             26.9     06-11    151<H>  |  113<H>  |  65<HH>  ----------------------------<  245<H>  4.0   |  28  |  2.0<H>    Ca    8.0<L>      11 Jun 2021 05:30  Phos  2.8     06-11  Mg     2.6     06-11    TPro  8.0  /  Alb  2.3<L>  /  TBili  0.4  /  DBili  x   /  AST  131<H>  /  ALT  62<H>  /  AlkPhos  149<H>  06-11    PT/INR - ( 10 Juan F 2021 04:20 )   PT: 16.20 sec;   INR: 1.41 ratio         PTT - ( 10 Juan F 2021 04:20 )  PTT:32.6 sec    CAPILLARY BLOOD GLUCOSE      POCT Blood Glucose.: 244 mg/dL (11 Jun 2021 05:33)  POCT Blood Glucose.: 250 mg/dL (10 Juan F 2021 17:01)  POCT Blood Glucose.: 267 mg/dL (10 Juan F 2021 12:04)    ABG - ( 11 Jun 2021 03:33 )  pH, Arterial: 7.46  pH, Blood: x     /  pCO2: 45    /  pO2: 79    / HCO3: 33    / Base Excess: 8.0   /  SaO2: 97                  RADIOLOGY & ADDITIONAL TESTS:    Consultant(s) Notes Reviewed:  [x ] YES  [ ] NO    MEDICATIONS  (STANDING):    MEDICATIONS  (PRN):      PHYSICAL EXAM:  GENERAL: intubated, sedated on prop and fent  NERVOUS SYSTEM:  intuabted, opens eyes to external stimuli   CHEST/LUNG: breath sounds bl   HEART: S1S2 normal, no S3, Regular rate and rhythm; No murmurs, rubs, or gallops  ABDOMEN: Soft, obese, distended   EXTREMITIES:  r BKA, Lt no edema   LYMPH: No lymphadenopathy noted  SKIN: No rashes or lesions    Care Discussed with Consultants/Other Providers [ x] YES  [ ] NO

## 2021-06-11 NOTE — PROGRESS NOTE ADULT - SUBJECTIVE AND OBJECTIVE BOX
Nephrology progress note    THIS IS AN INCOMPLETE NOTE . FULL NOTE TO FOLLOW SHORTLY    Patient is seen and examined, events over the last 24 h noted .    Allergies:  No Known Allergies  statins (Other)    Hospital Medications:   MEDICATIONS  (STANDING):  ALBUTerol    90 MICROgram(s) HFA Inhaler 1 Puff(s) Inhalation once  aspirin  chewable 81 milliGRAM(s) Oral daily  caspofungin IVPB 50 milliGRAM(s) IV Intermittent every 24 hours  caspofungin IVPB      chlorhexidine 0.12% Liquid 15 milliLiter(s) Oral Mucosa two times a day  chlorhexidine 4% Liquid 1 Application(s) Topical <User Schedule>  DAPTOmycin IVPB 875 milliGRAM(s) IV Intermittent every 48 hours  dextrose 40% Gel 15 Gram(s) Oral once  dextrose 5%. 1000 milliLiter(s) (75 mL/Hr) IV Continuous <Continuous>  dextrose 5%. 1000 milliLiter(s) (50 mL/Hr) IV Continuous <Continuous>  dextrose 5%. 1000 milliLiter(s) (100 mL/Hr) IV Continuous <Continuous>  dextrose 50% Injectable 25 Gram(s) IV Push once  dextrose 50% Injectable 12.5 Gram(s) IV Push once  dextrose 50% Injectable 25 Gram(s) IV Push once  fentaNYL   Infusion. 0.5 MICROgram(s)/kG/Hr (8.21 mL/Hr) IV Continuous <Continuous>  glucagon  Injectable 1 milliGRAM(s) IntraMuscular once  heparin   Injectable 5000 Unit(s) SubCutaneous every 8 hours  insulin glargine Injectable (LANTUS) 15 Unit(s) SubCutaneous at bedtime  insulin lispro (ADMELOG) corrective regimen sliding scale   SubCutaneous every 6 hours  insulin lispro Injectable (ADMELOG) 5 Unit(s) SubCutaneous three times a day before meals  meropenem  IVPB 1000 milliGRAM(s) IV Intermittent every 12 hours  norepinephrine Infusion 0.05 MICROgram(s)/kG/Min (7.7 mL/Hr) IV Continuous <Continuous>  nystatin Ointment 1 Application(s) Topical two times a day  pantoprazole    Tablet 40 milliGRAM(s) Oral before breakfast  polyethylene glycol 3350 17 Gram(s) Oral daily  propofol Infusion 5.004 MICROgram(s)/kG/Min (4.93 mL/Hr) IV Continuous <Continuous>  senna 2 Tablet(s) Oral at bedtime  tiotropium 18 MICROgram(s) Capsule 1 Capsule(s) Inhalation once        VITALS:  T(F): 100.1 (21 @ 08:00), Max: 101.8 (06-10-21 @ 14:00)  HR: 66 (21 @ 08:00)  BP: --  RR: 17 (21 @ 08:00)  SpO2: 99% (21 @ 08:00)  Wt(kg): --     @ 07:01  -  06-10 @ 07:00  --------------------------------------------------------  IN: 2481.2 mL / OUT: 3895 mL / NET: -1413.8 mL    06-10 @ 07:01  -   @ 07:00  --------------------------------------------------------  IN: 3612.3 mL / OUT: 2855 mL / NET: 757.3 mL     07:01  -   @ 08:47  --------------------------------------------------------  IN: 206.8 mL / OUT: 0 mL / NET: 206.8 mL      Height (cm): 327.7 (06-10 @ 16:05)  Weight (kg): 164.2 (06-10 @ 16:05)  BMI (kg/m2): 15.3 (06-10 @ 16:05)  BSA (m2): 4.18 (06-10 @ 16:05)    PHYSICAL EXAM:  Constitutional: NAD  HEENT: anicteric sclera, oropharynx clear, MMM  Neck: No JVD  Respiratory: CTAB, no wheezes, rales or rhonchi  Cardiovascular: S1, S2, RRR  Gastrointestinal: BS+, soft, NT/ND  Extremities: No cyanosis or clubbing. No peripheral edema  :  No terrazas.   Skin: No rashes    LABS:      151<H>  |  113<H>  |  65<HH>  ----------------------------<  245<H>  4.0   |  28  |  2.0<H>    Ca    8.0<L>      2021 05:30  Phos  2.8       Mg     2.6         TPro  8.0  /  Alb  2.3<L>  /  TBili  0.4  /  DBili      /  AST  131<H>  /  ALT  62<H>  /  AlkPhos  149<H>                            7.8    12.83 )-----------( 284      ( 2021 05:30 )             26.9       Urine Studies:  Urinalysis Basic - ( 2021 12:43 )    Color: Yellow / Appearance: Slightly Turbid / S.020 / pH:   Gluc:  / Ketone: Negative  / Bili: Negative / Urobili: <2 mg/dL   Blood:  / Protein: 100 mg/dL / Nitrite: Negative   Leuk Esterase: Negative / RBC: 35 /HPF / WBC 4 /HPF   Sq Epi:  / Non Sq Epi: 1 /HPF / Bacteria: Negative        RADIOLOGY & ADDITIONAL STUDIES:   Nephrology progress note  Patient is seen and examined, events over the last 24 h noted .  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 0.5 MICROgram(s)/kG/Hr (8.21 mL/Hr) IV Continuous <Continuous>  glucagon  Injectable 1 milliGRAM(s) IntraMuscular once  heparin   Injectable 5000 Unit(s) SubCutaneous every 8 hours  insulin glargine Injectable (LANTUS) 15 Unit(s) SubCutaneous at bedtime  insulin lispro (ADMELOG) corrective regimen sliding scale   SubCutaneous every 6 hours  insulin lispro Injectable (ADMELOG) 5 Unit(s) SubCutaneous three times a day before meals  meropenem  IVPB 1000 milliGRAM(s) IV Intermittent every 12 hours  norepinephrine Infusion 0.05 MICROgram(s)/kG/Min (7.7 mL/Hr) IV Continuous <Continuous>  nystatin Ointment 1 Application(s) Topical two times a day  pantoprazole    Tablet 40 milliGRAM(s) Oral before breakfast  polyethylene glycol 3350 17 Gram(s) Oral daily  propofol Infusion 5.004 MICROgram(s)/kG/Min (4.93 mL/Hr) IV Continuous <Continuous>  senna 2 Tablet(s) Oral at bedtime  tiotropium 18 MICROgram(s) Capsule 1 Capsule(s) Inhalation once        VITALS:  T(F): 100.1 (21 @ 08:00), Max: 101.8 (06-10-21 @ 14:00)  HR: 66 (21 @ 08:00)  RR: 17 (21 @ 08:00)  SpO2: 99% (21 @ 08:00)  Wt(kg): --     @ 07:01  -  06-10 @ 07:00  --------------------------------------------------------  IN: 2481.2 mL / OUT: 3895 mL / NET: -1413.8 mL    06-10 @ 07:  -   @ 07:00  --------------------------------------------------------  IN: 3612.3 mL / OUT: 2855 mL / NET: 757.3 mL     @ 07:01  -   @ 08:47  --------------------------------------------------------  IN: 206.8 mL / OUT: 0 mL / NET: 206.8 mL      Height (cm): 327.7 (06-10 @ 16:05)  Weight (kg): 164.2 (06-10 @ 16:05)  BMI (kg/m2): 15.3 (06-10 @ 16:05)  BSA (m2): 4.18 (06-10 @ 16:05)    PHYSICAL EXAM:  Constitutional: on MV   Neck: No JVD  Respiratory: CTAB,  Cardiovascular: S1, S2, RRR  Gastrointestinal: BS+, soft, NT/ND  Extremities: No cyanosis or clubbing. No peripheral edema  :  No terrazas.   Skin: No rashes    LABS:      151<H>  |  113<H>  |  65<HH>  ----------------------------<  245<H>  4.0   |  28  |  2.0<H>  Creatinine Trend: 2.0<--, 2.0<--, 2.1<--, 2.1<--, 2.3<--, 2.0<--  SODIUM TREND:  Sodium 151 [ 05:30]  Sodium 148 [ 00:15]  Sodium 152 [06-10 @ 16:00]  Sodium 154 [06-10 @ 04:20]  Sodium 144 [ @ 04:50]  Sodium 144 [ @ 04:50]  Sodium 143 [ @ 04:30]  Sodium 140 [ @ 04:30]  Sodium 141 [ @ 20:08]  Sodium 141 [ @ 04:30]    Ca    8.0<L>      2021 05:30  Phos  2.8       Mg     2.6         TPro  8.0  /  Alb  2.3<L>  /  TBili  0.4  /  DBili      /  AST  131<H>  /  ALT  62<H>  /  AlkPhos  149<H>                            7.8    12.83 )-----------( 284      ( 2021 05:30 )             26.9       Urine Studies:  Urinalysis Basic - ( 2021 12:43 )    Color: Yellow / Appearance: Slightly Turbid / S.020 / pH:   Gluc:  / Ketone: Negative  / Bili: Negative / Urobili: <2 mg/dL   Blood:  / Protein: 100 mg/dL / Nitrite: Negative   Leuk Esterase: Negative / RBC: 35 /HPF / WBC 4 /HPF   Sq Epi:  / Non Sq Epi: 1 /HPF / Bacteria: Negative        RADIOLOGY & ADDITIONAL STUDIES:

## 2021-06-11 NOTE — PROGRESS NOTE ADULT - ASSESSMENT
59 y/o M with PMH of DM, hypertension, dyslipidemia, sleep apnea, Charcot foot reconstruction with external fixation, morbid obesity presenting to the hospital on 5/29 with worsening foot ulcer.  s/p Right Knee Disarticulation for necrotizing soft tissue infection of right lower extremity    #CANDY on CKD ( unknown stage last known creat 1.4 in 11/2019) / rule out ATN / obstructive uropathy/ sp CT angio (too early for FLO)/ doubt IRGN (infection related GN)/ doubt AIN (multiple antibx courses)/ high bicarb/chr resp acidosis  non oliguric now / creat stable off HD  hold lasix bicarb is elevated and hypernatremic  agree with D5w and free water with diet / follow serial BMP / target lowering sodium by 6 meq per day   UA noted, minimal proteinuria    #appreciate ID [email or web address], on Dapto and merrem   sp debridement today of DFU as per podiatry  follow IP and Ca   will sign off recall PRN please

## 2021-06-11 NOTE — PROGRESS NOTE ADULT - SUBJECTIVE AND OBJECTIVE BOX
Patient is a 60y old  Male who presents with a chief complaint of dfu (11 Jun 2021 03:28)      Over Night Events:  Patient seen and examined.   still vented on sedation pedning OR AKA today   spiking temp     ROS:  See HPI    PHYSICAL EXAM    ICU Vital Signs Last 24 Hrs  T(C): 37.8 (11 Jun 2021 04:00), Max: 38.8 (10 Juan F 2021 08:00)  T(F): 100.1 (11 Jun 2021 04:00), Max: 101.9 (10 Juan F 2021 08:00)  HR: 68 (11 Jun 2021 07:00) (66 - 92)  BP: --  BP(mean): --  ABP: 112/46 (11 Jun 2021 07:00) (104/42 - 172/68)  ABP(mean): 66 (11 Jun 2021 07:00) (60 - 96)  RR: 18 (11 Jun 2021 07:00) (17 - 27)  SpO2: 98% (11 Jun 2021 07:00) (97% - 100%)      General: awake on sedation   HEENT:    et tube             Lymph Nodes: NO cervical LN   Lungs: Bilateral BS  Cardiovascular: Regular   Abdomen: Soft, Positive BS  Extremities: No clubbing   Skin: warm   Neurological: no focal   Musculoskeletal: move all ext     I&O's Detail    10 Juan F 2021 07:01  -  11 Jun 2021 07:00  --------------------------------------------------------  IN:    dextrose 5%: 700 mL    Enteral Tube Flush: 160 mL    FentaNYL: 360.9 mL    Free Water: 1000 mL    IV PiggyBack: 300 mL    Norepinephrine: 50 mL    Propofol: 188 mL    Vital High Protein: 825 mL  Total IN: 3583.9 mL    OUT:    Indwelling Catheter - Urethral (mL): 2855 mL  Total OUT: 2855 mL    Total NET: 728.9 mL          LABS:                          7.8    12.83 )-----------( 284      ( 11 Jun 2021 05:30 )             26.9         11 Jun 2021 05:30    151    |  113    |  65     ----------------------------<  245    4.0     |  28     |  2.0      Ca    8.0        11 Jun 2021 05:30  Phos  2.8       11 Jun 2021 05:30  Mg     2.6       11 Jun 2021 05:30    TPro  8.0    /  Alb  2.3    /  TBili  0.4    /  DBili  x      /  AST  131    /  ALT  62     /  AlkPhos  149    11 Jun 2021 05:30  Amylase x     lipase x                                                 PT/INR - ( 10 Juan F 2021 04:20 )   PT: 16.20 sec;   INR: 1.41 ratio         PTT - ( 10 Juan F 2021 04:20 )  PTT:32.6 sec                                             CARDIAC MARKERS ( 11 Jun 2021 05:30 )  x     / x     / 561 U/L / x     / x      CARDIAC MARKERS ( 10 Juan F 2021 04:20 )  x     / x     / 341 U/L / x     / x                                                                                                         Mode: AC/ CMV (Assist Control/ Continuous Mandatory Ventilation)  RR (machine): 18  TV (machine): 480  FiO2: 30  PEEP: 5  ITime: 1  MAP: 13  PIP: 26                                      ABG - ( 11 Jun 2021 03:33 )  pH, Arterial: 7.46  pH, Blood: x     /  pCO2: 45    /  pO2: 79    / HCO3: 33    / Base Excess: 8.0   /  SaO2: 97                  MEDICATIONS  (STANDING):  ALBUTerol    90 MICROgram(s) HFA Inhaler 1 Puff(s) Inhalation once  aspirin  chewable 81 milliGRAM(s) Oral daily  caspofungin IVPB      caspofungin IVPB 50 milliGRAM(s) IV Intermittent every 24 hours  chlorhexidine 0.12% Liquid 15 milliLiter(s) Oral Mucosa two times a day  chlorhexidine 4% Liquid 1 Application(s) Topical <User Schedule>  DAPTOmycin IVPB 875 milliGRAM(s) IV Intermittent every 48 hours  dextrose 40% Gel 15 Gram(s) Oral once  dextrose 5%. 1000 milliLiter(s) (50 mL/Hr) IV Continuous <Continuous>  dextrose 5%. 1000 milliLiter(s) (50 mL/Hr) IV Continuous <Continuous>  dextrose 5%. 1000 milliLiter(s) (100 mL/Hr) IV Continuous <Continuous>  dextrose 50% Injectable 25 Gram(s) IV Push once  dextrose 50% Injectable 12.5 Gram(s) IV Push once  dextrose 50% Injectable 25 Gram(s) IV Push once  fentaNYL   Infusion. 0.5 MICROgram(s)/kG/Hr (8.21 mL/Hr) IV Continuous <Continuous>  glucagon  Injectable 1 milliGRAM(s) IntraMuscular once  heparin   Injectable 5000 Unit(s) SubCutaneous every 8 hours  insulin lispro (ADMELOG) corrective regimen sliding scale   SubCutaneous every 6 hours  insulin NPH human recombinant 15 Unit(s) SubCutaneous every 12 hours  meropenem  IVPB 1000 milliGRAM(s) IV Intermittent every 12 hours  norepinephrine Infusion 0.05 MICROgram(s)/kG/Min (7.7 mL/Hr) IV Continuous <Continuous>  nystatin Ointment 1 Application(s) Topical two times a day  pantoprazole    Tablet 40 milliGRAM(s) Oral before breakfast  polyethylene glycol 3350 17 Gram(s) Oral daily  propofol Infusion 5.004 MICROgram(s)/kG/Min (4.93 mL/Hr) IV Continuous <Continuous>  senna 2 Tablet(s) Oral at bedtime  tiotropium 18 MICROgram(s) Capsule 1 Capsule(s) Inhalation once    MEDICATIONS  (PRN):  acetaminophen   Tablet .. 650 milliGRAM(s) Oral every 6 hours PRN Temp greater or equal to 38C (100.4F), Mild Pain (1 - 3)          Xrays:  TLC:  OG:  ET tube:                                                                                    pending    ECHO:  CAM ICU:

## 2021-06-11 NOTE — CHART NOTE - NSCHARTNOTEFT_GEN_A_CORE
Registered Dietitian Follow-Up     Patient Profile Reviewed                           Yes [x]   No []     Nutrition History Previously Obtained        Yes []  No [x] pt remains intubated at this time.        Pertinent Subjective Information: Pt NPO for procedure today at time of visit; TF being held.      Pertinent Medical Interventions: Vascular team following pt d/t diabetic foot infection (R foot) w/ necrotizing fascitis. Pt is sp revision of disarticulation of knee with conversion to R AKA ().      Diet order: Diet, NPO with Tube Feed:   Tube Feeding Modality: Orogastric  Vital High Protein  Total Volume for 24 Hours (mL): 1320  Continuous  Starting Tube Feed Rate {mL per Hour}: 15  Increase Tube Feed Rate by (mL): 10     Every 4 hours  Until Goal Tube Feed Rate (mL per Hour): 55  Tube Feed Duration (in Hours): 24  Tube Feed Start Time: 12:00  Beneprotein ([facility] Only)     Qty per Day:  7 PACKS/DAY (21 @ 11:17) [Active]  At goal this regimen provides: 1759 kcal (considers 264 kcal from propofol), 158 g protein, and 1109 mL free H2O    Anthropometrics:  Height (cm): 69"; current ht in EMR is likely a docmentation error  Weight (kg): 164.2 (06-10-21 @ 16:05)  BMI (kg/m2): 53.6 (06-10-21 @ 16:05) using 69" (original height) and dosing wt  IBW: 72.7 kg    Daily Weight in k.5 (), Weight in k.5 (06-10), Weight in k.4 (), Weight in k.2 (), Weight in k.9 (), Weight in k.7 (), Weight in k.3 ()  % Weight Change relatively stable daily wt since previously assessed; pt likely experiencing fluid related wt shifts throughout LOS.    MEDICATIONS  (STANDING):  ALBUTerol    90 MICROgram(s) HFA Inhaler 1 Puff(s) Inhalation once  caspofungin IVPB 50 milliGRAM(s) IV Intermittent every 24 hours  chlorhexidine 0.12% Liquid 15 milliLiter(s) Oral Mucosa two times a day  chlorhexidine 4% Liquid 1 Application(s) Topical <User Schedule>  DAPTOmycin IVPB 875 milliGRAM(s) IV Intermittent every 48 hours  dextrose 5%. 1000 milliLiter(s) (50 mL/Hr) IV Continuous <Continuous>  dextrose 5%. 1000 milliLiter(s) (100 mL/Hr) IV Continuous <Continuous>  dextrose 5%. 1000 milliLiter(s) (75 mL/Hr) IV Continuous <Continuous>  dextrose 50% Injectable 12.5 Gram(s) IV Push once  dextrose 50% Injectable 25 Gram(s) IV Push once  fentaNYL   Infusion. 0.5 MICROgram(s)/kG/Hr (8.21 mL/Hr) IV Continuous <Continuous>  glucagon  Injectable 1 milliGRAM(s) IntraMuscular once  heparin   Injectable 5000 Unit(s) SubCutaneous every 8 hours  insulin glargine Injectable (LANTUS) 15 Unit(s) SubCutaneous at bedtime  insulin lispro (ADMELOG) corrective regimen sliding scale   SubCutaneous every 6 hours  insulin lispro Injectable (ADMELOG) 5 Unit(s) SubCutaneous three times a day before meals  meropenem  IVPB 1000 milliGRAM(s) IV Intermittent every 12 hours  norepinephrine Infusion 0.05 MICROgram(s)/kG/Min (7.7 mL/Hr) IV Continuous <Continuous>  pantoprazole   Suspension 40 milliGRAM(s) Oral daily  polyethylene glycol 3350 17 Gram(s) Oral daily  propofol Infusion 5.004 MICROgram(s)/kG/Min (4.93 mL/Hr) IV Continuous <Continuous>------ (@ 10 mL/h provides 264 kcal/d)  senna 2 Tablet(s) Oral at bedtime  tiotropium 18 MICROgram(s) Capsule 1 Capsule(s) Inhalation once    MEDICATIONS  (PRN):  acetaminophen   Tablet .. 650 milliGRAM(s) Oral every 6 hours PRN Temp greater or equal to 38C (100.4F), Mild Pain (1 - 3)    Pertinent Labs:  RBC 2.90, H/H 7.8/26.9, Na 151, BUN 65, Cr 2.0, glucose 245, elevated LFTs, Mg 2.6, MAP 60-82    Finger Sticks:  POCT Blood Glucose.: 183 mg/dL ( @ 14:33)  POCT Blood Glucose.: 186 mg/dL ( @ 11:33)  POCT Blood Glucose.: 244 mg/dL ( @ 05:33)  POCT Blood Glucose.: 250 mg/dL (06-10 @ 17:01)    Physical Findings:  - Appearance: obese; () +2 generalized edema  - GI function: last BM on  per EMR; pt is on a bowel regimen; no noted GI s/s.  - Tubes: OGT  - Oral/Mouth cavity: NPO  - Skin: () ecchymosis + surgical incision     Nutrition Requirements:  Weight Used: dosing 164.2 kg vs IBW 73 kg; continued from assessment on  as kcal needs remain appropriate +/- 100 kcal when using new Tmax: 38.8, Ve: 8.2 (~2464 kcal (PSE ))     Estimated Energy Needs    Continue [x] 3695-8281 kcal range obtained by comparing 0126-5526 kcal/d (60-70% PSE : 2394 kcal) vs 4050-7696 kcal (11-14 kcal/kg CBW) vs 3159-9763 kcal (22-25 kcal/kg IBW) obese BMI + intubation considered     Estimated Protein Needs    Continue [x]  145-161 g (2-2.2 g/kg IBW) same as above     Estimated Fluid Needs        Continue [x]  per CCU team     Nutrient Intake: not meeting est protein/kcal needs at this time, NPO for procedure- TF being held     Nutrition Diagnosis (new)  Problem: Inadequate protein/energy intake  Etiology: diet order pending vascular procedure  Signs/symptoms: pt NPO at this time     Nutrition Intervention EN + medical food supplements, coordination of care     Goal/Expected Outcome: pt to resume TF and meet % est kcal/protein requirements as medically feasible within the next 4 days     Indicator/Monitoring: RD to monitor diet order, energy intake, body composition, NFPF, glucose/renal/electrolyte profiles    Recommendation: resume the currently ordered TF regimen + supplements (beneprotein 7x/d) when medically feasible as it meets % est kcal requirements and % est protein requirements, maintain aspiration precautions, hold TF if MAP <65

## 2021-06-11 NOTE — PROGRESS NOTE ADULT - SUBJECTIVE AND OBJECTIVE BOX
Progress Note: General Surgery  Patient: FERNANDEZ GUZMAN , 60y (1960)Male   MRN: [id number]  Location: [facility] [address]  Visit: 05-29-21 Inpatient  Date: 06-11-21 @ 03:28  Hospital Day: 13  Post-op Day: 10    Procedure: s/p right knee disarticulation, pre op for AKA    Events of past 24 hours: continues to spike fevers, 100.8 overnight, blood cx negative    Vitals: T(F): 100.6 (06-11-21 @ 00:00), Max: 102.5 (06-10-21 @ 04:00)  HR: 72 (06-11-21 @ 02:00)  BP: --  RR: 17 (06-11-21 @ 02:00)  SpO2: 100% (06-11-21 @ 02:00)  RR (machine): 18, TV (machine): 480, FiO2: 30, PEEP: 5, PIP: 26    Diet: Diet, NPO after Midnight:      NPO Start Date: 10-Juan F-2021,   NPO Start Time: 23:59 (06-10-21 @ 17:39)  Diet, NPO with Tube Feed:   Tube Feeding Modality: Orogastric  Vital High Protein  Total Volume for 24 Hours (mL): 1320  Continuous  Starting Tube Feed Rate mL per Hour: 15  Increase Tube Feed Rate by (mL): 10     Every 4 hours  Until Goal Tube Feed Rate (mL per Hour): 55  Tube Feed Duration (in Hours): 24  Tube Feed Start Time: 12:00  Beneprotein ([facility] Only)     Qty per Day:  7 PACKS/DAY (06-02-21 @ 11:35)    IV Fluids: yes no , Type: dextrose 5%. 1000 milliLiter(s) (50 mL/Hr) IV Continuous <Continuous>  dextrose 5%. 1000 milliLiter(s) (50 mL/Hr) IV Continuous <Continuous>  dextrose 5%. 1000 milliLiter(s) (100 mL/Hr) IV Continuous <Continuous>    Intake and Output:   06-09-21 @ 07:01  -  06-10-21 @ 07:00  --------------------------------------------------------  IN: 2481.2 mL    06-10-21 @ 07:01  -  06-11-21 @ 03:28  --------------------------------------------------------  IN: 3220.3 mL       06-09-21 @ 07:01  -  06-10-21 @ 07:00  --------------------------------------------------------  OUT:    Indwelling Catheter - Urethral (mL): 3895 mL  Total OUT: 3895 mL      06-10-21 @ 07:01 - 06-11-21 @ 03:28  --------------------------------------------------------  OUT:    Indwelling Catheter - Urethral (mL): 2385 mL  Total OUT: 2385 mL        06-09-21 @ 07:01  -  06-10-21 @ 07:00  --------------------------------------------------------  NET: -1413.8 mL    06-10-21 @ 07:01  -  06-11-21 @ 03:28  --------------------------------------------------------  NET: 835.3 mL        PHYSICAL EXAM    Appearance: intubated	  HEENT:   Normal oral mucosa, PERRL, EOMI	  Neck: Supple, - JVD;   Cardiovascular: Normal S1 S2, No JVD, No murmurs,   Respiratory: Lungs clear to auscultation/No Rales, Rhonchi, Wheezing	  Gastrointestinal:  Soft, Non-tender, + BS	  Skin: No rashes, No ecchymoses, No cyanosis  Extremities: Normal range of motion, No clubbing, cyanosis   Right knee disarticulation: dressing changed, healthy viable tissue, wet to dry gauze with kerlix and ace wrap applied  Neurologic: Non-focal  Psychiatry: arousable      Medications: [Standing]  ALBUTerol    90 MICROgram(s) HFA Inhaler 1 Puff(s) Inhalation once  aspirin  chewable 81 milliGRAM(s) Oral daily  caspofungin IVPB      caspofungin IVPB 50 milliGRAM(s) IV Intermittent every 24 hours  chlorhexidine 0.12% Liquid 15 milliLiter(s) Oral Mucosa two times a day  chlorhexidine 4% Liquid 1 Application(s) Topical <User Schedule>  DAPTOmycin IVPB 875 milliGRAM(s) IV Intermittent every 48 hours  dextrose 40% Gel 15 Gram(s) Oral once  dextrose 5%. 1000 milliLiter(s) (50 mL/Hr) IV Continuous <Continuous>  dextrose 5%. 1000 milliLiter(s) (50 mL/Hr) IV Continuous <Continuous>  dextrose 5%. 1000 milliLiter(s) (100 mL/Hr) IV Continuous <Continuous>  dextrose 50% Injectable 25 Gram(s) IV Push once  dextrose 50% Injectable 12.5 Gram(s) IV Push once  dextrose 50% Injectable 25 Gram(s) IV Push once  fentaNYL   Infusion. 0.5 MICROgram(s)/kG/Hr (8.21 mL/Hr) IV Continuous <Continuous>  glucagon  Injectable 1 milliGRAM(s) IntraMuscular once  heparin   Injectable 5000 Unit(s) SubCutaneous every 8 hours  insulin lispro (ADMELOG) corrective regimen sliding scale   SubCutaneous every 6 hours  insulin NPH human recombinant 15 Unit(s) SubCutaneous every 12 hours  meropenem  IVPB 1000 milliGRAM(s) IV Intermittent every 12 hours  norepinephrine Infusion 0.05 MICROgram(s)/kG/Min (7.7 mL/Hr) IV Continuous <Continuous>  nystatin Ointment 1 Application(s) Topical two times a day  pantoprazole    Tablet 40 milliGRAM(s) Oral before breakfast  polyethylene glycol 3350 17 Gram(s) Oral daily  propofol Infusion 5.004 MICROgram(s)/kG/Min (4.93 mL/Hr) IV Continuous <Continuous>  senna 2 Tablet(s) Oral at bedtime  tiotropium 18 MICROgram(s) Capsule 1 Capsule(s) Inhalation once    DVT Prophylaxis: heparin   Injectable 5000 Unit(s) SubCutaneous every 8 hours    GI Prophylaxis: pantoprazole    Tablet 40 milliGRAM(s) Oral before breakfast    Antibiotics: caspofungin IVPB      caspofungin IVPB 50 milliGRAM(s) IV Intermittent every 24 hours  DAPTOmycin IVPB 875 milliGRAM(s) IV Intermittent every 48 hours  meropenem  IVPB 1000 milliGRAM(s) IV Intermittent every 12 hours    Anticoagulation:   Medications:[PRN]  acetaminophen   Tablet .. 650 milliGRAM(s) Oral every 6 hours PRN    Labs:                        8.1    13.74 )-----------( 286      ( 10 Juan F 2021 04:20 )             27.9   06-11    148<H>  |  110  |  67<HH>  ----------------------------<  250<H>  4.0   |  31  |  2.0<H>    Ca    8.0<L>      11 Jun 2021 00:15  Phos  2.1     06-10  Mg     2.5     06-10    TPro  7.9  /  Alb  2.3<L>  /  TBili  0.3  /  DBili  x   /  AST  100<H>  /  ALT  39  /  AlkPhos  141<H>  06-10  LIVER FUNCTIONS - ( 10 Juan F 2021 04:20 )  Alb: 2.3 g/dL / Pro: 7.9 g/dL / ALK PHOS: 141 U/L / ALT: 39 U/L / AST: 100 U/L / GGT: x         PT/INR - ( 10 Juan F 2021 04:20 )   PT: 16.20 sec;   INR: 1.41 ratio         PTT - ( 10 Juan F 2021 04:20 )  PTT:32.6 secABG - ( 10 Juan F 2021 03:15 )  pH: 7.44  /  pCO2: 49    /  pO2: 79    / HCO3: 33    / Base Excess: 8.2   /  SaO2: 96              CARDIAC MARKERS ( 10 Juan F 2021 04:20 )  x     / x     / 341 U/L / x     / x      CARDIAC MARKERS ( 09 Jun 2021 04:50 )  x     / x     / 658 U/L / x     / x          Urine/Micro:    Imaging:  None/24h

## 2021-06-11 NOTE — PROGRESS NOTE ADULT - ASSESSMENT
Assessment:  60y Male patient admitted S/P *** , with the above physical exam, labs, and imaging findings.    Plan:  Pre op for AKA 6/11  NPO  IVF per CCU    Date/Time: 06-11-21 @ 03:28 Assessment:  60y Male patient with the above physical exam, labs, and imaging findings.    Plan:  Pre op for AKA 6/11  NPO  IVF per CCU    Date/Time: 06-11-21 @ 03:28

## 2021-06-11 NOTE — PACU DISCHARGE NOTE - COMMENTS
Pt transferred back to [facility], Sign out given to Dr. Auguste, agreed and accepted the pt.

## 2021-06-11 NOTE — CHART NOTE - NSCHARTNOTEFT_GEN_A_CORE
Vascular Surgery Post-Op Note,  SPECTRA [id number]    CC: DFU  Pre-Op Dx: DIABETIC FOOT INFECTION OF RIGHT FOOT;CANDY;NECROTIZING FASCIITIS    Gas gangrene of lower extremity    Diabetic infection of right foot      Procedure: Incision and drainage, pressure ulcer, skin, with debridement    Pulsed lavage for 15 minutes    Disarticulation of right knee    Insertion, catheter, temporary, dialysis    Revision of disarticulation of knee    Conversion to AKA    Surgeon: Dr. Pascal    Subjective: no issues    Vital Signs Last 24 Hrs  T(C): 38.1 (11 Jun 2021 12:00), Max: 38.8 (10 Juan F 2021 16:05)  T(F): 100.6 (11 Jun 2021 12:00), Max: 101.5 (10 Juan F 2021 16:00)  HR: 72 (11 Jun 2021 13:00) (66 - 78)  BP: --  BP(mean): --  RR: 17 (11 Jun 2021 13:00) (17 - 23)  SpO2: 98% (11 Jun 2021 13:00) (97% - 100%)    Physical Exam:  General: intubated  Pulmonary: Nonlabored breathing, no respiratory distress  Cardiovascular: NSR  Abdominal: soft, NT/ND  Extremities: Right AKA, dressing clean and intact  Skin: no hematoma, rash, ecchymosis  Neuro: intubated, sedated    LABS:                        7.8    12.83 )-----------( 284      ( 11 Jun 2021 05:30 )             26.9     06-11    151<H>  |  113<H>  |  65<HH>  ----------------------------<  245<H>  4.0   |  28  |  2.0<H>    Ca    8.0<L>      11 Jun 2021 05:30  Phos  2.8     06-11  Mg     2.6     06-11    TPro  8.0  /  Alb  2.3<L>  /  TBili  0.4  /  DBili  x   /  AST  131<H>  /  ALT  62<H>  /  AlkPhos  149<H>  06-11    PT/INR - ( 10 Juan F 2021 04:20 )   PT: 16.20 sec;   INR: 1.41 ratio         PTT - ( 10 Juan F 2021 04:20 )  PTT:32.6 sec  CAPILLARY BLOOD GLUCOSE      POCT Blood Glucose.: 186 mg/dL (11 Jun 2021 11:33)  POCT Blood Glucose.: 244 mg/dL (11 Jun 2021 05:33)  POCT Blood Glucose.: 250 mg/dL (10 Juan F 2021 17:01)    LIVER FUNCTIONS - ( 11 Jun 2021 05:30 )  Alb: 2.3 g/dL / Pro: 8.0 g/dL / ALK PHOS: 149 U/L / ALT: 62 U/L / AST: 131 U/L / GGT: x               Radiology and Additional Studies:    Assessment:60y Male s/p above procedure    Plan:  Keep dressing x 48 hours, we will re-assess on Monday  continue care as per CCU

## 2021-06-11 NOTE — BRIEF OPERATIVE NOTE - NSICDXBRIEFPOSTOP_GEN_ALL_CORE_FT
POST-OP DIAGNOSIS:  Gas gangrene of lower extremity 29-May-2021 06:31:51  Erickson Jiang  

## 2021-06-11 NOTE — PROGRESS NOTE ADULT - ASSESSMENT
· Assessment	  61 y/o M with PMH of DM, hypertension, dyslipidemia, sleep apnea, Charcot foot reconstruction with external fixation, super morbid obesity present for worsening right foot infection.  Pt reports he has had this foot ulcer at various stage of healing for 13 years. Patient has been admitted for IV antibiotics multiple times in the past for osteomyelitis of right foot. He has history of MRSA infection in foot. pt has baseline neuropathy in both feet so does not feel any pain.  Patient states his Infection has been gradually getting worse despite treatment with bactrim, he saw his podiatrists and he was sent in by podiatry for IV abx .    IMPRESSION;  #Fevers Tm 103 with downtrending WBC normotensive ?BKA source control , no change after stopping linezolid, non-infectious?    s/p Revision of disarticulation of knee 11-Jun-2021 "Healthy, non-infected tissue of knee disarticulation s/p conversion to Right AKA"    6/7 BCX NG    6/7 UA  WBC 4    TLC was changed  #MSOF : resp failure with fio2 40%, on pressors, renal failure off  CVVH, metabolic encephalopathy  5/31 S/p disarticulation right knee   -5/28 BCx E fecalis, ORSA  -5/31 TTE no vegetation  -etiology of bacteremia was right foot abscess  -5/29 R foot : ORSA , Corynebacterium  -5/30 BCx NG  -6/4 BCx NG  Creatinine, Serum: 2.3 mg/dL (06.09.21 @ 04:50)        RECOMMENDATIONS;  - Repeat BCX  - Send fungitell   - OR with Vascular for source control   - rule out non-infectious causes of fever, DVT etc  - Caspo 50mg q24h IV 6/9-  - Dk 1g q12h IV 6/9-  - Dapto 875mg q48h IV (8mg/kg of adjusted body wt)  - check daily CPK-  Creatine Kinase, Serum: 561 U/L (06.11.21 @ 05:30), need to  if > 1000        Please call with any questions or send a message on [employer] Teams  Spectra [phone]

## 2021-06-11 NOTE — PROGRESS NOTE ADULT - SUBJECTIVE AND OBJECTIVE BOX
Specialty: Neuro Critical Care     Interval Hx: Pt POD #0 s/p R AKA. Remains sedated, on MV      HPI:  Patient is a 61 y/o M with PMH of DM, hypertension, dyslipidemia, sleep apnea, Charcot foot reconstruction with external fixation, super morbid obesity present for worsening right foot infection.  Pt reports he has had this foot ulcer at various stage of healing for 13 years. Patient has been admitted for IV antibiotics multiple times in the past for osteomyelitis of right foot. He has history of MRSA infection in foot. pt has baseline neuropathy in both feet so does not feel any pain.  Patient states his Infection has been gradually getting worse despite treatment with bactrim, he saw his podiatrists and he was sent in by podiatry for IV abx . No fever, chills, cp, sob, abd pain, nausea, vomiting, dysuria, calf pain.     In ED  T(C): 37.3 (05-29-21 @ 01:31), Max: 38 (05-28-21 @ 20:27)  HR: 100 (05-29-21 @ 01:23) (99 - 118)  BP: 119/59 (05-29-21 @ 01:23) (119/59 - 139/69)  RR: 18 (05-29-21 @ 01:23) (17 - 18)  SpO2: 96% (05-29-21 @ 01:23) (86% - 100%)  there is a Right foot ulcer on the lateral aspect of the plantar surface measuring 8.5-3cm. Ulcer is surrounded by macerated tissue. Serous drainage from wound. Kurlex and sponge dressing in place, clean, dry and intact. Patient was I&D'd bedside by podiatry in the ed. purulent drainage was evacuated and sent to the lab for culture. Patient is scheduled for OR with podiatry in the AM.   (29 May 2021 02:29)      Past Medical and Surgical Hx:  PAST MEDICAL & SURGICAL HISTORY:  MATA on CPAP    DM (diabetes mellitus)    HTN (hypertension)    High cholesterol    Charcot ankle, right    History of percutaneous angioplasty    H/O skin graft    Left toe amputee  4 TOES    Diabetic Charcot foot  Fixation with external device        Family Hx:  FAMILY HISTORY:  Family history of diabetes mellitus (DM)    FH: leukemia        Allergies: No Known Allergies  statins (Other)      ROS:   Patient unable to participate in ROS due to neurologic status      Medications Current and PRN:  MEDICATIONS  (STANDING):  ALBUTerol    90 MICROgram(s) HFA Inhaler 1 Puff(s) Inhalation once  caspofungin IVPB 50 milliGRAM(s) IV Intermittent every 24 hours  chlorhexidine 0.12% Liquid 15 milliLiter(s) Oral Mucosa two times a day  chlorhexidine 4% Liquid 1 Application(s) Topical <User Schedule>  DAPTOmycin IVPB 875 milliGRAM(s) IV Intermittent every 48 hours  dextrose 5%. 1000 milliLiter(s) (50 mL/Hr) IV Continuous <Continuous>  dextrose 5%. 1000 milliLiter(s) (100 mL/Hr) IV Continuous <Continuous>  dextrose 5%. 1000 milliLiter(s) (75 mL/Hr) IV Continuous <Continuous>  dextrose 50% Injectable 12.5 Gram(s) IV Push once  dextrose 50% Injectable 25 Gram(s) IV Push once  fentaNYL   Infusion. 0.5 MICROgram(s)/kG/Hr (8.21 mL/Hr) IV Continuous <Continuous>  glucagon  Injectable 1 milliGRAM(s) IntraMuscular once  heparin   Injectable 5000 Unit(s) SubCutaneous every 8 hours  insulin glargine Injectable (LANTUS) 15 Unit(s) SubCutaneous at bedtime  insulin lispro (ADMELOG) corrective regimen sliding scale   SubCutaneous every 6 hours  insulin lispro Injectable (ADMELOG) 5 Unit(s) SubCutaneous three times a day before meals  meropenem  IVPB 1000 milliGRAM(s) IV Intermittent every 12 hours  norepinephrine Infusion 0.05 MICROgram(s)/kG/Min (7.7 mL/Hr) IV Continuous <Continuous>  pantoprazole   Suspension 40 milliGRAM(s) Oral daily  polyethylene glycol 3350 17 Gram(s) Oral daily  propofol Infusion 5.004 MICROgram(s)/kG/Min (4.93 mL/Hr) IV Continuous <Continuous>  senna 2 Tablet(s) Oral at bedtime  tiotropium 18 MICROgram(s) Capsule 1 Capsule(s) Inhalation once    MEDICATIONS  (PRN):  acetaminophen   Tablet .. 650 milliGRAM(s) Oral every 6 hours PRN Temp greater or equal to 38C (100.4F), Mild Pain (1 - 3)      Vital Signs:  ICU Vital Signs Last 24 Hrs  T(C): 38 (11 Jun 2021 16:00), Max: 38.5 (10 Juan F 2021 18:00)  T(F): 100.4 (11 Jun 2021 16:00), Max: 101.3 (10 Juan F 2021 18:00)  HR: 70 (11 Jun 2021 16:00) (66 - 76)  BP: --  BP(mean): --  ABP: 116/52 (11 Jun 2021 14:00) (100/44 - 146/60)  ABP(mean): 72 (11 Jun 2021 14:00) (60 - 86)  RR: 17 (11 Jun 2021 16:00) (17 - 23)  SpO2: 100% (11 Jun 2021 16:00) (97% - 100%)      Ventilator:  Mode: AC/ CMV (Assist Control/ Continuous Mandatory Ventilation)  RR (machine): 18  TV (machine): 450  FiO2: 30  PEEP: 5  ITime: 1  MAP: 14  PIP: 27        I/Os:  I&O's Summary    10 Juan F 2021 07:01  -  11 Jun 2021 07:00  --------------------------------------------------------  IN: 3612.3 mL / OUT: 2855 mL / NET: 757.3 mL    11 Jun 2021 07:01  -  11 Jun 2021 17:10  --------------------------------------------------------  IN: 1086.4 mL / OUT: 400 mL / NET: 686.4 mL          PE:  Sedated on Fentanyl and Propofol  Gen: Ill-appearing male on MV  Mental status: Sedated, opens eyes voice. No motor response. Does not follow commands  Cranial nerves: Pupils 3mm, reactive. No nystagmus or dysconjugate gaze. Does not track examiner. Cough intact. No gross facial asymmetry  Motor:  No spontaneous movements. Extremities flaccid, 0/5 strength  Sensation: Opens eyes to noxious stimuli      Labs:  06-11    151<H>  |  113<H>  |  65<HH>  ----------------------------<  245<H>  4.0   |  28  |  2.0<H>    Ca    8.0<L>      11 Jun 2021 05:30  Phos  2.8     06-11  Mg     2.6     06-11    TPro  8.0  /  Alb  2.3<L>  /  TBili  0.4  /  DBili  x   /  AST  131<H>  /  ALT  62<H>  /  AlkPhos  149<H>  06-11                          8.6    12.13 )-----------( 273      ( 11 Jun 2021 16:45 )             29.3     PT/INR - ( 10 Juan F 2021 04:20 )   PT: 16.20 sec;   INR: 1.41 ratio         PTT - ( 10 Juan F 2021 04:20 )  PTT:32.6 sec  LIVER FUNCTIONS - ( 11 Jun 2021 05:30 )  Alb: 2.3 g/dL / Pro: 8.0 g/dL / ALK PHOS: 149 U/L / ALT: 62 U/L / AST: 131 U/L / GGT: x           ABG - ( 11 Jun 2021 03:33 )  pH, Arterial: 7.46  pH, Blood: x     /  pCO2: 45    /  pO2: 79    / HCO3: 33    / Base Excess: 8.0   /  SaO2: 97            EEG:   EXAM:  EEG AWAKE AND DROWSY      PROCEDURE DATE:  05/31/2021      INTERPRETATION:  Exam Performed on: 16 Channel Digital Routine EEG done on [employer] recording system.    State  Drowsy    Symmetry  Symmetric  -    Organization  Well organized    PDR  Continuous  Background: 5-6 hz    Generalized Slowing  Yes  borderline - mild    Focal Slowing  No  -  -  -  -  -  -    Breach Artifact: No  -      Activation Procedure  Hyper Ventilation  No  -  -    Photic Stimulation  No  -  -    Epileptiform Activity  No    Frequency:  -  -    Side:  -    Type:  -  -    Area of Activity:  -    Events:  No  -  -    Impression:  -  Abnormal due to the presence of: generalized slowing as above  -    Clinical Correlation & Recommendations  Consistent with diffuse cerebral electrophysiological dysfunction.  Secondary to toxic metabolic cause.    -  -  -    Reviewed by:  Karly Otero    Signed by:  Karly Otero

## 2021-06-11 NOTE — BRIEF OPERATIVE NOTE - NSICDXBRIEFPROCEDURE_GEN_ALL_CORE_FT
PROCEDURES:  Revision of disarticulation of knee 11-Jun-2021 11:03:42  Rene Swanson  
PROCEDURES:  Disarticulation of right knee 31-May-2021 17:26:34  Rene Swanson  
PROCEDURES:  Incision and drainage, pressure ulcer, skin, with debridement 29-May-2021 06:30:54  Erickson Jiang  Pulsed lavage for 15 minutes 29-May-2021 06:31:04  Erickson Jiang

## 2021-06-11 NOTE — BRIEF OPERATIVE NOTE - OPERATION/FINDINGS
Healthy, non-infected tissue of knee disarticulation s/p conversion to AKA Healthy, non-infected tissue of knee disarticulation s/p conversion to Right AKA

## 2021-06-11 NOTE — PROGRESS NOTE ADULT - ASSESSMENT
59 y/o M with PMH of DM, hypertension, dyslipidemia, sleep apnea, Charcot foot reconstruction with external fixation, morbid obesity presented for worsening right foot infection.     # DFU with Hx of MRSA infection s/p BKA  # Acute hypoxic respiratory failure secondary to septic shock - intubated  # Toxic metabolic encephalopathy - improved   # Fever overnight >102, currently febrile   - s/p intubation 5/31   - bacteremia from right foot abscess   - septic on admission, WBC 12,  + lactate 2.3. s/p cefepime flagyl and vanc in the ED  - Podiatry on board, f/u   - S/p disarticulation right knee 5/31 w/ vascular   - 5/28 BCx E fecalis, ORSA, 5/30 BCx NG, 6/4 BCx NG  - 5/29 R foot : ORSA , Corynebacterium  - Echo 3/31: EF 55-60%, G2DD, no vegetations   - ID following, f/u recs:  -d/c linezolid and Ceftaroline, flagyl  -c/w dapto 875mg q12, caspofungin 50mg and eleuterio 1g q12  - CPK >1300 -> 600~ ->300~-> 561, monitor daily   - currently on propofol, fent, and levo  - bcx (-) 6/7, spoke to ID, okay for surgery, f/u with vascular. AKA surgery today   - plans for SBT and to try extubation if tolerated well after surg  - pt still having fevers, (drug fever? encephalopathy less likely as pt is following commands off sedation)  - ddimer 2550, duplex (-)  - Procal 1.08, , ESR 58  - Bcx sent   - keep on cooling blanket and reassess after surgery     #?Code stroke for possible CVA   - ptnt was code stroke on floors on 3/30 for AMS and confusion upgraded to CCU   - metabolic encephalopathy secondary to sepsis more likely than stroke  -  CT head neg for acute pathology, CTA w/ moderate stenoses at the junction of the precavernous and cavernous segments of both internal carotid arteries  - neuro recs for brain MR, can get repeat CT head when stable, will not fit in MR machine   - neuro rec LP, as per pulm, as pt is following commands now, little clinical benefit to preforming as of now.     # Hypernatremia   - na 151 today, likely dehydration   - increase gentyl hydration + free water 250ml q4hrs  - f/u BMP @4PM    # NSTEMI  - trops elevated on admission .11->.12  - secondary to sepsis induced demand ischemia  - less likely true ACS  -monitor     #CANDY on CKD III /  rule out ATN - worsening today   - secondary to sepsis induced ATN, continue supportive care, monitor for now.   - creatinine 3.0 on admission, baseline around 1.4-1.8   - s/p CVVHD 6/1 and 6/2, holding CVVHD for now ptnt w/ good urine output   - nephro following    # Anemia of chronic disease   - transfuse Hb < 7  - monitor   - s/p 3 units in total since admission  - stable today  - give one unit in anticipation for surgery     #Diabetes Mellitus  -on insulin regimen, increase  - a1c 8.9   - Monitor   - FS q1hr     #Hypertension  -holding oral meds     #Dyslipidemia  -c/w home meds    #MATA/ OHS    #morbid obesity    Diet: npo w/ tube feeds, NPO for surgery   DVT ppx: heparin subq TID  GI ppx: protonix   Dispo: acute    Spoke to Gentry Callahan, updated her on clinical presentation.

## 2021-06-11 NOTE — PROGRESS NOTE ADULT - ASSESSMENT
Assessment: 60-year-old male with PMHx DM, HTN, MATA, on CPAP, HLD, right Charcot foot reconstruction w/ external fixation, h/o MRSA infection right foot, h/o osteomyelitis right foot, B/L feet neuropathy, h/o percutaneous angioplasty, h/o left toe amputation, morbid obesity, p/w worsening chronic right foot infection, 5/29/2021. Stroke code, 5/31/2021, for worsening mental status and gaze deviation. CTH, 5/31/2021, revealed no evidence of acute intracranial pathology. Clinical presentation more consistent with metabolic and septic encephalopathy. S/P right knee disarticulation, 5/31/2021. Post-operative, patient required CVVH, now off. Video EEG, 5/31/2021, revealed borderline to mild generalized slowing, with no epileptiform activity. Now s/p R AKA    Plan:        ELEN Avalos  Please feel free to contact for any questions or concerns. Thank you.  Extension: #[id number] Assessment: 60-year-old male with PMHx DM, HTN, MATA, on CPAP, HLD, right Charcot foot reconstruction w/ external fixation, h/o MRSA infection right foot, h/o osteomyelitis right foot, B/L feet neuropathy, h/o percutaneous angioplasty, h/o left toe amputation, morbid obesity, p/w worsening chronic right foot infection, 5/29/2021. Stroke code, 5/31/2021, for worsening mental status and gaze deviation. CTH, 5/31/2021, revealed no evidence of acute intracranial pathology. Clinical presentation more consistent with metabolic and septic encephalopathy. S/P right knee disarticulation, 5/31/2021. Post-operative, patient required CVVH, now off. Video EEG, 5/31/2021, revealed borderline to mild generalized slowing, with no epileptiform activity. Now s/p R AKA. Diffuse flaccid weakness present on exam, likely related to critical illness polyneuropathy. Persistent AMS likely metabolic in setting of infection/sepsis, however given bacteremia during admission there is possibility of underlying meningitis contributing to symptoms.     Plan:  Continue daptomycin, would recommend increasing dose of meropenem for coverage of possible bacterial meningitis      ELEN Avalos  Please feel free to contact for any questions or concerns. Thank you.  Extension: #[id number]

## 2021-06-11 NOTE — BRIEF OPERATIVE NOTE - NSICDXBRIEFPREOP_GEN_ALL_CORE_FT
PRE-OP DIAGNOSIS:  Gas gangrene of lower extremity 29-May-2021 06:31:27  Erickson Jiang  

## 2021-06-11 NOTE — PROGRESS NOTE ADULT - ASSESSMENT
IMPRESSION    Acute Hypoxemic respiratory failure with hypercapnia s/p intubation on 40%  Septic Shock 2/2 to Bacteremia from DFU (Enterococcus,  Staph Aureus ) repeat cx neg  DFU s/p Right Knee Disarticulation for necrotizing soft tissue infection  CANDY on CKD s/p CVVHD sp dc u dall  AMS 2/2 Toxic-Metabolic Encephelopathy s/p CT head neg for acute pathology  MATA/ OHS  Morbid obesity  NSTEMI  hypernatremia   PLAN:    CNS: SAT.  awake  follow command  can not fit on MRI machine      HEENT: ETT care and Oral care    PULMONARY: keep same vent setting going to OR zee     CARDIOVASCULAR:  levophed keep map 65    is =os    lasix to 40 mg Q24hrs   increase D5w 750cc/ hr     GI: GI prophylaxis.  OG Feeding as tolerated start free water 250cc Q 4 hrs     RENAL: F/U nephrology , off  lasix    INFECTIOUS DISEASE:  ABX as per ID  caspofungin was added   follow ck level   follow cx    f/u repeat cultures neg  bld cx today   tlc was changed     HEMATOLOGICAL:  DVT prophylaxis. Transfuse  1 unit prbc  to keep Hb >8. Maintain active Type and screen.  follow h/h     ENDOCRINE:  Follow up FS.  start  Insulin protocol     MUSCULOSKELETAL: Bedrest. f/u vascular surgery follow surgery   Poor prognosis  Palliative care f/u  d/c Kortney

## 2021-06-12 LAB
ALBUMIN SERPL ELPH-MCNC: 2.3 G/DL — LOW (ref 3.5–5.2)
ALBUMIN SERPL ELPH-MCNC: 2.3 G/DL — LOW (ref 3.5–5.2)
ALP SERPL-CCNC: 138 U/L — HIGH (ref 30–115)
ALP SERPL-CCNC: 150 U/L — HIGH (ref 30–115)
ALT FLD-CCNC: 102 U/L — HIGH (ref 0–41)
ALT FLD-CCNC: 72 U/L — HIGH (ref 0–41)
ANION GAP SERPL CALC-SCNC: 6 MMOL/L — LOW (ref 7–14)
ANION GAP SERPL CALC-SCNC: 7 MMOL/L — SIGNIFICANT CHANGE UP (ref 7–14)
AST SERPL-CCNC: 145 U/L — HIGH (ref 0–41)
AST SERPL-CCNC: 227 U/L — HIGH (ref 0–41)
BASOPHILS # BLD AUTO: 0.03 K/UL — SIGNIFICANT CHANGE UP (ref 0–0.2)
BASOPHILS NFR BLD AUTO: 0.2 % — SIGNIFICANT CHANGE UP (ref 0–1)
BILIRUB SERPL-MCNC: 0.7 MG/DL — SIGNIFICANT CHANGE UP (ref 0.2–1.2)
BILIRUB SERPL-MCNC: 0.7 MG/DL — SIGNIFICANT CHANGE UP (ref 0.2–1.2)
BUN SERPL-MCNC: 55 MG/DL — HIGH (ref 10–20)
BUN SERPL-MCNC: 57 MG/DL — HIGH (ref 10–20)
CALCIUM SERPL-MCNC: 7.9 MG/DL — LOW (ref 8.5–10.1)
CALCIUM SERPL-MCNC: 8.1 MG/DL — LOW (ref 8.5–10.1)
CHLORIDE SERPL-SCNC: 113 MMOL/L — HIGH (ref 98–110)
CHLORIDE SERPL-SCNC: 113 MMOL/L — HIGH (ref 98–110)
CK SERPL-CCNC: 485 U/L — HIGH (ref 0–225)
CO2 SERPL-SCNC: 30 MMOL/L — SIGNIFICANT CHANGE UP (ref 17–32)
CO2 SERPL-SCNC: 30 MMOL/L — SIGNIFICANT CHANGE UP (ref 17–32)
CREAT SERPL-MCNC: 1.7 MG/DL — HIGH (ref 0.7–1.5)
CREAT SERPL-MCNC: 1.8 MG/DL — HIGH (ref 0.7–1.5)
CULTURE RESULTS: SIGNIFICANT CHANGE UP
EOSINOPHIL # BLD AUTO: 0.42 K/UL — SIGNIFICANT CHANGE UP (ref 0–0.7)
EOSINOPHIL NFR BLD AUTO: 3.5 % — SIGNIFICANT CHANGE UP (ref 0–8)
GLUCOSE BLDC GLUCOMTR-MCNC: 164 MG/DL — HIGH (ref 70–99)
GLUCOSE BLDC GLUCOMTR-MCNC: 164 MG/DL — HIGH (ref 70–99)
GLUCOSE BLDC GLUCOMTR-MCNC: 166 MG/DL — HIGH (ref 70–99)
GLUCOSE BLDC GLUCOMTR-MCNC: 176 MG/DL — HIGH (ref 70–99)
GLUCOSE BLDC GLUCOMTR-MCNC: 181 MG/DL — HIGH (ref 70–99)
GLUCOSE BLDC GLUCOMTR-MCNC: 200 MG/DL — HIGH (ref 70–99)
GLUCOSE BLDC GLUCOMTR-MCNC: 202 MG/DL — HIGH (ref 70–99)
GLUCOSE BLDC GLUCOMTR-MCNC: 202 MG/DL — HIGH (ref 70–99)
GLUCOSE SERPL-MCNC: 170 MG/DL — HIGH (ref 70–99)
GLUCOSE SERPL-MCNC: 207 MG/DL — HIGH (ref 70–99)
HCT VFR BLD CALC: 29.6 % — LOW (ref 42–52)
HGB BLD-MCNC: 8.6 G/DL — LOW (ref 14–18)
IMM GRANULOCYTES NFR BLD AUTO: 0.7 % — HIGH (ref 0.1–0.3)
LYMPHOCYTES # BLD AUTO: 1.44 K/UL — SIGNIFICANT CHANGE UP (ref 1.2–3.4)
LYMPHOCYTES # BLD AUTO: 12 % — LOW (ref 20.5–51.1)
MAGNESIUM SERPL-MCNC: 2.6 MG/DL — HIGH (ref 1.8–2.4)
MCHC RBC-ENTMCNC: 27.3 PG — SIGNIFICANT CHANGE UP (ref 27–31)
MCHC RBC-ENTMCNC: 29.1 G/DL — LOW (ref 32–37)
MCV RBC AUTO: 94 FL — SIGNIFICANT CHANGE UP (ref 80–94)
MONOCYTES # BLD AUTO: 0.99 K/UL — HIGH (ref 0.1–0.6)
MONOCYTES NFR BLD AUTO: 8.2 % — SIGNIFICANT CHANGE UP (ref 1.7–9.3)
NEUTROPHILS # BLD AUTO: 9.07 K/UL — HIGH (ref 1.4–6.5)
NEUTROPHILS NFR BLD AUTO: 75.4 % — HIGH (ref 42.2–75.2)
NRBC # BLD: 0 /100 WBCS — SIGNIFICANT CHANGE UP (ref 0–0)
PHOSPHATE SERPL-MCNC: 3.9 MG/DL — SIGNIFICANT CHANGE UP (ref 2.1–4.9)
PLATELET # BLD AUTO: 275 K/UL — SIGNIFICANT CHANGE UP (ref 130–400)
POTASSIUM SERPL-MCNC: 4.2 MMOL/L — SIGNIFICANT CHANGE UP (ref 3.5–5)
POTASSIUM SERPL-MCNC: 4.3 MMOL/L — SIGNIFICANT CHANGE UP (ref 3.5–5)
POTASSIUM SERPL-SCNC: 4.2 MMOL/L — SIGNIFICANT CHANGE UP (ref 3.5–5)
POTASSIUM SERPL-SCNC: 4.3 MMOL/L — SIGNIFICANT CHANGE UP (ref 3.5–5)
PROT SERPL-MCNC: 7.7 G/DL — SIGNIFICANT CHANGE UP (ref 6–8)
PROT SERPL-MCNC: 7.9 G/DL — SIGNIFICANT CHANGE UP (ref 6–8)
RBC # BLD: 3.15 M/UL — LOW (ref 4.7–6.1)
RBC # FLD: 18.3 % — HIGH (ref 11.5–14.5)
SODIUM SERPL-SCNC: 149 MMOL/L — HIGH (ref 135–146)
SODIUM SERPL-SCNC: 150 MMOL/L — HIGH (ref 135–146)
SPECIMEN SOURCE: SIGNIFICANT CHANGE UP
WBC # BLD: 12.03 K/UL — HIGH (ref 4.8–10.8)
WBC # FLD AUTO: 12.03 K/UL — HIGH (ref 4.8–10.8)

## 2021-06-12 PROCEDURE — 71045 X-RAY EXAM CHEST 1 VIEW: CPT | Mod: 26

## 2021-06-12 PROCEDURE — 99233 SBSQ HOSP IP/OBS HIGH 50: CPT

## 2021-06-12 RX ADMIN — Medication 650 MILLIGRAM(S): at 18:45

## 2021-06-12 RX ADMIN — PANTOPRAZOLE SODIUM 40 MILLIGRAM(S): 20 TABLET, DELAYED RELEASE ORAL at 12:28

## 2021-06-12 RX ADMIN — Medication 650 MILLIGRAM(S): at 18:00

## 2021-06-12 RX ADMIN — HEPARIN SODIUM 5000 UNIT(S): 5000 INJECTION INTRAVENOUS; SUBCUTANEOUS at 05:49

## 2021-06-12 RX ADMIN — HEPARIN SODIUM 5000 UNIT(S): 5000 INJECTION INTRAVENOUS; SUBCUTANEOUS at 21:00

## 2021-06-12 RX ADMIN — Medication 650 MILLIGRAM(S): at 12:00

## 2021-06-12 RX ADMIN — Medication 650 MILLIGRAM(S): at 06:21

## 2021-06-12 RX ADMIN — FENTANYL CITRATE 8.21 MICROGRAM(S)/KG/HR: 50 INJECTION INTRAVENOUS at 02:32

## 2021-06-12 RX ADMIN — CHLORHEXIDINE GLUCONATE 15 MILLILITER(S): 213 SOLUTION TOPICAL at 05:49

## 2021-06-12 RX ADMIN — Medication 4: at 06:15

## 2021-06-12 RX ADMIN — INSULIN GLARGINE 15 UNIT(S): 100 INJECTION, SOLUTION SUBCUTANEOUS at 21:22

## 2021-06-12 RX ADMIN — Medication 5 UNIT(S): at 17:01

## 2021-06-12 RX ADMIN — CASPOFUNGIN ACETATE 260 MILLIGRAM(S): 7 INJECTION, POWDER, LYOPHILIZED, FOR SOLUTION INTRAVENOUS at 12:28

## 2021-06-12 RX ADMIN — Medication 650 MILLIGRAM(S): at 13:00

## 2021-06-12 RX ADMIN — Medication 650 MILLIGRAM(S): at 05:49

## 2021-06-12 RX ADMIN — Medication 2: at 00:29

## 2021-06-12 RX ADMIN — Medication 2: at 17:02

## 2021-06-12 RX ADMIN — CHLORHEXIDINE GLUCONATE 1 APPLICATION(S): 213 SOLUTION TOPICAL at 05:49

## 2021-06-12 RX ADMIN — Medication 5 UNIT(S): at 12:27

## 2021-06-12 RX ADMIN — HEPARIN SODIUM 5000 UNIT(S): 5000 INJECTION INTRAVENOUS; SUBCUTANEOUS at 13:36

## 2021-06-12 RX ADMIN — Medication 2: at 23:56

## 2021-06-12 RX ADMIN — Medication 2: at 12:27

## 2021-06-12 RX ADMIN — CHLORHEXIDINE GLUCONATE 15 MILLILITER(S): 213 SOLUTION TOPICAL at 17:01

## 2021-06-12 RX ADMIN — Medication 5 UNIT(S): at 06:15

## 2021-06-12 RX ADMIN — MEROPENEM 100 MILLIGRAM(S): 1 INJECTION INTRAVENOUS at 17:01

## 2021-06-12 RX ADMIN — MEROPENEM 100 MILLIGRAM(S): 1 INJECTION INTRAVENOUS at 05:48

## 2021-06-12 NOTE — PROGRESS NOTE ADULT - ASSESSMENT
59 y/o M with PMH of DM, hypertension, dyslipidemia, sleep apnea, Charcot foot reconstruction with external fixation, morbid obesity presented for worsening right foot infection.     # DFU with Hx of MRSA infection s/p BKA  # Acute hypoxic respiratory failure secondary to septic shock - intubated  # Toxic metabolic encephalopathy - improved   # Fever overnight >102, currently febrile   - s/p intubation 5/31   - bacteremia from right foot abscess   - septic on admission, WBC 12,  + lactate 2.3. s/p cefepime flagyl and vanc in the ED  - Podiatry on board, f/u   - S/p disarticulation right knee 5/31 w/ vascular   - 5/28 BCx E fecalis, ORSA, 5/30 BCx NG, 6/4 BCx NG  - 5/29 R foot : ORSA , Corynebacterium  - Echo 3/31: EF 55-60%, G2DD, no vegetations   - ID following, f/u recs:  -c/w dapto 875mg q12, caspofungin 50mg and eleuterio 1g q12  - CPK >1300 -> 600~ ->300~-> 561, monitor daily   - currently on propofol, fent, and levo  - bcx (-) 6/7, spoke to ID, okay for surgery, f/u with vascular. AKA surgery today   - plans for SBT and to try extubation if tolerated well after surg  - pt still having fevers, (drug fever? encephalopathy less likely as pt is following commands off sedation)  - ddimer 2550, duplex (-)  - Procal 1.08, , ESR 58  - Bcx sent   - keep on cooling blanket and reassess after surgery     #?Code stroke for possible CVA   - ptnt was code stroke on floors on 3/30 for AMS and confusion upgraded to CCU   - metabolic encephalopathy secondary to sepsis more likely than stroke  -  CT head neg for acute pathology, CTA w/ moderate stenoses at the junction of the precavernous and cavernous segments of both internal carotid arteries  - neuro recs for brain MR, can get repeat CT head when stable, will not fit in MR machine   - neuro rec LP, as per pulm, as pt is following commands now, little clinical benefit to preforming as of now.     # Hypernatremia   - na 151 today, likely dehydration   - c/w gentle hydration + free water 250ml q4hrs    # NSTEMI  - trops elevated on admission .11->.12  - secondary to sepsis induced demand ischemia  - less likely true ACS  -monitor     #CANDY on CKD III /  rule out ATN - worsening today   - secondary to sepsis induced ATN, continue supportive care, monitor for now.   - creatinine 3.0 on admission, baseline around 1.4-1.8   - s/p CVVHD 6/1 and 6/2, holding CVVHD for now ptnt w/ good urine output   - nephro following    # Anemia of chronic disease   - transfuse Hb < 7  - monitor   - s/p 4 units in total since admission  - stable today    #Diabetes Mellitus  -on insulin regimen, increase  - a1c 8.9   - Monitor   - FS q1hr     #Hypertension  -holding oral meds     #Dyslipidemia  -c/w home meds    #MATA/ OHS    #morbid obesity    Diet: npo w/ tube feeds, NPO for surgery   DVT ppx: heparin subq TID  GI ppx: protonix   Dispo: acute 61 y/o M with PMH of DM, hypertension, dyslipidemia, sleep apnea, Charcot foot reconstruction with external fixation, morbid obesity presented for worsening right foot infection.     # DFU with Hx of MRSA infection s/p BKA  # Acute hypoxic respiratory failure secondary to septic shock - intubated  # Toxic metabolic encephalopathy - improved   # Fever overnight >102, currently febrile   - s/p intubation 5/31   - bacteremia from right foot abscess   - septic on admission, WBC 12,  + lactate 2.3. s/p cefepime flagyl and vanc in the ED  - Podiatry on board, f/u   - S/p disarticulation right knee 5/31 w/ vascular   - s/p AKA surgery 6/11  - 5/28 BCx E fecalis, ORSA, 5/30 BCx NG, 6/4 BCx NG  - 5/29 R foot : ORSA , Corynebacterium  - Echo 3/31: EF 55-60%, G2DD, no vegetations   - ID following, f/u recs:  -c/w dapto 875mg q12, caspofungin 50mg and eleuterio 1g q12  - CPK >1300 -> 600~ ->300~-> 561, monitor daily   - currently on propofol, fent, and levo  - bcx (-) 6/7, spoke to ID, okay for surgery, f/u with vascular  - plans for SBT and to try extubation if tolerated well after surg  - pt still having fevers, (drug fever? encephalopathy less likely as pt is following commands off sedation)  - ddimer 2550, duplex (-)  - Procal 1.08, , ESR 58  - Bcx sent   - keep on cooling blanket and reassess after surgery     #?Code stroke for possible CVA   - ptnt was code stroke on floors on 3/30 for AMS and confusion upgraded to CCU   - metabolic encephalopathy secondary to sepsis more likely than stroke  -  CT head neg for acute pathology, CTA w/ moderate stenoses at the junction of the precavernous and cavernous segments of both internal carotid arteries  - neuro recs for brain MR, can get repeat CT head when stable, will not fit in MR machine   - neuro rec LP, as per pulm, as pt is following commands now, little clinical benefit to preforming as of now.     # Hypernatremia   - na 151 today, likely dehydration   - c/w gentle hydration + free water 250ml q4hrs    # NSTEMI  - trops elevated on admission .11->.12  - secondary to sepsis induced demand ischemia  - less likely true ACS  -monitor     #CANDY on CKD III /  rule out ATN - worsening today   - secondary to sepsis induced ATN, continue supportive care, monitor for now.   - creatinine 3.0 on admission, baseline around 1.4-1.8   - s/p CVVHD 6/1 and 6/2, holding CVVHD for now ptnt w/ good urine output   - nephro following    # Anemia of chronic disease   - transfuse Hb < 7  - monitor   - s/p 4 units in total since admission  - stable today    #Diabetes Mellitus  -on insulin regimen, increase  - a1c 8.9   - Monitor   - FS q1hr     #Hypertension  -holding oral meds     #Dyslipidemia  -c/w home meds    #MATA/ OHS    #morbid obesity    Diet: npo w/ tube feeds, NPO for surgery   DVT ppx: heparin subq TID  GI ppx: protonix   Dispo: acute

## 2021-06-12 NOTE — PROGRESS NOTE ADULT - ASSESSMENT
IMPRESSION    Acute Hypoxemic respiratory failure with hypercapnia s/p intubation on 40%  Septic Shock 2/2 to Bacteremia from DFU (Enterococcus,  Staph Aureus ) repeat cx neg  DFU s/p Right Knee Disarticulation for necrotizing soft tissue infection  CANDY on CKD s/p CVVHD sp dc u dall  AMS 2/2 Toxic-Metabolic Encephelopathy s/p CT head neg for acute pathology  MATA/ OHS  Morbid obesity  NSTEMI  hypernatremia   PLAN:    CNS: SAT.  awake  follow command  can not fit on MRI machine    doubt meningitis when off sedation he follow command   HEENT: ETT care and Oral care    PULMONARY: keep same vent setting   do weaning trial      CARDIOVASCULAR:  levophed keep map 65    is =os    lasix as needed   continue  D5w 750cc/ hr     GI: GI prophylaxis.  OG Feeding as tolerated start free water 250cc Q 4 hrs     RENAL: F/U nephrology , off  lasix    INFECTIOUS DISEASE:  ABX as per ID  caspofungin was added   follow ck level   follow cx    f/u repeat cultures neg  bld cx today   tlc was changed     HEMATOLOGICAL:  DVT prophylaxis.  keep Hb >8. Maintain active Type and screen.  follow h/h     ENDOCRINE:  Follow up FS.   Insulin protocol     MUSCULOSKELETAL: Bedrest. f/u vascular surgery follow surgery   Poor prognosis  Palliative care f/u  d/c Kortney

## 2021-06-12 NOTE — PROGRESS NOTE ADULT - SUBJECTIVE AND OBJECTIVE BOX
Patient is a 60y old  Male who presents with a chief complaint of dfu (12 Jun 2021 00:40)      Over Night Events:  Patient seen and examined.   still on vent on sedation   s/p AKA    ROS:  See HPI    PHYSICAL EXAM    ICU Vital Signs Last 24 Hrs  T(C): 37.7 (12 Jun 2021 08:00), Max: 38.8 (11 Jun 2021 23:00)  T(F): 99.9 (12 Jun 2021 08:00), Max: 101.8 (11 Jun 2021 23:00)  HR: 72 (12 Jun 2021 08:00) (70 - 86)  BP: 104/47 (12 Jun 2021 08:00) (98/55 - 131/62)  BP(mean): 61 (12 Jun 2021 08:00) (61 - 85)  ABP: 116/52 (11 Jun 2021 14:00) (116/52 - 146/60)  ABP(mean): 72 (11 Jun 2021 14:00) (72 - 86)  RR: 18 (12 Jun 2021 08:00) (12 - 26)  SpO2: 99% (12 Jun 2021 08:00) (95% - 100%)      General: open eyes follow command   HEENT:         et tube      Lymph Nodes: NO cervical LN   Lungs: Bilateral BS  Cardiovascular: Regular   Abdomen: Soft, Positive BS  Extremities: No clubbing dressing clean   Skin: warm   Neurological: no focal   Musculoskeletal: move all ext     I&O's Detail    11 Jun 2021 07:01  -  12 Jun 2021 07:00  --------------------------------------------------------  IN:    dextrose 5%: 150 mL    dextrose 5%: 750 mL    Enteral Tube Flush: 50 mL    FentaNYL: 659.1 mL    FentaNYL: 32.8 mL    Free Water: 1250 mL    IV PiggyBack: 100 mL    Norepinephrine: 40 mL    Norepinephrine: 4 mL    Propofol: 222 mL    Propofol: 20 mL    Vital High Protein: 770 mL  Total IN: 4047.9 mL    OUT:    Indwelling Catheter - Urethral (mL): 2390 mL  Total OUT: 2390 mL    Total NET: 1657.9 mL      12 Jun 2021 07:01  -  12 Jun 2021 09:09  --------------------------------------------------------  IN:    FentaNYL: 65.8 mL    Norepinephrine: 4 mL    Propofol: 24 mL    Vital High Protein: 82.5 mL  Total IN: 176.3 mL    OUT:    Indwelling Catheter - Urethral (mL): 350 mL  Total OUT: 350 mL    Total NET: -173.7 mL          LABS:                          8.6    12.03 )-----------( 275      ( 12 Jun 2021 04:30 )             29.6         12 Jun 2021 04:30    149    |  113    |  57     ----------------------------<  207    4.3     |  30     |  1.8      Ca    7.9        12 Jun 2021 04:30  Phos  3.9       12 Jun 2021 04:30  Mg     2.6       12 Jun 2021 04:30    TPro  7.9    /  Alb  2.3    /  TBili  0.7    /  DBili  x      /  AST  227    /  ALT  102    /  AlkPhos  150    12 Jun 2021 04:30  Amylase x     lipase x                                                                                              CARDIAC MARKERS ( 12 Jun 2021 04:30 )  x     / x     / 485 U/L / x     / x      CARDIAC MARKERS ( 11 Jun 2021 05:30 )  x     / x     / 561 U/L / x     / x                                                            Culture - Blood (collected 10 Juan F 2021 17:07)  Source: .Blood None  Preliminary Report (12 Jun 2021 01:02):    No growth to date.                                                   Mode: AC/ CMV (Assist Control/ Continuous Mandatory Ventilation)  RR (machine): 18  TV (machine): 480  FiO2: 30  PEEP: 5  ITime: 1  MAP: 10  PIP: 24                                      ABG - ( 12 Jun 2021 03:09 )  pH, Arterial: 7.40  pH, Blood: x     /  pCO2: 51    /  pO2: 84    / HCO3: 31    / Base Excess: 5.4   /  SaO2: 96                  MEDICATIONS  (STANDING):  ALBUTerol    90 MICROgram(s) HFA Inhaler 1 Puff(s) Inhalation once  caspofungin IVPB 50 milliGRAM(s) IV Intermittent every 24 hours  chlorhexidine 0.12% Liquid 15 milliLiter(s) Oral Mucosa two times a day  chlorhexidine 4% Liquid 1 Application(s) Topical <User Schedule>  DAPTOmycin IVPB 875 milliGRAM(s) IV Intermittent every 48 hours  dextrose 5%. 1000 milliLiter(s) (100 mL/Hr) IV Continuous <Continuous>  dextrose 5%. 1000 milliLiter(s) (50 mL/Hr) IV Continuous <Continuous>  dextrose 5%. 1000 milliLiter(s) (100 mL/Hr) IV Continuous <Continuous>  dextrose 50% Injectable 12.5 Gram(s) IV Push once  dextrose 50% Injectable 25 Gram(s) IV Push once  fentaNYL   Infusion. 0.5 MICROgram(s)/kG/Hr (8.21 mL/Hr) IV Continuous <Continuous>  glucagon  Injectable 1 milliGRAM(s) IntraMuscular once  heparin   Injectable 5000 Unit(s) SubCutaneous every 8 hours  insulin glargine Injectable (LANTUS) 15 Unit(s) SubCutaneous at bedtime  insulin lispro (ADMELOG) corrective regimen sliding scale   SubCutaneous every 6 hours  insulin lispro Injectable (ADMELOG) 5 Unit(s) SubCutaneous three times a day before meals  meropenem  IVPB 1000 milliGRAM(s) IV Intermittent every 12 hours  norepinephrine Infusion 0.05 MICROgram(s)/kG/Min (7.7 mL/Hr) IV Continuous <Continuous>  pantoprazole   Suspension 40 milliGRAM(s) Oral daily  polyethylene glycol 3350 17 Gram(s) Oral daily  propofol Infusion 5.004 MICROgram(s)/kG/Min (4.93 mL/Hr) IV Continuous <Continuous>  senna 2 Tablet(s) Oral at bedtime  tiotropium 18 MICROgram(s) Capsule 1 Capsule(s) Inhalation once    MEDICATIONS  (PRN):  acetaminophen   Tablet .. 650 milliGRAM(s) Oral every 6 hours PRN Temp greater or equal to 38C (100.4F), Mild Pain (1 - 3)          Xrays:  TLC:  OG:  ET tube:                                                                                    left opacity    ECHO:  [facility] ICU:

## 2021-06-12 NOTE — PROGRESS NOTE ADULT - SUBJECTIVE AND OBJECTIVE BOX
SUBJECTIVE:  Patient is a 60y old Male who presents with a chief complaint of dfu (11 Jun 2021 17:09)   Currently admitted to medicine with the primary diagnosis of Diabetic infection of right foot     Today is hospital day 14d. There are no new issues or overnight events.     HPI  HPI:  Patient is a 59 y/o M with PMH of DM, hypertension, dyslipidemia, sleep apnea, Charcot foot reconstruction with external fixation, super morbid obesity present for worsening right foot infection.  Pt reports he has had this foot ulcer at various stage of healing for 13 years. Patient has been admitted for IV antibiotics multiple times in the past for osteomyelitis of right foot. He has history of MRSA infection in foot. pt has baseline neuropathy in both feet so does not feel any pain.  Patient states his Infection has been gradually getting worse despite treatment with bactrim, he saw his podiatrists and he was sent in by podiatry for IV abx . No fever, chills, cp, sob, abd pain, nausea, vomiting, dysuria, calf pain.     In ED  T(C): 37.3 (05-29-21 @ 01:31), Max: 38 (05-28-21 @ 20:27)  HR: 100 (05-29-21 @ 01:23) (99 - 118)  BP: 119/59 (05-29-21 @ 01:23) (119/59 - 139/69)  RR: 18 (05-29-21 @ 01:23) (17 - 18)  SpO2: 96% (05-29-21 @ 01:23) (86% - 100%)  there is a Right foot ulcer on the lateral aspect of the plantar surface measuring 8.5-3cm. Ulcer is surrounded by macerated tissue. Serous drainage from wound. Kurlex and sponge dressing in place, clean, dry and intact. Patient was I&D'd bedside by podiatry in the ed. purulent drainage was evacuated and sent to the lab for culture. Patient is scheduled for OR with podiatry in the AM.   (29 May 2021 02:29)      PAST MEDICAL & SURGICAL HISTORY  MATA on CPAP    DM (diabetes mellitus)    HTN (hypertension)    High cholesterol    Charcot ankle, right    History of percutaneous angioplasty    H/O skin graft    Left toe amputee  4 TOES    Diabetic Charcot foot  Fixation with external device      SOCIAL HISTORY:  Negative for smoking/alcohol/drug use.     ALLERGIES:  No Known Allergies    MEDICATIONS:  HOME MEDICATIONS  acetaminophen 325 mg oral tablet: 2 tab(s) orally every 6 hours, As needed, Moderate Pain (4 - 6)  amLODIPine 5 mg oral tablet: 1 tab(s) orally once a day  aspirin 81 mg oral tablet, chewable: 1 tab(s) orally once a day  ezetimibe 10 mg oral tablet: 1 tab(s) orally once a day  glimepiride 4 mg oral tablet: 2 tab(s) orally once a day  hydroCHLOROthiazide 25 mg oral tablet: 1 tab(s) orally once a day (at bedtime)  Invokana 300 mg oral tablet: 1 tab(s) orally once a day  Lantus Solostar Pen 100 units/mL subcutaneous solution: subcutaneous once a day (at bedtime)-  18 UNITS  linezolid 2 mg/mL-D5% intravenous solution: 600 milligram(s) intravenous every 12 hours STOP 1/6/20.  linezolid 600 mg oral tablet: 1 tab(s) orally 2 times a day   losartan 50 mg oral tablet: 1 tab(s) orally once a day  Pletal 100 mg oral tablet: 1 tab(s) orally once a day    STANDING MEDICATIONS  ALBUTerol    90 MICROgram(s) HFA Inhaler 1 Puff(s) Inhalation once  caspofungin IVPB 50 milliGRAM(s) IV Intermittent every 24 hours  chlorhexidine 0.12% Liquid 15 milliLiter(s) Oral Mucosa two times a day  chlorhexidine 4% Liquid 1 Application(s) Topical <User Schedule>  DAPTOmycin IVPB 875 milliGRAM(s) IV Intermittent every 48 hours  dextrose 5%. 1000 milliLiter(s) IV Continuous <Continuous>  dextrose 5%. 1000 milliLiter(s) IV Continuous <Continuous>  dextrose 5%. 1000 milliLiter(s) IV Continuous <Continuous>  dextrose 50% Injectable 12.5 Gram(s) IV Push once  dextrose 50% Injectable 25 Gram(s) IV Push once  fentaNYL   Infusion. 0.5 MICROgram(s)/kG/Hr IV Continuous <Continuous>  glucagon  Injectable 1 milliGRAM(s) IntraMuscular once  heparin   Injectable 5000 Unit(s) SubCutaneous every 8 hours  insulin glargine Injectable (LANTUS) 15 Unit(s) SubCutaneous at bedtime  insulin lispro (ADMELOG) corrective regimen sliding scale   SubCutaneous every 6 hours  insulin lispro Injectable (ADMELOG) 5 Unit(s) SubCutaneous three times a day before meals  meropenem  IVPB 1000 milliGRAM(s) IV Intermittent every 12 hours  norepinephrine Infusion 0.05 MICROgram(s)/kG/Min IV Continuous <Continuous>  pantoprazole   Suspension 40 milliGRAM(s) Oral daily  polyethylene glycol 3350 17 Gram(s) Oral daily  propofol Infusion 5.004 MICROgram(s)/kG/Min IV Continuous <Continuous>  senna 2 Tablet(s) Oral at bedtime  tiotropium 18 MICROgram(s) Capsule 1 Capsule(s) Inhalation once    PRN MEDICATIONS  acetaminophen   Tablet .. 650 milliGRAM(s) Oral every 6 hours PRN    VITALS:   ICU Vital Signs Last 24 Hrs  T(C): 38.6 (11 Jun 2021 22:00), Max: 38.6 (11 Jun 2021 22:00)  T(F): 101.5 (11 Jun 2021 22:00), Max: 101.5 (11 Jun 2021 22:00)  HR: 76 (11 Jun 2021 22:00) (66 - 86)  BP: 101/55 (11 Jun 2021 22:00) (101/55 - 131/62)  BP(mean): 69 (11 Jun 2021 22:00) (69 - 85)  ABP: 116/52 (11 Jun 2021 14:00) (100/44 - 146/60)  ABP(mean): 72 (11 Jun 2021 14:00) (60 - 86)  RR: 18 (11 Jun 2021 22:00) (17 - 26)  SpO2: 98% (11 Jun 2021 22:00) (97% - 100%)    VENT SETTINGS:  Mode: AC/ CMV (Assist Control/ Continuous Mandatory Ventilation), RR (machine): 18, TV (machine): 480, FiO2: 30, PEEP: 5, ITime: 1, MAP: 14, PIP: 29    LABS:                        8.6    12.13 )-----------( 273      ( 11 Jun 2021 16:45 )             29.3     06-11    152<H>  |  113<H>  |  58<H>  ----------------------------<  146<H>  4.0   |  30  |  1.7<H>    Ca    8.0<L>      11 Jun 2021 16:45  Phos  2.8     06-11  Mg     2.6     06-11    TPro  7.6  /  Alb  2.3<L>  /  TBili  0.6  /  DBili  x   /  AST  114<H>  /  ALT  61<H>  /  AlkPhos  141<H>  06-11    PT/INR - ( 10 Juan F 2021 04:20 )   PT: 16.20 sec;   INR: 1.41 ratio         PTT - ( 10 Juan F 2021 04:20 )  PTT:32.6 sec    I&O's Detail    10 Juan F 2021 07:01  -  11 Jun 2021 07:00  --------------------------------------------------------  IN:    dextrose 5%: 700 mL    Enteral Tube Flush: 160 mL    FentaNYL: 377.3 mL    Free Water: 1000 mL    IV PiggyBack: 300 mL    Norepinephrine: 52 mL    Propofol: 198 mL    Vital High Protein: 825 mL  Total IN: 3612.3 mL    OUT:    Indwelling Catheter - Urethral (mL): 2855 mL  Total OUT: 2855 mL    Total NET: 757.3 mL      11 Jun 2021 07:01  -  12 Jun 2021 00:40  --------------------------------------------------------  IN:    dextrose 5%: 150 mL    dextrose 5%: 750 mL    FentaNYL: 32.8 mL    FentaNYL: 395.1 mL    Free Water: 750 mL    IV PiggyBack: 100 mL    Norepinephrine: 4 mL    Norepinephrine: 24 mL    Propofol: 126 mL    Propofol: 20 mL    Vital High Protein: 330 mL  Total IN: 2681.9 mL    OUT:    Indwelling Catheter - Urethral (mL): 1300 mL  Total OUT: 1300 mL    Total NET: 1381.9 mL    ABG - ( 11 Jun 2021 03:33 )  pH, Arterial: 7.46  pH, Blood: x     /  pCO2: 45    /  pO2: 79    / HCO3: 33    / Base Excess: 8.0   /  SaO2: 97        Creatine Kinase, Serum: 561 U/L *H* (06-11-21 @ 05:30)    CARDIAC MARKERS ( 11 Jun 2021 05:30 )  x     / x     / 561 U/L / x     / x      CARDIAC MARKERS ( 10 Juan F 2021 04:20 )  x     / x     / 341 U/L / x     / x        PHYSICAL EXAM:  GEN: No acute distress  HEENT: Normocephalic  NECK: Supple  LUNGS: Decreased breathe sounds  HEART: Regular  ABD: Soft, non-tender, non-distended.  EXT: NE/2+PP  NEURO: Sedated and Intubated  PSYCH: Sedated and Intubated

## 2021-06-13 LAB
ALBUMIN SERPL ELPH-MCNC: 2.4 G/DL — LOW (ref 3.5–5.2)
ALP SERPL-CCNC: 125 U/L — HIGH (ref 30–115)
ALT FLD-CCNC: 68 U/L — HIGH (ref 0–41)
ANION GAP SERPL CALC-SCNC: 5 MMOL/L — LOW (ref 7–14)
ANION GAP SERPL CALC-SCNC: 6 MMOL/L — LOW (ref 7–14)
AST SERPL-CCNC: 90 U/L — HIGH (ref 0–41)
BASE EXCESS BLDA CALC-SCNC: 4.9 MMOL/L — HIGH (ref -2–2)
BASOPHILS # BLD AUTO: 0.03 K/UL — SIGNIFICANT CHANGE UP (ref 0–0.2)
BASOPHILS NFR BLD AUTO: 0.3 % — SIGNIFICANT CHANGE UP (ref 0–1)
BILIRUB SERPL-MCNC: 0.4 MG/DL — SIGNIFICANT CHANGE UP (ref 0.2–1.2)
BUN SERPL-MCNC: 45 MG/DL — HIGH (ref 10–20)
BUN SERPL-MCNC: 48 MG/DL — HIGH (ref 10–20)
CALCIUM SERPL-MCNC: 7.7 MG/DL — LOW (ref 8.5–10.1)
CALCIUM SERPL-MCNC: 7.8 MG/DL — LOW (ref 8.5–10.1)
CHLORIDE SERPL-SCNC: 114 MMOL/L — HIGH (ref 98–110)
CHLORIDE SERPL-SCNC: 117 MMOL/L — HIGH (ref 98–110)
CK SERPL-CCNC: 641 U/L — HIGH (ref 0–225)
CO2 SERPL-SCNC: 28 MMOL/L — SIGNIFICANT CHANGE UP (ref 17–32)
CO2 SERPL-SCNC: 29 MMOL/L — SIGNIFICANT CHANGE UP (ref 17–32)
CREAT SERPL-MCNC: 1.6 MG/DL — HIGH (ref 0.7–1.5)
CREAT SERPL-MCNC: 1.7 MG/DL — HIGH (ref 0.7–1.5)
EOSINOPHIL # BLD AUTO: 0.6 K/UL — SIGNIFICANT CHANGE UP (ref 0–0.7)
EOSINOPHIL NFR BLD AUTO: 5.3 % — SIGNIFICANT CHANGE UP (ref 0–8)
GAS PNL BLDA: SIGNIFICANT CHANGE UP
GAS PNL BLDA: SIGNIFICANT CHANGE UP
GLUCOSE BLDC GLUCOMTR-MCNC: 153 MG/DL — HIGH (ref 70–99)
GLUCOSE BLDC GLUCOMTR-MCNC: 199 MG/DL — HIGH (ref 70–99)
GLUCOSE BLDC GLUCOMTR-MCNC: 225 MG/DL — HIGH (ref 70–99)
GLUCOSE BLDC GLUCOMTR-MCNC: 237 MG/DL — HIGH (ref 70–99)
GLUCOSE SERPL-MCNC: 172 MG/DL — HIGH (ref 70–99)
GLUCOSE SERPL-MCNC: 233 MG/DL — HIGH (ref 70–99)
HCO3 BLDA-SCNC: 30 MMOL/L — HIGH (ref 23–27)
HCT VFR BLD CALC: 27.6 % — LOW (ref 42–52)
HGB BLD-MCNC: 8.2 G/DL — LOW (ref 14–18)
HOROWITZ INDEX BLDA+IHG-RTO: 30 — SIGNIFICANT CHANGE UP
IMM GRANULOCYTES NFR BLD AUTO: 0.6 % — HIGH (ref 0.1–0.3)
LYMPHOCYTES # BLD AUTO: 1.6 K/UL — SIGNIFICANT CHANGE UP (ref 1.2–3.4)
LYMPHOCYTES # BLD AUTO: 14.1 % — LOW (ref 20.5–51.1)
MAGNESIUM SERPL-MCNC: 2.7 MG/DL — HIGH (ref 1.8–2.4)
MCHC RBC-ENTMCNC: 28 PG — SIGNIFICANT CHANGE UP (ref 27–31)
MCHC RBC-ENTMCNC: 29.7 G/DL — LOW (ref 32–37)
MCV RBC AUTO: 94.2 FL — HIGH (ref 80–94)
MONOCYTES # BLD AUTO: 0.77 K/UL — HIGH (ref 0.1–0.6)
MONOCYTES NFR BLD AUTO: 6.8 % — SIGNIFICANT CHANGE UP (ref 1.7–9.3)
NEUTROPHILS # BLD AUTO: 8.28 K/UL — HIGH (ref 1.4–6.5)
NEUTROPHILS NFR BLD AUTO: 72.9 % — SIGNIFICANT CHANGE UP (ref 42.2–75.2)
NRBC # BLD: 0 /100 WBCS — SIGNIFICANT CHANGE UP (ref 0–0)
PCO2 BLDA: 50 MMHG — HIGH (ref 38–42)
PH BLDA: 7.4 — SIGNIFICANT CHANGE UP (ref 7.38–7.42)
PHOSPHATE SERPL-MCNC: 2.9 MG/DL — SIGNIFICANT CHANGE UP (ref 2.1–4.9)
PLATELET # BLD AUTO: 253 K/UL — SIGNIFICANT CHANGE UP (ref 130–400)
PO2 BLDA: 79 MMHG — SIGNIFICANT CHANGE UP (ref 78–95)
POTASSIUM SERPL-MCNC: 4 MMOL/L — SIGNIFICANT CHANGE UP (ref 3.5–5)
POTASSIUM SERPL-MCNC: 4.5 MMOL/L — SIGNIFICANT CHANGE UP (ref 3.5–5)
POTASSIUM SERPL-SCNC: 4 MMOL/L — SIGNIFICANT CHANGE UP (ref 3.5–5)
POTASSIUM SERPL-SCNC: 4.5 MMOL/L — SIGNIFICANT CHANGE UP (ref 3.5–5)
PROT SERPL-MCNC: 7.5 G/DL — SIGNIFICANT CHANGE UP (ref 6–8)
RBC # BLD: 2.93 M/UL — LOW (ref 4.7–6.1)
RBC # FLD: 18.6 % — HIGH (ref 11.5–14.5)
SAO2 % BLDA: 96 % — SIGNIFICANT CHANGE UP (ref 94–98)
SODIUM SERPL-SCNC: 148 MMOL/L — HIGH (ref 135–146)
SODIUM SERPL-SCNC: 151 MMOL/L — HIGH (ref 135–146)
WBC # BLD: 11.35 K/UL — HIGH (ref 4.8–10.8)
WBC # FLD AUTO: 11.35 K/UL — HIGH (ref 4.8–10.8)

## 2021-06-13 PROCEDURE — 99232 SBSQ HOSP IP/OBS MODERATE 35: CPT

## 2021-06-13 PROCEDURE — 71045 X-RAY EXAM CHEST 1 VIEW: CPT | Mod: 26

## 2021-06-13 RX ORDER — INSULIN LISPRO 100/ML
5 VIAL (ML) SUBCUTANEOUS EVERY 6 HOURS
Refills: 0 | Status: DISCONTINUED | OUTPATIENT
Start: 2021-06-13 | End: 2021-06-22

## 2021-06-13 RX ORDER — IBUPROFEN 200 MG
400 TABLET ORAL ONCE
Refills: 0 | Status: COMPLETED | OUTPATIENT
Start: 2021-06-13 | End: 2021-06-13

## 2021-06-13 RX ORDER — DEXMEDETOMIDINE HYDROCHLORIDE IN 0.9% SODIUM CHLORIDE 4 UG/ML
0.2 INJECTION INTRAVENOUS
Qty: 400 | Refills: 0 | Status: DISCONTINUED | OUTPATIENT
Start: 2021-06-13 | End: 2021-06-14

## 2021-06-13 RX ORDER — DEXMEDETOMIDINE HYDROCHLORIDE IN 0.9% SODIUM CHLORIDE 4 UG/ML
0.2 INJECTION INTRAVENOUS
Qty: 200 | Refills: 0 | Status: DISCONTINUED | OUTPATIENT
Start: 2021-06-13 | End: 2021-06-13

## 2021-06-13 RX ORDER — SODIUM CHLORIDE 9 MG/ML
1000 INJECTION, SOLUTION INTRAVENOUS
Refills: 0 | Status: DISCONTINUED | OUTPATIENT
Start: 2021-06-13 | End: 2021-06-13

## 2021-06-13 RX ORDER — INSULIN GLARGINE 100 [IU]/ML
18 INJECTION, SOLUTION SUBCUTANEOUS AT BEDTIME
Refills: 0 | Status: DISCONTINUED | OUTPATIENT
Start: 2021-06-13 | End: 2021-06-26

## 2021-06-13 RX ADMIN — DEXMEDETOMIDINE HYDROCHLORIDE IN 0.9% SODIUM CHLORIDE 8.21 MICROGRAM(S)/KG/HR: 4 INJECTION INTRAVENOUS at 11:00

## 2021-06-13 RX ADMIN — Medication 650 MILLIGRAM(S): at 00:00

## 2021-06-13 RX ADMIN — CHLORHEXIDINE GLUCONATE 1 APPLICATION(S): 213 SOLUTION TOPICAL at 05:00

## 2021-06-13 RX ADMIN — SENNA PLUS 2 TABLET(S): 8.6 TABLET ORAL at 21:05

## 2021-06-13 RX ADMIN — HEPARIN SODIUM 5000 UNIT(S): 5000 INJECTION INTRAVENOUS; SUBCUTANEOUS at 21:06

## 2021-06-13 RX ADMIN — Medication 650 MILLIGRAM(S): at 06:05

## 2021-06-13 RX ADMIN — HEPARIN SODIUM 5000 UNIT(S): 5000 INJECTION INTRAVENOUS; SUBCUTANEOUS at 05:00

## 2021-06-13 RX ADMIN — POLYETHYLENE GLYCOL 3350 17 GRAM(S): 17 POWDER, FOR SOLUTION ORAL at 11:02

## 2021-06-13 RX ADMIN — Medication 650 MILLIGRAM(S): at 18:00

## 2021-06-13 RX ADMIN — Medication 5 UNIT(S): at 06:03

## 2021-06-13 RX ADMIN — CASPOFUNGIN ACETATE 260 MILLIGRAM(S): 7 INJECTION, POWDER, LYOPHILIZED, FOR SOLUTION INTRAVENOUS at 12:01

## 2021-06-13 RX ADMIN — FENTANYL CITRATE 8.21 MICROGRAM(S)/KG/HR: 50 INJECTION INTRAVENOUS at 20:30

## 2021-06-13 RX ADMIN — Medication 400 MILLIGRAM(S): at 22:00

## 2021-06-13 RX ADMIN — Medication 4: at 18:04

## 2021-06-13 RX ADMIN — PANTOPRAZOLE SODIUM 40 MILLIGRAM(S): 20 TABLET, DELAYED RELEASE ORAL at 11:02

## 2021-06-13 RX ADMIN — MEROPENEM 100 MILLIGRAM(S): 1 INJECTION INTRAVENOUS at 17:18

## 2021-06-13 RX ADMIN — MEROPENEM 100 MILLIGRAM(S): 1 INJECTION INTRAVENOUS at 05:01

## 2021-06-13 RX ADMIN — CHLORHEXIDINE GLUCONATE 15 MILLILITER(S): 213 SOLUTION TOPICAL at 05:00

## 2021-06-13 RX ADMIN — Medication 650 MILLIGRAM(S): at 13:00

## 2021-06-13 RX ADMIN — CHLORHEXIDINE GLUCONATE 15 MILLILITER(S): 213 SOLUTION TOPICAL at 17:18

## 2021-06-13 RX ADMIN — Medication 650 MILLIGRAM(S): at 06:52

## 2021-06-13 RX ADMIN — Medication 650 MILLIGRAM(S): at 17:45

## 2021-06-13 RX ADMIN — SODIUM CHLORIDE 75 MILLILITER(S): 9 INJECTION, SOLUTION INTRAVENOUS at 08:00

## 2021-06-13 RX ADMIN — DEXMEDETOMIDINE HYDROCHLORIDE IN 0.9% SODIUM CHLORIDE 8.21 MICROGRAM(S)/KG/HR: 4 INJECTION INTRAVENOUS at 14:04

## 2021-06-13 RX ADMIN — Medication 5 UNIT(S): at 11:38

## 2021-06-13 RX ADMIN — Medication 400 MILLIGRAM(S): at 21:34

## 2021-06-13 RX ADMIN — INSULIN GLARGINE 18 UNIT(S): 100 INJECTION, SOLUTION SUBCUTANEOUS at 21:06

## 2021-06-13 RX ADMIN — Medication 2: at 06:04

## 2021-06-13 RX ADMIN — DAPTOMYCIN 135 MILLIGRAM(S): 500 INJECTION, POWDER, LYOPHILIZED, FOR SOLUTION INTRAVENOUS at 12:00

## 2021-06-13 RX ADMIN — Medication 4: at 11:39

## 2021-06-13 RX ADMIN — Medication 5 UNIT(S): at 18:04

## 2021-06-13 RX ADMIN — Medication 650 MILLIGRAM(S): at 11:51

## 2021-06-13 RX ADMIN — Medication 650 MILLIGRAM(S): at 00:04

## 2021-06-13 RX ADMIN — HEPARIN SODIUM 5000 UNIT(S): 5000 INJECTION INTRAVENOUS; SUBCUTANEOUS at 13:43

## 2021-06-13 NOTE — PROGRESS NOTE ADULT - ASSESSMENT
59 y/o M with PMH of DM, hypertension, dyslipidemia, sleep apnea, Charcot foot reconstruction with external fixation, morbid obesity presented for worsening right foot infection.     # DFU with Hx of MRSA infection s/p BKA  # Acute hypoxic respiratory failure secondary to septic shock - intubated  # Toxic metabolic encephalopathy - improved   # Fever overnight >101, currently febrile   - s/p intubation 5/31   - bacteremia from right foot abscess   - septic on admission, WBC 12,  + lactate 2.3. s/p cefepime flagyl and vanc in the ED  - Podiatry on board, f/u   - S/p disarticulation right knee 5/31 w/ vascular   - s/p AKA surgery 6/11  - 5/28 BCx E fecalis, ORSA, 5/30 BCx NG, 6/4 BCx NG  - 5/29 R foot : ORSA , Corynebacterium  - Echo 3/31: EF 55-60%, G2DD, no vegetations   - ID following, f/u recs:  -c/w dapto 875mg q12, caspofungin 50mg and eleuterio 1g q12  - CPK >1300 -> 600~ ->300~-> 561 -> 485, monitor daily   - currently on propofol, fent, and levo  - bcx (-) 6/7, spoke to ID, okay for surgery, f/u with vascular  - ddimer 2550, duplex (-)  - Procal 1.08, , ESR 58  - plans for SBT and to try extubation if tolerated  - pt still having fevers, (drug fever? encephalopathy less likely as pt is following commands off sedation)  - Bcx sent     #?Code stroke for possible CVA   - ptnt was code stroke on floors on 3/30 for AMS and confusion upgraded to CCU   - metabolic encephalopathy secondary to sepsis more likely than stroke  -  CT head neg for acute pathology, CTA w/ moderate stenoses at the junction of the precavernous and cavernous segments of both internal carotid arteries  - neuro recs for brain MR, can get repeat CT head when stable, will not fit in MR machine   - neuro rec LP, as per pulm, as pt is following commands now, little clinical benefit to preforming as of now.     # Hypernatremia   - na 149 today, likely dehydration   - c/w gentle hydration + free water 250ml q4hrs    # NSTEMI  - trops elevated on admission .11->.12  - secondary to sepsis induced demand ischemia  - less likely true ACS  -monitor     #CANDY on CKD III /  rule out ATN - worsening today   - secondary to sepsis induced ATN, continue supportive care, monitor for now.   - creatinine 3.0 on admission, baseline around 1.4-1.8   - s/p CVVHD 6/1 and 6/2, holding CVVHD for now ptnt w/ good urine output   - nephro following    # Anemia of chronic disease   - transfuse Hb < 7  - monitor   - s/p 4 units in total since admission  - stable today    #Diabetes Mellitus  -on insulin regimen, increase  - a1c 8.9   - Monitor   - FS q1hr     #Hypertension  -holding oral meds     #Dyslipidemia  -c/w home meds    #MATA/ OHS    #morbid obesity    Diet: npo w/ tube feeds, NPO for surgery   DVT ppx: heparin subq TID  GI ppx: protonix   Dispo: acute

## 2021-06-13 NOTE — PROGRESS NOTE ADULT - ASSESSMENT
IMPRESSION    Acute Hypoxemic respiratory failure with hypercapnia   Septic Shock 2/2 to Bacteremia from DFU (Enterococcus,  Staph Aureus ) repeat cx neg  DFU s/p Right Knee Disarticulation for necrotizing soft tissue infection  CANDY on CKD s/p CVVHD sp dc u dall  AMS 2/2 Toxic-Metabolic Encephelopathy s/p CT head neg for acute pathology  MATA/ OHS  Morbid obesity  NSTEMI  hypernatremia     PLAN:    CNS: SAT.      HEENT: ETT care and Oral care    PULMONARY: keep same vent setting.  SBT      CARDIOVASCULAR:  Wean Levophed.  Continue  D5w 750cc/ hr     GI: GI prophylaxis.  OG Feeding as tolerated start free water 250cc Q 4 hrs     RENAL: F/U Lytes and correct as needed     INFECTIOUS DISEASE:  ABX as per ID  caspofungin was added     HEMATOLOGICAL:  DVT prophylaxis.  keep Hb >8. Maintain active Type and screen.      ENDOCRINE:  Follow up FS.   Insulin protocol     MUSCULOSKELETAL: Bedrest. f/u vascular surgery follow surgery   Poor prognosis  Palliative care f/u

## 2021-06-13 NOTE — PROGRESS NOTE ADULT - SUBJECTIVE AND OBJECTIVE BOX
Patient is a 60y old  Male who presents with a chief complaint of dfu (12 Jun 2021 09:09)      INTERVAL HPI/OVERNIGHT EVENTS:  Still spiking fevers overnight, responding to commands, endorses no pain.     ICU Vital Signs Last 24 Hrs  T(C): 38.7 (12 Jun 2021 23:00), Max: 38.7 (12 Jun 2021 23:00)  T(F): 101.7 (12 Jun 2021 23:00), Max: 101.7 (12 Jun 2021 23:00)  HR: 74 (13 Jun 2021 02:00) (66 - 76)  BP: 97/47 (13 Jun 2021 02:00) (85/45 - 128/61)  BP(mean): 60 (13 Jun 2021 02:00) (57 - 83)  ABP: --  ABP(mean): --  RR: 17 (13 Jun 2021 02:00) (17 - 34)  SpO2: 98% (13 Jun 2021 02:00) (95% - 100%)    I&O's Summary    11 Jun 2021 07:01  -  12 Jun 2021 07:00  --------------------------------------------------------  IN: 4047.9 mL / OUT: 2390 mL / NET: 1657.9 mL    12 Jun 2021 07:01  -  13 Jun 2021 04:22  --------------------------------------------------------  IN: 3394.3 mL / OUT: 1820 mL / NET: 1574.3 mL      Mode: AC/ CMV (Assist Control/ Continuous Mandatory Ventilation)  RR (machine): 18  TV (machine): 480  FiO2: 30  PEEP: 5  ITime: 1  MAP: 14  PIP: 28      LABS:                        8.6    12.03 )-----------( 275      ( 12 Jun 2021 04:30 )             29.6     06-12    149<H>  |  113<H>  |  57<H>  ----------------------------<  207<H>  4.3   |  30  |  1.8<H>    Ca    7.9<L>      12 Jun 2021 04:30  Phos  3.9     06-12  Mg     2.6     06-12    TPro  7.9  /  Alb  2.3<L>  /  TBili  0.7  /  DBili  x   /  AST  227<H>  /  ALT  102<H>  /  AlkPhos  150<H>  06-12        CAPILLARY BLOOD GLUCOSE      POCT Blood Glucose.: 164 mg/dL (12 Jun 2021 23:55)  POCT Blood Glucose.: 166 mg/dL (12 Jun 2021 20:58)  POCT Blood Glucose.: 164 mg/dL (12 Jun 2021 16:59)  POCT Blood Glucose.: 176 mg/dL (12 Jun 2021 12:25)  POCT Blood Glucose.: 202 mg/dL (12 Jun 2021 08:07)  POCT Blood Glucose.: 202 mg/dL (12 Jun 2021 06:12)  POCT Blood Glucose.: 200 mg/dL (12 Jun 2021 04:35)    ABG - ( 12 Jun 2021 03:09 )  pH, Arterial: 7.40  pH, Blood: x     /  pCO2: 51    /  pO2: 84    / HCO3: 31    / Base Excess: 5.4   /  SaO2: 96                  RADIOLOGY & ADDITIONAL TESTS:    Consultant(s) Notes Reviewed:  [x ] YES  [ ] NO    MEDICATIONS  (STANDING):  ALBUTerol    90 MICROgram(s) HFA Inhaler 1 Puff(s) Inhalation once  caspofungin IVPB 50 milliGRAM(s) IV Intermittent every 24 hours  chlorhexidine 0.12% Liquid 15 milliLiter(s) Oral Mucosa two times a day  chlorhexidine 4% Liquid 1 Application(s) Topical <User Schedule>  DAPTOmycin IVPB 875 milliGRAM(s) IV Intermittent every 48 hours  fentaNYL   Infusion. 0.5 MICROgram(s)/kG/Hr (8.21 mL/Hr) IV Continuous <Continuous>  glucagon  Injectable 1 milliGRAM(s) IntraMuscular once  heparin   Injectable 5000 Unit(s) SubCutaneous every 8 hours  insulin glargine Injectable (LANTUS) 15 Unit(s) SubCutaneous at bedtime  insulin lispro (ADMELOG) corrective regimen sliding scale   SubCutaneous every 6 hours  insulin lispro Injectable (ADMELOG) 5 Unit(s) SubCutaneous three times a day before meals  meropenem  IVPB 1000 milliGRAM(s) IV Intermittent every 12 hours  norepinephrine Infusion 0.05 MICROgram(s)/kG/Min (7.7 mL/Hr) IV Continuous <Continuous>  pantoprazole   Suspension 40 milliGRAM(s) Oral daily  polyethylene glycol 3350 17 Gram(s) Oral daily  propofol Infusion 5.004 MICROgram(s)/kG/Min (4.93 mL/Hr) IV Continuous <Continuous>  senna 2 Tablet(s) Oral at bedtime  tiotropium 18 MICROgram(s) Capsule 1 Capsule(s) Inhalation once    MEDICATIONS  (PRN):  acetaminophen   Tablet .. 650 milliGRAM(s) Oral every 6 hours PRN Temp greater or equal to 38C (100.4F), Mild Pain (1 - 3)      PHYSICAL EXAM:  GENERAL: intubated   NERVOUS SYSTEM:  easily arrousable, follows commands, able to move rt hand but does not move left  CHEST/LUNG: B/L good air entry; No rales, rhonchi, or wheezing  HEART: S1S2 normal, no S3, Regular rate and rhythm; No murmurs, rubs, or gallops  ABDOMEN: Soft, Nontender, obese  EXTREMITIES:  No edema, AKA on rt leg  SKIN: No rashes or lesions    Care Discussed with Consultants/Other Providers [ x] YES  [ ] NO

## 2021-06-14 LAB
ALBUMIN SERPL ELPH-MCNC: 2.2 G/DL — LOW (ref 3.5–5.2)
ALP SERPL-CCNC: 131 U/L — HIGH (ref 30–115)
ALT FLD-CCNC: 63 U/L — HIGH (ref 0–41)
ANION GAP SERPL CALC-SCNC: 10 MMOL/L — SIGNIFICANT CHANGE UP (ref 7–14)
ANION GAP SERPL CALC-SCNC: 5 MMOL/L — LOW (ref 7–14)
ANION GAP SERPL CALC-SCNC: 9 MMOL/L — SIGNIFICANT CHANGE UP (ref 7–14)
AST SERPL-CCNC: 86 U/L — HIGH (ref 0–41)
BASE EXCESS BLDA CALC-SCNC: 3.2 MMOL/L — HIGH (ref -2–2)
BASE EXCESS BLDA CALC-SCNC: 3.4 MMOL/L — HIGH (ref -2–2)
BASE EXCESS BLDA CALC-SCNC: 3.8 MMOL/L — HIGH (ref -2–2)
BASOPHILS # BLD AUTO: 0.03 K/UL — SIGNIFICANT CHANGE UP (ref 0–0.2)
BASOPHILS NFR BLD AUTO: 0.3 % — SIGNIFICANT CHANGE UP (ref 0–1)
BILIRUB SERPL-MCNC: 0.5 MG/DL — SIGNIFICANT CHANGE UP (ref 0.2–1.2)
BUN SERPL-MCNC: 42 MG/DL — HIGH (ref 10–20)
BUN SERPL-MCNC: 45 MG/DL — HIGH (ref 10–20)
BUN SERPL-MCNC: 49 MG/DL — HIGH (ref 10–20)
BUN SERPL-MCNC: 50 MG/DL — HIGH (ref 10–20)
BUN SERPL-MCNC: 52 MG/DL — HIGH (ref 10–20)
CALCIUM SERPL-MCNC: 7.6 MG/DL — LOW (ref 8.5–10.1)
CALCIUM SERPL-MCNC: 7.6 MG/DL — LOW (ref 8.5–10.1)
CALCIUM SERPL-MCNC: 7.7 MG/DL — LOW (ref 8.5–10.1)
CALCIUM SERPL-MCNC: 7.8 MG/DL — LOW (ref 8.5–10.1)
CALCIUM SERPL-MCNC: 7.9 MG/DL — LOW (ref 8.5–10.1)
CHLORIDE SERPL-SCNC: 107 MMOL/L — SIGNIFICANT CHANGE UP (ref 98–110)
CHLORIDE SERPL-SCNC: 109 MMOL/L — SIGNIFICANT CHANGE UP (ref 98–110)
CHLORIDE SERPL-SCNC: 111 MMOL/L — HIGH (ref 98–110)
CHLORIDE SERPL-SCNC: 114 MMOL/L — HIGH (ref 98–110)
CHLORIDE SERPL-SCNC: 115 MMOL/L — HIGH (ref 98–110)
CO2 SERPL-SCNC: 25 MMOL/L — SIGNIFICANT CHANGE UP (ref 17–32)
CO2 SERPL-SCNC: 27 MMOL/L — SIGNIFICANT CHANGE UP (ref 17–32)
CO2 SERPL-SCNC: 27 MMOL/L — SIGNIFICANT CHANGE UP (ref 17–32)
CO2 SERPL-SCNC: 29 MMOL/L — SIGNIFICANT CHANGE UP (ref 17–32)
CO2 SERPL-SCNC: 29 MMOL/L — SIGNIFICANT CHANGE UP (ref 17–32)
CREAT SERPL-MCNC: 1.5 MG/DL — SIGNIFICANT CHANGE UP (ref 0.7–1.5)
CREAT SERPL-MCNC: 1.6 MG/DL — HIGH (ref 0.7–1.5)
CREAT SERPL-MCNC: 1.6 MG/DL — HIGH (ref 0.7–1.5)
CREAT SERPL-MCNC: 1.7 MG/DL — HIGH (ref 0.7–1.5)
CREAT SERPL-MCNC: 1.8 MG/DL — HIGH (ref 0.7–1.5)
EOSINOPHIL # BLD AUTO: 0.5 K/UL — SIGNIFICANT CHANGE UP (ref 0–0.7)
EOSINOPHIL NFR BLD AUTO: 4.4 % — SIGNIFICANT CHANGE UP (ref 0–8)
GAS PNL BLDA: SIGNIFICANT CHANGE UP
GAS PNL BLDA: SIGNIFICANT CHANGE UP
GLUCOSE BLDC GLUCOMTR-MCNC: 194 MG/DL — HIGH (ref 70–99)
GLUCOSE BLDC GLUCOMTR-MCNC: 213 MG/DL — HIGH (ref 70–99)
GLUCOSE BLDC GLUCOMTR-MCNC: 219 MG/DL — HIGH (ref 70–99)
GLUCOSE BLDC GLUCOMTR-MCNC: 229 MG/DL — HIGH (ref 70–99)
GLUCOSE SERPL-MCNC: 182 MG/DL — HIGH (ref 70–99)
GLUCOSE SERPL-MCNC: 195 MG/DL — HIGH (ref 70–99)
GLUCOSE SERPL-MCNC: 230 MG/DL — HIGH (ref 70–99)
GLUCOSE SERPL-MCNC: 233 MG/DL — HIGH (ref 70–99)
GLUCOSE SERPL-MCNC: 233 MG/DL — HIGH (ref 70–99)
HCO3 BLDA-SCNC: 29 MMOL/L — HIGH (ref 23–27)
HCT VFR BLD CALC: 28.7 % — LOW (ref 42–52)
HGB BLD-MCNC: 8.3 G/DL — LOW (ref 14–18)
HOROWITZ INDEX BLDA+IHG-RTO: 30 — SIGNIFICANT CHANGE UP
HOROWITZ INDEX BLDA+IHG-RTO: 30 — SIGNIFICANT CHANGE UP
IMM GRANULOCYTES NFR BLD AUTO: 0.6 % — HIGH (ref 0.1–0.3)
LYMPHOCYTES # BLD AUTO: 19.8 % — LOW (ref 20.5–51.1)
LYMPHOCYTES # BLD AUTO: 2.23 K/UL — SIGNIFICANT CHANGE UP (ref 1.2–3.4)
MAGNESIUM SERPL-MCNC: 2.7 MG/DL — HIGH (ref 1.8–2.4)
MCHC RBC-ENTMCNC: 27.2 PG — SIGNIFICANT CHANGE UP (ref 27–31)
MCHC RBC-ENTMCNC: 28.9 G/DL — LOW (ref 32–37)
MCV RBC AUTO: 94.1 FL — HIGH (ref 80–94)
MONOCYTES # BLD AUTO: 0.71 K/UL — HIGH (ref 0.1–0.6)
MONOCYTES NFR BLD AUTO: 6.3 % — SIGNIFICANT CHANGE UP (ref 1.7–9.3)
NEUTROPHILS # BLD AUTO: 7.7 K/UL — HIGH (ref 1.4–6.5)
NEUTROPHILS NFR BLD AUTO: 68.6 % — SIGNIFICANT CHANGE UP (ref 42.2–75.2)
NRBC # BLD: 0 /100 WBCS — SIGNIFICANT CHANGE UP (ref 0–0)
PCO2 BLDA: 47 MMHG — HIGH (ref 38–42)
PCO2 BLDA: 47 MMHG — HIGH (ref 38–42)
PCO2 BLDA: 49 MMHG — HIGH (ref 38–42)
PH BLDA: 7.38 — SIGNIFICANT CHANGE UP (ref 7.38–7.42)
PH BLDA: 7.4 — SIGNIFICANT CHANGE UP (ref 7.38–7.42)
PH BLDA: 7.4 — SIGNIFICANT CHANGE UP (ref 7.38–7.42)
PHOSPHATE SERPL-MCNC: 2.5 MG/DL — SIGNIFICANT CHANGE UP (ref 2.1–4.9)
PLATELET # BLD AUTO: 257 K/UL — SIGNIFICANT CHANGE UP (ref 130–400)
PO2 BLDA: 67 MMHG — LOW (ref 78–95)
PO2 BLDA: 67 MMHG — LOW (ref 78–95)
PO2 BLDA: 68 MMHG — LOW (ref 78–95)
POTASSIUM SERPL-MCNC: 3.9 MMOL/L — SIGNIFICANT CHANGE UP (ref 3.5–5)
POTASSIUM SERPL-MCNC: 4.1 MMOL/L — SIGNIFICANT CHANGE UP (ref 3.5–5)
POTASSIUM SERPL-MCNC: 4.1 MMOL/L — SIGNIFICANT CHANGE UP (ref 3.5–5)
POTASSIUM SERPL-MCNC: 4.3 MMOL/L — SIGNIFICANT CHANGE UP (ref 3.5–5)
POTASSIUM SERPL-MCNC: 4.3 MMOL/L — SIGNIFICANT CHANGE UP (ref 3.5–5)
POTASSIUM SERPL-SCNC: 3.9 MMOL/L — SIGNIFICANT CHANGE UP (ref 3.5–5)
POTASSIUM SERPL-SCNC: 4.1 MMOL/L — SIGNIFICANT CHANGE UP (ref 3.5–5)
POTASSIUM SERPL-SCNC: 4.1 MMOL/L — SIGNIFICANT CHANGE UP (ref 3.5–5)
POTASSIUM SERPL-SCNC: 4.3 MMOL/L — SIGNIFICANT CHANGE UP (ref 3.5–5)
POTASSIUM SERPL-SCNC: 4.3 MMOL/L — SIGNIFICANT CHANGE UP (ref 3.5–5)
PROT SERPL-MCNC: 7.7 G/DL — SIGNIFICANT CHANGE UP (ref 6–8)
RBC # BLD: 3.05 M/UL — LOW (ref 4.7–6.1)
RBC # FLD: 18.2 % — HIGH (ref 11.5–14.5)
SAO2 % BLDA: 93 % — LOW (ref 94–98)
SAO2 % BLDA: 93 % — LOW (ref 94–98)
SAO2 % BLDA: 94 % — SIGNIFICANT CHANGE UP (ref 94–98)
SODIUM SERPL-SCNC: 141 MMOL/L — SIGNIFICANT CHANGE UP (ref 135–146)
SODIUM SERPL-SCNC: 143 MMOL/L — SIGNIFICANT CHANGE UP (ref 135–146)
SODIUM SERPL-SCNC: 146 MMOL/L — SIGNIFICANT CHANGE UP (ref 135–146)
SODIUM SERPL-SCNC: 147 MMOL/L — HIGH (ref 135–146)
SODIUM SERPL-SCNC: 150 MMOL/L — HIGH (ref 135–146)
WBC # BLD: 11.24 K/UL — HIGH (ref 4.8–10.8)
WBC # FLD AUTO: 11.24 K/UL — HIGH (ref 4.8–10.8)

## 2021-06-14 PROCEDURE — 71045 X-RAY EXAM CHEST 1 VIEW: CPT | Mod: 26,76

## 2021-06-14 PROCEDURE — 99291 CRITICAL CARE FIRST HOUR: CPT

## 2021-06-14 RX ORDER — SODIUM BICARBONATE 1 MEQ/ML
0.05 SYRINGE (ML) INTRAVENOUS
Qty: 150 | Refills: 0 | Status: DISCONTINUED | OUTPATIENT
Start: 2021-06-14 | End: 2021-06-14

## 2021-06-14 RX ORDER — SODIUM CHLORIDE 9 MG/ML
1000 INJECTION, SOLUTION INTRAVENOUS
Refills: 0 | Status: DISCONTINUED | OUTPATIENT
Start: 2021-06-14 | End: 2021-06-14

## 2021-06-14 RX ORDER — SODIUM CHLORIDE 9 MG/ML
1000 INJECTION, SOLUTION INTRAVENOUS
Refills: 0 | Status: DISCONTINUED | OUTPATIENT
Start: 2021-06-14 | End: 2021-06-15

## 2021-06-14 RX ORDER — SODIUM BICARBONATE 1 MEQ/ML
650 SYRINGE (ML) INTRAVENOUS EVERY 6 HOURS
Refills: 0 | Status: DISCONTINUED | OUTPATIENT
Start: 2021-06-14 | End: 2021-06-16

## 2021-06-14 RX ORDER — ACETAMINOPHEN 500 MG
650 TABLET ORAL ONCE
Refills: 0 | Status: COMPLETED | OUTPATIENT
Start: 2021-06-14 | End: 2021-06-14

## 2021-06-14 RX ORDER — SODIUM BICARBONATE 1 MEQ/ML
650 SYRINGE (ML) INTRAVENOUS THREE TIMES A DAY
Refills: 0 | Status: DISCONTINUED | OUTPATIENT
Start: 2021-06-14 | End: 2021-06-14

## 2021-06-14 RX ADMIN — Medication 650 MILLIGRAM(S): at 07:00

## 2021-06-14 RX ADMIN — FENTANYL CITRATE 8.21 MICROGRAM(S)/KG/HR: 50 INJECTION INTRAVENOUS at 04:11

## 2021-06-14 RX ADMIN — DEXMEDETOMIDINE HYDROCHLORIDE IN 0.9% SODIUM CHLORIDE 8.21 MICROGRAM(S)/KG/HR: 4 INJECTION INTRAVENOUS at 05:11

## 2021-06-14 RX ADMIN — HEPARIN SODIUM 5000 UNIT(S): 5000 INJECTION INTRAVENOUS; SUBCUTANEOUS at 22:19

## 2021-06-14 RX ADMIN — CHLORHEXIDINE GLUCONATE 15 MILLILITER(S): 213 SOLUTION TOPICAL at 16:37

## 2021-06-14 RX ADMIN — Medication 650 MILLIGRAM(S): at 01:00

## 2021-06-14 RX ADMIN — Medication 4: at 15:32

## 2021-06-14 RX ADMIN — Medication 5 UNIT(S): at 05:44

## 2021-06-14 RX ADMIN — HEPARIN SODIUM 5000 UNIT(S): 5000 INJECTION INTRAVENOUS; SUBCUTANEOUS at 05:13

## 2021-06-14 RX ADMIN — CHLORHEXIDINE GLUCONATE 15 MILLILITER(S): 213 SOLUTION TOPICAL at 05:13

## 2021-06-14 RX ADMIN — MEROPENEM 100 MILLIGRAM(S): 1 INJECTION INTRAVENOUS at 05:12

## 2021-06-14 RX ADMIN — Medication 650 MILLIGRAM(S): at 00:02

## 2021-06-14 RX ADMIN — Medication 4: at 05:45

## 2021-06-14 RX ADMIN — CHLORHEXIDINE GLUCONATE 1 APPLICATION(S): 213 SOLUTION TOPICAL at 05:11

## 2021-06-14 RX ADMIN — Medication 650 MILLIGRAM(S): at 23:15

## 2021-06-14 RX ADMIN — MEROPENEM 100 MILLIGRAM(S): 1 INJECTION INTRAVENOUS at 17:29

## 2021-06-14 RX ADMIN — Medication 4: at 00:57

## 2021-06-14 RX ADMIN — Medication 650 MILLIGRAM(S): at 16:37

## 2021-06-14 RX ADMIN — CASPOFUNGIN ACETATE 260 MILLIGRAM(S): 7 INJECTION, POWDER, LYOPHILIZED, FOR SOLUTION INTRAVENOUS at 11:25

## 2021-06-14 RX ADMIN — Medication 5 UNIT(S): at 00:57

## 2021-06-14 RX ADMIN — Medication 650 MILLIGRAM(S): at 06:03

## 2021-06-14 RX ADMIN — HEPARIN SODIUM 5000 UNIT(S): 5000 INJECTION INTRAVENOUS; SUBCUTANEOUS at 13:18

## 2021-06-14 RX ADMIN — Medication 650 MILLIGRAM(S): at 22:19

## 2021-06-14 RX ADMIN — INSULIN GLARGINE 18 UNIT(S): 100 INJECTION, SOLUTION SUBCUTANEOUS at 23:00

## 2021-06-14 RX ADMIN — Medication 650 MILLIGRAM(S): at 17:19

## 2021-06-14 RX ADMIN — Medication 2: at 23:01

## 2021-06-14 NOTE — PROGRESS NOTE ADULT - SUBJECTIVE AND OBJECTIVE BOX
VASCULAR SURGERY PROGRESS NOTE    CC: DFU    Procedure: s/p right AKA    Events of past 24 hours: no issues with the stump. Dressing changed          ROS otherwise negative except per subjective and HPI      PAST MEDICAL & SURGICAL HISTORY:  MATA on CPAP    DM (diabetes mellitus)    HTN (hypertension)    High cholesterol    Charcot ankle, right    History of percutaneous angioplasty    H/O skin graft    Left toe amputee  4 TOES    Diabetic Charcot foot  Fixation with external device        Vital Signs Last 24 Hrs  T(C): 38.4 (14 Jun 2021 09:55), Max: 39.7 (13 Jun 2021 18:00)  T(F): 101.1 (14 Jun 2021 09:55), Max: 103.5 (13 Jun 2021 18:00)  HR: 82 (14 Jun 2021 09:55) (64 - 120)  BP: 127/56 (14 Jun 2021 09:55) (94/50 - 177/78)  BP(mean): 83 (14 Jun 2021 09:55) (61 - 123)  RR: 27 (14 Jun 2021 09:55) (16 - 32)  SpO2: 97% (14 Jun 2021 09:55) (92% - 100%)    Pain (0-10):            Pain Control Adequate: [] YES [] N    Diet:    I&O's Detail    13 Jun 2021 07:01  -  14 Jun 2021 07:00  --------------------------------------------------------  IN:    Dexmedetomidine: 212 mL    Dexmedetomidine: 32.8 mL    dextrose 5%: 900 mL    Enteral Tube Flush: 40 mL    FentaNYL: 698.6 mL    Free Water: 1500 mL    IV PiggyBack: 400 mL    Norepinephrine: 9 mL    Propofol: 20 mL    Vital High Protein: 1100 mL  Total IN: 4912.4 mL    OUT:    Indwelling Catheter - Urethral (mL): 2365 mL  Total OUT: 2365 mL    Total NET: 2547.4 mL      14 Jun 2021 07:01  -  14 Jun 2021 10:52  --------------------------------------------------------  IN:    Dexmedetomidine: 1 mL    FentaNYL: 33 mL    Vital High Protein: 55 mL  Total IN: 89 mL    OUT:    Indwelling Catheter - Urethral (mL): 385 mL  Total OUT: 385 mL    Total NET: -296 mL          Bowel Movement: : [] YES [] NO  Flatus: : [] YES [] NO    PHYSICAL EXAM    Appearance: Normal	  HEENT:   Normal oral mucosa, PERRL, EOMI	  Neck: Supple, - JVD;   Cardiovascular: Normal S1 S2, No JVD, No murmurs,   Respiratory: Lungs clear to auscultation/No Rales, Rhonchi, Wheezing	  Gastrointestinal:  Soft, Non-tender, + BS	  Skin: No rashes, No ecchymoses, No cyanosis  Extremities: Normal range of motion, No clubbing, cyanosis  Right AKA: dressing changed. Staples intact. No hematoma, swelling or oozing  Neurologic: Non-focal  Psychiatry: A lert          MEDICATIONS:   MEDICATIONS  (STANDING):  ALBUTerol    90 MICROgram(s) HFA Inhaler 1 Puff(s) Inhalation once  caspofungin IVPB 50 milliGRAM(s) IV Intermittent every 24 hours  chlorhexidine 0.12% Liquid 15 milliLiter(s) Oral Mucosa two times a day  chlorhexidine 4% Liquid 1 Application(s) Topical <User Schedule>  DAPTOmycin IVPB 875 milliGRAM(s) IV Intermittent every 48 hours  dexMEDEtomidine Infusion 0.2 MICROgram(s)/kG/Hr (8.21 mL/Hr) IV Continuous <Continuous>  dextrose 5%. 1000 milliLiter(s) (220 mL/Hr) IV Continuous <Continuous>  fentaNYL   Infusion. 0.5 MICROgram(s)/kG/Hr (8.21 mL/Hr) IV Continuous <Continuous>  glucagon  Injectable 1 milliGRAM(s) IntraMuscular once  heparin   Injectable 5000 Unit(s) SubCutaneous every 8 hours  insulin glargine Injectable (LANTUS) 18 Unit(s) SubCutaneous at bedtime  insulin lispro (ADMELOG) corrective regimen sliding scale   SubCutaneous every 6 hours  insulin lispro Injectable (ADMELOG) 5 Unit(s) SubCutaneous every 6 hours  meropenem  IVPB 1000 milliGRAM(s) IV Intermittent every 12 hours  pantoprazole   Suspension 40 milliGRAM(s) Oral daily  polyethylene glycol 3350 17 Gram(s) Oral daily  senna 2 Tablet(s) Oral at bedtime  sodium bicarbonate 650 milliGRAM(s) Oral every 6 hours  sodium bicarbonate  Infusion 0.046 mEq/kG/Hr (50 mL/Hr) IV Continuous <Continuous>  tiotropium 18 MICROgram(s) Capsule 1 Capsule(s) Inhalation once    MEDICATIONS  (PRN):  acetaminophen   Tablet .. 650 milliGRAM(s) Oral every 6 hours PRN Temp greater or equal to 38C (100.4F), Mild Pain (1 - 3)        LAB/STUDIES:                        8.3    11.24 )-----------( 257      ( 14 Jun 2021 04:50 )             28.7     06-14    150<H>  |  114<H>  |  52<H>  ----------------------------<  182<H>  4.3   |  27  |  1.8<H>    Ca    7.7<L>      14 Jun 2021 04:50  Phos  2.5     06-14  Mg     2.7     06-14    TPro  7.7  /  Alb  2.2<L>  /  TBili  0.5  /  DBili  x   /  AST  86<H>  /  ALT  63<H>  /  AlkPhos  131<H>  06-14      LIVER FUNCTIONS - ( 14 Jun 2021 04:50 )  Alb: 2.2 g/dL / Pro: 7.7 g/dL / ALK PHOS: 131 U/L / ALT: 63 U/L / AST: 86 U/L / GGT: x               CARDIAC MARKERS ( 13 Jun 2021 04:35 )  x     / x     / 641 U/L / x     / x              ABG - ( 14 Jun 2021 10:32 )  pH, Arterial: 7.40  pH, Blood: x     /  pCO2: 47    /  pO2: 67    / HCO3: 29    / Base Excess: 3.8   /  SaO2: 93

## 2021-06-14 NOTE — PROGRESS NOTE ADULT - ASSESSMENT
61 y/o M with PMH of DM, hypertension, dyslipidemia, sleep apnea, Charcot foot reconstruction with external fixation, morbid obesity presenting to the hospital on 5/29 with worsening foot ulcer.  s/p Right Knee Disarticulation for necrotizing soft tissue infection of right lower extremity    #CANDY on CKD ( unknown stage last known creat 1.4 in 11/2019) / rule out ATN / obstructive uropathy/ sp CT angio resolving / hypernatremia   non oliguric now / creat stable off HD  agree with D5w and free water with diet / follow serial BMP / target lowering sodium by 6 meq per day / follow BMP  UA noted, minimal proteinuria    #appreciate ID [email or web address]/u, on Dapto and merrem   sp debridement today of DFU as per podiatry  follow IP and Ca     will sign off recall PRN please

## 2021-06-14 NOTE — PROGRESS NOTE ADULT - SUBJECTIVE AND OBJECTIVE BOX
Patient is a 60y old  Male who presents with a chief complaint of dfu (13 Jun 2021 21:47)      INTERVAL HPI/OVERNIGHT EVENTS:  ICU Vital Signs Last 24 Hrs  T(C): 38.5 (14 Jun 2021 07:49), Max: 39.7 (13 Jun 2021 18:00)  T(F): 101.3 (14 Jun 2021 07:49), Max: 103.5 (13 Jun 2021 18:00)  HR: 84 (14 Jun 2021 07:00) (64 - 120)  BP: 132/53 (14 Jun 2021 07:49) (94/50 - 177/78)  BP(mean): 77 (14 Jun 2021 07:49) (61 - 123)  ABP: --  ABP(mean): --  RR: 18 (14 Jun 2021 07:49) (16 - 34)  SpO2: 98% (14 Jun 2021 07:49) (97% - 100%)    I&O's Summary    13 Jun 2021 07:01  -  14 Jun 2021 07:00  --------------------------------------------------------  IN: 4912.4 mL / OUT: 2365 mL / NET: 2547.4 mL    14 Jun 2021 07:01  -  14 Jun 2021 08:25  --------------------------------------------------------  IN: 89 mL / OUT: 60 mL / NET: 29 mL      Mode: AC/ CMV (Assist Control/ Continuous Mandatory Ventilation)  RR (machine): 18  TV (machine): 480  FiO2: 30  PEEP: 5  ITime: 1  MAP: 14  PIP: 26      LABS:                        8.3    11.24 )-----------( 257      ( 14 Jun 2021 04:50 )             28.7     06-14    150<H>  |  114<H>  |  52<H>  ----------------------------<  182<H>  4.3   |  27  |  1.8<H>    Ca    7.7<L>      14 Jun 2021 04:50  Phos  2.5     06-14  Mg     2.7     06-14    TPro  7.7  /  Alb  2.2<L>  /  TBili  0.5  /  DBili  x   /  AST  86<H>  /  ALT  63<H>  /  AlkPhos  131<H>  06-14        CAPILLARY BLOOD GLUCOSE      POCT Blood Glucose.: 219 mg/dL (14 Jun 2021 05:29)  POCT Blood Glucose.: 213 mg/dL (14 Jun 2021 00:56)  POCT Blood Glucose.: 153 mg/dL (13 Jun 2021 21:01)  POCT Blood Glucose.: 237 mg/dL (13 Jun 2021 17:50)  POCT Blood Glucose.: 225 mg/dL (13 Jun 2021 11:25)    ABG - ( 14 Jun 2021 04:54 )  pH, Arterial: 7.40  pH, Blood: x     /  pCO2: 47    /  pO2: 67    / HCO3: 29    / Base Excess: 3.4   /  SaO2: 93                  RADIOLOGY & ADDITIONAL TESTS:    Consultant(s) Notes Reviewed:  [x ] YES  [ ] NO    MEDICATIONS  (STANDING):  ALBUTerol    90 MICROgram(s) HFA Inhaler 1 Puff(s) Inhalation once  caspofungin IVPB 50 milliGRAM(s) IV Intermittent every 24 hours  chlorhexidine 0.12% Liquid 15 milliLiter(s) Oral Mucosa two times a day  chlorhexidine 4% Liquid 1 Application(s) Topical <User Schedule>  DAPTOmycin IVPB 875 milliGRAM(s) IV Intermittent every 48 hours  dexMEDEtomidine Infusion 0.2 MICROgram(s)/kG/Hr (8.21 mL/Hr) IV Continuous <Continuous>  fentaNYL   Infusion. 0.5 MICROgram(s)/kG/Hr (8.21 mL/Hr) IV Continuous <Continuous>  glucagon  Injectable 1 milliGRAM(s) IntraMuscular once  heparin   Injectable 5000 Unit(s) SubCutaneous every 8 hours  insulin glargine Injectable (LANTUS) 18 Unit(s) SubCutaneous at bedtime  insulin lispro (ADMELOG) corrective regimen sliding scale   SubCutaneous every 6 hours  insulin lispro Injectable (ADMELOG) 5 Unit(s) SubCutaneous every 6 hours  meropenem  IVPB 1000 milliGRAM(s) IV Intermittent every 12 hours  pantoprazole   Suspension 40 milliGRAM(s) Oral daily  polyethylene glycol 3350 17 Gram(s) Oral daily  senna 2 Tablet(s) Oral at bedtime  tiotropium 18 MICROgram(s) Capsule 1 Capsule(s) Inhalation once    MEDICATIONS  (PRN):  acetaminophen   Tablet .. 650 milliGRAM(s) Oral every 6 hours PRN Temp greater or equal to 38C (100.4F), Mild Pain (1 - 3)      PHYSICAL EXAM:  GENERAL: well built, well nourished  HEAD:  Atraumatic, Normocephalic  EYES: EOMI, PERRLA, conjunctiva and sclera clear  ENT: No tonsillar erythema, exudates, or enlargement; Moist mucous membranes, Good dentition, No lesions  NECK: Supple, No JVD, Normal thyroid, no enlarged nodes  NERVOUS SYSTEM:  Alert & Oriented X3, Good concentration; Motor Strength 5/5 B/L upper and lower extremities; DTRs 2+ intact and symmetric, sensory intact  CHEST/LUNG: B/L good air entry; No rales, rhonchi, or wheezing  HEART: S1S2 normal, no S3, Regular rate and rhythm; No murmurs, rubs, or gallops  ABDOMEN: Soft, Nontender, Nondistended; Bowel sounds present  EXTREMITIES:  2+ Peripheral Pulses, No clubbing, cyanosis, or edema  LYMPH: No lymphadenopathy noted  SKIN: No rashes or lesions    Care Discussed with Consultants/Other Providers [ x] YES  [ ] NO Patient is a 60y old  Male who presents with a chief complaint of dfu (13 Jun 2021 21:47)      INTERVAL HPI/OVERNIGHT EVENTS:  Overnight pt is still febrile >103. Otherwise vitally stable, to be extubated today. Passed SBT and is following commands.     ICU Vital Signs Last 24 Hrs  T(C): 38.5 (14 Jun 2021 07:49), Max: 39.7 (13 Jun 2021 18:00)  T(F): 101.3 (14 Jun 2021 07:49), Max: 103.5 (13 Jun 2021 18:00)  HR: 84 (14 Jun 2021 07:00) (64 - 120)  BP: 132/53 (14 Jun 2021 07:49) (94/50 - 177/78)  BP(mean): 77 (14 Jun 2021 07:49) (61 - 123)  ABP: --  ABP(mean): --  RR: 18 (14 Jun 2021 07:49) (16 - 34)  SpO2: 98% (14 Jun 2021 07:49) (97% - 100%)    I&O's Summary    13 Jun 2021 07:01  -  14 Jun 2021 07:00  --------------------------------------------------------  IN: 4912.4 mL / OUT: 2365 mL / NET: 2547.4 mL    14 Jun 2021 07:01  -  14 Jun 2021 08:25  --------------------------------------------------------  IN: 89 mL / OUT: 60 mL / NET: 29 mL      Mode: AC/ CMV (Assist Control/ Continuous Mandatory Ventilation)  RR (machine): 18  TV (machine): 480  FiO2: 30  PEEP: 5  ITime: 1  MAP: 14  PIP: 26      LABS:                        8.3    11.24 )-----------( 257      ( 14 Jun 2021 04:50 )             28.7     06-14    150<H>  |  114<H>  |  52<H>  ----------------------------<  182<H>  4.3   |  27  |  1.8<H>    Ca    7.7<L>      14 Jun 2021 04:50  Phos  2.5     06-14  Mg     2.7     06-14    TPro  7.7  /  Alb  2.2<L>  /  TBili  0.5  /  DBili  x   /  AST  86<H>  /  ALT  63<H>  /  AlkPhos  131<H>  06-14        CAPILLARY BLOOD GLUCOSE      POCT Blood Glucose.: 219 mg/dL (14 Jun 2021 05:29)  POCT Blood Glucose.: 213 mg/dL (14 Jun 2021 00:56)  POCT Blood Glucose.: 153 mg/dL (13 Jun 2021 21:01)  POCT Blood Glucose.: 237 mg/dL (13 Jun 2021 17:50)  POCT Blood Glucose.: 225 mg/dL (13 Jun 2021 11:25)    ABG - ( 14 Jun 2021 04:54 )  pH, Arterial: 7.40  pH, Blood: x     /  pCO2: 47    /  pO2: 67    / HCO3: 29    / Base Excess: 3.4   /  SaO2: 93                  RADIOLOGY & ADDITIONAL TESTS:    Consultant(s) Notes Reviewed:  [x ] YES  [ ] NO    MEDICATIONS  (STANDING):  ALBUTerol    90 MICROgram(s) HFA Inhaler 1 Puff(s) Inhalation once  caspofungin IVPB 50 milliGRAM(s) IV Intermittent every 24 hours  chlorhexidine 0.12% Liquid 15 milliLiter(s) Oral Mucosa two times a day  chlorhexidine 4% Liquid 1 Application(s) Topical <User Schedule>  DAPTOmycin IVPB 875 milliGRAM(s) IV Intermittent every 48 hours  dexMEDEtomidine Infusion 0.2 MICROgram(s)/kG/Hr (8.21 mL/Hr) IV Continuous <Continuous>  fentaNYL   Infusion. 0.5 MICROgram(s)/kG/Hr (8.21 mL/Hr) IV Continuous <Continuous>  glucagon  Injectable 1 milliGRAM(s) IntraMuscular once  heparin   Injectable 5000 Unit(s) SubCutaneous every 8 hours  insulin glargine Injectable (LANTUS) 18 Unit(s) SubCutaneous at bedtime  insulin lispro (ADMELOG) corrective regimen sliding scale   SubCutaneous every 6 hours  insulin lispro Injectable (ADMELOG) 5 Unit(s) SubCutaneous every 6 hours  meropenem  IVPB 1000 milliGRAM(s) IV Intermittent every 12 hours  pantoprazole   Suspension 40 milliGRAM(s) Oral daily  polyethylene glycol 3350 17 Gram(s) Oral daily  senna 2 Tablet(s) Oral at bedtime  tiotropium 18 MICROgram(s) Capsule 1 Capsule(s) Inhalation once    MEDICATIONS  (PRN):  acetaminophen   Tablet .. 650 milliGRAM(s) Oral every 6 hours PRN Temp greater or equal to 38C (100.4F), Mild Pain (1 - 3)      PHYSICAL EXAM:  GENERAL: intubated  HEAD:  Atraumatic, Normocephalic  NERVOUS SYSTEM:  Awake and alert, following commands  CHEST/LUNG: B/L good air entry; No rales, rhonchi, or wheezing  HEART: S1S2 normal,  Regular rate and rhythm;   ABDOMEN: Soft, Nontender, Nondistended; obese  EXTREMITIES:  no edema, rt AKA, no erythema by surgical site   SKIN: No rashes or lesions    Care Discussed with Consultants/Other Providers [ x] YES  [ ] NO

## 2021-06-14 NOTE — PROGRESS NOTE ADULT - SUBJECTIVE AND OBJECTIVE BOX
Patient is a 60y old  Male who presents with a chief complaint of dfu (14 Jun 2021 08:25)        Over Night Events:  Remains on MV.  Sedated.  Off pressors. Febrile           ROS:     All ROS are negative except HPI         PHYSICAL EXAM    ICU Vital Signs Last 24 Hrs  T(C): 38.5 (14 Jun 2021 07:49), Max: 39.7 (13 Jun 2021 18:00)  T(F): 101.3 (14 Jun 2021 07:49), Max: 103.5 (13 Jun 2021 18:00)  HR: 84 (14 Jun 2021 07:00) (64 - 120)  BP: 132/53 (14 Jun 2021 07:49) (94/50 - 177/78)  BP(mean): 77 (14 Jun 2021 07:49) (61 - 123)  ABP: --  ABP(mean): --  RR: 18 (14 Jun 2021 07:49) (16 - 34)  SpO2: 98% (14 Jun 2021 07:49) (97% - 100%)      CONSTITUTIONAL:  Well nourished.  NAD    ENT:   Airway patent,   Mouth with normal mucosa.   No thrush    EYES:   Pupils equal,   Round and reactive to light.    CARDIAC:   Normal rate,   Regular rhythm.    No edema      Vascular:  Normal systolic impulse  No Carotid bruits    RESPIRATORY:   No wheezing  Bilateral BS  Normal chest expansion  Not tachypneic,  No use of accessory muscles    GASTROINTESTINAL:  Abdomen soft,   Non-tender,   No guarding,   + BS    MUSCULOSKELETAL:   Range of motion is not limited,  No clubbing, cyanosis    NEUROLOGICAL:   Alert   Follows simple commands   No motor  deficits.    SKIN:   Skin normal color for race,   Warm and dry.   No evidence of rash.    PSYCHIATRIC:   Normal mood and affect.   No apparent risk to self or others.    HEMATOLOGICAL:  No cervical  lymphadenopathy.  no inguinal lymphadenopathy      06-13-21 @ 07:01  -  06-14-21 @ 07:00  --------------------------------------------------------  IN:    Dexmedetomidine: 212 mL    Dexmedetomidine: 32.8 mL    dextrose 5%: 900 mL    Enteral Tube Flush: 40 mL    FentaNYL: 698.6 mL    Free Water: 1500 mL    IV PiggyBack: 400 mL    Norepinephrine: 9 mL    Propofol: 20 mL    Vital High Protein: 1100 mL  Total IN: 4912.4 mL    OUT:    Indwelling Catheter - Urethral (mL): 2365 mL  Total OUT: 2365 mL    Total NET: 2547.4 mL      06-14-21 @ 07:01  -  06-14-21 @ 08:27  --------------------------------------------------------  IN:    Dexmedetomidine: 1 mL    FentaNYL: 33 mL    Vital High Protein: 55 mL  Total IN: 89 mL    OUT:    Indwelling Catheter - Urethral (mL): 60 mL  Total OUT: 60 mL    Total NET: 29 mL          LABS:                            8.3    11.24 )-----------( 257      ( 14 Jun 2021 04:50 )             28.7                                               06-14    150<H>  |  114<H>  |  52<H>  ----------------------------<  182<H>  4.3   |  27  |  1.8<H>    Ca    7.7<L>      14 Jun 2021 04:50  Phos  2.5     06-14  Mg     2.7     06-14    TPro  7.7  /  Alb  2.2<L>  /  TBili  0.5  /  DBili  x   /  AST  86<H>  /  ALT  63<H>  /  AlkPhos  131<H>  06-14                                                 CARDIAC MARKERS ( 13 Jun 2021 04:35 )  x     / x     / 641 U/L / x     / x                                                LIVER FUNCTIONS - ( 14 Jun 2021 04:50 )  Alb: 2.2 g/dL / Pro: 7.7 g/dL / ALK PHOS: 131 U/L / ALT: 63 U/L / AST: 86 U/L / GGT: x                                                                                               Mode: AC/ CMV (Assist Control/ Continuous Mandatory Ventilation)  RR (machine): 18  TV (machine): 480  FiO2: 30  PEEP: 5  ITime: 1  MAP: 14  PIP: 26                                      ABG - ( 14 Jun 2021 04:54 )  pH, Arterial: 7.40  pH, Blood: x     /  pCO2: 47    /  pO2: 67    / HCO3: 29    / Base Excess: 3.4   /  SaO2: 93                  MEDICATIONS  (STANDING):  ALBUTerol    90 MICROgram(s) HFA Inhaler 1 Puff(s) Inhalation once  caspofungin IVPB 50 milliGRAM(s) IV Intermittent every 24 hours  chlorhexidine 0.12% Liquid 15 milliLiter(s) Oral Mucosa two times a day  chlorhexidine 4% Liquid 1 Application(s) Topical <User Schedule>  DAPTOmycin IVPB 875 milliGRAM(s) IV Intermittent every 48 hours  dexMEDEtomidine Infusion 0.2 MICROgram(s)/kG/Hr (8.21 mL/Hr) IV Continuous <Continuous>  fentaNYL   Infusion. 0.5 MICROgram(s)/kG/Hr (8.21 mL/Hr) IV Continuous <Continuous>  glucagon  Injectable 1 milliGRAM(s) IntraMuscular once  heparin   Injectable 5000 Unit(s) SubCutaneous every 8 hours  insulin glargine Injectable (LANTUS) 18 Unit(s) SubCutaneous at bedtime  insulin lispro (ADMELOG) corrective regimen sliding scale   SubCutaneous every 6 hours  insulin lispro Injectable (ADMELOG) 5 Unit(s) SubCutaneous every 6 hours  meropenem  IVPB 1000 milliGRAM(s) IV Intermittent every 12 hours  pantoprazole   Suspension 40 milliGRAM(s) Oral daily  polyethylene glycol 3350 17 Gram(s) Oral daily  senna 2 Tablet(s) Oral at bedtime  tiotropium 18 MICROgram(s) Capsule 1 Capsule(s) Inhalation once    MEDICATIONS  (PRN):  acetaminophen   Tablet .. 650 milliGRAM(s) Oral every 6 hours PRN Temp greater or equal to 38C (100.4F), Mild Pain (1 - 3)      New X-rays reviewed:                                                                                  ECHO    CXR interpreted by me:  ET OK>  No infiltrates

## 2021-06-14 NOTE — PROGRESS NOTE ADULT - ASSESSMENT
61 y/o M with PMH of DM, hypertension, dyslipidemia, sleep apnea, Charcot foot reconstruction with external fixation, morbid obesity presented for worsening right foot infection.     # DFU with Hx of MRSA infection s/p BKA  # Acute hypoxic respiratory failure secondary to septic shock - intubated  # Toxic metabolic encephalopathy - improved   # Fever overnight >101, currently febrile   - s/p intubation 5/31   - bacteremia from right foot abscess   - septic on admission, WBC 12,  + lactate 2.3. s/p cefepime flagyl and vanc in the ED  - Podiatry on board, f/u   - S/p disarticulation right knee 5/31 w/ vascular   - s/p AKA surgery 6/11  - 5/28 BCx E fecalis, ORSA, 5/30 BCx NG, 6/4 BCx NG  - 5/29 R foot : ORSA , Corynebacterium  - Echo 3/31: EF 55-60%, G2DD, no vegetations   - ID following, f/u recs:  -c/w dapto 875mg q12, caspofungin 50mg and eleuterio 1g q12  - CPK >1300 -> 600~ ->300~-> 561 -> 485, monitor daily   - currently on propofol, fent, and levo  - bcx (-) 6/7, spoke to ID, okay for surgery, f/u with vascular  - ddimer 2550, duplex (-)  - Procal 1.08, , ESR 58  - plans for SBT and to try extubation if tolerated  - pt still having fevers, (drug fever? encephalopathy less likely as pt is following commands off sedation)  - Bcx sent     #?Code stroke for possible CVA   - ptnt was code stroke on floors on 3/30 for AMS and confusion upgraded to CCU   - metabolic encephalopathy secondary to sepsis more likely than stroke  -  CT head neg for acute pathology, CTA w/ moderate stenoses at the junction of the precavernous and cavernous segments of both internal carotid arteries  - neuro recs for brain MR, can get repeat CT head when stable, will not fit in MR machine   - neuro rec LP, as per pulm, as pt is following commands now, little clinical benefit to preforming as of now.     # Hypernatremia   - na 149 today, likely dehydration   - c/w gentle hydration + free water 250ml q4hrs    # NSTEMI  - trops elevated on admission .11->.12  - secondary to sepsis induced demand ischemia  - less likely true ACS  -monitor     #CANDY on CKD III /  rule out ATN - worsening today   - secondary to sepsis induced ATN, continue supportive care, monitor for now.   - creatinine 3.0 on admission, baseline around 1.4-1.8   - s/p CVVHD 6/1 and 6/2, holding CVVHD for now ptnt w/ good urine output   - nephro following    # Anemia of chronic disease   - transfuse Hb < 7  - monitor   - s/p 4 units in total since admission  - stable today    #Diabetes Mellitus  -on insulin regimen, increase  - a1c 8.9   - Monitor   - FS q1hr     #Hypertension  -holding oral meds     #Dyslipidemia  -c/w home meds    #MATA/ OHS    #morbid obesity    Diet: npo w/ tube feeds, NPO for surgery   DVT ppx: heparin subq TID  GI ppx: protonix   Dispo: acute 59 y/o M with PMH of DM, hypertension, dyslipidemia, sleep apnea, Charcot foot reconstruction with external fixation, morbid obesity presented for worsening right foot infection.     # DFU with Hx of MRSA infection s/p BKA  # Acute hypoxic respiratory failure secondary to septic shock - intubated  # Toxic metabolic encephalopathy - improved   # Fever overnight >103, currently febrile   - s/p intubation 5/31  - bacteremia from right foot abscess   - septic on admission, WBC 12,  + lactate 2.3. s/p cefepime flagyl and vanc in the ED  - Podiatry on board, f/u   - S/p disarticulation right knee 5/31 w/ vascular   - s/p AKA surgery 6/11  - 5/28 BCx E fecalis, ORSA, 5/30 BCx NG, 6/4 BCx NG  - 5/29 R foot : ORSA , Corynebacterium  - Echo 3/31: EF 55-60%, G2DD, no vegetations   - ID following, f/u recs:  -c/w dapto 875mg q12, caspofungin 50mg and eleuterio 1g q12  - CPK >1300 -> 600~ ->300~-> 561 -> 485 -> 600~, monitor daily   - bcx (-) 6/7, 6/10  - ddimer 2550, duplex (-)  - Procal 1.08, , ESR 58    - Pt passed SBT following commands, extubated today, on NIV  - pt still having fevers, (drug fever? encephalopathy less likely as pt is following commands off sedation)  - S&S consulted  - start on bicarb 650 q6hrs for dyspnea  - Keep on BiPAP 14/7 30% rr 12  - f/u abg    # Hypernatremia   - na 150 today, likely dehydration   - pt with 4L water deficit + needs daily hydration, started on d5 220 ml/hr   - bmp q6 hrs  - lower d5w to 100cc/hr if sodium continues to downtrend     #?Code stroke for possible CVA   - ptnt was code stroke on floors on 3/30 for AMS and confusion upgraded to CCU   - metabolic encephalopathy secondary to sepsis more likely than stroke  -  CT head neg for acute pathology, CTA w/ moderate stenoses at the junction of the precavernous and cavernous segments of both internal carotid arteries  - neuro recs for brain MR, can get repeat CT head when stable, will not fit in MR machine   - neuro rec LP, as per pulm, as pt is following commands now, little clinical benefit to preforming as of now.     # NSTEMI  - trops elevated on admission .11->.12  - secondary to sepsis induced demand ischemia  - less likely true ACS  -monitor     #CANDY on CKD III /  rule out ATN - worsening today   - secondary to sepsis induced ATN, continue supportive care, monitor for now.   - creatinine 3.0 on admission, baseline around 1.4-1.8   - s/p CVVHD 6/1 and 6/2, holding CVVHD for now ptnt w/ good urine output   - nephro following    # Anemia of chronic disease   - transfuse Hb < 7  - monitor   - s/p 4 units in total since admission  - stable today    #Diabetes Mellitus  -on insulin regimen, increase  - a1c 8.9   - Monitor   - FS q1hr     #Hypertension  -holding oral meds     #Dyslipidemia  -c/w home meds    #MATA/ OHS    #morbid obesity    Diet: npo w/ tube feeds, NPO for surgery   DVT ppx: heparin subq TID  GI ppx: protonix   Dispo: acute

## 2021-06-14 NOTE — PROGRESS NOTE ADULT - ASSESSMENT
s/p right AKA 6/11/21    Dressing changes daily: gauze, kerlix and ace wrap. May be changed by RN  No weight bearing on the stump  Will remove staples in 4 weeks    Follow up with Dr. Pascal in 2-3 weeks    SPECTRA [id number]

## 2021-06-14 NOTE — PROGRESS NOTE ADULT - SUBJECTIVE AND OBJECTIVE BOX
Nephrology progress note    THIS IS AN INCOMPLETE NOTE . FULL NOTE TO FOLLOW SHORTLY    Patient is seen and examined, events over the last 24 h noted .    Allergies:  No Known Allergies  statins (Other)    Hospital Medications:   MEDICATIONS  (STANDING):  ALBUTerol    90 MICROgram(s) HFA Inhaler 1 Puff(s) Inhalation once  caspofungin IVPB 50 milliGRAM(s) IV Intermittent every 24 hours  chlorhexidine 0.12% Liquid 15 milliLiter(s) Oral Mucosa two times a day  chlorhexidine 4% Liquid 1 Application(s) Topical <User Schedule>  DAPTOmycin IVPB 875 milliGRAM(s) IV Intermittent every 48 hours  dexMEDEtomidine Infusion 0.2 MICROgram(s)/kG/Hr (8.21 mL/Hr) IV Continuous <Continuous>  dextrose 5%. 1000 milliLiter(s) (220 mL/Hr) IV Continuous <Continuous>  fentaNYL   Infusion. 0.5 MICROgram(s)/kG/Hr (8.21 mL/Hr) IV Continuous <Continuous>  glucagon  Injectable 1 milliGRAM(s) IntraMuscular once  heparin   Injectable 5000 Unit(s) SubCutaneous every 8 hours  insulin glargine Injectable (LANTUS) 18 Unit(s) SubCutaneous at bedtime  insulin lispro (ADMELOG) corrective regimen sliding scale   SubCutaneous every 6 hours  insulin lispro Injectable (ADMELOG) 5 Unit(s) SubCutaneous every 6 hours  meropenem  IVPB 1000 milliGRAM(s) IV Intermittent every 12 hours  pantoprazole   Suspension 40 milliGRAM(s) Oral daily  polyethylene glycol 3350 17 Gram(s) Oral daily  senna 2 Tablet(s) Oral at bedtime  sodium bicarbonate 650 milliGRAM(s) Oral every 6 hours  tiotropium 18 MICROgram(s) Capsule 1 Capsule(s) Inhalation once        VITALS:  T(F): 101.1 (21 @ 09:55), Max: 103.5 (21 @ 18:00)  HR: 82 (21 @ 09:55)  BP: 127/56 (21 @ 09:55)  RR: 27 (21 @ 09:55)  SpO2: 97% (21 @ 09:55)  Wt(kg): --     @ 07:01  -   @ 07:00  --------------------------------------------------------  IN: 4178.8 mL / OUT: 2320 mL / NET: 1858.8 mL     @ 07:01  -   @ 07:00  --------------------------------------------------------  IN: 4912.4 mL / OUT: 2365 mL / NET: 2547.4 mL     @ 07:01  -   @ 11:54  --------------------------------------------------------  IN: 89 mL / OUT: 385 mL / NET: -296 mL          PHYSICAL EXAM:  Constitutional: NAD  HEENT: anicteric sclera, oropharynx clear, MMM  Neck: No JVD  Respiratory: CTAB, no wheezes, rales or rhonchi  Cardiovascular: S1, S2, RRR  Gastrointestinal: BS+, soft, NT/ND  Extremities: No cyanosis or clubbing. No peripheral edema  :  No terrazas.   Skin: No rashes    LABS:      150<H>  |  114<H>  |  52<H>  ----------------------------<  182<H>  4.3   |  27  |  1.8<H>    SODIUM TREND:  Sodium 150 [ @ 04:50]  Sodium 147 [ @ 00:20]  Sodium 148 [ @ 16:34]  Sodium 151 [ @ 04:35]  Sodium 149 [ @ 04:30]  Sodium 150 [ @ 00:00]  Sodium 152 [ @ 16:45]  Sodium 151 [ 05:30]  Sodium 148 [ 00:15]  Sodium 152 [06-10 @ 16:00]    Ca    7.7<L>      2021 04:50  Phos  2.5       Mg     2.7         TPro  7.7  /  Alb  2.2<L>  /  TBili  0.5  /  DBili      /  AST  86<H>  /  ALT  63<H>  /  AlkPhos  131<H>                            8.3    11.24 )-----------( 257      ( 2021 04:50 )             28.7       Urine Studies:  Urinalysis Basic - ( 2021 12:43 )    Color: Yellow / Appearance: Slightly Turbid / S.020 / pH:   Gluc:  / Ketone: Negative  / Bili: Negative / Urobili: <2 mg/dL   Blood:  / Protein: 100 mg/dL / Nitrite: Negative   Leuk Esterase: Negative / RBC: 35 /HPF / WBC 4 /HPF   Sq Epi:  / Non Sq Epi: 1 /HPF / Bacteria: Negative        RADIOLOGY & ADDITIONAL STUDIES:   Nephrology progress note  Patient is seen and examined, events over the last 24 h noted .  sp extubation on BIPAP    Allergies:  No Known Allergies  statins (Other)    Hospital Medications:   MEDICATIONS  (STANDING):    ALBUTerol    90 MICROgram(s) HFA Inhaler 1 Puff(s) Inhalation once  caspofungin IVPB 50 milliGRAM(s) IV Intermittent every 24 hours  DAPTOmycin IVPB 875 milliGRAM(s) IV Intermittent every 48 hours  dexMEDEtomidine Infusion 0.2 MICROgram(s)/kG/Hr (8.21 mL/Hr) IV Continuous <Continuous>  dextrose 5%. 1000 milliLiter(s) (220 mL/Hr) IV Continuous <Continuous>  fentaNYL   Infusion. 0.5 MICROgram(s)/kG/Hr (8.21 mL/Hr) IV Continuous <Continuous>  glucagon  Injectable 1 milliGRAM(s) IntraMuscular once  heparin   Injectable 5000 Unit(s) SubCutaneous every 8 hours  insulin glargine Injectable (LANTUS) 18 Unit(s) SubCutaneous at bedtime  insulin lispro (ADMELOG) corrective regimen sliding scale   SubCutaneous every 6 hours  insulin lispro Injectable (ADMELOG) 5 Unit(s) SubCutaneous every 6 hours  meropenem  IVPB 1000 milliGRAM(s) IV Intermittent every 12 hours  pantoprazole   Suspension 40 milliGRAM(s) Oral daily  polyethylene glycol 3350 17 Gram(s) Oral daily  senna 2 Tablet(s) Oral at bedtime  sodium bicarbonate 650 milliGRAM(s) Oral every 6 hours  tiotropium 18 MICROgram(s) Capsule 1 Capsule(s) Inhalation once        VITALS:  T(F): 101.1 (21 @ 09:55), Max: 103.5 (21 @ 18:00)  HR: 82 (21 @ 09:55)  BP: 127/56 (21 @ 09:55)  RR: 27 (21 @ 09:55)  SpO2: 97% (21 @ 09:55)       @ 07:01  -   @ 07:00  --------------------------------------------------------  IN: 4178.8 mL / OUT: 2320 mL / NET: 1858.8 mL     @ 07:01  -   @ 07:00  --------------------------------------------------------  IN: 4912.4 mL / OUT: 2365 mL / NET: 2547.4 mL     @ 07:01  -   @ 11:54  --------------------------------------------------------  IN: 89 mL / OUT: 385 mL / NET: -296 mL          PHYSICAL EXAM:  Constitutional: on BIPAP  Neck: No JVD  Respiratory: CTAB,   Cardiovascular: S1, S2, RRR  Gastrointestinal: BS+, soft, NT/ND  Extremities: No cyanosis or clubbing. plus one edema   :  No terrazas.   Skin: No rashes    LABS:      150<H>  |  114<H>  |  52<H>  ----------------------------<  182<H>  4.3   |  27  |  1.8<H>    SODIUM TREND:  Sodium 150 [ @ 04:50]  Sodium 147 [ @ 00:20]  Sodium 148 [ @ 16:34]  Sodium 151 [ @ 04:35]  Sodium 149 [ @ 04:30]  Sodium 150 [ @ 00:00]  Sodium 152 [ @ 16:45]  Sodium 151 [ @ 05:30]  Sodium 148 [ @ 00:15]  Sodium 152 [06-10 @ 16:00]    Ca    7.7<L>      2021 04:50  Phos  2.5       Mg     2.7         TPro  7.7  /  Alb  2.2<L>  /  TBili  0.5  /  DBili      /  AST  86<H>  /  ALT  63<H>  /  AlkPhos  131<H>                            8.3    11.24 )-----------( 257      ( 2021 04:50 )             28.7       Urine Studies:  Urinalysis Basic - ( 2021 12:43 )    Color: Yellow / Appearance: Slightly Turbid / S.020 / pH:   Gluc:  / Ketone: Negative  / Bili: Negative / Urobili: <2 mg/dL   Blood:  / Protein: 100 mg/dL / Nitrite: Negative   Leuk Esterase: Negative / RBC: 35 /HPF / WBC 4 /HPF   Sq Epi:  / Non Sq Epi: 1 /HPF / Bacteria: Negative        RADIOLOGY & ADDITIONAL STUDIES:

## 2021-06-14 NOTE — PROGRESS NOTE ADULT - ASSESSMENT
IMPRESSION    Acute Hypoxemic respiratory failure with hypercapnia   Septic Shock 2/2 to Bacteremia from DFU (Enterococcus,  Staph Aureus ) repeat cx neg  DFU s/p Right Knee Disarticulation for necrotizing soft tissue infection  CANDY on CKD s/p CVVHD sp dc u dall  AMS 2/2 Toxic-Metabolic Encephelopathy resolved   MATA/ OHS  Morbid obesity  NSTEMI  Hypernatremia     PLAN:    CNS: SAT.      HEENT: ETT care and Oral care    PULMONARY: HOB at 45 degrees.  SBT.  Extubated to NIV.  Aspiration precautions       CARDIOVASCULAR:  D5W to correct free H2O deficit     GI: GI prophylaxis.  Hold OG Feeding for SBT    RENAL: F/U Lytes and correct as needed.  FU with renal     INFECTIOUS DISEASE:  ABX as per ID  caspofungin was added     HEMATOLOGICAL:  DVT prophylaxis.  FU CBC       ENDOCRINE:  Follow up FS.   Insulin protocol     MUSCULOSKELETAL: Bed rest.      PT OT     TOV once extubated    Poor prognosis    DW RN, house staff and respiratory

## 2021-06-15 LAB
ALBUMIN SERPL ELPH-MCNC: 2.2 G/DL — LOW (ref 3.5–5.2)
ALP SERPL-CCNC: 121 U/L — HIGH (ref 30–115)
ALT FLD-CCNC: 76 U/L — HIGH (ref 0–41)
ANION GAP SERPL CALC-SCNC: 6 MMOL/L — LOW (ref 7–14)
ANION GAP SERPL CALC-SCNC: 7 MMOL/L — SIGNIFICANT CHANGE UP (ref 7–14)
ANION GAP SERPL CALC-SCNC: 8 MMOL/L — SIGNIFICANT CHANGE UP (ref 7–14)
ANION GAP SERPL CALC-SCNC: 8 MMOL/L — SIGNIFICANT CHANGE UP (ref 7–14)
AST SERPL-CCNC: 115 U/L — HIGH (ref 0–41)
BASOPHILS # BLD AUTO: 0.04 K/UL — SIGNIFICANT CHANGE UP (ref 0–0.2)
BASOPHILS NFR BLD AUTO: 0.4 % — SIGNIFICANT CHANGE UP (ref 0–1)
BILIRUB SERPL-MCNC: 0.5 MG/DL — SIGNIFICANT CHANGE UP (ref 0.2–1.2)
BUN SERPL-MCNC: 30 MG/DL — HIGH (ref 10–20)
BUN SERPL-MCNC: 35 MG/DL — HIGH (ref 10–20)
BUN SERPL-MCNC: 37 MG/DL — HIGH (ref 10–20)
BUN SERPL-MCNC: 39 MG/DL — HIGH (ref 10–20)
CALCIUM SERPL-MCNC: 7.7 MG/DL — LOW (ref 8.5–10.1)
CALCIUM SERPL-MCNC: 7.8 MG/DL — LOW (ref 8.5–10.1)
CALCIUM SERPL-MCNC: 8 MG/DL — LOW (ref 8.5–10.1)
CALCIUM SERPL-MCNC: 8 MG/DL — LOW (ref 8.5–10.1)
CHLORIDE SERPL-SCNC: 108 MMOL/L — SIGNIFICANT CHANGE UP (ref 98–110)
CHLORIDE SERPL-SCNC: 108 MMOL/L — SIGNIFICANT CHANGE UP (ref 98–110)
CHLORIDE SERPL-SCNC: 109 MMOL/L — SIGNIFICANT CHANGE UP (ref 98–110)
CHLORIDE SERPL-SCNC: 110 MMOL/L — SIGNIFICANT CHANGE UP (ref 98–110)
CK SERPL-CCNC: 716 U/L — HIGH (ref 0–225)
CO2 SERPL-SCNC: 29 MMOL/L — SIGNIFICANT CHANGE UP (ref 17–32)
CO2 SERPL-SCNC: 30 MMOL/L — SIGNIFICANT CHANGE UP (ref 17–32)
CREAT SERPL-MCNC: 1.1 MG/DL — SIGNIFICANT CHANGE UP (ref 0.7–1.5)
CREAT SERPL-MCNC: 1.2 MG/DL — SIGNIFICANT CHANGE UP (ref 0.7–1.5)
CREAT SERPL-MCNC: 1.3 MG/DL — SIGNIFICANT CHANGE UP (ref 0.7–1.5)
CREAT SERPL-MCNC: 1.3 MG/DL — SIGNIFICANT CHANGE UP (ref 0.7–1.5)
EOSINOPHIL # BLD AUTO: 0.61 K/UL — SIGNIFICANT CHANGE UP (ref 0–0.7)
EOSINOPHIL NFR BLD AUTO: 6.1 % — SIGNIFICANT CHANGE UP (ref 0–8)
GLUCOSE BLDC GLUCOMTR-MCNC: 116 MG/DL — HIGH (ref 70–99)
GLUCOSE BLDC GLUCOMTR-MCNC: 183 MG/DL — HIGH (ref 70–99)
GLUCOSE BLDC GLUCOMTR-MCNC: 189 MG/DL — HIGH (ref 70–99)
GLUCOSE BLDC GLUCOMTR-MCNC: 225 MG/DL — HIGH (ref 70–99)
GLUCOSE SERPL-MCNC: 174 MG/DL — HIGH (ref 70–99)
GLUCOSE SERPL-MCNC: 182 MG/DL — HIGH (ref 70–99)
GLUCOSE SERPL-MCNC: 216 MG/DL — HIGH (ref 70–99)
GLUCOSE SERPL-MCNC: 219 MG/DL — HIGH (ref 70–99)
HCT VFR BLD CALC: 29.8 % — LOW (ref 42–52)
HGB BLD-MCNC: 8.7 G/DL — LOW (ref 14–18)
IMM GRANULOCYTES NFR BLD AUTO: 0.4 % — HIGH (ref 0.1–0.3)
LYMPHOCYTES # BLD AUTO: 1.4 K/UL — SIGNIFICANT CHANGE UP (ref 1.2–3.4)
LYMPHOCYTES # BLD AUTO: 13.9 % — LOW (ref 20.5–51.1)
MAGNESIUM SERPL-MCNC: 2.5 MG/DL — HIGH (ref 1.8–2.4)
MCHC RBC-ENTMCNC: 26.7 PG — LOW (ref 27–31)
MCHC RBC-ENTMCNC: 29.2 G/DL — LOW (ref 32–37)
MCV RBC AUTO: 91.4 FL — SIGNIFICANT CHANGE UP (ref 80–94)
MONOCYTES # BLD AUTO: 0.73 K/UL — HIGH (ref 0.1–0.6)
MONOCYTES NFR BLD AUTO: 7.2 % — SIGNIFICANT CHANGE UP (ref 1.7–9.3)
NEUTROPHILS # BLD AUTO: 7.25 K/UL — HIGH (ref 1.4–6.5)
NEUTROPHILS NFR BLD AUTO: 72 % — SIGNIFICANT CHANGE UP (ref 42.2–75.2)
NRBC # BLD: 0 /100 WBCS — SIGNIFICANT CHANGE UP (ref 0–0)
PHOSPHATE SERPL-MCNC: 2.2 MG/DL — SIGNIFICANT CHANGE UP (ref 2.1–4.9)
PLATELET # BLD AUTO: 270 K/UL — SIGNIFICANT CHANGE UP (ref 130–400)
POTASSIUM SERPL-MCNC: 3.9 MMOL/L — SIGNIFICANT CHANGE UP (ref 3.5–5)
POTASSIUM SERPL-MCNC: 4 MMOL/L — SIGNIFICANT CHANGE UP (ref 3.5–5)
POTASSIUM SERPL-MCNC: 4.1 MMOL/L — SIGNIFICANT CHANGE UP (ref 3.5–5)
POTASSIUM SERPL-MCNC: 4.1 MMOL/L — SIGNIFICANT CHANGE UP (ref 3.5–5)
POTASSIUM SERPL-SCNC: 3.9 MMOL/L — SIGNIFICANT CHANGE UP (ref 3.5–5)
POTASSIUM SERPL-SCNC: 4 MMOL/L — SIGNIFICANT CHANGE UP (ref 3.5–5)
POTASSIUM SERPL-SCNC: 4.1 MMOL/L — SIGNIFICANT CHANGE UP (ref 3.5–5)
POTASSIUM SERPL-SCNC: 4.1 MMOL/L — SIGNIFICANT CHANGE UP (ref 3.5–5)
PROCALCITONIN SERPL-MCNC: 0.81 NG/ML — HIGH (ref 0.02–0.1)
PROT SERPL-MCNC: 7.6 G/DL — SIGNIFICANT CHANGE UP (ref 6–8)
RBC # BLD: 3.26 M/UL — LOW (ref 4.7–6.1)
RBC # FLD: 17.9 % — HIGH (ref 11.5–14.5)
SODIUM SERPL-SCNC: 145 MMOL/L — SIGNIFICANT CHANGE UP (ref 135–146)
SODIUM SERPL-SCNC: 146 MMOL/L — SIGNIFICANT CHANGE UP (ref 135–146)
SURGICAL PATHOLOGY STUDY: SIGNIFICANT CHANGE UP
WBC # BLD: 10.07 K/UL — SIGNIFICANT CHANGE UP (ref 4.8–10.8)
WBC # FLD AUTO: 10.07 K/UL — SIGNIFICANT CHANGE UP (ref 4.8–10.8)

## 2021-06-15 PROCEDURE — 71045 X-RAY EXAM CHEST 1 VIEW: CPT | Mod: 26

## 2021-06-15 PROCEDURE — 76705 ECHO EXAM OF ABDOMEN: CPT | Mod: 26

## 2021-06-15 PROCEDURE — 99291 CRITICAL CARE FIRST HOUR: CPT

## 2021-06-15 RX ORDER — SODIUM CHLORIDE 9 MG/ML
1000 INJECTION, SOLUTION INTRAVENOUS
Refills: 0 | Status: DISCONTINUED | OUTPATIENT
Start: 2021-06-15 | End: 2021-06-20

## 2021-06-15 RX ORDER — ACETAMINOPHEN 500 MG
650 TABLET ORAL EVERY 6 HOURS
Refills: 0 | Status: DISCONTINUED | OUTPATIENT
Start: 2021-06-15 | End: 2021-06-26

## 2021-06-15 RX ORDER — SODIUM CHLORIDE 9 MG/ML
1000 INJECTION, SOLUTION INTRAVENOUS
Refills: 0 | Status: DISCONTINUED | OUTPATIENT
Start: 2021-06-15 | End: 2021-06-15

## 2021-06-15 RX ADMIN — HEPARIN SODIUM 5000 UNIT(S): 5000 INJECTION INTRAVENOUS; SUBCUTANEOUS at 21:49

## 2021-06-15 RX ADMIN — Medication 2: at 17:21

## 2021-06-15 RX ADMIN — Medication 650 MILLIGRAM(S): at 13:00

## 2021-06-15 RX ADMIN — SODIUM CHLORIDE 150 MILLILITER(S): 9 INJECTION, SOLUTION INTRAVENOUS at 12:17

## 2021-06-15 RX ADMIN — Medication 5 UNIT(S): at 17:21

## 2021-06-15 RX ADMIN — Medication 5 UNIT(S): at 12:16

## 2021-06-15 RX ADMIN — HEPARIN SODIUM 5000 UNIT(S): 5000 INJECTION INTRAVENOUS; SUBCUTANEOUS at 13:53

## 2021-06-15 RX ADMIN — Medication 650 MILLIGRAM(S): at 20:29

## 2021-06-15 RX ADMIN — MEROPENEM 100 MILLIGRAM(S): 1 INJECTION INTRAVENOUS at 05:24

## 2021-06-15 RX ADMIN — HEPARIN SODIUM 5000 UNIT(S): 5000 INJECTION INTRAVENOUS; SUBCUTANEOUS at 05:25

## 2021-06-15 RX ADMIN — CHLORHEXIDINE GLUCONATE 1 APPLICATION(S): 213 SOLUTION TOPICAL at 05:24

## 2021-06-15 RX ADMIN — Medication 650 MILLIGRAM(S): at 13:30

## 2021-06-15 RX ADMIN — CHLORHEXIDINE GLUCONATE 15 MILLILITER(S): 213 SOLUTION TOPICAL at 05:24

## 2021-06-15 RX ADMIN — CASPOFUNGIN ACETATE 260 MILLIGRAM(S): 7 INJECTION, POWDER, LYOPHILIZED, FOR SOLUTION INTRAVENOUS at 12:16

## 2021-06-15 RX ADMIN — CHLORHEXIDINE GLUCONATE 15 MILLILITER(S): 213 SOLUTION TOPICAL at 17:35

## 2021-06-15 RX ADMIN — DAPTOMYCIN 135 MILLIGRAM(S): 500 INJECTION, POWDER, LYOPHILIZED, FOR SOLUTION INTRAVENOUS at 12:16

## 2021-06-15 RX ADMIN — Medication 4: at 12:15

## 2021-06-15 RX ADMIN — INSULIN GLARGINE 18 UNIT(S): 100 INJECTION, SOLUTION SUBCUTANEOUS at 23:01

## 2021-06-15 RX ADMIN — SODIUM CHLORIDE 75 MILLILITER(S): 9 INJECTION, SOLUTION INTRAVENOUS at 18:50

## 2021-06-15 RX ADMIN — Medication 2: at 06:10

## 2021-06-15 RX ADMIN — Medication 650 MILLIGRAM(S): at 21:50

## 2021-06-15 NOTE — PROGRESS NOTE ADULT - SUBJECTIVE AND OBJECTIVE BOX
THOMASFERNANDEZ  60y, Male    All available historical data reviewed    OVERNIGHT EVENTS:  isolated fevers  Extubated 6/14  on BIPAP  poorly communicative   no pressors  no BM      ROS:  unable to obtain history secondary to patient's mental status and/or sedation     VITALS:  T(F): 100, Max: 101.1 (06-15-21 @ 00:00)  HR: 79  BP: 142/66  RR: 30Vital Signs Last 24 Hrs  T(C): 37.8 (15 Juan F 2021 12:00), Max: 38.4 (15 Juan F 2021 00:00)  T(F): 100 (15 Juan F 2021 12:00), Max: 101.1 (15 Juan F 2021 00:00)  HR: 79 (15 Juan F 2021 14:00) (70 - 86)  BP: 142/66 (15 Juan F 2021 14:00) (98/49 - 170/65)  BP(mean): 82 (15 Juan F 2021 14:00) (64 - 101)  RR: 30 (15 Juan F 2021 14:00) (9 - 59)  SpO2: 98% (15 Juan F 2021 14:00) (96% - 99%)    TESTS & MEASUREMENTS:                        8.7    10.07 )-----------( 270      ( 15 Juan F 2021 04:15 )             29.8     06-15    145  |  108  |  35<H>  ----------------------------<  219<H>  4.1   |  30  |  1.1    Ca    8.0<L>      15 Juan F 2021 11:15  Phos  2.2     06-15  Mg     2.5     06-15    TPro  7.6  /  Alb  2.2<L>  /  TBili  0.5  /  DBili  x   /  AST  115<H>  /  ALT  76<H>  /  AlkPhos  121<H>  06-15    LIVER FUNCTIONS - ( 15 Juan F 2021 04:15 )  Alb: 2.2 g/dL / Pro: 7.6 g/dL / ALK PHOS: 121 U/L / ALT: 76 U/L / AST: 115 U/L / GGT: x             Culture - Blood (collected 06-10-21 @ 17:07)  Source: .Blood None  Preliminary Report (06-12-21 @ 01:02):    No growth to date.            RADIOLOGY & ADDITIONAL TESTS:  Personal review of radiological diagnostics performed  Echo and EKG results noted when applicable.     MEDICATIONS:  acetaminophen   Tablet .. 650 milliGRAM(s) Oral every 6 hours PRN  acetaminophen  Suppository .. 650 milliGRAM(s) Rectal every 6 hours PRN  ALBUTerol    90 MICROgram(s) HFA Inhaler 1 Puff(s) Inhalation once  caspofungin IVPB 50 milliGRAM(s) IV Intermittent every 24 hours  chlorhexidine 0.12% Liquid 15 milliLiter(s) Oral Mucosa two times a day  chlorhexidine 4% Liquid 1 Application(s) Topical <User Schedule>  DAPTOmycin IVPB 875 milliGRAM(s) IV Intermittent every 48 hours  dextrose 5% 1000 milliLiter(s) IV Continuous <Continuous>  dextrose 5%. 1000 milliLiter(s) IV Continuous <Continuous>  glucagon  Injectable 1 milliGRAM(s) IntraMuscular once  heparin   Injectable 5000 Unit(s) SubCutaneous every 8 hours  insulin glargine Injectable (LANTUS) 18 Unit(s) SubCutaneous at bedtime  insulin lispro (ADMELOG) corrective regimen sliding scale   SubCutaneous every 6 hours  insulin lispro Injectable (ADMELOG) 5 Unit(s) SubCutaneous every 6 hours  meropenem  IVPB 1000 milliGRAM(s) IV Intermittent every 12 hours  pantoprazole   Suspension 40 milliGRAM(s) Oral daily  polyethylene glycol 3350 17 Gram(s) Oral daily  senna 2 Tablet(s) Oral at bedtime  sodium bicarbonate 650 milliGRAM(s) Oral every 6 hours  tiotropium 18 MICROgram(s) Capsule 1 Capsule(s) Inhalation once      ANTIBIOTICS:  caspofungin IVPB 50 milliGRAM(s) IV Intermittent every 24 hours  DAPTOmycin IVPB 875 milliGRAM(s) IV Intermittent every 48 hours  meropenem  IVPB 1000 milliGRAM(s) IV Intermittent every 12 hours

## 2021-06-15 NOTE — CHART NOTE - NSCHARTNOTEFT_GEN_A_CORE
Registered Dietitian Follow-Up     Patient Profile Reviewed                           Yes [x]   No []     Nutrition History Previously Obtained        Yes []  No [x]  Patient has been extubated. Patient was sleeping after 2 attempts of RD visit. Will attempt to obtain nutrition hx at f/u     Pertinent Subjective Information: Patient has been extubated. Speech evaluated pt however Not appropriate for po trials, pt extubated to bipap, SLP will evaluate pt again on 6/15      Pertinent Medical Interventions:  Per nephrology:  ---CANDY on CKD ( unknown stage last known creat 1.4 in 2019) / rule out ATN / obstructive uropathy/ sp CT angio resolving / hypernatremia   non oliguric now / creat stable off HD  Per critical care not:  ---Acute Hypoxemic respiratory failure with hypercapnia   ---Septic Shock 2/2 to Bacteremia from DFU   ---DFU s/p Right Knee Disarticulation for necrotizing soft tissue infection  ---AMS 2/2 Toxic-Metabolic Encephelopathy resolved   ---Hold OG Feeding for SBT    Diet order: Diet, NPO with Tube Feed:   Tube Feeding Modality: Orogastric  Vital High Protein  Total Volume for 24 Hours (mL): 1320  Continuous  Starting Tube Feed Rate {mL per Hour}: 15  Increase Tube Feed Rate by (mL): 10     Every 4 hours  Until Goal Tube Feed Rate (mL per Hour): 55  Tube Feed Duration (in Hours): 24  Tube Feed Start Time: 12:00  Beneprotein ([facility] Only)     Qty per Day:  7 PACKS/DAY (21 @ 11:17) [Active]  At goal this regimen provides: 1759 kcal (considers 264 kcal from propofol), 158 g protein, and 1109 mL free H2O ---- active but not receiving tube feeding regimen at this time     Anthropometrics:  Height (cm): 69"; current ht in EMR is likely a docmentation error  Weight (kg): 164.2 (06-10-21 @ 16:05)  BMI (kg/m2): 53.6 (06-10-21 @ 16:05) using 69" (original height) and dosing wt  IBW: 72.7 kg    Daily Weight in kG: 160.2 kg (6/15) 164 kg () 161.3 kg () 160.2 kg  () ,153.5 (), Weight in k.5 (06-10), Weight in k.4 (), Weight in k.2 (), Weight in k.9 (), Weight in k.7 (), Weight in k.3 ()  % Weight Change relatively stable daily wt since previously assessed; pt likely experiencing fluid related wt shifts throughout LOS.    MEDICATIONS  (STANDING):  dextrose 5% 1000 milliLiter(s) (75 mL/Hr) IV Continuous <Continuous>  glucagon  Injectable 1 milliGRAM(s) IntraMuscular onc  insulin glargine Injectable (LANTUS) 18 Unit(s) SubCutaneous at bedtime  insulin lispro (ADMELOG) corrective regimen sliding scale   SubCutaneous every 6 hours  insulin lispro Injectable (ADMELOG) 5 Unit(s) SubCutaneous every 6 hours  pantoprazole   Suspension 40 milliGRAM(s) Oral daily  polyethylene glycol 3350 17 Gram(s) Oral daily  senna 2 Tablet(s) Oral at bedtime  sodium bicarbonate 650 milliGRAM(s) Oral every 6 hours    MEDICATIONS  (PRN):  acetaminophen   Tablet .. 650 milliGRAM(s) Oral every 6 hours PRN Temp greater or equal to 38C (100.4F), Mild Pain (1 - 3)    Pertinent Labs: 06-15 RBC 3.26, H/H 8.7/29.8, BUN 37, glucose 174, elevated LFTs, Mg 2.5     CAPILLARY BLOOD GLUCOSE  POCT Blood Glucose.: 189 mg/dL (15 Juan F 2021 06:07)  POCT Blood Glucose.: 194 mg/dL (2021 22:16)  POCT Blood Glucose.: 229 mg/dL (2021 15:28)    Physical Findings:  - Appearance: WDL, Hypophonic per RN flow sheets   - GI function: no gastrointestinal signs/symptoms, LBM: 6/12 per RN flow sheets   - Tubes: Extubated   - Oral/Mouth cavity: NPO  - Skin:  surgical incision, generalized edema +2 per RN flow sheets     Nutrition Requirements:  Weight Used:  IBW 73 kg; --- will adjust needs as patient is no longer intubated      Estimated Energy Needs    Adjust  [x] 4529-1633 kcal/day (25-30 kcal/kg of dosing weight consider BMI >30)    Estimated Protein Needs    Adjust [x]  88-95 g/day (1..2-1.3 g/kg of IBW, consider the same as above and the difference between IBW and ABW)     Estimated Fluid Needs        Continue [x]  per CCU team     Nutrient Intake: Patient not meeting estimated energy needs, not receiving EN at this time, SLP to evaluate pt      Nutrition Diagnosis   Problem: Inadequate protein/energy intake  Etiology: pending SLP evaluation  Signs/symptoms: pt NPO at this time     Nutrition Intervention medical food supplements, coordination of care, meals and snacks      Goal/Expected Outcome: Patient to initiate PO diet if medically feasible per SLP and to meet ~75% of estimated energy and protein needs in the next 3d     Indicator/Monitoring: RD to monitor diet order, energy intake, body composition, NFPF, glucose/renal/electrolyte profiles. at risk will f/u in 3 days     Recommendation:   1) Diet order per SLP recommendation; will monitor patients PO tolerance to diet once SLP evaluates pt and monitor the need for nutrition oral supplements

## 2021-06-15 NOTE — PROGRESS NOTE ADULT - SUBJECTIVE AND OBJECTIVE BOX
Patient is a 60y old  Male who presents with a chief complaint of dfu (15 Juan F 2021 14:39)        Over Night Events:  Extubated yesterday.  Tolerated NIV last night.  On 2 liters O2.         ROS:     All ROS are negative except HPI         PHYSICAL EXAM    ICU Vital Signs Last 24 Hrs  T(C): 38 (15 Juan F 2021 16:00), Max: 38.4 (15 Juan F 2021 00:00)  T(F): 100.4 (15 Juan F 2021 16:00), Max: 101.1 (15 Juan F 2021 00:00)  HR: 78 (15 Juan F 2021 16:00) (70 - 86)  BP: 152/65 (15 Juan F 2021 16:00) (98/49 - 170/65)  BP(mean): 90 (15 Juan F 2021 16:00) (64 - 101)  ABP: --  ABP(mean): --  RR: 27 (15 Juan F 2021 16:00) (9 - 59)  SpO2: 97% (15 Juan F 2021 16:00) (96% - 99%)      CONSTITUTIONAL:  Well nourished.  NAD    ENT:   Airway patent,   Mouth with normal mucosa.   No thrush    EYES:   Pupils equal,   Round and reactive to light.    CARDIAC:   Normal rate,   Regular rhythm.    No edema      Vascular:  Normal systolic impulse  No Carotid bruits    RESPIRATORY:   No wheezing  Bilateral BS  Normal chest expansion  Not tachypneic,  No use of accessory muscles    GASTROINTESTINAL:  Abdomen soft,   Non-tender,   No guarding,   + BS    MUSCULOSKELETAL:   Range of motion is not limited,  No clubbing, cyanosis    NEUROLOGICAL:   Alert   No motor  deficits.    SKIN:   Skin normal color for race,   Warm and dry  No evidence of rash.    PSYCHIATRIC:   Normal mood and affect.   No apparent risk to self or others.    HEMATOLOGICAL:  No cervical  lymphadenopathy.  no inguinal lymphadenopathy      06-14-21 @ 07:01  -  06-15-21 @ 07:00  --------------------------------------------------------  IN:    Dexmedetomidine: 1 mL    dextrose 5%: 2420 mL    dextrose 5% w/ Additives: 1425 mL    FentaNYL: 33 mL    IV PiggyBack: 300 mL    Vital High Protein: 55 mL  Total IN: 4234 mL    OUT:    Indwelling Catheter - Urethral (mL): 1635 mL    Voided (mL): 1205 mL  Total OUT: 2840 mL    Total NET: 1394 mL      06-15-21 @ 07:01  -  06-15-21 @ 17:54  --------------------------------------------------------  IN:    dextrose 5%: 1200 mL    dextrose 5% w/ Additives: 825 mL    IV PiggyBack: 300 mL  Total IN: 2325 mL    OUT:    Voided (mL): 1460 mL  Total OUT: 1460 mL    Total NET: 865 mL          LABS:                            8.7    10.07 )-----------( 270      ( 15 Juan F 2021 04:15 )             29.8                                               06-15    146  |  108  |  30<H>  ----------------------------<  216<H>  4.1   |  30  |  1.2    Ca    7.7<L>      15 Juan F 2021 16:00  Phos  2.2     06-15  Mg     2.5     06-15    TPro  7.6  /  Alb  2.2<L>  /  TBili  0.5  /  DBili  x   /  AST  115<H>  /  ALT  76<H>  /  AlkPhos  121<H>  06-15                                                 CARDIAC MARKERS ( 15 Juan F 2021 04:15 )  x     / x     / 716 U/L / x     / x                                                LIVER FUNCTIONS - ( 15 Juan F 2021 04:15 )  Alb: 2.2 g/dL / Pro: 7.6 g/dL / ALK PHOS: 121 U/L / ALT: 76 U/L / AST: 115 U/L / GGT: x                                                                                                                                   ABG - ( 14 Jun 2021 10:32 )  pH, Arterial: 7.40  pH, Blood: x     /  pCO2: 47    /  pO2: 67    / HCO3: 29    / Base Excess: 3.8   /  SaO2: 93                  MEDICATIONS  (STANDING):  ALBUTerol    90 MICROgram(s) HFA Inhaler 1 Puff(s) Inhalation once  caspofungin IVPB 50 milliGRAM(s) IV Intermittent every 24 hours  chlorhexidine 0.12% Liquid 15 milliLiter(s) Oral Mucosa two times a day  chlorhexidine 4% Liquid 1 Application(s) Topical <User Schedule>  DAPTOmycin IVPB 875 milliGRAM(s) IV Intermittent every 48 hours  dextrose 5% 1000 milliLiter(s) (75 mL/Hr) IV Continuous <Continuous>  dextrose 5%. 1000 milliLiter(s) (150 mL/Hr) IV Continuous <Continuous>  glucagon  Injectable 1 milliGRAM(s) IntraMuscular once  heparin   Injectable 5000 Unit(s) SubCutaneous every 8 hours  insulin glargine Injectable (LANTUS) 18 Unit(s) SubCutaneous at bedtime  insulin lispro (ADMELOG) corrective regimen sliding scale   SubCutaneous every 6 hours  insulin lispro Injectable (ADMELOG) 5 Unit(s) SubCutaneous every 6 hours  pantoprazole   Suspension 40 milliGRAM(s) Oral daily  polyethylene glycol 3350 17 Gram(s) Oral daily  senna 2 Tablet(s) Oral at bedtime  sodium bicarbonate 650 milliGRAM(s) Oral every 6 hours  tiotropium 18 MICROgram(s) Capsule 1 Capsule(s) Inhalation once    MEDICATIONS  (PRN):  acetaminophen   Tablet .. 650 milliGRAM(s) Oral every 6 hours PRN Temp greater or equal to 38C (100.4F), Mild Pain (1 - 3)  acetaminophen  Suppository .. 650 milliGRAM(s) Rectal every 6 hours PRN Temp greater or equal to 38C (100.4F), Mild Pain (1 - 3)      New X-rays reviewed:                                                                                  ECHO    CXR interpreted by me:  No infiltrates

## 2021-06-15 NOTE — PROGRESS NOTE ADULT - SUBJECTIVE AND OBJECTIVE BOX
Patient is a 60y old  Male who presents with a chief complaint of DFU (14 Jun 2021 11:50)      INTERVAL HPI/OVERNIGHT EVENTS:  Pt extubated yesterday, still febrile, no overnight events.       ICU Vital Signs Last 24 Hrs  T(C): 37.7 (15 Juan F 2021 08:00), Max: 38.5 (14 Jun 2021 13:55)  T(F): 99.9 (15 Juan F 2021 08:00), Max: 101.3 (14 Jun 2021 13:55)  HR: 74 (15 Juanf  2021 08:00) (70 - 90)  BP: 146/62 (15 Juan F 2021 08:00) (98/49 - 170/65)  BP(mean): 96 (15 Juan F 2021 08:00) (64 - 114)  ABP: --  ABP(mean): --  RR: 76 (15 Juan F 2021 08:00) (2 - 76)  SpO2: 97% (15 Juan F 2021 08:00) (94% - 99%)    I&O's Summary    14 Jun 2021 07:01  -  15 Juan F 2021 07:00  --------------------------------------------------------  IN: 4234 mL / OUT: 2840 mL / NET: 1394 mL    15 Juan F 2021 07:01  -  15 Juan F 2021 09:41  --------------------------------------------------------  IN: 75 mL / OUT: 150 mL / NET: -75 mL          LABS:                        8.7    10.07 )-----------( 270      ( 15 Juan F 2021 04:15 )             29.8     06-15    146  |  109  |  37<H>  ----------------------------<  174<H>  3.9   |  29  |  1.3    Ca    8.0<L>      15 Juan F 2021 04:15  Phos  2.2     06-15  Mg     2.5     06-15    TPro  7.6  /  Alb  2.2<L>  /  TBili  0.5  /  DBili  x   /  AST  115<H>  /  ALT  76<H>  /  AlkPhos  121<H>  06-15        CAPILLARY BLOOD GLUCOSE      POCT Blood Glucose.: 189 mg/dL (15 Juan F 2021 06:07)  POCT Blood Glucose.: 194 mg/dL (14 Jun 2021 22:16)  POCT Blood Glucose.: 229 mg/dL (14 Jun 2021 15:28)    ABG - ( 14 Jun 2021 10:32 )  pH, Arterial: 7.40  pH, Blood: x     /  pCO2: 47    /  pO2: 67    / HCO3: 29    / Base Excess: 3.8   /  SaO2: 93                  RADIOLOGY & ADDITIONAL TESTS:    Consultant(s) Notes Reviewed:  [x ] YES  [ ] NO    MEDICATIONS  (STANDING):  ALBUTerol    90 MICROgram(s) HFA Inhaler 1 Puff(s) Inhalation once  caspofungin IVPB 50 milliGRAM(s) IV Intermittent every 24 hours  chlorhexidine 0.12% Liquid 15 milliLiter(s) Oral Mucosa two times a day  chlorhexidine 4% Liquid 1 Application(s) Topical <User Schedule>  DAPTOmycin IVPB 875 milliGRAM(s) IV Intermittent every 48 hours  dextrose 5% 1000 milliLiter(s) (75 mL/Hr) IV Continuous <Continuous>  glucagon  Injectable 1 milliGRAM(s) IntraMuscular once  heparin   Injectable 5000 Unit(s) SubCutaneous every 8 hours  insulin glargine Injectable (LANTUS) 18 Unit(s) SubCutaneous at bedtime  insulin lispro (ADMELOG) corrective regimen sliding scale   SubCutaneous every 6 hours  insulin lispro Injectable (ADMELOG) 5 Unit(s) SubCutaneous every 6 hours  meropenem  IVPB 1000 milliGRAM(s) IV Intermittent every 12 hours  pantoprazole   Suspension 40 milliGRAM(s) Oral daily  polyethylene glycol 3350 17 Gram(s) Oral daily  senna 2 Tablet(s) Oral at bedtime  sodium bicarbonate 650 milliGRAM(s) Oral every 6 hours  tiotropium 18 MICROgram(s) Capsule 1 Capsule(s) Inhalation once    MEDICATIONS  (PRN):  acetaminophen   Tablet .. 650 milliGRAM(s) Oral every 6 hours PRN Temp greater or equal to 38C (100.4F), Mild Pain (1 - 3)      PHYSICAL EXAM:  GENERAL: well built, well nourished  NECK: Supple, No JVD  NERVOUS SYSTEM:  Alert & Oriented X2, able to move 3 extremities, 1+ on left, 3+ on right and lower left leg  CHEST/LUNG: B/L good air entry; reduced breath sounds   HEART: S1S2 normal, no S3, Regular rate and rhythm; No murmurs, rubs, or gallops  ABDOMEN: Soft, Nontender, Nondistended; obese  EXTREMITIES: no edema, rt leg AKA  LYMPH: No lymphadenopathy noted  SKIN: No rashes or lesions    Care Discussed with Consultants/Other Providers [ x] YES  [ ] NO Patient is a 60y old  Male who presents with a chief complaint of DFU (14 Jun 2021 11:50)    INTERVAL HPI/OVERNIGHT EVENTS:  Pt extubated yesterday, still febrile, no overnight events.     ICU Vital Signs Last 24 Hrs  T(C): 37.7 (15 Juan F 2021 08:00), Max: 38.5 (14 Jun 2021 13:55)  T(F): 99.9 (15 Juan F 2021 08:00), Max: 101.3 (14 Jun 2021 13:55)  HR: 74 (15 Juan F 2021 08:00) (70 - 90)  BP: 146/62 (15 Juan F 2021 08:00) (98/49 - 170/65)  BP(mean): 96 (15 Juan F 2021 08:00) (64 - 114)  ABP: --  ABP(mean): --  RR: 76 (15 Juan F 2021 08:00) (2 - 76)  SpO2: 97% (15 Juan F 2021 08:00) (94% - 99%)    I&O's Summary    14 Jun 2021 07:01  -  15 Juan F 2021 07:00  --------------------------------------------------------  IN: 4234 mL / OUT: 2840 mL / NET: 1394 mL    15 Juan F 2021 07:01  -  15 Juan F 2021 09:41  --------------------------------------------------------  IN: 75 mL / OUT: 150 mL / NET: -75 mL          LABS:                        8.7    10.07 )-----------( 270      ( 15 Ujan F 2021 04:15 )             29.8     06-15    146  |  109  |  37<H>  ----------------------------<  174<H>  3.9   |  29  |  1.3    Ca    8.0<L>      15 Juan F 2021 04:15  Phos  2.2     06-15  Mg     2.5     06-15    TPro  7.6  /  Alb  2.2<L>  /  TBili  0.5  /  DBili  x   /  AST  115<H>  /  ALT  76<H>  /  AlkPhos  121<H>  06-15        CAPILLARY BLOOD GLUCOSE      POCT Blood Glucose.: 189 mg/dL (15 Juan F 2021 06:07)  POCT Blood Glucose.: 194 mg/dL (14 Jun 2021 22:16)  POCT Blood Glucose.: 229 mg/dL (14 Jun 2021 15:28)    ABG - ( 14 Jun 2021 10:32 )  pH, Arterial: 7.40  pH, Blood: x     /  pCO2: 47    /  pO2: 67    / HCO3: 29    / Base Excess: 3.8   /  SaO2: 93                  RADIOLOGY & ADDITIONAL TESTS:    Consultant(s) Notes Reviewed:  [x ] YES  [ ] NO    MEDICATIONS  (STANDING):  ALBUTerol    90 MICROgram(s) HFA Inhaler 1 Puff(s) Inhalation once  caspofungin IVPB 50 milliGRAM(s) IV Intermittent every 24 hours  chlorhexidine 0.12% Liquid 15 milliLiter(s) Oral Mucosa two times a day  chlorhexidine 4% Liquid 1 Application(s) Topical <User Schedule>  DAPTOmycin IVPB 875 milliGRAM(s) IV Intermittent every 48 hours  dextrose 5% 1000 milliLiter(s) (75 mL/Hr) IV Continuous <Continuous>  glucagon  Injectable 1 milliGRAM(s) IntraMuscular once  heparin   Injectable 5000 Unit(s) SubCutaneous every 8 hours  insulin glargine Injectable (LANTUS) 18 Unit(s) SubCutaneous at bedtime  insulin lispro (ADMELOG) corrective regimen sliding scale   SubCutaneous every 6 hours  insulin lispro Injectable (ADMELOG) 5 Unit(s) SubCutaneous every 6 hours  meropenem  IVPB 1000 milliGRAM(s) IV Intermittent every 12 hours  pantoprazole   Suspension 40 milliGRAM(s) Oral daily  polyethylene glycol 3350 17 Gram(s) Oral daily  senna 2 Tablet(s) Oral at bedtime  sodium bicarbonate 650 milliGRAM(s) Oral every 6 hours  tiotropium 18 MICROgram(s) Capsule 1 Capsule(s) Inhalation once    MEDICATIONS  (PRN):  acetaminophen   Tablet .. 650 milliGRAM(s) Oral every 6 hours PRN Temp greater or equal to 38C (100.4F), Mild Pain (1 - 3)      PHYSICAL EXAM:  GENERAL: well built, well nourished  NECK: Supple, No JVD  NERVOUS SYSTEM:  Alert & Oriented X2, able to move 3 extremities, 1+ on left, 3+ on right and lower left leg  CHEST/LUNG: B/L good air entry; reduced breath sounds   HEART: S1S2 normal, no S3, Regular rate and rhythm; No murmurs, rubs, or gallops  ABDOMEN: Soft, Nontender, Nondistended; obese  EXTREMITIES: no edema, rt leg AKA  LYMPH: No lymphadenopathy noted  SKIN: No rashes or lesions    Care Discussed with Consultants/Other Providers [ x] YES  [ ] NO

## 2021-06-15 NOTE — SWALLOW BEDSIDE ASSESSMENT ADULT - NS SPL SWALLOW CLINIC TRIAL FT
mild oral dysphagia. pharyngeal dysphagia suspected c/b overt s/s of aspiration/penetration across all consistencies trialed mild oral dysphagia. pharyngeal dysphagia suspected c/b overt s/s of aspiration/penetration across all consistencies administered +mild oral dysphagia w/ overt s/s of penetration/aspiration

## 2021-06-15 NOTE — SWALLOW BEDSIDE ASSESSMENT ADULT - SWALLOW EVAL: DIAGNOSIS
overt s/s of aspiration/penetration with ice chips, nectar thick liquids and puree solids. +overt s/s of aspiration/penetration with ice chips, nectar thick liquids and puree

## 2021-06-15 NOTE — PROGRESS NOTE ADULT - ASSESSMENT
IMPRESSION    Acute Hypoxemic respiratory failure with hypercapnia resolved   Septic Shock 2/2 to Bacteremia from DFU (Enterococcus,  Staph Aureus ) repeat cx neg  DFU s/p Right Knee Disarticulation for necrotizing soft tissue infection  CANDY on CKD s/p CVVHD sp dc u dall  AMS 2/2 Toxic-Metabolic Encephelopathy resolved   MATA/ OHS  Morbid obesity  NSTEMI  Hypernatremia improved     PLAN:    CNS: SAT.      HEENT: ETT care and Oral care    PULMONARY: HOB at 45 degrees.  Aspiration precautions.  NIV during sleep.      CARDIOVASCULAR:  LR 75 cc per hour     GI: GI prophylaxis.  NPO.  FU with Speech and swallow     RENAL: F/U Lytes and correct as needed.  FU with renal     INFECTIOUS DISEASE:  ABX as per ID.       HEMATOLOGICAL:  DVT prophylaxis.  FU CBC       ENDOCRINE:  Follow up FS.   Insulin protocol     MUSCULOSKELETAL: Bed rest.      PT OT     TOV    Poor prognosis    DW house staff and respiratory    IMPRESSION    Acute Hypoxemic respiratory failure with hypercapnia resolved   Septic Shock 2/2 to Bacteremia from DFU (Enterococcus,  Staph Aureus ) repeat cx neg  DFU s/p Right Knee Disarticulation for necrotizing soft tissue infection  CANDY on CKD s/p CVVHD sp dc u dall  AMS 2/2 Toxic-Metabolic Encephelopathy resolved   MATA/ OHS  Morbid obesity  NSTEMI  Hypernatremia improved     PLAN:    CNS: No depressants     HEENT:  Oral care    PULMONARY: HOB at 45 degrees.  Aspiration precautions.  NIV during sleep.      CARDIOVASCULAR:  LR 75 cc per hour     GI: GI prophylaxis.  NPO.  FU with Speech and swallow     RENAL: F/U Lytes and correct as needed.  FU with renal     INFECTIOUS DISEASE:  ABX as per ID.       HEMATOLOGICAL:  DVT prophylaxis.  FU CBC       ENDOCRINE:  Follow up FS.   Insulin protocol     MUSCULOSKELETAL: Bed rest.      PT OT     TOV    Poor prognosis    DW house staff and respiratory

## 2021-06-15 NOTE — PROGRESS NOTE ADULT - ASSESSMENT
· Assessment	  59 y/o M with PMH of DM, hypertension, dyslipidemia, sleep apnea, Charcot foot reconstruction with external fixation, super morbid obesity present for worsening right foot infection.  Pt reports he has had this foot ulcer at various stage of healing for 13 years. Patient has been admitted for IV antibiotics multiple times in the past for osteomyelitis of right foot. He has history of MRSA infection in foot. pt has baseline neuropathy in both feet so does not feel any pain.  Patient states his Infection has been gradually getting worse despite treatment with bactrim, he saw his podiatrists and he was sent in by podiatry for IV abx .    IMPRESSION;  #Fevers with normal WBC normotensive, no pressors and extubated with an unremarkable CXR   Cryptogenic   Extubated 6/14    s/p Revision of disarticulation of knee 11-Jun-2021 "Healthy, non-infected tissue of knee disarticulation s/p conversion to Right AKA"    6/7,10  BCX NG    TLC was changed with R IJ placed on 6/8 5/31 S/p disarticulation right knee   -5/28 BCx E fecalis, ORSA  -5/31 TTE no vegetation  -etiology of bacteremia was right foot abscess  -5/29 R foot : ORSA , Corynebacterium  -5/30 BCx NG  -6/4 ,7, 10 BCx NG  CPK : secondary to Daptomycin. Although uptrending will continue with dapto and monitor CK for now  Transaminitis : clinically no acute cholecystitis, no cholestasis ( making cholangitis also unlikely ). Possibly drug related or slow resolving ischemic hepatitis.          RECOMMENDATIONS;  - Serum fungitell   - Caspo 50mg q24h IV 6/9- ( hold if fungitell is NG )  - D/ C Dk 1g q12h IV 6/9-  - Dapto 875mg q48h IV (8mg/kg of adjusted body wt)  - check daily CPK  -ELISABETH domingo

## 2021-06-15 NOTE — PROGRESS NOTE ADULT - ASSESSMENT
59 y/o M with PMH of DM, hypertension, dyslipidemia, sleep apnea, Charcot foot reconstruction with external fixation, morbid obesity presented for worsening right foot infection.     # Necrotizing fascitis of rt leg s/p AKA  # Acute hypoxic respiratory failure secondary to septic shock - extubated   # Toxic metabolic encephalopathy - improved   # Fever overnight >101, currently afebrile   - septic on admission, WBC 12,  + lactate 2.3. s/p cefepime flagyl and vanc in the ED, in res distress  - s/p intubation 5/31, extubated 6/14  - bacteremia from right foot abscess s/p BKA with disarticulation 5/31 with a follow up AKA 6/11  - Podiatry on board,  - 5/28 BCx E fecalis, ORSA, BCx (-) 5/30, 6/4, 6/7, 6/10  - 5/29 R foot : ORSA , Corynebacterium  - Echo 3/31: EF 55-60%, G2DD, no vegetations   - ID following, f/u recs:  -c/w dapto 875mg q12, caspofungin 50mg and eleuterio 1g q12  - ddimer 2550, duplex (-)  - CPK >1300 -> 600~ ->300~-> 561 -> 485 -> 641 -> 716 , monitor daily   - 6/10: Procal 1.08 -> .81, , ESR 58    - Pt passed SBT following commands, able to speak, AOx2, extubated 6/14, on 2L NC  - pt still having fevers, (drug fever? encephalopathy less likely as pt is following commands off sedation)  - S&S consulted, will follow today   - start on bicarb 650 q6hrs for dyspnea, drip if unable to tolerate PO  - Keep on BiPAP 14/7 30% rr 12  - f/u abg    # Hypernatremia   - na 146 today, likely dehydration   - c/w d5 150 ml/hr   - bmp q6 hrs    #?Code stroke for possible CVA, likely encephalopathy - improved   - ptnt was code stroke on floors on 3/30 for AMS and confusion upgraded to CCU   - metabolic encephalopathy secondary to sepsis more likely than stroke  -  CT head neg for acute pathology, CTA w/ moderate stenoses at the junction of the precavernous and cavernous segments of both internal carotid arteries  - neuro recs for brain MR, can get repeat CT head when stable, will not fit in MR machine   - neuro rec LP, as per pulm, as pt is following commands now, little clinical benefit to preforming as of now.   - CTH if acute change in mental status     # NSTEMI  - trops elevated on admission .11->.12  - secondary to sepsis induced demand ischemia  - less likely true ACS  - monitor in ccu     #CANDY on CKD III /  rule out ATN - resolved   - secondary to sepsis induced ATN, continue supportive care, monitor for now.   - creatinine 3.0 on admission, baseline around 1.4-1.8   - s/p CVVHD 6/1 and 6/2, holding CVVHD for now ptnt w/ good urine output   - nephro following  - Cr back at baseline     # Anemia of chronic disease   - transfuse Hb < 7  - monitor   - s/p 4 units in total since admission  - stable today    #Diabetes Mellitus  -on insulin regimen, increase bolus daily dose   - a1c 8.9   - Monitor   - FS q6hr     #Hypertension  -holding oral meds     #Dyslipidemia  -c/w home meds    #MATA/ OHS    #morbid obesity    Diet: npo until seen by S&S  DVT ppx: heparin subq TID  GI ppx: protonix   Dispo: acute 59 y/o M with PMH of DM, hypertension, dyslipidemia, sleep apnea, Charcot foot reconstruction with external fixation, morbid obesity presented for worsening right foot infection.     # Necrotizing fascitis of rt leg s/p AKA  # Acute hypoxic respiratory failure secondary to septic shock - extubated   # Toxic metabolic encephalopathy - improved   # Fever overnight >101, currently afebrile   - septic on admission, WBC 12,  + lactate 2.3. s/p cefepime flagyl and vanc in the ED, in res distress  - s/p intubation 5/31, extubated 6/14  - bacteremia from right foot abscess s/p BKA with disarticulation 5/31 with a follow up AKA 6/11  - Podiatry on board,  - 5/28 BCx E fecalis, ORSA, BCx (-) 5/30, 6/4, 6/7, 6/10  - 5/29 R foot : ORSA , Corynebacterium  - Echo 3/31: EF 55-60%, G2DD, no vegetations   - ID following, f/u recs:  -c/w dapto 875mg q12, caspofungin 50mg and eleuterio 1g q12 for 6 weeks from first bcx  (-) (5/30)  - ddimer 2550, duplex (-)  - CPK >1300 -> 600~ ->300~-> 561 -> 485 -> 641 -> 716 , monitor daily, likely uptrending from dapto  - 6/10: Procal 1.08 -> .81, , ESR 58    - Pt passed SBT following commands, able to speak, AOx2, extubated 6/14, on 2L NC  - transaminitis noted, uptrending today, f/u RUQ sono  - pt still having fevers, (drug fever? encephalopathy less likely as pt is following commands off sedation)  - S&S consulted, will follow today   - start on bicarb 650 q6hrs for dyspnea, drip if unable to tolerate PO  - Keep on BiPAP 14/7 30% rr 12  - f/u abg    # Hypernatremia   - na 146 today, likely dehydration   - c/w d5 150 ml/hr   - bmp q6 hrs    #?Code stroke for possible CVA, likely encephalopathy - improved   - ptnt was code stroke on floors on 3/30 for AMS and confusion upgraded to CCU   - metabolic encephalopathy secondary to sepsis more likely than stroke  -  CT head neg for acute pathology, CTA w/ moderate stenoses at the junction of the precavernous and cavernous segments of both internal carotid arteries  - neuro recs for brain MR, can get repeat CT head when stable, will not fit in MR machine   - neuro rec LP, as per pulm, as pt is following commands now, little clinical benefit to preforming as of now.   - CTH if acute change in mental status     # NSTEMI  - trops elevated on admission .11->.12  - secondary to sepsis induced demand ischemia  - less likely true ACS  - monitor in ccu     #CANDY on CKD III /  rule out ATN - resolved   - secondary to sepsis induced ATN, continue supportive care, monitor for now.   - creatinine 3.0 on admission, baseline around 1.4-1.8   - s/p CVVHD 6/1 and 6/2, holding CVVHD for now ptnt w/ good urine output   - nephro following  - Cr back at baseline     # Anemia of chronic disease   - transfuse Hb < 7  - monitor   - s/p 4 units in total since admission  - stable today    #Diabetes Mellitus  -on insulin regimen, increase bolus daily dose   - a1c 8.9   - Monitor   - FS q6hr     #Hypertension  -holding oral meds     #Dyslipidemia  -c/w home meds    #MATA/ OHS    #morbid obesity    Diet: npo until seen by S&S  DVT ppx: heparin subq TID  GI ppx: protonix   Dispo: acute

## 2021-06-16 LAB
ALBUMIN SERPL ELPH-MCNC: 2.5 G/DL — LOW (ref 3.5–5.2)
ALP SERPL-CCNC: 110 U/L — SIGNIFICANT CHANGE UP (ref 30–115)
ALT FLD-CCNC: 90 U/L — HIGH (ref 0–41)
ANION GAP SERPL CALC-SCNC: 4 MMOL/L — LOW (ref 7–14)
AST SERPL-CCNC: 146 U/L — HIGH (ref 0–41)
BASOPHILS # BLD AUTO: 0.02 K/UL — SIGNIFICANT CHANGE UP (ref 0–0.2)
BASOPHILS NFR BLD AUTO: 0.2 % — SIGNIFICANT CHANGE UP (ref 0–1)
BILIRUB SERPL-MCNC: 0.6 MG/DL — SIGNIFICANT CHANGE UP (ref 0.2–1.2)
BUN SERPL-MCNC: 28 MG/DL — HIGH (ref 10–20)
CALCIUM SERPL-MCNC: 8.1 MG/DL — LOW (ref 8.5–10.1)
CHLORIDE SERPL-SCNC: 110 MMOL/L — SIGNIFICANT CHANGE UP (ref 98–110)
CK SERPL-CCNC: 488 U/L — HIGH (ref 0–225)
CO2 SERPL-SCNC: 32 MMOL/L — SIGNIFICANT CHANGE UP (ref 17–32)
CREAT SERPL-MCNC: 1.1 MG/DL — SIGNIFICANT CHANGE UP (ref 0.7–1.5)
CULTURE RESULTS: SIGNIFICANT CHANGE UP
EOSINOPHIL # BLD AUTO: 0.56 K/UL — SIGNIFICANT CHANGE UP (ref 0–0.7)
EOSINOPHIL NFR BLD AUTO: 6.6 % — SIGNIFICANT CHANGE UP (ref 0–8)
GLUCOSE BLDC GLUCOMTR-MCNC: 115 MG/DL — HIGH (ref 70–99)
GLUCOSE BLDC GLUCOMTR-MCNC: 115 MG/DL — HIGH (ref 70–99)
GLUCOSE BLDC GLUCOMTR-MCNC: 123 MG/DL — HIGH (ref 70–99)
GLUCOSE BLDC GLUCOMTR-MCNC: 124 MG/DL — HIGH (ref 70–99)
GLUCOSE BLDC GLUCOMTR-MCNC: 160 MG/DL — HIGH (ref 70–99)
GLUCOSE SERPL-MCNC: 126 MG/DL — HIGH (ref 70–99)
HCT VFR BLD CALC: 30.3 % — LOW (ref 42–52)
HGB BLD-MCNC: 9 G/DL — LOW (ref 14–18)
IMM GRANULOCYTES NFR BLD AUTO: 0.5 % — HIGH (ref 0.1–0.3)
LYMPHOCYTES # BLD AUTO: 1.43 K/UL — SIGNIFICANT CHANGE UP (ref 1.2–3.4)
LYMPHOCYTES # BLD AUTO: 16.9 % — LOW (ref 20.5–51.1)
MAGNESIUM SERPL-MCNC: 2.2 MG/DL — SIGNIFICANT CHANGE UP (ref 1.8–2.4)
MCHC RBC-ENTMCNC: 27.3 PG — SIGNIFICANT CHANGE UP (ref 27–31)
MCHC RBC-ENTMCNC: 29.7 G/DL — LOW (ref 32–37)
MCV RBC AUTO: 91.8 FL — SIGNIFICANT CHANGE UP (ref 80–94)
MONOCYTES # BLD AUTO: 0.66 K/UL — HIGH (ref 0.1–0.6)
MONOCYTES NFR BLD AUTO: 7.8 % — SIGNIFICANT CHANGE UP (ref 1.7–9.3)
NEUTROPHILS # BLD AUTO: 5.74 K/UL — SIGNIFICANT CHANGE UP (ref 1.4–6.5)
NEUTROPHILS NFR BLD AUTO: 68 % — SIGNIFICANT CHANGE UP (ref 42.2–75.2)
NRBC # BLD: 0 /100 WBCS — SIGNIFICANT CHANGE UP (ref 0–0)
PHOSPHATE SERPL-MCNC: 2.4 MG/DL — SIGNIFICANT CHANGE UP (ref 2.1–4.9)
PLATELET # BLD AUTO: 286 K/UL — SIGNIFICANT CHANGE UP (ref 130–400)
POTASSIUM SERPL-MCNC: 4.2 MMOL/L — SIGNIFICANT CHANGE UP (ref 3.5–5)
POTASSIUM SERPL-SCNC: 4.2 MMOL/L — SIGNIFICANT CHANGE UP (ref 3.5–5)
PROT SERPL-MCNC: 7.3 G/DL — SIGNIFICANT CHANGE UP (ref 6–8)
RBC # BLD: 3.3 M/UL — LOW (ref 4.7–6.1)
RBC # FLD: 17.7 % — HIGH (ref 11.5–14.5)
SODIUM SERPL-SCNC: 146 MMOL/L — SIGNIFICANT CHANGE UP (ref 135–146)
SPECIMEN SOURCE: SIGNIFICANT CHANGE UP
WBC # BLD: 8.45 K/UL — SIGNIFICANT CHANGE UP (ref 4.8–10.8)
WBC # FLD AUTO: 8.45 K/UL — SIGNIFICANT CHANGE UP (ref 4.8–10.8)

## 2021-06-16 PROCEDURE — 71045 X-RAY EXAM CHEST 1 VIEW: CPT | Mod: 26

## 2021-06-16 PROCEDURE — 99223 1ST HOSP IP/OBS HIGH 75: CPT

## 2021-06-16 RX ORDER — DAPTOMYCIN 500 MG/10ML
875 INJECTION, POWDER, LYOPHILIZED, FOR SOLUTION INTRAVENOUS EVERY 24 HOURS
Refills: 0 | Status: DISCONTINUED | OUTPATIENT
Start: 2021-06-16 | End: 2021-06-26

## 2021-06-16 RX ORDER — DAPTOMYCIN 500 MG/10ML
875 INJECTION, POWDER, LYOPHILIZED, FOR SOLUTION INTRAVENOUS EVERY 24 HOURS
Refills: 0 | Status: DISCONTINUED | OUTPATIENT
Start: 2021-06-16 | End: 2021-06-16

## 2021-06-16 RX ADMIN — CASPOFUNGIN ACETATE 260 MILLIGRAM(S): 7 INJECTION, POWDER, LYOPHILIZED, FOR SOLUTION INTRAVENOUS at 14:19

## 2021-06-16 RX ADMIN — INSULIN GLARGINE 18 UNIT(S): 100 INJECTION, SOLUTION SUBCUTANEOUS at 22:14

## 2021-06-16 RX ADMIN — DAPTOMYCIN 135 MILLIGRAM(S): 500 INJECTION, POWDER, LYOPHILIZED, FOR SOLUTION INTRAVENOUS at 14:19

## 2021-06-16 RX ADMIN — CHLORHEXIDINE GLUCONATE 15 MILLILITER(S): 213 SOLUTION TOPICAL at 05:46

## 2021-06-16 RX ADMIN — HEPARIN SODIUM 5000 UNIT(S): 5000 INJECTION INTRAVENOUS; SUBCUTANEOUS at 05:46

## 2021-06-16 RX ADMIN — HEPARIN SODIUM 5000 UNIT(S): 5000 INJECTION INTRAVENOUS; SUBCUTANEOUS at 22:14

## 2021-06-16 RX ADMIN — HEPARIN SODIUM 5000 UNIT(S): 5000 INJECTION INTRAVENOUS; SUBCUTANEOUS at 14:10

## 2021-06-16 RX ADMIN — Medication 650 MILLIGRAM(S): at 20:21

## 2021-06-16 RX ADMIN — Medication 650 MILLIGRAM(S): at 18:49

## 2021-06-16 RX ADMIN — CHLORHEXIDINE GLUCONATE 1 APPLICATION(S): 213 SOLUTION TOPICAL at 05:47

## 2021-06-16 NOTE — PHYSICAL THERAPY INITIAL EVALUATION ADULT - CRITERIA FOR SKILLED THERAPEUTIC INTERVENTIONS
impairments found
impairments found/functional limitations in following categories/risk reduction/prevention/rehab potential/therapy frequency/predicted duration of therapy intervention/anticipated discharge recommendation

## 2021-06-16 NOTE — SWALLOW BEDSIDE ASSESSMENT ADULT - SWALLOW EVAL: DIAGNOSIS
+improved voice, slight baseline cough. +toleration of puree, thin and mech soft solids w/o overt s/s of penetration/aspiration, +mild oral dysphagia for regular solids characterized by munching, no rotary mastication

## 2021-06-16 NOTE — PHYSICAL THERAPY INITIAL EVALUATION ADULT - GENERAL OBSERVATIONS, REHAB EVAL
Patient:   ÓSCAR POND            MRN: [id number]            FIN: [id number]              Age:   62 years     Sex:  MALE     :  [birth year]   Associated Diagnoses:   None   Author:   KELLY, [facility]   LVAD. Renal CA s/p R nephrectomy 2 months ago, admitted with weakness, abd pain, UTI, elevated LD/hemolysis, severe hyperglycemia     History of Present Illness         63 y/o male w ICMP and LVAD since 2014, DM, HTN, hx of CVA, FORREST, prior admisions with hemolysis, hx of UTI and bacteremia with MDRO E.Coli, also hx of Renal CA, s/p R nephrectomy in Aug 2019 at [facility], recently discharged home from [facility] 5 days ago, presented to ED with c/o malaise, dyspnea, abd pain, nausea, generalized weakness, he also noted dark urine. Symtoms for the last 1-2 days.  Sister noted more dyspnea. Pt with severe  deconditioning post recent prolonged hospitalization and rehab stay. he is not able to ambulate. He denies any fevers or chills.  No resp symptoms. He also had  abd discomfort, mostly R sided. Had nausea, no vomiting, mild diarrhea and dysuria. No LVAD alarms, no drainage at DL. No CP, no HA/dizziness. Eval in ED with elevated LD, INR 2, abnormal LFTs, elevated lipase, leukocytosis, severe hyperglycemia, abnormal UA. Pt started Abx, started Hep  drip. CT scans done, postop changes, no pleural effusions. Pt admitted with concerns for hemolysis, sepsis, UTI, pancreatitis.   10/30:  Alert, RA, c/o abd pain, nausea. Afebrile, HR and BPs stable. LVAD stable flows. Elevated LD 1500, INR 2, start Hep drip. iv Abx dose. Sent blood and Ucx. CT scans reviewed. Echo pending.  BSs 500s, started Insulin drip. F/U labs. d/w sister  10/31:  Awake, afebrile. Was lethargic and hypotensive in am, stress dose steroids, pressors as needed. had RHC, [place], CVP low, iv fluid bolus. Zosyn added per ID. US liver pending. BSs improved. INR 2.5, LD 1600, continue Hep, bicarb drip. F/U labs. d/w sister  :  More alert, 
10:35-10:50; Pt encountered in bed, s/p OR this am. Reports he is not ready for PT yet. Educated pt on NWB RLE, importance of OOB. Pt said it's more important for him to stay in bed than put weight on his foot. Educated on OOB with NWB RLE. Pt adamant regarding going home; says he will get by like he did before transferring to an office chair with wheels and pushing himself around the house. Educated on safety, pt does not appear receptive. Will cont to f/u as appropriate.
tolerates diet. Off pressors, iv HC per Endo. HIDA scan report pending. iv Zosyn per ID. LD better, INR 4, off Hep drip. BSs improved, continue Insulin drip per Endo. Repeat lipase tomorrow, F/Ucultures. d/w family  11/2 to 11/3:  See notes per   11/4:  Alert, O2NC. BSs labile, regimen per Endo. iv Abx for UTI, WBC better, BC neg. Hg still on the low side, transfused, check FOBT. INR 1.9. LD improved. F/U labs.  11/5:  Alert, O2NC, tired. FOBT pos, seen by GI, plan for EGD in am. On PPI, Hold AC. No fevers, WBC better, Abx per ID. Device stable. F/U labs.d/w RN  11/6: Having EGD now, stable overnight, will f/u labs  11/7: S/p EGHD yesterday, found to have 2 AVMs, clipped. leukocytosis today likely reactive to GIB. will monitor.  11/8:  Alert, c/o back pain, recent CT abd reviewed. Dark stools, seen by GI, repeat push enteroscopy tomorrow. Cr stable. Transfused. INR 1.4, AC on hold, LD stable. Abx per ID.   11/9:  Stable, post EGD, had polypectomy, has gastric ulcer. PPI, Carafate, hold coumadin. Hg stable. Clear urine in [place]. Abx per ID. Resume diet. Back pain better. d/w RN  11/10:  Alert, feels better. Small BM, Hg relative stable. AC still on hold. Recent EGD with PUD, PPI, carafate. Seen by Urology, no interventions. Continue Zosyn per ID. f/u labs. Resume AC tomorrow if stable Hg  11/11 to 11/12:  See notes per   11/13:  Stable, some loose stools. Hg stable. INR low, cleared per GI for AC, Hep drip, coumadin dose. Finishing Zosyn for UTI in 2 days. Pain controled. F/U labs. Transfer to the floor.  11/14:  Alert, c/o back pain, meds adjusted. CT lumbar no obvious pathology. Elevated Bili, has cholelithiasis, repeat US liver per ID. INR low, Hep drip. Continue Abx per ID. Oral HC per Endo, Insulin regimen  11/15:  Stable, leg edema improving, iv diuresis. Back pain controled. US liver stable findings. INR low, Hep drip. Zosyn per ID, possible augmentin at discharge. F/U labs. PT/OT  11/16 crt up, 
stable, back pain, no device issues  11/17 no new CO overnight, stable, crt 1.99, vss, afeb, no device issues  11/18:  Alert, intermittent hematuria. INR 2, Hep drip off. Labs stable. iv Abx, plan for Augmentin at discharge. Pain control, add MSIR tab, d/c iv form. PMR consult  11/19:  Stable, urine clear. Hg stable. INR therapeutic. Still LE edema, continue iv diuresis. iv Abx while inhouse, oral Abx at discharge. d/w  pt about Rehab, he thinks he could go home, has RW and wheelchair. Pt needs AFO and special shoes that fit his AFO, those were ordered by his Podiatrist, they should be available per pt. Sister notified to help bring his shoes, will complete PT/OT re-eval for D/C disposition  11/20: Stable overnight, PT/OT, d/c plasn to [facility] vs home w [facility]  11/22: No events overnight, Hb 7.5, will cont same meds, PT/OT, still waiting for special boots for ambulation, f/u labs  11/23: HB cont to drop, today 7.1, pt states he has intermittednt hematuria, will cont to monitor closely, transfuse for Hb < 7  11/24: Hb 7.3 today, cont to monitor. Pt refusing [facility], denied by Cate Magallon. Wants to go home with help from sisters  11/25 Hb stable, BUN and crt still rising, vss, afeb, no device issues, DC planning home due to reluctance for better at a facility  11/26 moved to ICU, mildly hypoxic on abg, Hb trend is down, transfused, I reduced all narcs 50%, michael continue to wean, DW wife  11/27:  Alert, weak, c/o back pain. Has melena, GI bleed. Hg drop. INR 5. LD worsening. Edematous, evid of ALVARO, oliguric. Receiving albumin, PRBC, FFP. Art line placed, CVP low.  Octreotide and Bicarb drip, pressors. d/w Nephrology, d/w family, sisters at bedside. Pt with guarded prognosis, not candidate for LVAD exchange. Discussed consideration for DNR, pt and family to decide, full code for now.  d/w RN  11/28:  Sleepy, arousable, oriented. Tarry stools, EGD tomorrow. Additional PRBC today. INR 1.3, LD 1700, no AC at this time. Octreotide 
drip. Bicarb drip, Uoutput slight better. iv Abx per ID, pending repeat Ucx. d/w pt, clinical condition reviewed, pt is aware of poor prognosis. d/w RN  11/29:  Stable, post EGD, Actively bleeding gastric Dieulafoy's lesion treated, clip/APC. Hg stable post recent transfusion. Cr improved. Octreotide and Bicarb drip. Resume diet. F/U labs. d/w  RN  11/30:  Alert, feels tired. Hg stable, diet advanced. Edematous. CVP low, hold diuretics. Cr, LD stable. Off Bicarb drip. Hold AC for now. Continue Zosyn per ID. pain control. Prognosis guarded. d/w RN  12/1:  Alert, O2NC. Edematous, remains oliguric. CVP improving. CXR stable. Hg stable, started Hep drip. No leukocytosis, course of Zosyn per ID. Device stable flows. Discussed clinical condition with pt and sister again today. Pt wants to keep full code for now.     12/2/19: Received 6 units of PRBC for dual lesions which were clipped. Mental status improved, eating. Albumin 2.5, profound BLE edema, will restart lasix. Contact isolation for MRSA i urine. Chronic low        grade          hemolysis. LDH in low 1,000 today. Resumed Coumadin  12/3: Very weak, tired, sleepy, cont to monitor Hb  12/4: Hb 7.6, INR therapeutic, pt more awake today, has some chronic pain, mostly back pain , but no new c/o. Family meeting w palliative services on Fri for d/c plans  12/5: Sleepy, arousable. Hb 6.8, repeat 7.2, pt w intermittent hematuria, place [place], urology following, f/u labs  12/6:  Sleepy, c/o pain, responds appropriately. Pain meds adjusted. Mild hematuria. INR elevated, hold coumadin. Edematous, continue diuresis. Zosyn per ID. d/w Palliative team after family meeting, also long discussion with sisters today about medical condition, they are aware of guarded prognosis. Pt wants to keep full code. Plan eventual for home with [facility], equipment ordered.  12/7:  Lethargic, c/o pain. edematous, iv lasix. Cr and LFTs slight worse. Repeat Hg 6.5. Transfuse 1 unit. Repeat CT abd. Abx 
per ID, WBC stable. LD is up, INR 6. Labs reviewed. Prognosis very poor. d/w RN      Allergies:    Allergic Reactions (All)  Severity Not Documented  Morphine- Rash to liquid morphine.  Canceled/Inactive Reactions (All)  Severity Not Documented  Liquid Morphine- Rash.  NKA,    Allergies (1) Active Reaction  morphine rash to liquid morphine    Current medications:    Medications (33) Active  Scheduled: (22)  *Do NOT give IM Injections  1 each, N/A, Daily  Ascorbic acid 500 mg tab  250 mg 0.5 tab, Oral, TID  Ferrous sulfate 325 mg tab [Fe 65 mg]  325 mg 1 tab, Oral, TID [with meals]  Finasteride 5 mg tab  5 mg 1 tab, Oral, Daily  Folic acid 1 mg tab  1 mg 1 tab, Oral, Daily  Furosemide 40 mg/4 mL inj  40 mg 4 mL, Slow IV Push, Q8H  Hydrocortisone 20 mg tab  20 mg 1 tab, Oral, BID [with breakfast & dinner]  insulin glargine  5 unit 0.05 mL, Subcutaneous, Daily  Insulin human lispro 1 unit/0.01 mL inj  2-12 units, Subcutaneous, QID [with meals & HS]  Insulin human lispro 1 unit/0.01 mL inj  2 unit 0.02 mL, Subcutaneous, TID [with meals]  LevETIRAcetam 250 mg tab  250 mg 1 tab, Oral, Q12H  Lidocaine 4% (40 mg/gm) patch  1 patch, TransDermal, Daily  lidocaine topical patch removal`  Remove Patch, TransDermal, Daily  Morphine 15 mg ER tab 8-12 hr  15 mg 1 tab, Oral, Q12H  Multivitamin-minerals therapeutic tab  1 tab, Oral, Daily  Pantoprazole 40 mg DR tab  40 mg 1 tab, Oral, BID [before breakfast & dinner]  piperacillin-tazobactam  3.375 gm, IVPB, Q8H  Sodium chloride PF 0.9% flush inj 10 mL  10 mL, Flush, Q12H  Sucralfate 1,000 mg tab  1,000 mg 1 tab, Oral, QID [before meals & HS]  Tadalafil 20 mg tab  40 mg 2 tab, Oral, Daily  Tamsulosin 0.4 mg cap  0.4 mg 1 cap, Oral, Q Bedtime  Umeclidinium 62.5 mcg inhalation powder 7s  1 puff, MDI/DPI, Daily  Continuous: (0)  PRN: (11)  Acetaminophen 325 mg tab  650 mg 2 tab, Oral, Q4H  Dextrose (glucose) 40% 15 gm/37.5 gm oral gel UD  15 gm, Oral, As Directed PRN  Dextrose (glucose) 
50% 25 gm/50 mL syringe  12.5 gm 25 mL, IV Push, As Directed PRN  Docusate sodium 100 mg cap  100 mg 1 cap, Oral, BID  Loperamide 2 mg cap  2 mg 1 cap, Oral, Q6H  Meclizine 25 mg tab  25 mg 1 tab, Oral, Q8H  Melatonin 3 mg tab  3 mg 1 tab, Oral, Q Bedtime  Morphine 15 mg IR tab  15 mg 1 tab, Oral, Q6H  Ondansetron 4 mg/2 mL inj SDV  4 mg 2 mL, Slow IV Push, Q6H  Sodium chloride PF 0.9% flush inj 10 mL  10 mL, Flush, As Directed PRN  Sodium chloride PF 0.9% flush inj 10 mL  20 mL, Flush, As Directed PRN      Histories   Past Medical History: Problem List / Past Medical History   AICD (automatic cardioverter/defibrillator) present  CHF (congestive heart failure)  Cataract  Chronic pain  Coronary arteriosclerosis  Diabetes  Diabetic on insulin  Essential hypertension  H/O: blood transfusion  Headache, chronic  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   CA - Cancer of ovary  BROTHER  Intestine  FATHER  Diabetes                                                                                                                                                                                                                                20-SEP-2015 22:15:15<$>  Myocardial infarction  Hypertension    Procedure history.Social History       Alcohol  Details: Use: Past.  Frequency: 1-2 times per week.  Last Use: quit 21 years ago.  Exercise  Details: Excercise: Regularly.  Times Per Week: 3-4 times/week.  Type: Walking.; Comment(s): cardiac rehab 3 days per week  
Home/Environment  Details: Alcohol Abuse in Household: No.  Substance Abuse in Household: No.  Smoker in Household: No.  Patient Lives With: Sister.  Living Situation: Home with Family Care.  Ambulation: Required Assistance.  Bathing: Required Assistance.  Dressing: Required Assistance.  Driving: Not Performed.  Eating: Independent.  Elimination: Independent.  Grooming: Independent.  Preparing Meal: Not Performed.  Taking Meds: Independent.  Toileting:  Independent.  Transfers: Required Assistance.  Current Home Treatments: Assistance with ADLs, CPAP.  Assistive Devices Home: Cane, Glasses.  Substance Abuse  Details: Use: None.  Tobacco  Details: Smoked/Smokeless Tobacco Last 30 Days: No.  Use: Former smoker.  Type: Cigarettes.; Comment(s): quit in 1994  Details: Used in Last 12 Months: No.  Use: Former smoker.  Type: Cigarettes.  Smoking Cessation: Information Provided.  Cigarette Packs/Day: 1 Pack Per Day.  Year(s): 23.  Total Pack Years: 12.  Started Age: 15.0 Years.  Stopped Age: 36 Years.  Cultural/Restorationism Practices  Details: Restorationism or Cultural Practices: Church.  Restorationism or Cultural Practices While in Hospital: Yes.  .       Physical Examination   VS/Measurements     Vitals between:   06-DEC-2019 18:04:57   TO   07-DEC-2019 18:04:57                   LAST RESULT MINIMUM MAXIMUM  Temperature 36.5 36.4 36.7  Heart Rate 107 88 107  Respiratory Rate 16 15 16  NISBP           91 71 100  NIDBP           70 62 70  NIMBP           77 65 80  SpO2                    94 94 100  ,   Vitals between:   06-DEC-2019 18:04:57   TO   07-DEC-2019 18:04:57                   LAST RESULT MINIMUM MAXIMUM  Temperature 36.5 36.4 36.7  Heart Rate 107 88 107  Respiratory Rate 16 15 16  NISBP           91 71 100  NIDBP           70 62 70  NIMBP           77 65 80  SpO2                    94 94 100      General:  Alert and oriented, Mild distress.    Functional Status   Neck:  Jugular venous distention.    Respiratory:  Lungs are 
clear to auscultation, Respirations are non-labored.   Cardiovascular:  No edema, +VAD Tones.    Gastrointestinal:  Soft, mild tenderness in the upper abd, no perit signs.   Musculoskeletal     Normal range of motion.     Normal strength.     Integumentary:  Warm, Dry, See Driveline progress note .    Neurologic:  Alert, gen weak.    Psychiatric:  Cooperative.      Review / Management   Results review:     Labs between:  06-DEC-2019 18:04 to 07-DEC-2019 18:04  CBC:                 WBC  HgB  Hct  Plt  MCV  RDW   07-DEC-2019 (H) 13.8  (!) 6.5  (L) 20.4  234  91.5  (H) 19.1   07-DEC-2019 (H) 13.2  (L) 7.0  (L) 21.5  229  91.9  (H) 18.7   DIFF:                 Seg  Neutroph//ABS  Lymph//ABS  Mono//ABS  EOS/ABS  07-DEC-2019 NOT APPLICABLE  86 // (H) 11.8  4 // (L) 0.5  8 // (H) 1.1  0 // 0.1  BMP:                 Na  Cl  BUN  Glu   07-DEC-2019 139  99  (H) 67  (H) 141                              K  CO2  Cr  Ca                              4.3  30  (H) 2.59  8.6   CMP:                 AST  ALT  AlkPhos  Bili  Albumin   07-DEC-2019 (H) 553  (H) 85  (H) 1000  (H) 4.9  (L) 2.2   Other Chem:             Mg  Phos  Triglycerides  GGTP  DirectBili                           2.4           POC GLU:                 Latest Result  Latest Date  Minimum  Min Date  Maximum  Max Date                             (H) 152  07-DEC-2019 (H) 152  07-DEC-2019 (H) 162  06-DEC-2019  COAG:                 INR  PT  PTT  Ddimer  Fibrinogen    07-DEC-2019 (!) 6.7  (H) 64.0                        .         Interpretation:   Labs between:  06-DEC-2019 18:04 to 07-DEC-2019 18:04  CBC:                 WBC  HgB  Hct  Plt  MCV  RDW   07-DEC-2019 (H) 13.8  (!) 6.5  (L) 20.4  234  91.5  (H) 19.1   07-DEC-2019 (H) 13.2  (L) 7.0  (L) 21.5  229  91.9  (H) 18.7   DIFF:                 Seg  Neutroph//ABS  Lymph//ABS  Mono//ABS  EOS/ABS  07-DEC-2019 NOT APPLICABLE  86 // (H) 11.8  4 // (L) 0.5  8 // (H) 1.1  0 // 0.1  BMP:                 Na  Cl  BUN  Glu 
  07-DEC-2019 139  99  (H) 67  (H) 141                              K  CO2  Cr  Ca                              4.3  30  (H) 2.59  8.6   CMP:                 AST  ALT  AlkPhos  Bili  Albumin   07-DEC-2019 (H) 553  (H) 85  (H) 1000  (H) 4.9  (L) 2.2   Other Chem:             Mg  Phos  Triglycerides  GGTP  DirectBili                           2.4           POC GLU:                 Latest Result  Latest Date  Minimum  Min Date  Maximum  Max Date                             (H) 152  07-DEC-2019 (H) 152  07-DEC-2019 (H) 162  06-DEC-2019  COAG:                 INR  PT  PTT  Ddimer  Fibrinogen    07-DEC-2019 (!) 6.7  (H) 64.0                         .    Radiology results         Impression and Plan   63 y/o male with ICM and LVAD, Renal CA s/p R nephrectomy 2 months ago, admitted with weakness, abd pain, elevated LD, concern for sepsis, hemolysis  Medical problems:   AMS, weakness  suspected d/t infection, poor oral intake, dehydration  Pt with hx of CVA. CT head with no acute pathology  Pt afebrile, labs with leukocytosis, abnormal UA, abnormal LFTs, elevated lipase  CT scans with postop changes, cholelithiasis, possible L renal vein thrombus, no pleural effusions  Sent blood and Ucx. iv Abx. ID consult  Abnormal UA. Hx of UTI, urinary retention  Hx of MDRO E.coli, had bacteremia in may 2019  iv Abx pending Ucx.   CT scans noted. Pt had [place] until recently, monitor for urinary retention  Resume Finasteride and Flomax for BPH  Abd pain, mostly R sided. Nausea, diarrhea. From UTI, gallblader, postop  Pt is s/p R nephrectomy. Labs with abnormal LFTs, elevated lipase, leukocytosis  CT scans with small fluid collection R renal fossa, also cholelithiasis  Monitor labs. iv fluids. Repeat amylase/lipase. US liver if needed  Pt with hx of choledocolithiasis, required ERCP in 2018  Pain control. ID consult. GI consult if needed  Chronic Systolic HF (ICM) s/p LVAD 6/5/14, DT   - presents with hematuria, elevated LD, suspected 
hemolysis       -pt had prior episode of hemolysis, cardiac decompensation in July 2019 - LD elevated 1500, INR 2. Pt had recent subtherapeutic INR 1.7        -Plan for AC, Hep drip, Bicarb drip. Cr and lytes relative stable. F/U labs   - Device with stable flows. HR and BPs stable.          Renal cell CA. 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  Onc consult, Urology consult as needed.   ALVARO on CKDIII:       -Cr slight up. dehydration, iv fluids. Hold diuresis. Treat hyperglycemia   -UTI. iv Abx. Ucx pending  DM. Uncontroled. pt presents with severe hyperglycemia. BSs in the 500s  Monitor BSs, start Insulin drip, hold home Insulin regimen. Endo consult  Chronic Anemia. Hg stable. Monitor Hg, transfuse as needed  -pt with known chronic hematuria  -hx of GI bleed, gastric AVM cauterized 04/2019. continue PPI          Hx of CVA. remote. No focal deficits, Pt with overall weakness post recent major surgery  Headaches controlled on Topirimate 100 mg bid  BPH: Finasteride, Flomax  Adrenal insuff: On HC 20 and 10 mg, resume.   DVT prophy: Hep/coumadin  
pt seen in the CCU. [id number] am. 59 y/o M rec'd/left in [place] ROM [facility] Sport bed, nad, + 40% O2 FM, RT foot post op ace wrap drsg, tele, IV, mk. pt presents with overall deconditioning, fluctuating mental status, LT gaze deviation. able to perform AROM bilat UE's and AAROM bilat LE's. per RN, pt desaturated earlier today and was placed on 40% O2 FM. vitals: at rest 136/59 91% on FM, post ther ex 152/62 87 95% on FM.
Pt encountered in bed, lethargic, +condom cath, R residual limb dressing, tele, BP cuff, pulse ox, O2 @2L/min via NC. Pt reports not being in pain, but is extremely thirsty. Pt kane. tx poorly. Left with HOB elevated. Covering RN Claire made aware.

## 2021-06-16 NOTE — PHYSICAL THERAPY INITIAL EVALUATION ADULT - LIVES WITH, PROFILE
2 story home, 4 QASIM, 1 flight inside/alone
PH with sister and niece, 5 QASIM, can stay on 1st floor

## 2021-06-16 NOTE — OCCUPATIONAL THERAPY INITIAL EVALUATION ADULT - NS ASR FOLLOW COMMAND OT EVAL
required repetition of command at times, L/R discrimination difficulty inconsistently/50% of the time/able to follow single-step instructions

## 2021-06-16 NOTE — PROGRESS NOTE ADULT - ASSESSMENT
IMPRESSION    Acute Hypoxemic respiratory failure with hypercapnia resolved   Septic Shock RESOLVD   DFU s/p Right Knee Disarticulation for necrotizing soft tissue infection  CANDY on CKD IMPROVED   AMS 2/2 Toxic-Metabolic Encephelopathy resolved   MATA/ OHS  Morbid obesity  NSTEMI    PLAN:    CNS:  No depressants     HEENT:  Oral care    PULMONARY: HOB at 45 degrees.  Aspiration precautions.  NIV during sleep.  Wean O2     CARDIOVASCULAR:  Continue Gentle hydration while NPO.       GI: GI prophylaxis.  Feedign per Speech and swallow     RENAL: F/U Lytes and correct as needed.  FU with renal     INFECTIOUS DISEASE:  ABX as per ID.       HEMATOLOGICAL:  DVT prophylaxis.  FU CBC       ENDOCRINE:  Follow up FS.      MUSCULOSKELETAL: OOB as tolerated.        PT OT     DC TLC.  Might need PICC    DW house staff

## 2021-06-16 NOTE — PROGRESS NOTE ADULT - SUBJECTIVE AND OBJECTIVE BOX
Patient is a 60y old  Male who presents with a chief complaint of dfu (16 Jun 2021 08:46)      INTERVAL HPI/OVERNIGHT EVENTS:  no overnight events.     ICU Vital Signs Last 24 Hrs  T(C): 37.4 (16 Jun 2021 08:00), Max: 38.2 (16 Jun 2021 01:00)  T(F): 99.3 (16 Jun 2021 08:00), Max: 100.7 (16 Jun 2021 01:00)  HR: 82 (16 Jun 2021 10:00) (70 - 84)  BP: 149/69 (16 Jun 2021 10:00) (94/50 - 161/65)  BP(mean): 98 (16 Jun 2021 10:00) (62 - 103)  ABP: --  ABP(mean): --  RR: 18 (16 Jun 2021 08:00) (16 - 37)  SpO2: 97% (16 Jun 2021 10:00) (96% - 100%)    I&O's Summary    15 Juan F 2021 07:01  -  16 Jun 2021 07:00  --------------------------------------------------------  IN: 3300 mL / OUT: 2605 mL / NET: 695 mL          LABS:                        9.0    8.45  )-----------( 286      ( 16 Jun 2021 04:45 )             30.3     06-16    146  |  110  |  28<H>  ----------------------------<  126<H>  4.2   |  32  |  1.1    Ca    8.1<L>      16 Jun 2021 04:45  Phos  2.4     06-16  Mg     2.2     06-16    TPro  7.3  /  Alb  2.5<L>  /  TBili  0.6  /  DBili  x   /  AST  146<H>  /  ALT  90<H>  /  AlkPhos  110  06-16        CAPILLARY BLOOD GLUCOSE      POCT Blood Glucose.: 115 mg/dL (16 Jun 2021 05:54)  POCT Blood Glucose.: 116 mg/dL (15 Juan F 2021 22:53)  POCT Blood Glucose.: 183 mg/dL (15 Juan F 2021 16:24)  POCT Blood Glucose.: 225 mg/dL (15 Juan F 2021 11:50)        RADIOLOGY & ADDITIONAL TESTS:    Consultant(s) Notes Reviewed:  [x ] YES  [ ] NO    MEDICATIONS  (STANDING):  ALBUTerol    90 MICROgram(s) HFA Inhaler 1 Puff(s) Inhalation once  caspofungin IVPB 50 milliGRAM(s) IV Intermittent every 24 hours  chlorhexidine 0.12% Liquid 15 milliLiter(s) Oral Mucosa two times a day  chlorhexidine 4% Liquid 1 Application(s) Topical <User Schedule>  DAPTOmycin IVPB 875 milliGRAM(s) IV Intermittent every 24 hours  glucagon  Injectable 1 milliGRAM(s) IntraMuscular once  heparin   Injectable 5000 Unit(s) SubCutaneous every 8 hours  insulin glargine Injectable (LANTUS) 18 Unit(s) SubCutaneous at bedtime  insulin lispro (ADMELOG) corrective regimen sliding scale   SubCutaneous every 6 hours  insulin lispro Injectable (ADMELOG) 5 Unit(s) SubCutaneous every 6 hours  lactated ringers. 1000 milliLiter(s) (75 mL/Hr) IV Continuous <Continuous>  pantoprazole   Suspension 40 milliGRAM(s) Oral daily  polyethylene glycol 3350 17 Gram(s) Oral daily  senna 2 Tablet(s) Oral at bedtime  tiotropium 18 MICROgram(s) Capsule 1 Capsule(s) Inhalation once    MEDICATIONS  (PRN):  acetaminophen   Tablet .. 650 milliGRAM(s) Oral every 6 hours PRN Temp greater or equal to 38C (100.4F), Mild Pain (1 - 3)  acetaminophen  Suppository .. 650 milliGRAM(s) Rectal every 6 hours PRN Temp greater or equal to 38C (100.4F), Mild Pain (1 - 3)      PHYSICAL EXAM:  GENERAL: well built, well nourished  NERVOUS SYSTEM:  Alert & Oriented X1, memory intact, Motor Strength 5/5 in upper rt extremity, 4+ on the left upper extremities  CHEST/LUNG: reduced breath sounds, no dyspnea   HEART: S1S2 normal, distant, difficult to asucultate secondary to body habitus   ABDOMEN: Soft, Nontender, Nondistended; obese  EXTREMITIES:  rt AKA, no edema on left, amputation of several toes   SKIN: No rashes or lesions    Care Discussed with Consultants/Other Providers [ x] YES  [ ] NO

## 2021-06-16 NOTE — PHYSICAL THERAPY INITIAL EVALUATION ADULT - PRECAUTIONS/LIMITATIONS, REHAB EVAL
fall precautions/obesity precautions/oxygen therapy device and L/min
362 lbs; h/o RT foot diabetic charcot foot reconstruction/fall precautions/obesity precautions

## 2021-06-16 NOTE — OCCUPATIONAL THERAPY INITIAL EVALUATION ADULT - PLANNED THERAPY INTERVENTIONS, OT EVAL
ADL retraining/balance training/bed mobility training/cognitive, visual perceptual/fine motor coordination training/massage/motor coordination training/neuromuscular re-education/parent/caregiver training.../ROM/strengthening/stretching/transfer training

## 2021-06-16 NOTE — PROGRESS NOTE ADULT - ASSESSMENT
· Assessment	  61 y/o M with PMH of DM, hypertension, dyslipidemia, sleep apnea, Charcot foot reconstruction with external fixation, super morbid obesity present for worsening right foot infection.  Pt reports he has had this foot ulcer at various stage of healing for 13 years. Patient has been admitted for IV antibiotics multiple times in the past for osteomyelitis of right foot. He has history of MRSA infection in foot. pt has baseline neuropathy in both feet so does not feel any pain.  Patient states his Infection has been gradually getting worse despite treatment with bactrim, he saw his podiatrists and he was sent in by podiatry for IV abx .    IMPRESSION;  #Fevers with normal WBC normotensive, no pressors and extubated with an unremarkable CXR   Cryptogenic   Extubated 6/14    s/p Revision of disarticulation of knee 11-Jun-2021 "Healthy, non-infected tissue of knee disarticulation s/p conversion to Right AKA"    6/7,10  BCX NG    TLC was changed with R IJ placed on 6/8 5/31 S/p disarticulation right knee   -5/28 BCx E fecalis, ORSA  -5/31 TTE no vegetation  -etiology of bacteremia was right foot abscess  -5/29 R foot : ORSA , Corynebacterium  -5/30 BCx NG  -6/4 ,7, 10 BCx NG  CPK : secondary to Daptomycin. Downtrending 488.onitor once a week  Transaminitis : clinically no acute cholecystitis, no cholestasis ( making cholangitis also unlikely ). Possibly drug related or slow resolving ischemic hepatitis. US 6/15 unremarkable            RECOMMENDATIONS;  - Serum fungitell   - Caspo 50mg q24h IV 6/9- ( hold if fungitell is NG )  - Change Dapto 875mg q24h

## 2021-06-16 NOTE — PHYSICAL THERAPY INITIAL EVALUATION ADULT - PLANNED THERAPY INTERVENTIONS, PT EVAL
balance training/bed mobility training/gait training/strengthening/transfer training
bed mobility training/transfer training

## 2021-06-16 NOTE — PROGRESS NOTE ADULT - ASSESSMENT
61 y/o M with PMH of DM, hypertension, dyslipidemia, sleep apnea, Charcot foot reconstruction with external fixation, morbid obesity presented for worsening right foot infection.     # Necrotizing fascitis of rt leg s/p AKA  # Acute hypoxic respiratory failure secondary to septic shock - extubated   # Toxic metabolic encephalopathy - improved   # Fever overnight >100.6, improving    - septic on admission, WBC 12,  + lactate 2.3. s/p cefepime flagyl and vanc in the ED, in res distress  - s/p intubation 5/31, extubated 6/14  - bacteremia from right foot abscess s/p BKA with disarticulation 5/31 with a follow up AKA 6/11  - Podiatry on board,  - 5/28 BCx E fecalis, ORSA, BCx (-) 5/30, 6/4, 6/7, 6/10  - 5/29 R foot : ORSA , Corynebacterium  - Echo 3/31: EF 55-60%, G2DD, no vegetations   - ID following, f/u recs:  -c/w dapto 875mg q24, caspofungin 50mg for 6 weeks from first bcx  (-) (5/30-7/11)  - d/c eleuterio  - ddimer 2550, duplex (-)  - CPK >1300 -> 600~ ->300~-> 561 -> 485 -> 641 -> 716 -> 400~, monitor daily, likely uptrending from dapto, dose changed   - 6/10: Procal 1.08 -> .81, , ESR 58  - ID following, c/w wound care    - extubated 6/14, on 2L NC  - transaminitis noted, uptrending today, f/u RUQ sono, hepatomegaly with hepatic steatosis no gallbladder dx  - pt still having fevers, (drug fever? encephalopathy less likely as pt is following commands off sedation)  - S&S consulted, will follow today, pt likely has post extubation dysphagia    - CPap at night   - ID following, c/w wound care  - d/c TLC after peripherals obtained     # Hypernatremia   - na 146 today, likely dehydration   - c/w LR 75 ml/hr   - bmp daily     #?Code stroke for possible CVA, likely encephalopathy - improved   - ptnt was code stroke on floors on 3/30 for AMS and confusion upgraded to CCU   - metabolic encephalopathy secondary to sepsis more likely than stroke  -  CT head neg for acute pathology, CTA w/ moderate stenoses at the junction of the precavernous and cavernous segments of both internal carotid arteries  - neuro recs for brain MR, can get repeat CT head when stable, will not fit in MR machine   - neuro rec LP, as per pulm, as pt is following commands now, little clinical benefit to preforming as of now.   - CTH if acute change in mental status     # NSTEMI  - trops elevated on admission .11->.12  - secondary to sepsis induced demand ischemia  - less likely true ACS  - monitor in ccu     #CANDY on CKD III /  rule out ATN - resolved   - secondary to sepsis induced ATN, continue supportive care, monitor for now.   - creatinine 3.0 on admission, baseline around 1.4-1.8   - s/p CVVHD 6/1 and 6/2, holding CVVHD for now ptnt w/ good urine output   - nephro following  - Cr back at baseline     # Anemia of chronic disease   - transfuse Hb < 7  - monitor   - s/p 4 units in total since admission  - stable today    #Diabetes Mellitus  -on insulin regimen, increase bolus daily dose   - a1c 8.9   - Monitor   - FS q6hr     #Hypertension  -holding oral meds     #Dyslipidemia  -c/w home meds    #MATA/ OHS    #morbid obesity    Diet: npo until seen by S&S  DVT ppx: heparin subq TID  GI ppx: protonix   Dispo: acute, possible step down, OT & PT

## 2021-06-16 NOTE — PHYSICAL THERAPY INITIAL EVALUATION ADULT - LEVEL OF CONSCIOUSNESS, REHAB EVAL
lethargic/somnolent/confused
episodes of perseverated on some words. would start answering a question, but not able get past the 1st word, repeating it up to 8-10 times/alert

## 2021-06-16 NOTE — OCCUPATIONAL THERAPY INITIAL EVALUATION ADULT - GENERAL OBSERVATIONS, REHAB EVAL
Pt received semi conn in bed in NAD, +terrazas, +tele, +LLE sequential, +BP cuff, +pulse oxi, +IV lock, agreeable to OT evaluation, PT [place] and student Raysa present throughout evaluation for safety, left in bed in chair mode, all lines intact, vitals stable throughout session.

## 2021-06-16 NOTE — PROGRESS NOTE ADULT - SUBJECTIVE AND OBJECTIVE BOX
THOMASFERNANDEZ  60y, Male    All available historical data reviewed    OVERNIGHT EVENTS:  low grade fevers  98% NC 2 LIT  feels well and has no complaints   no diarrhea  no cough    ROS:  General: Denies rigors, nightsweats  HEENT: Denies headache, rhinorrhea, sore throat, eye pain  CV: Denies CP, palpitations  PULM: Denies wheezing, hemoptysis  GI: Denies hematemesis, hematochezia, melena  : Denies discharge, hematuria  MSK: Denies arthralgias, myalgias  SKIN: Denies rash, lesions  NEURO: Denies paresthesias, weakness  PSYCH: Denies depression, anxiety    VITALS:  T(F): 99.3, Max: 100.7 (06-16-21 @ 01:00)  HR: 76  BP: 150/64  RR: 19Vital Signs Last 24 Hrs  T(C): 37.4 (16 Jun 2021 06:00), Max: 38.2 (16 Jun 2021 01:00)  T(F): 99.3 (16 Jun 2021 06:00), Max: 100.7 (16 Jun 2021 01:00)  HR: 76 (16 Jun 2021 07:00) (70 - 84)  BP: 150/64 (16 Jun 2021 07:00) (94/50 - 166/74)  BP(mean): 83 (16 Jun 2021 07:00) (62 - 103)  RR: 19 (16 Jun 2021 07:00) (9 - 37)  SpO2: 98% (16 Jun 2021 07:00) (96% - 100%)    TESTS & MEASUREMENTS:                        9.0    8.45  )-----------( 286      ( 16 Jun 2021 04:45 )             30.3     06-16    146  |  110  |  28<H>  ----------------------------<  126<H>  4.2   |  32  |  1.1    Ca    8.1<L>      16 Jun 2021 04:45  Phos  2.4     06-16  Mg     2.2     06-16    TPro  7.3  /  Alb  2.5<L>  /  TBili  0.6  /  DBili  x   /  AST  146<H>  /  ALT  90<H>  /  AlkPhos  110  06-16    LIVER FUNCTIONS - ( 16 Jun 2021 04:45 )  Alb: 2.5 g/dL / Pro: 7.3 g/dL / ALK PHOS: 110 U/L / ALT: 90 U/L / AST: 146 U/L / GGT: x             Culture - Blood (collected 06-10-21 @ 17:07)  Source: .Blood None  Final Report (06-16-21 @ 01:13):    No Growth Final            RADIOLOGY & ADDITIONAL TESTS:  Personal review of radiological diagnostics performed  Echo and EKG results noted when applicable.     MEDICATIONS:  acetaminophen   Tablet .. 650 milliGRAM(s) Oral every 6 hours PRN  acetaminophen  Suppository .. 650 milliGRAM(s) Rectal every 6 hours PRN  ALBUTerol    90 MICROgram(s) HFA Inhaler 1 Puff(s) Inhalation once  caspofungin IVPB 50 milliGRAM(s) IV Intermittent every 24 hours  chlorhexidine 0.12% Liquid 15 milliLiter(s) Oral Mucosa two times a day  chlorhexidine 4% Liquid 1 Application(s) Topical <User Schedule>  DAPTOmycin IVPB 875 milliGRAM(s) IV Intermittent every 48 hours  glucagon  Injectable 1 milliGRAM(s) IntraMuscular once  heparin   Injectable 5000 Unit(s) SubCutaneous every 8 hours  insulin glargine Injectable (LANTUS) 18 Unit(s) SubCutaneous at bedtime  insulin lispro (ADMELOG) corrective regimen sliding scale   SubCutaneous every 6 hours  insulin lispro Injectable (ADMELOG) 5 Unit(s) SubCutaneous every 6 hours  lactated ringers. 1000 milliLiter(s) IV Continuous <Continuous>  pantoprazole   Suspension 40 milliGRAM(s) Oral daily  polyethylene glycol 3350 17 Gram(s) Oral daily  senna 2 Tablet(s) Oral at bedtime  tiotropium 18 MICROgram(s) Capsule 1 Capsule(s) Inhalation once      ANTIBIOTICS:  caspofungin IVPB 50 milliGRAM(s) IV Intermittent every 24 hours  DAPTOmycin IVPB 875 milliGRAM(s) IV Intermittent every 48 hours

## 2021-06-16 NOTE — OCCUPATIONAL THERAPY INITIAL EVALUATION ADULT - SITTING BALANCE: STATIC
pt with minimal ability to assist with lateral weight shifting in bed in chair mode with back supported

## 2021-06-16 NOTE — PHYSICAL THERAPY INITIAL EVALUATION ADULT - PATIENT PROFILE REVIEW, REHAB EVAL
Chart reviewed. Attempt #2 made in PM. Pt is lethargic, having difficulty opening eyes, and not responding consistently d/t fatigue. Pt moaning/groaning. Breathing is normal. HR normal. Please f/u tomorrow./yes
Chart reviewed. Unable to complete IE. As per RN Angelita, pt is gone for ultrasound. Attempt again tomorrow, as able./yes
yes
chart reviewed, pt seen from 9:00-9:35/yes

## 2021-06-16 NOTE — PHYSICAL THERAPY INITIAL EVALUATION ADULT - PERTINENT HX OF CURRENT PROBLEM, REHAB EVAL
Acute Hypoxemic respiratory failure with hypercapnia; Septic Shock; DFU s/p Right Knee Disarticulation for necrotizing soft tissue infection; CANDY on CKD; AMS 2/2 Toxic-Metabolic Encephelopathy.
presented to the hospital on 5/29 with worsening foot ulcer. S/P debridement of RT  foot on 5/28 and was being monitored on 4B until a stroke code was activated for worsening mental status and a left gaze deviation.

## 2021-06-16 NOTE — PROGRESS NOTE ADULT - SUBJECTIVE AND OBJECTIVE BOX
Patient is a 60y old  Male who presents with a chief complaint of dfu (15 Juan F 2021 17:54)        Over Night Events:  Looks and feels better.  Off pressors.  on IV hydration.          ROS:     All ROS are negative except HPI         PHYSICAL EXAM    ICU Vital Signs Last 24 Hrs  T(C): 37.4 (16 Jun 2021 06:00), Max: 38.2 (16 Jun 2021 01:00)  T(F): 99.3 (16 Jun 2021 06:00), Max: 100.7 (16 Jun 2021 01:00)  HR: 76 (16 Jun 2021 07:00) (70 - 84)  BP: 150/64 (16 Jun 2021 07:00) (94/50 - 166/74)  BP(mean): 83 (16 Jun 2021 07:00) (62 - 103)  ABP: --  ABP(mean): --  RR: 19 (16 Jun 2021 07:00) (9 - 37)  SpO2: 98% (16 Jun 2021 07:00) (96% - 100%)      CONSTITUTIONAL:  Well nourished.  NAD    ENT:   Airway patent,   Mouth with normal mucosa.   No thrush    EYES:   Pupils equal,   Round and reactive to light.    CARDIAC:   Normal rate,   Regular rhythm.    edema      Vascular:  Normal systolic impulse  No Carotid bruits    RESPIRATORY:   No wheezing  Bilateral BS  Normal chest expansion  Not tachypneic,  No use of accessory muscles    GASTROINTESTINAL:  Abdomen soft,   Non-tender,   No guarding,   + BS    MUSCULOSKELETAL:   Range of motion is not limited,  No clubbing, cyanosis    NEUROLOGICAL:   Alert and oriented   No motor  deficits.    SKIN:   Skin normal color for race,   Warm and dry  No evidence of rash.    PSYCHIATRIC:   Normal mood and affect.   No apparent risk to self or others.    HEMATOLOGICAL:  No cervical  lymphadenopathy.  no inguinal lymphadenopathy      06-15-21 @ 07:01  -  06-16-21 @ 07:00  --------------------------------------------------------  IN:    dextrose 5%: 1200 mL    dextrose 5% w/ Additives: 825 mL    IV PiggyBack: 300 mL    Lactated Ringers: 975 mL  Total IN: 3300 mL    OUT:    Voided (mL): 2605 mL  Total OUT: 2605 mL    Total NET: 695 mL          LABS:                            9.0    8.45  )-----------( 286      ( 16 Jun 2021 04:45 )             30.3                                               06-16    146  |  110  |  28<H>  ----------------------------<  126<H>  4.2   |  32  |  1.1    Ca    8.1<L>      16 Jun 2021 04:45  Phos  2.4     06-16  Mg     2.2     06-16    TPro  7.3  /  Alb  2.5<L>  /  TBili  0.6  /  DBili  x   /  AST  146<H>  /  ALT  90<H>  /  AlkPhos  110  06-16                                                 CARDIAC MARKERS ( 16 Jun 2021 04:45 )  x     / x     / 488 U/L / x     / x      CARDIAC MARKERS ( 15 Juan F 2021 04:15 )  x     / x     / 716 U/L / x     / x                                                LIVER FUNCTIONS - ( 16 Jun 2021 04:45 )  Alb: 2.5 g/dL / Pro: 7.3 g/dL / ALK PHOS: 110 U/L / ALT: 90 U/L / AST: 146 U/L / GGT: x                                                                                                                                   ABG - ( 14 Jun 2021 10:32 )  pH, Arterial: 7.40  pH, Blood: x     /  pCO2: 47    /  pO2: 67    / HCO3: 29    / Base Excess: 3.8   /  SaO2: 93                  MEDICATIONS  (STANDING):  ALBUTerol    90 MICROgram(s) HFA Inhaler 1 Puff(s) Inhalation once  caspofungin IVPB 50 milliGRAM(s) IV Intermittent every 24 hours  chlorhexidine 0.12% Liquid 15 milliLiter(s) Oral Mucosa two times a day  chlorhexidine 4% Liquid 1 Application(s) Topical <User Schedule>  DAPTOmycin IVPB 875 milliGRAM(s) IV Intermittent every 48 hours  glucagon  Injectable 1 milliGRAM(s) IntraMuscular once  heparin   Injectable 5000 Unit(s) SubCutaneous every 8 hours  insulin glargine Injectable (LANTUS) 18 Unit(s) SubCutaneous at bedtime  insulin lispro (ADMELOG) corrective regimen sliding scale   SubCutaneous every 6 hours  insulin lispro Injectable (ADMELOG) 5 Unit(s) SubCutaneous every 6 hours  lactated ringers. 1000 milliLiter(s) (75 mL/Hr) IV Continuous <Continuous>  pantoprazole   Suspension 40 milliGRAM(s) Oral daily  polyethylene glycol 3350 17 Gram(s) Oral daily  senna 2 Tablet(s) Oral at bedtime  sodium bicarbonate 650 milliGRAM(s) Oral every 6 hours  tiotropium 18 MICROgram(s) Capsule 1 Capsule(s) Inhalation once    MEDICATIONS  (PRN):  acetaminophen   Tablet .. 650 milliGRAM(s) Oral every 6 hours PRN Temp greater or equal to 38C (100.4F), Mild Pain (1 - 3)  acetaminophen  Suppository .. 650 milliGRAM(s) Rectal every 6 hours PRN Temp greater or equal to 38C (100.4F), Mild Pain (1 - 3)      New X-rays reviewed:                                                                                  ECHO    CXR interpreted by me:  no INFILTRATES

## 2021-06-16 NOTE — OCCUPATIONAL THERAPY INITIAL EVALUATION ADULT - TRANSFER TRAINING, PT EVAL
Patient will perform bed <> chair transfers with max assistance with appropriate assistive device by discharge ;

## 2021-06-16 NOTE — OCCUPATIONAL THERAPY INITIAL EVALUATION ADULT - RANGE OF MOTION EXAMINATION, UPPER EXTREMITY
B shoulders: ab/adduction/internal/external rotation WFL, flexion <1/4 AROM even in gravity eliminated, elbow/wrist/digits WFL, PROM all WFL

## 2021-06-16 NOTE — PHYSICAL THERAPY INITIAL EVALUATION ADULT - IMPAIRMENTS FOUND, PT EVAL
gait, locomotion, and balance/muscle strength
aerobic capacity/endurance/arousal, attention, and cognition/gait, locomotion, and balance

## 2021-06-16 NOTE — PHYSICAL THERAPY INITIAL EVALUATION ADULT - LEVEL OF INDEPENDENCE: SIT/STAND, REHAB EVAL
2* severe weakness/unable to perform
NA at this time due to desaturation earlier today/unable to perform

## 2021-06-16 NOTE — OCCUPATIONAL THERAPY INITIAL EVALUATION ADULT - LIVES WITH, PROFILE
pvt home, +QASIM, +HR, +1 flight to bedroom, +walk in shower 2nd floor, +bathroom on first floor/alone

## 2021-06-17 LAB
ALBUMIN SERPL ELPH-MCNC: 2.6 G/DL — LOW (ref 3.5–5.2)
ALP SERPL-CCNC: 116 U/L — HIGH (ref 30–115)
ALT FLD-CCNC: 100 U/L — HIGH (ref 0–41)
ANION GAP SERPL CALC-SCNC: 9 MMOL/L — SIGNIFICANT CHANGE UP (ref 7–14)
AST SERPL-CCNC: 150 U/L — HIGH (ref 0–41)
BASOPHILS # BLD AUTO: 0.04 K/UL — SIGNIFICANT CHANGE UP (ref 0–0.2)
BASOPHILS NFR BLD AUTO: 0.5 % — SIGNIFICANT CHANGE UP (ref 0–1)
BILIRUB SERPL-MCNC: 0.6 MG/DL — SIGNIFICANT CHANGE UP (ref 0.2–1.2)
BUN SERPL-MCNC: 26 MG/DL — HIGH (ref 10–20)
CALCIUM SERPL-MCNC: 8.3 MG/DL — LOW (ref 8.5–10.1)
CHLORIDE SERPL-SCNC: 109 MMOL/L — SIGNIFICANT CHANGE UP (ref 98–110)
CK SERPL-CCNC: 324 U/L — HIGH (ref 0–225)
CO2 SERPL-SCNC: 29 MMOL/L — SIGNIFICANT CHANGE UP (ref 17–32)
CREAT SERPL-MCNC: 1.1 MG/DL — SIGNIFICANT CHANGE UP (ref 0.7–1.5)
EOSINOPHIL # BLD AUTO: 0.39 K/UL — SIGNIFICANT CHANGE UP (ref 0–0.7)
EOSINOPHIL NFR BLD AUTO: 4.7 % — SIGNIFICANT CHANGE UP (ref 0–8)
GAS PNL BLDA: SIGNIFICANT CHANGE UP
GLUCOSE BLDC GLUCOMTR-MCNC: 122 MG/DL — HIGH (ref 70–99)
GLUCOSE BLDC GLUCOMTR-MCNC: 124 MG/DL — HIGH (ref 70–99)
GLUCOSE BLDC GLUCOMTR-MCNC: 132 MG/DL — HIGH (ref 70–99)
GLUCOSE BLDC GLUCOMTR-MCNC: 147 MG/DL — HIGH (ref 70–99)
GLUCOSE BLDC GLUCOMTR-MCNC: 147 MG/DL — HIGH (ref 70–99)
GLUCOSE SERPL-MCNC: 142 MG/DL — HIGH (ref 70–99)
HCT VFR BLD CALC: 32.7 % — LOW (ref 42–52)
HGB BLD-MCNC: 9.3 G/DL — LOW (ref 14–18)
IMM GRANULOCYTES NFR BLD AUTO: 0.4 % — HIGH (ref 0.1–0.3)
LYMPHOCYTES # BLD AUTO: 1.52 K/UL — SIGNIFICANT CHANGE UP (ref 1.2–3.4)
LYMPHOCYTES # BLD AUTO: 18.1 % — LOW (ref 20.5–51.1)
MAGNESIUM SERPL-MCNC: 2.1 MG/DL — SIGNIFICANT CHANGE UP (ref 1.8–2.4)
MCHC RBC-ENTMCNC: 26.6 PG — LOW (ref 27–31)
MCHC RBC-ENTMCNC: 28.4 G/DL — LOW (ref 32–37)
MCV RBC AUTO: 93.7 FL — SIGNIFICANT CHANGE UP (ref 80–94)
MONOCYTES # BLD AUTO: 0.85 K/UL — HIGH (ref 0.1–0.6)
MONOCYTES NFR BLD AUTO: 10.1 % — HIGH (ref 1.7–9.3)
NEUTROPHILS # BLD AUTO: 5.55 K/UL — SIGNIFICANT CHANGE UP (ref 1.4–6.5)
NEUTROPHILS NFR BLD AUTO: 66.2 % — SIGNIFICANT CHANGE UP (ref 42.2–75.2)
NRBC # BLD: 0 /100 WBCS — SIGNIFICANT CHANGE UP (ref 0–0)
PHOSPHATE SERPL-MCNC: 3 MG/DL — SIGNIFICANT CHANGE UP (ref 2.1–4.9)
PLATELET # BLD AUTO: 318 K/UL — SIGNIFICANT CHANGE UP (ref 130–400)
POTASSIUM SERPL-MCNC: 4.3 MMOL/L — SIGNIFICANT CHANGE UP (ref 3.5–5)
POTASSIUM SERPL-SCNC: 4.3 MMOL/L — SIGNIFICANT CHANGE UP (ref 3.5–5)
PROT SERPL-MCNC: 7.7 G/DL — SIGNIFICANT CHANGE UP (ref 6–8)
RBC # BLD: 3.49 M/UL — LOW (ref 4.7–6.1)
RBC # FLD: 18 % — HIGH (ref 11.5–14.5)
SODIUM SERPL-SCNC: 147 MMOL/L — HIGH (ref 135–146)
WBC # BLD: 8.38 K/UL — SIGNIFICANT CHANGE UP (ref 4.8–10.8)
WBC # FLD AUTO: 8.38 K/UL — SIGNIFICANT CHANGE UP (ref 4.8–10.8)

## 2021-06-17 PROCEDURE — 71045 X-RAY EXAM CHEST 1 VIEW: CPT | Mod: 26

## 2021-06-17 PROCEDURE — 99232 SBSQ HOSP IP/OBS MODERATE 35: CPT

## 2021-06-17 RX ADMIN — INSULIN GLARGINE 18 UNIT(S): 100 INJECTION, SOLUTION SUBCUTANEOUS at 21:01

## 2021-06-17 RX ADMIN — HEPARIN SODIUM 5000 UNIT(S): 5000 INJECTION INTRAVENOUS; SUBCUTANEOUS at 13:53

## 2021-06-17 RX ADMIN — SODIUM CHLORIDE 75 MILLILITER(S): 9 INJECTION, SOLUTION INTRAVENOUS at 21:01

## 2021-06-17 RX ADMIN — Medication 5 UNIT(S): at 17:33

## 2021-06-17 RX ADMIN — Medication 5 UNIT(S): at 11:20

## 2021-06-17 RX ADMIN — PANTOPRAZOLE SODIUM 40 MILLIGRAM(S): 20 TABLET, DELAYED RELEASE ORAL at 11:27

## 2021-06-17 RX ADMIN — HEPARIN SODIUM 5000 UNIT(S): 5000 INJECTION INTRAVENOUS; SUBCUTANEOUS at 06:40

## 2021-06-17 RX ADMIN — CHLORHEXIDINE GLUCONATE 1 APPLICATION(S): 213 SOLUTION TOPICAL at 06:40

## 2021-06-17 RX ADMIN — CASPOFUNGIN ACETATE 260 MILLIGRAM(S): 7 INJECTION, POWDER, LYOPHILIZED, FOR SOLUTION INTRAVENOUS at 12:52

## 2021-06-17 RX ADMIN — CHLORHEXIDINE GLUCONATE 15 MILLILITER(S): 213 SOLUTION TOPICAL at 06:40

## 2021-06-17 RX ADMIN — DAPTOMYCIN 135 MILLIGRAM(S): 500 INJECTION, POWDER, LYOPHILIZED, FOR SOLUTION INTRAVENOUS at 15:39

## 2021-06-17 RX ADMIN — HEPARIN SODIUM 5000 UNIT(S): 5000 INJECTION INTRAVENOUS; SUBCUTANEOUS at 21:01

## 2021-06-17 RX ADMIN — Medication 650 MILLIGRAM(S): at 20:30

## 2021-06-17 RX ADMIN — Medication 5 UNIT(S): at 07:15

## 2021-06-17 RX ADMIN — Medication 650 MILLIGRAM(S): at 20:00

## 2021-06-17 NOTE — PROGRESS NOTE ADULT - SUBJECTIVE AND OBJECTIVE BOX
SUBJECTIVE:  HPI:  Patient is a 61 y/o M with PMH of DM, hypertension, dyslipidemia, sleep apnea, Charcot foot reconstruction with external fixation, super morbid obesity present for worsening right foot infection.  Pt reports he has had this foot ulcer at various stage of healing for 13 years. Patient has been admitted for IV antibiotics multiple times in the past for osteomyelitis of right foot. He has history of MRSA infection in foot. pt has baseline neuropathy in both feet so does not feel any pain.  Patient states his Infection has been gradually getting worse despite treatment with bactrim, he saw his podiatrists and he was sent in by podiatry for IV abx . No fever, chills, cp, sob, abd pain, nausea, vomiting, dysuria, calf pain.     In ED  T(C): 37.3 (05-29-21 @ 01:31), Max: 38 (05-28-21 @ 20:27)  HR: 100 (05-29-21 @ 01:23) (99 - 118)  BP: 119/59 (05-29-21 @ 01:23) (119/59 - 139/69)  RR: 18 (05-29-21 @ 01:23) (17 - 18)  SpO2: 96% (05-29-21 @ 01:23) (86% - 100%)  there is a Right foot ulcer on the lateral aspect of the plantar surface measuring 8.5-3cm. Ulcer is surrounded by macerated tissue. Serous drainage from wound. Kurlex and sponge dressing in place, clean, dry and intact. Patient was I&D'd bedside by podiatry in the ed. purulent drainage was evacuated and sent to the lab for culture. Patient is scheduled for OR with podiatry in the AM.   (29 May 2021 02:29)    OVERNIGHT EVENTS:   Patient had episode of large emesis while on NIV at night, remains hemodynamically stable, no requiring increased O2 support, taken off BiPAP and placed on NC, STAT CXR ordered    PAST MEDICAL & SURGICAL HISTORY  PAST MEDICAL & SURGICAL HISTORY:  MATA on CPAP    DM (diabetes mellitus)    HTN (hypertension)    High cholesterol    Charcot ankle, right    History of percutaneous angioplasty    H/O skin graft    Left toe amputee  4 TOES    Diabetic Charcot foot  Fixation with external device      SOCIAL HISTORY:    ALLERGIES:  No Known Allergies    MEDICATIONS:  STANDING MEDICATIONS  ALBUTerol    90 MICROgram(s) HFA Inhaler 1 Puff(s) Inhalation once  caspofungin IVPB 50 milliGRAM(s) IV Intermittent every 24 hours  chlorhexidine 0.12% Liquid 15 milliLiter(s) Oral Mucosa two times a day  chlorhexidine 4% Liquid 1 Application(s) Topical <User Schedule>  DAPTOmycin IVPB 875 milliGRAM(s) IV Intermittent every 24 hours  glucagon  Injectable 1 milliGRAM(s) IntraMuscular once  heparin   Injectable 5000 Unit(s) SubCutaneous every 8 hours  insulin glargine Injectable (LANTUS) 18 Unit(s) SubCutaneous at bedtime  insulin lispro (ADMELOG) corrective regimen sliding scale   SubCutaneous every 6 hours  insulin lispro Injectable (ADMELOG) 5 Unit(s) SubCutaneous every 6 hours  lactated ringers. 1000 milliLiter(s) IV Continuous <Continuous>  pantoprazole   Suspension 40 milliGRAM(s) Oral daily  polyethylene glycol 3350 17 Gram(s) Oral daily  tiotropium 18 MICROgram(s) Capsule 1 Capsule(s) Inhalation once    PRN MEDICATIONS  acetaminophen   Tablet .. 650 milliGRAM(s) Oral every 6 hours PRN  acetaminophen  Suppository .. 650 milliGRAM(s) Rectal every 6 hours PRN    VITALS:   T(F): 99  HR: 74  BP: 149/97  RR: 20  SpO2: 98%    LABS:                        9.0    8.45  )-----------( 286      ( 16 Jun 2021 04:45 )             30.3     06-16    146  |  110  |  28<H>  ----------------------------<  126<H>  4.2   |  32  |  1.1    Ca    8.1<L>      16 Jun 2021 04:45  Phos  2.4     06-16  Mg     2.2     06-16    TPro  7.3  /  Alb  2.5<L>  /  TBili  0.6  /  DBili  x   /  AST  146<H>  /  ALT  90<H>  /  AlkPhos  110  06-16          Creatine Kinase, Serum: 488 U/L *H* (06-16-21 @ 04:45)      CARDIAC MARKERS ( 16 Jun 2021 04:45 )  x     / x     / 488 U/L / x     / x      CARDIAC MARKERS ( 15 Juan F 2021 04:15 )  x     / x     / 716 U/L / x     / x          RADIOLOGY:    PHYSICAL EXAM:  PHYSICAL EXAM:  GENERAL: well built, well nourished  NERVOUS SYSTEM:  Alert & Oriented X1, memory intact, Motor Strength 5/5 in upper rt extremity, 4+ on the left upper extremities  CHEST/LUNG: reduced breath sounds, no dyspnea   HEART: S1S2 normal, distant, difficult to asucultate secondary to body habitus   ABDOMEN: Soft, Nontender, Nondistended; obese  EXTREMITIES:  rt AKA, no edema on left, amputation of several toes   SKIN: No rashes or lesions

## 2021-06-17 NOTE — SWALLOW BEDSIDE ASSESSMENT ADULT - COMMENTS
RN reported pt w/ ? coughing w/ po intake yesterday and pocketing of mech soft solids
Mild oral dysphagia w/o any overt s/s of penetration/aspiration for regular solids

## 2021-06-17 NOTE — SWALLOW FEES ASSESSMENT ADULT - NS SWALLOW FEES REC ASPIR MON
oral hygiene/position upright (90Y)/cough/gurgly voice/fever/pneumonia/throat clearing/upper respiratory infection

## 2021-06-17 NOTE — PROGRESS NOTE ADULT - SUBJECTIVE AND OBJECTIVE BOX
THOMASFERNANDEZ  60y, Male    All available historical data reviewed    OVERNIGHT EVENTS:  isolated fevers  98% NC 4 LIT  poorly responsive  diarrhea  no pressors  R IJ catheter removed    ROS:  not reliable    VITALS:  T(F): 98, Max: 101.5 (06-16-21 @ 18:00)  HR: 72  BP: 164/76  RR: 61Vital Signs Last 24 Hrs  T(C): 36.7 (17 Jun 2021 06:00), Max: 38.6 (16 Jun 2021 18:00)  T(F): 98 (17 Jun 2021 06:00), Max: 101.5 (16 Jun 2021 18:00)  HR: 72 (17 Jun 2021 07:00) (35 - 82)  BP: 164/76 (17 Jun 2021 07:00) (133/66 - 179/82)  BP(mean): 10 (17 Jun 2021 07:00) (10 - 120)  RR: 61 (17 Jun 2021 07:00) (18 - 61)  SpO2: 98% (17 Jun 2021 07:43) (92% - 100%)    TESTS & MEASUREMENTS:                        9.3    8.38  )-----------( 318      ( 17 Jun 2021 04:43 )             32.7     06-17    147<H>  |  109  |  26<H>  ----------------------------<  142<H>  4.3   |  29  |  1.1    Ca    8.3<L>      17 Jun 2021 04:43  Phos  3.0     06-17  Mg     2.1     06-17    TPro  7.7  /  Alb  2.6<L>  /  TBili  0.6  /  DBili  x   /  AST  150<H>  /  ALT  100<H>  /  AlkPhos  116<H>  06-17    LIVER FUNCTIONS - ( 17 Jun 2021 04:43 )  Alb: 2.6 g/dL / Pro: 7.7 g/dL / ALK PHOS: 116 U/L / ALT: 100 U/L / AST: 150 U/L / GGT: x             Culture - Blood (collected 06-10-21 @ 17:07)  Source: .Blood None  Final Report (06-16-21 @ 01:13):    No Growth Final            RADIOLOGY & ADDITIONAL TESTS:  Personal review of radiological diagnostics performed  Echo and EKG results noted when applicable.     MEDICATIONS:  acetaminophen   Tablet .. 650 milliGRAM(s) Oral every 6 hours PRN  acetaminophen  Suppository .. 650 milliGRAM(s) Rectal every 6 hours PRN  ALBUTerol    90 MICROgram(s) HFA Inhaler 1 Puff(s) Inhalation once  caspofungin IVPB 50 milliGRAM(s) IV Intermittent every 24 hours  chlorhexidine 0.12% Liquid 15 milliLiter(s) Oral Mucosa two times a day  chlorhexidine 4% Liquid 1 Application(s) Topical <User Schedule>  DAPTOmycin IVPB 875 milliGRAM(s) IV Intermittent every 24 hours  glucagon  Injectable 1 milliGRAM(s) IntraMuscular once  heparin   Injectable 5000 Unit(s) SubCutaneous every 8 hours  insulin glargine Injectable (LANTUS) 18 Unit(s) SubCutaneous at bedtime  insulin lispro (ADMELOG) corrective regimen sliding scale   SubCutaneous every 6 hours  insulin lispro Injectable (ADMELOG) 5 Unit(s) SubCutaneous every 6 hours  lactated ringers. 1000 milliLiter(s) IV Continuous <Continuous>  pantoprazole   Suspension 40 milliGRAM(s) Oral daily  polyethylene glycol 3350 17 Gram(s) Oral daily  tiotropium 18 MICROgram(s) Capsule 1 Capsule(s) Inhalation once      ANTIBIOTICS:  caspofungin IVPB 50 milliGRAM(s) IV Intermittent every 24 hours  DAPTOmycin IVPB 875 milliGRAM(s) IV Intermittent every 24 hours

## 2021-06-17 NOTE — SWALLOW FEES ASSESSMENT ADULT - PHARYNGEAL PHASE COMMENTS
Severe pharyngeal dysphagia for thin liquids mild pharyngeal dysphagia for honey, puree and mech soft solids Severe pharyngeal dysphagia for nectar thick liquids

## 2021-06-17 NOTE — SWALLOW FEES ASSESSMENT ADULT - ROSENBEK'S PENETRATION ASPIRATION SCALE
(8) material passes glottis, visible subglottic residue remains, absent patient response (aspiration) (1) no aspiration, material does not enter airway

## 2021-06-17 NOTE — PROGRESS NOTE ADULT - ASSESSMENT
· Assessment	  61 y/o M with PMH of DM, hypertension, dyslipidemia, sleep apnea, Charcot foot reconstruction with external fixation, super morbid obesity present for worsening right foot infection.  Pt reports he has had this foot ulcer at various stage of healing for 13 years. Patient has been admitted for IV antibiotics multiple times in the past for osteomyelitis of right foot. He has history of MRSA infection in foot. pt has baseline neuropathy in both feet so does not feel any pain.  Patient states his Infection has been gradually getting worse despite treatment with bactrim, he saw his podiatrists and he was sent in by podiatry for IV abx .    IMPRESSION;  #Fevers with normal WBC normotensive, no pressors and extubated with an unremarkable CXR   Cryptogenic   Extubated 6/14    s/p Revision of disarticulation of knee 11-Jun-2021 "Healthy, non-infected tissue of knee disarticulation s/p conversion to Right AKA"    6/7,10  BCX NG    TLC was changed with R IJ placed on 6/8 5/31 S/p disarticulation right knee   -5/28 BCx E fecalis, ORSA  -5/31 TTE no vegetation  -etiology of bacteremia was right foot abscess  -5/29 R foot : ORSA , Corynebacterium  -5/30 BCx NG  -6/4 ,7, 10 BCx NG  CPK : secondary to Daptomycin. Downtrending 488.onitor once a week  Transaminitis : clinically no acute cholecystitis, no cholestasis ( making cholangitis also unlikely ). Possibly drug related or slow resolving ischemic hepatitis. US 6/15 unremarkable            RECOMMENDATIONS;  -BCx x 2  - Serum fungitell   - Caspo 50mg q24h IV 6/9- ( hold if fungitell is NG )  - Dapto 875mg q24h

## 2021-06-17 NOTE — PROGRESS NOTE ADULT - ASSESSMENT
61 y/o M with PMH of DM, hypertension, dyslipidemia, sleep apnea, Charcot foot reconstruction with external fixation, morbid obesity presented for worsening right foot infection.     # Necrotizing fascitis of rt leg s/p AKA  # Acute hypoxic respiratory failure secondary to septic shock - extubated   # Toxic metabolic encephalopathy - improved   # Fever overnight > resolved  # Episode of Emesis on CPAP overnight 06/17  - septic on admission, WBC 12,  + lactate 2.3. s/p cefepime flagyl and vanc in the ED, in res distress  - s/p intubation 5/31, extubated 6/14, currently on 4L NC w/ CPAP at night, sat'ing 97-99  - bacteremia from right foot abscess s/p BKA with disarticulation 5/31 with a follow up AKA 6/11  - Podiatry on board  - 5/28 BCx E fecalis, ORSA, BCx (-) 5/30, 6/4, 6/7, 6/10  - 5/29 R foot : ORSA , Corynebacterium  - Echo 3/31: EF 55-60%, G2DD, no vegetations   - ID following, f/u recs:              -c/w dapto 875mg q24, caspofungin 50mg for 6 weeks from first bcx  (-) (5/30-7/11)  - d/c eleuterio  - ddimer 2550, duplex (-)  - CPK >1300 -> 600~ ->300~-> 561 -> 485 -> 641 -> 716 -> 400~, monitor daily, likely uptrending from dapto, dose changed   - 6/10: Procal 1.08 -> .81, , ESR 58  - ID following, c/w wound care    - extubated 6/14, on 2L NC  - transaminitis noted, uptrending yesterday, RUQ sono, hepatomegaly with hepatic steatosis no gallbladder dx  - Fever resolved (drug fever? encephalopathy less likely as pt is following commands off sedation)  - Patient w/ episode on NBNB emesis while on CPAP, already on ABx, f/u CXR  - S&S consulted: Dysphagia 2 w/ thins  - CPap at night   - d/c'ed TLC 06/16     # Hypernatremia   - na 146 today, likely dehydration   - c/w LR 75 ml/hr   - bmp daily     #?Code stroke for possible CVA, likely encephalopathy - improved   - ptnt was code stroke on floors on 3/30 for AMS and confusion upgraded to CCU   - metabolic encephalopathy secondary to sepsis more likely than stroke  -  CT head neg for acute pathology, CTA w/ moderate stenoses at the junction of the precavernous and cavernous segments of both internal carotid arteries  - neuro recs for brain MR, can get repeat CT head when stable, will not fit in MR machine   - neuro rec LP, as per pulm, as pt is following commands now, little clinical benefit to preforming as of now.   - CTH if acute change in mental status     # NSTEMI  - trops elevated on admission 0.11->0.12 ->0.12  - secondary to sepsis induced demand ischemia  - less likely true ACS  - monitor in CCU    #CANDY on CKD III /  rule out ATN - resolved   - secondary to sepsis induced ATN, continue supportive care, monitor for now.   - creatinine 3.0 on admission, baseline around 1.1  - s/p CVVHD 6/1 and 6/2, holding CVVHD for now ptnt w/ good urine output   - nephro following  - Cr back at baseline     # Anemia of chronic disease   - transfuse Hb < 7  - monitor   - s/p 4 units in total since admission  - stable today    #Diabetes Mellitus  -on insulin regimen, increase bolus daily dose   - a1c 8.9   - Monitor   - FS q6hr     #Hypertension  -holding oral meds     #Dyslipidemia  -c/w home meds    #MATA/ OHS    #morbid obesity    Diet: Dysphagia 2 w/ Thin Liquids  DVT ppx: heparin subq TID  GI ppx: protonix   Dispo: acute, possible step down, OT & PT - recc Rehab

## 2021-06-17 NOTE — SWALLOW FEES ASSESSMENT ADULT - ORAL PHASE
spillage over BOT to the level of the TVC prior to the swallow/Uncontrolled bolus/spillover in hypopharynx pooling into the valleculae w/ spillage down to the level of the pyriform sinus prior to the swallow pooling to the level of the pyriform sinus prior to the swallow/Uncontrolled bolus/spillover in hypopharynx

## 2021-06-18 LAB
ALBUMIN SERPL ELPH-MCNC: 2.4 G/DL — LOW (ref 3.5–5.2)
ALP SERPL-CCNC: 102 U/L — SIGNIFICANT CHANGE UP (ref 30–115)
ALT FLD-CCNC: 72 U/L — HIGH (ref 0–41)
ANION GAP SERPL CALC-SCNC: 8 MMOL/L — SIGNIFICANT CHANGE UP (ref 7–14)
AST SERPL-CCNC: 68 U/L — HIGH (ref 0–41)
BASOPHILS # BLD AUTO: 0.03 K/UL — SIGNIFICANT CHANGE UP (ref 0–0.2)
BASOPHILS NFR BLD AUTO: 0.3 % — SIGNIFICANT CHANGE UP (ref 0–1)
BILIRUB SERPL-MCNC: 0.4 MG/DL — SIGNIFICANT CHANGE UP (ref 0.2–1.2)
BUN SERPL-MCNC: 20 MG/DL — SIGNIFICANT CHANGE UP (ref 10–20)
CALCIUM SERPL-MCNC: 8.2 MG/DL — LOW (ref 8.5–10.1)
CHLORIDE SERPL-SCNC: 112 MMOL/L — HIGH (ref 98–110)
CO2 SERPL-SCNC: 28 MMOL/L — SIGNIFICANT CHANGE UP (ref 17–32)
CREAT SERPL-MCNC: 1.1 MG/DL — SIGNIFICANT CHANGE UP (ref 0.7–1.5)
EOSINOPHIL # BLD AUTO: 0.43 K/UL — SIGNIFICANT CHANGE UP (ref 0–0.7)
EOSINOPHIL NFR BLD AUTO: 4.9 % — SIGNIFICANT CHANGE UP (ref 0–8)
GLUCOSE BLDC GLUCOMTR-MCNC: 108 MG/DL — HIGH (ref 70–99)
GLUCOSE BLDC GLUCOMTR-MCNC: 118 MG/DL — HIGH (ref 70–99)
GLUCOSE BLDC GLUCOMTR-MCNC: 140 MG/DL — HIGH (ref 70–99)
GLUCOSE BLDC GLUCOMTR-MCNC: 145 MG/DL — HIGH (ref 70–99)
GLUCOSE BLDC GLUCOMTR-MCNC: 157 MG/DL — HIGH (ref 70–99)
GLUCOSE SERPL-MCNC: 114 MG/DL — HIGH (ref 70–99)
HCT VFR BLD CALC: 32.1 % — LOW (ref 42–52)
HGB BLD-MCNC: 9.5 G/DL — LOW (ref 14–18)
IMM GRANULOCYTES NFR BLD AUTO: 0.3 % — SIGNIFICANT CHANGE UP (ref 0.1–0.3)
LYMPHOCYTES # BLD AUTO: 1.57 K/UL — SIGNIFICANT CHANGE UP (ref 1.2–3.4)
LYMPHOCYTES # BLD AUTO: 18 % — LOW (ref 20.5–51.1)
MAGNESIUM SERPL-MCNC: 1.9 MG/DL — SIGNIFICANT CHANGE UP (ref 1.8–2.4)
MCHC RBC-ENTMCNC: 27.3 PG — SIGNIFICANT CHANGE UP (ref 27–31)
MCHC RBC-ENTMCNC: 29.6 G/DL — LOW (ref 32–37)
MCV RBC AUTO: 92.2 FL — SIGNIFICANT CHANGE UP (ref 80–94)
MONOCYTES # BLD AUTO: 0.64 K/UL — HIGH (ref 0.1–0.6)
MONOCYTES NFR BLD AUTO: 7.3 % — SIGNIFICANT CHANGE UP (ref 1.7–9.3)
NEUTROPHILS # BLD AUTO: 6.02 K/UL — SIGNIFICANT CHANGE UP (ref 1.4–6.5)
NEUTROPHILS NFR BLD AUTO: 69.2 % — SIGNIFICANT CHANGE UP (ref 42.2–75.2)
NRBC # BLD: 0 /100 WBCS — SIGNIFICANT CHANGE UP (ref 0–0)
PHOSPHATE SERPL-MCNC: 2.5 MG/DL — SIGNIFICANT CHANGE UP (ref 2.1–4.9)
PLATELET # BLD AUTO: 319 K/UL — SIGNIFICANT CHANGE UP (ref 130–400)
POTASSIUM SERPL-MCNC: 4 MMOL/L — SIGNIFICANT CHANGE UP (ref 3.5–5)
POTASSIUM SERPL-SCNC: 4 MMOL/L — SIGNIFICANT CHANGE UP (ref 3.5–5)
PROT SERPL-MCNC: 7.4 G/DL — SIGNIFICANT CHANGE UP (ref 6–8)
RBC # BLD: 3.48 M/UL — LOW (ref 4.7–6.1)
RBC # FLD: 18.3 % — HIGH (ref 11.5–14.5)
SODIUM SERPL-SCNC: 148 MMOL/L — HIGH (ref 135–146)
WBC # BLD: 8.72 K/UL — SIGNIFICANT CHANGE UP (ref 4.8–10.8)
WBC # FLD AUTO: 8.72 K/UL — SIGNIFICANT CHANGE UP (ref 4.8–10.8)

## 2021-06-18 PROCEDURE — 71045 X-RAY EXAM CHEST 1 VIEW: CPT | Mod: 26

## 2021-06-18 PROCEDURE — 99291 CRITICAL CARE FIRST HOUR: CPT

## 2021-06-18 PROCEDURE — 99232 SBSQ HOSP IP/OBS MODERATE 35: CPT

## 2021-06-18 RX ADMIN — PANTOPRAZOLE SODIUM 40 MILLIGRAM(S): 20 TABLET, DELAYED RELEASE ORAL at 11:47

## 2021-06-18 RX ADMIN — DAPTOMYCIN 135 MILLIGRAM(S): 500 INJECTION, POWDER, LYOPHILIZED, FOR SOLUTION INTRAVENOUS at 18:35

## 2021-06-18 RX ADMIN — CHLORHEXIDINE GLUCONATE 1 APPLICATION(S): 213 SOLUTION TOPICAL at 06:02

## 2021-06-18 RX ADMIN — Medication 5 UNIT(S): at 06:55

## 2021-06-18 RX ADMIN — HEPARIN SODIUM 5000 UNIT(S): 5000 INJECTION INTRAVENOUS; SUBCUTANEOUS at 06:20

## 2021-06-18 RX ADMIN — Medication 5 UNIT(S): at 18:18

## 2021-06-18 RX ADMIN — Medication 650 MILLIGRAM(S): at 11:58

## 2021-06-18 RX ADMIN — Medication 5 UNIT(S): at 11:58

## 2021-06-18 RX ADMIN — Medication 2: at 23:23

## 2021-06-18 RX ADMIN — INSULIN GLARGINE 18 UNIT(S): 100 INJECTION, SOLUTION SUBCUTANEOUS at 22:41

## 2021-06-18 RX ADMIN — Medication 5 UNIT(S): at 00:32

## 2021-06-18 RX ADMIN — SODIUM CHLORIDE 75 MILLILITER(S): 9 INJECTION, SOLUTION INTRAVENOUS at 18:30

## 2021-06-18 RX ADMIN — HEPARIN SODIUM 5000 UNIT(S): 5000 INJECTION INTRAVENOUS; SUBCUTANEOUS at 22:41

## 2021-06-18 RX ADMIN — CASPOFUNGIN ACETATE 260 MILLIGRAM(S): 7 INJECTION, POWDER, LYOPHILIZED, FOR SOLUTION INTRAVENOUS at 11:50

## 2021-06-18 RX ADMIN — HEPARIN SODIUM 5000 UNIT(S): 5000 INJECTION INTRAVENOUS; SUBCUTANEOUS at 14:08

## 2021-06-18 RX ADMIN — Medication 650 MILLIGRAM(S): at 11:47

## 2021-06-18 NOTE — SWALLOW BEDSIDE ASSESSMENT ADULT - SWALLOW EVAL: DIAGNOSIS
+toleration of honey thick liquids and puree w/o overt s/s of penetration/aspiration, +mild to moderate oral dysphagia for mech soft solids w/o overt s/s of penetration/aspiration, cognition appears to interfere w/ timing and oral trasport

## 2021-06-18 NOTE — PROGRESS NOTE ADULT - SUBJECTIVE AND OBJECTIVE BOX
Patient is a 60y old  Male who presents with a chief complaint of dfu (18 Jun 2021 11:46)        Over Night Events:  Looks better than yesterday.  Off pressors.  Tolerated NIV overnight.          ROS:     All ROS are negative except HPI         PHYSICAL EXAM    ICU Vital Signs Last 24 Hrs  T(C): 37.8 (18 Jun 2021 16:15), Max: 38 (17 Jun 2021 20:00)  T(F): 100 (18 Jun 2021 16:15), Max: 100.4 (17 Jun 2021 20:00)  HR: 74 (18 Jun 2021 18:15) (60 - 78)  BP: 169/72 (18 Jun 2021 18:15) (110/51 - 172/72)  BP(mean): 96 (18 Jun 2021 18:15) (66 - 107)  ABP: --  ABP(mean): --  RR: 20 (18 Jun 2021 18:15) (15 - 24)  SpO2: 100% (18 Jun 2021 18:15) (94% - 100%)      CONSTITUTIONAL:  Well nourished.  NAD    ENT:   Airway patent,   Mouth with normal mucosa.   No thrush    EYES:   Pupils equal,   Round and reactive to light.    CARDIAC:   Normal rate,   Regular rhythm.    No edema      Vascular:  Normal systolic impulse  No Carotid bruits    RESPIRATORY:   No wheezing  Bilateral BS  Normal chest expansion  Not tachypneic,  No use of accessory muscles    GASTROINTESTINAL:  Abdomen soft,   Non-tender,   No guarding,   + BS    MUSCULOSKELETAL:   Range of motion is not limited,  No clubbing, cyanosis    NEUROLOGICAL:   Alert   Follows commands   No motor  deficits.    SKIN:   Skin normal color for race,   Warm and dry  No evidence of rash.    PSYCHIATRIC:   Normal mood and affect.   No apparent risk to self or others.    HEMATOLOGICAL:  No cervical  lymphadenopathy.  no inguinal lymphadenopathy      06-17-21 @ 07:01  -  06-18-21 @ 07:00  --------------------------------------------------------  IN:    IV PiggyBack: 300 mL    Lactated Ringers: 1800 mL    Oral Fluid: 360 mL  Total IN: 2460 mL    OUT:    Incontinent per Condom Catheter (mL): 1200 mL    Rectal Tube (mL): 200 mL  Total OUT: 1400 mL    Total NET: 1060 mL      06-18-21 @ 07:01  -  06-18-21 @ 19:16  --------------------------------------------------------  IN:    IV PiggyBack: 250 mL    Lactated Ringers: 900 mL    Oral Fluid: 1020 mL  Total IN: 2170 mL    OUT:    Estimated Blood Loss (mL): 0 mL    Incontinent per Condom Catheter (mL): 250 mL    Voided (mL): 550 mL  Total OUT: 800 mL    Total NET: 1370 mL          LABS:                            9.5    8.72  )-----------( 319      ( 18 Jun 2021 04:51 )             32.1                                               06-18    148<H>  |  112<H>  |  20  ----------------------------<  114<H>  4.0   |  28  |  1.1    Ca    8.2<L>      18 Jun 2021 04:51  Phos  2.5     06-18  Mg     1.9     06-18    TPro  7.4  /  Alb  2.4<L>  /  TBili  0.4  /  DBili  x   /  AST  68<H>  /  ALT  72<H>  /  AlkPhos  102  06-18                                                 CARDIAC MARKERS ( 17 Jun 2021 04:43 )  x     / x     / 324 U/L / x     / x                                                LIVER FUNCTIONS - ( 18 Jun 2021 04:51 )  Alb: 2.4 g/dL / Pro: 7.4 g/dL / ALK PHOS: 102 U/L / ALT: 72 U/L / AST: 68 U/L / GGT: x                                                                                                                                   ABG - ( 17 Jun 2021 08:18 )  pH, Arterial: 7.39  pH, Blood: x     /  pCO2: 54    /  pO2: 83    / HCO3: 32    / Base Excess: 6.3   /  SaO2: 97                  MEDICATIONS  (STANDING):  ALBUTerol    90 MICROgram(s) HFA Inhaler 1 Puff(s) Inhalation once  caspofungin IVPB 50 milliGRAM(s) IV Intermittent every 24 hours  chlorhexidine 4% Liquid 1 Application(s) Topical <User Schedule>  DAPTOmycin IVPB 875 milliGRAM(s) IV Intermittent every 24 hours  glucagon  Injectable 1 milliGRAM(s) IntraMuscular once  heparin   Injectable 5000 Unit(s) SubCutaneous every 8 hours  insulin glargine Injectable (LANTUS) 18 Unit(s) SubCutaneous at bedtime  insulin lispro (ADMELOG) corrective regimen sliding scale   SubCutaneous every 6 hours  insulin lispro Injectable (ADMELOG) 5 Unit(s) SubCutaneous every 6 hours  lactated ringers. 1000 milliLiter(s) (75 mL/Hr) IV Continuous <Continuous>  pantoprazole   Suspension 40 milliGRAM(s) Oral daily  polyethylene glycol 3350 17 Gram(s) Oral daily  tiotropium 18 MICROgram(s) Capsule 1 Capsule(s) Inhalation once    MEDICATIONS  (PRN):  acetaminophen   Tablet .. 650 milliGRAM(s) Oral every 6 hours PRN Temp greater or equal to 38C (100.4F), Mild Pain (1 - 3)  acetaminophen  Suppository .. 650 milliGRAM(s) Rectal every 6 hours PRN Temp greater or equal to 38C (100.4F), Mild Pain (1 - 3)      New X-rays reviewed:                                                                                  ECHO    CXR interpreted by me:

## 2021-06-18 NOTE — PROGRESS NOTE ADULT - ASSESSMENT
IMPRESSION    Acute Hypoxemic respiratory failure with hypercapnia resolved   Septic Shock resolved   DFU s/p Right Knee Disarticulation for necrotizing soft tissue infection  CANDY on CKD IMPROVED   AMS 2/2 Toxic-Metabolic Encephelopathy resolved   MATA/ OHS  Morbid obesity  NSTEMI    PLAN:    CNS:  No depressants     HEENT:  Oral care    PULMONARY: HOB at 45 degrees.  Aspiration precautions.  NIV during sleep.  Wean O2     CARDIOVASCULAR:  Change IVF to d5W 30 cc per hour     GI: GI prophylaxis.  Feeding per Speech and swallow     RENAL: F/U Lytes and correct as needed.  FU with renal     INFECTIOUS DISEASE:  ABX as per ID.   ID note noted     HEMATOLOGICAL:  DVT prophylaxis.  FU CBC       ENDOCRINE:  Follow up FS.      MUSCULOSKELETAL: OOB as tolerated.        PT OT     DW house staff

## 2021-06-18 NOTE — PROGRESS NOTE ADULT - ASSESSMENT
· Assessment	  61 y/o M with PMH of DM, hypertension, dyslipidemia, sleep apnea, Charcot foot reconstruction with external fixation, super morbid obesity present for worsening right foot infection.  Pt reports he has had this foot ulcer at various stage of healing for 13 years. Patient has been admitted for IV antibiotics multiple times in the past for osteomyelitis of right foot. He has history of MRSA infection in foot. pt has baseline neuropathy in both feet so does not feel any pain.  Patient states his Infection has been gradually getting worse despite treatment with bactrim, he saw his podiatrists and he was sent in by podiatry for IV abx .    IMPRESSION;  #Fevers with normal WBC normotensive, no pressors and extubated with an unremarkable CXR   Cryptogenic   Extubated 6/14    s/p Revision of disarticulation of knee 11-Jun-2021 "Healthy, non-infected tissue of knee disarticulation s/p conversion to Right AKA"    6/7,10  BCX NG    TLC was changed with R IJ placed on 6/8 5/31 S/p disarticulation right knee   -5/28 BCx E fecalis, ORSA  -5/31 TTE no vegetation  -etiology of bacteremia was right foot abscess  -5/29 R foot : ORSA , Corynebacterium  -5/30 BCx NG  -6/4 ,7, 10 BCx NG  CPK : secondary to Daptomycin. Downtrending 488.onitor once a week  Transaminitis : clinically no acute cholecystitis, no cholestasis ( making cholangitis also unlikely ). Possibly drug related or slow resolving ischemic hepatitis. US 6/15 unremarkable              RECOMMENDATIONS;  - Serum fungitell   - Caspo 50mg q24h IV 6/9- ( hold if fungitell is NG )  - Dapto 875mg q24h

## 2021-06-18 NOTE — PROGRESS NOTE ADULT - SUBJECTIVE AND OBJECTIVE BOX
THOMAS FERNANDEZ  60y, Male    All available historical data reviewed    OVERNIGHT EVENTS:      ROS:  General: Denies rigors, nightsweats  HEENT: Denies headache, rhinorrhea, sore throat, eye pain  CV: Denies CP, palpitations  PULM: Denies wheezing, hemoptysis  GI: Denies hematemesis, hematochezia, melena  : Denies discharge, hematuria  MSK: Denies arthralgias, myalgias  SKIN: Denies rash, lesions  NEURO: Denies paresthesias, weakness  PSYCH: Denies depression, anxiety    VITALS:  T(F): 99.2, Max: 100.4 (06-17-21 @ 20:00)  HR: 72  BP: 172/72  RR: 23Vital Signs Last 24 Hrs  T(C): 37.3 (18 Jun 2021 04:00), Max: 38 (17 Jun 2021 20:00)  T(F): 99.2 (18 Jun 2021 04:00), Max: 100.4 (17 Jun 2021 20:00)  HR: 72 (18 Jun 2021 08:00) (60 - 78)  BP: 172/72 (18 Jun 2021 08:00) (110/51 - 172/72)  BP(mean): 98 (18 Jun 2021 08:00) (66 - 108)  RR: 23 (18 Jun 2021 08:00) (15 - 32)  SpO2: 98% (18 Jun 2021 08:00) (94% - 100%)    TESTS & MEASUREMENTS:                        9.5    8.72  )-----------( 319      ( 18 Jun 2021 04:51 )             32.1     06-18    148<H>  |  112<H>  |  20  ----------------------------<  114<H>  4.0   |  28  |  1.1    Ca    8.2<L>      18 Jun 2021 04:51  Phos  2.5     06-18  Mg     1.9     06-18    TPro  7.4  /  Alb  2.4<L>  /  TBili  0.4  /  DBili  x   /  AST  68<H>  /  ALT  72<H>  /  AlkPhos  102  06-18    LIVER FUNCTIONS - ( 18 Jun 2021 04:51 )  Alb: 2.4 g/dL / Pro: 7.4 g/dL / ALK PHOS: 102 U/L / ALT: 72 U/L / AST: 68 U/L / GGT: x                   RADIOLOGY & ADDITIONAL TESTS:  Personal review of radiological diagnostics performed  Echo and EKG results noted when applicable.     MEDICATIONS:  acetaminophen   Tablet .. 650 milliGRAM(s) Oral every 6 hours PRN  acetaminophen  Suppository .. 650 milliGRAM(s) Rectal every 6 hours PRN  ALBUTerol    90 MICROgram(s) HFA Inhaler 1 Puff(s) Inhalation once  caspofungin IVPB 50 milliGRAM(s) IV Intermittent every 24 hours  chlorhexidine 4% Liquid 1 Application(s) Topical <User Schedule>  DAPTOmycin IVPB 875 milliGRAM(s) IV Intermittent every 24 hours  glucagon  Injectable 1 milliGRAM(s) IntraMuscular once  heparin   Injectable 5000 Unit(s) SubCutaneous every 8 hours  insulin glargine Injectable (LANTUS) 18 Unit(s) SubCutaneous at bedtime  insulin lispro (ADMELOG) corrective regimen sliding scale   SubCutaneous every 6 hours  insulin lispro Injectable (ADMELOG) 5 Unit(s) SubCutaneous every 6 hours  lactated ringers. 1000 milliLiter(s) IV Continuous <Continuous>  pantoprazole   Suspension 40 milliGRAM(s) Oral daily  polyethylene glycol 3350 17 Gram(s) Oral daily  tiotropium 18 MICROgram(s) Capsule 1 Capsule(s) Inhalation once      ANTIBIOTICS:  caspofungin IVPB 50 milliGRAM(s) IV Intermittent every 24 hours  DAPTOmycin IVPB 875 milliGRAM(s) IV Intermittent every 24 hours       THOMAS FERNANDEZ  60y, Male    All available historical data reviewed    OVERNIGHT EVENTS:    none  ROS:  General: Denies rigors, nightsweats  HEENT: Denies headache, rhinorrhea, sore throat, eye pain  CV: Denies CP, palpitations  PULM: Denies wheezing, hemoptysis  GI: Denies hematemesis, hematochezia, melena  : Denies discharge, hematuria  MSK: Denies arthralgias, myalgias  SKIN: Denies rash, lesions  NEURO: Denies paresthesias, weakness  PSYCH: Denies depression, anxiety    VITALS:  T(F): 99.2, Max: 100.4 (06-17-21 @ 20:00)  HR: 72  BP: 172/72  RR: 23Vital Signs Last 24 Hrs  T(C): 37.3 (18 Jun 2021 04:00), Max: 38 (17 Jun 2021 20:00)  T(F): 99.2 (18 Jun 2021 04:00), Max: 100.4 (17 Jun 2021 20:00)  HR: 72 (18 Jun 2021 08:00) (60 - 78)  BP: 172/72 (18 Jun 2021 08:00) (110/51 - 172/72)  BP(mean): 98 (18 Jun 2021 08:00) (66 - 108)  RR: 23 (18 Jun 2021 08:00) (15 - 32)  SpO2: 98% (18 Jun 2021 08:00) (94% - 100%)    TESTS & MEASUREMENTS:                        9.5    8.72  )-----------( 319      ( 18 Jun 2021 04:51 )             32.1     06-18    148<H>  |  112<H>  |  20  ----------------------------<  114<H>  4.0   |  28  |  1.1    Ca    8.2<L>      18 Jun 2021 04:51  Phos  2.5     06-18  Mg     1.9     06-18    TPro  7.4  /  Alb  2.4<L>  /  TBili  0.4  /  DBili  x   /  AST  68<H>  /  ALT  72<H>  /  AlkPhos  102  06-18    LIVER FUNCTIONS - ( 18 Jun 2021 04:51 )  Alb: 2.4 g/dL / Pro: 7.4 g/dL / ALK PHOS: 102 U/L / ALT: 72 U/L / AST: 68 U/L / GGT: x                   RADIOLOGY & ADDITIONAL TESTS:  Personal review of radiological diagnostics performed  Echo and EKG results noted when applicable.     MEDICATIONS:  acetaminophen   Tablet .. 650 milliGRAM(s) Oral every 6 hours PRN  acetaminophen  Suppository .. 650 milliGRAM(s) Rectal every 6 hours PRN  ALBUTerol    90 MICROgram(s) HFA Inhaler 1 Puff(s) Inhalation once  caspofungin IVPB 50 milliGRAM(s) IV Intermittent every 24 hours  chlorhexidine 4% Liquid 1 Application(s) Topical <User Schedule>  DAPTOmycin IVPB 875 milliGRAM(s) IV Intermittent every 24 hours  glucagon  Injectable 1 milliGRAM(s) IntraMuscular once  heparin   Injectable 5000 Unit(s) SubCutaneous every 8 hours  insulin glargine Injectable (LANTUS) 18 Unit(s) SubCutaneous at bedtime  insulin lispro (ADMELOG) corrective regimen sliding scale   SubCutaneous every 6 hours  insulin lispro Injectable (ADMELOG) 5 Unit(s) SubCutaneous every 6 hours  lactated ringers. 1000 milliLiter(s) IV Continuous <Continuous>  pantoprazole   Suspension 40 milliGRAM(s) Oral daily  polyethylene glycol 3350 17 Gram(s) Oral daily  tiotropium 18 MICROgram(s) Capsule 1 Capsule(s) Inhalation once      ANTIBIOTICS:  caspofungin IVPB 50 milliGRAM(s) IV Intermittent every 24 hours  DAPTOmycin IVPB 875 milliGRAM(s) IV Intermittent every 24 hours

## 2021-06-18 NOTE — PROGRESS NOTE ADULT - SUBJECTIVE AND OBJECTIVE BOX
Patient is a 60y old  Male who presents with a chief complaint of dfu (18 Jun 2021 10:41)      INTERVAL HPI/OVERNIGHT EVENTS:  no overnight events, pt feeling well this AM    ICU Vital Signs Last 24 Hrs  T(C): 37.8 (18 Jun 2021 08:00), Max: 38 (17 Jun 2021 20:00)  T(F): 100 (18 Jun 2021 08:00), Max: 100.4 (17 Jun 2021 20:00)  HR: 78 (18 Jun 2021 10:20) (60 - 78)  BP: 159/74 (18 Jun 2021 10:20) (110/51 - 172/72)  BP(mean): 96 (18 Jun 2021 10:20) (66 - 108)  ABP: --  ABP(mean): --  RR: 24 (18 Jun 2021 10:20) (15 - 32)  SpO2: 97% (18 Jun 2021 10:20) (94% - 100%)    I&O's Summary    17 Jun 2021 07:01  -  18 Jun 2021 07:00  --------------------------------------------------------  IN: 2460 mL / OUT: 1400 mL / NET: 1060 mL    18 Jun 2021 07:01  -  18 Jun 2021 11:47  --------------------------------------------------------  IN: 540 mL / OUT: 250 mL / NET: 290 mL          LABS:                        9.5    8.72  )-----------( 319      ( 18 Jun 2021 04:51 )             32.1     06-18    148<H>  |  112<H>  |  20  ----------------------------<  114<H>  4.0   |  28  |  1.1    Ca    8.2<L>      18 Jun 2021 04:51  Phos  2.5     06-18  Mg     1.9     06-18    TPro  7.4  /  Alb  2.4<L>  /  TBili  0.4  /  DBili  x   /  AST  68<H>  /  ALT  72<H>  /  AlkPhos  102  06-18        CAPILLARY BLOOD GLUCOSE      POCT Blood Glucose.: 108 mg/dL (18 Jun 2021 05:52)  POCT Blood Glucose.: 118 mg/dL (18 Jun 2021 00:23)  POCT Blood Glucose.: 132 mg/dL (17 Jun 2021 20:54)  POCT Blood Glucose.: 147 mg/dL (17 Jun 2021 17:30)    ABG - ( 17 Jun 2021 08:18 )  pH, Arterial: 7.39  pH, Blood: x     /  pCO2: 54    /  pO2: 83    / HCO3: 32    / Base Excess: 6.3   /  SaO2: 97                  RADIOLOGY & ADDITIONAL TESTS:    Consultant(s) Notes Reviewed:  [x ] YES  [ ] NO    MEDICATIONS  (STANDING):  ALBUTerol    90 MICROgram(s) HFA Inhaler 1 Puff(s) Inhalation once  caspofungin IVPB 50 milliGRAM(s) IV Intermittent every 24 hours  chlorhexidine 4% Liquid 1 Application(s) Topical <User Schedule>  DAPTOmycin IVPB 875 milliGRAM(s) IV Intermittent every 24 hours  glucagon  Injectable 1 milliGRAM(s) IntraMuscular once  heparin   Injectable 5000 Unit(s) SubCutaneous every 8 hours  insulin glargine Injectable (LANTUS) 18 Unit(s) SubCutaneous at bedtime  insulin lispro (ADMELOG) corrective regimen sliding scale   SubCutaneous every 6 hours  insulin lispro Injectable (ADMELOG) 5 Unit(s) SubCutaneous every 6 hours  lactated ringers. 1000 milliLiter(s) (75 mL/Hr) IV Continuous <Continuous>  pantoprazole   Suspension 40 milliGRAM(s) Oral daily  polyethylene glycol 3350 17 Gram(s) Oral daily  tiotropium 18 MICROgram(s) Capsule 1 Capsule(s) Inhalation once    MEDICATIONS  (PRN):  acetaminophen   Tablet .. 650 milliGRAM(s) Oral every 6 hours PRN Temp greater or equal to 38C (100.4F), Mild Pain (1 - 3)  acetaminophen  Suppository .. 650 milliGRAM(s) Rectal every 6 hours PRN Temp greater or equal to 38C (100.4F), Mild Pain (1 - 3)      PHYSICAL EXAM:  GENERAL: well built, well nourished  NERVOUS SYSTEM:  Alert & Oriented X3, strength improving in the 3 extremities   CHEST/LUNG: B/L good air entry;   HEART: RRR S1S2 normal, distant based on body habitus   ABDOMEN: Soft, Nontender, Nondistended;  EXTREMITIES:  no edema, rt AKA, wound healing well   SKIN: No rashes or lesions    Care Discussed with Consultants/Other Providers [ x] YES  [ ] NO

## 2021-06-18 NOTE — PROGRESS NOTE ADULT - ASSESSMENT
59 y/o M with PMH of DM, hypertension, dyslipidemia, sleep apnea, Charcot foot reconstruction with external fixation, morbid obesity presented for worsening right foot infection.     # Necrotizing fascitis of rt leg s/p AKA  # Acute hypoxic respiratory failure secondary to septic shock - extubated   # Toxic metabolic encephalopathy - improved   # Episode of Emesis on CPAP overnight 06/17  - septic on admission, WBC 12,  + lactate 2.3. s/p cefepime flagyl and vanc in the ED, in res distress  - s/p intubation 5/31, extubated 6/14, currently on 4L NC w/ CPAP at night, sat'ing 97-99  - bacteremia from right foot abscess s/p BKA with disarticulation 5/31 with a follow up AKA 6/11  - Podiatry on board  - 5/28 BCx E fecalis, ORSA, BCx (-) 5/30, 6/4, 6/7, 6/10  - 5/29 R foot : ORSA , Corynebacterium  - Echo 3/31: EF 55-60%, G2DD, no vegetations   - ID following, f/u recs:              -c/w dapto 875mg q24, caspofungin 50mg for 6 weeks from first bcx  (-) (5/30-7/11)  - d/c eleuterio, f/u fungitell,  if (-) d/c caspofungin   - ddimer 2550, duplex (-)  - CPK >1300 -> 600~ ->300~-> 561 -> 485 -> 641 -> 716 -> 400~, monitor daily, likely uptrending from dapto, dose changed   - 6/10: Procal 1.08 -> .81, , ESR 58  - ID following, c/w wound care    - extubated 6/14, on 2L NC  - transaminitis noted, uptrending yesterday, RUQ sono, hepatomegaly with hepatic steatosis no gallbladder dx  - Fever  (drug fever? encephalopathy less likely as pt is following commands off sedation)  - Patient w/ episode on NBNB emesis while on CPAP, already on ABx, CXR clear, atelectasis seen    - S&S consulted: Dysphagia 2 w/ thins  - CPap at night     # Hypernatremia   - na 148 today, likely dehydration   - c/w LR 75 ml/hr   - encourage oral hydration   - bmp daily     #?Code stroke for possible CVA, likely encephalopathy - improved   - ptnt was code stroke on floors on 3/30 for AMS and confusion upgraded to CCU   - metabolic encephalopathy secondary to sepsis more likely than stroke  -  CT head neg for acute pathology, CTA w/ moderate stenoses at the junction of the precavernous and cavernous segments of both internal carotid arteries  - neuro recs for brain MR, can get repeat CT head when stable, will not fit in MR machine   - neuro rec LP, as per pulm, as pt is following commands now, little clinical benefit to preforming as of now.   - CTH if acute change in mental status     # NSTEMI  - trops elevated on admission 0.11->0.12 ->0.12  - secondary to sepsis induced demand ischemia  - less likely true ACS  - monitor in CCU    #CANDY on CKD III /  rule out ATN - resolved   - secondary to sepsis induced ATN, continue supportive care, monitor for now.   - creatinine 3.0 on admission, baseline around 1.1  - s/p CVVHD 6/1 and 6/2, holding CVVHD for now ptnt w/ good urine output   - nephro following  - Cr back at baseline     # Anemia of chronic disease   - transfuse Hb < 7  - monitor   - s/p 4 units in total since admission  - stable today    #Diabetes Mellitus  -on insulin regimen, increase bolus daily dose   - a1c 8.9   - Monitor   - FS q6hr     #Hypertension  -holding oral meds     #Dyslipidemia  -c/w home meds    #MATA/ OHS    #morbid obesity    Diet: Dysphagia 2 w/ Thin Liquids  DVT ppx: heparin subq TID  GI ppx: protonix   Dispo: acute, possible step down, OT & PT - recc Rehab

## 2021-06-18 NOTE — CHART NOTE - NSCHARTNOTEFT_GEN_A_CORE
Registered Dietitian Follow-Up - assessment completed by LASHAE Paul     Patient Profile Reviewed                           Yes [x]   No []     Nutrition History Previously Obtained        Yes []  No [x] Attempted to obtain from pt however pt unable to provide nutrition hx at this time. No response from emergency contact. NKFA per chart.     Pertinent Medical Interventions: Necrotizing fascitis of rt leg s/p AKA. Acute hypoxic respiratory failure secondary to septic shock - extubated. Episode of Emesis on CPAP overnight 06/17. CANDY on CKD III - resolved per progress notes.    Diet order: Dysphagia 1 pureed diet with honey thick liquids + Consistent Carbohydrate diet (evening snack) - diet order placed 6/17.    Anthropometrics:  Height (cm): 175.26 cm  Weight (kg): 161 kg (6/18) - no significant wt changes observed following previous RD assessment  BMI (kg/m2): 53.6 (06-10-21 @ 16:05) using 175.26 cm. (original height) and dosing wt 164.2 kg (6/10)  IBW: 72.7 kg    MEDICATIONS: heparin, lactated ringers, insulin glargine, insulin lispro, protonix, miralax    Pertinent Labs: 6/18: RBC-3.48, H/H-9.5/32.1, Na-148, Cl-112, gluc-114; 6/2: [id number]-5.9%    Physical Findings:  - Appearance: 1+ generalized edema; disoriented at time of RD visit  - GI function: last BM 6/18; loose. Emesis 6/17.  - Tubes: no feeding tubes  - Oral/Mouth cavity: Per 6/18 SLP eval: "+toleration of honey thick liquids and puree w/o overt s/s of penetration/aspiration, +mild to moderate oral dysphagia for mech soft solids w/o overt s/s of penetration/aspiration, cognition appears to interfere w/ timing and oral trasport". Recommends dysphagia 2 with honey thick liquids.  - Skin:  surgical incision, generalized edema +2 per RN flow sheets     Nutrition Requirements:  Weight Used:  IBW 73 kg; per 6/15 RD assessment - remains appropriate.     Estimated Energy Needs    Continue  [x] 5267-1585 kcal/day (25-30 kcal/kg of dosing weight consider BMI >30)    Estimated Protein Needs    Continue  [x] 88-95 g/day (1..2-1.3 g/kg of IBW, consider the same as above and the difference between IBW and ABW)     Estimated Fluid Needs        Continue  [x] per CCU team     Nutrient Intake: Po intake reportedly inadequate d/t weakness; pt consuming 50% of meals. Reviewed SLP recommendations with LIP.     Previous Nutrition Diagnosis: Inadequate protein/energy intake (ongoing)     Nutrition Intervention medical food supplements, coordination of care, meals and snacks      Goal/Expected Outcome: Pt to demonstrate tolerance to diet order with at least 75% po intake achieved over next 4 days.     Indicator/Monitoring: Diet order, energy intake, body composition, NFPF, glucose/renal/electrolyte profiles.    Recommendation: Change diet to Dysphagia 2 mechanical soft with honey thick liquids as per 6/18 SLP recommendations. Avoid adding restrictive diet modifiers (i.e. remove Consistent Carbohydrate restriction) to optimize po intake. Will monitor blood glucose trends. Order Ensure Pudding q24hrs. Reviewed with LIP via spectra transferred by .

## 2021-06-18 NOTE — PROGRESS NOTE ADULT - SUBJECTIVE AND OBJECTIVE BOX
Pt. seen and examined at bedside this morning by Neurocritical care team. Pt. was awake, alert, and appropriately communicative w/ examiner. No significant events reported overnight except for low-grade fever __ otherwise comfortable on RA post-extubation 6/14, and hemodynamically stable.     Vitals:   T(F): 100 (06-18-21 @ 16:15), Max: 100.4 (06-17-21 @ 20:00)  HR: 67 (06-18-21 @ 19:29) (60 - 78)  BP: 169/72 (06-18-21 @ 18:15) (110/51 - 172/72)  RR: 20 (06-18-21 @ 18:15) (15 - 24)  SpO2: 100% (06-18-21 @ 19:29) (94% - 100%)    24hr I/O's:   17 Jun 2021 07:01  -  18 Jun 2021 07:00  --------------------------------------------------------  IN:    IV PiggyBack: 300 mL    Lactated Ringers: 1800 mL    Oral Fluid: 360 mL  Total IN: 2460 mL    OUT:    Incontinent per Condom Catheter (mL): 1200 mL    Rectal Tube (mL): 200 mL  Total OUT: 1400 mL    Total NET: 1060 mL  ---------------------------------------------------    Labs: Reviewed                9.5    8.72  )-----------( 319      ( 18 Jun 2021 04:51 )             32.1     06-18    148<H>  |  112<H>  |  20  ----------------------------<  114<H>  4.0   |  28  |  1.1    Ca    8.2<L>      18 Jun 2021 04:51  Phos  2.5     06-18  Mg     1.9     06-18    TPro  7.4  /  Alb  2.4<L>  /  TBili  0.4  /  DBili  x   /  AST  68<H>  /  ALT  72<H>  /  AlkPhos  102  06-18  LIVER FUNCTIONS - ( 18 Jun 2021 04:51 )  Alb: 2.4 g/dL / Pro: 7.4 g/dL / ALK PHOS: 102 U/L / ALT: 72 U/L / AST: 68 U/L / GGT: x           ---------------------------------------------------------------------------    Current Medications:  ALBUTerol    90 MICROgram(s) HFA Inhaler 1 Puff(s) Inhalation once  caspofungin IVPB 50 milliGRAM(s) IV Intermittent every 24 hours  chlorhexidine 4% Liquid 1 Application(s) Topical   DAPTOmycin IVPB 875 milliGRAM(s) IV Intermittent every 24 hours  glucagon  Injectable 1 milliGRAM(s) IntraMuscular once  heparin   Injectable 5000 Unit(s) SubCutaneous every 8 hours  insulin glargine Injectable (LANTUS) 18 Unit(s) SubCutaneous at bedtime  insulin lispro (ADMELOG) corrective regimen sliding scale   SubCutaneous every 6 hours  insulin lispro Injectable (ADMELOG) 5 Unit(s) SubCutaneous every 6 hours  lactated ringers. 1000 milliLiter(s) IV Continuous <Continuous>  pantoprazole   Suspension 40 milliGRAM(s) Oral daily  polyethylene glycol 3350 17 Gram(s) Oral daily  tiotropium 18 MICROgram(s) Capsule 1 Capsule(s) Inhalation once      --------------------------------------    Neuro Exam:  Pt. awake, alert, oriented x4, appropriately responsive to examiner  PERRL, EOMI, no gaze pref/ deviation, no visual field deficits  Face symmetric, normal speech fluency/ enunciation  Pt. moves BUE/ LLE anti-grav to command w/ no drifts ___ s/p Rt. AKA/ stump margins clean able to flex at hip to command  No evidence of dysmetria/ ataxia, no clonus noted    -------------------------------    Impression: 59y/o morbidly obese M w/ PMHx of DM-II, HLD, MATA, chronic Rt. foot MRSA infection resistant to Bactrim, admitted 5/29 for worsening foot infection w/ assc. septic shock, acute respiratory failure, CANDY on CKD transiently requiring CVVH, and toxic metabolic encephalopathy. Neurocritical care consulted to evaluate encephalopathy for which pt. underwent vEEG to r/o seizures, as well as CTA/ CTH x2 [all unremarkable]. Pt. has since undergone Rt. knee disarticulation/ revision/ and AKA __ is now extubated/ off pressors/ and off CVVH. Pt. now clinically appears to be at neurological baseline w/ no clinical deficits noted.     [ ] Cont. clinical mgmt. per primary team __ on 6wk-course of Capsofungin/ Daptomycin [5/30 - 7/11]   [ ] Will sign off __ reconsult NCC as clinically warranted  [ ] Case d/w Neurointensivist Dr. Abreu.                       Prophalaxis:     GI:   DVT:     Pt. seen and examined at bedside this morning by Neurocritical care team. Pt. was awake, alert, and appropriately communicative w/ examiner. No significant events reported overnight except for low-grade fever __ otherwise comfortable on RA post-extubation 6/14, and hemodynamically stable.     Vitals:   T(F): 100 (06-18-21 @ 16:15), Max: 100.4 (06-17-21 @ 20:00)  HR: 67 (06-18-21 @ 19:29) (60 - 78)  BP: 169/72 (06-18-21 @ 18:15) (110/51 - 172/72)  RR: 20 (06-18-21 @ 18:15) (15 - 24)  SpO2: 100% (06-18-21 @ 19:29) (94% - 100%)    24hr I/O's:   17 Jun 2021 07:01  -  18 Jun 2021 07:00  --------------------------------------------------------  IN:    IV PiggyBack: 300 mL    Lactated Ringers: 1800 mL    Oral Fluid: 360 mL  Total IN: 2460 mL    OUT:    Incontinent per Condom Catheter (mL): 1200 mL    Rectal Tube (mL): 200 mL  Total OUT: 1400 mL    Total NET: 1060 mL  ---------------------------------------------------    Labs: Reviewed                9.5    8.72  )-----------( 319      ( 18 Jun 2021 04:51 )             32.1     06-18    148<H>  |  112<H>  |  20  ----------------------------<  114<H>  4.0   |  28  |  1.1    Ca    8.2<L>      18 Jun 2021 04:51  Phos  2.5     06-18  Mg     1.9     06-18    TPro  7.4  /  Alb  2.4<L>  /  TBili  0.4  /  DBili  x   /  AST  68<H>  /  ALT  72<H>  /  AlkPhos  102  06-18  LIVER FUNCTIONS - ( 18 Jun 2021 04:51 )  Alb: 2.4 g/dL / Pro: 7.4 g/dL / ALK PHOS: 102 U/L / ALT: 72 U/L / AST: 68 U/L / GGT: x           ---------------------------------------------------------------------------    Current Medications:  ALBUTerol    90 MICROgram(s) HFA Inhaler 1 Puff(s) Inhalation once  caspofungin IVPB 50 milliGRAM(s) IV Intermittent every 24 hours  chlorhexidine 4% Liquid 1 Application(s) Topical   DAPTOmycin IVPB 875 milliGRAM(s) IV Intermittent every 24 hours  glucagon  Injectable 1 milliGRAM(s) IntraMuscular once  heparin   Injectable 5000 Unit(s) SubCutaneous every 8 hours  insulin glargine Injectable (LANTUS) 18 Unit(s) SubCutaneous at bedtime  insulin lispro (ADMELOG) corrective regimen sliding scale   SubCutaneous every 6 hours  insulin lispro Injectable (ADMELOG) 5 Unit(s) SubCutaneous every 6 hours  lactated ringers. 1000 milliLiter(s) IV Continuous <Continuous>  pantoprazole   Suspension 40 milliGRAM(s) Oral daily  polyethylene glycol 3350 17 Gram(s) Oral daily  tiotropium 18 MICROgram(s) Capsule 1 Capsule(s) Inhalation once      --------------------------------------    Neuro Exam:  Pt. awake, alert, oriented x4, appropriately responsive to examiner  PERRL, EOMI, no gaze pref/ deviation, no visual field deficits  Face symmetric, normal speech fluency/ enunciation  Pt. moves BUE/ LLE anti-grav to command w/ no drifts ___ s/p Rt. AKA/ stump margins clean able to flex at hip to command  No evidence of dysmetria/ ataxia, no clonus noted    -------------------------------    Impression: 59y/o morbidly obese M w/ PMHx of DM-II, HLD, MATA, chronic Rt. foot MRSA infection resistant to Bactrim, admitted 5/29 for worsening foot infection w/ assc. septic shock, acute respiratory failure, CANDY on CKD transiently requiring CVVH, and toxic metabolic encephalopathy. Neurocritical care consulted to evaluate encephalopathy for which pt. underwent vEEG to r/o seizures, as well as CTA/ CTH x2 [all unremarkable]. Pt. has since undergone Rt. knee disarticulation/ revision/ and AKA __ is now extubated/ off pressors/ and off CVVH. Pt. now clinically appears to be at neurological baseline w/ no clinical deficits noted.     [ ] Cont. clinical mgmt. per primary team __ on 6wk-course of Capsofungin/ Daptomycin [5/30 - 7/11]   [ ] Will sign off __ reconsult NCC as clinically warranted  [ ] Case d/w Neurointensivist Dr. Abreu.

## 2021-06-19 LAB
ALBUMIN SERPL ELPH-MCNC: 2.5 G/DL — LOW (ref 3.5–5.2)
ALP SERPL-CCNC: 97 U/L — SIGNIFICANT CHANGE UP (ref 30–115)
ALT FLD-CCNC: 51 U/L — HIGH (ref 0–41)
ANION GAP SERPL CALC-SCNC: 6 MMOL/L — LOW (ref 7–14)
AST SERPL-CCNC: 43 U/L — HIGH (ref 0–41)
BILIRUB SERPL-MCNC: 0.5 MG/DL — SIGNIFICANT CHANGE UP (ref 0.2–1.2)
BUN SERPL-MCNC: 13 MG/DL — SIGNIFICANT CHANGE UP (ref 10–20)
CALCIUM SERPL-MCNC: 8.2 MG/DL — LOW (ref 8.5–10.1)
CHLORIDE SERPL-SCNC: 105 MMOL/L — SIGNIFICANT CHANGE UP (ref 98–110)
CO2 SERPL-SCNC: 29 MMOL/L — SIGNIFICANT CHANGE UP (ref 17–32)
CREAT SERPL-MCNC: 0.8 MG/DL — SIGNIFICANT CHANGE UP (ref 0.7–1.5)
FUNGITELL: 32 PG/ML — SIGNIFICANT CHANGE UP
GLUCOSE BLDC GLUCOMTR-MCNC: 132 MG/DL — HIGH (ref 70–99)
GLUCOSE BLDC GLUCOMTR-MCNC: 133 MG/DL — HIGH (ref 70–99)
GLUCOSE BLDC GLUCOMTR-MCNC: 143 MG/DL — HIGH (ref 70–99)
GLUCOSE BLDC GLUCOMTR-MCNC: 147 MG/DL — HIGH (ref 70–99)
GLUCOSE SERPL-MCNC: 143 MG/DL — HIGH (ref 70–99)
POTASSIUM SERPL-MCNC: 4 MMOL/L — SIGNIFICANT CHANGE UP (ref 3.5–5)
POTASSIUM SERPL-SCNC: 4 MMOL/L — SIGNIFICANT CHANGE UP (ref 3.5–5)
PROT SERPL-MCNC: 7.3 G/DL — SIGNIFICANT CHANGE UP (ref 6–8)
SODIUM SERPL-SCNC: 140 MMOL/L — SIGNIFICANT CHANGE UP (ref 135–146)

## 2021-06-19 PROCEDURE — 99232 SBSQ HOSP IP/OBS MODERATE 35: CPT

## 2021-06-19 PROCEDURE — 71045 X-RAY EXAM CHEST 1 VIEW: CPT | Mod: 26

## 2021-06-19 RX ORDER — METOPROLOL TARTRATE 50 MG
5 TABLET ORAL ONCE
Refills: 0 | Status: COMPLETED | OUTPATIENT
Start: 2021-06-19 | End: 2021-06-19

## 2021-06-19 RX ORDER — LABETALOL HCL 100 MG
10 TABLET ORAL ONCE
Refills: 0 | Status: COMPLETED | OUTPATIENT
Start: 2021-06-19 | End: 2021-06-19

## 2021-06-19 RX ADMIN — Medication 5 MILLIGRAM(S): at 17:10

## 2021-06-19 RX ADMIN — Medication 5 UNIT(S): at 12:34

## 2021-06-19 RX ADMIN — INSULIN GLARGINE 18 UNIT(S): 100 INJECTION, SOLUTION SUBCUTANEOUS at 22:00

## 2021-06-19 RX ADMIN — HEPARIN SODIUM 5000 UNIT(S): 5000 INJECTION INTRAVENOUS; SUBCUTANEOUS at 22:00

## 2021-06-19 RX ADMIN — PANTOPRAZOLE SODIUM 40 MILLIGRAM(S): 20 TABLET, DELAYED RELEASE ORAL at 11:50

## 2021-06-19 RX ADMIN — HEPARIN SODIUM 5000 UNIT(S): 5000 INJECTION INTRAVENOUS; SUBCUTANEOUS at 15:00

## 2021-06-19 RX ADMIN — SODIUM CHLORIDE 75 MILLILITER(S): 9 INJECTION, SOLUTION INTRAVENOUS at 16:58

## 2021-06-19 RX ADMIN — HEPARIN SODIUM 5000 UNIT(S): 5000 INJECTION INTRAVENOUS; SUBCUTANEOUS at 05:03

## 2021-06-19 RX ADMIN — Medication 10 MILLIGRAM(S): at 22:10

## 2021-06-19 RX ADMIN — Medication 5 UNIT(S): at 17:11

## 2021-06-19 RX ADMIN — CASPOFUNGIN ACETATE 260 MILLIGRAM(S): 7 INJECTION, POWDER, LYOPHILIZED, FOR SOLUTION INTRAVENOUS at 11:50

## 2021-06-19 RX ADMIN — Medication 5 UNIT(S): at 05:44

## 2021-06-19 RX ADMIN — DAPTOMYCIN 135 MILLIGRAM(S): 500 INJECTION, POWDER, LYOPHILIZED, FOR SOLUTION INTRAVENOUS at 14:00

## 2021-06-19 RX ADMIN — CHLORHEXIDINE GLUCONATE 1 APPLICATION(S): 213 SOLUTION TOPICAL at 05:03

## 2021-06-19 NOTE — PROGRESS NOTE ADULT - ASSESSMENT
IMPRESSION    Acute Hypoxemic respiratory failure with hypercapnia sp extubation  Septic Shock resolved   DFU s/p Right Knee Disarticulation . sp aka  CANDY on CKD IMPROVED   AMS 2/2 Toxic-Metabolic Encephelopathy resolved   MATA/ OHS  Morbid obesity  NSTEMI    PLAN:    CNS:  No depressants     HEENT:  Oral care    PULMONARY: HOB at 45 degrees.  Aspiration precautions.  NIV during sleep.  Wean O2     CARDIOVASCULAR:  dc IVF if Na less than 145    GI: GI prophylaxis.  Feeding per Speech and swallow     RENAL: F/U Lytes and correct as needed.  FU with renal     INFECTIOUS DISEASE:  ABX as per ID.   ID noted, fungitell, trend CK    HEMATOLOGICAL:  DVT prophylaxis.  FU CBC       ENDOCRINE:  Follow up FS.      MUSCULOSKELETAL: OOB as tolerated.        PT OT     DW house staff

## 2021-06-19 NOTE — PROGRESS NOTE ADULT - SUBJECTIVE AND OBJECTIVE BOX
OVERNIGHT EVENTS: Events noted, on RA, awake, following commands, want to eat/ drink    Vital Signs Last 24 Hrs  T(C): 37.1 (19 Jun 2021 04:00), Max: 38 (18 Jun 2021 12:00)  T(F): 98.7 (19 Jun 2021 04:00), Max: 100.4 (18 Jun 2021 12:00)  HR: 74 (19 Jun 2021 06:00) (58 - 78)  BP: 156/70 (19 Jun 2021 06:00) (142/97 - 172/72)  BP(mean): 102 (19 Jun 2021 06:00) (84 - 107)  RR: 22 (19 Jun 2021 06:00) (17 - 30)  SpO2: 99% (19 Jun 2021 06:00) (96% - 100%)    PHYSICAL EXAMINATION:    GENERAL: comfortable    HEENT: Head is normocephalic and atraumatic.     NECK: Supple.    LUNGS: dec bs both bases  HEART: KIRILL 3.6    ABDOMEN: Soft, nontender, and nondistended.      EXTREMITIES: r aka  NEUROLOGIC: follows commands          LABS:                        9.5    8.72  )-----------( 319      ( 18 Jun 2021 04:51 )             32.1     06-18    148<H>  |  112<H>  |  20  ----------------------------<  114<H>  4.0   |  28  |  1.1    Ca    8.2<L>      18 Jun 2021 04:51  Phos  2.5     06-18  Mg     1.9     06-18    TPro  7.4  /  Alb  2.4<L>  /  TBili  0.4  /  DBili  x   /  AST  68<H>  /  ALT  72<H>  /  AlkPhos  102  06-18        ABG - ( 17 Jun 2021 08:18 )  pH, Arterial: 7.39  pH, Blood: x     /  pCO2: 54    /  pO2: 83    / HCO3: 32    / Base Excess: 6.3   /  SaO2: 97                                06-17-21 @ 07:01  -  06-18-21 @ 07:00  --------------------------------------------------------  IN: 2460 mL / OUT: 1400 mL / NET: 1060 mL    06-18-21 @ 07:01  -  06-19-21 @ 06:36  --------------------------------------------------------  IN: 3270 mL / OUT: 2000 mL / NET: 1270 mL        MICROBIOLOGY:      MEDICATIONS  (STANDING):  ALBUTerol    90 MICROgram(s) HFA Inhaler 1 Puff(s) Inhalation once  caspofungin IVPB 50 milliGRAM(s) IV Intermittent every 24 hours  chlorhexidine 4% Liquid 1 Application(s) Topical <User Schedule>  DAPTOmycin IVPB 875 milliGRAM(s) IV Intermittent every 24 hours  glucagon  Injectable 1 milliGRAM(s) IntraMuscular once  heparin   Injectable 5000 Unit(s) SubCutaneous every 8 hours  insulin glargine Injectable (LANTUS) 18 Unit(s) SubCutaneous at bedtime  insulin lispro (ADMELOG) corrective regimen sliding scale   SubCutaneous every 6 hours  insulin lispro Injectable (ADMELOG) 5 Unit(s) SubCutaneous every 6 hours  lactated ringers. 1000 milliLiter(s) (75 mL/Hr) IV Continuous <Continuous>  pantoprazole   Suspension 40 milliGRAM(s) Oral daily  polyethylene glycol 3350 17 Gram(s) Oral daily  tiotropium 18 MICROgram(s) Capsule 1 Capsule(s) Inhalation once    MEDICATIONS  (PRN):  acetaminophen   Tablet .. 650 milliGRAM(s) Oral every 6 hours PRN Temp greater or equal to 38C (100.4F), Mild Pain (1 - 3)  acetaminophen  Suppository .. 650 milliGRAM(s) Rectal every 6 hours PRN Temp greater or equal to 38C (100.4F), Mild Pain (1 - 3)      RADIOLOGY & ADDITIONAL STUDIES:

## 2021-06-20 LAB
ALBUMIN SERPL ELPH-MCNC: 2.5 G/DL — LOW (ref 3.5–5.2)
ALP SERPL-CCNC: 91 U/L — SIGNIFICANT CHANGE UP (ref 30–115)
ALT FLD-CCNC: 40 U/L — SIGNIFICANT CHANGE UP (ref 0–41)
ANION GAP SERPL CALC-SCNC: 9 MMOL/L — SIGNIFICANT CHANGE UP (ref 7–14)
AST SERPL-CCNC: 36 U/L — SIGNIFICANT CHANGE UP (ref 0–41)
BASOPHILS # BLD AUTO: 0.01 K/UL — SIGNIFICANT CHANGE UP (ref 0–0.2)
BASOPHILS NFR BLD AUTO: 0.1 % — SIGNIFICANT CHANGE UP (ref 0–1)
BILIRUB SERPL-MCNC: 0.4 MG/DL — SIGNIFICANT CHANGE UP (ref 0.2–1.2)
BUN SERPL-MCNC: 11 MG/DL — SIGNIFICANT CHANGE UP (ref 10–20)
CALCIUM SERPL-MCNC: 8.1 MG/DL — LOW (ref 8.5–10.1)
CHLORIDE SERPL-SCNC: 99 MMOL/L — SIGNIFICANT CHANGE UP (ref 98–110)
CO2 SERPL-SCNC: 29 MMOL/L — SIGNIFICANT CHANGE UP (ref 17–32)
CREAT SERPL-MCNC: 0.9 MG/DL — SIGNIFICANT CHANGE UP (ref 0.7–1.5)
EOSINOPHIL # BLD AUTO: 0.34 K/UL — SIGNIFICANT CHANGE UP (ref 0–0.7)
EOSINOPHIL NFR BLD AUTO: 4.9 % — SIGNIFICANT CHANGE UP (ref 0–8)
GLUCOSE BLDC GLUCOMTR-MCNC: 124 MG/DL — HIGH (ref 70–99)
GLUCOSE BLDC GLUCOMTR-MCNC: 126 MG/DL — HIGH (ref 70–99)
GLUCOSE BLDC GLUCOMTR-MCNC: 142 MG/DL — HIGH (ref 70–99)
GLUCOSE BLDC GLUCOMTR-MCNC: 148 MG/DL — HIGH (ref 70–99)
GLUCOSE BLDC GLUCOMTR-MCNC: 151 MG/DL — HIGH (ref 70–99)
GLUCOSE SERPL-MCNC: 133 MG/DL — HIGH (ref 70–99)
HCT VFR BLD CALC: 32.2 % — LOW (ref 42–52)
HGB BLD-MCNC: 9.9 G/DL — LOW (ref 14–18)
IMM GRANULOCYTES NFR BLD AUTO: 0.3 % — SIGNIFICANT CHANGE UP (ref 0.1–0.3)
LYMPHOCYTES # BLD AUTO: 1.61 K/UL — SIGNIFICANT CHANGE UP (ref 1.2–3.4)
LYMPHOCYTES # BLD AUTO: 23.3 % — SIGNIFICANT CHANGE UP (ref 20.5–51.1)
MAGNESIUM SERPL-MCNC: 1.3 MG/DL — LOW (ref 1.8–2.4)
MCHC RBC-ENTMCNC: 27.4 PG — SIGNIFICANT CHANGE UP (ref 27–31)
MCHC RBC-ENTMCNC: 30.7 G/DL — LOW (ref 32–37)
MCV RBC AUTO: 89.2 FL — SIGNIFICANT CHANGE UP (ref 80–94)
MONOCYTES # BLD AUTO: 0.68 K/UL — HIGH (ref 0.1–0.6)
MONOCYTES NFR BLD AUTO: 9.9 % — HIGH (ref 1.7–9.3)
NEUTROPHILS # BLD AUTO: 4.24 K/UL — SIGNIFICANT CHANGE UP (ref 1.4–6.5)
NEUTROPHILS NFR BLD AUTO: 61.5 % — SIGNIFICANT CHANGE UP (ref 42.2–75.2)
NRBC # BLD: 0 /100 WBCS — SIGNIFICANT CHANGE UP (ref 0–0)
PLATELET # BLD AUTO: 330 K/UL — SIGNIFICANT CHANGE UP (ref 130–400)
POTASSIUM SERPL-MCNC: 3.8 MMOL/L — SIGNIFICANT CHANGE UP (ref 3.5–5)
POTASSIUM SERPL-SCNC: 3.8 MMOL/L — SIGNIFICANT CHANGE UP (ref 3.5–5)
PROT SERPL-MCNC: 7 G/DL — SIGNIFICANT CHANGE UP (ref 6–8)
RBC # BLD: 3.61 M/UL — LOW (ref 4.7–6.1)
RBC # FLD: 18.4 % — HIGH (ref 11.5–14.5)
SODIUM SERPL-SCNC: 137 MMOL/L — SIGNIFICANT CHANGE UP (ref 135–146)
WBC # BLD: 6.9 K/UL — SIGNIFICANT CHANGE UP (ref 4.8–10.8)
WBC # FLD AUTO: 6.9 K/UL — SIGNIFICANT CHANGE UP (ref 4.8–10.8)

## 2021-06-20 PROCEDURE — 71045 X-RAY EXAM CHEST 1 VIEW: CPT | Mod: 26

## 2021-06-20 PROCEDURE — 99232 SBSQ HOSP IP/OBS MODERATE 35: CPT

## 2021-06-20 RX ORDER — FUROSEMIDE 40 MG
40 TABLET ORAL DAILY
Refills: 0 | Status: DISCONTINUED | OUTPATIENT
Start: 2021-06-20 | End: 2021-06-26

## 2021-06-20 RX ADMIN — Medication 5 UNIT(S): at 06:24

## 2021-06-20 RX ADMIN — CASPOFUNGIN ACETATE 260 MILLIGRAM(S): 7 INJECTION, POWDER, LYOPHILIZED, FOR SOLUTION INTRAVENOUS at 12:04

## 2021-06-20 RX ADMIN — Medication 5 UNIT(S): at 00:15

## 2021-06-20 RX ADMIN — PANTOPRAZOLE SODIUM 40 MILLIGRAM(S): 20 TABLET, DELAYED RELEASE ORAL at 14:25

## 2021-06-20 RX ADMIN — DAPTOMYCIN 135 MILLIGRAM(S): 500 INJECTION, POWDER, LYOPHILIZED, FOR SOLUTION INTRAVENOUS at 14:21

## 2021-06-20 RX ADMIN — INSULIN GLARGINE 18 UNIT(S): 100 INJECTION, SOLUTION SUBCUTANEOUS at 22:28

## 2021-06-20 RX ADMIN — Medication 5 UNIT(S): at 12:01

## 2021-06-20 RX ADMIN — Medication 40 MILLIGRAM(S): at 14:25

## 2021-06-20 RX ADMIN — HEPARIN SODIUM 5000 UNIT(S): 5000 INJECTION INTRAVENOUS; SUBCUTANEOUS at 22:28

## 2021-06-20 RX ADMIN — HEPARIN SODIUM 5000 UNIT(S): 5000 INJECTION INTRAVENOUS; SUBCUTANEOUS at 14:23

## 2021-06-20 RX ADMIN — Medication 5 UNIT(S): at 18:33

## 2021-06-20 RX ADMIN — CHLORHEXIDINE GLUCONATE 1 APPLICATION(S): 213 SOLUTION TOPICAL at 06:19

## 2021-06-20 RX ADMIN — HEPARIN SODIUM 5000 UNIT(S): 5000 INJECTION INTRAVENOUS; SUBCUTANEOUS at 06:19

## 2021-06-20 NOTE — PROGRESS NOTE ADULT - ASSESSMENT
IMPRESSION    Acute Hypoxemic respiratory failure with hypercapnia sp extubation on RA  Septic Shock resolved   DFU s/p Right Knee Disarticulation . sp aka  CANDY on CKD IMPROVED   AMS 2/2 Toxic-Metabolic Encephelopathy resolved   MATA/ OHS  Morbid obesity  NSTEMI    PLAN:    CNS:  No depressants     HEENT:  Oral care    PULMONARY: HOB at 45 degrees.  Aspiration precautions.  NIV during sleep.  Wean O2     CARDIOVASCULAR:  dc IVF, PO LASIX    GI: GI prophylaxis.  Feeding per Speech and swallow     RENAL: F/U Lytes and correct as needed.  FU with renal     INFECTIOUS DISEASE:  ABX as per ID., fungitell 32    HEMATOLOGICAL:  DVT prophylaxis.  FU CBC       ENDOCRINE:  Follow up FS.      MUSCULOSKELETAL: OOB as tolerated.        PT OT   CEU  DW house staff

## 2021-06-20 NOTE — CHART NOTE - NSCHARTNOTEFT_GEN_A_CORE
CCU DOWNGRADE NOTE:    60y Male transferred to SDU from CCU    Patient is a 60y old Male who is currently admitted for the primary diagnosis of Diabetic infection of right foot      Disposition:    Code Status:    Follow Up:    HPI:  Patient is a 59 y/o M with PMH of DM, hypertension, dyslipidemia, sleep apnea, Charcot foot reconstruction with external fixation, super morbid obesity present for worsening right foot infection.  Pt reports he has had this foot ulcer at various stage of healing for 13 years. Patient has been admitted for IV antibiotics multiple times in the past for osteomyelitis of right foot. He has history of MRSA infection in foot. pt has baseline neuropathy in both feet so does not feel any pain.  Patient states his Infection has been gradually getting worse despite treatment with bactrim, he saw his podiatrists and he was sent in by podiatry for IV abx . No fever, chills, cp, sob, abd pain, nausea, vomiting, dysuria, calf pain.     In ED  T(C): 37.3 (05-29-21 @ 01:31), Max: 38 (05-28-21 @ 20:27)  HR: 100 (05-29-21 @ 01:23) (99 - 118)  BP: 119/59 (05-29-21 @ 01:23) (119/59 - 139/69)  RR: 18 (05-29-21 @ 01:23) (17 - 18)  SpO2: 96% (05-29-21 @ 01:23) (86% - 100%)  there is a Right foot ulcer on the lateral aspect of the plantar surface measuring 8.5-3cm. Ulcer is surrounded by macerated tissue. Serous drainage from wound. Kurlex and sponge dressing in place, clean, dry and intact. Patient was I&D'd bedside by podiatry in the ed. purulent drainage was evacuated and sent to the lab for culture. Patient is scheduled for OR with podiatry in the AM.   (29 May 2021 02:29)      CCU COURSE OF EVENTS:  -------------------------------------------------------------------------------------------        -------------------------------------------------------------------------------------------  VITALS:   T(C): 37.2 (06-20-21 @ 04:00), Max: 37.2 (06-19-21 @ 20:00)  HR: 80 (06-20-21 @ 08:34) (68 - 82)  BP: 158/73 (06-20-21 @ 08:34) (148/71 - 186/86)  RR: 22 (06-20-21 @ 08:34) (18 - 33)  SpO2: 98% (06-20-21 @ 08:34) (96% - 100%)    GEN: No acute distress  HEENT: Normocephalic  NECK: Supple  LUNGS: Decreased breathe sounds  HEART: Regular  ABD: Soft, non-tender, non-distended.  EXT: NE/2+PP  NEURO: AAOX3  PSYCH: Mood is appropriate, following commands    Assessment and Plan:    SIGN OUT AT 06-20-21 @ 18:13 GIVEN TO: CCU DOWNGRADE NOTE:    60y Male transferred to SDU from CCU    Patient is a 60y old Male who is currently admitted for the primary diagnosis of Diabetic infection of right foot    Disposition: SDU    Code Status: Full    Follow Up:  - Prosthetic leg?  - Speech Swallow followup    CCU Course:  Admitted 5/29 for worsening foot infection w/ assc. septic shock, acute respiratory failure requiring intubation s/p extubation on 6/14, CANDY on CKD transiently requiring CVVH, and toxic metabolic encephalopathy. Pt. has since undergone Rt AKA. Currently off pressors and off CVVH. Had an acute change in mental status while on floors likely due to encephalopathy. CTH, EEG all neuro workup were negative. Clinically stable to be downgraded to stepdown unit    HPI:  Patient is a 59 y/o M with PMH of DM, hypertension, dyslipidemia, sleep apnea, Charcot foot reconstruction with external fixation, super morbid obesity present for worsening right foot infection.  Pt reports he has had this foot ulcer at various stage of healing for 13 years. Patient has been admitted for IV antibiotics multiple times in the past for osteomyelitis of right foot. He has history of MRSA infection in foot. pt has baseline neuropathy in both feet so does not feel any pain.  Patient states his Infection has been gradually getting worse despite treatment with bactrim, he saw his podiatrists and he was sent in by podiatry for IV abx . No fever, chills, cp, sob, abd pain, nausea, vomiting, dysuria, calf pain.     In ED  T(C): 37.3 (05-29-21 @ 01:31), Max: 38 (05-28-21 @ 20:27)  HR: 100 (05-29-21 @ 01:23) (99 - 118)  BP: 119/59 (05-29-21 @ 01:23) (119/59 - 139/69)  RR: 18 (05-29-21 @ 01:23) (17 - 18)  SpO2: 96% (05-29-21 @ 01:23) (86% - 100%)  there is a Right foot ulcer on the lateral aspect of the plantar surface measuring 8.5-3cm. Ulcer is surrounded by macerated tissue. Serous drainage from wound. Kurlex and sponge dressing in place, clean, dry and intact. Patient was I&D'd bedside by podiatry in the ed. purulent drainage was evacuated and sent to the lab for culture. Patient is scheduled for OR with podiatry in the AM.   (29 May 2021 02:29)    VITALS:   T(C): 37.2 (06-20-21 @ 04:00), Max: 37.2 (06-19-21 @ 20:00)  HR: 80 (06-20-21 @ 08:34) (68 - 82)  BP: 158/73 (06-20-21 @ 08:34) (148/71 - 186/86)  RR: 22 (06-20-21 @ 08:34) (18 - 33)  SpO2: 98% (06-20-21 @ 08:34) (96% - 100%)    GENERAL: sheeba cute disctress  HEENT: normocephalic  NECK: Supple  LUNGS: decreased breathe sounds  HEART: Regular rate and rhythm  ABDOMEN: Soft, nontender, and nondistended  EXTREMITIES: R AKA  NEUROLOGIC: follows commands    Assessment and Plan:  59 y/o M with PMH of DM, hypertension, dyslipidemia, sleep apnea, Charcot foot reconstruction with external fixation, morbid obesity presented for worsening right foot infection.     # Necrotizing fascitis of rt leg s/p AKA  # Acute hypoxic respiratory failure secondary to septic shock - extubated   # Toxic metabolic encephalopathy - improved   # Episode of Emesis on CPAP overnight 06/17  - septic on admission, WBC 12,  + lactate 2.3. s/p cefepime flagyl and vanc in the ED, in res distress  - s/p intubation 5/31, extubated 6/14, currently on 4L NC w/ CPAP at night, sat'ing 97-99  - bacteremia from right foot abscess s/p BKA with disarticulation 5/31 with a follow up AKA 6/11  - Podiatry on board  - 5/28 BCx E fecalis, ORSA, BCx (-) 5/30, 6/4, 6/7, 6/10  - 5/29 R foot : ORSA , Corynebacterium  - Echo 3/31: EF 55-60%, G2DD, no vegetations   - ID following, f/u recs:   -c/w dapto 875mg q24, caspofungin 50mg for 6 weeks from first bcx  (-) (5/30-7/11)  - f/u fungitell,  if (-) d/c caspofungin   - ddimer 2550, duplex (-)  - CPK >1300 -> 600~ ->300~-> 561 -> 485 -> 641 -> 716 -> 400~, monitor daily, likely uptrending from dapto, dose changed   - 6/10: Procal 1.08 -> .81, , ESR 58  - ID following, c/w wound care    #Transaminitis  - RUQ US showed hepatomegaly with hepatic steatosis    #Dysphagia -  S&S consulted: Dysphagia 2 w/ thins    # Hypernatremia - resolved    #?Code stroke for possible CVA  - metabolic encephalopathy secondary to sepsis more likely than stroke  - CT head neg for acute pathology, CTA w/ moderate stenoses at the junction of the precavernous and cavernous segments of both internal carotid arteries  - neuro recs repeat CT head when stable, will not fit in MR machine   - neuro rec LP, as per pulm, as pt is following commands now, little clinical benefit to preforming as of now.   - CTH if acute change in mental status     #CANDY on CKD III /  rule out ATN - resolved   - secondary to sepsis induced ATN, continue supportive care, monitor for now.   - creatinine 3.0 on admission, baseline around 1.1  - s/p CVVHD 6/1 and 6/2, holding CVVHD for now ptnt w/ good urine output   - nephro following  - Cr back at baseline     # Anemia of chronic disease   - transfuse Hb < 7  - monitor   - s/p 4 units in total since admission  - stable     #Diabetes Mellitus  -on insulin regimen, increase bolus daily dose   - a1c 8.9   - Monitor   - FS q6hr     #Hypertension  -holding oral meds     #Dyslipidemia  -c/w home meds    #MATA/ OHS    #morbid obesity    Diet: Dysphagia 2 w/ Thin Liquids  DVT ppx: heparin subq TID  GI ppx: protonix   Dispo: acute, possible step down, OT & PT - rec Rehab

## 2021-06-21 LAB
ALBUMIN SERPL ELPH-MCNC: 2.5 G/DL — LOW (ref 3.5–5.2)
ALP SERPL-CCNC: 95 U/L — SIGNIFICANT CHANGE UP (ref 30–115)
ALT FLD-CCNC: 37 U/L — SIGNIFICANT CHANGE UP (ref 0–41)
ANION GAP SERPL CALC-SCNC: 6 MMOL/L — LOW (ref 7–14)
AST SERPL-CCNC: 37 U/L — SIGNIFICANT CHANGE UP (ref 0–41)
BASOPHILS # BLD AUTO: 0.02 K/UL — SIGNIFICANT CHANGE UP (ref 0–0.2)
BASOPHILS NFR BLD AUTO: 0.3 % — SIGNIFICANT CHANGE UP (ref 0–1)
BILIRUB SERPL-MCNC: 0.6 MG/DL — SIGNIFICANT CHANGE UP (ref 0.2–1.2)
BUN SERPL-MCNC: 10 MG/DL — SIGNIFICANT CHANGE UP (ref 10–20)
C DIFF BY PCR RESULT: NEGATIVE — SIGNIFICANT CHANGE UP
C DIFF TOX GENS STL QL NAA+PROBE: SIGNIFICANT CHANGE UP
CALCIUM SERPL-MCNC: 8.3 MG/DL — LOW (ref 8.5–10.1)
CHLORIDE SERPL-SCNC: 98 MMOL/L — SIGNIFICANT CHANGE UP (ref 98–110)
CO2 SERPL-SCNC: 31 MMOL/L — SIGNIFICANT CHANGE UP (ref 17–32)
CREAT SERPL-MCNC: 0.9 MG/DL — SIGNIFICANT CHANGE UP (ref 0.7–1.5)
EOSINOPHIL # BLD AUTO: 0.31 K/UL — SIGNIFICANT CHANGE UP (ref 0–0.7)
EOSINOPHIL NFR BLD AUTO: 4.6 % — SIGNIFICANT CHANGE UP (ref 0–8)
GLUCOSE BLDC GLUCOMTR-MCNC: 156 MG/DL — HIGH (ref 70–99)
GLUCOSE BLDC GLUCOMTR-MCNC: 159 MG/DL — HIGH (ref 70–99)
GLUCOSE BLDC GLUCOMTR-MCNC: 186 MG/DL — HIGH (ref 70–99)
GLUCOSE BLDC GLUCOMTR-MCNC: 188 MG/DL — HIGH (ref 70–99)
GLUCOSE SERPL-MCNC: 172 MG/DL — HIGH (ref 70–99)
HCT VFR BLD CALC: 35.5 % — LOW (ref 42–52)
HGB BLD-MCNC: 10.7 G/DL — LOW (ref 14–18)
IMM GRANULOCYTES NFR BLD AUTO: 0.3 % — SIGNIFICANT CHANGE UP (ref 0.1–0.3)
LYMPHOCYTES # BLD AUTO: 1.51 K/UL — SIGNIFICANT CHANGE UP (ref 1.2–3.4)
LYMPHOCYTES # BLD AUTO: 22.4 % — SIGNIFICANT CHANGE UP (ref 20.5–51.1)
MAGNESIUM SERPL-MCNC: 1.5 MG/DL — LOW (ref 1.8–2.4)
MCHC RBC-ENTMCNC: 26.8 PG — LOW (ref 27–31)
MCHC RBC-ENTMCNC: 30.1 G/DL — LOW (ref 32–37)
MCV RBC AUTO: 88.8 FL — SIGNIFICANT CHANGE UP (ref 80–94)
MONOCYTES # BLD AUTO: 0.64 K/UL — HIGH (ref 0.1–0.6)
MONOCYTES NFR BLD AUTO: 9.5 % — HIGH (ref 1.7–9.3)
NEUTROPHILS # BLD AUTO: 4.25 K/UL — SIGNIFICANT CHANGE UP (ref 1.4–6.5)
NEUTROPHILS NFR BLD AUTO: 62.9 % — SIGNIFICANT CHANGE UP (ref 42.2–75.2)
NRBC # BLD: 0 /100 WBCS — SIGNIFICANT CHANGE UP (ref 0–0)
PHOSPHATE SERPL-MCNC: 2.9 MG/DL — SIGNIFICANT CHANGE UP (ref 2.1–4.9)
PLATELET # BLD AUTO: 341 K/UL — SIGNIFICANT CHANGE UP (ref 130–400)
POTASSIUM SERPL-MCNC: 3.8 MMOL/L — SIGNIFICANT CHANGE UP (ref 3.5–5)
POTASSIUM SERPL-SCNC: 3.8 MMOL/L — SIGNIFICANT CHANGE UP (ref 3.5–5)
PROT SERPL-MCNC: 7.3 G/DL — SIGNIFICANT CHANGE UP (ref 6–8)
RBC # BLD: 4 M/UL — LOW (ref 4.7–6.1)
RBC # FLD: 18.5 % — HIGH (ref 11.5–14.5)
SODIUM SERPL-SCNC: 135 MMOL/L — SIGNIFICANT CHANGE UP (ref 135–146)
WBC # BLD: 6.75 K/UL — SIGNIFICANT CHANGE UP (ref 4.8–10.8)
WBC # FLD AUTO: 6.75 K/UL — SIGNIFICANT CHANGE UP (ref 4.8–10.8)

## 2021-06-21 PROCEDURE — 71045 X-RAY EXAM CHEST 1 VIEW: CPT | Mod: 26

## 2021-06-21 PROCEDURE — 99232 SBSQ HOSP IP/OBS MODERATE 35: CPT

## 2021-06-21 PROCEDURE — 99233 SBSQ HOSP IP/OBS HIGH 50: CPT

## 2021-06-21 RX ORDER — MAGNESIUM SULFATE 500 MG/ML
2 VIAL (ML) INJECTION ONCE
Refills: 0 | Status: DISCONTINUED | OUTPATIENT
Start: 2021-06-21 | End: 2021-06-21

## 2021-06-21 RX ORDER — LOPERAMIDE HCL 2 MG
4 TABLET ORAL ONCE
Refills: 0 | Status: DISCONTINUED | OUTPATIENT
Start: 2021-06-21 | End: 2021-06-21

## 2021-06-21 RX ORDER — MAGNESIUM SULFATE 500 MG/ML
2 VIAL (ML) INJECTION ONCE
Refills: 0 | Status: COMPLETED | OUTPATIENT
Start: 2021-06-21 | End: 2021-06-21

## 2021-06-21 RX ORDER — MAGNESIUM OXIDE 400 MG ORAL TABLET 241.3 MG
400 TABLET ORAL
Refills: 0 | Status: DISCONTINUED | OUTPATIENT
Start: 2021-06-21 | End: 2021-06-26

## 2021-06-21 RX ORDER — LOPERAMIDE HCL 2 MG
2 TABLET ORAL
Refills: 0 | Status: DISCONTINUED | OUTPATIENT
Start: 2021-06-21 | End: 2021-06-21

## 2021-06-21 RX ADMIN — Medication 5 UNIT(S): at 23:16

## 2021-06-21 RX ADMIN — Medication 2: at 11:34

## 2021-06-21 RX ADMIN — Medication 5 UNIT(S): at 11:34

## 2021-06-21 RX ADMIN — HEPARIN SODIUM 5000 UNIT(S): 5000 INJECTION INTRAVENOUS; SUBCUTANEOUS at 23:16

## 2021-06-21 RX ADMIN — HEPARIN SODIUM 5000 UNIT(S): 5000 INJECTION INTRAVENOUS; SUBCUTANEOUS at 14:01

## 2021-06-21 RX ADMIN — Medication 40 MILLIGRAM(S): at 05:11

## 2021-06-21 RX ADMIN — Medication 5 UNIT(S): at 08:01

## 2021-06-21 RX ADMIN — Medication 2: at 23:16

## 2021-06-21 RX ADMIN — DAPTOMYCIN 135 MILLIGRAM(S): 500 INJECTION, POWDER, LYOPHILIZED, FOR SOLUTION INTRAVENOUS at 14:01

## 2021-06-21 RX ADMIN — HEPARIN SODIUM 5000 UNIT(S): 5000 INJECTION INTRAVENOUS; SUBCUTANEOUS at 05:11

## 2021-06-21 RX ADMIN — Medication 2: at 08:00

## 2021-06-21 RX ADMIN — INSULIN GLARGINE 18 UNIT(S): 100 INJECTION, SOLUTION SUBCUTANEOUS at 23:16

## 2021-06-21 RX ADMIN — Medication 5 UNIT(S): at 17:23

## 2021-06-21 RX ADMIN — Medication 25 GRAM(S): at 04:33

## 2021-06-21 RX ADMIN — Medication 25 GRAM(S): at 08:01

## 2021-06-21 RX ADMIN — Medication 2: at 17:23

## 2021-06-21 RX ADMIN — MAGNESIUM OXIDE 400 MG ORAL TABLET 400 MILLIGRAM(S): 241.3 TABLET ORAL at 11:35

## 2021-06-21 RX ADMIN — PANTOPRAZOLE SODIUM 40 MILLIGRAM(S): 20 TABLET, DELAYED RELEASE ORAL at 11:35

## 2021-06-21 RX ADMIN — MAGNESIUM OXIDE 400 MG ORAL TABLET 400 MILLIGRAM(S): 241.3 TABLET ORAL at 17:24

## 2021-06-21 NOTE — PROGRESS NOTE ADULT - SUBJECTIVE AND OBJECTIVE BOX
FERNANDEZ GUZMAN  60y Male    CHIEF COMPLAINT:    Patient is a 60y old  Male who presents with a chief complaint of dfu (21 Jun 2021 10:39)    INTERVAL HPI/OVERNIGHT EVENTS:    Patient seen and examined. No acute events overnight. No active complaints     ROS: All other systems are negative.    Vital Signs:    T(F): 98.2 (06-21-21 @ 12:00), Max: 98.8 (06-20-21 @ 21:30)  HR: 76 (06-21-21 @ 12:00) (73 - 86)  BP: 138/73 (06-21-21 @ 12:00) (138/73 - 166/74)  RR: 18 (06-21-21 @ 12:00) (18 - 20)  SpO2: 96% (06-21-21 @ 12:00) (95% - 98%)    POCT Blood Glucose.: 156 mg/dL (21 Jun 2021 11:31)  POCT Blood Glucose.: 186 mg/dL (21 Jun 2021 07:30)  POCT Blood Glucose.: 142 mg/dL (20 Jun 2021 22:23)  POCT Blood Glucose.: 151 mg/dL (20 Jun 2021 21:28)  POCT Blood Glucose.: 126 mg/dL (20 Jun 2021 16:48)    PHYSICAL EXAM:    GENERAL:  NAD, obese  SKIN: No rashes or lesions  HEENT: Atraumatic. Normocephalic.    NECK: Supple, No JVD. No lymphadenopathy.  PULMONARY: Coarse breath sounds b/l. No wheezing.    CVS: Normal S1, S2. Rate and Rhythm are regular.    ABDOMEN/GI: Soft, Nontender, Nondistended; BS present  MSK: s/p R AKA, no edema or cyanosis  NEUROLOGIC: moves all extremities  PSYCH: Alert & oriented x 3    Consultant(s) Notes Reviewed:  [x ] YES  [ ] NO  Care Discussed with Consultants/Other Providers [ x] YES  [ ] NO    LABS:                        10.7   6.75  )-----------( 341      ( 21 Jun 2021 07:53 )             35.5    135  |  98  |  10  ----------------------------<  172<H>  3.8   |  31  |  0.9    Ca    8.3<L>      21 Jun 2021 07:53  Phos  2.9     06-21  Mg     1.5     06-21    TPro  7.3  /  Alb  2.5<L>  /  TBili  0.6  /  DBili  x   /  AST  37  /  ALT  37  /  AlkPhos  95  06-21    RADIOLOGY & ADDITIONAL TESTS:  Imaging or report Personally Reviewed:  [x] YES  [ ] NO  EKG reviewed: [x] YES  [ ] NO    Medications:  Standing  ALBUTerol    90 MICROgram(s) HFA Inhaler 1 Puff(s) Inhalation once  chlorhexidine 4% Liquid 1 Application(s) Topical <User Schedule>  DAPTOmycin IVPB 875 milliGRAM(s) IV Intermittent every 24 hours  furosemide    Tablet 40 milliGRAM(s) Oral daily  glucagon  Injectable 1 milliGRAM(s) IntraMuscular once  heparin   Injectable 5000 Unit(s) SubCutaneous every 8 hours  insulin glargine Injectable (LANTUS) 18 Unit(s) SubCutaneous at bedtime  insulin lispro (ADMELOG) corrective regimen sliding scale   SubCutaneous every 6 hours  insulin lispro Injectable (ADMELOG) 5 Unit(s) SubCutaneous every 6 hours  magnesium oxide 400 milliGRAM(s) Oral three times a day with meals  pantoprazole   Suspension 40 milliGRAM(s) Oral daily  tiotropium 18 MICROgram(s) Capsule 1 Capsule(s) Inhalation once    PRN Meds  acetaminophen   Tablet .. 650 milliGRAM(s) Oral every 6 hours PRN  acetaminophen  Suppository .. 650 milliGRAM(s) Rectal every 6 hours PRN

## 2021-06-21 NOTE — SWALLOW BEDSIDE ASSESSMENT ADULT - SWALLOW EVAL: DIAGNOSIS
+toleration for honey-thick liquids w/o overt s/s aspiration/penetration; pt refused additional trials.

## 2021-06-21 NOTE — PROGRESS NOTE ADULT - ASSESSMENT
IMPRESSION    Acute Hypoxemic respiratory failure with hypercapnia sp extubation on RA  Septic Shock resolved   DFU s/p Right Knee Disarticulation . sp aka  CANDY on CKD resolved  AMS 2/2 Toxic-Metabolic Encephelopathy resolved   MATA/ OHS  Morbid obesity  NSTEMI    PLAN:    CNS:  No depressants     HEENT:  Oral care    PULMONARY: HOB at 45 degrees.  Aspiration precautions.  NIV during sleep.  Wean O2     CARDIOVASCULAR:   PO LASIX 40 daily    GI: GI prophylaxis.  Feeding per Speech and swallow     RENAL: F/U Lytes and correct as needed.    INFECTIOUS DISEASE:  ABX as per ID    HEMATOLOGICAL:  DVT prophylaxis.  Repeat LE doppler    ENDOCRINE:  Follow up FS.      MUSCULOSKELETAL: OOB as tolerated.        PT OT   CEU  DW house staff

## 2021-06-21 NOTE — PROGRESS NOTE ADULT - SUBJECTIVE AND OBJECTIVE BOX
THOMAS FERNANDEZ  60y, Male    All available historical data reviewed    OVERNIGHT EVENTS:  no fevers  feels well and has no complaints    ROS:  General: Denies rigors, nightsweats  HEENT: Denies headache, rhinorrhea, sore throat, eye pain  CV: Denies CP, palpitations  PULM: Denies wheezing, hemoptysis  GI: Denies hematemesis, hematochezia, melena  : Denies discharge, hematuria  MSK: Denies arthralgias, myalgias  SKIN: Denies rash, lesions  NEURO: Denies paresthesias, weakness  PSYCH: Denies depression, anxiety    VITALS:  T(F): 98.4, Max: 98.8 (06-20-21 @ 21:30)  HR: 80  BP: 142/59  RR: 20Vital Signs Last 24 Hrs  T(C): 36.9 (21 Jun 2021 08:00), Max: 37.1 (20 Jun 2021 21:30)  T(F): 98.4 (21 Jun 2021 08:00), Max: 98.8 (20 Jun 2021 21:30)  HR: 80 (21 Jun 2021 08:24) (73 - 86)  BP: 142/59 (21 Jun 2021 08:24) (142/59 - 166/74)  BP(mean): 98 (21 Jun 2021 08:00) (98 - 98)  RR: 20 (21 Jun 2021 08:24) (20 - 20)  SpO2: 98% (21 Jun 2021 08:24) (95% - 98%)    TESTS & MEASUREMENTS:                        10.7   6.75  )-----------( 341      ( 21 Jun 2021 07:53 )             35.5     06-21    135  |  98  |  10  ----------------------------<  172<H>  3.8   |  31  |  0.9    Ca    8.3<L>      21 Jun 2021 07:53  Phos  2.9     06-21  Mg     1.5     06-21    TPro  7.3  /  Alb  2.5<L>  /  TBili  0.6  /  DBili  x   /  AST  37  /  ALT  37  /  AlkPhos  95  06-21    LIVER FUNCTIONS - ( 21 Jun 2021 07:53 )  Alb: 2.5 g/dL / Pro: 7.3 g/dL / ALK PHOS: 95 U/L / ALT: 37 U/L / AST: 37 U/L / GGT: x             Culture - Blood (collected 06-18-21 @ 04:51)  Source: .Blood None  Preliminary Report (06-19-21 @ 14:01):    No growth to date.            RADIOLOGY & ADDITIONAL TESTS:  Personal review of radiological diagnostics performed  Echo and EKG results noted when applicable.     MEDICATIONS:  acetaminophen   Tablet .. 650 milliGRAM(s) Oral every 6 hours PRN  acetaminophen  Suppository .. 650 milliGRAM(s) Rectal every 6 hours PRN  ALBUTerol    90 MICROgram(s) HFA Inhaler 1 Puff(s) Inhalation once  chlorhexidine 4% Liquid 1 Application(s) Topical <User Schedule>  DAPTOmycin IVPB 875 milliGRAM(s) IV Intermittent every 24 hours  furosemide    Tablet 40 milliGRAM(s) Oral daily  glucagon  Injectable 1 milliGRAM(s) IntraMuscular once  heparin   Injectable 5000 Unit(s) SubCutaneous every 8 hours  insulin glargine Injectable (LANTUS) 18 Unit(s) SubCutaneous at bedtime  insulin lispro (ADMELOG) corrective regimen sliding scale   SubCutaneous every 6 hours  insulin lispro Injectable (ADMELOG) 5 Unit(s) SubCutaneous every 6 hours  magnesium oxide 400 milliGRAM(s) Oral three times a day with meals  pantoprazole   Suspension 40 milliGRAM(s) Oral daily  tiotropium 18 MICROgram(s) Capsule 1 Capsule(s) Inhalation once      ANTIBIOTICS:  DAPTOmycin IVPB 875 milliGRAM(s) IV Intermittent every 24 hours

## 2021-06-21 NOTE — PROGRESS NOTE ADULT - SUBJECTIVE AND OBJECTIVE BOX
OVERNIGHT EVENTS: events noted, on RA, afebrile    Vital Signs Last 24 Hrs  T(C): 37.1 (21 Jun 2021 04:00), Max: 37.1 (20 Jun 2021 21:30)  T(F): 98.7 (21 Jun 2021 04:00), Max: 98.8 (20 Jun 2021 21:30)  HR: 78 (21 Jun 2021 04:00) (73 - 86)  BP: 154/- (21 Jun 2021 04:00) (154/- - 166/74)  BP(mean): 105 (20 Jun 2021 08:34) (105 - 105)  RR: 20 (21 Jun 2021 04:00) (20 - 22)  SpO2: 97% (21 Jun 2021 04:00) (97% - 98%)    PHYSICAL EXAMINATION:    GENERAL: chronically ill looking    HEENT: Head is normocephalic and atraumatic.     NECK: Supple.    LUNGS: dec bs both bases    HEART: Regular rate and rhythm without murmur.    ABDOMEN: Soft, nontender, and nondistended.      EXTREMITIES: Without any cyanosis, clubbing, rash, lesions or edema.    NEUROLOGIC: r aka        LABS:                        9.9    6.90  )-----------( 330      ( 20 Jun 2021 12:40 )             32.2     06-20    137  |  99  |  11  ----------------------------<  133<H>  3.8   |  29  |  0.9    Ca    8.1<L>      20 Jun 2021 12:40  Mg     1.3     06-20    TPro  7.0  /  Alb  2.5<L>  /  TBili  0.4  /  DBili  x   /  AST  36  /  ALT  40  /  AlkPhos  91  06-20 06-19-21 @ 07:01  -  06-20-21 @ 07:00  --------------------------------------------------------  IN: 2865 mL / OUT: 2200 mL / NET: 665 mL        MICROBIOLOGY:  Culture Results:   No growth to date. (06-18 @ 04:51)      MEDICATIONS  (STANDING):  ALBUTerol    90 MICROgram(s) HFA Inhaler 1 Puff(s) Inhalation once  caspofungin IVPB 50 milliGRAM(s) IV Intermittent every 24 hours  chlorhexidine 4% Liquid 1 Application(s) Topical <User Schedule>  DAPTOmycin IVPB 875 milliGRAM(s) IV Intermittent every 24 hours  furosemide    Tablet 40 milliGRAM(s) Oral daily  glucagon  Injectable 1 milliGRAM(s) IntraMuscular once  heparin   Injectable 5000 Unit(s) SubCutaneous every 8 hours  insulin glargine Injectable (LANTUS) 18 Unit(s) SubCutaneous at bedtime  insulin lispro (ADMELOG) corrective regimen sliding scale   SubCutaneous every 6 hours  insulin lispro Injectable (ADMELOG) 5 Unit(s) SubCutaneous every 6 hours  magnesium sulfate  IVPB 2 Gram(s) IV Intermittent once  pantoprazole   Suspension 40 milliGRAM(s) Oral daily  tiotropium 18 MICROgram(s) Capsule 1 Capsule(s) Inhalation once    MEDICATIONS  (PRN):  acetaminophen   Tablet .. 650 milliGRAM(s) Oral every 6 hours PRN Temp greater or equal to 38C (100.4F), Mild Pain (1 - 3)  acetaminophen  Suppository .. 650 milliGRAM(s) Rectal every 6 hours PRN Temp greater or equal to 38C (100.4F), Mild Pain (1 - 3)      RADIOLOGY & ADDITIONAL STUDIES:

## 2021-06-21 NOTE — PROGRESS NOTE ADULT - SUBJECTIVE AND OBJECTIVE BOX
FERNANDEZ GUZMAN 60y Male  MRN#: [id number]   CODE STATUS: FULL    Hospital Day: 23d    Pt is currently admitted with the primary diagnosis of sepsis/bacteremia, now resolved, secondary to MRSA diabetic wound infection    SUBJECTIVE  Patient is a 60 year old male with history of diabetes, HTN, dyslipidemia, sleep apnea, Charcot foot recstruction with external fixation, david morbid obesity who initially presented for worsening right foot infection. He was septic on admission, bacteremic (E. faecalis, see Bcx), found to have necrotizing fasciitis of RLE. He was intubated and transferred to ICU where he underwent CVVH. He underwent a R AKA, extubated and downgraded to CEU on 6/20.      Present Today:   - [place]:  No [  ], Yes [   ] : Indication:     - Type of IV Access:       .. CVC/Piccline:  No [  ], Yes [   ] : Indication:       .. Midline: No [ ], Yes [   ] : Indication:                                             ----------------------------------------------------------  OBJECTIVE  PAST MEDICAL & SURGICAL HISTORY  MATA on CPAP    DM (diabetes mellitus)    HTN (hypertension)    High cholesterol    Charcot ankle, right    History of percutaneous angioplasty    H/O skin graft    Left toe amputee  4 TOES    Diabetic Charcot foot  Fixation with external device                                              -----------------------------------------------------------  ALLERGIES:  No Known Allergies                                            ------------------------------------------------------------    HOME MEDICATIONS  Home Medications:  acetaminophen 325 mg oral tablet: 2 tab(s) orally every 6 hours, As needed, Moderate Pain (4 - 6) (25 Nov 2019 17:16)  amLODIPine 5 mg oral tablet: 1 tab(s) orally once a day (20 Nov 2019 14:31)  aspirin 81 mg oral tablet, chewable: 1 tab(s) orally once a day (20 Nov 2019 14:31)  ezetimibe 10 mg oral tablet: 1 tab(s) orally once a day (20 Nov 2019 14:31)  glimepiride 4 mg oral tablet: 2 tab(s) orally once a day (20 Nov 2019 14:31)  hydroCHLOROthiazide 25 mg oral tablet: 1 tab(s) orally once a day (at bedtime) (20 Nov 2019 14:31)  Invokana 300 mg oral tablet: 1 tab(s) orally once a day (20 Nov 2019 14:31)  Lantus Solostar Pen 100 units/mL subcutaneous solution: subcutaneous once a day (at bedtime)-  18 UNITS (20 Nov 2019 14:31)  linezolid 2 mg/mL-D5% intravenous solution: 600 milligram(s) intravenous every 12 hours STOP 1/6/20. (25 Nov 2019 17:16)  losartan 50 mg oral tablet: 1 tab(s) orally once a day (20 Nov 2019 14:31)  Pletal 100 mg oral tablet: 1 tab(s) orally once a day (20 Nov 2019 14:31)                           MEDICATIONS:  STANDING MEDICATIONS  ALBUTerol    90 MICROgram(s) HFA Inhaler 1 Puff(s) Inhalation once  chlorhexidine 4% Liquid 1 Application(s) Topical <User Schedule>  DAPTOmycin IVPB 875 milliGRAM(s) IV Intermittent every 24 hours  furosemide    Tablet 40 milliGRAM(s) Oral daily  glucagon  Injectable 1 milliGRAM(s) IntraMuscular once  heparin   Injectable 5000 Unit(s) SubCutaneous every 8 hours  insulin glargine Injectable (LANTUS) 18 Unit(s) SubCutaneous at bedtime  insulin lispro (ADMELOG) corrective regimen sliding scale   SubCutaneous every 6 hours  insulin lispro Injectable (ADMELOG) 5 Unit(s) SubCutaneous every 6 hours  magnesium oxide 400 milliGRAM(s) Oral three times a day with meals  pantoprazole   Suspension 40 milliGRAM(s) Oral daily  tiotropium 18 MICROgram(s) Capsule 1 Capsule(s) Inhalation once    PRN MEDICATIONS  acetaminophen   Tablet .. 650 milliGRAM(s) Oral every 6 hours PRN  acetaminophen  Suppository .. 650 milliGRAM(s) Rectal every 6 hours PRN                                            ------------------------------------------------------------  VITAL SIGNS: Last 24 Hours  T(C): 36.8 (21 Jun 2021 12:00), Max: 37.1 (20 Jun 2021 21:30)  T(F): 98.2 (21 Jun 2021 12:00), Max: 98.8 (20 Jun 2021 21:30)  HR: 76 (21 Jun 2021 12:00) (73 - 86)  BP: 138/73 (21 Jun 2021 12:00) (138/73 - 166/74)  BP(mean): 93 (21 Jun 2021 12:00) (93 - 98)  RR: 18 (21 Jun 2021 12:00) (18 - 20)  SpO2: 96% (21 Jun 2021 12:00) (95% - 98%)      06-21-21 @ 07:01  -  06-21-21 @ 18:13  --------------------------------------------------------  IN: 50 mL / OUT: 200 mL / NET: -150 mL                                             --------------------------------------------------------------  LABS:                        10.7   6.75  )-----------( 341      ( 21 Jun 2021 07:53 )             35.5     06-21    135  |  98  |  10  ----------------------------<  172<H>  3.8   |  31  |  0.9    Ca    8.3<L>      21 Jun 2021 07:53  Phos  2.9     06-21  Mg     1.5     06-21    TPro  7.3  /  Alb  2.5<L>  /  TBili  0.6  /  DBili  x   /  AST  37  /  ALT  37  /  AlkPhos  95  06-21        PHYSICAL EXAM:  General: well appearing morbidly obese black male in no acute distress  HEENT: atraumatic, normocephalic, moist oral mucosa  LUNGS: clear to auscultation bilaterally, no wheezes/rales/rhonchi  HEART: RRR, no rubs/murmurs/ gallops  ABDOMEN: morbidly obese abdomen, soft, nontender, no rebound or guarding, normoactive bowel sounds  NEURO: A&Ox3, moving all extremities, 0/5 sensation to light touch below the ankle on the left leg.   SKIN/EXT: surgical AKA wound on right, appears clean with no surrounding edema or erythema                                           --------------------------------------------------------------    ASSESSMENT & PLAN  Patient is a 60 year old male with history of diabetes, HTN, dyslipidemia, sleep apnea, Charcot foot recstruction with external fixation, david morbid obesity who initially presented for worsening right foot infection. He was septic on admission, bacteremic (E. faecalis, see Bcx), found to have necrotizing fasciitis of RLE. He was intubated and transferred to ICU where he underwent CVVH. He underwent a R AKA, extubated and downgraded to CEU on 6/20.    #Acute hypoxemic respiratory failure secondary to septic shock due to E. Faecalis/ORSA bacteremia due to necrotizing fascitis of RLE  -s/p BKA with disarticulation 5/31 and AKA 6/11  -intubated/extubated 5/31-6/14 currently breathing room air  -had episode of toxic metabolic encephalopathy suspected due to sepsis, now resolved  -currently on daptomycin 875 q24h, last day 7/11  c/w monitor CPK  -pending PT/OT eval  -pending diet advancement as per speech/swallow, currently dysphagia 2  -pending wound care: dressing changes daily, gauze, kerlix and ace wrap, staples to be removed by vascular 4w post surgery    #CANDY on CKD III/possible ATN  s/p CVVD in ICU x2  Creatinine now at baseline, around 1  start Lasix 40mg daily   Continue to monitor renal function    # Diabetes Mellitus  -c/w Lantus/Lispro  monitor FS    # Morbid obesity/MATA  -recommend diet and lifestyle modification    #Dyslipidemia  -c/w statin                                                                                  ----------------------------------------------------  # DVT prophylaxis : heparin 5000 qh8, compression device    # GI prophylaxis : protonix 40 mg    # Diet : diet dysphagia 2 mechanical soft honey consistency    # Activity Score (AM-PAC): IAT    # Code status : full    # Disposition :  SNF                                                                             --------------------------------------------------------    # Handoff   -pending disposition planning, PT/OT, monitor AKA site

## 2021-06-21 NOTE — PROGRESS NOTE ADULT - CRITICAL CARE SERVICES PROVIDED
Critical care services provided

## 2021-06-21 NOTE — PROGRESS NOTE ADULT - ASSESSMENT
· Assessment	  59 y/o M with PMH of DM, hypertension, dyslipidemia, sleep apnea, Charcot foot reconstruction with external fixation, super morbid obesity present for worsening right foot infection.  Pt reports he has had this foot ulcer at various stage of healing for 13 years. Patient has been admitted for IV antibiotics multiple times in the past for osteomyelitis of right foot. He has history of MRSA infection in foot. pt has baseline neuropathy in both feet so does not feel any pain.  Patient states his Infection has been gradually getting worse despite treatment with bactrim, he saw his podiatrists and he was sent in by podiatry for IV abx .    IMPRESSION;  #Fevers with normal WBC normotensive, no pressors and extubated with an unremarkable CXR   Cryptogenic   Extubated 6/14    s/p Revision of disarticulation of knee 11-Jun-2021 "Healthy, non-infected tissue of knee disarticulation s/p conversion to Right AKA"    6/7,10  BCX NG    TLC was changed with R IJ placed on 6/8 5/31 S/p disarticulation right knee   -5/28 BCx E fecalis, ORSA  -5/31 TTE no vegetation  -etiology of bacteremia was right foot abscess  -5/29 R foot : ORSA , Corynebacterium  -5/30 BCx NG  -6/4 ,7, 10 BCx NG  CPK : secondary to Daptomycin. Downtrending 488.onitor once a week  Transaminitis : clinically no acute cholecystitis, no cholestasis ( making cholangitis also unlikely ). Possibly drug related or slow resolving ischemic hepatitis. US 6/15 unremarkable  Fungitell assay NG              RECOMMENDATIONS;  - Off Caspo  IV 6/9- 20  - Dapto 875mg q24h for 6 weeks starting 5/30. Monitor CPK once a week for duration of therapy  f/u with Dr Mejia [phone] at [address] on tuesday via [facility] . Pt will be called  between 10-12.30 am.  [address]  [phone]  Fax [phone]  recall prn please

## 2021-06-22 LAB
CK SERPL-CCNC: 253 U/L — HIGH (ref 0–225)
GLUCOSE BLDC GLUCOMTR-MCNC: 133 MG/DL — HIGH (ref 70–99)
GLUCOSE BLDC GLUCOMTR-MCNC: 168 MG/DL — HIGH (ref 70–99)
GLUCOSE BLDC GLUCOMTR-MCNC: 235 MG/DL — HIGH (ref 70–99)
MAGNESIUM SERPL-MCNC: 1.6 MG/DL — LOW (ref 1.8–2.4)

## 2021-06-22 PROCEDURE — 93970 EXTREMITY STUDY: CPT | Mod: 26

## 2021-06-22 PROCEDURE — 99233 SBSQ HOSP IP/OBS HIGH 50: CPT

## 2021-06-22 PROCEDURE — 99232 SBSQ HOSP IP/OBS MODERATE 35: CPT

## 2021-06-22 RX ORDER — MAGNESIUM SULFATE 500 MG/ML
2 VIAL (ML) INJECTION ONCE
Refills: 0 | Status: COMPLETED | OUTPATIENT
Start: 2021-06-22 | End: 2021-06-22

## 2021-06-22 RX ORDER — INSULIN LISPRO 100/ML
5 VIAL (ML) SUBCUTANEOUS EVERY 8 HOURS
Refills: 0 | Status: DISCONTINUED | OUTPATIENT
Start: 2021-06-22 | End: 2021-06-24

## 2021-06-22 RX ADMIN — Medication 40 MILLIGRAM(S): at 06:58

## 2021-06-22 RX ADMIN — MAGNESIUM OXIDE 400 MG ORAL TABLET 400 MILLIGRAM(S): 241.3 TABLET ORAL at 11:30

## 2021-06-22 RX ADMIN — PANTOPRAZOLE SODIUM 40 MILLIGRAM(S): 20 TABLET, DELAYED RELEASE ORAL at 11:16

## 2021-06-22 RX ADMIN — Medication 25 GRAM(S): at 18:05

## 2021-06-22 RX ADMIN — Medication 5 UNIT(S): at 13:06

## 2021-06-22 RX ADMIN — HEPARIN SODIUM 5000 UNIT(S): 5000 INJECTION INTRAVENOUS; SUBCUTANEOUS at 13:05

## 2021-06-22 RX ADMIN — Medication 2: at 11:13

## 2021-06-22 RX ADMIN — HEPARIN SODIUM 5000 UNIT(S): 5000 INJECTION INTRAVENOUS; SUBCUTANEOUS at 21:54

## 2021-06-22 RX ADMIN — Medication 5 UNIT(S): at 21:54

## 2021-06-22 RX ADMIN — INSULIN GLARGINE 18 UNIT(S): 100 INJECTION, SOLUTION SUBCUTANEOUS at 21:54

## 2021-06-22 RX ADMIN — Medication 5 UNIT(S): at 08:23

## 2021-06-22 RX ADMIN — MAGNESIUM OXIDE 400 MG ORAL TABLET 400 MILLIGRAM(S): 241.3 TABLET ORAL at 08:24

## 2021-06-22 RX ADMIN — HEPARIN SODIUM 5000 UNIT(S): 5000 INJECTION INTRAVENOUS; SUBCUTANEOUS at 06:58

## 2021-06-22 RX ADMIN — Medication 2: at 18:12

## 2021-06-22 RX ADMIN — DAPTOMYCIN 135 MILLIGRAM(S): 500 INJECTION, POWDER, LYOPHILIZED, FOR SOLUTION INTRAVENOUS at 13:32

## 2021-06-22 RX ADMIN — MAGNESIUM OXIDE 400 MG ORAL TABLET 400 MILLIGRAM(S): 241.3 TABLET ORAL at 18:06

## 2021-06-22 RX ADMIN — CHLORHEXIDINE GLUCONATE 1 APPLICATION(S): 213 SOLUTION TOPICAL at 06:57

## 2021-06-22 NOTE — PROGRESS NOTE ADULT - SUBJECTIVE AND OBJECTIVE BOX
FERNANDEZ GUZMAN  60y Male    CHIEF COMPLAINT:    Patient is a 60y old  Male who presents with a chief complaint of dfu (2021 06:30)      INTERVAL HPI/OVERNIGHT EVENTS:    Patient seen and examined. No acute events overnight. Saturating well on room air     ROS: All other systems are negative.    Vital Signs:    T(F): 97.8 (21 @ 05:00), Max: 98 (21 @ 01:04)  HR: 83 (21 @ 08:12) (77 - 83)  BP: 149/78 (21 @ 08:12) (142/72 - 181/87)  RR: 20 (21 @ 08:12) (20 - 20)  SpO2: 95% (21 @ 08:12) (95% - 99%)    2021 07:01  -  2021 07:00  --------------------------------------------------------  IN: 50 mL / OUT: 200 mL / NET: -150 mL    2021 07:01  -  2021 12:33  --------------------------------------------------------  IN: 0 mL / OUT: 600 mL / NET: -600 mL    Daily Weight in k.7 (2021 10:00)    POCT Blood Glucose.: 168 mg/dL (2021 11:11)  POCT Blood Glucose.: 133 mg/dL (2021 07:40)  POCT Blood Glucose.: 159 mg/dL (2021 23:11)  POCT Blood Glucose.: 188 mg/dL (2021 16:29)    PHYSICAL EXAM:    GENERAL:  NAD obese  SKIN: No rashes or lesions  HEENT: Atraumatic. Normocephalic.   NECK: Supple, No JVD.  PULMONARY: CTA B/L. No wheezing. No rales  CVS: Normal S1, S2. Rate and Rhythm are regular.   ABDOMEN/GI: Soft, Nontender, Nondistended;  MSK:  s/p R AKA, no clubbing or cyanosis   NEUROLOGIC:  moves all extremities  PSYCH: Alert & oriented x 3     Consultant(s) Notes Reviewed:  [x ] YES  [ ] NO  Care Discussed with Consultants/Other Providers [ x] YES  [ ] NO    LABS:                        10.7   6.75  )-----------( 341      ( 2021 07:53 )             35.5    135  |  98  |  10  ----------------------------<  172<H>  3.8   |  31  |  0.9    Ca    8.3<L>      2021 07:53  Phos  2.9       Mg     1.6         TPro  7.3  /  Alb  2.5<L>  /  TBili  0.6  /  DBili  x   /  AST  37  /  ALT  37  /  AlkPhos  95    Trop --, CKMB --, , - @ 07:46    RADIOLOGY & ADDITIONAL TESTS:  Imaging or report Personally Reviewed:  [x] YES  [ ] NO  EKG reviewed: [x] YES  [ ] NO    Medications:  Standing  ALBUTerol    90 MICROgram(s) HFA Inhaler 1 Puff(s) Inhalation once  chlorhexidine 4% Liquid 1 Application(s) Topical <User Schedule>  DAPTOmycin IVPB 875 milliGRAM(s) IV Intermittent every 24 hours  furosemide    Tablet 40 milliGRAM(s) Oral daily  glucagon  Injectable 1 milliGRAM(s) IntraMuscular once  heparin   Injectable 5000 Unit(s) SubCutaneous every 8 hours  insulin glargine Injectable (LANTUS) 18 Unit(s) SubCutaneous at bedtime  insulin lispro (ADMELOG) corrective regimen sliding scale   SubCutaneous every 6 hours  insulin lispro Injectable (ADMELOG) 5 Unit(s) SubCutaneous every 8 hours  magnesium oxide 400 milliGRAM(s) Oral three times a day with meals  pantoprazole   Suspension 40 milliGRAM(s) Oral daily  tiotropium 18 MICROgram(s) Capsule 1 Capsule(s) Inhalation once    PRN Meds  acetaminophen   Tablet .. 650 milliGRAM(s) Oral every 6 hours PRN  acetaminophen  Suppository .. 650 milliGRAM(s) Rectal every 6 hours PRN

## 2021-06-22 NOTE — ADVANCED PRACTICE NURSE CONSULT - RECOMMEDATIONS
1. Stage 3 b/l buttock pressure injuries- Cleanse wound bed w/ normal saline, gently pat dry.  Apply Cavilon no-sting barrier film to wound edges and periwound to prevent maceration.  Apply Collagenase Santyl nickel-thick to entire wound bed to enzymatically debride devitalized tissue. Apply saline moistened gauze on top of wound bed for moisture to activate debriding enzymes. Cover w/ ABD pad. Apply Collagenase and change dressing daily and prn for strike-through drainage or soiling.  -Recommend burn consult for further evaluation & treatment - ? debridement of devitalized tissue if pt candidate & within pt's goal/plan of care.   -Assess skin/wound qshift, report changes to primary provider.     Additional recs:  -Continue turning/positioning patient from side-to-side q2h while in bed, q1h when/if OOB chair, or in accordance w/ pt's plan of care. Continue utilizing pillows and/or z-licha fluidized positioner to assist w/ turning/positioning. When/if OOB chair, utilize pillows or chair cushion to offload pressure.   -Continue to offload left heel from bed surface with soft pillow under calf or by applying offloading boots to LLE.   -Continue applying Coloplast Raquel Protect moisture barrier cream to lower buttock and perineal area daily and prn after any soiling  -Continue utilizing one underpad underneath patient to contain any soiling or incontinence episodes; change pad when saturated/soiled.   -When/if terrazas d/c'ed, consider utilizing condom catheter (if patient candidate) to contain any urinary incontinence.   -Continue utilizing fecal management system/rectal tube/DigniShield (if patient candidate; MD order required) to contain loose fecal incontinence.   -Continue nutrition consult & tight glucose control for optimal wound healing & nutritional status.     Plan of Care: Primary RN Jessica made aware of above recs. Spoke w/  covering/primary MD Sukhdeep in regards to above. No further needs/recs from [facility] service at this time. Staff RN to perform routine skin/wound assessment and manage wound care. Questions or concerns or if wound worsens and reconsult needed, please contact [facility], [phone].

## 2021-06-22 NOTE — PROGRESS NOTE ADULT - SUBJECTIVE AND OBJECTIVE BOX
OVERNIGHT EVENTS: events noted, ID reviewed, on RA    Vital Signs Last 24 Hrs  T(C): 36.6 (22 Jun 2021 05:00), Max: 36.9 (21 Jun 2021 08:00)  T(F): 97.8 (22 Jun 2021 05:00), Max: 98.4 (21 Jun 2021 08:00)  HR: 80 (22 Jun 2021 05:00) (76 - 82)  BP: 151/75 (22 Jun 2021 05:00) (138/73 - 181/87)  BP(mean): 93 (21 Jun 2021 12:00) (93 - 98)  RR: 20 (22 Jun 2021 05:00) (18 - 20)  SpO2: 98% (22 Jun 2021 05:00) (95% - 99%)    PHYSICAL EXAMINATION:    GENERAL: Comfortable    HEENT: Head is normocephalic and atraumatic.     NECK: Supple.    LUNGS: dec bs both bases    HEART: Regular rate and rhythm without murmur.    ABDOMEN: Soft, nontender, and nondistended.      EXTREMITIES: r aka    NEUROLOGIC: Grossly intact.          LABS:                        10.7   6.75  )-----------( 341      ( 21 Jun 2021 07:53 )             35.5     06-21    135  |  98  |  10  ----------------------------<  172<H>  3.8   |  31  |  0.9    Ca    8.3<L>      21 Jun 2021 07:53  Phos  2.9     06-21  Mg     1.5     06-21    TPro  7.3  /  Alb  2.5<L>  /  TBili  0.6  /  DBili  x   /  AST  37  /  ALT  37  /  AlkPhos  95  06-21 06-21-21 @ 07:01  -  06-22-21 @ 06:30  --------------------------------------------------------  IN: 50 mL / OUT: 200 mL / NET: -150 mL        MICROBIOLOGY:      MEDICATIONS  (STANDING):  ALBUTerol    90 MICROgram(s) HFA Inhaler 1 Puff(s) Inhalation once  chlorhexidine 4% Liquid 1 Application(s) Topical <User Schedule>  DAPTOmycin IVPB 875 milliGRAM(s) IV Intermittent every 24 hours  furosemide    Tablet 40 milliGRAM(s) Oral daily  glucagon  Injectable 1 milliGRAM(s) IntraMuscular once  heparin   Injectable 5000 Unit(s) SubCutaneous every 8 hours  insulin glargine Injectable (LANTUS) 18 Unit(s) SubCutaneous at bedtime  insulin lispro (ADMELOG) corrective regimen sliding scale   SubCutaneous every 6 hours  insulin lispro Injectable (ADMELOG) 5 Unit(s) SubCutaneous every 8 hours  magnesium oxide 400 milliGRAM(s) Oral three times a day with meals  pantoprazole   Suspension 40 milliGRAM(s) Oral daily  tiotropium 18 MICROgram(s) Capsule 1 Capsule(s) Inhalation once    MEDICATIONS  (PRN):  acetaminophen   Tablet .. 650 milliGRAM(s) Oral every 6 hours PRN Temp greater or equal to 38C (100.4F), Mild Pain (1 - 3)  acetaminophen  Suppository .. 650 milliGRAM(s) Rectal every 6 hours PRN Temp greater or equal to 38C (100.4F), Mild Pain (1 - 3)      RADIOLOGY & ADDITIONAL STUDIES:

## 2021-06-22 NOTE — SWALLOW BEDSIDE ASSESSMENT ADULT - PHARYNGEAL PHASE
Within functional limits
Delayed cough post oral intake/Multiple swallows

## 2021-06-22 NOTE — PROGRESS NOTE ADULT - ASSESSMENT
Patient is a 61 y/o M with PMH of DM, hypertension, dyslipidemia, sleep apnea, Charcot foot reconstruction with external fixation, super morbid obesity present for worsening right foot infection. Patient was septic on admission, bacteremic, found to have Nec Fasc of RLE, intubated and transferred to ICU. There he underwent CVVH  Patient underwent a R AKA, extubated and downgraded to CEU 6/20    Acute Hypoxemic respiratory failure secondary to Septic Shock  due to E Faecalis/ORSA bacteremia  due to nec fasc of RLE s/p AKA  Toxic metabolic encephalopathy - resolved   Super morbid obesity/suspected MATA/OHS  sepsis POA  intubated 5/31-extubated 6/14, currently breathing ambient air  s/p s/p BKA with disarticulation 5/31 with a follow up AKA 6/11  Dressing changes daily: gauze, kerlix and ace wrap, staples to be removed by vascular 4w post surgery  s/p caspo 6/9-6/20  Currently on daptomycin 875 Q24H, last day 7/11  monitor CPK  PT/OT eval, will likely require SNF  diet as per speech/swallow    CANDY on CKD III/possible ATN  Anemia  s/p CVVD in ICU x2  Scr now at baseline, around 1  c/w Lasix 40mg daily   c/w monitoring   + terrazas, will need TOV if able to participate with PT    Diarrhea  Hypomagnesemia  Cdiff neg, c/w loperamide  replete Mg    Diabetes Mellitus  continue with Lantus/Lispro  monitor FS    Morbid obesity  functional quadriplegia  decubitus ulcer stage 3  wound care RN following, recommended burn eval for possible debridement  continue with rectal tube for now  wound care as per recs    #Progress Note Handoff  Pending (specify):   PT/OT f/u, monitor AKA site, burn eval for sacral decub  Family discussion: Plan of care discussed with patient, aware and agreeable   Disposition:  SNF    Germania Ngo MD  s. [phone]

## 2021-06-22 NOTE — SWALLOW BEDSIDE ASSESSMENT ADULT - POSITIONING
upright (45 degrees)
upright (45 degrees)
upright (90 degrees)
upright (90 degrees)
upright (45 degrees)

## 2021-06-22 NOTE — PROGRESS NOTE ADULT - SUBJECTIVE AND OBJECTIVE BOX
FERNANDEZ GUZMAN 60y Male  MRN#: [id number]   CODE STATUS:________    Hospital Day: 24d    Pt is currently admitted with the primary diagnosis of     SUBJECTIVE  Hospital Course    Overnight events     Subjective complaints     Present Today:   - [place]:  No [  ], Yes [   ] : Indication:     - Type of IV Access:       .. CVC/Piccline:  No [  ], Yes [   ] : Indication:       .. Midline: No [  ], Yes [   ] : Indication:                                             ----------------------------------------------------------  OBJECTIVE  PAST MEDICAL & SURGICAL HISTORY  MATA on CPAP    DM (diabetes mellitus)    HTN (hypertension)    High cholesterol    Charcot ankle, right    History of percutaneous angioplasty    H/O skin graft    Left toe amputee  4 TOES    Diabetic Charcot foot  Fixation with external device                                              -----------------------------------------------------------  ALLERGIES:  No Known Allergies                                            ------------------------------------------------------------    HOME MEDICATIONS  Home Medications:  acetaminophen 325 mg oral tablet: 2 tab(s) orally every 6 hours, As needed, Moderate Pain (4 - 6) (25 Nov 2019 17:16)  amLODIPine 5 mg oral tablet: 1 tab(s) orally once a day (20 Nov 2019 14:31)  aspirin 81 mg oral tablet, chewable: 1 tab(s) orally once a day (20 Nov 2019 14:31)  ezetimibe 10 mg oral tablet: 1 tab(s) orally once a day (20 Nov 2019 14:31)  glimepiride 4 mg oral tablet: 2 tab(s) orally once a day (20 Nov 2019 14:31)  hydroCHLOROthiazide 25 mg oral tablet: 1 tab(s) orally once a day (at bedtime) (20 Nov 2019 14:31)  Invokana 300 mg oral tablet: 1 tab(s) orally once a day (20 Nov 2019 14:31)  Lantus Solostar Pen 100 units/mL subcutaneous solution: subcutaneous once a day (at bedtime)-  18 UNITS (20 Nov 2019 14:31)  linezolid 2 mg/mL-D5% intravenous solution: 600 milligram(s) intravenous every 12 hours STOP 1/6/20. (25 Nov 2019 17:16)  losartan 50 mg oral tablet: 1 tab(s) orally once a day (20 Nov 2019 14:31)  Pletal 100 mg oral tablet: 1 tab(s) orally once a day (20 Nov 2019 14:31)                           MEDICATIONS:  STANDING MEDICATIONS  ALBUTerol    90 MICROgram(s) HFA Inhaler 1 Puff(s) Inhalation once  chlorhexidine 4% Liquid 1 Application(s) Topical <User Schedule>  DAPTOmycin IVPB 875 milliGRAM(s) IV Intermittent every 24 hours  furosemide    Tablet 40 milliGRAM(s) Oral daily  glucagon  Injectable 1 milliGRAM(s) IntraMuscular once  heparin   Injectable 5000 Unit(s) SubCutaneous every 8 hours  insulin glargine Injectable (LANTUS) 18 Unit(s) SubCutaneous at bedtime  insulin lispro (ADMELOG) corrective regimen sliding scale   SubCutaneous every 6 hours  insulin lispro Injectable (ADMELOG) 5 Unit(s) SubCutaneous every 8 hours  magnesium oxide 400 milliGRAM(s) Oral three times a day with meals  magnesium sulfate  IVPB 2 Gram(s) IV Intermittent once  pantoprazole   Suspension 40 milliGRAM(s) Oral daily  tiotropium 18 MICROgram(s) Capsule 1 Capsule(s) Inhalation once    PRN MEDICATIONS  acetaminophen   Tablet .. 650 milliGRAM(s) Oral every 6 hours PRN  acetaminophen  Suppository .. 650 milliGRAM(s) Rectal every 6 hours PRN                                            ------------------------------------------------------------  VITAL SIGNS: Last 24 Hours  T(C): 37.3 (22 Jun 2021 16:28), Max: 37.3 (22 Jun 2021 16:28)  T(F): 99.1 (22 Jun 2021 16:28), Max: 99.1 (22 Jun 2021 16:28)  HR: 89 (22 Jun 2021 16:28) (77 - 89)  BP: 129/69 (22 Jun 2021 16:28) (129/69 - 181/87)  BP(mean): --  RR: 22 (22 Jun 2021 16:28) (20 - 22)  SpO2: 95% (22 Jun 2021 08:12) (95% - 99%)      06-21-21 @ 07:01  -  06-22-21 @ 07:00  --------------------------------------------------------  IN: 50 mL / OUT: 200 mL / NET: -150 mL    06-22-21 @ 07:01  -  06-22-21 @ 16:50  --------------------------------------------------------  IN: 360 mL / OUT: 2800 mL / NET: -2440 mL                                             --------------------------------------------------------------  LABS:                        10.7   6.75  )-----------( 341      ( 21 Jun 2021 07:53 )             35.5     06-21    135  |  98  |  10  ----------------------------<  172<H>  3.8   |  31  |  0.9    Ca    8.3<L>      21 Jun 2021 07:53  Phos  2.9     06-21  Mg     1.6     06-22    TPro  7.3  /  Alb  2.5<L>  /  TBili  0.6  /  DBili  x   /  AST  37  /  ALT  37  /  AlkPhos  95  06-21          Creatine Kinase, Serum: 253 U/L *H* (06-22-21 @ 07:46)          CARDIAC MARKERS ( 22 Jun 2021 07:46 )  x     / x     / 253 U/L / x     / x                                                  -------------------------------------------------------------  RADIOLOGY:                                            --------------------------------------------------------------    PHYSICAL EXAM:  General:   HEENT:  LUNGS:  HEART:  ABDOMEN:  EXT:  NEURO:  SKIN:                                           --------------------------------------------------------------    ASSESSMENT & PLAN    Past medical history and hospital course                                                                                                           ----------------------------------------------------  # DVT prophylaxis     # GI prophylaxis     # Diet     # Activity Score (AM-PAC)    # Code status     # Disposition                                                                              --------------------------------------------------------    # Handoff      FERNANDEZ GUZMAN 60y Male  MRN#: [id number]   CODE STATUS: full    Hospital Day: 24d    Pt is currently admitted with the primary diagnosis of R diabetic foot ulcer complicated by septic shock/ bacteremia now resolved s/p R AKA.    SUBJECTIVE    Interval history:  Patient reports feeling well and has no complaints. Denies any pain, fevers, shortness of breath, chest pain, leg edema. No events overnight. Vascular managing AKA surgical site, wound-care to see patient today regarding sacral ulcer.    Present Today:   - [place]:  No [  x ], Yes [   ] : Indication:     - Type of IV Access:       .. CVC/Piccline:  No [ x ], Yes [   ] : Indication:       .. Midline: No [  ], Yes [  x ] : Indication:                                             ----------------------------------------------------------  OBJECTIVE  PAST MEDICAL & SURGICAL HISTORY  MATA on CPAP    DM (diabetes mellitus)    HTN (hypertension)    High cholesterol    Charcot ankle, right    History of percutaneous angioplasty    H/O skin graft    Left toe amputee  4 TOES    Diabetic Charcot foot  Fixation with external device                                              -----------------------------------------------------------  ALLERGIES:  No Known Allergies                                            ------------------------------------------------------------    HOME MEDICATIONS  Home Medications:  acetaminophen 325 mg oral tablet: 2 tab(s) orally every 6 hours, As needed, Moderate Pain (4 - 6) (25 Nov 2019 17:16)  amLODIPine 5 mg oral tablet: 1 tab(s) orally once a day (20 Nov 2019 14:31)  aspirin 81 mg oral tablet, chewable: 1 tab(s) orally once a day (20 Nov 2019 14:31)  ezetimibe 10 mg oral tablet: 1 tab(s) orally once a day (20 Nov 2019 14:31)  glimepiride 4 mg oral tablet: 2 tab(s) orally once a day (20 Nov 2019 14:31)  hydroCHLOROthiazide 25 mg oral tablet: 1 tab(s) orally once a day (at bedtime) (20 Nov 2019 14:31)  Invokana 300 mg oral tablet: 1 tab(s) orally once a day (20 Nov 2019 14:31)  Lantus Solostar Pen 100 units/mL subcutaneous solution: subcutaneous once a day (at bedtime)-  18 UNITS (20 Nov 2019 14:31)  linezolid 2 mg/mL-D5% intravenous solution: 600 milligram(s) intravenous every 12 hours STOP 1/6/20. (25 Nov 2019 17:16)  losartan 50 mg oral tablet: 1 tab(s) orally once a day (20 Nov 2019 14:31)  Pletal 100 mg oral tablet: 1 tab(s) orally once a day (20 Nov 2019 14:31)                           MEDICATIONS:  STANDING MEDICATIONS  ALBUTerol    90 MICROgram(s) HFA Inhaler 1 Puff(s) Inhalation once  chlorhexidine 4% Liquid 1 Application(s) Topical <User Schedule>  DAPTOmycin IVPB 875 milliGRAM(s) IV Intermittent every 24 hours  furosemide    Tablet 40 milliGRAM(s) Oral daily  glucagon  Injectable 1 milliGRAM(s) IntraMuscular once  heparin   Injectable 5000 Unit(s) SubCutaneous every 8 hours  insulin glargine Injectable (LANTUS) 18 Unit(s) SubCutaneous at bedtime  insulin lispro (ADMELOG) corrective regimen sliding scale   SubCutaneous every 6 hours  insulin lispro Injectable (ADMELOG) 5 Unit(s) SubCutaneous every 8 hours  magnesium oxide 400 milliGRAM(s) Oral three times a day with meals  magnesium sulfate  IVPB 2 Gram(s) IV Intermittent once  pantoprazole   Suspension 40 milliGRAM(s) Oral daily  tiotropium 18 MICROgram(s) Capsule 1 Capsule(s) Inhalation once    PRN MEDICATIONS  acetaminophen   Tablet .. 650 milliGRAM(s) Oral every 6 hours PRN  acetaminophen  Suppository .. 650 milliGRAM(s) Rectal every 6 hours PRN                                            ------------------------------------------------------------  VITAL SIGNS: Last 24 Hours  T(C): 37.3 (22 Jun 2021 16:28), Max: 37.3 (22 Jun 2021 16:28)  T(F): 99.1 (22 Jun 2021 16:28), Max: 99.1 (22 Jun 2021 16:28)  HR: 89 (22 Jun 2021 16:28) (77 - 89)  BP: 129/69 (22 Jun 2021 16:28) (129/69 - 181/87)  BP(mean): --  RR: 22 (22 Jun 2021 16:28) (20 - 22)  SpO2: 95% (22 Jun 2021 08:12) (95% - 99%)      06-21-21 @ 07:01  -  06-22-21 @ 07:00  --------------------------------------------------------  IN: 50 mL / OUT: 200 mL / NET: -150 mL    06-22-21 @ 07:01  -  06-22-21 @ 16:50  --------------------------------------------------------  IN: 360 mL / OUT: 2800 mL / NET: -2440 mL                                             --------------------------------------------------------------  LABS:                        10.7   6.75  )-----------( 341      ( 21 Jun 2021 07:53 )             35.5     06-21    135  |  98  |  10  ----------------------------<  172<H>  3.8   |  31  |  0.9    Ca    8.3<L>      21 Jun 2021 07:53  Phos  2.9     06-21  Mg     1.6     06-22    TPro  7.3  /  Alb  2.5<L>  /  TBili  0.6  /  DBili  x   /  AST  37  /  ALT  37  /  AlkPhos  95  06-21          Creatine Kinase, Serum: 253 U/L *H* (06-22-21 @ 07:46)          CARDIAC MARKERS ( 22 Jun 2021 07:46 )  x     / x     / 253 U/L / x     / x            PHYSICAL EXAM:  GENERAL: well appearing, morbidly obese male in no acute distress eating pudding?/food  HEAD:  Atraumatic, normocephalic  EYES: EOMI, PERRL, conjunctiva and sclera clear  ENT: Moist mucous membranes  NECK: Supple, No JVD  CHEST/LUNG: Clear to auscultation bilaterally, non-labored respirations, no wheezes, no crackles  HEART: Regular rate and rhythm, S1S2  ABDOMEN: Morbidly obese abdomen. Bowel sounds present; Soft, Nontender, Nondistended. No guarding or rigidity    EXTREMITIES:  2+ bilateral radial pulses, No left lower extremity edema. Amputation of 2nd to 5th digits left foot. AKA on the right with staples with no surrounding erythema or edema. Warm to touch.  NERVOUS SYSTEM:  Alert & Oriented X3, speech clear. Answers questions appropriately.  Moving all extremities.   SKIN: Warm and dry, No cyanosis                                             --------------------------------------------------------------    ASSESSMENT & PLAN  Patient is a 60 year old male with history of diabetes, HTN, dyslipidemia, sleep apnea, Charcot foot recstruction with external fixation, david morbid obesity who initially presented for worsening right foot infection. He was septic on admission, bacteremic (E. faecalis, see Bcx), found to have necrotizing fasciitis of RLE. He was intubated and transferred to ICU where he underwent CVVH. He underwent a R AKA, extubated and downgraded to CEU on 6/20.    #Acute hypoxemic respiratory failure secondary to septic shock due to E. Faecalis/ORSA bacteremia due to necrotizing fascitis of RLE  -s/p BKA with disarticulation 5/31 and AKA 6/11, staples to be removed by vascular 4w post surgery.  -cultures x4 negative  -ICU course: intubated/extubated 5/31-6/14. episode of toxic metabolic encephalopathy suspected due to sepsis, now resolved  -c/w daptomycin 875 q24h, last day 7/11, monitor CPK every few days.  -pending disposition planning, possibly SNF    #CANDY on CKD III/possible ATN  -s/p CVVD in ICU x2  -Creatinine now at baseline, around 1  -decrease Lasix to 40mg PO daily  -Continue to monitor renal function    # Diabetes Mellitus  -c/w Lantus/Lispro  monitor FS    # Morbid obesity/MATA  -recommend diet and lifestyle modification  -uses CPAP irregularly at home, has been using here    #Dyslipidemia  -c/w statin                                                                                ----------------------------------------------------  # DVT prophylaxis : heparin 5000 qh8, compression device  # GI prophylaxis : protonix 40 mg  # Diet : Advanced to Consistent Carbohydrate w/Evening Snack, DASH/TLC.  # Activity Score (AM-PAC): IAT  # Code status : full  # Disposition :  SNF    #Signout pending disposition planning [facility]

## 2021-06-22 NOTE — PROGRESS NOTE ADULT - ASSESSMENT
IMPRESSION    Acute Hypoxemic respiratory failure with hypercapnia sp extubation on RA  Septic Shock resolved   DFU s/p Right Knee Disarticulation . sp aka  CANDY on CKD resolved  AMS 2/2 Toxic-Metabolic Encephelopathy resolved   MATA/ OHS  Morbid obesity  NSTEMI    PLAN:    CNS:  No depressants     HEENT:  Oral care    PULMONARY: HOB at 45 degrees.  Aspiration precautions.  NIV during sleep.  Wean O2     CARDIOVASCULAR:   PO LASIX 40 daily    GI: GI prophylaxis.  Feeding     RENAL: F/U Lytes and correct as needed.    INFECTIOUS DISEASE:  ABX as per ID    HEMATOLOGICAL:  DVT prophylaxis.  Repeat LE doppler f/up     ENDOCRINE:  Follow up FS.      MUSCULOSKELETAL: OOB as tolerated.        PT OT   CEU  DW house staff

## 2021-06-22 NOTE — ADVANCED PRACTICE NURSE CONSULT - ASSESSMENT
61 y/o M with PMH of DM, hypertension, dyslipidemia, sleep apnea, Charcot foot reconstruction with external fixation, super morbid obesity presented (5/29) for worsening right foot infection, reports foot ulcer at various stage of healing for 13 years; of note patient has been admitted for IV antibiotics multiple times in the past for osteomyelitis & MRSA infection of right foot  Patient currently admitted to medicine, in CEU, being managed for cute Hypoxemic respiratory failure with hypercapnia sp extubation on RA; Septic Shock resolved; DFU s/p Right Knee Disarticulation-s/p AKA (with vascular); CANDY on CKD resolved; AMS 2/2 Toxic-Metabolic Encephelopathy resolved; MATA/ OHS; Morbid obesity; NSTEMI    Received patient in CEU,  laying supine on Bariatric Compella bed, awake, A&Ox1 (per RN report), primary RN Jessica made aware of purpose of WOCN visit, RN reports patient just turned, cleaned and Triad ointment applied to entire wound area prior to WOCN arrival.    (Based upon 6/21 wound picture in chart)    Type of wound: Stage 3 pressure injury; likely progressed from incontinence associated dermatitis(IAD) r/t consistent loose fecal incontinence from C.diff   Location:  coccyx- extending down to b/l buttock   Wound measurements: multiple areas in close proximity to each other & measured all together at 13cm x 10cm x ~0.2cm   Tunneling/Undermining: none  Wound bed: marbleized w/ yellow subcutaneous tissue and brown devitalized tissue   Wound edges: attached, irregular   Periwound: macerated, scattered areas of peeling epidermal layer  Wound exudate: none per RN report   Wound odor: none per RN report   Induration, erythema, warmth: none per RN report  Wound pain: no s/s pain present during previously wound cleansing & ointment application per RN report     Patient bedbound, very limited mobility in bed, + terrazas, + DigniShield. Ordered for dysphagia diet, probably inadequate intake as per reported Jasen score.

## 2021-06-23 LAB
CULTURE RESULTS: SIGNIFICANT CHANGE UP
GLUCOSE BLDC GLUCOMTR-MCNC: 133 MG/DL — HIGH (ref 70–99)
GLUCOSE BLDC GLUCOMTR-MCNC: 148 MG/DL — HIGH (ref 70–99)
GLUCOSE BLDC GLUCOMTR-MCNC: 178 MG/DL — HIGH (ref 70–99)
GLUCOSE BLDC GLUCOMTR-MCNC: 188 MG/DL — HIGH (ref 70–99)
GLUCOSE BLDC GLUCOMTR-MCNC: 200 MG/DL — HIGH (ref 70–99)
GLUCOSE BLDC GLUCOMTR-MCNC: 218 MG/DL — HIGH (ref 70–99)
GLUCOSE BLDC GLUCOMTR-MCNC: 293 MG/DL — HIGH (ref 70–99)
SPECIMEN SOURCE: SIGNIFICANT CHANGE UP

## 2021-06-23 PROCEDURE — 99232 SBSQ HOSP IP/OBS MODERATE 35: CPT

## 2021-06-23 PROCEDURE — 99231 SBSQ HOSP IP/OBS SF/LOW 25: CPT

## 2021-06-23 PROCEDURE — 99233 SBSQ HOSP IP/OBS HIGH 50: CPT

## 2021-06-23 RX ORDER — SODIUM HYPOCHLORITE 0.125 %
1 SOLUTION, NON-ORAL MISCELLANEOUS
Refills: 0 | Status: DISCONTINUED | OUTPATIENT
Start: 2021-06-23 | End: 2021-06-26

## 2021-06-23 RX ORDER — COLLAGENASE CLOSTRIDIUM HIST. 250 UNIT/G
1 OINTMENT (GRAM) TOPICAL
Refills: 0 | Status: DISCONTINUED | OUTPATIENT
Start: 2021-06-23 | End: 2021-06-26

## 2021-06-23 RX ADMIN — Medication 40 MILLIGRAM(S): at 05:06

## 2021-06-23 RX ADMIN — Medication 1 APPLICATION(S): at 17:03

## 2021-06-23 RX ADMIN — Medication 1 APPLICATION(S): at 17:02

## 2021-06-23 RX ADMIN — HEPARIN SODIUM 5000 UNIT(S): 5000 INJECTION INTRAVENOUS; SUBCUTANEOUS at 05:06

## 2021-06-23 RX ADMIN — INSULIN GLARGINE 18 UNIT(S): 100 INJECTION, SOLUTION SUBCUTANEOUS at 21:20

## 2021-06-23 RX ADMIN — Medication 5 UNIT(S): at 07:57

## 2021-06-23 RX ADMIN — MAGNESIUM OXIDE 400 MG ORAL TABLET 400 MILLIGRAM(S): 241.3 TABLET ORAL at 07:50

## 2021-06-23 RX ADMIN — HEPARIN SODIUM 5000 UNIT(S): 5000 INJECTION INTRAVENOUS; SUBCUTANEOUS at 13:16

## 2021-06-23 RX ADMIN — PANTOPRAZOLE SODIUM 40 MILLIGRAM(S): 20 TABLET, DELAYED RELEASE ORAL at 11:20

## 2021-06-23 RX ADMIN — Medication 4: at 07:57

## 2021-06-23 RX ADMIN — HEPARIN SODIUM 5000 UNIT(S): 5000 INJECTION INTRAVENOUS; SUBCUTANEOUS at 21:20

## 2021-06-23 RX ADMIN — DAPTOMYCIN 135 MILLIGRAM(S): 500 INJECTION, POWDER, LYOPHILIZED, FOR SOLUTION INTRAVENOUS at 13:19

## 2021-06-23 RX ADMIN — MAGNESIUM OXIDE 400 MG ORAL TABLET 400 MILLIGRAM(S): 241.3 TABLET ORAL at 11:57

## 2021-06-23 RX ADMIN — Medication 2: at 01:00

## 2021-06-23 RX ADMIN — MAGNESIUM OXIDE 400 MG ORAL TABLET 400 MILLIGRAM(S): 241.3 TABLET ORAL at 16:34

## 2021-06-23 RX ADMIN — Medication 5 UNIT(S): at 13:28

## 2021-06-23 RX ADMIN — Medication 2: at 11:56

## 2021-06-23 NOTE — PROGRESS NOTE ADULT - SUBJECTIVE AND OBJECTIVE BOX
THOMASFERNANDEZ  60y, Male  Allergy: No Known Allergies  statins (Other)    Hospital Day: 25d    Patient seen and examined earlier today. No acute events overnight.    PMH/PSH:  PAST MEDICAL & SURGICAL HISTORY:  MATA on CPAP    DM (diabetes mellitus)    HTN (hypertension)    High cholesterol    Charcot ankle, right    History of percutaneous angioplasty    H/O skin graft    Left toe amputee  4 TOES    Diabetic Charcot foot  Fixation with external device    VITALS:  T(F): 98.7 (06-23-21 @ 12:00), Max: 99.1 (06-22-21 @ 16:28)  HR: 88 (06-23-21 @ 12:00)  BP: 14/68 (06-23-21 @ 12:00) (14/68 - 169/79)  RR: 18 (06-23-21 @ 12:00)  SpO2: 100% (06-23-21 @ 12:00)    TESTS & MEASUREMENTS:  Weight (Kg):     06-21-21 @ 07:01  -  06-22-21 @ 07:00  --------------------------------------------------------  IN: 50 mL / OUT: 200 mL / NET: -150 mL    06-22-21 @ 07:01  -  06-23-21 @ 07:00  --------------------------------------------------------  IN: 860 mL / OUT: 3800 mL / NET: -2940 mL    06-23-21 @ 07:01  -  06-23-21 @ 14:31  --------------------------------------------------------  IN: 0 mL / OUT: 350 mL / NET: -350 mL    Mg     1.6     06-22    CARDIAC MARKERS ( 22 Jun 2021 07:46 )  x     / x     / 253 U/L / x     / x        Culture - Blood (collected 06-18-21 @ 04:51)  Source: .Blood None  Final Report (06-23-21 @ 14:01):    No Growth Final    RECENT DIAGNOSTIC ORDERS:  Magnesium, Serum: AM Sched. Collection: 24-Jun-2021 04:30 (06-23-21 @ 08:44)  Comprehensive Metabolic Panel: AM Sched. Collection: 24-Jun-2021 04:30 (06-23-21 @ 08:43)  Complete Blood Count: AM Sched. Collection: 24-Jun-2021 04:30 (06-23-21 @ 08:43)    MEDICATIONS:  MEDICATIONS  (STANDING):  ALBUTerol    90 MICROgram(s) HFA Inhaler 1 Puff(s) Inhalation once  chlorhexidine 4% Liquid 1 Application(s) Topical <User Schedule>  collagenase Ointment 1 Application(s) Topical two times a day  Dakins Solution - 1/2 Strength 1 Application(s) Topical two times a day  DAPTOmycin IVPB 875 milliGRAM(s) IV Intermittent every 24 hours  furosemide    Tablet 40 milliGRAM(s) Oral daily  glucagon  Injectable 1 milliGRAM(s) IntraMuscular once  heparin   Injectable 5000 Unit(s) SubCutaneous every 8 hours  insulin glargine Injectable (LANTUS) 18 Unit(s) SubCutaneous at bedtime  insulin lispro (ADMELOG) corrective regimen sliding scale   SubCutaneous every 6 hours  insulin lispro Injectable (ADMELOG) 5 Unit(s) SubCutaneous every 8 hours  magnesium oxide 400 milliGRAM(s) Oral three times a day with meals  pantoprazole   Suspension 40 milliGRAM(s) Oral daily  tiotropium 18 MICROgram(s) Capsule 1 Capsule(s) Inhalation once    MEDICATIONS  (PRN):  acetaminophen   Tablet .. 650 milliGRAM(s) Oral every 6 hours PRN Temp greater or equal to 38C (100.4F), Mild Pain (1 - 3)  acetaminophen  Suppository .. 650 milliGRAM(s) Rectal every 6 hours PRN Temp greater or equal to 38C (100.4F), Mild Pain (1 - 3)    HOME MEDICATIONS:  acetaminophen 325 mg oral tablet (11-25)  amLODIPine 5 mg oral tablet (11-20)  aspirin 81 mg oral tablet, chewable (11-20)  ezetimibe 10 mg oral tablet (11-20)  glimepiride 4 mg oral tablet (11-20)  hydroCHLOROthiazide 25 mg oral tablet (11-20)  Invokana 300 mg oral tablet (11-20)  Lantus Solostar Pen 100 units/mL subcutaneous solution (11-20)  linezolid 2 mg/mL-D5% intravenous solution (11-25)  losartan 50 mg oral tablet (11-20)  Pletal 100 mg oral tablet (11-20)      REVIEW OF SYSTEMS:  All other review of systems is negative unless indicated above.     PHYSICAL EXAM:  GENERAL: NAD  HEENT: No Swelling  CHEST/LUNG: Good air entry, No wheezing  HEART: RRR, No murmurs  ABDOMEN: Soft, Bowel sounds present  EXTREMITIES:  No clubbing, s/p Rt AKA

## 2021-06-23 NOTE — CONSULT NOTE ADULT - CONSULT REQUESTED DATE/TIME
28-May-2021 20:30
30-May-2021 23:33
23-Jun-2021 10:49
30-May-2021 08:19
01-Jun-2021 14:01
28-May-2021 19:43
29-May-2021 05:32

## 2021-06-23 NOTE — CONSULT NOTE ADULT - ATTENDING COMMENTS
bry dressing change--> partial and full thickness wounds, sacrum, buttocks, right leg--> no surgery needed--> local wound care
I received a phone call from patient's nurse 05/28 @ 6PM stating that he is not being himself and lethargic and recommended to be sent to ED  Upon admission, right foot/ankle x-ray showed soft tissue emphysema tracking up posterior leg   Patient was brought to OR at 5AM for debridement, incision and drainage and multiple fasciotomy. Pre and post debridement deep wound culture obtained. Patient understands this is a very serious infection that will likely result in proximal leg amputation   Patient confirmed understanding.   Will follow up
Wound care.   Non-invasive studies.
Patient seen and examined at bedside with NP.  Patient is intubated and sedated. Introduced palliative care to cristiane today, will continue to follow.

## 2021-06-23 NOTE — PROGRESS NOTE ADULT - ASSESSMENT
IMPRESSION    Acute Hypoxemic respiratory failure with hypercapnia sp extubation on RA  DFU sp aka  CANDY on CKD resolved  AMS 2/2 Toxic-Metabolic Encephelopathy resolved   MATA/ OHS  Morbid obesity  NSTEMI    PLAN:    CNS:  No depressants     HEENT:  Oral care    PULMONARY: HOB at 45 degrees.  Aspiration precautions.  NIV during sleep.  Wean O2     CARDIOVASCULAR:   PO LASIX 40 daily, fup ck    GI: GI prophylaxis.  Feeding     RENAL: F/U Lytes and correct as needed.    INFECTIOUS DISEASE:  ABX as per ID    HEMATOLOGICAL:  DVT prophylaxis.  Repeat LE doppler f/up     ENDOCRINE:  Follow up FS.      MUSCULOSKELETAL: OOB as tolerated.    burn eval    floor

## 2021-06-23 NOTE — CONSULT NOTE ADULT - SUBJECTIVE AND OBJECTIVE BOX
Patient is a 60y old  Male who presents with a chief complaint of dfu (23 Jun 2021 06:51)  Burn consulted for wounds of LEs, sacrum and buttocks    Vital Signs Last 24 Hrs  T(C): 37.1 (23 Jun 2021 12:00), Max: 37.3 (22 Jun 2021 16:28)  T(F): 98.7 (23 Jun 2021 12:00), Max: 99.1 (22 Jun 2021 16:28)  HR: 88 (23 Jun 2021 12:00) (76 - 89)  BP: 14/68 (23 Jun 2021 12:00) (14/68 - 169/79)  RR: 18 (23 Jun 2021 12:00) (18 - 22)  SpO2: 100% (23 Jun 2021 12:00) (96% - 100%)    LABS:  Mg     1.6     06-22      MEDICATIONS  (STANDING):  ALBUTerol    90 MICROgram(s) HFA Inhaler 1 Puff(s) Inhalation once  chlorhexidine 4% Liquid 1 Application(s) Topical <User Schedule>  DAPTOmycin IVPB 875 milliGRAM(s) IV Intermittent every 24 hours  furosemide    Tablet 40 milliGRAM(s) Oral daily  glucagon  Injectable 1 milliGRAM(s) IntraMuscular once  heparin   Injectable 5000 Unit(s) SubCutaneous every 8 hours  insulin glargine Injectable (LANTUS) 18 Unit(s) SubCutaneous at bedtime  insulin lispro (ADMELOG) corrective regimen sliding scale   SubCutaneous every 6 hours  insulin lispro Injectable (ADMELOG) 5 Unit(s) SubCutaneous every 8 hours  magnesium oxide 400 milliGRAM(s) Oral three times a day with meals  pantoprazole   Suspension 40 milliGRAM(s) Oral daily  tiotropium 18 MICROgram(s) Capsule 1 Capsule(s) Inhalation once    MEDICATIONS  (PRN):  acetaminophen   Tablet .. 650 milliGRAM(s) Oral every 6 hours PRN Temp greater or equal to 38C (100.4F), Mild Pain (1 - 3)  acetaminophen  Suppository .. 650 milliGRAM(s) Rectal every 6 hours PRN Temp greater or equal to 38C (100.4F), Mild Pain (1 - 3)      PHYSICAL EXAM:  GENERAL: pt obese, lying in bed  SKIN: approx. 2x1cm wound on RLE on stump, posterior to surgical closure.   Large sacral and bilateral buttocks pressure wounds with thin areas of black/gray eschar.   +local wound care performed at bedside patient tolerated well

## 2021-06-23 NOTE — CHART NOTE - NSCHARTNOTEFT_GEN_A_CORE
SDU Transfer Note    Transfer from: MICU  Transfer to:  ( x ) Medicine    (  ) Telemetry    (  ) RCU    (  ) Palliative    (  ) Stroke Unit    (  ) _______________    SDU COURSE:  Pt is currently admitted with the primary diagnosis of R diabetic foot ulcer complicated by septic shock/ bacteremia now resolved s/p R AKA.  Patient is a 60 year old male with history of diabetes, HTN, dyslipidemia, sleep apnea, Charcot foot reconstruction with external fixation, david morbid obesity who initially presented for worsening right foot infection. He was septic on admission, bacteremic (E. faecalis, ORCA), found to have necrotizing fasciitis of RLE. He was intubated and transferred to ICU where he underwent CVVH. He was extubated on `He underwent a R AKA, was extubated and was downgraded to SDU on 6/20.    During his stay in the SDU, he has been afebrile, normotensive, saturating in the % on room air, stable. No significant events happened during his stay.      ASSESSMENT & PLAN:   #Acute hypoxemic respiratory failure secondary to septic shock due to E. Faecalis/ORSA bacteremia due to necrotizing fascitis of RLE  -s/p BKA with disarticulation 5/31 and AKA 6/11, staples to be removed by vascular 4w post surgery.  -cultures x4 negative  -ICU course: intubated/extubated 5/31-6/14. episode of toxic metabolic encephalopathy suspected due to sepsis, now resolved  -c/w daptomycin 875 q24h, last day 7/11, monitor CPK every few days.  -pending disposition planning, SNF    #CANDY on CKD III/possible ATN, resolved  -s/p CVVD in ICU x2  -Creatinine now at baseline, around 1  -decrease Lasix to 40mg PO daily  -Continue to monitor renal function    # hx Diabetes Mellitus  -c/w Lantus/Lispro  monitor FS    # hx Morbid obesity/MATA  -recommend diet and lifestyle modification  -uses CPAP irregularly at home, has been using nighlty here    #hx Dyslipidemia  -c/w statin    # DVT prophylaxis : heparin 5000 qh8, compression device  # GI prophylaxis : protonix 40 mg  # Diet : Advanced to Consistent Carbohydrate w/Evening Snack, DASH/TLC.  # Activity Score (AM-PAC): IAT  # Code status : full  # Disposition :  SNF      For Follow-Up:  -burn evaluation for possible debridement stage 3 sacral pressure ulcer, consult placed as per wound care  -disposition planning      Vital Signs Last 24 Hrs  T(C): 37.1 (23 Jun 2021 12:00), Max: 37.3 (22 Jun 2021 16:28)  T(F): 98.7 (23 Jun 2021 12:00), Max: 99.1 (22 Jun 2021 16:28)  HR: 88 (23 Jun 2021 12:00) (76 - 89)  BP: 14/68 (23 Jun 2021 12:00) (14/68 - 169/79)  BP(mean): --  RR: 18 (23 Jun 2021 12:00) (18 - 22)  SpO2: 100% (23 Jun 2021 12:00) (96% - 100%)  I&O's Summary    22 Jun 2021 07:01  -  23 Jun 2021 07:00  --------------------------------------------------------  IN: 860 mL / OUT: 3800 mL / NET: -2940 mL    23 Jun 2021 07:01  -  23 Jun 2021 12:43  --------------------------------------------------------  IN: 0 mL / OUT: 350 mL / NET: -350 mL          MEDICATIONS  (STANDING):  ALBUTerol    90 MICROgram(s) HFA Inhaler 1 Puff(s) Inhalation once  chlorhexidine 4% Liquid 1 Application(s) Topical <User Schedule>  DAPTOmycin IVPB 875 milliGRAM(s) IV Intermittent every 24 hours  furosemide    Tablet 40 milliGRAM(s) Oral daily  glucagon  Injectable 1 milliGRAM(s) IntraMuscular once  heparin   Injectable 5000 Unit(s) SubCutaneous every 8 hours  insulin glargine Injectable (LANTUS) 18 Unit(s) SubCutaneous at bedtime  insulin lispro (ADMELOG) corrective regimen sliding scale   SubCutaneous every 6 hours  insulin lispro Injectable (ADMELOG) 5 Unit(s) SubCutaneous every 8 hours  magnesium oxide 400 milliGRAM(s) Oral three times a day with meals  pantoprazole   Suspension 40 milliGRAM(s) Oral daily  tiotropium 18 MICROgram(s) Capsule 1 Capsule(s) Inhalation once    MEDICATIONS  (PRN):  acetaminophen   Tablet .. 650 milliGRAM(s) Oral every 6 hours PRN Temp greater or equal to 38C (100.4F), Mild Pain (1 - 3)  acetaminophen  Suppository .. 650 milliGRAM(s) Rectal every 6 hours PRN Temp greater or equal to 38C (100.4F), Mild Pain (1 - 3)        LABS SDU Transfer Note    Transfer from: MICU  Transfer to:  ( x ) Medicine    (  ) Telemetry    (  ) RCU    (  ) Palliative    (  ) Stroke Unit    (  ) _______________    SDU COURSE:  Patient is a 60 year old male with history of diabetes, HTN, dyslipidemia, sleep apnea, Charcot foot reconstruction with external fixation, david morbid obesity admitted with the primary diagnosis of R diabetic foot ulcer complicated by septic shock/ bacteremia now resolved s/p R AKA. He was septic on admission, bacteremic (E. faecalis, ORCA), found to have necrotizing fasciitis of RLE. He was intubated and transferred to ICU where he underwent CVVH. He was extubated on `He underwent a R AKA, was extubated and was downgraded to SDU on 6/20.    During his stay in the SDU, he has been afebrile, normotensive, saturating in the % on room air, stable. Patient was asymptomatic without any complaints. No significant events happened during his stay in the SDU. Duplex of his left lower extremity was negative for DVT. Mg found low likely secondary to diarrhea now resolved has been repleted continue to monitor. Diet advanced PT on board. Vascular following for AKA, wound care following for sacral ulcer. Patient to be downgraded to hospital floor pending disposition planning, placement at skilled nursing facility.    ASSESSMENT & PLAN:   #Sacral pressure ulcer stage III  -wound care saw, recommended burn consult to see if can be debrided, awaiting recommendations from burn consult was placed  -regular dressing changes and care of wound daily by RN per wound care note    #Hypomagnesemia  -replete, likely due to diarrhea which has now resolved  -recheck Mg    #Acute hypoxemic respiratory failure secondary to septic shock due to E. Faecalis/ORSA bacteremia due to necrotizing fascitis of RLE, resolved  -ICU course: intubated/extubated 5/31-6/14. 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    #CANDY on CKD III/possible ATN, resolved  -s/p CVVD in ICU x2  -Creatinine now at baseline, around 1  -decrease Lasix to 40mg PO daily  -Continue to monitor renal function    # hx Diabetes Mellitus  -c/w Lantus/Lispro  monitor FS    # hx Morbid obesity/MATA  -recommend diet and lifestyle modification  -uses CPAP irregularly at home, has been using nighlty here    #hx Dyslipidemia  -c/w statin    # DVT prophylaxis : heparin 5000 qh8, compression device  # GI prophylaxis : protonix 40 mg  # Diet : Advanced to Consistent Carbohydrate w/Evening Snack, DASH/TLC.  # Activity Score (AM-PAC): IAT  # Code status : full    For Follow-Up:  -burn evaluation for possible debridement stage 3 sacral pressure ulcer, consult placed as per wound care  -disposition planning      Vital Signs Last 24 Hrs  T(C): 37.1 (23 Jun 2021 12:00), Max: 37.3 (22 Jun 2021 16:28)  T(F): 98.7 (23 Jun 2021 12:00), Max: 99.1 (22 Jun 2021 16:28)  HR: 88 (23 Jun 2021 12:00) (76 - 89)  BP: 14/68 (23 Jun 2021 12:00) (14/68 - 169/79)  BP(mean): --  RR: 18 (23 Jun 2021 12:00) (18 - 22)  SpO2: 100% (23 Jun 2021 12:00) (96% - 100%)  I&O's Summary    22 Jun 2021 07:01  -  23 Jun 2021 07:00  --------------------------------------------------------  IN: 860 mL / OUT: 3800 mL / NET: -2940 mL    23 Jun 2021 07:01  -  23 Jun 2021 12:43  --------------------------------------------------------  IN: 0 mL / OUT: 350 mL / NET: -350 mL          MEDICATIONS  (STANDING):  ALBUTerol    90 MICROgram(s) HFA Inhaler 1 Puff(s) Inhalation once  chlorhexidine 4% Liquid 1 Application(s) Topical <User Schedule>  DAPTOmycin IVPB 875 milliGRAM(s) IV Intermittent every 24 hours  furosemide    Tablet 40 milliGRAM(s) Oral daily  glucagon  Injectable 1 milliGRAM(s) IntraMuscular once  heparin   Injectable 5000 Unit(s) SubCutaneous every 8 hours  insulin glargine Injectable (LANTUS) 18 Unit(s) SubCutaneous at bedtime  insulin lispro (ADMELOG) corrective regimen sliding scale   SubCutaneous every 6 hours  insulin lispro Injectable (ADMELOG) 5 Unit(s) SubCutaneous every 8 hours  magnesium oxide 400 milliGRAM(s) Oral three times a day with meals  pantoprazole   Suspension 40 milliGRAM(s) Oral daily  tiotropium 18 MICROgram(s) Capsule 1 Capsule(s) Inhalation once    MEDICATIONS  (PRN):  acetaminophen   Tablet .. 650 milliGRAM(s) Oral every 6 hours PRN Temp greater or equal to 38C (100.4F), Mild Pain (1 - 3)  acetaminophen  Suppository .. 650 milliGRAM(s) Rectal every 6 hours PRN Temp greater or equal to 38C (100.4F), Mild Pain (1 - 3)        LABS

## 2021-06-23 NOTE — PROGRESS NOTE ADULT - ASSESSMENT
s/p right AKA 6/11/21 with small wound below the surgical incision line    Dressing changes daily: moist to dry, gauze and tape. May be changed by RN  No weight bearing on the stump  Keep staples in   will follow up    SPECTRA [id number]

## 2021-06-23 NOTE — CONSULT NOTE ADULT - ASSESSMENT
#Multiple pressure wounds    -RLE stump wound as per vascular, spoke with team to evaluate patient today  -no surgical intervention needed at this time for sacrum  -continue local wound care BID  -wash with soap and water, apply santyl/dakins moist gauze, cover with ABD and tape.   -ensure adequate nutrition  -offloading/positional changes  -recall as needed, will sign off

## 2021-06-23 NOTE — CONSULT NOTE ADULT - CONSULT REASON
***Stroke Code: 8.05pm  Last known well time: Unclear
wound check
CANDY
diabetic foot ulcer
multiple wounds
septic shock, intubated, CKD on CVVH
DFU

## 2021-06-23 NOTE — PROGRESS NOTE ADULT - SUBJECTIVE AND OBJECTIVE BOX
VASCULAR SURGERY PROGRESS NOTE    CC: DFU      Procedure: s/p Right AKA    Events of past 24 hours: new wound at the right stump          ROS otherwise negative except per subjective and HPI      PAST MEDICAL & SURGICAL HISTORY:  MATA on CPAP    DM (diabetes mellitus)    HTN (hypertension)    High cholesterol    Charcot ankle, right    History of percutaneous angioplasty    H/O skin graft    Left toe amputee  4 TOES    Diabetic Charcot foot  Fixation with external device        Vital Signs Last 24 Hrs  T(C): 37.1 (23 Jun 2021 12:00), Max: 37.3 (22 Jun 2021 16:28)  T(F): 98.7 (23 Jun 2021 12:00), Max: 99.1 (22 Jun 2021 16:28)  HR: 88 (23 Jun 2021 12:00) (76 - 89)  BP: 14/68 (23 Jun 2021 12:00) (14/68 - 169/79)  BP(mean): --  RR: 18 (23 Jun 2021 12:00) (18 - 22)  SpO2: 100% (23 Jun 2021 12:00) (96% - 100%)    Pain (0-10):            Pain Control Adequate: [] YES [] N    Diet:    I&O's Detail    22 Jun 2021 07:01  -  23 Jun 2021 07:00  --------------------------------------------------------  IN:    IV PiggyBack: 100 mL    Oral Fluid: 760 mL  Total IN: 860 mL    OUT:    Incontinent per Condom Catheter (mL): 3200 mL    Voided (mL): 600 mL  Total OUT: 3800 mL    Total NET: -2940 mL      23 Jun 2021 07:01  -  23 Jun 2021 14:12  --------------------------------------------------------  IN:  Total IN: 0 mL    OUT:    Incontinent per Condom Catheter (mL): 350 mL  Total OUT: 350 mL    Total NET: -350 mL      PHYSICAL EXAM    Appearance: Normal	  HEENT:   Normal oral mucosa, PERRL, EOMI	  Neck: Supple, - JVD;   Cardiovascular: Normal S1 S2, No JVD, No murmurs,   Respiratory: Lungs clear to auscultation/No Rales, Rhonchi, Wheezing	  Gastrointestinal:  Soft, Non-tender, + BS	  Skin: No rashes, No ecchymoses, No cyanosis  Extremities: Normal range of motion, No clubbing, cyanosis   Right AKA surgical incision with 2 x 1 cm wound mid stump below incision, minimal drainage  Neurologic: Non-focal  Psychiatry: A & O x 3, Mood & affect appropriate        MEDICATIONS:   MEDICATIONS  (STANDING):  ALBUTerol    90 MICROgram(s) HFA Inhaler 1 Puff(s) Inhalation once  chlorhexidine 4% Liquid 1 Application(s) Topical <User Schedule>  collagenase Ointment 1 Application(s) Topical two times a day  Dakins Solution - 1/2 Strength 1 Application(s) Topical two times a day  DAPTOmycin IVPB 875 milliGRAM(s) IV Intermittent every 24 hours  furosemide    Tablet 40 milliGRAM(s) Oral daily  glucagon  Injectable 1 milliGRAM(s) IntraMuscular once  heparin   Injectable 5000 Unit(s) SubCutaneous every 8 hours  insulin glargine Injectable (LANTUS) 18 Unit(s) SubCutaneous at bedtime  insulin lispro (ADMELOG) corrective regimen sliding scale   SubCutaneous every 6 hours  insulin lispro Injectable (ADMELOG) 5 Unit(s) SubCutaneous every 8 hours  magnesium oxide 400 milliGRAM(s) Oral three times a day with meals  pantoprazole   Suspension 40 milliGRAM(s) Oral daily  tiotropium 18 MICROgram(s) Capsule 1 Capsule(s) Inhalation once    MEDICATIONS  (PRN):  acetaminophen   Tablet .. 650 milliGRAM(s) Oral every 6 hours PRN Temp greater or equal to 38C (100.4F), Mild Pain (1 - 3)  acetaminophen  Suppository .. 650 milliGRAM(s) Rectal every 6 hours PRN Temp greater or equal to 38C (100.4F), Mild Pain (1 - 3)        LAB/STUDIES:      Mg     1.6     06-22      CARDIAC MARKERS ( 22 Jun 2021 07:46 )  x     / x     / 253 U/L / x     / x                    IMAGING:      ASSESSMENT/PLAN:          SPECTRA:

## 2021-06-23 NOTE — PROGRESS NOTE ADULT - SUBJECTIVE AND OBJECTIVE BOX
OVERNIGHT EVENTS: events noted, on RA, afebrile    Vital Signs Last 24 Hrs  T(C): 36.6 (23 Jun 2021 04:00), Max: 37.3 (22 Jun 2021 16:28)  T(F): 97.8 (23 Jun 2021 04:00), Max: 99.1 (22 Jun 2021 16:28)  HR: 76 (23 Jun 2021 04:00) (76 - 89)  BP: 158/72 (23 Jun 2021 04:00) (129/69 - 161/76)  BP(mean): --  RR: 18 (23 Jun 2021 04:00) (18 - 22)  SpO2: 96% (23 Jun 2021 04:00) (95% - 99%)    PHYSICAL EXAMINATION:    GENERAL: ill looking    HEENT: Head is normocephalic and atraumatic    NECK: Supple.    LUNGS: dec bs both bases    HEART: KIRILL 2/6    ABDOMEN: Soft, nontender, and nondistended.      EXTREMITIES: Without any cyanosis, clubbing, rash, lesions or edema.    NEUROLOGIC: r AKA    SKIN: sacral ulcer      LABS:                        10.7   6.75  )-----------( 341      ( 21 Jun 2021 07:53 )             35.5     06-21    135  |  98  |  10  ----------------------------<  172<H>  3.8   |  31  |  0.9    Ca    8.3<L>      21 Jun 2021 07:53  Phos  2.9     06-21  Mg     1.6     06-22    TPro  7.3  /  Alb  2.5<L>  /  TBili  0.6  /  DBili  x   /  AST  37  /  ALT  37  /  AlkPhos  95  06-21          CARDIAC MARKERS ( 22 Jun 2021 07:46 )  x     / x     / 253 U/L / x     / x                      06-21-21 @ 07:01  -  06-22-21 @ 07:00  --------------------------------------------------------  IN: 50 mL / OUT: 200 mL / NET: -150 mL    06-22-21 @ 07:01  -  06-23-21 @ 06:51  --------------------------------------------------------  IN: 860 mL / OUT: 3800 mL / NET: -2940 mL        MICROBIOLOGY:      MEDICATIONS  (STANDING):  ALBUTerol    90 MICROgram(s) HFA Inhaler 1 Puff(s) Inhalation once  chlorhexidine 4% Liquid 1 Application(s) Topical <User Schedule>  DAPTOmycin IVPB 875 milliGRAM(s) IV Intermittent every 24 hours  furosemide    Tablet 40 milliGRAM(s) Oral daily  glucagon  Injectable 1 milliGRAM(s) IntraMuscular once  heparin   Injectable 5000 Unit(s) SubCutaneous every 8 hours  insulin glargine Injectable (LANTUS) 18 Unit(s) SubCutaneous at bedtime  insulin lispro (ADMELOG) corrective regimen sliding scale   SubCutaneous every 6 hours  insulin lispro Injectable (ADMELOG) 5 Unit(s) SubCutaneous every 8 hours  magnesium oxide 400 milliGRAM(s) Oral three times a day with meals  pantoprazole   Suspension 40 milliGRAM(s) Oral daily  tiotropium 18 MICROgram(s) Capsule 1 Capsule(s) Inhalation once    MEDICATIONS  (PRN):  acetaminophen   Tablet .. 650 milliGRAM(s) Oral every 6 hours PRN Temp greater or equal to 38C (100.4F), Mild Pain (1 - 3)  acetaminophen  Suppository .. 650 milliGRAM(s) Rectal every 6 hours PRN Temp greater or equal to 38C (100.4F), Mild Pain (1 - 3)      RADIOLOGY & ADDITIONAL STUDIES:

## 2021-06-23 NOTE — PROGRESS NOTE ADULT - ASSESSMENT
Patient is a 61 y/o M with PMH of DM, hypertension, dyslipidemia, sleep apnea, Charcot foot reconstruction with external fixation, super morbid obesity present for worsening right foot infection. Patient was septic on admission, bacteremic, found to have Nec Fasc of RLE, intubated and transferred to ICU. There he underwent CVVH  Patient underwent a R AKA, extubated and downgraded to CEU 6/20    #Acute Hypoxemic respiratory failure   #Septic Shock POA, E faecalis/ORSA bacteremia  #Rt DFU c/b Necrotizing fasciitis s/p Rt AKA 06/11  Intubated 05/31, extubated 06/14, currently on RA  s/p caspo 06/09 - 06/20  - Currently on daptomycin 875 q24h (End 07/11)  - Vasc recalled to evaluate wound  - Dispo planning to SNF    #CANDY on CKD III/possible ATN, resolved  #Anemia  s/p CVVD in ICU x2  Scr now at baseline, around 1  - Cont Lasix 40mg daily   c/w monitoring   + terrazas, will need TOV if able to participate with PT    #Diarrhea  #Hypomagnesemia  Cdiff neg  -cont loperamide  - replete Mg    #Diabetes Mellitus  - continue with Lantus/Lispro  - monitor FS    #Morbid obesity  functional quadriplegia  decubitus ulcer stage 3  wound care RN following  No intervention needed per burn team, cont local wound care  - continue with rectal tube for now  - wound care as per recs    #Progress Note Handoff  Pending (specify):  DC planning, vasc f/u for woundcare  Family discussion: Plan of care discussed with patient regarding discharge planning  Disposition:  SNF

## 2021-06-24 LAB
ALBUMIN SERPL ELPH-MCNC: 3.1 G/DL — LOW (ref 3.5–5.2)
ALP SERPL-CCNC: 108 U/L — SIGNIFICANT CHANGE UP (ref 30–115)
ALT FLD-CCNC: 28 U/L — SIGNIFICANT CHANGE UP (ref 0–41)
ANION GAP SERPL CALC-SCNC: 9 MMOL/L — SIGNIFICANT CHANGE UP (ref 7–14)
AST SERPL-CCNC: 33 U/L — SIGNIFICANT CHANGE UP (ref 0–41)
BILIRUB SERPL-MCNC: 0.6 MG/DL — SIGNIFICANT CHANGE UP (ref 0.2–1.2)
BUN SERPL-MCNC: 12 MG/DL — SIGNIFICANT CHANGE UP (ref 10–20)
CALCIUM SERPL-MCNC: 8.9 MG/DL — SIGNIFICANT CHANGE UP (ref 8.5–10.1)
CHLORIDE SERPL-SCNC: 93 MMOL/L — LOW (ref 98–110)
CO2 SERPL-SCNC: 34 MMOL/L — HIGH (ref 17–32)
CREAT SERPL-MCNC: 1 MG/DL — SIGNIFICANT CHANGE UP (ref 0.7–1.5)
GLUCOSE BLDC GLUCOMTR-MCNC: 156 MG/DL — HIGH (ref 70–99)
GLUCOSE BLDC GLUCOMTR-MCNC: 158 MG/DL — HIGH (ref 70–99)
GLUCOSE BLDC GLUCOMTR-MCNC: 173 MG/DL — HIGH (ref 70–99)
GLUCOSE BLDC GLUCOMTR-MCNC: 183 MG/DL — HIGH (ref 70–99)
GLUCOSE BLDC GLUCOMTR-MCNC: 197 MG/DL — HIGH (ref 70–99)
GLUCOSE BLDC GLUCOMTR-MCNC: 197 MG/DL — HIGH (ref 70–99)
GLUCOSE BLDC GLUCOMTR-MCNC: 211 MG/DL — HIGH (ref 70–99)
GLUCOSE SERPL-MCNC: 170 MG/DL — HIGH (ref 70–99)
HCT VFR BLD CALC: 38.5 % — LOW (ref 42–52)
HGB BLD-MCNC: 11.8 G/DL — LOW (ref 14–18)
MAGNESIUM SERPL-MCNC: 1.6 MG/DL — LOW (ref 1.8–2.4)
MCHC RBC-ENTMCNC: 27.4 PG — SIGNIFICANT CHANGE UP (ref 27–31)
MCHC RBC-ENTMCNC: 30.6 G/DL — LOW (ref 32–37)
MCV RBC AUTO: 89.5 FL — SIGNIFICANT CHANGE UP (ref 80–94)
NRBC # BLD: 0 /100 WBCS — SIGNIFICANT CHANGE UP (ref 0–0)
PLATELET # BLD AUTO: 352 K/UL — SIGNIFICANT CHANGE UP (ref 130–400)
POTASSIUM SERPL-MCNC: 4.2 MMOL/L — SIGNIFICANT CHANGE UP (ref 3.5–5)
POTASSIUM SERPL-SCNC: 4.2 MMOL/L — SIGNIFICANT CHANGE UP (ref 3.5–5)
PROT SERPL-MCNC: 8.1 G/DL — HIGH (ref 6–8)
RBC # BLD: 4.3 M/UL — LOW (ref 4.7–6.1)
RBC # FLD: 18.3 % — HIGH (ref 11.5–14.5)
SARS-COV-2 RNA SPEC QL NAA+PROBE: SIGNIFICANT CHANGE UP
SODIUM SERPL-SCNC: 136 MMOL/L — SIGNIFICANT CHANGE UP (ref 135–146)
WBC # BLD: 7.62 K/UL — SIGNIFICANT CHANGE UP (ref 4.8–10.8)
WBC # FLD AUTO: 7.62 K/UL — SIGNIFICANT CHANGE UP (ref 4.8–10.8)

## 2021-06-24 PROCEDURE — 99233 SBSQ HOSP IP/OBS HIGH 50: CPT

## 2021-06-24 RX ORDER — INSULIN LISPRO 100/ML
5 VIAL (ML) SUBCUTANEOUS
Refills: 0 | Status: DISCONTINUED | OUTPATIENT
Start: 2021-06-24 | End: 2021-06-26

## 2021-06-24 RX ORDER — MAGNESIUM SULFATE 500 MG/ML
2 VIAL (ML) INJECTION ONCE
Refills: 0 | Status: COMPLETED | OUTPATIENT
Start: 2021-06-24 | End: 2021-06-24

## 2021-06-24 RX ADMIN — Medication 2: at 08:02

## 2021-06-24 RX ADMIN — Medication 50 GRAM(S): at 20:36

## 2021-06-24 RX ADMIN — Medication 5 UNIT(S): at 16:56

## 2021-06-24 RX ADMIN — HEPARIN SODIUM 5000 UNIT(S): 5000 INJECTION INTRAVENOUS; SUBCUTANEOUS at 15:41

## 2021-06-24 RX ADMIN — HEPARIN SODIUM 5000 UNIT(S): 5000 INJECTION INTRAVENOUS; SUBCUTANEOUS at 20:37

## 2021-06-24 RX ADMIN — MAGNESIUM OXIDE 400 MG ORAL TABLET 400 MILLIGRAM(S): 241.3 TABLET ORAL at 08:03

## 2021-06-24 RX ADMIN — PANTOPRAZOLE SODIUM 40 MILLIGRAM(S): 20 TABLET, DELAYED RELEASE ORAL at 12:04

## 2021-06-24 RX ADMIN — INSULIN GLARGINE 18 UNIT(S): 100 INJECTION, SOLUTION SUBCUTANEOUS at 21:19

## 2021-06-24 RX ADMIN — Medication 4: at 16:55

## 2021-06-24 RX ADMIN — Medication 1 APPLICATION(S): at 19:23

## 2021-06-24 RX ADMIN — MAGNESIUM OXIDE 400 MG ORAL TABLET 400 MILLIGRAM(S): 241.3 TABLET ORAL at 12:03

## 2021-06-24 RX ADMIN — Medication 1 APPLICATION(S): at 06:53

## 2021-06-24 RX ADMIN — DAPTOMYCIN 135 MILLIGRAM(S): 500 INJECTION, POWDER, LYOPHILIZED, FOR SOLUTION INTRAVENOUS at 15:41

## 2021-06-24 RX ADMIN — Medication 40 MILLIGRAM(S): at 06:53

## 2021-06-24 RX ADMIN — Medication 2: at 23:19

## 2021-06-24 RX ADMIN — Medication 2: at 12:02

## 2021-06-24 RX ADMIN — MAGNESIUM OXIDE 400 MG ORAL TABLET 400 MILLIGRAM(S): 241.3 TABLET ORAL at 16:56

## 2021-06-24 RX ADMIN — HEPARIN SODIUM 5000 UNIT(S): 5000 INJECTION INTRAVENOUS; SUBCUTANEOUS at 06:54

## 2021-06-24 RX ADMIN — Medication 5 UNIT(S): at 08:03

## 2021-06-24 NOTE — SWALLOW BEDSIDE ASSESSMENT ADULT - SLP GENERAL OBSERVATIONS
Pt encountered awake and alert receiving 2L of supplemental O2 via NC. + generalized weakness, +hoarseness, +dysphonic, +wet respiratory vocal quality
Pt received in O2 NC, awake and alert, does not recall if he ate dinner or how he tolerated it yesterday
Pt received in bed asleep, on bipap, s/p recent extubation, not appropriate for po trials will re-attempt tomorrow 6/15
Pt received in bed awake and alert on O2 NC, improved voice, requesting po
Pt received in bed awake and alert, confusion noted, decreased recall that he ate breakfast, RN reported toleration, pt c/o consistency of food
Pt received in bed awake and alert on O2 NC w/ niece at b/s. Pt and niece educated on dysphagia and need for repeat instrumental swallow study
pt received in bed awake alert w/o c/o pain. +room air
pt received in bed awake confused w/o c/o pain. +room air; Staff report +toleration of current diet

## 2021-06-24 NOTE — PROGRESS NOTE ADULT - SUBJECTIVE AND OBJECTIVE BOX
SUBJECTIVE:    Patient is a 60y old Male who presents with a chief complaint of dfu (23 Jun 2021 14:30)    Currently admitted to medicine with the primary diagnosis of Diabetic infection of right foot       Today is hospital day 26d. This morning he is resting comfortably in bed and reports no new issues or overnight events.     PAST MEDICAL & SURGICAL HISTORY  MATA on CPAP    DM (diabetes mellitus)    HTN (hypertension)    High cholesterol    Charcot ankle, right    History of percutaneous angioplasty    H/O skin graft    Left toe amputee  4 TOES    Diabetic Charcot foot  Fixation with external device      SOCIAL HISTORY:  Negative for smoking/alcohol/drug use.     ALLERGIES:  No Known Allergies    MEDICATIONS:  STANDING MEDICATIONS  ALBUTerol    90 MICROgram(s) HFA Inhaler 1 Puff(s) Inhalation once  chlorhexidine 4% Liquid 1 Application(s) Topical <User Schedule>  collagenase Ointment 1 Application(s) Topical two times a day  Dakins Solution - 1/2 Strength 1 Application(s) Topical two times a day  DAPTOmycin IVPB 875 milliGRAM(s) IV Intermittent every 24 hours  furosemide    Tablet 40 milliGRAM(s) Oral daily  glucagon  Injectable 1 milliGRAM(s) IntraMuscular once  heparin   Injectable 5000 Unit(s) SubCutaneous every 8 hours  insulin glargine Injectable (LANTUS) 18 Unit(s) SubCutaneous at bedtime  insulin lispro (ADMELOG) corrective regimen sliding scale   SubCutaneous every 6 hours  insulin lispro Injectable (ADMELOG) 5 Unit(s) SubCutaneous every 8 hours  magnesium oxide 400 milliGRAM(s) Oral three times a day with meals  pantoprazole   Suspension 40 milliGRAM(s) Oral daily  tiotropium 18 MICROgram(s) Capsule 1 Capsule(s) Inhalation once    PRN MEDICATIONS  acetaminophen   Tablet .. 650 milliGRAM(s) Oral every 6 hours PRN  acetaminophen  Suppository .. 650 milliGRAM(s) Rectal every 6 hours PRN    VITALS:   T(F): 99  HR: 77  BP: 152/74  RR: 18  SpO2: 98%    LABS:                        11.8   7.62  )-----------( 352      ( 24 Jun 2021 06:54 )             38.5     06-24    136  |  93<L>  |  12  ----------------------------<  170<H>  4.2   |  34<H>  |  1.0    Ca    8.9      24 Jun 2021 06:54  Mg     1.6     06-24    TPro  8.1<H>  /  Alb  3.1<L>  /  TBili  0.6  /  DBili  x   /  AST  33  /  ALT  28  /  AlkPhos  108  06-24                  RADIOLOGY:    PHYSICAL EXAM:  GEN: No acute distress  LUNGS: Clear to auscultation bilaterally   HEART: Regular  ABD: Soft, non-tender, non-distended.  EXT: NC/NC/NE/2+PP/GARY/Skin Intact.   NEURO: AAOX3    Intravenous access:   NG tube:   Lovelace Catheter:        SUBJECTIVE:    Patient is a 60y old Male who presents with a chief complaint of dfu (23 Jun 2021 14:30)    Currently admitted to medicine with the primary diagnosis of Diabetic infection of right foot       Today is hospital day 26d. No overnight events. This morning he is resting comfortably in bed and reports no new issues.    PAST MEDICAL & SURGICAL HISTORY  MATA on CPAP    DM (diabetes mellitus)    HTN (hypertension)    High cholesterol    Charcot ankle, right    History of percutaneous angioplasty    H/O skin graft    Left toe amputee  4 TOES    Diabetic Charcot foot  Fixation with external device    ALLERGIES:  No Known Allergies    MEDICATIONS:  STANDING MEDICATIONS  ALBUTerol    90 MICROgram(s) HFA Inhaler 1 Puff(s) Inhalation once  chlorhexidine 4% Liquid 1 Application(s) Topical <User Schedule>  collagenase Ointment 1 Application(s) Topical two times a day  Dakins Solution - 1/2 Strength 1 Application(s) Topical two times a day  DAPTOmycin IVPB 875 milliGRAM(s) IV Intermittent every 24 hours  furosemide    Tablet 40 milliGRAM(s) Oral daily  glucagon  Injectable 1 milliGRAM(s) IntraMuscular once  heparin   Injectable 5000 Unit(s) SubCutaneous every 8 hours  insulin glargine Injectable (LANTUS) 18 Unit(s) SubCutaneous at bedtime  insulin lispro (ADMELOG) corrective regimen sliding scale   SubCutaneous every 6 hours  insulin lispro Injectable (ADMELOG) 5 Unit(s) SubCutaneous every 8 hours  magnesium oxide 400 milliGRAM(s) Oral three times a day with meals  pantoprazole   Suspension 40 milliGRAM(s) Oral daily  tiotropium 18 MICROgram(s) Capsule 1 Capsule(s) Inhalation once    PRN MEDICATIONS  acetaminophen   Tablet .. 650 milliGRAM(s) Oral every 6 hours PRN  acetaminophen  Suppository .. 650 milliGRAM(s) Rectal every 6 hours PRN    VITALS:   T(F): 99  HR: 77  BP: 152/74  RR: 18  SpO2: 98%    LABS:                        11.8   7.62  )-----------( 352      ( 24 Jun 2021 06:54 )             38.5     06-24    136  |  93<L>  |  12  ----------------------------<  170<H>  4.2   |  34<H>  |  1.0    Ca    8.9      24 Jun 2021 06:54  Mg     1.6     06-24    TPro  8.1<H>  /  Alb  3.1<L>  /  TBili  0.6  /  DBili  x   /  AST  33  /  ALT  28  /  AlkPhos  108  06-24          RADIOLOGY:    PHYSICAL EXAM:  GEN: No acute distress  LUNGS: Clear to auscultation bilaterally   HEART: Regular  ABD: Soft, non-tender, non-distended.  EXT: No edema  NEURO: AAOX3

## 2021-06-24 NOTE — PROGRESS NOTE ADULT - ASSESSMENT
#Sacral pressure ulcer stage III  -wound care saw, recommended burn consult to see if can be debrided, awaiting recommendations from burn consult was placed  -regular dressing changes and care of wound daily by RN per wound care note    #Acute hypoxemic respiratory failure secondary to septic shock due to E. Faecalis/ORSA bacteremia due to necrotizing fascitis of RLE, resolved  -ICU course: intubated/extubated 5/31-6/14. 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    #CANDY on CKD III/possible ATN, resolved  -s/p CVVD in ICU x2  -Creatinine now at baseline, around 1  -decrease Lasix to 40mg PO daily  -Continue to monitor renal function    # hx Diabetes Mellitus  -c/w Lantus/Lispro  monitor FS    # hx Morbid obesity/MATA  -recommend diet and lifestyle modification  -uses CPAP irregularly at home, has been using nighlty here    #hx Dyslipidemia  -c/w statin    # DVT prophylaxis : heparin 5000 qh8, compression device  # GI prophylaxis : protonix 40 mg  # Diet : Advanced to Consistent Carbohydrate w/Evening Snack, DASH/TLC.  # Activity Score (AM-PAC): IAT  # Code status : full

## 2021-06-24 NOTE — PROGRESS NOTE ADULT - ATTENDING COMMENTS
I have personally seen and examined this patient on 6/8 with ACP.   I have fully participated in the care of this patient.  I have reviewed all pertinent clinical information, including history, physical exam, plan and note. Likely toxic metabolic encephalopathy. Brain MRI once stable. Consider LP if patient continues to be encephalopathic.  I have reviewed all pertinent clinical information and reviewed all relevant imaging and diagnostic studies personally.  Recommendations as above.  Agree with above assessment except as noted.
Agree with above.
For R AKA stump closure tomorrow.
History, events, data and scans reviewed. Patient examined during rounds. I agreed and approved plan of care as outlined above.
IMPRESSION    Acute Hypoxemic respiratory failure with hypercapnia resolved   Septic Shock RESOLVD   DFU s/p Right Knee Disarticulation for necrotizing soft tissue infection  CANDY on CKD IMPROVED   AMS 2/2 Toxic-Metabolic Encephelopathy resolved   MATA/ OHS  Morbid obesity  NSTEMI    Plan DW house staff and RN
Will formalize the AKA after sepsis is controlled, and kidney function is back to baseline.   Wound care.
***MY NOTE SUPERSEDES ANY DISCREPANCIES THAT MAY BE ABOVE IN THE RESIDENT'S NOTE***    59 y/o M with PMH of DM, hypertension, dyslipidemia, sleep apnea, Charcot foot reconstruction with external fixation, super morbid obesity present for worsening right foot infection. Patient was septic on admission, bacteremic, found to have Nec Fasc of RLE, intubated and transferred to ICU. There he underwent CVVH    Patient underwent a R AKA, extubated and downgraded to CEU 6/20    Gen- morbidly obese M, unkempt, NAD, non toxic  Eyes- anicteric sclera, non injected conjunctiva, EOMI  ENT- hearing grossly intact, oropharynx clear, MMM  Neck- large neck circumference  Chest- CTAB, no wheezing or coarse breath sounds, symmetrical chest rise  Cardiac- RRR, normal s1s2, no RMG appreciated  Ext- large extremities, no clubbing or cyanosis, right AKA, left multiple digits amputated  Skin- warm dry, no jaundice  Neuro- AOx3 but intermittently inappropriate responses  Rectal- rectal tube in place  Uro- condom cath in place    #Sacral pressure ulcer stage III  -wound care saw, recommended burn consult to see if can be debrided, awaiting recommendations from burn consult was placed  -regular dressing changes and care of wound daily by RN per wound care note    #Acute hypoxemic respiratory failure secondary to septic shock due to E. Faecalis/ORSA bacteremia due to necrotizing fascitis of RLE, resolved  -ICU course: intubated/extubated 5/31-6/14. 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    #CANDY on CKD III/possible ATN, resolved  -s/p CVVD in ICU x2  -Creatinine now at baseline, around 1  -decrease Lasix to 40mg PO daily  -Continue to monitor renal function    # hx Diabetes Mellitus  -c/w Lantus/Lispro  monitor FS    # hx Morbid obesity/MATA  -recommend diet and lifestyle modification  -uses CPAP irregularly at home, has been using nighlty here    #hx Dyslipidemia  -c/w statin    #Progress Note Handoff  Pending (specify):  n/a  Family discussion: great niece at bedside and provided update  [facility] will check [facility] and give approval to send him there by tomorrow morning. anticipated for tomorrow.
For closure of R AKA stump today.
Mr Jayesh Callahan is known to me for the last 5 years with chronic right lateral DFU with chronic OM with episodes of flareup   Patient had reconstruction with Ex- Fix  and midfoot osteotomy which provided significant realignment and alleviated pressure. However, due to chronic OM not responding to oral and long term IV, DFU was never fully healed   Nurse called me Friday morning concerned about how he looks pale and have significant drainage from DFU. Recommended to go to ED, emergent I&D performed Sat at 5 AM, all fibronecrosis removed and multiple fasciotomy preformed lateral comp and post comp deep and superf with high pressure abx pulse irrigation  Over the next 2 days patient wound continue to improve, no signs of clinical residual infection however he was still in sepsis and vascular team felt proximal amputation is necessary for source control. After intubation, patient underwent knee disarticulation, hopeful for bacteremia control. Pod will sign off at this time
History, events, data and scans reviewed, patient examined at bedside. I agree and approved plan of care as outlined above.
Most likely, encephalopathy in resolving with treatment of infection and metabolic derangements.
Patient is not medically cleared for closure of the amputation stump.  Will postpone the procedure.
events noted, still intubated, ventilated, dec hb no active bleed, on low dose pressors, IV abx, SAT, SBT, serial CBC, dc u karma, podiatry f.up, poor prognosis

## 2021-06-24 NOTE — SWALLOW BEDSIDE ASSESSMENT ADULT - SWALLOW EVAL: RECOMMENDED FEEDING/EATING TECHNIQUES
oral hygiene/position upright (90 degrees)/small sips/bites
encourage pt to swallow prior to sip/allow for swallow between intakes/alternate food with liquid/check mouth frequently for oral residue/pocketing/maintain upright posture during/after eating for 30 mins/oral hygiene/position upright (90 degrees)
oral hygiene/position upright (90 degrees)
check mouth frequently for oral residue/pocketing/small sips/bites
alternate food with liquid/check mouth frequently for oral residue/pocketing/oral hygiene/small sips/bites

## 2021-06-24 NOTE — SWALLOW BEDSIDE ASSESSMENT ADULT - SLP PERTINENT HISTORY OF CURRENT PROBLEM
Pt admitted w/ diabetic foot ulcer, s/p AKA 6/11, AHRF, intubation 2' septic shock, s/p extubation on 6/14, now on 2L NC +hospital course complicated by code stroke CTH: (-), MRI recommended, will not fit in MRI machine, +metabolic encephalopathy
Pt admitted w/ diabetic foot ulcer, s/p AKA 6/11, AHRF, intubation 2' septic shock, s/p extubation this am, no on bipap. +hospital course complicated by code stroke CTH: (-), MRI recommended, will not fit in MRI machine, +metabolic encephalopathy
Pt admitted w/ diabetic foot ulcer, s/p AKA 6/11, AHRF, intubation 2' septic shock, s/p extubation on 6/14, now on 2L NC +hospital course complicated by code stroke CTH: (-), MRI recommended, will not fit in MRI machine, +metabolic encephalopathy
Pt admitted w/ diabetic foot ulcer, s/p AKA 6/11, AHRF, intubation 2' septic shock, s/p extubation on 6/14, now on 2L NC +hospital course complicated by code stroke CTH: (-), MRI recommended, will not fit in MRI machine, +metabolic encephalopathy; pt underwent FEES 6/17 w/ recs for puree w/ honey-thick liquids.
Pt admitted w/ diabetic foot ulcer, s/p AKA 6/11, AHRF, intubation 2' septic shock, s/p extubation on 6/14, now on 2L NC +hospital course complicated by code stroke CTH: (-), MRI recommended, will not fit in MRI machine, +metabolic encephalopathy
Pt admitted w/ diabetic foot ulcer, s/p AKA 6/11, AHRF, intubation 2' septic shock, s/p extubation on 6/14, now on 2L NC +hospital course complicated by code stroke CTH: (-), MRI recommended, will not fit in MRI machine, +metabolic encephalopathy
Yes, Non-Core measure site...

## 2021-06-24 NOTE — SWALLOW BEDSIDE ASSESSMENT ADULT - SWALLOW EVAL: CURRENT DIET
dys 2 w/ honey thick liquids
dysphagia 1 w/ honey thick liquids
dysphagia 2 w honey-thick liquids
NPO
NPO
made NPO after vomiting while on bipap early this am
dysphagia 1 w/ honey-thick liquids
NPO, ice chips

## 2021-06-24 NOTE — SWALLOW BEDSIDE ASSESSMENT ADULT - NS ASR SWALLOW FINDINGS DISCUS
Physician/Nursing/Patient
Pts niece at b/s/Physician/Nursing/Patient/Family
RN Jessica/Nursing/Patient
ALESIA Gomes/Nursing/Patient
Physician/Nursing/Patient
Physician/Nursing/Patient

## 2021-06-24 NOTE — SWALLOW BEDSIDE ASSESSMENT ADULT - ASR SWALLOW RECOMMEND DIAG
today to further assess soheila-pharyngeal swallow integrity/FEES
w/ C-arm planned for tomorrow/VFSS/MBS

## 2021-06-25 ENCOUNTER — TRANSCRIPTION ENCOUNTER (OUTPATIENT)
Age: 61
End: 2021-06-25

## 2021-06-25 LAB
ANION GAP SERPL CALC-SCNC: 9 MMOL/L — SIGNIFICANT CHANGE UP (ref 7–14)
BASOPHILS # BLD AUTO: 0.04 K/UL — SIGNIFICANT CHANGE UP (ref 0–0.2)
BASOPHILS NFR BLD AUTO: 0.5 % — SIGNIFICANT CHANGE UP (ref 0–1)
BLD GP AB SCN SERPL QL: SIGNIFICANT CHANGE UP
BUN SERPL-MCNC: 12 MG/DL — SIGNIFICANT CHANGE UP (ref 10–20)
CALCIUM SERPL-MCNC: 9 MG/DL — SIGNIFICANT CHANGE UP (ref 8.5–10.1)
CHLORIDE SERPL-SCNC: 96 MMOL/L — LOW (ref 98–110)
CO2 SERPL-SCNC: 30 MMOL/L — SIGNIFICANT CHANGE UP (ref 17–32)
CREAT SERPL-MCNC: 0.9 MG/DL — SIGNIFICANT CHANGE UP (ref 0.7–1.5)
EOSINOPHIL # BLD AUTO: 0.38 K/UL — SIGNIFICANT CHANGE UP (ref 0–0.7)
EOSINOPHIL NFR BLD AUTO: 4.5 % — SIGNIFICANT CHANGE UP (ref 0–8)
GLUCOSE BLDC GLUCOMTR-MCNC: 174 MG/DL — HIGH (ref 70–99)
GLUCOSE BLDC GLUCOMTR-MCNC: 195 MG/DL — HIGH (ref 70–99)
GLUCOSE BLDC GLUCOMTR-MCNC: 210 MG/DL — HIGH (ref 70–99)
GLUCOSE BLDC GLUCOMTR-MCNC: 254 MG/DL — HIGH (ref 70–99)
GLUCOSE SERPL-MCNC: 192 MG/DL — HIGH (ref 70–99)
HCT VFR BLD CALC: 38.4 % — LOW (ref 42–52)
HGB BLD-MCNC: 11.8 G/DL — LOW (ref 14–18)
IMM GRANULOCYTES NFR BLD AUTO: 0.2 % — SIGNIFICANT CHANGE UP (ref 0.1–0.3)
LYMPHOCYTES # BLD AUTO: 1.85 K/UL — SIGNIFICANT CHANGE UP (ref 1.2–3.4)
LYMPHOCYTES # BLD AUTO: 21.9 % — SIGNIFICANT CHANGE UP (ref 20.5–51.1)
MAGNESIUM SERPL-MCNC: 1.6 MG/DL — LOW (ref 1.8–2.4)
MCHC RBC-ENTMCNC: 27.3 PG — SIGNIFICANT CHANGE UP (ref 27–31)
MCHC RBC-ENTMCNC: 30.7 G/DL — LOW (ref 32–37)
MCV RBC AUTO: 88.7 FL — SIGNIFICANT CHANGE UP (ref 80–94)
MONOCYTES # BLD AUTO: 1 K/UL — HIGH (ref 0.1–0.6)
MONOCYTES NFR BLD AUTO: 11.8 % — HIGH (ref 1.7–9.3)
NEUTROPHILS # BLD AUTO: 5.17 K/UL — SIGNIFICANT CHANGE UP (ref 1.4–6.5)
NEUTROPHILS NFR BLD AUTO: 61.1 % — SIGNIFICANT CHANGE UP (ref 42.2–75.2)
NRBC # BLD: 0 /100 WBCS — SIGNIFICANT CHANGE UP (ref 0–0)
PLATELET # BLD AUTO: 319 K/UL — SIGNIFICANT CHANGE UP (ref 130–400)
POTASSIUM SERPL-MCNC: 4.2 MMOL/L — SIGNIFICANT CHANGE UP (ref 3.5–5)
POTASSIUM SERPL-SCNC: 4.2 MMOL/L — SIGNIFICANT CHANGE UP (ref 3.5–5)
RBC # BLD: 4.33 M/UL — LOW (ref 4.7–6.1)
RBC # FLD: 17.9 % — HIGH (ref 11.5–14.5)
SODIUM SERPL-SCNC: 135 MMOL/L — SIGNIFICANT CHANGE UP (ref 135–146)
WBC # BLD: 8.46 K/UL — SIGNIFICANT CHANGE UP (ref 4.8–10.8)
WBC # FLD AUTO: 8.46 K/UL — SIGNIFICANT CHANGE UP (ref 4.8–10.8)

## 2021-06-25 PROCEDURE — 99232 SBSQ HOSP IP/OBS MODERATE 35: CPT

## 2021-06-25 RX ORDER — MAGNESIUM SULFATE 500 MG/ML
2 VIAL (ML) INJECTION ONCE
Refills: 0 | Status: COMPLETED | OUTPATIENT
Start: 2021-06-25 | End: 2021-06-25

## 2021-06-25 RX ORDER — TIOTROPIUM BROMIDE 18 UG/1
1 CAPSULE ORAL; RESPIRATORY (INHALATION)
Qty: 0 | Refills: 0 | DISCHARGE
Start: 2021-06-25

## 2021-06-25 RX ORDER — ALBUTEROL 90 UG/1
1 AEROSOL, METERED ORAL
Qty: 0 | Refills: 0 | DISCHARGE
Start: 2021-06-25

## 2021-06-25 RX ORDER — MAGNESIUM OXIDE 400 MG ORAL TABLET 241.3 MG
1 TABLET ORAL
Qty: 0 | Refills: 0 | DISCHARGE
Start: 2021-06-25

## 2021-06-25 RX ORDER — CILOSTAZOL 100 MG/1
1 TABLET ORAL
Qty: 0 | Refills: 0 | DISCHARGE

## 2021-06-25 RX ORDER — DAPTOMYCIN 500 MG/10ML
875 INJECTION, POWDER, LYOPHILIZED, FOR SOLUTION INTRAVENOUS
Qty: 0 | Refills: 0 | DISCHARGE
Start: 2021-06-25 | End: 2021-07-11

## 2021-06-25 RX ADMIN — MAGNESIUM OXIDE 400 MG ORAL TABLET 400 MILLIGRAM(S): 241.3 TABLET ORAL at 07:40

## 2021-06-25 RX ADMIN — Medication 50 GRAM(S): at 11:29

## 2021-06-25 RX ADMIN — Medication 5 UNIT(S): at 07:40

## 2021-06-25 RX ADMIN — PANTOPRAZOLE SODIUM 40 MILLIGRAM(S): 20 TABLET, DELAYED RELEASE ORAL at 11:31

## 2021-06-25 RX ADMIN — HEPARIN SODIUM 5000 UNIT(S): 5000 INJECTION INTRAVENOUS; SUBCUTANEOUS at 07:10

## 2021-06-25 RX ADMIN — Medication 5 UNIT(S): at 11:30

## 2021-06-25 RX ADMIN — INSULIN GLARGINE 18 UNIT(S): 100 INJECTION, SOLUTION SUBCUTANEOUS at 22:41

## 2021-06-25 RX ADMIN — Medication 650 MILLIGRAM(S): at 14:36

## 2021-06-25 RX ADMIN — HEPARIN SODIUM 5000 UNIT(S): 5000 INJECTION INTRAVENOUS; SUBCUTANEOUS at 13:15

## 2021-06-25 RX ADMIN — Medication 5 UNIT(S): at 16:36

## 2021-06-25 RX ADMIN — Medication 1 APPLICATION(S): at 07:11

## 2021-06-25 RX ADMIN — MAGNESIUM OXIDE 400 MG ORAL TABLET 400 MILLIGRAM(S): 241.3 TABLET ORAL at 13:15

## 2021-06-25 RX ADMIN — Medication 650 MILLIGRAM(S): at 13:33

## 2021-06-25 RX ADMIN — CHLORHEXIDINE GLUCONATE 1 APPLICATION(S): 213 SOLUTION TOPICAL at 07:10

## 2021-06-25 RX ADMIN — HEPARIN SODIUM 5000 UNIT(S): 5000 INJECTION INTRAVENOUS; SUBCUTANEOUS at 22:50

## 2021-06-25 RX ADMIN — Medication 2: at 07:40

## 2021-06-25 RX ADMIN — Medication 4: at 11:30

## 2021-06-25 RX ADMIN — Medication 40 MILLIGRAM(S): at 07:10

## 2021-06-25 RX ADMIN — Medication 1 APPLICATION(S): at 07:10

## 2021-06-25 RX ADMIN — MAGNESIUM OXIDE 400 MG ORAL TABLET 400 MILLIGRAM(S): 241.3 TABLET ORAL at 16:36

## 2021-06-25 RX ADMIN — DAPTOMYCIN 135 MILLIGRAM(S): 500 INJECTION, POWDER, LYOPHILIZED, FOR SOLUTION INTRAVENOUS at 13:34

## 2021-06-25 RX ADMIN — Medication 6: at 16:36

## 2021-06-25 NOTE — DISCHARGE NOTE PROVIDER - NSDCFUADDAPPT_GEN_ALL_CORE_FT
Follow up with infectious disease Dr Mejia [phone] at [address] on tuesday via telehealth . Pt will be called  between 10-12.30 am.  [address]  [phone]  Fax [phone]   Follow up with infectious disease Dr Mejia [id number] at [address] on tuesday via telehealth . Pt will be called  between 10-12.30 am.  [address]  [phone]  Fax [phone]    Monitor vanc trough before every 4th dose and maintain it at 15-20

## 2021-06-25 NOTE — PROCEDURE NOTE - NSINDICATIONS_GEN_A_CORE
airway protection/critical patient/mental status change/respiratory failure
abscess
antibiotic therapy
critical patient/monitoring purposes
dialysis/CRRT
critical illness/emergency venous access/venous access
critical illness
dialysis/CRRT

## 2021-06-25 NOTE — SWALLOW FEES ASSESSMENT ADULT - RECOMMENDED FEEDING/EATING TECHNIQUES
maintain upright posture during/after eating for 30 mins/oral hygiene/position upright (90 degrees)
oral hygiene/position upright (90 degrees)

## 2021-06-25 NOTE — SWALLOW FEES ASSESSMENT ADULT - PHARYNGEAL PHASE COMMENTS
Mild pharyngeal dysphagia for nectar, puree and soft solids Moderate pharyngeal dysphagia for thin liquids w/ deep penetration, no cough response and thin liquid residue remaining on and around the glottis.

## 2021-06-25 NOTE — CHART NOTE - NSCHARTNOTESELECT_GEN_ALL_CORE
Contact info/Event Note
Event Note
PreOp
RD follow up/Event Note
Vascular Surgery/Event Note
Code Stroke
Dietitian f/u/Event Note
Dietitian f/u/Event Note
Event Note
Event Note
Family contact/Event Note
ICU transfer post op/Event Note
Nutrition RD f/u/Event Note
Palliative Care - Social Work/Event Note
Palliative care
Podiatry/Event Note
RD Follow-Up/Event Note
RD follow up/Event Note
Transfer Note
Vascular Surgery - post op note/Event Note
Vascular Surgery/Event Note
anesthesia pacu note/Transfer Note

## 2021-06-25 NOTE — SWALLOW FEES ASSESSMENT ADULT - SLP PERTINENT HISTORY OF CURRENT PROBLEM
Pt admitted w/ diabetic foot ulcer, s/p AKA 6/11, AHRF, intubation 2' septic shock, s/p extubation on 6/14, now on 2L NC +hospital course complicated by code stroke CTH: (-), MRI recommended, will not fit in MRI machine, +metabolic encephalopathy
Pt admitted w/ diabetic foot ulcer, s/p AKA 6/11, AHRF, intubation 2' septic shock, s/p extubation on 6/14, now on 2L NC +hospital course complicated by code stroke CTH: (-), MRI recommended, will not fit in MRI machine, +metabolic encephalopathy; pt underwent FEES 6/17 w/ recs for puree w/ honey-thick liquids.

## 2021-06-25 NOTE — SWALLOW FEES ASSESSMENT ADULT - ORAL PHASE
spillage of nectar over BOT prior to the swallow/Uncontrolled bolus/spillover in hypopharynx spillage over BOT into the laryngeal vestibule prior to the swallow/Uncontrolled bolus/spillover in hypopharynx

## 2021-06-25 NOTE — DISCHARGE NOTE PROVIDER - CARE PROVIDER_API CALL
Ingrid Benedict (DO)  Family Medicine  [address] [place] [address]  Phone: [phone]  Fax: [phone]  Follow Up Time: 1 week    Candido Mejia  Infectious Diseases  [address] [place] [address]  Phone: [phone]  Fax: [phone]  Follow Up Time: 1 week

## 2021-06-25 NOTE — DISCHARGE NOTE PROVIDER - PROVIDER TOKENS
PROVIDER:[TOKEN:[[id number]],FOLLOWUP:[1 week]],PROVIDER:[TOKEN:[[id number]],FOLLOWUP:[1 week]]

## 2021-06-25 NOTE — DISCHARGE NOTE PROVIDER - CARE PROVIDERS DIRECT ADDRESSES
[email or web address]

## 2021-06-25 NOTE — PROCEDURE NOTE - NSPROCDETAILS_GEN_ALL_CORE
location identified, draped/prepped, sterile technique used, needle inserted/introduced/positive blood return obtained via catheter/connected to a pressurized flush line/sutured in place/hemostasis with direct pressure, dressing applied/Seldinger technique/all materials/supplies accounted for at end of procedure
location identified, draped/prepped, sterile technique used/sterile dressing applied/sterile technique, catheter placed/supine position/ultrasound guidance
guidewire recovered/lumen(s) aspirated and flushed/sterile dressing applied/sterile technique, catheter placed/ultrasound guidance with use of sterile gel and probe cove
incision left open, packing placed
patient pre-oxygenated, tube inserted, placement confirmed
guidewire recovered/lumen(s) aspirated and flushed/sterile dressing applied/sterile technique, catheter placed/ultrasound guidance with use of sterile gel and probe cove

## 2021-06-25 NOTE — DISCHARGE NOTE NURSING/CASE MANAGEMENT/SOCIAL WORK - NSDCFUADDAPPT_GEN_ALL_CORE_FT
Follow up with infectious disease Dr Mejia [phone] at [address] on tuesday via telehealth . Pt will be called  between 10-12.30 am.  [address]  [phone]  Fax [phone]

## 2021-06-25 NOTE — PROCEDURE NOTE - NSASSISTBY_GEN_A_CORE
Dr. Jeter resident, Dr. Kyle attending/Attending/Resident
Myself
Resident
Dr. Jeter resident, Dr. Gan resident/Myself/Resident
Myself
Rahul Gonsales/Resident
bc/Myself
Dr. Kyle/Myself/Attending

## 2021-06-25 NOTE — PROCEDURE NOTE - NSSITEPREP_SKIN_A_CORE
chlorhexidine/Adherence to aseptic technique: hand hygiene prior to donning barriers (gown, gloves), don cap and mask, sterile drape over patient
chlorhexidine
chlorhexidine/povidone iodine (if allergic to chlorhexidine)
povidone iodine (if allergic to chlorhexidine)
chlorhexidine

## 2021-06-25 NOTE — DISCHARGE NOTE PROVIDER - NSDCFUADDINST_GEN_ALL_CORE_FT
- Please check CPK level weekly while on daptomycin - Please check CPK level weekly while on daptomycin  - Pressure ulcers local wound care, to be performed twice daily, wash with soap and water, apply santyl/dakins moist gauze, cover with ABD and tape.  - Daily dressing change for Rt lower extremity stump: moist to dry, gauze and tape.  - No weight bearing on the stump

## 2021-06-25 NOTE — DISCHARGE NOTE PROVIDER - NSDCCPCAREPLAN_GEN_ALL_CORE_FT
PRINCIPAL DISCHARGE DIAGNOSIS  Diagnosis: Diabetic infection of right foot  Assessment and Plan of Treatment: You had septic shock secondary to necrotizing fascitis infection of right foot ulcer. Your right leg had to be amputated in order for the infection to be treated. You required intravenous antibiotics and you will continue to take antibiotics at home until 7/11.      SECONDARY DISCHARGE DIAGNOSES  Diagnosis: CANDY (acute kidney injury)  Assessment and Plan of Treatment: You required dialysis when you were in ICU.     PRINCIPAL DISCHARGE DIAGNOSIS  Diagnosis: Diabetic infection of right foot  Assessment and Plan of Treatment: You had septic shock secondary to necrotizing fascitis infection of right foot ulcer. Your right leg had to be amputated in order for the infection to be treated. You required intravenous antibiotics and you will continue to take antibiotics at home until 7/11.      SECONDARY DISCHARGE DIAGNOSES  Diagnosis: CANDY (acute kidney injury)  Assessment and Plan of Treatment: You required dialysis when you were in ICU. You no longer require dialysis. Please follow up with your primary care doctor.    Diagnosis: Diabetes  Assessment and Plan of Treatment: Please make sure to take your medications as prescribed and follow up with your primary care doctor to avoid complications of diabetes.     PRINCIPAL DISCHARGE DIAGNOSIS  Diagnosis: Diabetic infection of right foot  Assessment and Plan of Treatment: You had septic shock secondary to necrotizing fascitis infection of right foot ulcer. Your right leg had to be amputated in order for the infection to be treated. You required intravenous antibiotics and you will continue to take antibiotics at home until 7/11.      SECONDARY DISCHARGE DIAGNOSES  Diagnosis: CANDY (acute kidney injury)  Assessment and Plan of Treatment: You required dialysis when you were in ICU. You no longer require dialysis. Please follow up with your primary care doctor.    Diagnosis: Diabetes  Assessment and Plan of Treatment: Please make sure to take your medications as prescribed and follow up with your primary care doctor to avoid complications of diabetes.    Diagnosis: Pressure ulcer  Assessment and Plan of Treatment: -wash with soap and water, apply santyl/dakins moist gauze, cover with ABD and tape.   Please perform local wound care twice/ day

## 2021-06-25 NOTE — PROCEDURE NOTE - PROCEDURE DATE TIME, MLM
25-Jun-2021 15:57
31-May-2021 15:30
31-May-2021 15:24
01-Jun-2021 03:54
31-May-2021 18:22
31-May-2021 15:19
08-Jun-2021 13:00
29-May-2021 00:53

## 2021-06-25 NOTE — PROCEDURE NOTE - NSPOSTCAREGUIDE_GEN_A_CORE
Verbal/written post procedure instructions were given to patient/caregiver
Verbal/written post procedure instructions were given to patient/caregiver/Care for catheter as per unit/ICU protocols
Keep the cast/splint/dressing clean and dry
Keep the cast/splint/dressing clean and dry

## 2021-06-25 NOTE — DISCHARGE NOTE PROVIDER - NSDCMRMEDTOKEN_GEN_ALL_CORE_FT
acetaminophen 325 mg oral tablet: 2 tab(s) orally every 6 hours, As needed, Moderate Pain (4 - 6)  amLODIPine 5 mg oral tablet: 1 tab(s) orally once a day  aspirin 81 mg oral tablet, chewable: 1 tab(s) orally once a day  ezetimibe 10 mg oral tablet: 1 tab(s) orally once a day  glimepiride 4 mg oral tablet: 2 tab(s) orally once a day  hydroCHLOROthiazide 25 mg oral tablet: 1 tab(s) orally once a day (at bedtime)  Invokana 300 mg oral tablet: 1 tab(s) orally once a day  Lantus Solostar Pen 100 units/mL subcutaneous solution: subcutaneous once a day (at bedtime)-  18 UNITS  linezolid 2 mg/mL-D5% intravenous solution: 600 milligram(s) intravenous every 12 hours STOP 1/6/20.  linezolid 600 mg oral tablet: 1 tab(s) orally 2 times a day   losartan 50 mg oral tablet: 1 tab(s) orally once a day  Pletal 100 mg oral tablet: 1 tab(s) orally once a day   acetaminophen 325 mg oral tablet: 2 tab(s) orally every 6 hours, As needed, Moderate Pain (4 - 6)  amLODIPine 5 mg oral tablet: 1 tab(s) orally once a day  aspirin 81 mg oral tablet, chewable: 1 tab(s) orally once a day  DAPTOmycin 500 mg intravenous injection: 875 milligram(s) intravenous every 24 hours until 7/11   ezetimibe 10 mg oral tablet: 1 tab(s) orally once a day  glimepiride 4 mg oral tablet: 2 tab(s) orally once a day  hydroCHLOROthiazide 25 mg oral tablet: 1 tab(s) orally once a day (at bedtime)  Invokana 300 mg oral tablet: 1 tab(s) orally once a day  Lantus Solostar Pen 100 units/mL subcutaneous solution: subcutaneous once a day (at bedtime)-  18 UNITS  losartan 50 mg oral tablet: 1 tab(s) orally once a day  magnesium oxide 400 mg oral tablet: 1 tab(s) orally 3 times a day (with meals)  Pletal 100 mg oral tablet: 1 tab(s) orally once a day   acetaminophen 325 mg oral tablet: 2 tab(s) orally every 6 hours, As needed, Moderate Pain (4 - 6)  albuterol 90 mcg/inh inhalation aerosol: 1 puff(s) inhaled once  amLODIPine 5 mg oral tablet: 1 tab(s) orally once a day  aspirin 81 mg oral tablet, chewable: 1 tab(s) orally once a day  DAPTOmycin 500 mg intravenous injection: 875 milligram(s) intravenous every 24 hours until 7/11   ezetimibe 10 mg oral tablet: 1 tab(s) orally once a day  glimepiride 4 mg oral tablet: 2 tab(s) orally once a day  hydroCHLOROthiazide 25 mg oral tablet: 1 tab(s) orally once a day (at bedtime)  Invokana 300 mg oral tablet: 1 tab(s) orally once a day  Lantus Solostar Pen 100 units/mL subcutaneous solution: subcutaneous once a day (at bedtime)-  18 UNITS  losartan 50 mg oral tablet: 1 tab(s) orally once a day  magnesium oxide 400 mg oral tablet: 1 tab(s) orally 3 times a day (with meals)  tiotropium 18 mcg inhalation capsule: 1 cap(s) inhaled once   acetaminophen 325 mg oral tablet: 2 tab(s) orally every 6 hours, As needed, Moderate Pain (4 - 6)  albuterol 90 mcg/inh inhalation aerosol: 1 puff(s) inhaled once  amLODIPine 5 mg oral tablet: 1 tab(s) orally once a day  aspirin 81 mg oral tablet, chewable: 1 tab(s) orally once a day  ezetimibe 10 mg oral tablet: 1 tab(s) orally once a day  glimepiride 4 mg oral tablet: 2 tab(s) orally once a day  hydroCHLOROthiazide 25 mg oral tablet: 1 tab(s) orally once a day (at bedtime)  Invokana 300 mg oral tablet: 1 tab(s) orally once a day  Lantus Solostar Pen 100 units/mL subcutaneous solution: subcutaneous once a day (at bedtime)-  18 UNITS  losartan 50 mg oral tablet: 1 tab(s) orally once a day  magnesium oxide 400 mg oral tablet: 1 tab(s) orally 3 times a day (with meals)  tiotropium 18 mcg inhalation capsule: 1 cap(s) inhaled once  vancomycin 1.5 g intravenous injection: 1.5 gram(s) intravenous 2 times a day (end 7/10/2021)

## 2021-06-25 NOTE — SWALLOW FEES ASSESSMENT ADULT - DIAGNOSTIC IMPRESSIONS
Moderate pharyngeal dysphagia for thin, mild for nectar, honey, puree and soft solids
+severe pharyngeal dysphagia for thin and nectar thick liquids, +mild pharyngeal dysphagia for honey thick liquids, puree and mech soft solids. Pt w/ mild oral dysphagia fo mech soft solids and reports of pocketing per RN yesterday.

## 2021-06-25 NOTE — PROCEDURE NOTE - NSINFORMCONSENT_GEN_A_CORE
Benefits, risks, and possible complications of procedure explained to patient/caregiver who verbalized understanding and gave written consent.
from neice/Benefits, risks, and possible complications of procedure explained to patient/caregiver who verbalized understanding and gave verbal consent.
Benefits, risks, and possible complications of procedure explained to patient/caregiver who verbalized understanding and gave written consent.
Benefits, risks, and possible complications of procedure explained to patient/caregiver who verbalized understanding and gave verbal consent.
Benefits, risks, and possible complications of procedure explained to patient/caregiver who verbalized understanding and gave written consent.
Benefits, risks, and possible complications of procedure explained to patient/caregiver who verbalized understanding and gave written consent.
This was an emergent procedure.

## 2021-06-25 NOTE — SWALLOW FEES ASSESSMENT ADULT - SLP GENERAL OBSERVATIONS
Unable to complete VFSS therefore pt seen for FEES. Pt on room air
Pt received in bed awake and alert on O 2NC

## 2021-06-25 NOTE — PROCEDURE NOTE - NSPROCNAME_GEN_A_CORE
Central Line Insertion
Tracheal Intubation
Central Line Insertion
Central Line Insertion
Midline Insertion
Arterial Puncture/Cannulation
Central Line Insertion
Incision & Drainage

## 2021-06-25 NOTE — SWALLOW FEES ASSESSMENT ADULT - UNSUCCESSFUL STRATEGIES TRIALED DURING PROCEDURE
volitional bolus hold/chin tuck/productive volitional cough following clinician cue
productive volitional cough following clinician cue

## 2021-06-25 NOTE — PROGRESS NOTE ADULT - ASSESSMENT
61 y/o M with PMH of DM, hypertension, dyslipidemia, sleep apnea, Charcot foot reconstruction with external fixation, super morbid obesity present for worsening right foot infection. Patient was septic on admission, bacteremic, found to have Nec Fasc of RLE, intubated and transferred to ICU. There he underwent CVVH    Patient underwent a R AKA, extubated and downgraded to CEU 6/20    #Sacral pressure ulcer stage III  -wound care saw, recommended burn consult to see if can be debrided, awaiting recommendations from burn consult was placed  -regular dressing changes and care of wound daily by RN per wound care note    #Acute hypoxemic respiratory failure secondary to septic shock due to E. Faecalis/ORSA bacteremia due to necrotizing fascitis of RLE, resolved  -ICU course: intubated/extubated 5/31-6/14. 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    #CANDY on CKD III/possible ATN, resolved  -s/p CVVD in ICU x2  -Creatinine now at baseline, around 1  -decrease Lasix to 40mg PO daily  -Continue to monitor renal function    # hx Diabetes Mellitus  -c/w Lantus/Lispro  monitor FS    # hx Morbid obesity/MATA  -recommend diet and lifestyle modification  -uses CPAP irregularly at home, has been using nighlty here    #hx Dyslipidemia  -c/w statin    #Progress Note Handoff  Pending (specify):  n/a  Family discussion: great niece at bedside and provided update  Disposition: bed opened at [facility]. however, notified at 5pm that they cannot accept him on daptomycin due to prohibitory costs. Will have to discuss changing antibiotics with ID for a new regimen. They will hold bed over weekend.

## 2021-06-25 NOTE — SWALLOW FEES ASSESSMENT ADULT - ROSENBEK'S PENETRATION ASPIRATION SCALE
(5) material contacts vocal cords, visible residue remains (penetration) (2) material enters airway, remains above the vocal cords, no residue remains (penetration)

## 2021-06-25 NOTE — DISCHARGE NOTE NURSING/CASE MANAGEMENT/SOCIAL WORK - PATIENT PORTAL LINK FT
You can access the FollowMyHealth Patient Portal offered by [facility] by registering at the following website: [email or web address]. By joining [facility]’s FollowMyHealth portal, you will also be able to view your health information using other applications (apps) compatible with our system.

## 2021-06-25 NOTE — SWALLOW FEES ASSESSMENT ADULT - PRELIMINARY ENDOSCOPIC EXAMINATIONS
Vocal fold adduction/abduction
+slight glottic gap, +suspected intubation granulomas noted on TVC b/l/Interarytenoid/post-commissure edema/Interarytenoid/Arytenoid erythema

## 2021-06-25 NOTE — PROGRESS NOTE ADULT - SUBJECTIVE AND OBJECTIVE BOX
FERNANDEZ GUZMAN  60y  Male      Patient is a 60y old  Male who presents with a chief complaint of dfu (25 Jun 2021 09:30)      INTERVAL HPI/OVERNIGHT EVENTS: no acute events overnight.       VITALS  T(C): 37.1 (06-25-21 @ 14:00), Max: 37.3 (06-24-21 @ 22:08)  HR: 84 (06-25-21 @ 14:00) (81 - 91)  BP: 145/77 (06-25-21 @ 14:00) (145/77 - 165/74)  RR: 20 (06-25-21 @ 14:00) (18 - 20)  SpO2: 96% (06-24-21 @ 22:16) (96% - 97%)  Wt(kg): --Vital Signs Last 24 Hrs  T(C): 37.1 (25 Jun 2021 14:00), Max: 37.3 (24 Jun 2021 22:08)  T(F): 98.8 (25 Jun 2021 14:00), Max: 99.2 (24 Jun 2021 22:08)  HR: 84 (25 Jun 2021 14:00) (81 - 91)  BP: 145/77 (25 Jun 2021 14:00) (145/77 - 165/74)  BP(mean): --  RR: 20 (25 Jun 2021 14:00) (18 - 20)  SpO2: 96% (24 Jun 2021 22:16) (96% - 97%)      06-24-21 @ 07:01  -  06-25-21 @ 07:00  --------------------------------------------------------  IN: 0 mL / OUT: 150 mL / NET: -150 mL    06-25-21 @ 07:01  -  06-25-21 @ 16:50  --------------------------------------------------------  IN: 0 mL / OUT: 800 mL / NET: -800 mL        PHYSICAL EXAM:  Gen- morbidly obese M, unkempt, NAD, non toxic  Eyes- anicteric sclera, non injected conjunctiva, EOMI  ENT- hearing grossly intact, oropharynx clear, MMM  Neck- large neck circumference  Chest- CTAB, no wheezing or coarse breath sounds, symmetrical chest rise  Cardiac- RRR, normal s1s2, no RMG appreciated  Ext- large extremities, no clubbing or cyanosis, right AKA, left multiple digits amputated  Skin- warm dry, no jaundice  Neuro- AOx3 but intermittently inappropriate responses  Rectal- rectal tube in place  Uro- condom cath in place    Consultant(s) Notes Reviewed:  [x ] YES  [ ] NO    Discussed with Consultants/Other Providers [ x] YES     LABS                          11.8   8.46  )-----------( 319      ( 25 Jun 2021 08:19 )             38.4     06-25    135  |  96<L>  |  12  ----------------------------<  192<H>  4.2   |  30  |  0.9    Ca    9.0      25 Jun 2021 08:19  Mg     1.6     06-25    TPro  8.1<H>  /  Alb  3.1<L>  /  TBili  0.6  /  DBili  x   /  AST  33  /  ALT  28  /  AlkPhos  108  06-24          Lactate Trend        CAPILLARY BLOOD GLUCOSE      POCT Blood Glucose.: 254 mg/dL (25 Jun 2021 16:18)        RADIOLOGY & ADDITIONAL TESTS:    Imaging Personally Reviewed:  [ ] YES  [ ] NO    HEALTH ISSUES - PROBLEM Dx:  Respiratory failure requiring intubation  You required intubation and mechanical ventilation from 5/31 to 6/11.

## 2021-06-25 NOTE — DISCHARGE NOTE PROVIDER - HOSPITAL COURSE
59 yo M PMHx HTN, DM, Charcot foot deformity s/p reconstruction, MATA and morbid obesity presented for worsening Rt foot ulcer.   Admitted with diagnosis of septic shock 2/2 enterococcus and ORSA bacteremia 2/2 necrotizing fascitis of Rt foot, pt underwent Rt AKA on 6/11, will be discharged on daptomycin until 7/11.  Pt also developed metabolic encephalopathy during his hospital stay, hypercapnic respiratory failure, required intubation (5/31 - 6/11), on d/c stable on RA.  Pt had CANDY required CVVH x2 in ICU   History of Present Illness:   Patient is a 61 y/o M with PMH of DM, hypertension, dyslipidemia, sleep apnea, Charcot foot reconstruction with external fixation, super morbid obesity present for worsening right foot infection.  Pt reports he has had this foot ulcer at various stage of healing for 13 years. Patient has been admitted for IV antibiotics multiple times in the past for osteomyelitis of right foot. He has history of MRSA infection in foot. pt has baseline neuropathy in both feet so does not feel any pain.  Patient states his Infection has been gradually getting worse despite treatment with bactrim, he saw his podiatrists and he was sent in by podiatry for IV abx . No fever, chills, cp, sob, abd pain, nausea, vomiting, dysuria, calf pain.  In the Emergency room he was found to be febrile, tachycardic with occasional desaturations and confused with the inability to protect airway. He was intubated and admitted under critical care services.      Hospital Course  Went to the operating room on 5/29/2021 for a debridment of his wound. During his stay he experienced septic shock and required intravenous medications to support his blood pressure. Additionally, he experienced renal failure and required the support of intermittent dialysis to support his kidney function. Went to the operating room on 6/11/2021 for above the knee amputation of the right extremity. He was extubated on 6/14 but remained in the ICU until 6/20 for management of other acute critical medical problems until be downgraded to the floor until his safe discharge on 6/25 to an acute rehab facility. He will be discharged on daptomycin until 7/11.    Vital Signs Last 24 Hrs  T(C): 37.1 (25 Jun 2021 14:00), Max: 37.3 (24 Jun 2021 22:08)  T(F): 98.8 (25 Jun 2021 14:00), Max: 99.2 (24 Jun 2021 22:08)  HR: 84 (25 Jun 2021 14:00) (81 - 91)  BP: 145/77 (25 Jun 2021 14:00) (145/77 - 165/74)  BP(mean): --  RR: 20 (25 Jun 2021 14:00) (18 - 20)  SpO2: 96% (24 Jun 2021 22:16) (96% - 97%)   History of Present Illness:   Patient is a 61 y/o M with PMH of DM, hypertension, dyslipidemia, sleep apnea, Charcot foot reconstruction with external fixation, super morbid obesity present for worsening right foot infection.  Pt reports he has had this foot ulcer at various stage of healing for 13 years. Patient has been admitted for IV antibiotics multiple times in the past for osteomyelitis of right foot. He has history of MRSA infection in foot. pt has baseline neuropathy in both feet so does not feel any pain.  Patient states his Infection has been gradually getting worse despite treatment with bactrim, he saw his podiatrists and he was sent in by podiatry for IV abx . No fever, chills, cp, sob, abd pain, nausea, vomiting, dysuria, calf pain.  In the Emergency room he was found to be febrile, tachycardic with occasional desaturations and confused with the inability to protect airway. He was intubated and admitted under critical care services.        Hospital Course  Went to the operating room on 5/29/2021 for a debridment of his wound. On 5/31/2021, he reuturned the operating room for the first portion of his above the knee amputation. During his stay he experienced septic shock and required intravenous medications to support his blood pressure. Additionally, he experienced renal failure and required the support of intermittent dialysis to support his kidney function. Went to the operating room again on 6/11/2021 for the second portion of the procedure for his knee amputation of the right extremity. He was extubated on 6/14. He remained in the ICU until 6/20 for management of other acute critical medical problems until he was downgraded to the floor unit for the duration of his hospital stay until he was discharge on 6/25 to an acute rehab facility. He will be discharged on daptomycin until 7/11.    Vital Signs Last 24 Hrs  T(C): 37.1 (25 Jun 2021 14:00), Max: 37.3 (24 Jun 2021 22:08)  T(F): 98.8 (25 Jun 2021 14:00), Max: 99.2 (24 Jun 2021 22:08)  HR: 84 (25 Jun 2021 14:00) (81 - 91)  BP: 145/77 (25 Jun 2021 14:00) (145/77 - 165/74)  BP(mean): --  RR: 20 (25 Jun 2021 14:00) (18 - 20)  SpO2: 96% (24 Jun 2021 22:16) (96% - 97%)

## 2021-06-26 VITALS
HEART RATE: 85 BPM | DIASTOLIC BLOOD PRESSURE: 61 MMHG | RESPIRATION RATE: 20 BRPM | SYSTOLIC BLOOD PRESSURE: 127 MMHG | TEMPERATURE: 97 F

## 2021-06-26 LAB
GLUCOSE BLDC GLUCOMTR-MCNC: 187 MG/DL — HIGH (ref 70–99)
GLUCOSE BLDC GLUCOMTR-MCNC: 210 MG/DL — HIGH (ref 70–99)
GLUCOSE BLDC GLUCOMTR-MCNC: 221 MG/DL — HIGH (ref 70–99)
GLUCOSE BLDC GLUCOMTR-MCNC: 272 MG/DL — HIGH (ref 70–99)

## 2021-06-26 PROCEDURE — 99232 SBSQ HOSP IP/OBS MODERATE 35: CPT

## 2021-06-26 RX ORDER — VANCOMYCIN HCL 1 G
1500 VIAL (EA) INTRAVENOUS ONCE
Refills: 0 | Status: COMPLETED | OUTPATIENT
Start: 2021-06-26 | End: 2021-06-26

## 2021-06-26 RX ORDER — VANCOMYCIN HCL 1 G
1500 VIAL (EA) INTRAVENOUS EVERY 12 HOURS
Refills: 0 | Status: DISCONTINUED | OUTPATIENT
Start: 2021-06-26 | End: 2021-06-26

## 2021-06-26 RX ORDER — VANCOMYCIN HCL 1 G
1.5 VIAL (EA) INTRAVENOUS
Qty: 0 | Refills: 0 | DISCHARGE

## 2021-06-26 RX ADMIN — Medication 4: at 12:13

## 2021-06-26 RX ADMIN — HEPARIN SODIUM 5000 UNIT(S): 5000 INJECTION INTRAVENOUS; SUBCUTANEOUS at 14:42

## 2021-06-26 RX ADMIN — Medication 5 UNIT(S): at 17:00

## 2021-06-26 RX ADMIN — Medication 650 MILLIGRAM(S): at 08:46

## 2021-06-26 RX ADMIN — PANTOPRAZOLE SODIUM 40 MILLIGRAM(S): 20 TABLET, DELAYED RELEASE ORAL at 12:13

## 2021-06-26 RX ADMIN — Medication 5 UNIT(S): at 12:13

## 2021-06-26 RX ADMIN — Medication 5 UNIT(S): at 08:45

## 2021-06-26 RX ADMIN — Medication 4: at 08:45

## 2021-06-26 RX ADMIN — MAGNESIUM OXIDE 400 MG ORAL TABLET 400 MILLIGRAM(S): 241.3 TABLET ORAL at 12:13

## 2021-06-26 RX ADMIN — Medication 1 APPLICATION(S): at 12:10

## 2021-06-26 RX ADMIN — MAGNESIUM OXIDE 400 MG ORAL TABLET 400 MILLIGRAM(S): 241.3 TABLET ORAL at 09:40

## 2021-06-26 RX ADMIN — Medication 6: at 17:00

## 2021-06-26 RX ADMIN — Medication 300 MILLIGRAM(S): at 17:01

## 2021-06-26 RX ADMIN — MAGNESIUM OXIDE 400 MG ORAL TABLET 400 MILLIGRAM(S): 241.3 TABLET ORAL at 17:00

## 2021-06-26 RX ADMIN — Medication 2: at 00:12

## 2021-06-26 NOTE — PROGRESS NOTE ADULT - RESPIRATORY
Breath Sounds equal & clear to percussion & auscultation, no accessory muscle use
detailed exam
Breath Sounds equal & clear to percussion & auscultation, no accessory muscle use
detailed exam

## 2021-06-26 NOTE — PROGRESS NOTE ADULT - ASSESSMENT
61 y/o M with PMH of DM, hypertension, dyslipidemia, sleep apnea, Charcot foot reconstruction with external fixation, super morbid obesity present for worsening right foot infection. Patient was septic on admission, bacteremic, found to have Nec Fasc of RLE, intubated and transferred to ICU. There he underwent CVVH    Patient underwent a R AKA, extubated and downgraded to CEU 6/20    #Sacral pressure ulcer stage III  -wound care saw, recommended burn consult to see if can be debrided, awaiting recommendations from burn consult was placed  -regular dressing changes and care of wound daily by RN per wound care note    #Acute hypoxemic respiratory failure secondary to septic shock due to E. Faecalis/ORSA bacteremia due to necrotizing fascitis of RLE, resolved  -ICU course: intubated/extubated 5/31-6/14. 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    #CANDY on CKD III/possible ATN, resolved  -s/p CVVD in ICU x2  -Creatinine now at baseline, around 1  -decrease Lasix to 40mg PO daily  -Continue to monitor renal function    # hx Diabetes Mellitus  -c/w Lantus/Lispro  monitor FS    # hx Morbid obesity/MATA  -recommend diet and lifestyle modification  -uses CPAP irregularly at home, has been using nighlty here    #hx Dyslipidemia  -c/w statin    #Progress Note Handoff  Pending (specify):  n/a  Family discussion: great niece at bedside and provided update  Disposition: discharge today after Vanco dose

## 2021-06-26 NOTE — PROGRESS NOTE ADULT - TIME BILLING
.
Counseled patient about diagnostic testing and treatment plan. All questions answered.
Counseled patient about diagnostic testing and treatment plan. All questions answered.

## 2021-06-26 NOTE — PROGRESS NOTE ADULT - NSICDXPILOT_GEN_ALL_CORE
[place]

## 2021-06-26 NOTE — PROGRESS NOTE ADULT - SUBJECTIVE AND OBJECTIVE BOX
FERNANDEZ GUZMAN  60y  Male      Patient is a 60y old  Male who presents with a chief complaint of dfu (26 Jun 2021 11:17)      INTERVAL HPI/OVERNIGHT EVENTS: no acute events overnight. patient was to be discharged to [facility] yesterday but was notified late afternoon that Daptomycin is too expensive for facility and they are asking for alternative.       REVIEW OF SYSTEMS:  CONSTITUTIONAL: No fever, weight loss, or fatigue  RESPIRATORY: No cough, wheezing, chills or hemoptysis; No shortness of breath  CARDIOVASCULAR: No chest pain, palpitations, dizziness, or leg swelling  GASTROINTESTINAL: No abdominal or epigastric pain. No nausea, vomiting, or hematemesis; No diarrhea or constipation. No melena or hematochezia.  GENITOURINARY: No dysuria, frequency, hematuria, or incontinence  NEUROLOGICAL: No headaches, memory loss, loss of strength, numbness, or tremors  SKIN: No itching, burning, rashes, or lesions   MUSCULOSKELETAL: No joint pain or swelling; No muscle, back, or extremity pain  PSYCHIATRIC: No depression, anxiety, mood swings, or difficulty sleeping  All other review of systems negative    VITALS  T(C): 35.9 (06-26-21 @ 13:17), Max: 36.8 (06-25-21 @ 22:08)  HR: 85 (06-26-21 @ 13:17) (77 - 85)  BP: 127/61 (06-26-21 @ 13:17) (127/61 - 179/85)  RR: 20 (06-26-21 @ 13:17) (18 - 20)  SpO2: --  Wt(kg): --Vital Signs Last 24 Hrs  T(C): 35.9 (26 Jun 2021 13:17), Max: 36.8 (25 Jun 2021 22:08)  T(F): 96.6 (26 Jun 2021 13:17), Max: 98.2 (25 Jun 2021 22:08)  HR: 85 (26 Jun 2021 13:17) (77 - 85)  BP: 127/61 (26 Jun 2021 13:17) (127/61 - 179/85)  BP(mean): --  RR: 20 (26 Jun 2021 13:17) (18 - 20)  SpO2: --      06-25-21 @ 07:01  -  06-26-21 @ 07:00  --------------------------------------------------------  IN: 0 mL / OUT: 1200 mL / NET: -1200 mL    06-26-21 @ 07:01  -  06-26-21 @ 15:00  --------------------------------------------------------  IN: 420 mL / OUT: 0 mL / NET: 420 mL    PHYSICAL EXAM:  Gen- morbidly obese M, unkempt, NAD, non toxic  Eyes- anicteric sclera, non injected conjunctiva, EOMI  ENT- hearing grossly intact, oropharynx clear, MMM  Neck- large neck circumference  Chest- CTAB, no wheezing or coarse breath sounds, symmetrical chest rise  Cardiac- RRR, normal s1s2, no RMG appreciated  Ext- large extremities, no clubbing or cyanosis, right AKA, left multiple digits amputated  Skin- warm dry, no jaundice  Neuro- AOx3 but intermittently inappropriate responses  Rectal- rectal tube in place  Uro- condom cath in place    Consultant(s) Notes Reviewed:  [x ] YES  [ ] NO    Discussed with Consultants/Other Providers [ x] YES     LABS                          11.8   8.46  )-----------( 319      ( 25 Jun 2021 08:19 )             38.4     06-25    135  |  96<L>  |  12  ----------------------------<  192<H>  4.2   |  30  |  0.9    Ca    9.0      25 Jun 2021 08:19  Mg     1.6     06-25          POCT Blood Glucose.: 210 mg/dL (26 Jun 2021 11:32)        RADIOLOGY & ADDITIONAL TESTS:  - no images    HEALTH ISSUES - PROBLEM Dx:  Respiratory failure requiring intubation  You required intubation and mechanical ventilation from 5/31 to 6/11.        MEDICATIONS  (STANDING):  ALBUTerol    90 MICROgram(s) HFA Inhaler 1 Puff(s) Inhalation once  chlorhexidine 4% Liquid 1 Application(s) Topical <User Schedule>  collagenase Ointment 1 Application(s) Topical two times a day  Dakins Solution - 1/2 Strength 1 Application(s) Topical two times a day  furosemide    Tablet 40 milliGRAM(s) Oral daily  glucagon  Injectable 1 milliGRAM(s) IntraMuscular once  heparin   Injectable 5000 Unit(s) SubCutaneous every 8 hours  insulin glargine Injectable (LANTUS) 18 Unit(s) SubCutaneous at bedtime  insulin lispro (ADMELOG) corrective regimen sliding scale   SubCutaneous every 6 hours  insulin lispro Injectable (ADMELOG) 5 Unit(s) SubCutaneous three times a day before meals  magnesium oxide 400 milliGRAM(s) Oral three times a day with meals  pantoprazole   Suspension 40 milliGRAM(s) Oral daily  tiotropium 18 MICROgram(s) Capsule 1 Capsule(s) Inhalation once  vancomycin  IVPB 1500 milliGRAM(s) IV Intermittent once    MEDICATIONS  (PRN):  acetaminophen   Tablet .. 650 milliGRAM(s) Oral every 6 hours PRN Temp greater or equal to 38C (100.4F), Mild Pain (1 - 3)  acetaminophen  Suppository .. 650 milliGRAM(s) Rectal every 6 hours PRN Temp greater or equal to 38C (100.4F), Mild Pain (1 - 3)

## 2021-06-26 NOTE — PROGRESS NOTE ADULT - GASTROINTESTINAL
Soft, non-tender, no hepatosplenomegaly, normal bowel sounds
detailed exam
Soft, non-tender, no hepatosplenomegaly, normal bowel sounds
Soft, non-tender, no hepatosplenomegaly, normal bowel sounds

## 2021-06-26 NOTE — PROGRESS NOTE ADULT - REASON FOR ADMISSION
DFU
Metabolic/ Septic Encephalopathy
dfu
Sepsis secondary to MRSA diabetic wound infection
Septic shock/ Toxic Metabolic Encephalopathy
dfu
Diabetic foot ulcer
dfu
diabetic foot ulcer
DFU

## 2021-06-26 NOTE — PROGRESS NOTE ADULT - CARDIOVASCULAR
Regular rate & rhythm, normal S1, S2; no murmurs, gallops or rubs; no S3, S4
detailed exam
Regular rate & rhythm, normal S1, S2; no murmurs, gallops or rubs; no S3, S4
detailed exam

## 2021-06-26 NOTE — PROGRESS NOTE ADULT - GASTROINTESTINAL DETAILS
soft/nontender
soft/nontender/no rebound tenderness/no guarding/no rigidity
soft/nontender/no rebound tenderness/no guarding
soft/nontender/no rebound tenderness/no guarding/no rigidity
soft/nontender

## 2021-06-26 NOTE — PROGRESS NOTE ADULT - ASSESSMENT
61 y/o M with PMH of DM, hypertension, dyslipidemia, sleep apnea, Charcot foot reconstruction with external fixation, super morbid obesity present for worsening right foot infection.  Pt reports he has had this foot ulcer at various stage of healing for 13 years. Patient has been admitted for IV antibiotics multiple times in the past for osteomyelitis of right foot. He has history of MRSA infection in foot. pt has baseline neuropathy in both feet so does not feel any pain.  Patient states his Infection has been gradually getting worse despite treatment with bactrim, he saw his podiatrists and he was sent in by podiatry for IV abx .    IMPRESSION;  #Fevers with normal WBC normotensive, no pressors and extubated with an unremarkable CXR   Cryptogenic   Extubated 6/14    s/p Revision of disarticulation of knee 11-Jun-2021 "Healthy, non-infected tissue of knee disarticulation s/p conversion to Right AKA"    6/7,10  BCX NG    TLC was changed with R IJ placed on 6/8 5/31 S/p disarticulation right knee   -5/28 BCx E fecalis, ORSA  -5/31 TTE no vegetation  -etiology of bacteremia was right foot abscess  -5/29 R foot : ORSA , Corynebacterium  -5/30 BCx NG  -6/4 ,7, 10 BCx NG  CPK : secondary to Daptomycin. Downtrending 488.onitor once a week  Transaminitis : clinically no acute cholecystitis, no cholestasis ( making cholangitis also unlikely ). Possibly drug related or slow resolving ischemic hepatitis. US 6/15 unremarkable  Fungitell assay NG              RECOMMENDATIONS;  -vancomycin 1500 mg iv q12h for 6 weeks starting 5/30. Trough 15-20.  - D/c Dapto   f/u with Dr Mejia [id number] at [address] on tuesday via [email or web address] . Pt will be called  between 10-12.30 am.  [address]  [phone]  Fax [phone]  recall prn please

## 2021-06-26 NOTE — PROGRESS NOTE ADULT - SUBJECTIVE AND OBJECTIVE BOX
FERNANDEZ GUZMAN  60y, Male    All available historical data reviewed    OVERNIGHT EVENTS:    none  ROS:  General: Denies rigors, nightsweats  HEENT: Denies headache, rhinorrhea, sore throat, eye pain  CV: Denies CP, palpitations  PULM: Denies wheezing, hemoptysis  GI: Denies hematemesis, hematochezia, melena  : Denies discharge, hematuria  MSK: Denies arthralgias, myalgias  SKIN: Denies rash, lesions  NEURO: Denies paresthesias, weakness  PSYCH: Denies depression, anxiety    VITALS:  T(F): 97.3, Max: 98.8 (06-25-21 @ 14:00)  HR: 80  BP: 179/85  RR: 18Vital Signs Last 24 Hrs  T(C): 36.3 (26 Jun 2021 06:06), Max: 37.1 (25 Jun 2021 14:00)  T(F): 97.3 (26 Jun 2021 06:06), Max: 98.8 (25 Jun 2021 14:00)  HR: 80 (26 Jun 2021 06:06) (77 - 84)  BP: 179/85 (26 Jun 2021 06:06) (145/77 - 179/85)  BP(mean): --  RR: 18 (26 Jun 2021 06:06) (18 - 20)  SpO2: --    TESTS & MEASUREMENTS:                        11.8   8.46  )-----------( 319      ( 25 Jun 2021 08:19 )             38.4     06-25    135  |  96<L>  |  12  ----------------------------<  192<H>  4.2   |  30  |  0.9    Ca    9.0      25 Jun 2021 08:19  Mg     1.6     06-25                RADIOLOGY & ADDITIONAL TESTS:  Personal review of radiological diagnostics performed  Echo and EKG results noted when applicable.     MEDICATIONS:  acetaminophen   Tablet .. 650 milliGRAM(s) Oral every 6 hours PRN  acetaminophen  Suppository .. 650 milliGRAM(s) Rectal every 6 hours PRN  ALBUTerol    90 MICROgram(s) HFA Inhaler 1 Puff(s) Inhalation once  chlorhexidine 4% Liquid 1 Application(s) Topical <User Schedule>  collagenase Ointment 1 Application(s) Topical two times a day  Dakins Solution - 1/2 Strength 1 Application(s) Topical two times a day  DAPTOmycin IVPB 875 milliGRAM(s) IV Intermittent every 24 hours  furosemide    Tablet 40 milliGRAM(s) Oral daily  glucagon  Injectable 1 milliGRAM(s) IntraMuscular once  heparin   Injectable 5000 Unit(s) SubCutaneous every 8 hours  insulin glargine Injectable (LANTUS) 18 Unit(s) SubCutaneous at bedtime  insulin lispro (ADMELOG) corrective regimen sliding scale   SubCutaneous every 6 hours  insulin lispro Injectable (ADMELOG) 5 Unit(s) SubCutaneous three times a day before meals  magnesium oxide 400 milliGRAM(s) Oral three times a day with meals  pantoprazole   Suspension 40 milliGRAM(s) Oral daily  tiotropium 18 MICROgram(s) Capsule 1 Capsule(s) Inhalation once      ANTIBIOTICS:  DAPTOmycin IVPB 875 milliGRAM(s) IV Intermittent every 24 hours

## 2021-06-28 ENCOUNTER — EMERGENCY (EMERGENCY)
Facility: HOSPITAL | Age: 61
LOS: 0 days | Discharge: NURSING, REHAB OR LONG-TERM CARE | End: 2021-06-29
Attending: EMERGENCY MEDICINE | Admitting: EMERGENCY MEDICINE
Payer: MEDICARE

## 2021-06-28 VITALS
RESPIRATION RATE: 18 BRPM | TEMPERATURE: 98 F | HEIGHT: 70 IN | HEART RATE: 87 BPM | OXYGEN SATURATION: 99 % | SYSTOLIC BLOOD PRESSURE: 138 MMHG | DIASTOLIC BLOOD PRESSURE: 71 MMHG

## 2021-06-28 DIAGNOSIS — Z86.19 PERSONAL HISTORY OF OTHER INFECTIOUS AND PARASITIC DISEASES: ICD-10-CM

## 2021-06-28 DIAGNOSIS — Z79.82 LONG TERM (CURRENT) USE OF ASPIRIN: ICD-10-CM

## 2021-06-28 DIAGNOSIS — Z94.5 SKIN TRANSPLANT STATUS: Chronic | ICD-10-CM

## 2021-06-28 DIAGNOSIS — K62.89 OTHER SPECIFIED DISEASES OF ANUS AND RECTUM: ICD-10-CM

## 2021-06-28 DIAGNOSIS — Z89.422 ACQUIRED ABSENCE OF OTHER LEFT TOE(S): Chronic | ICD-10-CM

## 2021-06-28 DIAGNOSIS — Z00.8 ENCOUNTER FOR OTHER GENERAL EXAMINATION: ICD-10-CM

## 2021-06-28 DIAGNOSIS — Z79.899 OTHER LONG TERM (CURRENT) DRUG THERAPY: ICD-10-CM

## 2021-06-28 DIAGNOSIS — Z88.8 ALLERGY STATUS TO OTHER DRUGS, MEDICAMENTS AND BIOLOGICAL SUBSTANCES: ICD-10-CM

## 2021-06-28 DIAGNOSIS — E11.610 TYPE 2 DIABETES MELLITUS WITH DIABETIC NEUROPATHIC ARTHROPATHY: Chronic | ICD-10-CM

## 2021-06-28 DIAGNOSIS — Z87.09 PERSONAL HISTORY OF OTHER DISEASES OF THE RESPIRATORY SYSTEM: ICD-10-CM

## 2021-06-28 DIAGNOSIS — E11.9 TYPE 2 DIABETES MELLITUS WITHOUT COMPLICATIONS: ICD-10-CM

## 2021-06-28 DIAGNOSIS — Z98.890 OTHER SPECIFIED POSTPROCEDURAL STATES: Chronic | ICD-10-CM

## 2021-06-28 DIAGNOSIS — E78.5 HYPERLIPIDEMIA, UNSPECIFIED: ICD-10-CM

## 2021-06-28 PROCEDURE — 99283 EMERGENCY DEPT VISIT LOW MDM: CPT

## 2021-06-28 NOTE — ED ADULT TRIAGE NOTE - CHIEF COMPLAINT QUOTE
Presents from [facility] via [facility]. due to rectal tube coming out. Had right leg amp done 4 weeks ago. Afebrile. Dsg is CDI.

## 2021-06-29 VITALS
TEMPERATURE: 97 F | SYSTOLIC BLOOD PRESSURE: 112 MMHG | HEART RATE: 76 BPM | RESPIRATION RATE: 16 BRPM | DIASTOLIC BLOOD PRESSURE: 58 MMHG | OXYGEN SATURATION: 94 %

## 2021-06-29 NOTE — ED ADULT NURSE NOTE - CHIEF COMPLAINT QUOTE
Presents from [facility] via [facility]. due to rectal tube coming out. Had right leg amp done 4 weeks ago. Afebrile. Dsg is CDI.

## 2021-06-29 NOTE — ED PROVIDER NOTE - CLINICAL SUMMARY MEDICAL DECISION MAKING FREE TEXT BOX
rectal tube dislodgement - no clear indication for replacement, d/w NH staff, return precautions discussed, discharged back to [facility]

## 2021-06-29 NOTE — ED PROVIDER NOTE - PROGRESS NOTE DETAILS
DANIELITO: no indication to replace rectal tube, disucssed with nursing director at [facility] they are aware, will discharge back to snf.

## 2021-06-29 NOTE — ED ADULT NURSE NOTE - OBJECTIVE STATEMENT
Pt reports here from [facility] for rectal tube coming out. Dressing intact upon assessment. Nursing home states that tube came out when rolling pt and needs to either be placed back in or removed.

## 2021-06-29 NOTE — ED PROVIDER NOTE - NS ED ROS FT
Review of Systems    Constitutional: (-) fever   Cardiovascular: (-) chest pain, (-) syncope (-) palpitations  Respiratory: (-) cough, (-) shortness of breath  Gastrointestinal: (-) vomiting,  (-) abdominal pain  Genitourinary:  (-) dysuria   Integumentary: (-) rash, (-) edema  Neurological: (-) headache, (-) confusion  Hematologic: (-) easy bruising

## 2021-06-29 NOTE — ED PROVIDER NOTE - OBJECTIVE STATEMENT
61 y/o M with DM, HTN, HLD, MATA, charcot foot, recently here with severe sepsis 2/2 DFU with intubation, OR debridement/aka/intermittent dialysis/had rectal tube placed and discharged to SNF now presents from SNF for rectal tube dislodgment.   no symptoms.

## 2021-06-29 NOTE — ED PROVIDER NOTE - ATTENDING CONTRIBUTION TO CARE
60M from [facility] ext pmh incl recent admission for severe sepsis 2/2 R foot wound requirnig intubation, pressors, s/p R AKA 6/11/21 picc line in place on abx, dm, htn, hl, eusebio, referred from NH for dislodged rectal tube. EMR reviewed, unclear indication for initial placement or what service placed initially, pt never had bowel obstruction, although during hosp course pt had recurrent diarrhea, C. diff neg, sacral ulcers. Pt unsure of exactly when rectal tube dislodged. No other complaints. PCP Albrecht.    PE:  nad  skin- sacral wounds  ncat  neck supple  rrr nl s1s2 no mrg  ctab no wrr  abd soft ntnd no palpable masses no rgr  back non-tender no cvat  ext R AKA; remainder of ext no cce dpi  neuro aaox3 grossly nf exam

## 2021-06-29 NOTE — ED PROVIDER NOTE - PHYSICAL EXAMINATION
PHYSICAL EXAM:    GENERAL: Alert, appears stated age, well appearing, non-toxic  SKIN: Warm, pink and dry. MMM.   HEAD: NC  EYE: Normal lids/conjunctiva  ENT: Normal hearing, patent oropharynx   NECK: +supple. No meningismus  Pulm: Bilateral BS, normal resp effort, no wheezes, stridor, or retractions  CV: RRR, no M/R/G, 2+and = radial pulses  Abd: soft, non-tender, non-distended  Neuro: AAOx3,

## 2021-06-29 NOTE — ED PROVIDER NOTE - PATIENT PORTAL LINK FT
You can access the FollowMyHealth Patient Portal offered by [facility] by registering at the following website: [email or web address]. By joining [facility]’s FollowMyHealth portal, you will also be able to view your health information using other applications (apps) compatible with our system.

## 2021-06-30 DIAGNOSIS — I21.A1 MYOCARDIAL INFARCTION TYPE 2: ICD-10-CM

## 2021-06-30 DIAGNOSIS — R13.10 DYSPHAGIA, UNSPECIFIED: ICD-10-CM

## 2021-06-30 DIAGNOSIS — A41.9 SEPSIS, UNSPECIFIED ORGANISM: ICD-10-CM

## 2021-06-30 DIAGNOSIS — E87.0 HYPEROSMOLALITY AND HYPERNATREMIA: ICD-10-CM

## 2021-06-30 DIAGNOSIS — G92 TOXIC ENCEPHALOPATHY: ICD-10-CM

## 2021-06-30 DIAGNOSIS — A48.0 GAS GANGRENE: ICD-10-CM

## 2021-06-30 DIAGNOSIS — A41.1 SEPSIS DUE TO OTHER SPECIFIED STAPHYLOCOCCUS: ICD-10-CM

## 2021-06-30 DIAGNOSIS — Z51.5 ENCOUNTER FOR PALLIATIVE CARE: ICD-10-CM

## 2021-06-30 DIAGNOSIS — M72.6 NECROTIZING FASCIITIS: ICD-10-CM

## 2021-06-30 DIAGNOSIS — E11.42 TYPE 2 DIABETES MELLITUS WITH DIABETIC POLYNEUROPATHY: ICD-10-CM

## 2021-06-30 DIAGNOSIS — E87.1 HYPO-OSMOLALITY AND HYPONATREMIA: ICD-10-CM

## 2021-06-30 DIAGNOSIS — E66.01 MORBID (SEVERE) OBESITY DUE TO EXCESS CALORIES: ICD-10-CM

## 2021-06-30 DIAGNOSIS — E11.69 TYPE 2 DIABETES MELLITUS WITH OTHER SPECIFIED COMPLICATION: ICD-10-CM

## 2021-06-30 DIAGNOSIS — E11.21 TYPE 2 DIABETES MELLITUS WITH DIABETIC NEPHROPATHY: ICD-10-CM

## 2021-06-30 DIAGNOSIS — L89.153 PRESSURE ULCER OF SACRAL REGION, STAGE 3: ICD-10-CM

## 2021-06-30 DIAGNOSIS — L89.313 PRESSURE ULCER OF RIGHT BUTTOCK, STAGE 3: ICD-10-CM

## 2021-06-30 DIAGNOSIS — J96.01 ACUTE RESPIRATORY FAILURE WITH HYPOXIA: ICD-10-CM

## 2021-06-30 DIAGNOSIS — J96.02 ACUTE RESPIRATORY FAILURE WITH HYPERCAPNIA: ICD-10-CM

## 2021-06-30 DIAGNOSIS — R65.21 SEVERE SEPSIS WITH SEPTIC SHOCK: ICD-10-CM

## 2021-06-30 DIAGNOSIS — L02.611 CUTANEOUS ABSCESS OF RIGHT FOOT: ICD-10-CM

## 2021-06-30 DIAGNOSIS — N18.30 CHRONIC KIDNEY DISEASE, STAGE 3 UNSPECIFIED: ICD-10-CM

## 2021-06-30 DIAGNOSIS — E11.22 TYPE 2 DIABETES MELLITUS WITH DIABETIC CHRONIC KIDNEY DISEASE: ICD-10-CM

## 2021-06-30 DIAGNOSIS — E87.4 MIXED DISORDER OF ACID-BASE BALANCE: ICD-10-CM

## 2021-06-30 DIAGNOSIS — L89.323 PRESSURE ULCER OF LEFT BUTTOCK, STAGE 3: ICD-10-CM

## 2021-06-30 DIAGNOSIS — N17.0 ACUTE KIDNEY FAILURE WITH TUBULAR NECROSIS: ICD-10-CM

## 2021-06-30 DIAGNOSIS — E11.52 TYPE 2 DIABETES MELLITUS WITH DIABETIC PERIPHERAL ANGIOPATHY WITH GANGRENE: ICD-10-CM

## 2021-06-30 DIAGNOSIS — F05 DELIRIUM DUE TO KNOWN PHYSIOLOGICAL CONDITION: ICD-10-CM

## 2021-06-30 DIAGNOSIS — E11.621 TYPE 2 DIABETES MELLITUS WITH FOOT ULCER: ICD-10-CM

## 2021-06-30 DIAGNOSIS — L97.519 NON-PRESSURE CHRONIC ULCER OF OTHER PART OF RIGHT FOOT WITH UNSPECIFIED SEVERITY: ICD-10-CM

## 2021-06-30 DIAGNOSIS — E11.610 TYPE 2 DIABETES MELLITUS WITH DIABETIC NEUROPATHIC ARTHROPATHY: ICD-10-CM

## 2021-07-08 ENCOUNTER — APPOINTMENT (OUTPATIENT)
Dept: BURN CARE | Facility: CLINIC | Age: 61
End: 2021-07-08

## 2021-07-21 LAB
CULTURE RESULTS: SIGNIFICANT CHANGE UP
CULTURE RESULTS: SIGNIFICANT CHANGE UP
SPECIMEN SOURCE: SIGNIFICANT CHANGE UP
SPECIMEN SOURCE: SIGNIFICANT CHANGE UP

## 2021-08-02 NOTE — ED PROVIDER NOTE - PROGRESS NOTE DETAILS
TC: 59 yo M with PMHx of TC: Sepsis suspected at this time. Given 30cc/kg IVF of ideal body weight. Ordered cefepime, vanco, flagyl for DFU. TC: 61 yo M with PMHx of  Placed call for podiatry. TC: Spoke with podiatry who will see pt. Ordered arterial/venous duplex of RLE. TC: 61 yo M with PMHx of charcot ankle, DM, HLD, HTN AMTA who presents with chronic R DFU that failed outpatient abx. Pt with chronic numbness at baseline. Here in ED, tachycardic 110s, oral temp 99. Ordered labs, xrays, ekg. Placed call for podiatry. TC: Labs notable for WBC 12, hgb 9.5 (previously 12 on 7/14/20), BUN/Cr 52/3 (previously 31/2 on 7/14/20), lactate 2.3. Discussed with vascular surgery, will evaluate patient. TC: Labs notable for WBC 12, hgb 9.5 (previously 12 on 7/14/20), BUN/Cr 52/3 (previously 31/2 on 7/14/20), lactate 2.3. Spoke with vascular tech, venous duplex negative for DVT, arterial duplex limited due to edema but visualized portions were normal. Discussed with surgery, pending their evaluation and care. Discussed with Podiatry resident and requested to expedite patient care. TC: Xrays show gas extending to ankle. Pending CT. Signed out to Dr. Miller. Patient remained stable in ED, patient was evaluated by surgery, vascular and podiatry, followed their recommendations. Podiatry recommended admission to Medicine Pending OR in AM, and vascular will follow up patient pending the results of the imaging. MAR is aware. Will follow up on CT scan and arterial duplex. Scheduled for washout tomorrow morning by podiatry. no

## 2021-08-19 ENCOUNTER — APPOINTMENT (OUTPATIENT)
Dept: BURN CARE | Facility: CLINIC | Age: 61
End: 2021-08-19
Payer: MEDICARE

## 2021-08-19 ENCOUNTER — OUTPATIENT (OUTPATIENT)
Dept: OUTPATIENT SERVICES | Facility: HOSPITAL | Age: 61
LOS: 1 days | Discharge: HOME | End: 2021-08-19

## 2021-08-19 DIAGNOSIS — Z89.422 ACQUIRED ABSENCE OF OTHER LEFT TOE(S): Chronic | ICD-10-CM

## 2021-08-19 DIAGNOSIS — E11.610 TYPE 2 DIABETES MELLITUS WITH DIABETIC NEUROPATHIC ARTHROPATHY: Chronic | ICD-10-CM

## 2021-08-19 DIAGNOSIS — Z98.890 OTHER SPECIFIED POSTPROCEDURAL STATES: Chronic | ICD-10-CM

## 2021-08-19 DIAGNOSIS — S81.801A UNSPECIFIED OPEN WOUND, RIGHT LOWER LEG, INITIAL ENCOUNTER: ICD-10-CM

## 2021-08-19 DIAGNOSIS — Z94.5 SKIN TRANSPLANT STATUS: Chronic | ICD-10-CM

## 2021-08-19 PROCEDURE — 99203 OFFICE O/P NEW LOW 30 MIN: CPT

## 2021-08-23 LAB — BACTERIA SPEC CULT: ABNORMAL

## 2021-09-16 ENCOUNTER — OUTPATIENT (OUTPATIENT)
Dept: OUTPATIENT SERVICES | Facility: HOSPITAL | Age: 61
LOS: 1 days | Discharge: HOME | End: 2021-09-16

## 2021-09-16 ENCOUNTER — APPOINTMENT (OUTPATIENT)
Dept: BURN CARE | Facility: CLINIC | Age: 61
End: 2021-09-16
Payer: MEDICARE

## 2021-09-16 DIAGNOSIS — E11.610 TYPE 2 DIABETES MELLITUS WITH DIABETIC NEUROPATHIC ARTHROPATHY: Chronic | ICD-10-CM

## 2021-09-16 DIAGNOSIS — T14.90XA INJURY, UNSPECIFIED, INITIAL ENCOUNTER: ICD-10-CM

## 2021-09-16 DIAGNOSIS — Z94.5 SKIN TRANSPLANT STATUS: Chronic | ICD-10-CM

## 2021-09-16 DIAGNOSIS — Z89.422 ACQUIRED ABSENCE OF OTHER LEFT TOE(S): Chronic | ICD-10-CM

## 2021-09-16 DIAGNOSIS — Z98.890 OTHER SPECIFIED POSTPROCEDURAL STATES: Chronic | ICD-10-CM

## 2021-09-16 DIAGNOSIS — L02.91 CUTANEOUS ABSCESS, UNSPECIFIED: ICD-10-CM

## 2021-09-16 DIAGNOSIS — L89.159 PRESSURE ULCER OF SACRAL REGION, UNSPECIFIED STAGE: ICD-10-CM

## 2021-09-16 DIAGNOSIS — S91.301A UNSPECIFIED OPEN WOUND, RIGHT FOOT, INITIAL ENCOUNTER: ICD-10-CM

## 2021-09-16 PROCEDURE — 99213 OFFICE O/P EST LOW 20 MIN: CPT

## 2021-09-28 DIAGNOSIS — T14.90XA INJURY, UNSPECIFIED, INITIAL ENCOUNTER: ICD-10-CM

## 2021-09-28 DIAGNOSIS — L89.159 PRESSURE ULCER OF SACRAL REGION, UNSPECIFIED STAGE: ICD-10-CM

## 2021-09-28 DIAGNOSIS — L02.91 CUTANEOUS ABSCESS, UNSPECIFIED: ICD-10-CM

## 2021-10-07 ENCOUNTER — APPOINTMENT (OUTPATIENT)
Dept: BURN CARE | Facility: CLINIC | Age: 61
End: 2021-10-07

## 2021-11-11 ENCOUNTER — APPOINTMENT (OUTPATIENT)
Dept: BURN CARE | Facility: CLINIC | Age: 61
End: 2021-11-11

## 2021-12-07 NOTE — OCCUPATIONAL THERAPY INITIAL EVALUATION ADULT - LEVEL OF INDEPENDENCE: BED TO CHAIR, REHAB EVAL
not attempted 2* to safety, pt will require full body lift, RN aware, pt placed in bed in chair mode to improve activity tolerance in prep for OOB to chair
FAMILY HISTORY:  Family history of amyotrophic lateral sclerosis    Child  Still living? Unknown  Family history of multiple sclerosis, Age at diagnosis: Age Unknown

## 2022-01-18 NOTE — PATIENT PROFILE ADULT - HEALTH LITERACY
Physician Progress Note    Subjective seen and examined at bedside. Patient refused MRI brain and CTL spine this morning. States he is unable to lay flat for 2 hours and would vomit. He is febrile. Denies nausea, vomiting. Tolerated diet yesterday. Still having cough with some chest discomfort. No SOB. No abd pain. Admits to diarrhea and incontinence. MRSA +. GI planning for EGD cscope this admission. UCX negative. On linezolid and zosyn, seen by GI ONC NEURO ID.     Review of Systems  Review of Systems   All other systems reviewed and are negative.       Objective     I/O's    Intake/Output Summary (Last 24 hours) at 1/18/2022 0911  Last data filed at 1/18/2022 0430  Gross per 24 hour   Intake 1110.03 ml   Output 1150 ml   Net -39.97 ml       Last Recorded Vitals  Blood pressure 93/54, pulse (!) 109, temperature (!) 101.3 °F (38.5 °C), temperature source Oral, resp. rate 16, height 6' 2\" (1.88 m), weight 70.8 kg (156 lb), SpO2 94 %.  Body mass index is 20.03 kg/m².    Physical Exam  Vitals and nursing note reviewed.   Constitutional:       Appearance: Normal appearance.      Comments: cachexia   HENT:      Head: Normocephalic and atraumatic.   Cardiovascular:      Rate and Rhythm: Normal rate and regular rhythm.      Pulses: Normal pulses.      Heart sounds: Normal heart sounds.   Pulmonary:      Effort: Pulmonary effort is normal.      Breath sounds: Normal breath sounds.   Abdominal:      General: Abdomen is flat.      Palpations: Abdomen is soft.   Skin:     General: Skin is warm and dry.   Neurological:      General: No focal deficit present.      Mental Status: He is alert.      Comments: Able to lift legs off the bed, but has generalized weakness and decrease muscle strength   Psychiatric:         Mood and Affect: Mood normal.         Imaging  MRI MRCP WO CONTRAST AND ABDOMEN W WO CONTRAST    Result Date: 1/17/2022  EXAM: MRI MRCP WO CONTRAST AND ABDOMEN W WO CONTRAST CLINICAL INDICATION: Jaundice. Elevated  liver function tests. Abnormal finding on recent CT scan. TECHNIQUE: Axial and coronal T2 HASTE, axial diffusion and ADC, axial in and opposed phase T1, coronal HASTE T2 THIN, axial T2 fat suppressed, coronal T2 MRCP, axial pre-contrast T1, and axial and coronal post-contrast T1 weighted images were obtained.  3-D postprocessing was performed on the scanner itself by the technologist. Per technologist, patient was moving throughout the scan and unable to follow breathing instructions well. COMPARISON: CT abdomen/pelvis without contrast on 01/15/2022. CT abdomen/pelvis with contrast on 09/01/2019. FINDINGS: Liver: The liver is enlarged. This is new since 09/01/2019 exam. There is normal in contour. There is at least mild right hepatic signal loss on out-of-phase imaging indicative of hepatic steatosis. There is no visualized discrete focal liver lesion. The hepatic and portal veins are patent. Biliary Tree: The gallbladder is poorly distended and therefore not well evaluated. There is no intrahepatic or extrahepatic biliary ductal dilatation. There is no visualized choledocholithiasis or ampullary mass. Pancreas: The pancreas is normal in signal intensity and enhancement without evidence of mass or pancreatic duct dilatation.  There is no evidence of pancreatitis.  Spleen: The spleen is mildly enlarged. This is new since 09/01/2019 exam. There is diffuse signal loss on in-phase imaging indicative of iron deposition. Adrenal glands: The glands are normal in size and shape. Kidneys: The kidneys enhance symmetrically and are without hydronephrosis or solid mass. Bowel: There is no bowel obstruction. Exam is not tailored for bowel evaluation. Lymph nodes: There is no pathologic lymphadenopathy by imaging criteria. Other: Exam is not optimized for evaluation of peritoneum/mesentery/omentum. There is probable trace ascites. No upper abdominal no drainable fluid collections are seen. Lower chest: There are redemonstrated  nodular and masslike opacities better seen on recent CT scan. Body wall: There is no visualized destructive bone lesion. There are vertebral body hemangiomata.     1.   No discrete mass in the abdomen. 2.   No biliary ductal dilatation. 3.   Moderate hepatomegaly, new since 09/01/2019. Mild to moderate regional hepatic steatosis. 4.   Mild splenomegaly, new since 09/01/2019. Diffuse iron deposition in the spleen. 5.   Redemonstrated indeterminate pulmonary lesions, suspicious for metastases. 6.   Partially limited exam. Electronically Signed by: RENATO BRAY MD Signed on: 1/17/2022 1:02 AM       Labs     Recent Results (from the past 24 hour(s))   HIV Antigen/Antibody Screen    Collection Time: 01/17/22 10:31 AM   Result Value Ref Range    HIV Antigen/ Antibody Combo Screen Nonreactive Nonreactive   Lactate Dehydrogenase    Collection Time: 01/17/22 10:31 AM   Result Value Ref Range    LD, Total 169 86 - 234 Units/L   Haptoglobin    Collection Time: 01/17/22 10:31 AM   Result Value Ref Range    Haptoglobin 263 (H) 36 - 195 mg/dL   Rapid SARS-CoV-2 by PCR    Collection Time: 01/17/22 10:49 AM    Specimen: Nasal, Mid-turbinate; Swab   Result Value Ref Range    Rapid SARS-COV-2 by PCR Not Detected Not Detected / Detected / Presumptive Positive / Inhibitors present    Isolation Guidelines      Procedural Comment     Staphylococcus aureus Methicillin Resistant (MRSA) PCR    Collection Time: 01/17/22 10:49 AM    Specimen: Nares; Swab   Result Value Ref Range    MRSA PCR Detected (A) Not Detected   SPUTUM, BACTERIAL CULTURE WITH GRAM STAIN    Collection Time: 01/17/22 12:03 PM    Specimen: Sputum   Result Value Ref Range    CULTURE WITH GRAM STAIN, SPUTUM Culture in progress.     Gram Stain       Adequate quality specimen. Acute inflammation with moderate/many neutrophils. Mixed bacteria with no predominant type.   Basic Metabolic Panel    Collection Time: 01/18/22  7:14 AM   Result Value Ref Range    Fasting Status       Sodium 128 (L) 135 - 145 mmol/L    Potassium 3.1 (L) 3.4 - 5.1 mmol/L    Chloride 96 (L) 98 - 107 mmol/L    Carbon Dioxide 23 21 - 32 mmol/L    Anion Gap 12 10 - 20 mmol/L    Glucose 88 70 - 99 mg/dL    BUN 5 (L) 6 - 20 mg/dL    Creatinine 0.54 (L) 0.67 - 1.17 mg/dL    Glomerular Filtration Rate >90 >=60    BUN/ Creatinine Ratio 9 7 - 25    Calcium 7.6 (L) 8.4 - 10.2 mg/dL   Magnesium    Collection Time: 01/18/22  7:14 AM   Result Value Ref Range    Magnesium 1.6 (L) 1.7 - 2.4 mg/dL   CBC with Automated Differential (performable only)    Collection Time: 01/18/22  7:14 AM   Result Value Ref Range    WBC 10.7 4.2 - 11.0 K/mcL    RBC 2.47 (L) 4.50 - 5.90 mil/mcL    HGB 8.0 (L) 13.0 - 17.0 g/dL    HCT 24.2 (L) 39.0 - 51.0 %    MCV 98.0 78.0 - 100.0 fl    MCH 32.4 26.0 - 34.0 pg    MCHC 33.1 32.0 - 36.5 g/dL    RDW-CV 21.2 (H) 11.0 - 15.0 %    RDW-SD 74.3 (H) 39.0 - 50.0 fL     140 - 450 K/mcL    NRBC 0 <=0 /100 WBC   Manual Differential    Collection Time: 01/18/22  7:14 AM   Result Value Ref Range    Neutrophil, Percent 89 %    Lymphocytes, Percent 6 %    Mono, Percent 3 %    Eosinophils, Percent 0 %    Basophils, Percent 0 %    Bands, Percent 2 0 - 10 %    Absolute Neutrophil 9.7 (H) 1.8 - 7.7 K/mcL    Absolute Lymphocytes 0.6 (L) 1.0 - 4.8 K/mcL    Absolute Monocytes 0.3 0.3 - 0.9 K/mcL    Absolute Eosinophils 0.0 0.0 - 0.5 K/mcL    Absolute Basophils 0.0 0.0 - 0.3 K/mcL    WBC Morphology Normal Normal    Platelet Morphology Normal Normal    Polychromasia Few        Assessment & Plan   Macrocytic Anemia / folate deficiciency  Lung lesions concerning for metastasis vs atypical infection vs septic emboli  ETOH abuse  Smoker  Hyponatremia  hypokalemia   hypomagnesemia  Elevated procal  Weight loss / moderate protein calorie malnutrition - encourage PO intake, nutrition consult, protein supplement  Lactic acidosis  Left parietal lobe cva on MRI    Appreciate GI recs  Appreciate heme, ID, neuro recs  Check quant  gold to f/o TB  Check 2d echo to eval for vegetation  PT OT  GI planning for EGD cscope this admission  Monitor HB, transfuse to keep Hb > 7  Replete folic acid  Counseled on tobacco and etoh cessation  Monitor lytes and replete  Continue gabapentin  Continue PPI  Sputum culture  Tumor markers ordered  Fungal antigens pending  HIV negative  Continue linezolid and zosyn per ID  MRI brain and CTL spine ordered, patient agreeable to have them done separately    DVT Prophylaxis  lovenox    Smoking Cessation  Ready to quit: Not Answered  Counseling given: Not Answered              Estephania Leo DO  1/18/2022 9:11 AM    no

## 2022-03-08 ENCOUNTER — OUTPATIENT (OUTPATIENT)
Dept: OUTPATIENT SERVICES | Facility: HOSPITAL | Age: 62
LOS: 1 days | Discharge: HOME | End: 2022-03-08

## 2022-03-08 DIAGNOSIS — E11.610 TYPE 2 DIABETES MELLITUS WITH DIABETIC NEUROPATHIC ARTHROPATHY: Chronic | ICD-10-CM

## 2022-03-08 DIAGNOSIS — Z89.611 ACQUIRED ABSENCE OF RIGHT LEG ABOVE KNEE: ICD-10-CM

## 2022-03-08 DIAGNOSIS — Z89.422 ACQUIRED ABSENCE OF OTHER LEFT TOE(S): Chronic | ICD-10-CM

## 2022-03-08 DIAGNOSIS — Z98.890 OTHER SPECIFIED POSTPROCEDURAL STATES: Chronic | ICD-10-CM

## 2022-03-08 DIAGNOSIS — Z94.5 SKIN TRANSPLANT STATUS: Chronic | ICD-10-CM

## 2022-04-23 NOTE — PATIENT PROFILE ADULT - DO YOU FEEL LIKE HURTING YOURSELF OR OTHERS?
Call [phone] to schedule HIDA scan    Take Protonix as prescribed daily    Return here for worsening abdominal pain, vomiting, fever or any other concern    Diet as tolerated, drink plenty of fluids  
no

## 2022-04-28 ENCOUNTER — OUTPATIENT (OUTPATIENT)
Dept: OUTPATIENT SERVICES | Facility: HOSPITAL | Age: 62
LOS: 1 days | Discharge: HOME | End: 2022-04-28

## 2022-04-28 ENCOUNTER — APPOINTMENT (OUTPATIENT)
Dept: BURN CARE | Facility: CLINIC | Age: 62
End: 2022-04-28
Payer: MEDICARE

## 2022-04-28 DIAGNOSIS — E11.610 TYPE 2 DIABETES MELLITUS WITH DIABETIC NEUROPATHIC ARTHROPATHY: Chronic | ICD-10-CM

## 2022-04-28 DIAGNOSIS — Z98.890 OTHER SPECIFIED POSTPROCEDURAL STATES: Chronic | ICD-10-CM

## 2022-04-28 DIAGNOSIS — Z94.5 SKIN TRANSPLANT STATUS: Chronic | ICD-10-CM

## 2022-04-28 DIAGNOSIS — Z89.422 ACQUIRED ABSENCE OF OTHER LEFT TOE(S): Chronic | ICD-10-CM

## 2022-04-28 PROCEDURE — 97597 DBRDMT OPN WND 1ST 20 CM/<: CPT

## 2022-05-25 ENCOUNTER — APPOINTMENT (OUTPATIENT)
Dept: INTERNAL MEDICINE | Facility: CLINIC | Age: 62
End: 2022-05-25

## 2022-05-25 ENCOUNTER — OUTPATIENT (OUTPATIENT)
Dept: OUTPATIENT SERVICES | Facility: HOSPITAL | Age: 62
LOS: 1 days | Discharge: HOME | End: 2022-05-25

## 2022-05-25 VITALS
TEMPERATURE: 98.1 F | OXYGEN SATURATION: 92 % | HEART RATE: 70 BPM | HEIGHT: 70 IN | BODY MASS INDEX: 42.95 KG/M2 | DIASTOLIC BLOOD PRESSURE: 73 MMHG | SYSTOLIC BLOOD PRESSURE: 121 MMHG | WEIGHT: 300 LBS

## 2022-05-25 DIAGNOSIS — Z98.890 OTHER SPECIFIED POSTPROCEDURAL STATES: Chronic | ICD-10-CM

## 2022-05-25 DIAGNOSIS — E11.610 TYPE 2 DIABETES MELLITUS WITH DIABETIC NEUROPATHIC ARTHROPATHY: Chronic | ICD-10-CM

## 2022-05-25 DIAGNOSIS — Z94.5 SKIN TRANSPLANT STATUS: Chronic | ICD-10-CM

## 2022-05-25 DIAGNOSIS — Z89.422 ACQUIRED ABSENCE OF OTHER LEFT TOE(S): Chronic | ICD-10-CM

## 2022-05-25 PROCEDURE — 99203 OFFICE O/P NEW LOW 30 MIN: CPT | Mod: GC

## 2022-05-25 NOTE — REVIEW OF SYSTEMS
[Fever] : no fever [Chills] : no chills [Night Sweats] : no night sweats [Discharge] : no discharge [Pain] : no pain [Vision Problems] : no vision problems [Itching] : no itching [Earache] : no earache [Hearing Loss] : no hearing loss [Nasal Discharge] : no nasal discharge [Sore Throat] : no sore throat [Chest Pain] : no chest pain [Palpitations] : no palpitations [Claudication] : no  leg claudication [Shortness Of Breath] : no shortness of breath [Wheezing] : no wheezing [Abdominal Pain] : no abdominal pain [Nausea] : no nausea [Vomiting] : no vomiting [Heartburn] : no heartburn [Dysuria] : no dysuria [Hematuria] : no hematuria

## 2022-05-25 NOTE — PHYSICAL EXAM
[No Acute Distress] : no acute distress [Well Nourished] : well nourished [Normal Sclera/Conjunctiva] : normal sclera/conjunctiva [Normal Outer Ear/Nose] : the outer ears and nose were normal in appearance [No JVD] : no jugular venous distention [No Lymphadenopathy] : no lymphadenopathy [No Respiratory Distress] : no respiratory distress  [No Accessory Muscle Use] : no accessory muscle use [Normal Rate] : normal rate  [Normal S1, S2] : normal S1 and S2 [Soft] : abdomen soft [No HSM] : no HSM [Normal Anterior Cervical Nodes] : no anterior cervical lymphadenopathy [de-identified] : Dec BS

## 2022-05-25 NOTE — ASSESSMENT
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 Reports that he has difficulty walking and was told that he needs the specialized boot. States he needs to see the endocrinologist \par \par #HTN\par - Controlled. Continue home meds\par - States that he does not remember his medications and will bring his list next time. Does not need refills\par \par #DM type II\par - Follow HbA1C\par - Endocrinology referral given \par \par #Hx of Osteomyelitis s/p R AKA\par - following with podiatry\par \par #HCM\par - GI referral for colonoscopy. Never had one done\par - RTC in 3 months. Routine labs

## 2022-05-25 NOTE — HISTORY OF PRESENT ILLNESS
[FreeTextEntry1] : Establish care [de-identified] : 60 YO M with history of HTN, DM2 complicated by neuropathy, CKD, osteomyelitis s/p R AKA presented for his initial visit\par He states no active complaints. Reports that he has difficulty walking and was told that he needs the specialized boot. States he needs to see the endocrinologist

## 2022-05-26 DIAGNOSIS — N18.9 CHRONIC KIDNEY DISEASE, UNSPECIFIED: ICD-10-CM

## 2022-05-26 DIAGNOSIS — E11.9 TYPE 2 DIABETES MELLITUS WITHOUT COMPLICATIONS: ICD-10-CM

## 2022-05-26 DIAGNOSIS — I10 ESSENTIAL (PRIMARY) HYPERTENSION: ICD-10-CM

## 2022-07-07 ENCOUNTER — APPOINTMENT (OUTPATIENT)
Dept: ENDOCRINOLOGY | Facility: CLINIC | Age: 62
End: 2022-07-07

## 2022-07-07 ENCOUNTER — OUTPATIENT (OUTPATIENT)
Dept: OUTPATIENT SERVICES | Facility: HOSPITAL | Age: 62
LOS: 1 days | Discharge: HOME | End: 2022-07-07

## 2022-07-07 VITALS
DIASTOLIC BLOOD PRESSURE: 81 MMHG | HEIGHT: 70 IN | BODY MASS INDEX: 43.67 KG/M2 | WEIGHT: 305 LBS | HEART RATE: 89 BPM | SYSTOLIC BLOOD PRESSURE: 130 MMHG

## 2022-07-07 DIAGNOSIS — Z98.890 OTHER SPECIFIED POSTPROCEDURAL STATES: Chronic | ICD-10-CM

## 2022-07-07 DIAGNOSIS — Z89.422 ACQUIRED ABSENCE OF OTHER LEFT TOE(S): Chronic | ICD-10-CM

## 2022-07-07 DIAGNOSIS — Z94.5 SKIN TRANSPLANT STATUS: Chronic | ICD-10-CM

## 2022-07-07 DIAGNOSIS — E11.610 TYPE 2 DIABETES MELLITUS WITH DIABETIC NEUROPATHIC ARTHROPATHY: Chronic | ICD-10-CM

## 2022-07-07 RX ORDER — GLIPIZIDE 10 MG/1
10 TABLET, FILM COATED, EXTENDED RELEASE ORAL
Refills: 0 | Status: DISCONTINUED | COMMUNITY
End: 2022-07-07

## 2022-07-07 NOTE — PROGRESS NOTE ADULT - SUBJECTIVE AND OBJECTIVE BOX
PROGRESS NOTE   Pt seen @ bedside during PM rounds. S/p RLE Charcot recon. Dressing +Clean, +Dry, + Intact.       Vital Signs Last 24 Hrs  T(C): 37.4 (05 Oct 2019 17:16), Max: 37.7 (05 Oct 2019 14:02)  T(F): 99.3 (05 Oct 2019 17:16), Max: 99.9 (05 Oct 2019 14:02)  HR: 95 (05 Oct 2019 17:16) (86 - 99)  BP: 125/59 (05 Oct 2019 17:16) (106/59 - 177/81)  BP(mean): --  RR: 18 (05 Oct 2019 17:16) (18 - 22)  SpO2: 100% (05 Oct 2019 01:56) (96% - 100%)                          12.0   10.58 )-----------( 165      ( 05 Oct 2019 06:54 )             39.1               10-05    141  |  102  |  27<H>  ----------------------------<  180<H>  4.1   |  29  |  1.8<H>    Ca    8.6      05 Oct 2019 06:54  Mg     2.2     10-05        PHYSICAL EXAM  LE Focused:    Assessment  S/p Charcot recon  Pin sites and surgical sites are clean without drainage. no dehiscence. Ex fix stable and intact.   Plan:  Pt evaluated  Continue strict NWB RLE   Monitoring closely  for a few days for signs of infection  Continue Vancomycin   Dressing change by podiatry  Plan D/C to home this coming week   Wheelchair on D/C  attending updated same name as above

## 2022-07-18 NOTE — HISTORY OF PRESENT ILLNESS
[FreeTextEntry1] : Patient is a 61 year old male with PMHx Diabetes, HTN, CKD, presents to the clinic for prescription of an orthotic shoe. Patient had a R leg amputation 2/2 osteomyelitis and states that his L ankle rolls outwards when walking with the prosthetic leg. Patient follows with an ophthalmologist but has not followed up with Podiatry. Patient takes Lantus 16 U and Glimepiride. HbA1c from 3/22 is 8.5. Patient denies polydipsia, weight changes and tingling/numbness in extremities.

## 2022-07-18 NOTE — ASSESSMENT
[Action and use of Insulin] : action and use of short and long-acting insulin [Insulin Self-Administration] : insulin self-administration [Weight Loss] : weight loss [Diabetes Foot Care] : diabetes foot care [Carbohydrate Consistent Diet] : carbohydrate consistent diet [Importance of Diet and Exercise] : importance of diet and exercise to improve glycemic control, achieve weight loss and improve cardiovascular health [FreeTextEntry1] : 61 year old male with PMHx Diabetes, HTN, CKD presents to the clinic for orthotic shoe prescription. HbA1c 3/22 noted to be 8.5. Patient has no active complaints. stop sulfonylurea therapy, try ozempic 0.5 to 2 mg. weekly, stop insulin

## 2022-07-18 NOTE — PHYSICAL EXAM
[Alert] : alert [No Acute Distress] : no acute distress [No Respiratory Distress] : no respiratory distress [No Accessory Muscle Use] : no accessory muscle use [Normal Rate and Effort] : normal respiratory rate and effort [Clear to Auscultation] : lungs were clear to auscultation bilaterally [Normal to Percussion] : lungs were normal to percussion [Normal PMI] : the apical impulse was normal [Normal S1, S2] : normal S1 and S2 [No Murmurs] : no murmurs [Normal Rate] : heart rate was normal [Regular Rhythm] : with a regular rhythm [No Rubs] : no pericardial rub [Normal Bowel Sounds] : normal bowel sounds [Not Tender] : non-tender [Not Distended] : not distended [No Masses] : no abdominal mass palpated [Soft] : abdomen soft [No HSM] : no hepato-splenomegaly [No Hernias] : no hernia was discovered [No Rash] : no rash [No Skin Lesions] : no skin lesions [Oriented x3] : oriented to person, place, and time [Normal Affect] : the affect was normal [Normal Insight/Judgement] : insight and judgment were intact [Normal Mood] : the mood was normal [de-identified] : R leg amputation [de-identified] : left foot neuropathic with poor circulation.

## 2022-07-26 ENCOUNTER — NON-APPOINTMENT (OUTPATIENT)
Age: 62
End: 2022-07-26

## 2022-07-29 ENCOUNTER — RX RENEWAL (OUTPATIENT)
Age: 62
End: 2022-07-29

## 2022-08-03 ENCOUNTER — RX RENEWAL (OUTPATIENT)
Age: 62
End: 2022-08-03

## 2022-08-04 ENCOUNTER — APPOINTMENT (OUTPATIENT)
Dept: NUTRITION | Facility: CLINIC | Age: 62
End: 2022-08-04

## 2022-08-04 ENCOUNTER — OUTPATIENT (OUTPATIENT)
Dept: OUTPATIENT SERVICES | Facility: HOSPITAL | Age: 62
LOS: 1 days | Discharge: HOME | End: 2022-08-04

## 2022-08-04 DIAGNOSIS — E11.610 TYPE 2 DIABETES MELLITUS WITH DIABETIC NEUROPATHIC ARTHROPATHY: Chronic | ICD-10-CM

## 2022-08-04 DIAGNOSIS — Z89.422 ACQUIRED ABSENCE OF OTHER LEFT TOE(S): Chronic | ICD-10-CM

## 2022-08-04 DIAGNOSIS — Z94.5 SKIN TRANSPLANT STATUS: Chronic | ICD-10-CM

## 2022-08-04 DIAGNOSIS — Z98.890 OTHER SPECIFIED POSTPROCEDURAL STATES: Chronic | ICD-10-CM

## 2022-08-08 ENCOUNTER — RX RENEWAL (OUTPATIENT)
Age: 62
End: 2022-08-08

## 2022-08-31 ENCOUNTER — APPOINTMENT (OUTPATIENT)
Dept: OPHTHALMOLOGY | Facility: CLINIC | Age: 62
End: 2022-08-31

## 2022-08-31 ENCOUNTER — OUTPATIENT (OUTPATIENT)
Dept: OUTPATIENT SERVICES | Facility: HOSPITAL | Age: 62
LOS: 1 days | Discharge: HOME | End: 2022-08-31

## 2022-08-31 DIAGNOSIS — Z94.5 SKIN TRANSPLANT STATUS: Chronic | ICD-10-CM

## 2022-08-31 DIAGNOSIS — Z89.422 ACQUIRED ABSENCE OF OTHER LEFT TOE(S): Chronic | ICD-10-CM

## 2022-08-31 DIAGNOSIS — Z98.890 OTHER SPECIFIED POSTPROCEDURAL STATES: Chronic | ICD-10-CM

## 2022-08-31 DIAGNOSIS — E11.610 TYPE 2 DIABETES MELLITUS WITH DIABETIC NEUROPATHIC ARTHROPATHY: Chronic | ICD-10-CM

## 2022-08-31 PROCEDURE — 92004 COMPRE OPH EXAM NEW PT 1/>: CPT

## 2022-09-21 ENCOUNTER — RX RENEWAL (OUTPATIENT)
Age: 62
End: 2022-09-21

## 2022-09-29 ENCOUNTER — RX RENEWAL (OUTPATIENT)
Age: 62
End: 2022-09-29

## 2022-10-07 RX ORDER — TIRZEPATIDE 7.5 MG/.5ML
7.5 INJECTION, SOLUTION SUBCUTANEOUS
Qty: 1 | Refills: 0 | Status: COMPLETED | COMMUNITY
Start: 2022-08-08 | End: 2022-10-07

## 2022-10-07 RX ORDER — SEMAGLUTIDE 1.34 MG/ML
4 INJECTION, SOLUTION SUBCUTANEOUS
Qty: 1 | Refills: 0 | Status: COMPLETED | COMMUNITY
Start: 2022-07-12 | End: 2022-10-07

## 2022-10-13 ENCOUNTER — NON-APPOINTMENT (OUTPATIENT)
Age: 62
End: 2022-10-13

## 2022-11-04 NOTE — PROGRESS NOTE ADULT - ASSESSMENT
60yMale  DM, hypertension, dyslipidemia, sleep apnea, Charcot foot reconstruction with external fixation, super morbid obesity presented for worsening right footinfection; since admission -  Acute Hypoxic Hypercarbic Respiratory Failure, patient is on ventilator.   Patient on antibiotics for the fever.  OR on hold due to fever.  Patient responds to verbal stimuli.     Spoke to Jose Luis Truong , she has been receiving medical updates and would like to continue medical management.   Also discussed with resident Dr Ugarte.     Morphine Equivalent Daily Dose (MEDD) on fent gtt      See Recs below. D/W primary team and bedside RN     Please call or [phone]  with questions or concerns.   We will continue to follow.    [facility]

## 2022-11-09 ENCOUNTER — NON-APPOINTMENT (OUTPATIENT)
Age: 62
End: 2022-11-09

## 2022-11-17 RX ORDER — SEMAGLUTIDE 1.34 MG/ML
2 INJECTION, SOLUTION SUBCUTANEOUS
Qty: 1 | Refills: 0 | Status: DISCONTINUED | COMMUNITY
Start: 2022-07-07 | End: 2022-11-17

## 2023-01-04 ENCOUNTER — OUTPATIENT (OUTPATIENT)
Dept: OUTPATIENT SERVICES | Facility: HOSPITAL | Age: 63
LOS: 1 days | Discharge: HOME | End: 2023-01-04

## 2023-01-04 DIAGNOSIS — Z89.422 ACQUIRED ABSENCE OF OTHER LEFT TOE(S): Chronic | ICD-10-CM

## 2023-01-04 DIAGNOSIS — Z89.611 ACQUIRED ABSENCE OF RIGHT LEG ABOVE KNEE: ICD-10-CM

## 2023-01-04 DIAGNOSIS — Z98.890 OTHER SPECIFIED POSTPROCEDURAL STATES: Chronic | ICD-10-CM

## 2023-01-04 DIAGNOSIS — E11.610 TYPE 2 DIABETES MELLITUS WITH DIABETIC NEUROPATHIC ARTHROPATHY: Chronic | ICD-10-CM

## 2023-01-04 DIAGNOSIS — Z94.5 SKIN TRANSPLANT STATUS: Chronic | ICD-10-CM

## 2023-01-11 ENCOUNTER — APPOINTMENT (OUTPATIENT)
Dept: ENDOCRINOLOGY | Facility: CLINIC | Age: 63
End: 2023-01-11

## 2023-01-11 ENCOUNTER — OUTPATIENT (OUTPATIENT)
Dept: OUTPATIENT SERVICES | Facility: HOSPITAL | Age: 63
LOS: 1 days | Discharge: HOME | End: 2023-01-11

## 2023-01-11 VITALS
SYSTOLIC BLOOD PRESSURE: 131 MMHG | HEIGHT: 70 IN | WEIGHT: 295 LBS | DIASTOLIC BLOOD PRESSURE: 84 MMHG | BODY MASS INDEX: 42.23 KG/M2 | HEART RATE: 88 BPM

## 2023-01-11 DIAGNOSIS — Z00.00 ENCOUNTER FOR GENERAL ADULT MEDICAL EXAMINATION W/OUT ABNORMAL FINDINGS: ICD-10-CM

## 2023-01-11 DIAGNOSIS — S88.119A COMPLETE TRAUMATIC AMPUTATION AT LVL BETWEEN KNEE AND ANKLE, UNSPECIFIED LOWER LEG, INITIAL ENCOUNTER: ICD-10-CM

## 2023-01-11 NOTE — PHYSICAL EXAM
[Alert] : alert [No Respiratory Distress] : no respiratory distress [Normal Rate] : heart rate was normal [Not Tender] : non-tender [Not Distended] : not distended [Soft] : abdomen soft [de-identified] : left eye deviation (valentino)  [de-identified] : matt clarke

## 2023-01-11 NOTE — HISTORY OF PRESENT ILLNESS
[FreeTextEntry1] : Patient is a 62 year old male with PMHx Diabetes, HTN, CKD, presents to [facility] for f/u.\par \par Pt taking pioglitazone, farxiga, and ozempic. \par Pt feels "great" no active complaints

## 2023-01-11 NOTE — ASSESSMENT
[FreeTextEntry1] : \par #DM\par -a1c 3/22 8.5\par -c/w farxiga 10\par -c/w mounjara\par -c/w pioglitazone \par -repeat labs

## 2023-01-12 ENCOUNTER — LABORATORY RESULT (OUTPATIENT)
Age: 63
End: 2023-01-12

## 2023-01-16 ENCOUNTER — RX RENEWAL (OUTPATIENT)
Age: 63
End: 2023-01-16

## 2023-01-16 LAB
ALBUMIN SERPL ELPH-MCNC: 4.2 G/DL
ALP BLD-CCNC: 87 U/L
ALT SERPL-CCNC: 19 U/L
ANION GAP SERPL CALC-SCNC: 12 MMOL/L
APPEARANCE: CLEAR
AST SERPL-CCNC: 24 U/L
BASOPHILS # BLD AUTO: 0.03 K/UL
BASOPHILS NFR BLD AUTO: 0.4 %
BILIRUB SERPL-MCNC: 0.7 MG/DL
BILIRUBIN URINE: NEGATIVE
BLOOD URINE: ABNORMAL
BUN SERPL-MCNC: 20 MG/DL
CALCIUM SERPL-MCNC: 9.3 MG/DL
CHLORIDE SERPL-SCNC: 102 MMOL/L
CHOLEST SERPL-MCNC: 221 MG/DL
CO2 SERPL-SCNC: 26 MMOL/L
COLOR: NORMAL
CREAT SERPL-MCNC: 1.27 MG/DL
CREAT SPEC-SCNC: 89 MG/DL
EGFR: 63 ML/MIN/1.73M2
EOSINOPHIL # BLD AUTO: 0.29 K/UL
EOSINOPHIL NFR BLD AUTO: 3.9 %
ESTIMATED AVERAGE GLUCOSE: 163 MG/DL
GLUCOSE QUALITATIVE U: ABNORMAL
GLUCOSE SERPL-MCNC: 162 MG/DL
HBA1C MFR BLD HPLC: 7.3 %
HCT VFR BLD CALC: 46.2 %
HDLC SERPL-MCNC: 44 MG/DL
HGB BLD-MCNC: 14.4 G/DL
IMM GRANULOCYTES NFR BLD AUTO: 0.3 %
KETONES URINE: NEGATIVE
LDLC SERPL CALC-MCNC: 122 MG/DL
LEUKOCYTE ESTERASE URINE: NEGATIVE
LYMPHOCYTES # BLD AUTO: 1.83 K/UL
LYMPHOCYTES NFR BLD AUTO: 24.3 %
MAN DIFF?: NORMAL
MCHC RBC-ENTMCNC: 28.7 PG
MCHC RBC-ENTMCNC: 31.2 G/DL
MCV RBC AUTO: 92.2 FL
MICROALBUMIN 24H UR DL<=1MG/L-MCNC: 26.8 MG/DL
MICROALBUMIN/CREAT 24H UR-RTO: 300 MG/G
MONOCYTES # BLD AUTO: 0.68 K/UL
MONOCYTES NFR BLD AUTO: 9 %
NEUTROPHILS # BLD AUTO: 4.67 K/UL
NEUTROPHILS NFR BLD AUTO: 62.1 %
NITRITE URINE: NEGATIVE
NONHDLC SERPL-MCNC: 176 MG/DL
PH URINE: 6
PLATELET # BLD AUTO: 194 K/UL
POTASSIUM SERPL-SCNC: 4.2 MMOL/L
PROT SERPL-MCNC: 8.1 G/DL
PROTEIN URINE: ABNORMAL
PSA SERPL-MCNC: 0.93 NG/ML
RBC # BLD: 5.01 M/UL
RBC # FLD: 14.5 %
SODIUM SERPL-SCNC: 140 MMOL/L
SPECIFIC GRAVITY URINE: 1.03
TRIGL SERPL-MCNC: 273 MG/DL
TSH SERPL-ACNC: 1.91 UIU/ML
UROBILINOGEN URINE: NORMAL
WBC # FLD AUTO: 7.52 K/UL

## 2023-01-19 ENCOUNTER — NON-APPOINTMENT (OUTPATIENT)
Age: 63
End: 2023-01-19

## 2023-02-02 ENCOUNTER — OUTPATIENT (OUTPATIENT)
Dept: OUTPATIENT SERVICES | Facility: HOSPITAL | Age: 63
LOS: 1 days | End: 2023-02-02

## 2023-02-02 DIAGNOSIS — Z89.611 ACQUIRED ABSENCE OF RIGHT LEG ABOVE KNEE: ICD-10-CM

## 2023-02-02 DIAGNOSIS — Z94.5 SKIN TRANSPLANT STATUS: Chronic | ICD-10-CM

## 2023-02-02 DIAGNOSIS — E11.610 TYPE 2 DIABETES MELLITUS WITH DIABETIC NEUROPATHIC ARTHROPATHY: Chronic | ICD-10-CM

## 2023-02-02 DIAGNOSIS — Z98.890 OTHER SPECIFIED POSTPROCEDURAL STATES: Chronic | ICD-10-CM

## 2023-02-02 DIAGNOSIS — Z89.422 ACQUIRED ABSENCE OF OTHER LEFT TOE(S): Chronic | ICD-10-CM

## 2023-02-07 ENCOUNTER — OUTPATIENT (OUTPATIENT)
Dept: OUTPATIENT SERVICES | Facility: HOSPITAL | Age: 63
LOS: 1 days | End: 2023-02-07
Payer: MEDICARE

## 2023-02-07 DIAGNOSIS — E11.610 TYPE 2 DIABETES MELLITUS WITH DIABETIC NEUROPATHIC ARTHROPATHY: Chronic | ICD-10-CM

## 2023-02-07 DIAGNOSIS — Z98.890 OTHER SPECIFIED POSTPROCEDURAL STATES: Chronic | ICD-10-CM

## 2023-02-07 DIAGNOSIS — Z00.00 ENCOUNTER FOR GENERAL ADULT MEDICAL EXAMINATION WITHOUT ABNORMAL FINDINGS: ICD-10-CM

## 2023-02-07 DIAGNOSIS — Z89.422 ACQUIRED ABSENCE OF OTHER LEFT TOE(S): Chronic | ICD-10-CM

## 2023-02-07 DIAGNOSIS — Z94.5 SKIN TRANSPLANT STATUS: Chronic | ICD-10-CM

## 2023-02-07 PROCEDURE — 97110 THERAPEUTIC EXERCISES: CPT | Mod: GP

## 2023-02-08 DIAGNOSIS — Z00.00 ENCOUNTER FOR GENERAL ADULT MEDICAL EXAMINATION WITHOUT ABNORMAL FINDINGS: ICD-10-CM

## 2023-02-09 ENCOUNTER — OUTPATIENT (OUTPATIENT)
Dept: OUTPATIENT SERVICES | Facility: HOSPITAL | Age: 63
LOS: 1 days | End: 2023-02-09

## 2023-02-09 DIAGNOSIS — E11.610 TYPE 2 DIABETES MELLITUS WITH DIABETIC NEUROPATHIC ARTHROPATHY: Chronic | ICD-10-CM

## 2023-02-09 DIAGNOSIS — Z00.00 ENCOUNTER FOR GENERAL ADULT MEDICAL EXAMINATION WITHOUT ABNORMAL FINDINGS: ICD-10-CM

## 2023-02-09 DIAGNOSIS — Z89.422 ACQUIRED ABSENCE OF OTHER LEFT TOE(S): Chronic | ICD-10-CM

## 2023-02-09 DIAGNOSIS — Z98.890 OTHER SPECIFIED POSTPROCEDURAL STATES: Chronic | ICD-10-CM

## 2023-02-09 DIAGNOSIS — Z94.5 SKIN TRANSPLANT STATUS: Chronic | ICD-10-CM

## 2023-02-11 DIAGNOSIS — Z89.611 ACQUIRED ABSENCE OF RIGHT LEG ABOVE KNEE: ICD-10-CM

## 2023-02-27 DIAGNOSIS — Z89.611 ACQUIRED ABSENCE OF RIGHT LEG ABOVE KNEE: ICD-10-CM

## 2023-02-28 ENCOUNTER — OUTPATIENT (OUTPATIENT)
Dept: OUTPATIENT SERVICES | Facility: HOSPITAL | Age: 63
LOS: 1 days | End: 2023-02-28

## 2023-02-28 DIAGNOSIS — Z89.422 ACQUIRED ABSENCE OF OTHER LEFT TOE(S): Chronic | ICD-10-CM

## 2023-02-28 DIAGNOSIS — Z89.611 ACQUIRED ABSENCE OF RIGHT LEG ABOVE KNEE: ICD-10-CM

## 2023-02-28 DIAGNOSIS — Z94.5 SKIN TRANSPLANT STATUS: Chronic | ICD-10-CM

## 2023-02-28 DIAGNOSIS — E11.610 TYPE 2 DIABETES MELLITUS WITH DIABETIC NEUROPATHIC ARTHROPATHY: Chronic | ICD-10-CM

## 2023-02-28 DIAGNOSIS — Z98.890 OTHER SPECIFIED POSTPROCEDURAL STATES: Chronic | ICD-10-CM

## 2023-03-02 ENCOUNTER — OUTPATIENT (OUTPATIENT)
Dept: OUTPATIENT SERVICES | Facility: HOSPITAL | Age: 63
LOS: 1 days | End: 2023-03-02
Payer: MEDICARE

## 2023-03-02 DIAGNOSIS — Z98.890 OTHER SPECIFIED POSTPROCEDURAL STATES: Chronic | ICD-10-CM

## 2023-03-02 DIAGNOSIS — Z89.422 ACQUIRED ABSENCE OF OTHER LEFT TOE(S): Chronic | ICD-10-CM

## 2023-03-02 DIAGNOSIS — Z94.5 SKIN TRANSPLANT STATUS: Chronic | ICD-10-CM

## 2023-03-02 DIAGNOSIS — Z89.611 ACQUIRED ABSENCE OF RIGHT LEG ABOVE KNEE: ICD-10-CM

## 2023-03-02 DIAGNOSIS — E11.610 TYPE 2 DIABETES MELLITUS WITH DIABETIC NEUROPATHIC ARTHROPATHY: Chronic | ICD-10-CM

## 2023-03-02 PROCEDURE — 97116 GAIT TRAINING THERAPY: CPT | Mod: GP

## 2023-03-07 ENCOUNTER — OUTPATIENT (OUTPATIENT)
Dept: OUTPATIENT SERVICES | Facility: HOSPITAL | Age: 63
LOS: 1 days | End: 2023-03-07

## 2023-03-07 DIAGNOSIS — Z89.422 ACQUIRED ABSENCE OF OTHER LEFT TOE(S): Chronic | ICD-10-CM

## 2023-03-07 DIAGNOSIS — Z89.611 ACQUIRED ABSENCE OF RIGHT LEG ABOVE KNEE: ICD-10-CM

## 2023-03-07 DIAGNOSIS — E11.610 TYPE 2 DIABETES MELLITUS WITH DIABETIC NEUROPATHIC ARTHROPATHY: Chronic | ICD-10-CM

## 2023-03-07 DIAGNOSIS — Z98.890 OTHER SPECIFIED POSTPROCEDURAL STATES: Chronic | ICD-10-CM

## 2023-03-07 DIAGNOSIS — Z94.5 SKIN TRANSPLANT STATUS: Chronic | ICD-10-CM

## 2023-03-09 ENCOUNTER — OUTPATIENT (OUTPATIENT)
Dept: OUTPATIENT SERVICES | Facility: HOSPITAL | Age: 63
LOS: 1 days | End: 2023-03-09

## 2023-03-09 DIAGNOSIS — Z98.890 OTHER SPECIFIED POSTPROCEDURAL STATES: Chronic | ICD-10-CM

## 2023-03-09 DIAGNOSIS — Z89.611 ACQUIRED ABSENCE OF RIGHT LEG ABOVE KNEE: ICD-10-CM

## 2023-03-09 DIAGNOSIS — Z89.422 ACQUIRED ABSENCE OF OTHER LEFT TOE(S): Chronic | ICD-10-CM

## 2023-03-09 DIAGNOSIS — Z94.5 SKIN TRANSPLANT STATUS: Chronic | ICD-10-CM

## 2023-03-09 DIAGNOSIS — E11.610 TYPE 2 DIABETES MELLITUS WITH DIABETIC NEUROPATHIC ARTHROPATHY: Chronic | ICD-10-CM

## 2023-03-10 ENCOUNTER — APPOINTMENT (OUTPATIENT)
Dept: GASTROENTEROLOGY | Facility: CLINIC | Age: 63
End: 2023-03-10

## 2023-03-21 ENCOUNTER — OUTPATIENT (OUTPATIENT)
Dept: OUTPATIENT SERVICES | Facility: HOSPITAL | Age: 63
LOS: 1 days | End: 2023-03-21

## 2023-03-21 DIAGNOSIS — Z89.422 ACQUIRED ABSENCE OF OTHER LEFT TOE(S): Chronic | ICD-10-CM

## 2023-03-21 DIAGNOSIS — Z89.611 ACQUIRED ABSENCE OF RIGHT LEG ABOVE KNEE: ICD-10-CM

## 2023-03-21 DIAGNOSIS — Z94.5 SKIN TRANSPLANT STATUS: Chronic | ICD-10-CM

## 2023-03-21 DIAGNOSIS — E11.610 TYPE 2 DIABETES MELLITUS WITH DIABETIC NEUROPATHIC ARTHROPATHY: Chronic | ICD-10-CM

## 2023-03-21 DIAGNOSIS — Z98.890 OTHER SPECIFIED POSTPROCEDURAL STATES: Chronic | ICD-10-CM

## 2023-03-21 DIAGNOSIS — Z00.00 ENCOUNTER FOR GENERAL ADULT MEDICAL EXAMINATION WITHOUT ABNORMAL FINDINGS: ICD-10-CM

## 2023-03-22 DIAGNOSIS — Z00.00 ENCOUNTER FOR GENERAL ADULT MEDICAL EXAMINATION WITHOUT ABNORMAL FINDINGS: ICD-10-CM

## 2023-03-23 ENCOUNTER — OUTPATIENT (OUTPATIENT)
Dept: OUTPATIENT SERVICES | Facility: HOSPITAL | Age: 63
LOS: 1 days | End: 2023-03-23

## 2023-03-23 DIAGNOSIS — Z89.422 ACQUIRED ABSENCE OF OTHER LEFT TOE(S): Chronic | ICD-10-CM

## 2023-03-23 DIAGNOSIS — Z89.611 ACQUIRED ABSENCE OF RIGHT LEG ABOVE KNEE: ICD-10-CM

## 2023-03-23 DIAGNOSIS — Z94.5 SKIN TRANSPLANT STATUS: Chronic | ICD-10-CM

## 2023-03-23 DIAGNOSIS — E11.610 TYPE 2 DIABETES MELLITUS WITH DIABETIC NEUROPATHIC ARTHROPATHY: Chronic | ICD-10-CM

## 2023-03-23 DIAGNOSIS — Z00.00 ENCOUNTER FOR GENERAL ADULT MEDICAL EXAMINATION WITHOUT ABNORMAL FINDINGS: ICD-10-CM

## 2023-03-23 DIAGNOSIS — Z98.890 OTHER SPECIFIED POSTPROCEDURAL STATES: Chronic | ICD-10-CM

## 2023-03-24 DIAGNOSIS — Z00.00 ENCOUNTER FOR GENERAL ADULT MEDICAL EXAMINATION WITHOUT ABNORMAL FINDINGS: ICD-10-CM

## 2023-03-24 DIAGNOSIS — Z89.611 ACQUIRED ABSENCE OF RIGHT LEG ABOVE KNEE: ICD-10-CM

## 2023-03-26 NOTE — PROGRESS NOTE ADULT - SUBJECTIVE AND OBJECTIVE BOX
OVERNIGHT EVENTS: Events noted, on RA, feels better    Vital Signs Last 24 Hrs  T(C): 37.2 (20 Jun 2021 04:00), Max: 37.3 (19 Jun 2021 08:00)  T(F): 98.9 (20 Jun 2021 04:00), Max: 99.2 (19 Jun 2021 08:00)  HR: 82 (20 Jun 2021 05:00) (68 - 82)  BP: 184/85 (20 Jun 2021 05:00) (147/66 - 186/86)  BP(mean): 111 (20 Jun 2021 05:00) (85 - 129)  RR: 32 (20 Jun 2021 05:00) (18 - 33)  SpO2: 98% (20 Jun 2021 05:00) (96% - 100%)    PHYSICAL EXAMINATION:    GENERAL: comfortablr    HEENT: Head is normocephalic and atraumatic.     NECK: Supple.    LUNGS: dec bs both bases    HEART: Regular rate and rhythm without murmur.    ABDOMEN: Soft, nontender, and nondistended.      EXTREMITIES: r aka    NEUROLOGIC: follows commands        LABS:    06-19    140  |  105  |  13  ----------------------------<  143<H>  4.0   |  29  |  0.8    Ca    8.2<L>      19 Jun 2021 17:04    TPro  7.3  /  Alb  2.5<L>  /  TBili  0.5  /  DBili  x   /  AST  43<H>  /  ALT  51<H>  /  AlkPhos  97  06-19 06-18-21 @ 07:01 - 06-19-21 @ 07:00  --------------------------------------------------------  IN: 3270 mL / OUT: 2000 mL / NET: 1270 mL    06-19-21 @ 07:01  - 06-20-21 @ 06:31  --------------------------------------------------------  IN: 2865 mL / OUT: 2200 mL / NET: 665 mL        MICROBIOLOGY:  Culture Results:   No growth to date. (06-18 @ 04:51)      MEDICATIONS  (STANDING):  ALBUTerol    90 MICROgram(s) HFA Inhaler 1 Puff(s) Inhalation once  caspofungin IVPB 50 milliGRAM(s) IV Intermittent every 24 hours  chlorhexidine 4% Liquid 1 Application(s) Topical <User Schedule>  DAPTOmycin IVPB 875 milliGRAM(s) IV Intermittent every 24 hours  glucagon  Injectable 1 milliGRAM(s) IntraMuscular once  heparin   Injectable 5000 Unit(s) SubCutaneous every 8 hours  insulin glargine Injectable (LANTUS) 18 Unit(s) SubCutaneous at bedtime  insulin lispro (ADMELOG) corrective regimen sliding scale   SubCutaneous every 6 hours  insulin lispro Injectable (ADMELOG) 5 Unit(s) SubCutaneous every 6 hours  lactated ringers. 1000 milliLiter(s) (75 mL/Hr) IV Continuous <Continuous>  pantoprazole   Suspension 40 milliGRAM(s) Oral daily  tiotropium 18 MICROgram(s) Capsule 1 Capsule(s) Inhalation once    MEDICATIONS  (PRN):  acetaminophen   Tablet .. 650 milliGRAM(s) Oral every 6 hours PRN Temp greater or equal to 38C (100.4F), Mild Pain (1 - 3)  acetaminophen  Suppository .. 650 milliGRAM(s) Rectal every 6 hours PRN Temp greater or equal to 38C (100.4F), Mild Pain (1 - 3)      RADIOLOGY & ADDITIONAL STUDIES:     No

## 2023-03-28 ENCOUNTER — OUTPATIENT (OUTPATIENT)
Dept: OUTPATIENT SERVICES | Facility: HOSPITAL | Age: 63
LOS: 1 days | End: 2023-03-28

## 2023-03-28 DIAGNOSIS — E11.610 TYPE 2 DIABETES MELLITUS WITH DIABETIC NEUROPATHIC ARTHROPATHY: Chronic | ICD-10-CM

## 2023-03-28 DIAGNOSIS — Z98.890 OTHER SPECIFIED POSTPROCEDURAL STATES: Chronic | ICD-10-CM

## 2023-03-28 DIAGNOSIS — Z89.611 ACQUIRED ABSENCE OF RIGHT LEG ABOVE KNEE: ICD-10-CM

## 2023-03-28 DIAGNOSIS — Z89.422 ACQUIRED ABSENCE OF OTHER LEFT TOE(S): Chronic | ICD-10-CM

## 2023-03-28 DIAGNOSIS — Z94.5 SKIN TRANSPLANT STATUS: Chronic | ICD-10-CM

## 2023-03-28 DIAGNOSIS — Z00.00 ENCOUNTER FOR GENERAL ADULT MEDICAL EXAMINATION WITHOUT ABNORMAL FINDINGS: ICD-10-CM

## 2023-03-29 DIAGNOSIS — Z89.611 ACQUIRED ABSENCE OF RIGHT LEG ABOVE KNEE: ICD-10-CM

## 2023-03-29 DIAGNOSIS — Z00.00 ENCOUNTER FOR GENERAL ADULT MEDICAL EXAMINATION WITHOUT ABNORMAL FINDINGS: ICD-10-CM

## 2023-04-06 ENCOUNTER — OUTPATIENT (OUTPATIENT)
Dept: OUTPATIENT SERVICES | Facility: HOSPITAL | Age: 63
LOS: 1 days | End: 2023-04-06
Payer: MEDICARE

## 2023-04-06 DIAGNOSIS — Z89.422 ACQUIRED ABSENCE OF OTHER LEFT TOE(S): Chronic | ICD-10-CM

## 2023-04-06 DIAGNOSIS — Z89.611 ACQUIRED ABSENCE OF RIGHT LEG ABOVE KNEE: ICD-10-CM

## 2023-04-06 DIAGNOSIS — Z94.5 SKIN TRANSPLANT STATUS: Chronic | ICD-10-CM

## 2023-04-06 DIAGNOSIS — Z98.890 OTHER SPECIFIED POSTPROCEDURAL STATES: Chronic | ICD-10-CM

## 2023-04-06 DIAGNOSIS — E11.610 TYPE 2 DIABETES MELLITUS WITH DIABETIC NEUROPATHIC ARTHROPATHY: Chronic | ICD-10-CM

## 2023-04-06 PROCEDURE — 97116 GAIT TRAINING THERAPY: CPT | Mod: GP

## 2023-04-13 ENCOUNTER — OUTPATIENT (OUTPATIENT)
Dept: OUTPATIENT SERVICES | Facility: HOSPITAL | Age: 63
LOS: 1 days | End: 2023-04-13

## 2023-04-13 DIAGNOSIS — Z89.611 ACQUIRED ABSENCE OF RIGHT LEG ABOVE KNEE: ICD-10-CM

## 2023-04-13 DIAGNOSIS — Z89.422 ACQUIRED ABSENCE OF OTHER LEFT TOE(S): Chronic | ICD-10-CM

## 2023-04-13 DIAGNOSIS — Z94.5 SKIN TRANSPLANT STATUS: Chronic | ICD-10-CM

## 2023-04-13 DIAGNOSIS — E11.610 TYPE 2 DIABETES MELLITUS WITH DIABETIC NEUROPATHIC ARTHROPATHY: Chronic | ICD-10-CM

## 2023-04-13 DIAGNOSIS — Z98.890 OTHER SPECIFIED POSTPROCEDURAL STATES: Chronic | ICD-10-CM

## 2023-04-27 ENCOUNTER — OUTPATIENT (OUTPATIENT)
Dept: OUTPATIENT SERVICES | Facility: HOSPITAL | Age: 63
LOS: 1 days | End: 2023-04-27

## 2023-04-27 DIAGNOSIS — E11.610 TYPE 2 DIABETES MELLITUS WITH DIABETIC NEUROPATHIC ARTHROPATHY: Chronic | ICD-10-CM

## 2023-04-27 DIAGNOSIS — Z98.890 OTHER SPECIFIED POSTPROCEDURAL STATES: Chronic | ICD-10-CM

## 2023-04-27 DIAGNOSIS — Z94.5 SKIN TRANSPLANT STATUS: Chronic | ICD-10-CM

## 2023-04-27 DIAGNOSIS — Z89.422 ACQUIRED ABSENCE OF OTHER LEFT TOE(S): Chronic | ICD-10-CM

## 2023-04-27 DIAGNOSIS — Z89.611 ACQUIRED ABSENCE OF RIGHT LEG ABOVE KNEE: ICD-10-CM

## 2023-05-02 ENCOUNTER — OUTPATIENT (OUTPATIENT)
Dept: OUTPATIENT SERVICES | Facility: HOSPITAL | Age: 63
LOS: 1 days | End: 2023-05-02
Payer: MEDICARE

## 2023-05-02 DIAGNOSIS — E11.610 TYPE 2 DIABETES MELLITUS WITH DIABETIC NEUROPATHIC ARTHROPATHY: Chronic | ICD-10-CM

## 2023-05-02 DIAGNOSIS — Z89.611 ACQUIRED ABSENCE OF RIGHT LEG ABOVE KNEE: ICD-10-CM

## 2023-05-02 DIAGNOSIS — Z89.422 ACQUIRED ABSENCE OF OTHER LEFT TOE(S): Chronic | ICD-10-CM

## 2023-05-02 DIAGNOSIS — Z98.890 OTHER SPECIFIED POSTPROCEDURAL STATES: Chronic | ICD-10-CM

## 2023-05-02 DIAGNOSIS — Z94.5 SKIN TRANSPLANT STATUS: Chronic | ICD-10-CM

## 2023-05-02 PROCEDURE — 97116 GAIT TRAINING THERAPY: CPT | Mod: GP

## 2023-05-11 ENCOUNTER — OUTPATIENT (OUTPATIENT)
Dept: OUTPATIENT SERVICES | Facility: HOSPITAL | Age: 63
LOS: 1 days | End: 2023-05-11

## 2023-05-11 DIAGNOSIS — E11.610 TYPE 2 DIABETES MELLITUS WITH DIABETIC NEUROPATHIC ARTHROPATHY: Chronic | ICD-10-CM

## 2023-05-11 DIAGNOSIS — Z98.890 OTHER SPECIFIED POSTPROCEDURAL STATES: Chronic | ICD-10-CM

## 2023-05-11 DIAGNOSIS — Z89.611 ACQUIRED ABSENCE OF RIGHT LEG ABOVE KNEE: ICD-10-CM

## 2023-05-11 DIAGNOSIS — Z94.5 SKIN TRANSPLANT STATUS: Chronic | ICD-10-CM

## 2023-05-11 DIAGNOSIS — Z89.422 ACQUIRED ABSENCE OF OTHER LEFT TOE(S): Chronic | ICD-10-CM

## 2023-05-16 ENCOUNTER — OUTPATIENT (OUTPATIENT)
Dept: OUTPATIENT SERVICES | Facility: HOSPITAL | Age: 63
LOS: 1 days | End: 2023-05-16

## 2023-05-16 DIAGNOSIS — E11.610 TYPE 2 DIABETES MELLITUS WITH DIABETIC NEUROPATHIC ARTHROPATHY: Chronic | ICD-10-CM

## 2023-05-16 DIAGNOSIS — Z89.611 ACQUIRED ABSENCE OF RIGHT LEG ABOVE KNEE: ICD-10-CM

## 2023-05-16 DIAGNOSIS — Z98.890 OTHER SPECIFIED POSTPROCEDURAL STATES: Chronic | ICD-10-CM

## 2023-05-16 DIAGNOSIS — Z94.5 SKIN TRANSPLANT STATUS: Chronic | ICD-10-CM

## 2023-05-16 DIAGNOSIS — Z89.422 ACQUIRED ABSENCE OF OTHER LEFT TOE(S): Chronic | ICD-10-CM

## 2023-05-18 ENCOUNTER — OUTPATIENT (OUTPATIENT)
Dept: OUTPATIENT SERVICES | Facility: HOSPITAL | Age: 63
LOS: 1 days | End: 2023-05-18

## 2023-05-18 DIAGNOSIS — Z98.890 OTHER SPECIFIED POSTPROCEDURAL STATES: Chronic | ICD-10-CM

## 2023-05-18 DIAGNOSIS — Z89.611 ACQUIRED ABSENCE OF RIGHT LEG ABOVE KNEE: ICD-10-CM

## 2023-05-18 DIAGNOSIS — Z94.5 SKIN TRANSPLANT STATUS: Chronic | ICD-10-CM

## 2023-05-18 DIAGNOSIS — E11.610 TYPE 2 DIABETES MELLITUS WITH DIABETIC NEUROPATHIC ARTHROPATHY: Chronic | ICD-10-CM

## 2023-05-18 DIAGNOSIS — Z89.422 ACQUIRED ABSENCE OF OTHER LEFT TOE(S): Chronic | ICD-10-CM

## 2023-05-23 ENCOUNTER — OUTPATIENT (OUTPATIENT)
Dept: OUTPATIENT SERVICES | Facility: HOSPITAL | Age: 63
LOS: 1 days | End: 2023-05-23

## 2023-05-23 DIAGNOSIS — Z94.5 SKIN TRANSPLANT STATUS: Chronic | ICD-10-CM

## 2023-05-23 DIAGNOSIS — Z89.611 ACQUIRED ABSENCE OF RIGHT LEG ABOVE KNEE: ICD-10-CM

## 2023-05-23 DIAGNOSIS — Z98.890 OTHER SPECIFIED POSTPROCEDURAL STATES: Chronic | ICD-10-CM

## 2023-05-23 DIAGNOSIS — E11.610 TYPE 2 DIABETES MELLITUS WITH DIABETIC NEUROPATHIC ARTHROPATHY: Chronic | ICD-10-CM

## 2023-05-23 DIAGNOSIS — Z89.422 ACQUIRED ABSENCE OF OTHER LEFT TOE(S): Chronic | ICD-10-CM

## 2023-05-30 ENCOUNTER — OUTPATIENT (OUTPATIENT)
Dept: OUTPATIENT SERVICES | Facility: HOSPITAL | Age: 63
LOS: 1 days | End: 2023-05-30

## 2023-05-30 DIAGNOSIS — Z94.5 SKIN TRANSPLANT STATUS: Chronic | ICD-10-CM

## 2023-05-30 DIAGNOSIS — Z98.890 OTHER SPECIFIED POSTPROCEDURAL STATES: Chronic | ICD-10-CM

## 2023-05-30 DIAGNOSIS — Z89.422 ACQUIRED ABSENCE OF OTHER LEFT TOE(S): Chronic | ICD-10-CM

## 2023-05-30 DIAGNOSIS — Z89.611 ACQUIRED ABSENCE OF RIGHT LEG ABOVE KNEE: ICD-10-CM

## 2023-06-13 ENCOUNTER — OUTPATIENT (OUTPATIENT)
Dept: OUTPATIENT SERVICES | Facility: HOSPITAL | Age: 63
LOS: 1 days | End: 2023-06-13
Payer: MEDICARE

## 2023-06-13 DIAGNOSIS — Z98.890 OTHER SPECIFIED POSTPROCEDURAL STATES: Chronic | ICD-10-CM

## 2023-06-13 DIAGNOSIS — Z89.422 ACQUIRED ABSENCE OF OTHER LEFT TOE(S): Chronic | ICD-10-CM

## 2023-06-13 DIAGNOSIS — Z89.611 ACQUIRED ABSENCE OF RIGHT LEG ABOVE KNEE: ICD-10-CM

## 2023-06-13 DIAGNOSIS — E11.610 TYPE 2 DIABETES MELLITUS WITH DIABETIC NEUROPATHIC ARTHROPATHY: Chronic | ICD-10-CM

## 2023-06-13 DIAGNOSIS — Z94.5 SKIN TRANSPLANT STATUS: Chronic | ICD-10-CM

## 2023-06-13 PROCEDURE — 97116 GAIT TRAINING THERAPY: CPT | Mod: GP

## 2023-06-15 ENCOUNTER — OUTPATIENT (OUTPATIENT)
Dept: OUTPATIENT SERVICES | Facility: HOSPITAL | Age: 63
LOS: 1 days | End: 2023-06-15

## 2023-06-15 DIAGNOSIS — Z89.422 ACQUIRED ABSENCE OF OTHER LEFT TOE(S): Chronic | ICD-10-CM

## 2023-06-15 DIAGNOSIS — Z94.5 SKIN TRANSPLANT STATUS: Chronic | ICD-10-CM

## 2023-06-15 DIAGNOSIS — Z89.611 ACQUIRED ABSENCE OF RIGHT LEG ABOVE KNEE: ICD-10-CM

## 2023-06-15 DIAGNOSIS — Z98.890 OTHER SPECIFIED POSTPROCEDURAL STATES: Chronic | ICD-10-CM

## 2023-06-15 DIAGNOSIS — E11.610 TYPE 2 DIABETES MELLITUS WITH DIABETIC NEUROPATHIC ARTHROPATHY: Chronic | ICD-10-CM

## 2023-06-18 ENCOUNTER — RX RENEWAL (OUTPATIENT)
Age: 63
End: 2023-06-18

## 2023-06-27 ENCOUNTER — OUTPATIENT (OUTPATIENT)
Dept: OUTPATIENT SERVICES | Facility: HOSPITAL | Age: 63
LOS: 1 days | End: 2023-06-27

## 2023-06-27 DIAGNOSIS — Z94.5 SKIN TRANSPLANT STATUS: Chronic | ICD-10-CM

## 2023-06-27 DIAGNOSIS — Z89.611 ACQUIRED ABSENCE OF RIGHT LEG ABOVE KNEE: ICD-10-CM

## 2023-06-27 DIAGNOSIS — Z89.422 ACQUIRED ABSENCE OF OTHER LEFT TOE(S): Chronic | ICD-10-CM

## 2023-06-27 DIAGNOSIS — Z98.890 OTHER SPECIFIED POSTPROCEDURAL STATES: Chronic | ICD-10-CM

## 2023-06-27 DIAGNOSIS — E11.610 TYPE 2 DIABETES MELLITUS WITH DIABETIC NEUROPATHIC ARTHROPATHY: Chronic | ICD-10-CM

## 2023-06-29 ENCOUNTER — OUTPATIENT (OUTPATIENT)
Dept: OUTPATIENT SERVICES | Facility: HOSPITAL | Age: 63
LOS: 1 days | End: 2023-06-29

## 2023-06-29 DIAGNOSIS — Z94.5 SKIN TRANSPLANT STATUS: Chronic | ICD-10-CM

## 2023-06-29 DIAGNOSIS — Z98.890 OTHER SPECIFIED POSTPROCEDURAL STATES: Chronic | ICD-10-CM

## 2023-06-29 DIAGNOSIS — E11.610 TYPE 2 DIABETES MELLITUS WITH DIABETIC NEUROPATHIC ARTHROPATHY: Chronic | ICD-10-CM

## 2023-06-29 DIAGNOSIS — Z89.611 ACQUIRED ABSENCE OF RIGHT LEG ABOVE KNEE: ICD-10-CM

## 2023-06-29 DIAGNOSIS — Z89.422 ACQUIRED ABSENCE OF OTHER LEFT TOE(S): Chronic | ICD-10-CM

## 2023-07-13 ENCOUNTER — OUTPATIENT (OUTPATIENT)
Dept: OUTPATIENT SERVICES | Facility: HOSPITAL | Age: 63
LOS: 1 days | End: 2023-07-13
Payer: MEDICARE

## 2023-07-13 DIAGNOSIS — E11.610 TYPE 2 DIABETES MELLITUS WITH DIABETIC NEUROPATHIC ARTHROPATHY: Chronic | ICD-10-CM

## 2023-07-13 DIAGNOSIS — Z89.611 ACQUIRED ABSENCE OF RIGHT LEG ABOVE KNEE: ICD-10-CM

## 2023-07-13 DIAGNOSIS — Z89.422 ACQUIRED ABSENCE OF OTHER LEFT TOE(S): Chronic | ICD-10-CM

## 2023-07-13 DIAGNOSIS — Z98.890 OTHER SPECIFIED POSTPROCEDURAL STATES: Chronic | ICD-10-CM

## 2023-07-13 DIAGNOSIS — Z94.5 SKIN TRANSPLANT STATUS: Chronic | ICD-10-CM

## 2023-07-13 PROCEDURE — 97116 GAIT TRAINING THERAPY: CPT | Mod: GP

## 2023-07-13 RX ORDER — INSULIN DEGLUDEC INJECTION 100 U/ML
100 INJECTION, SOLUTION SUBCUTANEOUS
Qty: 2 | Refills: 3 | Status: ACTIVE | COMMUNITY
Start: 2023-07-13 | End: 1900-01-01

## 2023-07-14 DIAGNOSIS — Z89.611 ACQUIRED ABSENCE OF RIGHT LEG ABOVE KNEE: ICD-10-CM

## 2023-07-18 ENCOUNTER — OUTPATIENT (OUTPATIENT)
Dept: OUTPATIENT SERVICES | Facility: HOSPITAL | Age: 63
LOS: 1 days | End: 2023-07-18

## 2023-07-18 DIAGNOSIS — Z89.611 ACQUIRED ABSENCE OF RIGHT LEG ABOVE KNEE: ICD-10-CM

## 2023-07-18 DIAGNOSIS — Z89.422 ACQUIRED ABSENCE OF OTHER LEFT TOE(S): Chronic | ICD-10-CM

## 2023-07-18 DIAGNOSIS — Z98.890 OTHER SPECIFIED POSTPROCEDURAL STATES: Chronic | ICD-10-CM

## 2023-07-18 DIAGNOSIS — Z94.5 SKIN TRANSPLANT STATUS: Chronic | ICD-10-CM

## 2023-07-18 DIAGNOSIS — E11.610 TYPE 2 DIABETES MELLITUS WITH DIABETIC NEUROPATHIC ARTHROPATHY: Chronic | ICD-10-CM

## 2023-07-20 ENCOUNTER — OUTPATIENT (OUTPATIENT)
Dept: OUTPATIENT SERVICES | Facility: HOSPITAL | Age: 63
LOS: 1 days | End: 2023-07-20

## 2023-07-20 DIAGNOSIS — Z98.890 OTHER SPECIFIED POSTPROCEDURAL STATES: Chronic | ICD-10-CM

## 2023-07-20 DIAGNOSIS — Z89.611 ACQUIRED ABSENCE OF RIGHT LEG ABOVE KNEE: ICD-10-CM

## 2023-07-20 DIAGNOSIS — E11.610 TYPE 2 DIABETES MELLITUS WITH DIABETIC NEUROPATHIC ARTHROPATHY: Chronic | ICD-10-CM

## 2023-07-20 DIAGNOSIS — Z89.422 ACQUIRED ABSENCE OF OTHER LEFT TOE(S): Chronic | ICD-10-CM

## 2023-07-20 DIAGNOSIS — Z94.5 SKIN TRANSPLANT STATUS: Chronic | ICD-10-CM

## 2023-07-27 ENCOUNTER — OUTPATIENT (OUTPATIENT)
Dept: OUTPATIENT SERVICES | Facility: HOSPITAL | Age: 63
LOS: 1 days | End: 2023-07-27
Payer: MEDICARE

## 2023-07-27 ENCOUNTER — APPOINTMENT (OUTPATIENT)
Dept: ENDOCRINOLOGY | Facility: CLINIC | Age: 63
End: 2023-07-27

## 2023-07-27 VITALS — DIASTOLIC BLOOD PRESSURE: 91 MMHG | HEART RATE: 66 BPM | SYSTOLIC BLOOD PRESSURE: 155 MMHG

## 2023-07-27 DIAGNOSIS — R19.7 DIARRHEA, UNSPECIFIED: ICD-10-CM

## 2023-07-27 DIAGNOSIS — Z94.5 SKIN TRANSPLANT STATUS: Chronic | ICD-10-CM

## 2023-07-27 DIAGNOSIS — Z89.422 ACQUIRED ABSENCE OF OTHER LEFT TOE(S): Chronic | ICD-10-CM

## 2023-07-27 DIAGNOSIS — E78.2 MIXED HYPERLIPIDEMIA: ICD-10-CM

## 2023-07-27 DIAGNOSIS — Z00.00 ENCOUNTER FOR GENERAL ADULT MEDICAL EXAMINATION WITHOUT ABNORMAL FINDINGS: ICD-10-CM

## 2023-07-27 DIAGNOSIS — E11.610 TYPE 2 DIABETES MELLITUS WITH DIABETIC NEUROPATHIC ARTHROPATHY: Chronic | ICD-10-CM

## 2023-07-27 DIAGNOSIS — Z98.890 OTHER SPECIFIED POSTPROCEDURAL STATES: Chronic | ICD-10-CM

## 2023-07-27 PROCEDURE — 99214 OFFICE O/P EST MOD 30 MIN: CPT

## 2023-07-27 NOTE — HISTORY OF PRESENT ILLNESS
[FreeTextEntry1] : Patient is a 62 year old male with PMHx Diabetes, HTN, CKD, presents to endo clinic for f/u. States he has been having diarrhea over past 6 weeks. His dose was decreased from 15 to 12.5 when the diarrhea started, but the diarrhea has still not improved. His last Mounjaro dose was 2 weeks go, but he is still having diarrhea.\par \par Pt taking pioglitazone, farxiga, and ozempic. \par Pt feels "great" no active complaints

## 2023-07-27 NOTE — REVIEW OF SYSTEMS
[Diarrhea] : diarrhea [Fatigue] : no fatigue [Chest Pain] : no chest pain [Shortness Of Breath] : no shortness of breath

## 2023-07-27 NOTE — PHYSICAL EXAM
[Alert] : alert [Obese] : obese [No Acute Distress] : no acute distress [Supple] : the neck was supple [No Respiratory Distress] : no respiratory distress [No Accessory Muscle Use] : no accessory muscle use [Clear to Auscultation] : lungs were clear to auscultation bilaterally [Normal S1, S2] : normal S1 and S2 [Normal Rate] : heart rate was normal [Regular Rhythm] : with a regular rhythm [Normal Bowel Sounds] : normal bowel sounds [Not Tender] : non-tender [Not Distended] : not distended [Soft] : abdomen soft [de-identified] : R AKA Universal Safety Interventions

## 2023-07-27 NOTE — ASSESSMENT
[FreeTextEntry1] : Patient is a 62 year old male with PMHx Diabetes, HTN, CKD, presents to endo clinic for f/u. States he has been having diarrhea over past 6 weeks.\par \par #DM\par - medication side effect - diarrhea\par - Sending stool ova/parasite, stool culture, GI PCR, C diff\par -  A1c 01/2023 7.3\par - c/w farxiga \par - c/w pioglitazone \par \par \par RTC PRN\par \par

## 2023-08-01 ENCOUNTER — OUTPATIENT (OUTPATIENT)
Dept: OUTPATIENT SERVICES | Facility: HOSPITAL | Age: 63
LOS: 1 days | End: 2023-08-01
Payer: MEDICARE

## 2023-08-01 DIAGNOSIS — Z00.00 ENCOUNTER FOR GENERAL ADULT MEDICAL EXAMINATION WITHOUT ABNORMAL FINDINGS: ICD-10-CM

## 2023-08-01 DIAGNOSIS — E11.610 TYPE 2 DIABETES MELLITUS WITH DIABETIC NEUROPATHIC ARTHROPATHY: Chronic | ICD-10-CM

## 2023-08-01 DIAGNOSIS — Z98.890 OTHER SPECIFIED POSTPROCEDURAL STATES: Chronic | ICD-10-CM

## 2023-08-01 DIAGNOSIS — Z94.5 SKIN TRANSPLANT STATUS: Chronic | ICD-10-CM

## 2023-08-01 DIAGNOSIS — Z89.422 ACQUIRED ABSENCE OF OTHER LEFT TOE(S): Chronic | ICD-10-CM

## 2023-08-01 DIAGNOSIS — E11.65 TYPE 2 DIABETES MELLITUS WITH HYPERGLYCEMIA: ICD-10-CM

## 2023-08-01 DIAGNOSIS — E66.9 OBESITY, UNSPECIFIED: ICD-10-CM

## 2023-08-01 DIAGNOSIS — Z89.611 ACQUIRED ABSENCE OF RIGHT LEG ABOVE KNEE: ICD-10-CM

## 2023-08-01 PROCEDURE — 87507 IADNA-DNA/RNA PROBE TQ 12-25: CPT

## 2023-08-01 PROCEDURE — 87046 STOOL CULTR AEROBIC BACT EA: CPT

## 2023-08-01 PROCEDURE — 87177 OVA AND PARASITES SMEARS: CPT

## 2023-08-01 PROCEDURE — 87077 CULTURE AEROBIC IDENTIFY: CPT

## 2023-08-01 PROCEDURE — 97116 GAIT TRAINING THERAPY: CPT | Mod: GP

## 2023-08-01 PROCEDURE — 87493 C DIFF AMPLIFIED PROBE: CPT | Mod: XU

## 2023-08-01 PROCEDURE — 87045 FECES CULTURE AEROBIC BACT: CPT

## 2023-08-02 DIAGNOSIS — Z89.611 ACQUIRED ABSENCE OF RIGHT LEG ABOVE KNEE: ICD-10-CM

## 2023-08-02 DIAGNOSIS — Z00.00 ENCOUNTER FOR GENERAL ADULT MEDICAL EXAMINATION WITHOUT ABNORMAL FINDINGS: ICD-10-CM

## 2023-08-02 LAB — CDIFF BY PCR: NOT DETECTED

## 2023-08-03 ENCOUNTER — OUTPATIENT (OUTPATIENT)
Dept: OUTPATIENT SERVICES | Facility: HOSPITAL | Age: 63
LOS: 1 days | End: 2023-08-03

## 2023-08-03 DIAGNOSIS — Z94.5 SKIN TRANSPLANT STATUS: Chronic | ICD-10-CM

## 2023-08-03 DIAGNOSIS — Z98.890 OTHER SPECIFIED POSTPROCEDURAL STATES: Chronic | ICD-10-CM

## 2023-08-03 DIAGNOSIS — E11.610 TYPE 2 DIABETES MELLITUS WITH DIABETIC NEUROPATHIC ARTHROPATHY: Chronic | ICD-10-CM

## 2023-08-03 DIAGNOSIS — Z89.611 ACQUIRED ABSENCE OF RIGHT LEG ABOVE KNEE: ICD-10-CM

## 2023-08-03 DIAGNOSIS — Z89.422 ACQUIRED ABSENCE OF OTHER LEFT TOE(S): Chronic | ICD-10-CM

## 2023-08-04 DIAGNOSIS — A04.4 OTHER INTESTINAL ESCHERICHIA COLI INFECTIONS: ICD-10-CM

## 2023-08-04 DIAGNOSIS — A49.8 OTHER BACTERIAL INFECTIONS OF UNSPECIFIED SITE: ICD-10-CM

## 2023-08-04 LAB
BACTERIA STL CULT: NORMAL
DEPRECATED O AND P PREP STL: NORMAL
ENTEROPATHOGENIC E. COLI (EPEC): DETECTED
GI PCR PANEL: DETECTED

## 2023-08-04 RX ORDER — CIPROFLOXACIN HYDROCHLORIDE 500 MG/1
500 TABLET, FILM COATED ORAL TWICE DAILY
Qty: 14 | Refills: 0 | Status: ACTIVE | COMMUNITY
Start: 2023-08-04 | End: 1900-01-01

## 2023-08-08 ENCOUNTER — OUTPATIENT (OUTPATIENT)
Dept: OUTPATIENT SERVICES | Facility: HOSPITAL | Age: 63
LOS: 1 days | End: 2023-08-08

## 2023-08-08 DIAGNOSIS — Z94.5 SKIN TRANSPLANT STATUS: Chronic | ICD-10-CM

## 2023-08-08 DIAGNOSIS — Z98.890 OTHER SPECIFIED POSTPROCEDURAL STATES: Chronic | ICD-10-CM

## 2023-08-08 DIAGNOSIS — Z89.611 ACQUIRED ABSENCE OF RIGHT LEG ABOVE KNEE: ICD-10-CM

## 2023-08-08 DIAGNOSIS — E11.610 TYPE 2 DIABETES MELLITUS WITH DIABETIC NEUROPATHIC ARTHROPATHY: Chronic | ICD-10-CM

## 2023-08-10 ENCOUNTER — OUTPATIENT (OUTPATIENT)
Dept: OUTPATIENT SERVICES | Facility: HOSPITAL | Age: 63
LOS: 1 days | End: 2023-08-10

## 2023-08-10 DIAGNOSIS — Z89.611 ACQUIRED ABSENCE OF RIGHT LEG ABOVE KNEE: ICD-10-CM

## 2023-08-10 DIAGNOSIS — Z94.5 SKIN TRANSPLANT STATUS: Chronic | ICD-10-CM

## 2023-08-10 DIAGNOSIS — E11.610 TYPE 2 DIABETES MELLITUS WITH DIABETIC NEUROPATHIC ARTHROPATHY: Chronic | ICD-10-CM

## 2023-08-10 DIAGNOSIS — Z89.422 ACQUIRED ABSENCE OF OTHER LEFT TOE(S): Chronic | ICD-10-CM

## 2023-08-10 DIAGNOSIS — Z98.890 OTHER SPECIFIED POSTPROCEDURAL STATES: Chronic | ICD-10-CM

## 2023-08-15 ENCOUNTER — OUTPATIENT (OUTPATIENT)
Dept: OUTPATIENT SERVICES | Facility: HOSPITAL | Age: 63
LOS: 1 days | End: 2023-08-15

## 2023-08-15 DIAGNOSIS — Z98.890 OTHER SPECIFIED POSTPROCEDURAL STATES: Chronic | ICD-10-CM

## 2023-08-15 DIAGNOSIS — Z89.611 ACQUIRED ABSENCE OF RIGHT LEG ABOVE KNEE: ICD-10-CM

## 2023-08-15 DIAGNOSIS — Z89.422 ACQUIRED ABSENCE OF OTHER LEFT TOE(S): Chronic | ICD-10-CM

## 2023-08-15 DIAGNOSIS — E11.610 TYPE 2 DIABETES MELLITUS WITH DIABETIC NEUROPATHIC ARTHROPATHY: Chronic | ICD-10-CM

## 2023-08-17 ENCOUNTER — OUTPATIENT (OUTPATIENT)
Dept: OUTPATIENT SERVICES | Facility: HOSPITAL | Age: 63
LOS: 1 days | End: 2023-08-17
Payer: MEDICARE

## 2023-08-17 ENCOUNTER — OUTPATIENT (OUTPATIENT)
Dept: OUTPATIENT SERVICES | Facility: HOSPITAL | Age: 63
LOS: 1 days | End: 2023-08-17

## 2023-08-17 DIAGNOSIS — Z89.422 ACQUIRED ABSENCE OF OTHER LEFT TOE(S): Chronic | ICD-10-CM

## 2023-08-17 DIAGNOSIS — Z89.611 ACQUIRED ABSENCE OF RIGHT LEG ABOVE KNEE: ICD-10-CM

## 2023-08-17 DIAGNOSIS — Z94.5 SKIN TRANSPLANT STATUS: Chronic | ICD-10-CM

## 2023-08-17 DIAGNOSIS — Z98.890 OTHER SPECIFIED POSTPROCEDURAL STATES: Chronic | ICD-10-CM

## 2023-08-17 DIAGNOSIS — E11.610 TYPE 2 DIABETES MELLITUS WITH DIABETIC NEUROPATHIC ARTHROPATHY: Chronic | ICD-10-CM

## 2023-08-17 PROCEDURE — 97167 OT EVAL HIGH COMPLEX 60 MIN: CPT | Mod: GO

## 2023-08-18 DIAGNOSIS — Z89.611 ACQUIRED ABSENCE OF RIGHT LEG ABOVE KNEE: ICD-10-CM

## 2023-08-24 ENCOUNTER — OUTPATIENT (OUTPATIENT)
Dept: OUTPATIENT SERVICES | Facility: HOSPITAL | Age: 63
LOS: 1 days | End: 2023-08-24

## 2023-08-24 DIAGNOSIS — Z89.422 ACQUIRED ABSENCE OF OTHER LEFT TOE(S): Chronic | ICD-10-CM

## 2023-08-24 DIAGNOSIS — Z98.890 OTHER SPECIFIED POSTPROCEDURAL STATES: Chronic | ICD-10-CM

## 2023-08-24 DIAGNOSIS — Z89.611 ACQUIRED ABSENCE OF RIGHT LEG ABOVE KNEE: ICD-10-CM

## 2023-08-24 DIAGNOSIS — Z94.5 SKIN TRANSPLANT STATUS: Chronic | ICD-10-CM

## 2023-08-24 DIAGNOSIS — E11.610 TYPE 2 DIABETES MELLITUS WITH DIABETIC NEUROPATHIC ARTHROPATHY: Chronic | ICD-10-CM

## 2023-08-31 ENCOUNTER — OUTPATIENT (OUTPATIENT)
Dept: OUTPATIENT SERVICES | Facility: HOSPITAL | Age: 63
LOS: 1 days | End: 2023-08-31

## 2023-08-31 DIAGNOSIS — Z89.611 ACQUIRED ABSENCE OF RIGHT LEG ABOVE KNEE: ICD-10-CM

## 2023-08-31 DIAGNOSIS — Z89.422 ACQUIRED ABSENCE OF OTHER LEFT TOE(S): Chronic | ICD-10-CM

## 2023-08-31 DIAGNOSIS — Z94.5 SKIN TRANSPLANT STATUS: Chronic | ICD-10-CM

## 2023-08-31 DIAGNOSIS — Z98.890 OTHER SPECIFIED POSTPROCEDURAL STATES: Chronic | ICD-10-CM

## 2023-08-31 DIAGNOSIS — E11.610 TYPE 2 DIABETES MELLITUS WITH DIABETIC NEUROPATHIC ARTHROPATHY: Chronic | ICD-10-CM

## 2023-09-01 ENCOUNTER — APPOINTMENT (OUTPATIENT)
Dept: GASTROENTEROLOGY | Facility: CLINIC | Age: 63
End: 2023-09-01

## 2023-09-05 ENCOUNTER — OUTPATIENT (OUTPATIENT)
Dept: OUTPATIENT SERVICES | Facility: HOSPITAL | Age: 63
LOS: 1 days | End: 2023-09-05
Payer: MEDICARE

## 2023-09-05 DIAGNOSIS — Z94.5 SKIN TRANSPLANT STATUS: Chronic | ICD-10-CM

## 2023-09-05 DIAGNOSIS — E11.610 TYPE 2 DIABETES MELLITUS WITH DIABETIC NEUROPATHIC ARTHROPATHY: Chronic | ICD-10-CM

## 2023-09-05 DIAGNOSIS — Z89.611 ACQUIRED ABSENCE OF RIGHT LEG ABOVE KNEE: ICD-10-CM

## 2023-09-05 DIAGNOSIS — Z98.890 OTHER SPECIFIED POSTPROCEDURAL STATES: Chronic | ICD-10-CM

## 2023-09-05 DIAGNOSIS — Z89.422 ACQUIRED ABSENCE OF OTHER LEFT TOE(S): Chronic | ICD-10-CM

## 2023-09-05 PROCEDURE — 97116 GAIT TRAINING THERAPY: CPT | Mod: GP

## 2023-09-06 DIAGNOSIS — Z89.611 ACQUIRED ABSENCE OF RIGHT LEG ABOVE KNEE: ICD-10-CM

## 2023-09-07 ENCOUNTER — OUTPATIENT (OUTPATIENT)
Dept: OUTPATIENT SERVICES | Facility: HOSPITAL | Age: 63
LOS: 1 days | End: 2023-09-07

## 2023-09-07 DIAGNOSIS — E11.610 TYPE 2 DIABETES MELLITUS WITH DIABETIC NEUROPATHIC ARTHROPATHY: Chronic | ICD-10-CM

## 2023-09-07 DIAGNOSIS — Z98.890 OTHER SPECIFIED POSTPROCEDURAL STATES: Chronic | ICD-10-CM

## 2023-09-07 DIAGNOSIS — Z89.611 ACQUIRED ABSENCE OF RIGHT LEG ABOVE KNEE: ICD-10-CM

## 2023-09-07 DIAGNOSIS — Z94.5 SKIN TRANSPLANT STATUS: Chronic | ICD-10-CM

## 2023-09-07 DIAGNOSIS — Z89.422 ACQUIRED ABSENCE OF OTHER LEFT TOE(S): Chronic | ICD-10-CM

## 2023-09-12 DIAGNOSIS — Z12.5 ENCOUNTER FOR SCREENING FOR MALIGNANT NEOPLASM OF PROSTATE: ICD-10-CM

## 2023-09-12 RX ORDER — HYDROCHLOROTHIAZIDE 12.5 MG/1
12.5 CAPSULE ORAL DAILY
Qty: 90 | Refills: 0 | Status: ACTIVE | COMMUNITY
Start: 1900-01-01 | End: 1900-01-01

## 2023-09-14 ENCOUNTER — OUTPATIENT (OUTPATIENT)
Dept: OUTPATIENT SERVICES | Facility: HOSPITAL | Age: 63
LOS: 1 days | End: 2023-09-14

## 2023-09-14 DIAGNOSIS — Z89.611 ACQUIRED ABSENCE OF RIGHT LEG ABOVE KNEE: ICD-10-CM

## 2023-09-14 DIAGNOSIS — Z94.5 SKIN TRANSPLANT STATUS: Chronic | ICD-10-CM

## 2023-09-14 DIAGNOSIS — Z98.890 OTHER SPECIFIED POSTPROCEDURAL STATES: Chronic | ICD-10-CM

## 2023-09-14 DIAGNOSIS — E11.610 TYPE 2 DIABETES MELLITUS WITH DIABETIC NEUROPATHIC ARTHROPATHY: Chronic | ICD-10-CM

## 2023-09-14 DIAGNOSIS — Z89.422 ACQUIRED ABSENCE OF OTHER LEFT TOE(S): Chronic | ICD-10-CM

## 2023-09-19 ENCOUNTER — OUTPATIENT (OUTPATIENT)
Dept: OUTPATIENT SERVICES | Facility: HOSPITAL | Age: 63
LOS: 1 days | End: 2023-09-19

## 2023-09-19 DIAGNOSIS — E11.610 TYPE 2 DIABETES MELLITUS WITH DIABETIC NEUROPATHIC ARTHROPATHY: Chronic | ICD-10-CM

## 2023-09-19 DIAGNOSIS — Z89.611 ACQUIRED ABSENCE OF RIGHT LEG ABOVE KNEE: ICD-10-CM

## 2023-09-19 DIAGNOSIS — Z98.890 OTHER SPECIFIED POSTPROCEDURAL STATES: Chronic | ICD-10-CM

## 2023-09-19 DIAGNOSIS — Z94.5 SKIN TRANSPLANT STATUS: Chronic | ICD-10-CM

## 2023-09-19 DIAGNOSIS — Z89.422 ACQUIRED ABSENCE OF OTHER LEFT TOE(S): Chronic | ICD-10-CM

## 2023-09-21 ENCOUNTER — OUTPATIENT (OUTPATIENT)
Dept: OUTPATIENT SERVICES | Facility: HOSPITAL | Age: 63
LOS: 1 days | End: 2023-09-21

## 2023-09-21 DIAGNOSIS — Z89.611 ACQUIRED ABSENCE OF RIGHT LEG ABOVE KNEE: ICD-10-CM

## 2023-09-21 DIAGNOSIS — E11.610 TYPE 2 DIABETES MELLITUS WITH DIABETIC NEUROPATHIC ARTHROPATHY: Chronic | ICD-10-CM

## 2023-09-21 DIAGNOSIS — Z89.422 ACQUIRED ABSENCE OF OTHER LEFT TOE(S): Chronic | ICD-10-CM

## 2023-09-26 ENCOUNTER — OUTPATIENT (OUTPATIENT)
Dept: OUTPATIENT SERVICES | Facility: HOSPITAL | Age: 63
LOS: 1 days | End: 2023-09-26

## 2023-09-26 DIAGNOSIS — Z89.611 ACQUIRED ABSENCE OF RIGHT LEG ABOVE KNEE: ICD-10-CM

## 2023-09-26 DIAGNOSIS — Z98.890 OTHER SPECIFIED POSTPROCEDURAL STATES: Chronic | ICD-10-CM

## 2023-09-26 DIAGNOSIS — Z89.422 ACQUIRED ABSENCE OF OTHER LEFT TOE(S): Chronic | ICD-10-CM

## 2023-09-26 DIAGNOSIS — E11.610 TYPE 2 DIABETES MELLITUS WITH DIABETIC NEUROPATHIC ARTHROPATHY: Chronic | ICD-10-CM

## 2023-09-26 DIAGNOSIS — Z94.5 SKIN TRANSPLANT STATUS: Chronic | ICD-10-CM

## 2023-09-28 ENCOUNTER — OUTPATIENT (OUTPATIENT)
Dept: OUTPATIENT SERVICES | Facility: HOSPITAL | Age: 63
LOS: 1 days | End: 2023-09-28

## 2023-09-28 DIAGNOSIS — Z89.611 ACQUIRED ABSENCE OF RIGHT LEG ABOVE KNEE: ICD-10-CM

## 2023-09-28 DIAGNOSIS — Z89.422 ACQUIRED ABSENCE OF OTHER LEFT TOE(S): Chronic | ICD-10-CM

## 2023-09-28 DIAGNOSIS — Z98.890 OTHER SPECIFIED POSTPROCEDURAL STATES: Chronic | ICD-10-CM

## 2023-09-28 DIAGNOSIS — Z94.5 SKIN TRANSPLANT STATUS: Chronic | ICD-10-CM

## 2023-09-28 DIAGNOSIS — E11.610 TYPE 2 DIABETES MELLITUS WITH DIABETIC NEUROPATHIC ARTHROPATHY: Chronic | ICD-10-CM

## 2023-10-03 ENCOUNTER — OUTPATIENT (OUTPATIENT)
Dept: OUTPATIENT SERVICES | Facility: HOSPITAL | Age: 63
LOS: 1 days | End: 2023-10-03
Payer: MEDICARE

## 2023-10-03 DIAGNOSIS — Z89.422 ACQUIRED ABSENCE OF OTHER LEFT TOE(S): Chronic | ICD-10-CM

## 2023-10-03 DIAGNOSIS — E11.610 TYPE 2 DIABETES MELLITUS WITH DIABETIC NEUROPATHIC ARTHROPATHY: Chronic | ICD-10-CM

## 2023-10-03 DIAGNOSIS — Z94.5 SKIN TRANSPLANT STATUS: Chronic | ICD-10-CM

## 2023-10-03 DIAGNOSIS — Z89.611 ACQUIRED ABSENCE OF RIGHT LEG ABOVE KNEE: ICD-10-CM

## 2023-10-03 DIAGNOSIS — Z98.890 OTHER SPECIFIED POSTPROCEDURAL STATES: Chronic | ICD-10-CM

## 2023-10-03 PROCEDURE — 97116 GAIT TRAINING THERAPY: CPT | Mod: GP

## 2023-10-04 DIAGNOSIS — Z89.611 ACQUIRED ABSENCE OF RIGHT LEG ABOVE KNEE: ICD-10-CM

## 2023-10-10 ENCOUNTER — OUTPATIENT (OUTPATIENT)
Dept: OUTPATIENT SERVICES | Facility: HOSPITAL | Age: 63
LOS: 1 days | End: 2023-10-10

## 2023-10-10 DIAGNOSIS — Z89.422 ACQUIRED ABSENCE OF OTHER LEFT TOE(S): Chronic | ICD-10-CM

## 2023-10-10 DIAGNOSIS — Z89.611 ACQUIRED ABSENCE OF RIGHT LEG ABOVE KNEE: ICD-10-CM

## 2023-10-10 DIAGNOSIS — E11.610 TYPE 2 DIABETES MELLITUS WITH DIABETIC NEUROPATHIC ARTHROPATHY: Chronic | ICD-10-CM

## 2023-10-10 DIAGNOSIS — Z98.890 OTHER SPECIFIED POSTPROCEDURAL STATES: Chronic | ICD-10-CM

## 2023-10-10 DIAGNOSIS — Z94.5 SKIN TRANSPLANT STATUS: Chronic | ICD-10-CM

## 2023-10-12 ENCOUNTER — OUTPATIENT (OUTPATIENT)
Dept: OUTPATIENT SERVICES | Facility: HOSPITAL | Age: 63
LOS: 1 days | End: 2023-10-12

## 2023-10-12 DIAGNOSIS — Z89.422 ACQUIRED ABSENCE OF OTHER LEFT TOE(S): Chronic | ICD-10-CM

## 2023-10-12 DIAGNOSIS — Z98.890 OTHER SPECIFIED POSTPROCEDURAL STATES: Chronic | ICD-10-CM

## 2023-10-12 DIAGNOSIS — Z94.5 SKIN TRANSPLANT STATUS: Chronic | ICD-10-CM

## 2023-10-12 DIAGNOSIS — Z89.611 ACQUIRED ABSENCE OF RIGHT LEG ABOVE KNEE: ICD-10-CM

## 2023-10-12 DIAGNOSIS — E11.610 TYPE 2 DIABETES MELLITUS WITH DIABETIC NEUROPATHIC ARTHROPATHY: Chronic | ICD-10-CM

## 2023-10-17 ENCOUNTER — OUTPATIENT (OUTPATIENT)
Dept: OUTPATIENT SERVICES | Facility: HOSPITAL | Age: 63
LOS: 1 days | End: 2023-10-17

## 2023-10-17 DIAGNOSIS — Z89.611 ACQUIRED ABSENCE OF RIGHT LEG ABOVE KNEE: ICD-10-CM

## 2023-10-17 DIAGNOSIS — Z89.422 ACQUIRED ABSENCE OF OTHER LEFT TOE(S): Chronic | ICD-10-CM

## 2023-10-17 DIAGNOSIS — E11.610 TYPE 2 DIABETES MELLITUS WITH DIABETIC NEUROPATHIC ARTHROPATHY: Chronic | ICD-10-CM

## 2023-10-17 DIAGNOSIS — Z94.5 SKIN TRANSPLANT STATUS: Chronic | ICD-10-CM

## 2023-10-17 DIAGNOSIS — Z98.890 OTHER SPECIFIED POSTPROCEDURAL STATES: Chronic | ICD-10-CM

## 2023-10-24 ENCOUNTER — OUTPATIENT (OUTPATIENT)
Dept: OUTPATIENT SERVICES | Facility: HOSPITAL | Age: 63
LOS: 1 days | End: 2023-10-24

## 2023-10-24 DIAGNOSIS — Z89.611 ACQUIRED ABSENCE OF RIGHT LEG ABOVE KNEE: ICD-10-CM

## 2023-10-24 DIAGNOSIS — Z98.890 OTHER SPECIFIED POSTPROCEDURAL STATES: Chronic | ICD-10-CM

## 2023-10-24 DIAGNOSIS — E11.610 TYPE 2 DIABETES MELLITUS WITH DIABETIC NEUROPATHIC ARTHROPATHY: Chronic | ICD-10-CM

## 2023-10-24 DIAGNOSIS — Z89.422 ACQUIRED ABSENCE OF OTHER LEFT TOE(S): Chronic | ICD-10-CM

## 2023-10-26 ENCOUNTER — OUTPATIENT (OUTPATIENT)
Dept: OUTPATIENT SERVICES | Facility: HOSPITAL | Age: 63
LOS: 1 days | End: 2023-10-26
Payer: MEDICARE

## 2023-10-26 DIAGNOSIS — Z89.611 ACQUIRED ABSENCE OF RIGHT LEG ABOVE KNEE: ICD-10-CM

## 2023-10-26 DIAGNOSIS — Z89.422 ACQUIRED ABSENCE OF OTHER LEFT TOE(S): Chronic | ICD-10-CM

## 2023-10-26 DIAGNOSIS — E11.610 TYPE 2 DIABETES MELLITUS WITH DIABETIC NEUROPATHIC ARTHROPATHY: Chronic | ICD-10-CM

## 2023-10-26 DIAGNOSIS — Z94.5 SKIN TRANSPLANT STATUS: Chronic | ICD-10-CM

## 2023-10-26 PROCEDURE — 97542 WHEELCHAIR MNGMENT TRAINING: CPT | Mod: GO

## 2023-10-31 ENCOUNTER — OUTPATIENT (OUTPATIENT)
Dept: OUTPATIENT SERVICES | Facility: HOSPITAL | Age: 63
LOS: 1 days | End: 2023-10-31

## 2023-10-31 DIAGNOSIS — Z89.611 ACQUIRED ABSENCE OF RIGHT LEG ABOVE KNEE: ICD-10-CM

## 2023-10-31 DIAGNOSIS — Z94.5 SKIN TRANSPLANT STATUS: Chronic | ICD-10-CM

## 2023-10-31 DIAGNOSIS — Z89.422 ACQUIRED ABSENCE OF OTHER LEFT TOE(S): Chronic | ICD-10-CM

## 2023-10-31 DIAGNOSIS — E11.610 TYPE 2 DIABETES MELLITUS WITH DIABETIC NEUROPATHIC ARTHROPATHY: Chronic | ICD-10-CM

## 2023-10-31 DIAGNOSIS — Z98.890 OTHER SPECIFIED POSTPROCEDURAL STATES: Chronic | ICD-10-CM

## 2023-11-09 ENCOUNTER — OUTPATIENT (OUTPATIENT)
Dept: OUTPATIENT SERVICES | Facility: HOSPITAL | Age: 63
LOS: 1 days | End: 2023-11-09
Payer: MEDICARE

## 2023-11-09 DIAGNOSIS — Z89.422 ACQUIRED ABSENCE OF OTHER LEFT TOE(S): Chronic | ICD-10-CM

## 2023-11-09 DIAGNOSIS — E11.610 TYPE 2 DIABETES MELLITUS WITH DIABETIC NEUROPATHIC ARTHROPATHY: Chronic | ICD-10-CM

## 2023-11-09 DIAGNOSIS — Z98.890 OTHER SPECIFIED POSTPROCEDURAL STATES: Chronic | ICD-10-CM

## 2023-11-09 DIAGNOSIS — Z94.5 SKIN TRANSPLANT STATUS: Chronic | ICD-10-CM

## 2023-11-09 DIAGNOSIS — Z89.611 ACQUIRED ABSENCE OF RIGHT LEG ABOVE KNEE: ICD-10-CM

## 2023-11-09 PROCEDURE — 97110 THERAPEUTIC EXERCISES: CPT | Mod: GP

## 2023-11-09 PROCEDURE — 97116 GAIT TRAINING THERAPY: CPT | Mod: GP

## 2023-11-10 DIAGNOSIS — Z89.611 ACQUIRED ABSENCE OF RIGHT LEG ABOVE KNEE: ICD-10-CM

## 2023-11-14 ENCOUNTER — OUTPATIENT (OUTPATIENT)
Dept: OUTPATIENT SERVICES | Facility: HOSPITAL | Age: 63
LOS: 1 days | End: 2023-11-14

## 2023-11-14 DIAGNOSIS — Z89.611 ACQUIRED ABSENCE OF RIGHT LEG ABOVE KNEE: ICD-10-CM

## 2023-11-14 DIAGNOSIS — Z94.5 SKIN TRANSPLANT STATUS: Chronic | ICD-10-CM

## 2023-11-14 DIAGNOSIS — Z98.890 OTHER SPECIFIED POSTPROCEDURAL STATES: Chronic | ICD-10-CM

## 2023-11-14 DIAGNOSIS — Z89.422 ACQUIRED ABSENCE OF OTHER LEFT TOE(S): Chronic | ICD-10-CM

## 2023-11-14 DIAGNOSIS — E11.610 TYPE 2 DIABETES MELLITUS WITH DIABETIC NEUROPATHIC ARTHROPATHY: Chronic | ICD-10-CM

## 2023-11-16 ENCOUNTER — OUTPATIENT (OUTPATIENT)
Dept: OUTPATIENT SERVICES | Facility: HOSPITAL | Age: 63
LOS: 1 days | End: 2023-11-16

## 2023-11-16 DIAGNOSIS — Z89.422 ACQUIRED ABSENCE OF OTHER LEFT TOE(S): Chronic | ICD-10-CM

## 2023-11-16 DIAGNOSIS — Z89.611 ACQUIRED ABSENCE OF RIGHT LEG ABOVE KNEE: ICD-10-CM

## 2023-11-16 DIAGNOSIS — E11.610 TYPE 2 DIABETES MELLITUS WITH DIABETIC NEUROPATHIC ARTHROPATHY: Chronic | ICD-10-CM

## 2023-11-16 DIAGNOSIS — Z98.890 OTHER SPECIFIED POSTPROCEDURAL STATES: Chronic | ICD-10-CM

## 2023-11-16 DIAGNOSIS — Z94.5 SKIN TRANSPLANT STATUS: Chronic | ICD-10-CM

## 2023-11-30 ENCOUNTER — OUTPATIENT (OUTPATIENT)
Dept: OUTPATIENT SERVICES | Facility: HOSPITAL | Age: 63
LOS: 1 days | End: 2023-11-30

## 2023-11-30 DIAGNOSIS — Z89.422 ACQUIRED ABSENCE OF OTHER LEFT TOE(S): Chronic | ICD-10-CM

## 2023-11-30 DIAGNOSIS — Z89.611 ACQUIRED ABSENCE OF RIGHT LEG ABOVE KNEE: ICD-10-CM

## 2023-11-30 DIAGNOSIS — Z98.890 OTHER SPECIFIED POSTPROCEDURAL STATES: Chronic | ICD-10-CM

## 2023-11-30 DIAGNOSIS — Z94.5 SKIN TRANSPLANT STATUS: Chronic | ICD-10-CM

## 2023-11-30 DIAGNOSIS — E11.610 TYPE 2 DIABETES MELLITUS WITH DIABETIC NEUROPATHIC ARTHROPATHY: Chronic | ICD-10-CM

## 2023-12-05 ENCOUNTER — APPOINTMENT (OUTPATIENT)
Age: 63
End: 2023-12-05

## 2023-12-07 ENCOUNTER — OUTPATIENT (OUTPATIENT)
Dept: OUTPATIENT SERVICES | Facility: HOSPITAL | Age: 63
LOS: 1 days | End: 2023-12-07
Payer: MEDICARE

## 2023-12-07 ENCOUNTER — APPOINTMENT (OUTPATIENT)
Age: 63
End: 2023-12-07

## 2023-12-07 DIAGNOSIS — Z89.611 ACQUIRED ABSENCE OF RIGHT LEG ABOVE KNEE: ICD-10-CM

## 2023-12-07 DIAGNOSIS — E11.610 TYPE 2 DIABETES MELLITUS WITH DIABETIC NEUROPATHIC ARTHROPATHY: Chronic | ICD-10-CM

## 2023-12-07 DIAGNOSIS — Z98.890 OTHER SPECIFIED POSTPROCEDURAL STATES: Chronic | ICD-10-CM

## 2023-12-07 DIAGNOSIS — Z89.422 ACQUIRED ABSENCE OF OTHER LEFT TOE(S): Chronic | ICD-10-CM

## 2023-12-07 DIAGNOSIS — Z94.5 SKIN TRANSPLANT STATUS: Chronic | ICD-10-CM

## 2023-12-07 PROCEDURE — 97110 THERAPEUTIC EXERCISES: CPT | Mod: GP

## 2023-12-07 PROCEDURE — 97116 GAIT TRAINING THERAPY: CPT | Mod: GP

## 2023-12-08 DIAGNOSIS — Z89.611 ACQUIRED ABSENCE OF RIGHT LEG ABOVE KNEE: ICD-10-CM

## 2023-12-12 ENCOUNTER — APPOINTMENT (OUTPATIENT)
Age: 63
End: 2023-12-12

## 2023-12-14 ENCOUNTER — OUTPATIENT (OUTPATIENT)
Dept: OUTPATIENT SERVICES | Facility: HOSPITAL | Age: 63
LOS: 1 days | End: 2023-12-14

## 2023-12-14 ENCOUNTER — APPOINTMENT (OUTPATIENT)
Age: 63
End: 2023-12-14

## 2023-12-14 DIAGNOSIS — E11.610 TYPE 2 DIABETES MELLITUS WITH DIABETIC NEUROPATHIC ARTHROPATHY: Chronic | ICD-10-CM

## 2023-12-14 DIAGNOSIS — Z89.611 ACQUIRED ABSENCE OF RIGHT LEG ABOVE KNEE: ICD-10-CM

## 2023-12-14 DIAGNOSIS — Z89.422 ACQUIRED ABSENCE OF OTHER LEFT TOE(S): Chronic | ICD-10-CM

## 2023-12-14 DIAGNOSIS — Z98.890 OTHER SPECIFIED POSTPROCEDURAL STATES: Chronic | ICD-10-CM

## 2023-12-14 DIAGNOSIS — Z94.5 SKIN TRANSPLANT STATUS: Chronic | ICD-10-CM

## 2023-12-15 DIAGNOSIS — Z89.611 ACQUIRED ABSENCE OF RIGHT LEG ABOVE KNEE: ICD-10-CM

## 2023-12-19 ENCOUNTER — OUTPATIENT (OUTPATIENT)
Dept: OUTPATIENT SERVICES | Facility: HOSPITAL | Age: 63
LOS: 1 days | End: 2023-12-19

## 2023-12-19 ENCOUNTER — APPOINTMENT (OUTPATIENT)
Age: 63
End: 2023-12-19

## 2023-12-19 DIAGNOSIS — E11.610 TYPE 2 DIABETES MELLITUS WITH DIABETIC NEUROPATHIC ARTHROPATHY: Chronic | ICD-10-CM

## 2023-12-19 DIAGNOSIS — Z89.422 ACQUIRED ABSENCE OF OTHER LEFT TOE(S): Chronic | ICD-10-CM

## 2023-12-19 DIAGNOSIS — Z94.5 SKIN TRANSPLANT STATUS: Chronic | ICD-10-CM

## 2023-12-19 DIAGNOSIS — Z98.890 OTHER SPECIFIED POSTPROCEDURAL STATES: Chronic | ICD-10-CM

## 2023-12-19 DIAGNOSIS — Z89.611 ACQUIRED ABSENCE OF RIGHT LEG ABOVE KNEE: ICD-10-CM

## 2023-12-20 DIAGNOSIS — Z89.611 ACQUIRED ABSENCE OF RIGHT LEG ABOVE KNEE: ICD-10-CM

## 2023-12-21 ENCOUNTER — APPOINTMENT (OUTPATIENT)
Age: 63
End: 2023-12-21

## 2023-12-26 ENCOUNTER — APPOINTMENT (OUTPATIENT)
Age: 63
End: 2023-12-26

## 2023-12-28 ENCOUNTER — APPOINTMENT (OUTPATIENT)
Age: 63
End: 2023-12-28

## 2024-01-02 ENCOUNTER — APPOINTMENT (OUTPATIENT)
Age: 64
End: 2024-01-02

## 2024-01-04 ENCOUNTER — APPOINTMENT (OUTPATIENT)
Age: 64
End: 2024-01-04

## 2024-01-09 ENCOUNTER — APPOINTMENT (OUTPATIENT)
Age: 64
End: 2024-01-09

## 2024-01-11 ENCOUNTER — APPOINTMENT (OUTPATIENT)
Age: 64
End: 2024-01-11

## 2024-01-16 ENCOUNTER — APPOINTMENT (OUTPATIENT)
Age: 64
End: 2024-01-16

## 2024-01-18 ENCOUNTER — APPOINTMENT (OUTPATIENT)
Age: 64
End: 2024-01-18

## 2024-01-18 RX ORDER — PEN NEEDLE, DIABETIC 29 G X1/2"
31G X 5 MM NEEDLE, DISPOSABLE MISCELLANEOUS
Qty: 100 | Refills: 2 | Status: ACTIVE | COMMUNITY
Start: 2023-07-19 | End: 1900-01-01

## 2024-01-18 RX ORDER — DAPAGLIFLOZIN 10 MG/1
10 TABLET, FILM COATED ORAL
Qty: 90 | Refills: 3 | Status: ACTIVE | COMMUNITY
Start: 2022-10-09 | End: 1900-01-01

## 2024-01-23 ENCOUNTER — APPOINTMENT (OUTPATIENT)
Age: 64
End: 2024-01-23

## 2024-01-23 ENCOUNTER — OUTPATIENT (OUTPATIENT)
Dept: OUTPATIENT SERVICES | Facility: HOSPITAL | Age: 64
LOS: 1 days | End: 2024-01-23
Payer: MEDICARE

## 2024-01-23 DIAGNOSIS — Z89.422 ACQUIRED ABSENCE OF OTHER LEFT TOE(S): Chronic | ICD-10-CM

## 2024-01-23 DIAGNOSIS — E11.610 TYPE 2 DIABETES MELLITUS WITH DIABETIC NEUROPATHIC ARTHROPATHY: Chronic | ICD-10-CM

## 2024-01-23 DIAGNOSIS — Z89.611 ACQUIRED ABSENCE OF RIGHT LEG ABOVE KNEE: ICD-10-CM

## 2024-01-23 DIAGNOSIS — Z98.890 OTHER SPECIFIED POSTPROCEDURAL STATES: Chronic | ICD-10-CM

## 2024-01-23 DIAGNOSIS — Z94.5 SKIN TRANSPLANT STATUS: Chronic | ICD-10-CM

## 2024-01-23 PROCEDURE — 97110 THERAPEUTIC EXERCISES: CPT | Mod: GP

## 2024-01-24 DIAGNOSIS — Z89.611 ACQUIRED ABSENCE OF RIGHT LEG ABOVE KNEE: ICD-10-CM

## 2024-01-25 ENCOUNTER — APPOINTMENT (OUTPATIENT)
Age: 64
End: 2024-01-25

## 2024-01-30 ENCOUNTER — APPOINTMENT (OUTPATIENT)
Age: 64
End: 2024-01-30

## 2024-01-31 ENCOUNTER — NON-APPOINTMENT (OUTPATIENT)
Age: 64
End: 2024-01-31

## 2024-01-31 DIAGNOSIS — S89.90XA UNSPECIFIED INJURY OF UNSPECIFIED LOWER LEG, INITIAL ENCOUNTER: ICD-10-CM

## 2024-02-05 ENCOUNTER — NON-APPOINTMENT (OUTPATIENT)
Age: 64
End: 2024-02-05

## 2024-02-07 ENCOUNTER — APPOINTMENT (OUTPATIENT)
Dept: ORTHOPEDIC SURGERY | Facility: CLINIC | Age: 64
End: 2024-02-07
Payer: MEDICARE

## 2024-02-07 DIAGNOSIS — M85.89 OTHER SPECIFIED DISORDERS OF BONE DENSITY AND STRUCTURE, MULTIPLE SITES: ICD-10-CM

## 2024-02-07 DIAGNOSIS — M23.92 UNSPECIFIED INTERNAL DERANGEMENT OF LEFT KNEE: ICD-10-CM

## 2024-02-07 PROCEDURE — 73560 X-RAY EXAM OF KNEE 1 OR 2: CPT | Mod: LT

## 2024-02-07 PROCEDURE — 73562 X-RAY EXAM OF KNEE 3: CPT | Mod: LT

## 2024-02-07 PROCEDURE — 99204 OFFICE O/P NEW MOD 45 MIN: CPT

## 2024-02-07 RX ORDER — LIDOCAINE 5% 700 MG/1
5 PATCH TOPICAL
Qty: 30 | Refills: 1 | Status: ACTIVE | COMMUNITY
Start: 2024-02-07 | End: 1900-01-01

## 2024-02-07 RX ORDER — ACETAMINOPHEN 500 MG/1
500 TABLET ORAL 3 TIMES DAILY
Qty: 120 | Refills: 0 | Status: ACTIVE | COMMUNITY
Start: 2024-02-07 | End: 1900-01-01

## 2024-02-07 RX ORDER — MELOXICAM 7.5 MG/1
7.5 TABLET ORAL
Qty: 30 | Refills: 0 | Status: ACTIVE | COMMUNITY
Start: 2024-02-07 | End: 1900-01-01

## 2024-02-07 RX ORDER — DICLOFENAC SODIUM 1% 10 MG/G
1 GEL TOPICAL DAILY
Qty: 1 | Refills: 4 | Status: ACTIVE | COMMUNITY
Start: 2024-02-07 | End: 1900-01-01

## 2024-02-07 RX ORDER — CHOLECALCIFEROL (VITAMIN D3) 50 MCG
50 MCG TABLET ORAL
Qty: 60 | Refills: 1 | Status: ACTIVE | COMMUNITY
Start: 2024-02-07 | End: 1900-01-01

## 2024-02-07 NOTE — DISCUSSION/SUMMARY
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  HE IS ON DISABILITY AS HIS DIABETES CAUGHT UP TO HIM AND HE HAD A RIGHT DISTAL LEG ULCER THAT PUT HIM INTO SEPSIS BACK IN MAY 2021 AND RESULTED IN A RIGHT AMPUTATION.  HIS LEFT FOOT HAD HIS LESSER TOES AMPUTATED MANY YEARS AGO BUT THAT LEFT FOOT AND LEFT LOWER EXTREMITY HAS BEEN STABLE SINCE.  HE USED TO GO TO A PODIATRIST FOR THE RIGHT FOOT BUT HE HAS BEEN TAKING CARE OF THE LEFT FOOT HIMSELF.  HIS GREAT TOENAIL IS GROWING OUT AS A HORN AND HE HAS BEEN TENDING TO THAT AND I AM ADVOCATING THAT HE SEE A PODIATRIST AS WELL AS A VASCULAR SURGEON.  HE DOES HAVE SOME VENOUS STASIS CHANGES AT HIS DISTAL LEG AND I REALLY CANNOT FEEL PULSES SO WE DO WANT HIM TO SEE A VASCULAR SURGEON AND PODIATRIST.  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  IN GENERAL HE TRANSFERS FROM THE WHEELCHAIR TO A CHAIR TO A TOILET USING HIS UPPER EXTREMITIES AND DOES NOT EVEN NEED A SLIDE BOARD DESPITE THE FACT THAT HE IS QUITE HEAVY.  IN THE BEGINNING OR MIDDLE OF JANUARY HE WAS AT THE SIDE OF HIS BED SITTING AND STARTED TO FALL ASLEEP AND FELL ONTO HIS LEFT KNEE.  NORMALLY HE WOULD BE ABLE TO PULL HIMSELF UP WITH A LADDER THAT HE HAS NEAR THE BED BUT HE CANNOT PUT ANY WEIGHT ON THE LEFT KNEE AND WAITED THERE 6 HOURS STILL FRIENDS CAME.  THE PAIN IN THE LEFT KNEE IS IMPROVING BUT IS STILL QUITE PRESENT.  IT PARTICULARLY BOTHERS HIM IF HE GOES TO THE SIDE.  HE DID RETURN TO THERAPY AND DID WALK STRAIGHT WITH THE PROSTHESIS AND THE WALKER BUT WHEN HE THOUGHT ABOUT TURNING HE KNEW THAT IT WOULD HURT HIS LEFT KNEE.  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  LEFT KNEE HAS RANGE OF MOTION BETWEEN APPROXIMATELY 20 DEGREES  DEGREES.  THE PULSES ARE FAINT.  THE KNEE IS STABLE TO VARUS VALGUS AND ANTERIOR POSTERIOR STRESSES.  THERE IS VERY LITTLE EFFUSION.  HE IS JUST VAGUELY UNCOMFORTABLE ABOUT THE KNEE BUT DOES NOT HAVE POINT TENDERNESS OR SPECIFIC JOINT LINE TENDERNESS.  WE ARE TO GET HIM AN MRI OF THE LEFT KNEE AND WE ARE GOING TO CLEAR HIM TO WORK IN PHYSICAL THERAPY WITH HIS PROSTHESIS AND A WALKER CAREFULLY.  WE ALSO ADVOCATING THAT THEY WORK ON UPPER EXTREMITY STRENGTH AND GENTLE AEROBIC CONDITIONING AND HAVE TALKED HIM ABOUT WEIGHT LOSS.  WE ARE ALSO GOING TO TREAT HIM WITH LIDODERM PATCHES AND VOLTAREN GEL MELOXICAM TYLENOL CALCIUM AND VITAMIN D  OVERALL PHYSICAL EXAM GENERAL: WELL DEVELOPED WELL NOURISHED IN NO ACUTE DISTRESS   MENTAL:ALERT AND ORIENTED X3, NORMAL AFFECT, PLEASANT AND ACCOMPANIED BY FAMILY   MUSCULOSKELETAL IN A WHEELCHAIR WITH A RIGHT LOWER EXTREMITY AMPUTATION  HEENT: NCAT, EOM INTACT, MUCOSA MOIST, NECK SUPPLE WITH ADEQUATE FREE ROM   CARDIOVASCULAR/HEMATOLOGICAL: NO JVD, NO PERIPHERAL EDEMA, GOOD CAPILLARY REFILL,  SKIN WARM AND WELL PERFUSED, NO VENOUS STASIS ULCERS, PULSES INTACT, NO PALLOR OR SUPERFICIAL NON-TRAUMATIC BRUISING   NEUROLOGICAL: FREE PASSIVE ROM WITHOUT RIGIDITY OR COG WHEEL MOTION   RESPIRATORY: NO GROSS STRIDOR OR WHEEZING OR INCREASED RESPIRATORY EFFORT OR USE OF O2, GOOD CAPIL REFILL AND COLOR   INTEGUMENTARY: SKIN INTACT WITHOUT OBVIOUS SUSPICIOUS LESIONS WHERE EXAMINED   OSTEOPENIA We discussed with them the maintenance of bone health through weightbearing activities and general health measures such as smoking cessation, diabetic control and proper weight maintenance.  We did advocate that they take vitamin D 1 to 5000 units a day and calcium citrate 500 mg a day.  This can be obtained over-the-counter.  We stressed that they should take calcium citrate not calcium carbonate which is more like carbon/chalk and is not as well absorbed.  We encourage them to speak to their primary care physician as regards the issue of whether or not they should get a bone density test and possibly be on adjunct of treatment such as Prolia, Fosamax etc.

## 2024-02-22 ENCOUNTER — APPOINTMENT (OUTPATIENT)
Dept: MRI IMAGING | Facility: CLINIC | Age: 64
End: 2024-02-22
Payer: MEDICARE

## 2024-02-22 PROCEDURE — 73721 MRI JNT OF LWR EXTRE W/O DYE: CPT | Mod: LT

## 2024-02-27 ENCOUNTER — OUTPATIENT (OUTPATIENT)
Dept: OUTPATIENT SERVICES | Facility: HOSPITAL | Age: 64
LOS: 1 days | End: 2024-02-27
Payer: MEDICARE

## 2024-02-27 DIAGNOSIS — Z94.5 SKIN TRANSPLANT STATUS: Chronic | ICD-10-CM

## 2024-02-27 DIAGNOSIS — N18.9 CHRONIC KIDNEY DISEASE, UNSPECIFIED: ICD-10-CM

## 2024-02-27 DIAGNOSIS — Z89.422 ACQUIRED ABSENCE OF OTHER LEFT TOE(S): Chronic | ICD-10-CM

## 2024-02-27 DIAGNOSIS — E11.9 TYPE 2 DIABETES MELLITUS WITHOUT COMPLICATIONS: ICD-10-CM

## 2024-02-27 DIAGNOSIS — E11.610 TYPE 2 DIABETES MELLITUS WITH DIABETIC NEUROPATHIC ARTHROPATHY: Chronic | ICD-10-CM

## 2024-02-27 DIAGNOSIS — Z98.890 OTHER SPECIFIED POSTPROCEDURAL STATES: Chronic | ICD-10-CM

## 2024-02-27 PROCEDURE — 82043 UR ALBUMIN QUANTITATIVE: CPT

## 2024-02-27 PROCEDURE — 36415 COLL VENOUS BLD VENIPUNCTURE: CPT

## 2024-02-27 PROCEDURE — 83036 HEMOGLOBIN GLYCOSYLATED A1C: CPT

## 2024-02-27 PROCEDURE — 80061 LIPID PANEL: CPT

## 2024-02-27 PROCEDURE — 80053 COMPREHEN METABOLIC PANEL: CPT

## 2024-02-28 ENCOUNTER — OUTPATIENT (OUTPATIENT)
Dept: OUTPATIENT SERVICES | Facility: HOSPITAL | Age: 64
LOS: 1 days | End: 2024-02-28
Payer: MEDICARE

## 2024-02-28 ENCOUNTER — APPOINTMENT (OUTPATIENT)
Dept: ENDOCRINOLOGY | Facility: CLINIC | Age: 64
End: 2024-02-28

## 2024-02-28 VITALS
HEART RATE: 82 BPM | DIASTOLIC BLOOD PRESSURE: 107 MMHG | BODY MASS INDEX: 42.33 KG/M2 | WEIGHT: 295 LBS | SYSTOLIC BLOOD PRESSURE: 165 MMHG

## 2024-02-28 DIAGNOSIS — E11.610 TYPE 2 DIABETES MELLITUS WITH DIABETIC NEUROPATHIC ARTHROPATHY: Chronic | ICD-10-CM

## 2024-02-28 DIAGNOSIS — E11.9 TYPE 2 DIABETES MELLITUS W/OUT COMPLICATIONS: ICD-10-CM

## 2024-02-28 DIAGNOSIS — E66.01 MORBID (SEVERE) OBESITY DUE TO EXCESS CALORIES: ICD-10-CM

## 2024-02-28 DIAGNOSIS — I10 ESSENTIAL (PRIMARY) HYPERTENSION: ICD-10-CM

## 2024-02-28 DIAGNOSIS — N18.9 CHRONIC KIDNEY DISEASE, UNSPECIFIED: ICD-10-CM

## 2024-02-28 DIAGNOSIS — Z00.00 ENCOUNTER FOR GENERAL ADULT MEDICAL EXAMINATION WITHOUT ABNORMAL FINDINGS: ICD-10-CM

## 2024-02-28 DIAGNOSIS — E11.9 TYPE 2 DIABETES MELLITUS WITHOUT COMPLICATIONS: ICD-10-CM

## 2024-02-28 DIAGNOSIS — Z98.890 OTHER SPECIFIED POSTPROCEDURAL STATES: Chronic | ICD-10-CM

## 2024-02-28 DIAGNOSIS — Z89.422 ACQUIRED ABSENCE OF OTHER LEFT TOE(S): Chronic | ICD-10-CM

## 2024-02-28 DIAGNOSIS — Z94.5 SKIN TRANSPLANT STATUS: Chronic | ICD-10-CM

## 2024-02-28 PROCEDURE — 99214 OFFICE O/P EST MOD 30 MIN: CPT

## 2024-02-28 RX ORDER — BLOOD SUGAR DIAGNOSTIC
STRIP MISCELLANEOUS DAILY
Qty: 100 | Refills: 2 | Status: ACTIVE | COMMUNITY
Start: 2024-02-28 | End: 1900-01-01

## 2024-02-28 RX ORDER — EZETIMIBE 10 MG/1
10 TABLET ORAL
Qty: 90 | Refills: 3 | Status: ACTIVE | COMMUNITY
Start: 2023-01-16 | End: 1900-01-01

## 2024-02-28 RX ORDER — PIOGLITAZONE HYDROCHLORIDE 30 MG/1
30 TABLET ORAL
Qty: 90 | Refills: 3 | Status: ACTIVE | COMMUNITY
Start: 2022-10-09 | End: 1900-01-01

## 2024-02-28 RX ORDER — BLOOD-GLUCOSE METER
W/DEVICE EACH MISCELLANEOUS
Qty: 1 | Refills: 0 | Status: ACTIVE | COMMUNITY
Start: 2024-02-28 | End: 1900-01-01

## 2024-02-28 RX ORDER — ATORVASTATIN CALCIUM 80 MG/1
80 TABLET, FILM COATED ORAL
Qty: 90 | Refills: 2 | Status: ACTIVE | COMMUNITY
Start: 2022-07-12 | End: 1900-01-01

## 2024-02-28 RX ORDER — LANCETS
EACH MISCELLANEOUS
Qty: 100 | Refills: 2 | Status: ACTIVE | COMMUNITY
Start: 2024-02-28 | End: 1900-01-01

## 2024-02-28 NOTE — PHYSICAL EXAM
[No Acute Distress] : no acute distress [Alert] : alert [Normal Sclera/Conjunctiva] : normal sclera/conjunctiva [PERRL] : pupils equal, round and reactive to light [EOMI] : extra ocular movement intact [No Neck Mass] : no neck mass was observed [No LAD] : no lymphadenopathy [No Accessory Muscle Use] : no accessory muscle use [Thyroid Not Enlarged] : the thyroid was not enlarged [No Respiratory Distress] : no respiratory distress [Normal Rate and Effort] : normal respiratory rate and effort [Clear to Auscultation] : lungs were clear to auscultation bilaterally [Normal S1, S2] : normal S1 and S2 [No Murmurs] : no murmurs [Normal Rate] : heart rate was normal [Regular Rhythm] : with a regular rhythm [Normal Bowel Sounds] : normal bowel sounds [Not Tender] : non-tender [Soft] : abdomen soft [Not Distended] : not distended [Oriented x3] : oriented to person, place, and time [Normal Mood] : the mood was normal

## 2024-02-29 LAB
ALBUMIN SERPL ELPH-MCNC: 4.1 G/DL
ALP BLD-CCNC: 85 U/L
ALT SERPL-CCNC: 17 U/L
ANION GAP SERPL CALC-SCNC: 11 MMOL/L
AST SERPL-CCNC: 24 U/L
BILIRUB SERPL-MCNC: 0.6 MG/DL
BUN SERPL-MCNC: 16 MG/DL
CALCIUM SERPL-MCNC: 8.7 MG/DL
CHLORIDE SERPL-SCNC: 100 MMOL/L
CHOLEST SERPL-MCNC: 218 MG/DL
CO2 SERPL-SCNC: 28 MMOL/L
CREAT SERPL-MCNC: 1.2 MG/DL
CREAT SPEC-SCNC: 87 MG/DL
EGFR: 67 ML/MIN/1.73M2
ESTIMATED AVERAGE GLUCOSE: 166 MG/DL
GLUCOSE SERPL-MCNC: 143 MG/DL
HBA1C MFR BLD HPLC: 7.4 %
HDLC SERPL-MCNC: 47 MG/DL
LDLC SERPL CALC-MCNC: 137 MG/DL
MICROALBUMIN 24H UR DL<=1MG/L-MCNC: 58.9 MG/DL
MICROALBUMIN/CREAT 24H UR-RTO: 679 MG/G
NONHDLC SERPL-MCNC: 171 MG/DL
POTASSIUM SERPL-SCNC: 4.4 MMOL/L
PROT SERPL-MCNC: 8 G/DL
SODIUM SERPL-SCNC: 139 MMOL/L
TRIGL SERPL-MCNC: 169 MG/DL

## 2024-03-13 DIAGNOSIS — E11.65 TYPE 2 DIABETES MELLITUS WITH HYPERGLYCEMIA: ICD-10-CM

## 2024-03-13 DIAGNOSIS — E66.01 MORBID (SEVERE) OBESITY DUE TO EXCESS CALORIES: ICD-10-CM

## 2024-03-29 ENCOUNTER — APPOINTMENT (OUTPATIENT)
Dept: INTERNAL MEDICINE | Facility: CLINIC | Age: 64
End: 2024-03-29

## 2024-04-11 ENCOUNTER — OUTPATIENT (OUTPATIENT)
Dept: OUTPATIENT SERVICES | Facility: HOSPITAL | Age: 64
LOS: 1 days | End: 2024-04-11
Payer: MEDICARE

## 2024-04-11 DIAGNOSIS — Z94.5 SKIN TRANSPLANT STATUS: Chronic | ICD-10-CM

## 2024-04-11 DIAGNOSIS — E11.610 TYPE 2 DIABETES MELLITUS WITH DIABETIC NEUROPATHIC ARTHROPATHY: Chronic | ICD-10-CM

## 2024-04-11 DIAGNOSIS — S78.119D COMPLETE TRAUMATIC AMPUTATION AT LEVEL BETWEEN UNSPECIFIED HIP AND KNEE, SUBSEQUENT ENCOUNTER: ICD-10-CM

## 2024-04-11 DIAGNOSIS — Z98.890 OTHER SPECIFIED POSTPROCEDURAL STATES: Chronic | ICD-10-CM

## 2024-04-11 DIAGNOSIS — Z89.422 ACQUIRED ABSENCE OF OTHER LEFT TOE(S): Chronic | ICD-10-CM

## 2024-04-11 PROCEDURE — 97161 PT EVAL LOW COMPLEX 20 MIN: CPT | Mod: GP

## 2024-04-12 DIAGNOSIS — S78.119D COMPLETE TRAUMATIC AMPUTATION AT LEVEL BETWEEN UNSPECIFIED HIP AND KNEE, SUBSEQUENT ENCOUNTER: ICD-10-CM

## 2024-05-14 NOTE — PHYSICAL THERAPY INITIAL EVALUATION ADULT - THERAPY FREQUENCY, PT EVAL
3-5x/week
Detail Level: Detailed
Quality 431: Preventive Care And Screening: Unhealthy Alcohol Use - Screening: Patient not identified as an unhealthy alcohol user when screened for unhealthy alcohol use using a systematic screening method
Quality 226: Preventive Care And Screening: Tobacco Use: Screening And Cessation Intervention: Patient screened for tobacco use and is an ex/non-smoker
2-3x/week

## 2024-05-20 NOTE — ASSESSMENT
[FreeTextEntry1] : 63 year old male with PMHx Diabetes, HTN, CKD, HLD presents to endo clinic for f/u.   #DM2 -HbA1c in Feb 2024 7.4, stable from 7.3 -cw Mounjaro 15 weekly, pioglitazone 30mg daily -HbA1c ordered -diarrhea from last visit resolved, likely medication side effect - stool studies unremarkable .PATIENT IS INTOLERANT TO METFORMIN DUE TO DIARRHEA, BUT IS TAKING PIOGLITAZONE, AND OZEMPIC FOR DIABETES CONTROL.   #HLD -lipid panel from Feb 2024: , cholesterol 238, triglyceride 169  -will increase atorvastatin to 80mg daily  -cw ezetimibe 10mg daily  -endorsed diet and exercise   -follow up in 6 months

## 2024-05-20 NOTE — HISTORY OF PRESENT ILLNESS
[Diabetes] : a history of diabetes [FreeTextEntry1] :  63 year old male with PMHx Diabetes, HTN, CKD, presents to endo clinic for f/u. On his last visit, patient was endorsing 6 weeks of diarrhea. Patient reports diarrhea has since resolved. He gets occassional bouts of watery stools when he eats particular foods, especially spicy or dairy products. States he is taking meds as prescribed. Concerned that his pharmacy will not have Mounjaro available for next couple of weeks however. Denies abdo pain, nausea, vomiting, constipation.

## 2024-05-20 NOTE — REVIEW OF SYSTEMS
[Fatigue] : no fatigue [Decreased Appetite] : appetite not decreased [Recent Weight Loss (___ Lbs)] : no recent weight loss [Dysphagia] : no dysphagia [Chest Pain] : no chest pain [Lower Ext Edema] : no lower extremity edema [Shortness Of Breath] : no shortness of breath [Cough] : no cough [Orthopnea] : no orthopnea [Wheezing] : no wheezing [SOB on Exertion] : no shortness of breath on exertion [Nausea] : no nausea [Constipation] : no constipation [Abdominal Pain] : no abdominal pain [Vomiting] : no vomiting [Diarrhea] : no diarrhea [Polyuria] : no polyuria [Dysuria] : no dysuria [Confusion] : no confusion [Depression] : no depression [Polydipsia] : no polydipsia [Cold Intolerance] : no cold intolerance [Heat Intolerance] : no heat intolerance

## 2024-05-21 ENCOUNTER — NON-APPOINTMENT (OUTPATIENT)
Age: 64
End: 2024-05-21

## 2024-06-24 RX ORDER — TIRZEPATIDE 12.5 MG/.5ML
12.5 INJECTION, SOLUTION SUBCUTANEOUS
Qty: 1 | Refills: 5 | Status: ACTIVE | COMMUNITY
Start: 2022-09-21 | End: 1900-01-01

## 2024-08-28 ENCOUNTER — APPOINTMENT (OUTPATIENT)
Dept: ENDOCRINOLOGY | Facility: CLINIC | Age: 64
End: 2024-08-28

## 2024-11-12 NOTE — PROGRESS NOTE ADULT - SUBJECTIVE AND OBJECTIVE BOX
Patient is scheduled for an appointment. Courtesy refill provided.      VASCULAR SURGERY PROGRESS NOTE    CC: DFU      Procedure: s/p right knee disarticulation     Events of past 24 hours: intubated, on pressors          ROS otherwise negative except per subjective and HPI      PAST MEDICAL & SURGICAL HISTORY:  MATA on CPAP    DM (diabetes mellitus)    HTN (hypertension)    High cholesterol    Charcot ankle, right    History of percutaneous angioplasty    H/O skin graft    Left toe amputee  4 TOES    Diabetic Charcot foot  Fixation with external device        Vital Signs Last 24 Hrs  T(C): 37.7 (2021 04:00), Max: 38 (2021 22:00)  T(F): 99.9 (2021 04:00), Max: 100.4 (2021 22:00)  HR: 92 (2021 09:00) (78 - 100)  BP: 113/62 (2021 08:00) (113/62 - 122/49)  BP(mean): 87 (2021 08:00) (69 - 87)  RR: 22 (2021 09:00) (13 - 27)  SpO2: 100% (2021 09:00) (98% - 100%)    Pain (0-10):            Pain Control Adequate: [] YES [] N    Diet:    I&O's Detail    2021 07:01  -  2021 07:00  --------------------------------------------------------  IN:    Enteral Tube Flush: 260 mL    FentaNYL: 1700 mL    Heparin: 34 mL    IV PiggyBack: 600 mL    Norepinephrine: 56 mL    Norepinephrine: 211 mL    Propofol: 628 mL    Vital High Protein: 770 mL  Total IN: 4259 mL    OUT:    Indwelling Catheter - Urethral (mL): 3890 mL    Other (mL): 232 mL  Total OUT: 4122 mL    Total NET: 137 mL      2021 07:01  -  2021 09:54  --------------------------------------------------------  IN:    FentaNYL: 140 mL    Norepinephrine: 26 mL    Propofol: 50 mL  Total IN: 216 mL    OUT:    Indwelling Catheter - Urethral (mL): 425 mL  Total OUT: 425 mL    Total NET: -209 mL          Bowel Movement: : [] YES [] NO  Flatus: : [] YES [] NO    PHYSICAL EXAM    Appearance: intubated, sedated  HEENT:   Normal oral mucosa, PERRL, EOMI	  Neck: Supple, - JVD;    Cardiovascular: Normal S1 S2, No JVD, No murmurs,   Respiratory: Lungs clear to auscultation/No Rales, Rhonchi, Wheezing	  Gastrointestinal:  Soft, Non-tender, + BS	  Skin: No rashes, No ecchymoses, No cyanosis  Extremities: Normal range of motion, No clubbing, cyanosis  Right lower extremity s/o knee disarticulation dressing changes wet to dry daily, changed to day, no bleeding/oozing  Neurologic: Non-focal          MEDICATIONS:   MEDICATIONS  (STANDING):  ALBUTerol    90 MICROgram(s) HFA Inhaler 1 Puff(s) Inhalation once  aspirin  chewable 81 milliGRAM(s) Oral daily  ceftaroline fosamil IVPB 200 milliGRAM(s) IV Intermittent every 12 hours  chlorhexidine 0.12% Liquid 15 milliLiter(s) Oral Mucosa two times a day  chlorhexidine 4% Liquid 1 Application(s) Topical <User Schedule>  dextrose 40% Gel 15 Gram(s) Oral once  dextrose 5%. 1000 milliLiter(s) (50 mL/Hr) IV Continuous <Continuous>  dextrose 5%. 1000 milliLiter(s) (100 mL/Hr) IV Continuous <Continuous>  dextrose 50% Injectable 25 Gram(s) IV Push once  dextrose 50% Injectable 25 Gram(s) IV Push once  dextrose 50% Injectable 12.5 Gram(s) IV Push once  fentaNYL   Infusion. 0.5 MICROgram(s)/kG/Hr (8.21 mL/Hr) IV Continuous <Continuous>  glucagon  Injectable 1 milliGRAM(s) IntraMuscular once  heparin   Injectable 5000 Unit(s) SubCutaneous every 8 hours  insulin lispro (ADMELOG) corrective regimen sliding scale   SubCutaneous three times a day before meals  insulin NPH human recombinant 12 Unit(s) SubCutaneous every 12 hours  linezolid  IVPB 600 milliGRAM(s) IV Intermittent every 12 hours  metroNIDAZOLE  IVPB 500 milliGRAM(s) IV Intermittent every 8 hours  mupirocin 2% Nasal 1 Application(s) Nasal two times a day  norepinephrine Infusion 0.05 MICROgram(s)/kG/Min (7.7 mL/Hr) IV Continuous <Continuous>  pantoprazole    Tablet 40 milliGRAM(s) Oral before breakfast  polyethylene glycol 3350 17 Gram(s) Oral daily  propofol Infusion 5.004 MICROgram(s)/kG/Min (4.93 mL/Hr) IV Continuous <Continuous>  senna 2 Tablet(s) Oral at bedtime  tiotropium 18 MICROgram(s) Capsule 1 Capsule(s) Inhalation once    MEDICATIONS  (PRN):      DVT PROPHYLAXIS: [x] YES [] NO  GI PROPHYLAXIS: [x] YES [] NO  ANTIPLATELETS: [] YES [x] NO  ANTICOAGULATION: [] YES [x] NO  ANTIBIOTICS: [x] YES [] NO    LAB/STUDIES:                        7.4    13.69 )-----------( 185      ( 2021 05:50 )             23.9     06-04    140  |  106  |  29<H>  ----------------------------<  197<H>  4.5   |  24  |  2.4<H>    Ca    7.6<L>      2021 05:50  Phos  3.2     06-04  Mg     2.4     06-04    TPro  7.1  /  Alb  2.1<L>  /  TBili  0.2  /  DBili  x   /  AST  29  /  ALT  15  /  AlkPhos  191<H>  06-04    PTT - ( 2021 12:30 )  PTT:42.1 sec  LIVER FUNCTIONS - ( 2021 05:50 )  Alb: 2.1 g/dL / Pro: 7.1 g/dL / ALK PHOS: 191 U/L / ALT: 15 U/L / AST: 29 U/L / GGT: x             Urinalysis Basic - ( 2021 01:10 )    Color: Light Yellow / Appearance: Clear / S.013 / pH: x  Gluc: x / Ketone: Negative  / Bili: Negative / Urobili: <2 mg/dL   Blood: x / Protein: 30 mg/dL / Nitrite: Negative   Leuk Esterase: Negative / RBC: 14 /HPF / WBC 0 /HPF   Sq Epi: x / Non Sq Epi: 0 /HPF / Bacteria: Negative              ABG - ( 2021 03:55 )  pH, Arterial: 7.41  pH, Blood: x     /  pCO2: 42    /  pO2: 195   / HCO3: 26    / Base Excess: 1.5   /  SaO2: 100

## 2024-11-20 DIAGNOSIS — S78.111A COMPLETE TRAUMATIC AMPUTATION AT LVL BETWEEN RIGHT HIP AND KNEE, INITIAL ENCOUNTER: ICD-10-CM

## 2024-12-17 RX ORDER — TIRZEPATIDE 15 MG/.5ML
15 INJECTION, SOLUTION SUBCUTANEOUS
Qty: 3 | Refills: 0 | Status: ACTIVE | COMMUNITY
Start: 2024-12-14

## 2024-12-20 ENCOUNTER — APPOINTMENT (OUTPATIENT)
Dept: INTERNAL MEDICINE | Facility: CLINIC | Age: 64
End: 2024-12-20

## 2025-03-17 NOTE — PROCEDURE NOTE - NSCHLORHEXIDINEBATH_GEN_A_CORE
Correct serum vials verified by patient and nurse. After obtaining consent, and per orders of Dr Castillo, injections of allergy serum given by Lois MERRITT. Patient instructed to remain in clinic for 30 minutes after injection, and to report any adverse reactions to me immediately. No change in patient status post injection.  
4% solution

## 2025-04-18 NOTE — INPATIENT CERTIFICATION FOR MEDICARE PATIENTS - NS ICMP CERT SIG IND
- S/p doses of Garamycin/vancomycin in ED, will hold further garamcyin pending ID consultation   - S/p SP catheter change and ED reported prior to change pt with gross hematuria - Hgb is stable, check coags, if gross hematuria recurs - consult Urology for evaluation   - HOLD apixaban at this time   - FC exchanged 4/16  - UA infectious, cultures pending  - ID consulted, appreciate recs  - Continue vanc for now.     I certify as stated above.

## 2025-04-23 ENCOUNTER — OUTPATIENT (OUTPATIENT)
Dept: OUTPATIENT SERVICES | Facility: HOSPITAL | Age: 65
LOS: 1 days | End: 2025-04-23

## 2025-04-23 ENCOUNTER — APPOINTMENT (OUTPATIENT)
Dept: ENDOCRINOLOGY | Facility: CLINIC | Age: 65
End: 2025-04-23

## 2025-04-23 VITALS
DIASTOLIC BLOOD PRESSURE: 104 MMHG | SYSTOLIC BLOOD PRESSURE: 195 MMHG | HEART RATE: 84 BPM | WEIGHT: 290 LBS | HEIGHT: 70 IN | BODY MASS INDEX: 41.52 KG/M2

## 2025-04-23 DIAGNOSIS — E11.610 TYPE 2 DIABETES MELLITUS WITH DIABETIC NEUROPATHIC ARTHROPATHY: Chronic | ICD-10-CM

## 2025-04-23 DIAGNOSIS — E11.9 TYPE 2 DIABETES MELLITUS W/OUT COMPLICATIONS: ICD-10-CM

## 2025-04-23 DIAGNOSIS — Z94.5 SKIN TRANSPLANT STATUS: Chronic | ICD-10-CM

## 2025-04-23 DIAGNOSIS — Z89.422 ACQUIRED ABSENCE OF OTHER LEFT TOE(S): Chronic | ICD-10-CM

## 2025-04-23 DIAGNOSIS — E66.01 MORBID (SEVERE) OBESITY DUE TO EXCESS CALORIES: ICD-10-CM

## 2025-04-23 DIAGNOSIS — Z00.00 ENCOUNTER FOR GENERAL ADULT MEDICAL EXAMINATION WITHOUT ABNORMAL FINDINGS: ICD-10-CM

## 2025-04-23 DIAGNOSIS — Z98.890 OTHER SPECIFIED POSTPROCEDURAL STATES: Chronic | ICD-10-CM

## 2025-04-23 PROCEDURE — 99214 OFFICE O/P EST MOD 30 MIN: CPT

## 2025-04-23 RX ORDER — TELMISARTAN AND HYDROCHLOROTHIAZIDE 12.5; 8 MG/1; MG/1
80-12.5 TABLET ORAL DAILY
Qty: 90 | Refills: 3 | Status: ACTIVE | COMMUNITY
Start: 2025-04-23 | End: 1900-01-01

## 2025-05-07 DIAGNOSIS — E66.01 MORBID (SEVERE) OBESITY DUE TO EXCESS CALORIES: ICD-10-CM

## 2025-05-07 DIAGNOSIS — E11.65 TYPE 2 DIABETES MELLITUS WITH HYPERGLYCEMIA: ICD-10-CM
